# Patient Record
Sex: MALE | Race: WHITE | Employment: OTHER | URBAN - METROPOLITAN AREA
[De-identification: names, ages, dates, MRNs, and addresses within clinical notes are randomized per-mention and may not be internally consistent; named-entity substitution may affect disease eponyms.]

---

## 2017-02-03 ENCOUNTER — HOSPITAL ENCOUNTER (OUTPATIENT)
Dept: GENERAL RADIOLOGY | Age: 72
Discharge: HOME | End: 2017-02-03
Payer: MEDICARE

## 2017-02-03 DIAGNOSIS — R06.02 SHORTNESS OF BREATH: ICD-10-CM

## 2017-02-03 PROCEDURE — 71020 XR CHEST PA LAT: CPT

## 2017-03-27 ENCOUNTER — HOSPITAL ENCOUNTER (OUTPATIENT)
Dept: GENERAL RADIOLOGY | Age: 72
Discharge: HOME | End: 2017-03-27
Payer: MEDICARE

## 2017-03-27 DIAGNOSIS — J18.9 PNEUMONIA: ICD-10-CM

## 2017-03-27 PROCEDURE — 71020 XR CHEST PA LAT: CPT

## 2017-05-09 ENCOUNTER — HOSPITAL ENCOUNTER (OUTPATIENT)
Dept: CT IMAGING | Age: 72
Discharge: HOME | End: 2017-05-09
Attending: INTERNAL MEDICINE
Payer: MEDICARE

## 2017-05-09 DIAGNOSIS — J44.9 COPD (CHRONIC OBSTRUCTIVE PULMONARY DISEASE) (HCC): ICD-10-CM

## 2017-05-09 PROCEDURE — 71250 CT THORAX DX C-: CPT

## 2017-11-16 ENCOUNTER — HOSPITAL ENCOUNTER (OUTPATIENT)
Age: 72
Setting detail: OUTPATIENT SURGERY
Discharge: HOME | End: 2017-11-16
Attending: SPECIALIST | Admitting: SPECIALIST
Payer: MEDICARE

## 2017-11-16 ENCOUNTER — ANESTHESIA EVENT (OUTPATIENT)
Dept: ENDOSCOPY | Age: 72
End: 2017-11-16
Payer: MEDICARE

## 2017-11-16 ENCOUNTER — ANESTHESIA (OUTPATIENT)
Dept: ENDOSCOPY | Age: 72
End: 2017-11-16
Payer: MEDICARE

## 2017-11-16 VITALS
HEIGHT: 66 IN | SYSTOLIC BLOOD PRESSURE: 162 MMHG | HEART RATE: 66 BPM | WEIGHT: 246 LBS | TEMPERATURE: 98.3 F | RESPIRATION RATE: 15 BRPM | OXYGEN SATURATION: 93 % | BODY MASS INDEX: 39.53 KG/M2 | DIASTOLIC BLOOD PRESSURE: 105 MMHG

## 2017-11-16 PROCEDURE — 88305 TISSUE EXAM BY PATHOLOGIST: CPT | Performed by: SPECIALIST

## 2017-11-16 PROCEDURE — 74011000250 HC RX REV CODE- 250

## 2017-11-16 PROCEDURE — 74011250636 HC RX REV CODE- 250/636

## 2017-11-16 PROCEDURE — 76040000019: Performed by: SPECIALIST

## 2017-11-16 PROCEDURE — 76060000031 HC ANESTHESIA FIRST 0.5 HR: Performed by: SPECIALIST

## 2017-11-16 PROCEDURE — 77030009426 HC FCPS BIOP ENDOSC BSC -B: Performed by: SPECIALIST

## 2017-11-16 RX ORDER — FLUMAZENIL 0.1 MG/ML
0.2 INJECTION INTRAVENOUS
Status: DISCONTINUED | OUTPATIENT
Start: 2017-11-16 | End: 2017-11-16 | Stop reason: HOSPADM

## 2017-11-16 RX ORDER — ATROPINE SULFATE 0.1 MG/ML
0.5 INJECTION INTRAVENOUS
Status: DISCONTINUED | OUTPATIENT
Start: 2017-11-16 | End: 2017-11-16 | Stop reason: HOSPADM

## 2017-11-16 RX ORDER — MIDAZOLAM HYDROCHLORIDE 1 MG/ML
.25-1 INJECTION, SOLUTION INTRAMUSCULAR; INTRAVENOUS
Status: DISCONTINUED | OUTPATIENT
Start: 2017-11-16 | End: 2017-11-16 | Stop reason: HOSPADM

## 2017-11-16 RX ORDER — SODIUM CHLORIDE 9 MG/ML
INJECTION, SOLUTION INTRAVENOUS
Status: DISCONTINUED | OUTPATIENT
Start: 2017-11-16 | End: 2017-11-16 | Stop reason: HOSPADM

## 2017-11-16 RX ORDER — PROPOFOL 10 MG/ML
INJECTION, EMULSION INTRAVENOUS AS NEEDED
Status: DISCONTINUED | OUTPATIENT
Start: 2017-11-16 | End: 2017-11-16 | Stop reason: HOSPADM

## 2017-11-16 RX ORDER — EPINEPHRINE 0.1 MG/ML
1 INJECTION INTRACARDIAC; INTRAVENOUS
Status: DISCONTINUED | OUTPATIENT
Start: 2017-11-16 | End: 2017-11-16 | Stop reason: HOSPADM

## 2017-11-16 RX ORDER — FENTANYL CITRATE 50 UG/ML
200 INJECTION, SOLUTION INTRAMUSCULAR; INTRAVENOUS
Status: DISCONTINUED | OUTPATIENT
Start: 2017-11-16 | End: 2017-11-16 | Stop reason: HOSPADM

## 2017-11-16 RX ORDER — SODIUM CHLORIDE 0.9 % (FLUSH) 0.9 %
5-10 SYRINGE (ML) INJECTION EVERY 8 HOURS
Status: DISCONTINUED | OUTPATIENT
Start: 2017-11-16 | End: 2017-11-16 | Stop reason: HOSPADM

## 2017-11-16 RX ORDER — SODIUM CHLORIDE 0.9 % (FLUSH) 0.9 %
5-10 SYRINGE (ML) INJECTION AS NEEDED
Status: DISCONTINUED | OUTPATIENT
Start: 2017-11-16 | End: 2017-11-16 | Stop reason: HOSPADM

## 2017-11-16 RX ORDER — SODIUM CHLORIDE 9 MG/ML
50 INJECTION, SOLUTION INTRAVENOUS CONTINUOUS
Status: DISCONTINUED | OUTPATIENT
Start: 2017-11-16 | End: 2017-11-16 | Stop reason: HOSPADM

## 2017-11-16 RX ORDER — LIDOCAINE HYDROCHLORIDE 20 MG/ML
INJECTION, SOLUTION EPIDURAL; INFILTRATION; INTRACAUDAL; PERINEURAL AS NEEDED
Status: DISCONTINUED | OUTPATIENT
Start: 2017-11-16 | End: 2017-11-16 | Stop reason: HOSPADM

## 2017-11-16 RX ORDER — NALOXONE HYDROCHLORIDE 0.4 MG/ML
0.4 INJECTION, SOLUTION INTRAMUSCULAR; INTRAVENOUS; SUBCUTANEOUS
Status: DISCONTINUED | OUTPATIENT
Start: 2017-11-16 | End: 2017-11-16 | Stop reason: HOSPADM

## 2017-11-16 RX ORDER — DEXTROMETHORPHAN/PSEUDOEPHED 2.5-7.5/.8
1.2 DROPS ORAL
Status: DISCONTINUED | OUTPATIENT
Start: 2017-11-16 | End: 2017-11-16 | Stop reason: HOSPADM

## 2017-11-16 RX ADMIN — LIDOCAINE HYDROCHLORIDE 60 MG: 20 INJECTION, SOLUTION EPIDURAL; INFILTRATION; INTRACAUDAL; PERINEURAL at 14:30

## 2017-11-16 RX ADMIN — PROPOFOL 50 MG: 10 INJECTION, EMULSION INTRAVENOUS at 14:30

## 2017-11-16 RX ADMIN — SODIUM CHLORIDE: 9 INJECTION, SOLUTION INTRAVENOUS at 14:25

## 2017-11-16 RX ADMIN — PROPOFOL 50 MG: 10 INJECTION, EMULSION INTRAVENOUS at 14:32

## 2017-11-16 NOTE — DISCHARGE INSTRUCTIONS
Quinton Kiara  [id number]  1945    COLON DISCHARGE INSTRUCTIONS  Discomfort:  Redness at IV site- apply warm compress to area; if redness or soreness persist- contact your physician  There may be a slight amount of blood passed from the rectum  Gaseous discomfort- walking, belching will help relieve any discomfort  You may not operate a vehicle for 12 hours  You may not engage in an occupation involving machinery or appliances for rest of today  You may not drink alcoholic beverages for at least 12 hours  Avoid making any critical decisions for at least 24 hour  DIET:   High fiber diet. - however -  remember your colon is empty and a heavy meal will produce gas. Avoid these foods:  vegetables, fried / greasy foods, carbonated drinks for today. MEDICATIONS:      Regarding Aspirin or Nonsteroidal medications, please see below. ACTIVITY:  You may resume your normal daily activities it is recommended that you spend the remainder of the day resting -  avoid any strenuous activity. CALL M.D. ANY SIGN OF:  Increasing pain, nausea, vomiting  Abdominal distension (swelling)  New increased bleeding (oral or rectal)  Fever (chills)  Pain in chest area  Bloody discharge from nose or mouth  Shortness of breath    You may not  take any Advil, Aspirin, Ibuprofen, Motrin, Aleve, or Goodys for 10 days, ONLY  Tylenol as needed for pain.       Follow-up Instructions:   Call Dr. Deya Jackson  Results of procedure / biopsy in 10 days  Telephone #  [phone]      DISCHARGE SUMMARY from Nurse    The following personal items collected during your admission are returned to you:   Dental Appliance: Dental Appliances: None  Vision:    Hearing Aid:    Jewelry:    Clothing:    Other Valuables:    Valuables sent to safe:

## 2017-11-16 NOTE — H&P
Pre-endoscopy H and P     The patient was seen and examined in the endoscopy suite. The airway was assessed and docuemented. The problem list and medications were reviewed. Patient Active Problem List   Diagnosis Code    GI bleed K92.2    TIA (transient ischemic attack) G45.9     Social History     Social History    Marital status: SINGLE     Spouse name: N/A    Number of children: N/A    Years of education: N/A     Occupational History    Not on file. Social History Main Topics    Smoking status: Former Smoker     Packs/day: 2.00     Years: 45.00     Quit date: 6/19/2005    Smokeless tobacco: Never Used    Alcohol use No      Comment: 1 gallon whiskey a week stopped 4 years ago    Drug use: Not on file    Sexual activity: Not on file     Other Topics Concern    Not on file     Social History Narrative     Past Medical History:   Diagnosis Date    Chronic kidney disease     stones    [facility] [facility]     Diabetes ([facility] [address] [facility]     Hypertension     Stroke ([facility] [address] 1980s    no residual         Prior to Admission Medications   Prescriptions Last Dose Informant Patient Reported? Taking?   acetaminophen 650 mg Tab 11/2/2017  No No   Sig: Take 650 mg by mouth every four (4) hours as needed. atorvastatin (LIPITOR) 20 mg tablet 11/15/2017  No No   Sig: Take 1 Tab by mouth daily. dexamethasone (DECADRON) 0.5 mg tablet 11/15/2017 at Unknown time  No Yes   Sig: Decadron 2 mg every 6 hours for 3 days then, 2 mg every 8 hoursx2 days  then 2 mg every 12 hours x2 days  Then 1 mg every 12 hoursx2 days then 0.5 mg every 12 hours x2 days , then daily for 2 days and stop   insulin glargine (LANTUS) 100 unit/mL injection 11/15/2017 at Unknown time  No Yes   Sig: Lantus 6 units daily   pantoprazole (PROTONIX) 20 mg tablet 11/15/2017 at Unknown time  No Yes   Sig: Take 2 Tabs by mouth daily.       Facility-Administered Medications: None       Chief complaint, history of present illness, and review of systems and Past medical History are positive for: chronic cough, heartburn dysphagia, COPD and CVA. The heart, lungs and mental status were satisfactory for the administration of sedation and for the procedure. I discussed with the patient the objectives, risks, consequences and alternatives to the procedure.      Plan: Endoscopic procedure with sedation     Chandler Prakash MD   11/16/2017  2:27 PM

## 2017-11-16 NOTE — IP AVS SNAPSHOT
[address] 
[phone] Patient: Kathryn Maciel MRN: [id number] PBL:0/2/8978 About your hospitalization You were admitted on:  November 16, 2017 You last received care in the:  [address] [place] [address] ENDOSCOPY You were discharged on:  November 16, 2017 Why you were hospitalized Your primary diagnosis was:  Not on File Discharge Orders None A check beto indicates which time of day the medication should be taken. My Medications TAKE these medications as instructed Instructions Each Dose to Equal  
 Morning Noon Evening Bedtime  
 acetaminophen 650 mg Tab Your last dose was: Your next dose is: Take 650 mg by mouth every four (4) hours as needed. 650 mg  
    
   
   
   
  
 atorvastatin 20 mg tablet Commonly known as:  LIPITOR Your last dose was: Your next dose is: Take 1 Tab by mouth daily. 20 mg  
    
   
   
   
  
 dexamethasone 0.5 mg tablet Commonly known as:  DECADRON Your last dose was: Your next dose is:    
   
   
 Decadron 2 mg every 6 hours for 3 days then, 2 mg every 8 hoursx2 days  then 2 mg every 12 hours x2 days  Then 1 mg every 12 hoursx2 days then 0.5 mg every 12 hours x2 days , then daily for 2 days and stop  
     
   
   
   
  
 insulin glargine 100 unit/mL injection Commonly known as:  LANTUS Your last dose was: Your next dose is:    
   
   
 Lantus 6 units daily  
     
   
   
   
  
 pantoprazole 20 mg tablet Commonly known as:  PROTONIX Your last dose was: Your next dose is: Take 2 Tabs by mouth daily. 40 mg Discharge Instructions Kathryn Maciel [id number] 
1945 COLON DISCHARGE INSTRUCTIONS Discomfort: 
Redness at IV site- apply warm compress to area; if redness or soreness persist- contact your physician There may be a slight amount of blood passed from the rectum Gaseous discomfort- walking, belching will help relieve any discomfort You may not operate a vehicle for 12 hours You may not engage in an occupation involving machinery or appliances for rest of today You may not drink alcoholic beverages for at least 12 hours Avoid making any critical decisions for at least 24 hour DIET: 
 High fiber diet.  however -  remember your colon is empty and a heavy meal will produce gas. Avoid these foods:  vegetables, fried / greasy foods, carbonated drinks for today. MEDICATIONS: 
  
 Regarding Aspirin or Nonsteroidal medications, please see below. ACTIVITY: 
You may resume your normal daily activities it is recommended that you spend the remainder of the day resting -  avoid any strenuous activity. CALL M.D. ANY SIGN OF: Increasing pain, nausea, vomiting Abdominal distension (swelling) New increased bleeding (oral or rectal) Fever (chills) Pain in chest area Bloody discharge from nose or mouth Shortness of breath You may not  take any Advil, Aspirin, Ibuprofen, Motrin, Aleve, or Goodys for 10 days, ONLY  Tylenol as needed for pain. Follow-up Instructions: 
 Call Dr. Diana Templeton Results of procedure / biopsy in 10 days Telephone #  [phone] DISCHARGE SUMMARY from Nurse The following personal items collected during your admission are returned to you:  
Dental Appliance: Dental Appliances: None Vision:   
Hearing Aid:   
Jewelry:   
Clothing:   
Other Valuables:   
Valuables sent to safe:   
 
 
 
 
  
  
  
Introducing [facility]! [facility] introduces [employer] patient portal. Now you can access parts of your medical record, email your doctor's office, and request medication refills online. 1. In your internet browser, go to [email or web address]. [employer] 2. Click on the First Time User? Click Here link in the Sign In box.  You will see the New Member Sign Up page. 3. Enter your [employer] Access Code exactly as it appears below. You will not need to use this code after youve completed the sign-up process. If you do not sign up before the expiration date, you must request a new code. · [employer] Access Code: [email or web address] Expires: 2/14/2018  2:51 PM 
 
4. Enter the last four digits of your Social Security Number (xxxx) and Date of Birth (mm/dd/yyyy) as indicated and click Submit. You will be taken to the next sign-up page. 5. Create a [employer] ID. This will be your [employer] login ID and cannot be changed, so think of one that is secure and easy to remember. 6. Create a [employer] password. You can change your password at any time. 7. Enter your Password Reset Question and Answer. This can be used at a later time if you forget your password. 8. Enter your e-mail address. You will receive e-mail notification when new information is available in [facility] [address] [facility] [address]. 9. Click Sign Up. You can now view and download portions of your medical record. 10. Click the Download Summary menu link to download a portable copy of your medical information. If you have questions, please visit the Frequently Asked Questions section of the [employer] website. Remember, [employer] is NOT to be used for urgent needs. For medical emergencies, dial 911. Now available from your iPhone and Android! Providers Seen During Your Hospitalization Provider Specialty Primary office phone Emre Arvizu MD Gastroenterology [phone] Your Primary Care Physician (PCP) Primary Care Physician Office Phone Office Fax Esther Gibbs [phone] You are allergic to the following No active allergies Recent Documentation Height Weight BMI Smoking Status 1.676 m 111.6 kg 39.71 kg/m2 Former Smoker Emergency Contacts Name Discharge Info Relation Home Work Mobile Reba Martinez DISCHARGE CAREGIVER [3] Friend [5] [phone] Patient Belongings The following personal items are in your possession at time of discharge: 
  Dental Appliances: None Please provide this summary of care documentation to your next provider. Signatures-by signing, you are acknowledging that this After Visit Summary has been reviewed with you and you have received a copy. Patient Signature:  ____________________________________________________________ Date:  ____________________________________________________________  
  
Lian Bergman Provider Signature:  ____________________________________________________________ Date:  ____________________________________________________________

## 2017-11-16 NOTE — ANESTHESIA POSTPROCEDURE EVALUATION
Post-Anesthesia Evaluation and Assessment    Patient: Km Hussein MRN: [id number]  SSN: [id number]    YOB: 1945  Age: 81 y.o. Sex: male       Cardiovascular Function/Vital Signs  Visit Vitals    /77    Pulse 75    Temp 36.8 °C (98.3 °F)    Resp 21    Ht 5' 6\" (1.676 m)    Wt 111.6 kg (246 lb)    SpO2 98%    BMI 39.71 kg/m2       Patient is status post MAC anesthesia for Procedure(s):  ESOPHAGOGASTRODUODENOSCOPY (EGD)  ESOPHAGOGASTRODUODENAL (EGD) BIOPSY. Nausea/Vomiting: None    Postoperative hydration reviewed and adequate. Pain:      Managed    Neurological Status: At baseline    Mental Status and Level of Consciousness: Arousable    Pulmonary Status:   O2 Device: Nasal cannula (11/16/17 1450)   Adequate oxygenation and airway patent    Complications related to anesthesia: None    Post-anesthesia assessment completed.  No concerns    Signed By: Renée Pierce DO     November 16, 2017

## 2017-11-16 NOTE — PROCEDURES
[address] [facility], [address] [facility]                 NAME:  Zane Light   :   1945   MRN:   [id number]     Date/Time:  2017 2:40 PM    Esophagogastroduodenoscopy (EGD) Procedure Note    :  Joaquin Ortiz MD    Referring Provider:  Oly Wilhelm MD    Anethesia/Sedation:  MAC anesthesia Propofol    Preoperative diagnosis: CHRONIC COUGH, DYSPHAGIA, HEARTBURN    Postoperative diagnosis: gastritis, r/o barretts    Procedure Details     After infom consent was obtained for the procedure, with all risks and benefits of procedure explained the patient was taken to the endoscopy suite and placed in the left lateral decubitus position. Following sequential administration of sedation as per above, the [id number] gastroscope was inserted into the mouth and advanced under direct vision to second portion of the duodenum. A careful inspection was made as the gastroscope was withdrawn, including a retroflexed view of the proximal stomach; findings and interventions are described below. Findings:  Esophagus:Irregular Z line at 35 cm, biopsies obtained to rule out Bridges esophagus. Stomach:Antral gastritis, biopsies done. Duodenum/jejunum:normal      Therapies:  none    Specimens: antral, distal esophagus biopsies           EBL: None    Complications:   None; patient tolerated the procedure well. Impression:    See Postoperative diagnosis above    Recommendations:  -Acid suppression with a proton pump inhibitor. , -Await pathology.     Discharge disposition:  Home in the company of  when able to ambulate    Joaquin Ortiz MD

## 2017-11-16 NOTE — PROGRESS NOTES
Endoscope was pre-cleaned at bedside immediately following procedure by [facility] FOR COGNITIVE DISORDERS. Ward Solomon

## 2017-11-16 NOTE — ANESTHESIA PREPROCEDURE EVALUATION
Anesthetic History   No history of anesthetic complications            Review of Systems / Medical History  Patient summary reviewed, nursing notes reviewed and pertinent labs reviewed    Pulmonary  Within defined limits                 Neuro/Psych         TIA     Cardiovascular    Hypertension              Exercise tolerance: >4 METS     GI/Hepatic/Renal  Within defined limits              Endo/Other    Diabetes         Other Findings              Physical Exam    Airway  Mallampati: II  TM Distance: > 6 cm  Neck ROM: normal range of motion   Mouth opening: Normal     Cardiovascular  Regular rate and rhythm,  S1 and S2 normal,  no murmur, click, rub, or gallop             Dental  No notable dental hx       Pulmonary  Breath sounds clear to auscultation               Abdominal  GI exam deferred       Other Findings            Anesthetic Plan    ASA: 3  Anesthesia type: MAC          Induction: Intravenous  Anesthetic plan and risks discussed with: Patient

## 2017-11-16 NOTE — ROUTINE PROCESS
Zane Flemington  1945  [id number]    Situation:  Verbal report received from: RN Venecia  Procedure: Procedure(s):  ESOPHAGOGASTRODUODENOSCOPY (EGD)  ESOPHAGOGASTRODUODENAL (EGD) BIOPSY    Background:    Preoperative diagnosis: CHRONIC COUGH, DYSPHAGIA, HEARTBURN  Postoperative diagnosis: gastritis, r/o barretts    :  Dr. Roselia Ruth  Assistant(s): Endoscopy Technician-1: Kiera Rico  Endoscopy RN-1: Joanna Rodriguez RN    Specimens:   ID Type Source Tests Collected by Time Destination   1 : antrum Preservative Gastric  Joaquin Ortiz MD 11/16/2017 1435 Pathology   2 : pathology Preservative Esophagus, Distal  Joaquin Ortiz MD 11/16/2017 1435 Pathology     H. Pylori  no    Assessment:  Intra-procedure medications       Anesthesia gave intra-procedure sedation and medications, see anesthesia flow sheet yes    Intravenous fluids:  200 NS @ KVO     Vital signs stable yes    Abdominal assessment: round and soft yes    Recommendation:  Discharge patient per MD order yes.   Return to floor no  Family or Friend : friend  Permission to share finding with family or friend yes

## 2018-04-12 ENCOUNTER — ANESTHESIA (OUTPATIENT)
Dept: ENDOSCOPY | Age: 73
End: 2018-04-12
Payer: MEDICARE

## 2018-04-12 ENCOUNTER — HOSPITAL ENCOUNTER (OUTPATIENT)
Age: 73
Setting detail: OUTPATIENT SURGERY
Discharge: HOME | End: 2018-04-12
Attending: SPECIALIST | Admitting: SPECIALIST
Payer: MEDICARE

## 2018-04-12 ENCOUNTER — ANESTHESIA EVENT (OUTPATIENT)
Dept: ENDOSCOPY | Age: 73
End: 2018-04-12
Payer: MEDICARE

## 2018-04-12 VITALS
SYSTOLIC BLOOD PRESSURE: 128 MMHG | DIASTOLIC BLOOD PRESSURE: 63 MMHG | BODY MASS INDEX: 38.9 KG/M2 | HEART RATE: 65 BPM | WEIGHT: 241 LBS | TEMPERATURE: 97.9 F | OXYGEN SATURATION: 95 % | RESPIRATION RATE: 17 BRPM

## 2018-04-12 LAB
GLUCOSE BLD STRIP.AUTO-MCNC: 67 MG/DL (ref 65–100)
GLUCOSE BLD STRIP.AUTO-MCNC: 78 MG/DL (ref 65–100)
GLUCOSE BLD STRIP.AUTO-MCNC: 86 MG/DL (ref 65–100)
SERVICE CMNT-IMP: NORMAL

## 2018-04-12 PROCEDURE — 77030027957 HC TBNG IRR ENDOGTR BUSS -B: Performed by: SPECIALIST

## 2018-04-12 PROCEDURE — 76060000032 HC ANESTHESIA 0.5 TO 1 HR: Performed by: SPECIALIST

## 2018-04-12 PROCEDURE — 74011250636 HC RX REV CODE- 250/636

## 2018-04-12 PROCEDURE — 77030011640 HC PAD GRND REM COVD -A: Performed by: SPECIALIST

## 2018-04-12 PROCEDURE — 82962 GLUCOSE BLOOD TEST: CPT

## 2018-04-12 PROCEDURE — 74011250637 HC RX REV CODE- 250/637: Performed by: SPECIALIST

## 2018-04-12 PROCEDURE — 77030013992 HC SNR POLYP ENDOSC BSC -B: Performed by: SPECIALIST

## 2018-04-12 PROCEDURE — 76040000007: Performed by: SPECIALIST

## 2018-04-12 PROCEDURE — 88305 TISSUE EXAM BY PATHOLOGIST: CPT | Performed by: SPECIALIST

## 2018-04-12 RX ORDER — PROPOFOL 10 MG/ML
INJECTION, EMULSION INTRAVENOUS AS NEEDED
Status: DISCONTINUED | OUTPATIENT
Start: 2018-04-12 | End: 2018-04-12 | Stop reason: HOSPADM

## 2018-04-12 RX ORDER — ATROPINE SULFATE 0.1 MG/ML
0.5 INJECTION INTRAVENOUS
Status: DISCONTINUED | OUTPATIENT
Start: 2018-04-12 | End: 2018-04-12 | Stop reason: HOSPADM

## 2018-04-12 RX ORDER — DEXTROMETHORPHAN/PSEUDOEPHED 2.5-7.5/.8
1.2 DROPS ORAL
Status: DISCONTINUED | OUTPATIENT
Start: 2018-04-12 | End: 2018-04-12 | Stop reason: HOSPADM

## 2018-04-12 RX ORDER — NALOXONE HYDROCHLORIDE 0.4 MG/ML
0.4 INJECTION, SOLUTION INTRAMUSCULAR; INTRAVENOUS; SUBCUTANEOUS
Status: DISCONTINUED | OUTPATIENT
Start: 2018-04-12 | End: 2018-04-12 | Stop reason: HOSPADM

## 2018-04-12 RX ORDER — FLUMAZENIL 0.1 MG/ML
0.2 INJECTION INTRAVENOUS
Status: DISCONTINUED | OUTPATIENT
Start: 2018-04-12 | End: 2018-04-12 | Stop reason: HOSPADM

## 2018-04-12 RX ORDER — EPINEPHRINE 0.1 MG/ML
1 INJECTION INTRACARDIAC; INTRAVENOUS
Status: DISCONTINUED | OUTPATIENT
Start: 2018-04-12 | End: 2018-04-12 | Stop reason: HOSPADM

## 2018-04-12 RX ORDER — PYRIDOXINE HCL (VITAMIN B6) 100 MG
100 TABLET ORAL DAILY
COMMUNITY
End: 2019-12-03

## 2018-04-12 RX ORDER — SODIUM CHLORIDE 9 MG/ML
INJECTION, SOLUTION INTRAVENOUS
Status: DISCONTINUED | OUTPATIENT
Start: 2018-04-12 | End: 2018-04-12 | Stop reason: HOSPADM

## 2018-04-12 RX ORDER — LOSARTAN POTASSIUM 25 MG/1
TABLET ORAL DAILY
COMMUNITY
End: 2019-12-03

## 2018-04-12 RX ORDER — SODIUM CHLORIDE 9 MG/ML
50 INJECTION, SOLUTION INTRAVENOUS CONTINUOUS
Status: DISCONTINUED | OUTPATIENT
Start: 2018-04-12 | End: 2018-04-12 | Stop reason: HOSPADM

## 2018-04-12 RX ORDER — FUROSEMIDE 20 MG/1
TABLET ORAL EVERY OTHER DAY
COMMUNITY
End: 2020-11-05 | Stop reason: SDUPTHER

## 2018-04-12 RX ORDER — SIMVASTATIN 20 MG/1
20 TABLET, FILM COATED ORAL
COMMUNITY
End: 2021-05-27

## 2018-04-12 RX ORDER — GLUCOSAMINE SULFATE 1500 MG
POWDER IN PACKET (EA) ORAL DAILY
COMMUNITY
End: 2019-05-29

## 2018-04-12 RX ORDER — GLUCOSAMINE SULFATE 1500 MG
POWDER IN PACKET (EA) ORAL DAILY
COMMUNITY
End: 2020-10-01

## 2018-04-12 RX ORDER — SODIUM CHLORIDE 0.9 % (FLUSH) 0.9 %
5-10 SYRINGE (ML) INJECTION AS NEEDED
Status: DISCONTINUED | OUTPATIENT
Start: 2018-04-12 | End: 2018-04-12 | Stop reason: HOSPADM

## 2018-04-12 RX ORDER — METFORMIN HYDROCHLORIDE 500 MG/1
1000 TABLET ORAL 2 TIMES DAILY WITH MEALS
COMMUNITY
End: 2022-07-13

## 2018-04-12 RX ORDER — MIDAZOLAM HYDROCHLORIDE 1 MG/ML
.25-1 INJECTION, SOLUTION INTRAMUSCULAR; INTRAVENOUS
Status: DISCONTINUED | OUTPATIENT
Start: 2018-04-12 | End: 2018-04-12 | Stop reason: HOSPADM

## 2018-04-12 RX ORDER — FENTANYL CITRATE 50 UG/ML
200 INJECTION, SOLUTION INTRAMUSCULAR; INTRAVENOUS
Status: DISCONTINUED | OUTPATIENT
Start: 2018-04-12 | End: 2018-04-12 | Stop reason: HOSPADM

## 2018-04-12 RX ORDER — SODIUM CHLORIDE 0.9 % (FLUSH) 0.9 %
5-10 SYRINGE (ML) INJECTION EVERY 8 HOURS
Status: DISCONTINUED | OUTPATIENT
Start: 2018-04-12 | End: 2018-04-12 | Stop reason: HOSPADM

## 2018-04-12 RX ADMIN — SIMETHICONE 80 MG: 20 SUSPENSION/ DROPS ORAL at 12:04

## 2018-04-12 RX ADMIN — PROPOFOL 20 MG: 10 INJECTION, EMULSION INTRAVENOUS at 12:02

## 2018-04-12 RX ADMIN — PROPOFOL 20 MG: 10 INJECTION, EMULSION INTRAVENOUS at 12:10

## 2018-04-12 RX ADMIN — PROPOFOL 20 MG: 10 INJECTION, EMULSION INTRAVENOUS at 12:04

## 2018-04-12 RX ADMIN — PROPOFOL 20 MG: 10 INJECTION, EMULSION INTRAVENOUS at 12:07

## 2018-04-12 RX ADMIN — PROPOFOL 50 MG: 10 INJECTION, EMULSION INTRAVENOUS at 11:58

## 2018-04-12 RX ADMIN — PROPOFOL 20 MG: 10 INJECTION, EMULSION INTRAVENOUS at 12:13

## 2018-04-12 RX ADMIN — PROPOFOL 30 MG: 10 INJECTION, EMULSION INTRAVENOUS at 12:16

## 2018-04-12 RX ADMIN — SODIUM CHLORIDE: 9 INJECTION, SOLUTION INTRAVENOUS at 11:41

## 2018-04-12 RX ADMIN — PROPOFOL 20 MG: 10 INJECTION, EMULSION INTRAVENOUS at 12:00

## 2018-04-12 NOTE — ROUTINE PROCESS
Mandy Dany  1945  [id number]    Situation:  Verbal report received from: MARISSA Childers RN  Procedure: Procedure(s):  COLONOSCOPY  ENDOSCOPIC POLYPECTOMY    Background:    Preoperative diagnosis: CHRONIC COUGH, DYSPHAGIA, DIABETES MELLITUS  Postoperative diagnosis: 1. diverticulosis  2. colon polyps  3. small hemorrhoids    :  Dr. Alphonso San  Assistant(s): Endoscopy Technician-2: Marilia Valadez  Endoscopy RN-1: Jeaneen Seip, RN    Specimens:   ID Type Source Tests Collected by Time Destination   1 : sigmoid colon polyp Preservative   Richard Thomas MD 4/12/2018 1215 Pathology   2 : rectum polyp Preservative   Richard Thomas MD 4/12/2018 1219 Pathology     H. Pylori  no    Assessment:  Intra-procedure medications     Anesthesia gave intra-procedure sedation and medications, see anesthesia flow sheet yes    Intravenous fluids: NS@ KVO     Vital signs stable     Abdominal assessment: round and soft     Recommendation:  Discharge patient per MD order.     Family or Friend   Permission to share finding with family or friend yes

## 2018-04-12 NOTE — ANESTHESIA POSTPROCEDURE EVALUATION
Post-Anesthesia Evaluation and Assessment    Patient: Suki Corey MRN: [id number]  SSN: [id number]    YOB: 1945  Age: 81 y.o. Sex: male       Cardiovascular Function/Vital Signs  Visit Vitals    /76    Pulse 78    Temp 36.6 °C (97.9 °F)    Resp 19    Wt 109.3 kg (241 lb)    SpO2 94%    BMI 38.9 kg/m2       Patient is status post MAC anesthesia for Procedure(s):  COLONOSCOPY  ENDOSCOPIC POLYPECTOMY. Nausea/Vomiting: None    Postoperative hydration reviewed and adequate. Pain:  Pain Scale 1: Numeric (0 - 10) (04/12/18 1229)  Pain Intensity 1: 0 (04/12/18 1229)   Managed    Neurological Status: At baseline    Mental Status and Level of Consciousness: Arousable    Pulmonary Status:   O2 Device: Nasal cannula (04/12/18 1229)   Adequate oxygenation and airway patent    Complications related to anesthesia: None    Post-anesthesia assessment completed.  No concerns    Signed By: Vinicio Hilliard MD     April 12, 2018

## 2018-04-12 NOTE — ANESTHESIA PREPROCEDURE EVALUATION
Anesthetic History   No history of anesthetic complications            Review of Systems / Medical History  Patient summary reviewed, nursing notes reviewed and pertinent labs reviewed    Pulmonary  Within defined limits  COPD               Neuro/Psych   Within defined limits    CVA       Cardiovascular  Within defined limits  Hypertension                   GI/Hepatic/Renal  Within defined limits              Endo/Other  Within defined limits  Diabetes    Morbid obesity     Other Findings              Physical Exam    Airway  Mallampati: II  TM Distance: > 6 cm  Neck ROM: normal range of motion   Mouth opening: Normal     Cardiovascular  Regular rate and rhythm,  S1 and S2 normal,  no murmur, click, rub, or gallop             Dental  No notable dental hx       Pulmonary  Breath sounds clear to auscultation               Abdominal  GI exam deferred       Other Findings            Anesthetic Plan    ASA: 3  Anesthesia type: MAC          Induction: Intravenous  Anesthetic plan and risks discussed with: Patient

## 2018-04-12 NOTE — PROGRESS NOTES
FBS 67. Pt states feeling ok. Given pt Ginger ale and a pack peanut butter crackers. Will recheck in 15min.

## 2018-04-12 NOTE — DISCHARGE INSTRUCTIONS
Omaira Booker  [id number]  1945    COLON DISCHARGE INSTRUCTIONS  Discomfort:  Redness at IV site- apply warm compress to area; if redness or soreness persist- contact your physician  There may be a slight amount of blood passed from the rectum  Gaseous discomfort- walking, belching will help relieve any discomfort  You may not operate a vehicle for 12 hours  You may not engage in an occupation involving machinery or appliances for rest of today  You may not drink alcoholic beverages for at least 12 hours  Avoid making any critical decisions for at least 24 hour  DIET:   High fiber diet. - however -  remember your colon is empty and a heavy meal will produce gas. Avoid these foods:  vegetables, fried / greasy foods, carbonated drinks for today. MEDICATIONS:      Regarding Aspirin or Nonsteroidal medications, please see below. ACTIVITY:  You may resume your normal daily activities it is recommended that you spend the remainder of the day resting -  avoid any strenuous activity. CALL M.D. ANY SIGN OF:  Increasing pain, nausea, vomiting  Abdominal distension (swelling)  New increased bleeding (oral or rectal)  Fever (chills)  Pain in chest area  Bloody discharge from nose or mouth  Shortness of breath    You may not  take any Advil, Aspirin, Ibuprofen, Motrin, Aleve, or Goodys for 10 days, ONLY  Tylenol as needed for pain.       Follow-up Instructions:   Call Dr. Oseas Whatley  Results of procedure / biopsy in 10 days  Telephone #  [phone]      DISCHARGE SUMMARY from Nurse    The following personal items collected during your admission are returned to you:   Dental Appliance: Dental Appliances: None  Vision: Visual Aid: Glasses, With patient  Hearing Aid:    Jewelry:    Clothing:    Other Valuables:    Valuables sent to safe:

## 2018-04-12 NOTE — IP AVS SNAPSHOT
[address] [facility] [place] [address] 
[phone] Patient: Regis Hahn MRN: [id number] EHO:[birth year] About your hospitalization You were admitted on:  April 12, 2018 You last received care in the:  [facility] ENDOSCOPY You were discharged on:  April 12, 2018 Why you were hospitalized Your primary diagnosis was:  Not on File Follow-up Information None Discharge Orders None A check beto indicates which time of day the medication should be taken. My Medications CONTINUE taking these medications Instructions Each Dose to Equal  
 Morning Noon Evening Bedtime  
 acetaminophen 650 mg Tab Your last dose was: Your next dose is: Take 650 mg by mouth every four (4) hours as needed. 650 mg  
    
   
   
   
  
 atorvastatin 20 mg tablet Commonly known as:  LIPITOR Your last dose was: Your next dose is: Take 1 Tab by mouth daily. 20 mg  
    
   
   
   
  
 * cholecalciferol 1,000 unit Cap Commonly known as:  VITAMIN D3 Your last dose was: Your next dose is: Take  by mouth daily. * VITAMIN D3 1,000 unit Cap Generic drug:  cholecalciferol Your last dose was: Your next dose is: Take  by mouth daily. dexamethasone 0.5 mg tablet Commonly known as:  DECADRON Your last dose was: Your next dose is:    
   
   
 Decadron 2 mg every 6 hours for 3 days then, 2 mg every 8 hoursx2 days  then 2 mg every 12 hours x2 days  Then 1 mg every 12 hoursx2 days then 0.5 mg every 12 hours x2 days , then daily for 2 days and stop  
     
   
   
   
  
 furosemide 20 mg tablet Commonly known as:  LASIX Your last dose was: Your next dose is: Take  by mouth daily. insulin glargine 100 unit/mL injection Commonly known as:  LANTUS Your last dose was: Your next dose is:    
   
   
 Lantus 6 units daily  
     
   
   
   
  
 losartan 25 mg tablet Commonly known as:  COZAAR Your last dose was: Your next dose is: Take  by mouth daily. metFORMIN 500 mg tablet Commonly known as:  GLUCOPHAGE Your last dose was: Your next dose is: Take  by mouth two (2) times daily (with meals). NovoLIN 70/30 U-100 Insulin 100 unit/mL (70-30) injection Generic drug:  insulin NPH/insulin regular Your last dose was: Your next dose is:    
   
   
 by SubCUTAneous route. pantoprazole 20 mg tablet Commonly known as:  PROTONIX Your last dose was: Your next dose is: Take 2 Tabs by mouth daily. 40 mg  
    
   
   
   
  
 simvastatin 20 mg tablet Commonly known as:  ZOCOR Your last dose was: Your next dose is: Take 20 mg by mouth nightly. 20 mg  
    
   
   
   
  
 VITAMIN B-6 100 mg tablet Generic drug:  pyridoxine (vitamin B6) Your last dose was: Your next dose is: Take 100 mg by mouth daily. 100 mg * Notice: This list has 2 medication(s) that are the same as other medications prescribed for you. Read the directions carefully, and ask your doctor or other care provider to review them with you. Discharge Instructions Rose Nicolas [phone] 
1945 COLON DISCHARGE INSTRUCTIONS Discomfort: 
Redness at IV site- apply warm compress to area; if redness or soreness persist- contact your physician There may be a slight amount of blood passed from the rectum Gaseous discomfort- walking, belching will help relieve any discomfort You may not operate a vehicle for 12 hours You may not engage in an occupation involving machinery or appliances for rest of today You may not drink alcoholic beverages for at least 12 hours Avoid making any critical decisions for at least 24 hour DIET: 
 High fiber diet.  however -  remember your colon is empty and a heavy meal will produce gas. Avoid these foods:  vegetables, fried / greasy foods, carbonated drinks for today. MEDICATIONS: 
  
 Regarding Aspirin or Nonsteroidal medications, please see below. ACTIVITY: 
You may resume your normal daily activities it is recommended that you spend the remainder of the day resting -  avoid any strenuous activity. CALL M.D. ANY SIGN OF: Increasing pain, nausea, vomiting Abdominal distension (swelling) New increased bleeding (oral or rectal) Fever (chills) Pain in chest area Bloody discharge from nose or mouth Shortness of breath You may not  take any Advil, Aspirin, Ibuprofen, Motrin, Aleve, or Goodys for 10 days, ONLY  Tylenol as needed for pain. Follow-up Instructions: 
 Call Dr. Jonah Reyes Results of procedure / biopsy in 10 days Telephone #  [phone] DISCHARGE SUMMARY from Nurse The following personal items collected during your admission are returned to you:  
Dental Appliance: Dental Appliances: None Vision: Visual Aid: Glasses, With patient Hearing Aid:   
Jewelry:   
Clothing:   
Other Valuables:   
Valuables sent to safe:   
 
 
 
 
  
  
  
Introducing [facility]! Liz Ibrahim introduces Paymetric patient portal. Now you can access parts of your medical record, email your doctor's office, and request medication refills online. 1. In your internet browser, go to [email or web address] 2. Click on the First Time User? Click Here link in the Sign In box. You will see the New Member Sign Up page. 3. Enter your Paymetric Access Code exactly as it appears below. You will not need to use this code after youve completed the sign-up process.  If you do not sign up before the expiration date, you must request a new code. 
 
· Beep Access Code: [email or web address] Expires: 7/11/2018 12:48 PM 
 
4. Enter the last four digits of your Social Security Number (xxxx) and Date of Birth (mm/dd/yyyy) as indicated and click Submit. You will be taken to the next sign-up page. 5. Create a Beep ID. This will be your Beep login ID and cannot be changed, so think of one that is secure and easy to remember. 6. Create a Beep password. You can change your password at any time. 7. Enter your Password Reset Question and Answer. This can be used at a later time if you forget your password. 8. Enter your e-mail address. You will receive e-mail notification when new information is available in [address] 9. Click Sign Up. You can now view and download portions of your medical record. 10. Click the Download Summary menu link to download a portable copy of your medical information. If you have questions, please visit the Frequently Asked Questions section of the Beep website. Remember, Beep is NOT to be used for urgent needs. For medical emergencies, dial 911. Now available from your iPhone and Android! Introducing Eder Mcneil As a Robert [facility] patient, I wanted to make you aware of our electronic visit tool called Eder Flakitoleonardo. [facility] 24/7 allows you to connect within minutes with a medical provider 24 hours a day, seven days a week via a mobile device or tablet or logging into a secure website from your computer. You can access Eder Maryannfin from anywhere in the [place]. A virtual visit might be right for you when you have a simple condition and feel like you just dont want to get out of bed, or cant get away from work for an appointment, when your regular [facility] provider is not available (evenings, weekends or holidays), or when youre out of town and need minor care.   Electronic visits cost only $49 and if the [facility] 24/7 provider determines a prescription is needed to treat your condition, one can be electronically transmitted to a nearby pharmacy*. Please take a moment to enroll today if you have not already done so. The enrollment process is free and takes just a few minutes. To enroll, please download the Rodolfo Cho 24/7 leo to your tablet or phone, or visit [email or web address] to enroll on your computer. And, as an [address] [place] [address] patient with a DashBurst account, the results of your visits will be scanned into your electronic medical record and your primary care provider will be able to view the scanned results. We urge you to continue to see your regular [facility] provider for your ongoing medical care. And while your primary care provider may not be the one available when you seek a Lumavita virtual visit, the peace of mind you get from getting a real diagnosis real time can be priceless. For more information on Lumavita, view our Frequently Asked Questions (FAQs) at [email or web address]. Sincerely, 
 
Meghan Mathews MD 
Chief Medical Officer [address] [place] [address] *:  certain medications cannot be prescribed via Lumavita Providers Seen During Your Hospitalization Provider Specialty Primary office phone Ana Medina MD Gastroenterology [phone] Your Primary Care Physician (PCP) Primary Care Physician Office Phone Office Fax Ray Villeda [phone] You are allergic to the following No active allergies Recent Documentation Weight BMI Smoking Status 109.3 kg 38.9 kg/m2 Former Smoker Emergency Contacts Name Discharge Info Relation Home Work Mobile JuanReba DISCHARGE CAREGIVER [3] Friend [5] [phone] Patient Belongings The following personal items are in your possession at time of discharge: Dental Appliances: None  Visual Aid: Glasses, With patient Please provide this summary of care documentation to your next provider. Signatures-by signing, you are acknowledging that this After Visit Summary has been reviewed with you and you have received a copy. Patient Signature:  ____________________________________________________________ Date:  ____________________________________________________________  
  
Ej Cart Provider Signature:  ____________________________________________________________ Date:  ____________________________________________________________

## 2018-04-12 NOTE — H&P
Colonoscopy History and Physical      The patient was seen and examined. Date of last colonoscopy: 2012, Polyps  Yes      The airway was assessed and documented. The problem list, past medical history, and medications were reviewed. Patient Active Problem List   Diagnosis Code    GI bleed K92.2    TIA (transient ischemic attack) G45.9     Social History     Social History    Marital status: SINGLE     Spouse name: N/A    Number of children: N/A    Years of education: N/A     Occupational History    Not on file. Social History Main Topics    Smoking status: Former Smoker     Packs/day: 2.00     Years: 45.00     Quit date: 6/19/2005    Smokeless tobacco: Never Used    Alcohol use No      Comment: 1 gallon whiskey a week stopped 4 years ago    Drug use: Not on file    Sexual activity: Not on file     Other Topics Concern    Not on file     Social History Narrative     Past Medical History:   Diagnosis Date    CAD (coronary artery disease)     Chronic kidney disease     stones    [facility] disease [facility] [address] [address]     Diabetes ([facility] [address]     Hypertension     Stroke ([facility] [address] 1980s    right hand neuropathy         Prior to Admission Medications   Prescriptions Last Dose Informant Patient Reported? Taking?   acetaminophen 650 mg Tab Not Taking at Unknown time  No No   Sig: Take 650 mg by mouth every four (4) hours as needed. atorvastatin (LIPITOR) 20 mg tablet Not Taking at Unknown time  No No   Sig: Take 1 Tab by mouth daily. cholecalciferol (VITAMIN D3) 1,000 unit cap 4/11/2018 at Unknown time  Yes Yes   Sig: Take  by mouth daily. cholecalciferol (VITAMIN D3) 1,000 unit cap 4/11/2018 at Unknown time  Yes Yes   Sig: Take  by mouth daily.    dexamethasone (DECADRON) 0.5 mg tablet   No No   Sig: Decadron 2 mg every 6 hours for 3 days then, 2 mg every 8 hoursx2 days  then 2 mg every 12 hours x2 days  Then 1 mg every 12 hoursx2 days then 0.5 mg every 12 hours x2 days , then daily for 2 days and stop   furosemide (LASIX) 20 mg tablet 4/11/2018 at Unknown time  Yes Yes   Sig: Take  by mouth daily. insulin NPH/insulin regular (NOVOLIN 70/30 U-100 INSULIN) 100 unit/mL (70-30) injection 4/12/2018 at Unknown time  Yes Yes   Sig: by SubCUTAneous route. insulin glargine (LANTUS) 100 unit/mL injection Not Taking at Unknown time  No No   Sig: Lantus 6 units daily   losartan (COZAAR) 25 mg tablet 4/11/2018 at Unknown time  Yes Yes   Sig: Take  by mouth daily. metFORMIN (GLUCOPHAGE) 500 mg tablet 4/11/2018 at Unknown time  Yes Yes   Sig: Take  by mouth two (2) times daily (with meals). pantoprazole (PROTONIX) 20 mg tablet 4/11/2018 at Unknown time  No Yes   Sig: Take 2 Tabs by mouth daily. pyridoxine, vitamin B6, (VITAMIN B-6) 100 mg tablet 4/11/2018 at Unknown time  Yes Yes   Sig: Take 100 mg by mouth daily. simvastatin (ZOCOR) 20 mg tablet 4/11/2018 at Unknown time  Yes Yes   Sig: Take 20 mg by mouth nightly. Facility-Administered Medications: None       The patient was seen and examined in the endoscopy suite. The airway was assessed and docuemented. The problem list and medications were reviewed. Chief complaint, history of present illness, and review of systems and Past medical History are positive for: diabetes, HTN, Colon polyps , obesity and COPD. The heart, lungs and mental status were satisfactory for the administration of sedation and for the procedure. I discussed with the patient the objectives, risks, consequences and alternatives to the procedure.      Plan: Endoscopic procedure with sedation     Mark Jordan MD   4/12/2018  11:57 AM

## 2018-04-12 NOTE — PROGRESS NOTES

## 2018-04-12 NOTE — PROCEDURES
[address] [facility]  [address]                 Colonoscopy Procedure Note    Indications:   See Preoperative Diagnosis above  Referring Physician: Tee Cordova MD  Anesthesia/Sedation: MAC anesthesia Propofol  Endoscopist:  Dr. Liu Hubbard  Assistant:  Endoscopy Technician-2: Isak Ann  Endoscopy RN-1: Marta Adams RN  Preoperative diagnosis: CHRONIC COUGH, DYSPHAGIA, DIABETES MELLITUS  Postoperative diagnosis: 1. diverticulosis  2. colon polyps  3. small hemorrhoids    Procedure in Detail:  Informed consent was obtained for the procedure, including sedation. Risks of perforation, hemorrhage, adverse drug reaction, and aspiration were discussed. The patient was placed in the left lateral decubitus position. Based on the pre-procedure assessment, including review of the patient's medical history, medications, allergies, and review of systems, he had been deemed to be an appropriate candidate for moderate sedation; he was therefore sedated with the medications listed above. The patient was monitored continuously with ECG tracing, pulse oximetry, blood pressure monitoring, and direct observations. A rectal examination was performed. The [id number] was inserted into the rectum and advanced under direct vision to the cecum, which was identified by the ileocecal valve and appendiceal orifice. The quality of the colonic preparation was good. A careful inspection was made as the colonoscope was withdrawn, including a retroflexed view of the rectum; findings and interventions are described below. Appropriate photodocumentation was obtained. Findings:  Rectum: Grade 2 non bleeding hemorrhoids, 6 mm sessile polyp removed with hot snare. Sigmoid: moderate diverticulosis; Descending Colon: moderate diverticulosis;  Transverse Colon: moderate diverticulosis;   Ascending Colon: normal  Cecum: normal    Specimens:    Colon polyps    EBL: None    Complications: None; patient tolerated the procedure well. Recommendations:     - Await pathology. - If adenoma is present, repeat colonoscopy in 3 years. - High fiber diet.       Signed By: Betty Bennett MD                        April 12, 2018

## 2018-12-06 ENCOUNTER — HOSPITAL ENCOUNTER (OUTPATIENT)
Dept: GENERAL RADIOLOGY | Age: 73
Discharge: HOME | End: 2018-12-06
Payer: MEDICARE

## 2018-12-06 DIAGNOSIS — R05.9 COUGH: ICD-10-CM

## 2018-12-06 PROCEDURE — 71046 X-RAY EXAM CHEST 2 VIEWS: CPT

## 2019-02-13 ENCOUNTER — HOSPITAL ENCOUNTER (OUTPATIENT)
Dept: CT IMAGING | Age: 74
Discharge: HOME | End: 2019-02-13
Payer: MEDICARE

## 2019-02-13 DIAGNOSIS — R04.2 HEMOPTYSIS: ICD-10-CM

## 2019-02-13 PROCEDURE — 71250 CT THORAX DX C-: CPT

## 2019-05-28 ENCOUNTER — HOSPITAL ENCOUNTER (OUTPATIENT)
Age: 74
Setting detail: OBSERVATION
Discharge: HOME | End: 2019-05-29
Attending: EMERGENCY MEDICINE | Admitting: INTERNAL MEDICINE
Payer: MEDICARE

## 2019-05-28 ENCOUNTER — APPOINTMENT (OUTPATIENT)
Dept: GENERAL RADIOLOGY | Age: 74
End: 2019-05-28
Attending: EMERGENCY MEDICINE
Payer: MEDICARE

## 2019-05-28 ENCOUNTER — APPOINTMENT (OUTPATIENT)
Dept: CT IMAGING | Age: 74
End: 2019-05-28
Attending: EMERGENCY MEDICINE
Payer: MEDICARE

## 2019-05-28 DIAGNOSIS — Z87.898: Primary | ICD-10-CM

## 2019-05-28 DIAGNOSIS — G45.9 TIA (TRANSIENT ISCHEMIC ATTACK): ICD-10-CM

## 2019-05-28 DIAGNOSIS — R40.4 TRANSIENT ALTERATION OF AWARENESS: ICD-10-CM

## 2019-05-28 LAB
ALBUMIN SERPL-MCNC: 3.3 G/DL (ref 3.5–5)
ALBUMIN/GLOB SERPL: 0.9 {RATIO} (ref 1.1–2.2)
ALP SERPL-CCNC: 60 U/L (ref 45–117)
ALT SERPL-CCNC: 33 U/L (ref 12–78)
ANION GAP SERPL CALC-SCNC: 8 MMOL/L (ref 5–15)
APPEARANCE UR: CLEAR
AST SERPL-CCNC: 29 U/L (ref 15–37)
ATRIAL RATE: 69 BPM
BACTERIA URNS QL MICRO: NEGATIVE /HPF
BASOPHILS # BLD: 0.1 K/UL (ref 0–0.1)
BASOPHILS NFR BLD: 2 % (ref 0–1)
BILIRUB SERPL-MCNC: 0.5 MG/DL (ref 0.2–1)
BILIRUB UR QL: NEGATIVE
BUN SERPL-MCNC: 18 MG/DL (ref 6–20)
BUN/CREAT SERPL: 13 (ref 12–20)
CALCIUM SERPL-MCNC: 8.3 MG/DL (ref 8.5–10.1)
CALCULATED P AXIS, ECG09: 60 DEGREES
CALCULATED T AXIS, ECG11: 50 DEGREES
CHLORIDE SERPL-SCNC: 110 MMOL/L (ref 97–108)
CO2 SERPL-SCNC: 26 MMOL/L (ref 21–32)
COLOR UR: NORMAL
COMMENT, HOLDF: NORMAL
COMMENT, HOLDF: NORMAL
CREAT SERPL-MCNC: 1.44 MG/DL (ref 0.7–1.3)
DIAGNOSIS, 93000: NORMAL
DIFFERENTIAL METHOD BLD: ABNORMAL
EOSINOPHIL # BLD: 0.2 K/UL (ref 0–0.4)
EOSINOPHIL NFR BLD: 4 % (ref 0–7)
EPITH CASTS URNS QL MICRO: NORMAL /LPF
ERYTHROCYTE [DISTWIDTH] IN BLOOD BY AUTOMATED COUNT: 13.4 % (ref 11.5–14.5)
GLOBULIN SER CALC-MCNC: 3.5 G/DL (ref 2–4)
GLUCOSE BLD STRIP.AUTO-MCNC: 117 MG/DL (ref 65–100)
GLUCOSE BLD STRIP.AUTO-MCNC: 163 MG/DL (ref 65–100)
GLUCOSE SERPL-MCNC: 150 MG/DL (ref 65–100)
GLUCOSE UR STRIP.AUTO-MCNC: NEGATIVE MG/DL
HCT VFR BLD AUTO: 43.5 % (ref 36.6–50.3)
HGB BLD-MCNC: 13.9 G/DL (ref 12.1–17)
HGB UR QL STRIP: NEGATIVE
IMM GRANULOCYTES # BLD AUTO: 0 K/UL (ref 0–0.04)
IMM GRANULOCYTES NFR BLD AUTO: 0 % (ref 0–0.5)
KETONES UR QL STRIP.AUTO: NEGATIVE MG/DL
LEUKOCYTE ESTERASE UR QL STRIP.AUTO: NEGATIVE
LYMPHOCYTES # BLD: 1.5 K/UL (ref 0.8–3.5)
LYMPHOCYTES NFR BLD: 37 % (ref 12–49)
MAGNESIUM SERPL-MCNC: 1.7 MG/DL (ref 1.6–2.4)
MCH RBC QN AUTO: 29.6 PG (ref 26–34)
MCHC RBC AUTO-ENTMCNC: 32 G/DL (ref 30–36.5)
MCV RBC AUTO: 92.8 FL (ref 80–99)
MONOCYTES # BLD: 0.4 K/UL (ref 0–1)
MONOCYTES NFR BLD: 9 % (ref 5–13)
NEUTS SEG # BLD: 1.8 K/UL (ref 1.8–8)
NEUTS SEG NFR BLD: 48 % (ref 32–75)
NITRITE UR QL STRIP.AUTO: NEGATIVE
NRBC # BLD: 0 K/UL (ref 0–0.01)
NRBC BLD-RTO: 0 PER 100 WBC
P-R INTERVAL, ECG05: 142 MS
PH UR STRIP: 5.5 [PH] (ref 5–8)
PLATELET # BLD AUTO: 109 K/UL (ref 150–400)
POTASSIUM SERPL-SCNC: 4.1 MMOL/L (ref 3.5–5.1)
PROT SERPL-MCNC: 6.8 G/DL (ref 6.4–8.2)
PROT UR STRIP-MCNC: NEGATIVE MG/DL
Q-T INTERVAL, ECG07: 406 MS
QRS DURATION, ECG06: 98 MS
QTC CALCULATION (BEZET), ECG08: 435 MS
RBC # BLD AUTO: 4.69 M/UL (ref 4.1–5.7)
RBC #/AREA URNS HPF: NORMAL /HPF (ref 0–5)
RBC MORPH BLD: ABNORMAL
SAMPLES BEING HELD,HOLD: NORMAL
SAMPLES BEING HELD,HOLD: NORMAL
SERVICE CMNT-IMP: ABNORMAL
SERVICE CMNT-IMP: ABNORMAL
SODIUM SERPL-SCNC: 144 MMOL/L (ref 136–145)
SP GR UR REFRACTOMETRY: 1.02 (ref 1–1.03)
UA: UC IF INDICATED,UAUC: NORMAL
UROBILINOGEN UR QL STRIP.AUTO: 0.2 EU/DL (ref 0.2–1)
VENTRICULAR RATE, ECG03: 69 BPM
WBC # BLD AUTO: 4 K/UL (ref 4.1–11.1)
WBC URNS QL MICRO: NORMAL /HPF (ref 0–4)

## 2019-05-28 PROCEDURE — 85025 COMPLETE CBC W/AUTO DIFF WBC: CPT

## 2019-05-28 PROCEDURE — 86140 C-REACTIVE PROTEIN: CPT

## 2019-05-28 PROCEDURE — 83735 ASSAY OF MAGNESIUM: CPT

## 2019-05-28 PROCEDURE — 71046 X-RAY EXAM CHEST 2 VIEWS: CPT

## 2019-05-28 PROCEDURE — 74011000250 HC RX REV CODE- 250: Performed by: EMERGENCY MEDICINE

## 2019-05-28 PROCEDURE — 94664 DEMO&/EVAL PT USE INHALER: CPT

## 2019-05-28 PROCEDURE — 85379 FIBRIN DEGRADATION QUANT: CPT

## 2019-05-28 PROCEDURE — 96360 HYDRATION IV INFUSION INIT: CPT

## 2019-05-28 PROCEDURE — 81001 URINALYSIS AUTO W/SCOPE: CPT

## 2019-05-28 PROCEDURE — 74011250636 HC RX REV CODE- 250/636: Performed by: INTERNAL MEDICINE

## 2019-05-28 PROCEDURE — 82962 GLUCOSE BLOOD TEST: CPT

## 2019-05-28 PROCEDURE — 83880 ASSAY OF NATRIURETIC PEPTIDE: CPT

## 2019-05-28 PROCEDURE — 70450 CT HEAD/BRAIN W/O DYE: CPT

## 2019-05-28 PROCEDURE — 99218 HC RM OBSERVATION: CPT

## 2019-05-28 PROCEDURE — 84443 ASSAY THYROID STIM HORMONE: CPT

## 2019-05-28 PROCEDURE — 82550 ASSAY OF CK (CPK): CPT

## 2019-05-28 PROCEDURE — 85652 RBC SED RATE AUTOMATED: CPT

## 2019-05-28 PROCEDURE — 94640 AIRWAY INHALATION TREATMENT: CPT

## 2019-05-28 PROCEDURE — 77030029684 HC NEB SM VOL KT MONA -A

## 2019-05-28 PROCEDURE — 96361 HYDRATE IV INFUSION ADD-ON: CPT

## 2019-05-28 PROCEDURE — 80053 COMPREHEN METABOLIC PANEL: CPT

## 2019-05-28 PROCEDURE — 84100 ASSAY OF PHOSPHORUS: CPT

## 2019-05-28 PROCEDURE — 99285 EMERGENCY DEPT VISIT HI MDM: CPT

## 2019-05-28 PROCEDURE — 93005 ELECTROCARDIOGRAM TRACING: CPT

## 2019-05-28 PROCEDURE — 36415 COLL VENOUS BLD VENIPUNCTURE: CPT

## 2019-05-28 PROCEDURE — 84484 ASSAY OF TROPONIN QUANT: CPT

## 2019-05-28 PROCEDURE — 74011250636 HC RX REV CODE- 250/636: Performed by: EMERGENCY MEDICINE

## 2019-05-28 RX ORDER — MAGNESIUM SULFATE 100 %
4 CRYSTALS MISCELLANEOUS AS NEEDED
Status: DISCONTINUED | OUTPATIENT
Start: 2019-05-28 | End: 2019-05-29 | Stop reason: HOSPADM

## 2019-05-28 RX ORDER — INSULIN LISPRO 100 [IU]/ML
INJECTION, SOLUTION INTRAVENOUS; SUBCUTANEOUS
Status: DISCONTINUED | OUTPATIENT
Start: 2019-05-28 | End: 2019-05-29 | Stop reason: HOSPADM

## 2019-05-28 RX ORDER — ACETAMINOPHEN 325 MG/1
650 TABLET ORAL
Status: DISCONTINUED | OUTPATIENT
Start: 2019-05-28 | End: 2019-05-29 | Stop reason: HOSPADM

## 2019-05-28 RX ORDER — ONDANSETRON 2 MG/ML
4 INJECTION INTRAMUSCULAR; INTRAVENOUS
Status: DISCONTINUED | OUTPATIENT
Start: 2019-05-28 | End: 2019-05-29 | Stop reason: HOSPADM

## 2019-05-28 RX ORDER — SODIUM CHLORIDE, SODIUM LACTATE, POTASSIUM CHLORIDE, CALCIUM CHLORIDE 600; 310; 30; 20 MG/100ML; MG/100ML; MG/100ML; MG/100ML
125 INJECTION, SOLUTION INTRAVENOUS CONTINUOUS
Status: DISCONTINUED | OUTPATIENT
Start: 2019-05-28 | End: 2019-05-28

## 2019-05-28 RX ORDER — IPRATROPIUM BROMIDE AND ALBUTEROL SULFATE 2.5; .5 MG/3ML; MG/3ML
3 SOLUTION RESPIRATORY (INHALATION)
Status: COMPLETED | OUTPATIENT
Start: 2019-05-28 | End: 2019-05-28

## 2019-05-28 RX ORDER — DEXTROSE 50 % IN WATER (D50W) INTRAVENOUS SYRINGE
12.5-25 AS NEEDED
Status: DISCONTINUED | OUTPATIENT
Start: 2019-05-28 | End: 2019-05-29 | Stop reason: HOSPADM

## 2019-05-28 RX ORDER — BISACODYL 5 MG
5 TABLET, DELAYED RELEASE (ENTERIC COATED) ORAL DAILY PRN
Status: DISCONTINUED | OUTPATIENT
Start: 2019-05-28 | End: 2019-05-29 | Stop reason: HOSPADM

## 2019-05-28 RX ORDER — SODIUM CHLORIDE 0.9 % (FLUSH) 0.9 %
5-40 SYRINGE (ML) INJECTION EVERY 8 HOURS
Status: DISCONTINUED | OUTPATIENT
Start: 2019-05-28 | End: 2019-05-29 | Stop reason: HOSPADM

## 2019-05-28 RX ORDER — SODIUM CHLORIDE 0.9 % (FLUSH) 0.9 %
5-40 SYRINGE (ML) INJECTION AS NEEDED
Status: DISCONTINUED | OUTPATIENT
Start: 2019-05-28 | End: 2019-05-29 | Stop reason: HOSPADM

## 2019-05-28 RX ORDER — SODIUM CHLORIDE, SODIUM LACTATE, POTASSIUM CHLORIDE, CALCIUM CHLORIDE 600; 310; 30; 20 MG/100ML; MG/100ML; MG/100ML; MG/100ML
75 INJECTION, SOLUTION INTRAVENOUS CONTINUOUS
Status: DISCONTINUED | OUTPATIENT
Start: 2019-05-28 | End: 2019-05-29 | Stop reason: HOSPADM

## 2019-05-28 RX ADMIN — IPRATROPIUM BROMIDE AND ALBUTEROL SULFATE 3 ML: .5; 3 SOLUTION RESPIRATORY (INHALATION) at 17:51

## 2019-05-28 RX ADMIN — Medication 10 ML: at 22:43

## 2019-05-28 RX ADMIN — SODIUM CHLORIDE 1000 ML: 900 INJECTION, SOLUTION INTRAVENOUS at 19:22

## 2019-05-28 RX ADMIN — SODIUM CHLORIDE, SODIUM LACTATE, POTASSIUM CHLORIDE, AND CALCIUM CHLORIDE 75 ML/HR: 600; 310; 30; 20 INJECTION, SOLUTION INTRAVENOUS at 22:43

## 2019-05-28 NOTE — ED PROVIDER NOTES
72 y.o. male with past medical history significant for hypertension, CAD, stroke, and COPD who presents from home via a private vehicle with chief complaint of light-headedness. Wife reports the pt woke up this morning around 0800 complaining he \"was't feeling well\" but did not have any specific symptoms at the time. Wife reports the pt went to sleep again around 1000 at baseline. Wife reports she woke the pt up around 1500 and the pt was confused with slurred speech. In the ED, pt only complains of light-headedness and a little dyspnea. Pt reports his light-headedness is improved when he is sitting or otherwise resting and exacerbated with movement. Pt states other symptoms have now resolved. Pt denies any nausea, vomiting, chest pain, palpitations, numbness, tingling, dizziness, weakness, or other symptoms. There are no other acute medical concerns at this time. Social hx - Tobacco use: former smoker, Alcohol Use: former user    PCP: Gucci Duque MD    Note written by Luly Meza, as dictated by Mague Brooks MD 5:22 PM.        The history is provided by the patient and the spouse. No  was used.         Past Medical History:   Diagnosis Date    CAD (coronary artery disease)     Chronic kidney disease     stones    Chronic obstructive pulmonary disease ([address]     Diabetes ([address]     Hypertension     Stroke ([address] 1980s    right hand neuropathy       Past Surgical History:   Procedure Laterality Date    COLONOSCOPY N/A 4/12/2018    COLONOSCOPY performed by Ward Rutledge MD at [facility] ENDOSCOPY         Family History:   Problem Relation Age of Onset    Heart Disease Mother     Heart Disease Father     Cancer Sister         breast ca       Social History     Socioeconomic History    Marital status: SINGLE     Spouse name: Not on file    Number of children: Not on file    Years of education: Not on file    Highest education level: Not on file   Occupational History  Not on file   Social Needs    Financial resource strain: Not on file    Food insecurity:     Worry: Not on file     Inability: Not on file    Transportation needs:     Medical: Not on file     Non-medical: Not on file   Tobacco Use    Smoking status: Former Smoker     Packs/day: 2.00     Years: 45.00     Pack years: 90.00     Last attempt to quit: 2005     Years since quittin.9    Smokeless tobacco: Never Used   Substance and Sexual Activity    Alcohol use: No     Comment: 1 gallon whiskey a week stopped 4 years ago    Drug use: Not on file    Sexual activity: Not on file   Lifestyle    Physical activity:     Days per week: Not on file     Minutes per session: Not on file    Stress: Not on file   Relationships    Social connections:     Talks on phone: Not on file     Gets together: Not on file     Attends Taoist service: Not on file     Active member of club or organization: Not on file     Attends meetings of clubs or organizations: Not on file     Relationship status: Not on file    Intimate partner violence:     Fear of current or ex partner: Not on file     Emotionally abused: Not on file     Physically abused: Not on file     Forced sexual activity: Not on file   Other Topics Concern    Not on file   Social History Narrative    Not on file         ALLERGIES: Patient has no known allergies. Review of Systems   Constitutional: Negative for chills and fever. Respiratory: Positive for shortness of breath. Cardiovascular: Negative for chest pain and palpitations. Gastrointestinal: Negative for abdominal pain, constipation, diarrhea, nausea and vomiting. Neurological: Positive for speech difficulty and light-headedness. Negative for dizziness, weakness and numbness. Psychiatric/Behavioral: Positive for confusion. All other systems reviewed and are negative.       Vitals:    19 1714 19 1730 19 1736   BP: 124/77 99/46    Pulse: 75 69    Resp: 16 16 Temp: 97.9 °F (36.6 °C) 98.4 °F (36.9 °C)    SpO2: 93% (!) 88% 95%   Height: 5' 5\" (1.651 m)              Physical Exam   Constitutional: He is oriented to person, place, and time. He appears well-developed and well-nourished. No distress. Pt with a wide base gait. HENT:   Head: Normocephalic and atraumatic. Eyes: Pupils are equal, round, and reactive to light. Conjunctivae are normal.   Neck: Normal range of motion. Cardiovascular: Normal rate, regular rhythm and normal heart sounds. Exam reveals no gallop and no friction rub. No murmur heard. Pulmonary/Chest: Effort normal. He has wheezes. Some expiratory wheezes. Abdominal: Soft. He exhibits no distension. There is no tenderness. Musculoskeletal: Normal range of motion. Neurological: He is alert and oriented to person, place, and time. No cranial nerve deficit. Pt is alert and oriented. Equal strength in bilateral upper and bilateral lower extremities. No drift. No slurred speech. Skin: Skin is dry. Capillary refill takes less than 2 seconds. Nursing note and vitals reviewed. Note written by Willa Gilford, Scribe, as dictated by Jose Manuel Banuelos MD 5:22 PM.  MDM  Number of Diagnoses or Management Options  History of dysarthria:   Transient alteration of awareness:   Diagnosis management comments: DDX: arrhythmia, STEMI, seizure, meningitis, stroke, sepsis, subarachnoid hemorrhage, intracranial bleeding, TIA, encephalitis. Procedures  ED EKG interpretation:  Rhythm: normal sinus rhythm; and regular . Rate (approx.): 69 bpm; ST/T wave: no ST or T wave changes; no ectopy  Note written by Willa Gilford, Scribe, as dictated by Jose Manuel Banuelos MD 5:45 PM.           Hospitalist Romeo for Admission  6:59 PM    ED Room Number: ER10/10  Patient Name and age:  Priya Nugent 72 y.o.  male  Working Diagnosis:   1. History of dysarthria    2.  Transient alteration of awareness      Readmission: no  Isolation Requirements:  no  Recommended Level of Care:  telemetry  Code Status:  Likely full but pt in Xray  Other:  ? TIA, no current deficits, no TPA    CONSULT NOTE:  7:21 PM Candiss Merlin, MD spoke with Dr. Gamaliel Michel, Consult for Hospitalist.  Discussed available diagnostic tests and clinical findings. Dr. Gamaliel Michel will see and admit.    7:21 PM  Patient is being admitted to the hospital.  The results of their tests and reasons for their admission have been discussed with them and/or available family. They convey agreement and understanding for the need to be admitted and for their admission diagnosis.

## 2019-05-28 NOTE — ED TRIAGE NOTES
He and his wife arrive saying this am when he got up at 8am he wasn't feeling well. They say they have been working hard moving. He went back to be at 10am. His wife awoke him at 3pm and he was confused and was slurring his speech. He arrives ambulatory without any deficits. He does complain that he feels dizzy. Code stroke was called and he was taken to the CT room.

## 2019-05-29 ENCOUNTER — APPOINTMENT (OUTPATIENT)
Dept: VASCULAR SURGERY | Age: 74
End: 2019-05-29
Attending: INTERNAL MEDICINE
Payer: MEDICARE

## 2019-05-29 ENCOUNTER — APPOINTMENT (OUTPATIENT)
Dept: MRI IMAGING | Age: 74
End: 2019-05-29
Attending: INTERNAL MEDICINE
Payer: MEDICARE

## 2019-05-29 ENCOUNTER — APPOINTMENT (OUTPATIENT)
Dept: NON INVASIVE DIAGNOSTICS | Age: 74
End: 2019-05-29
Attending: INTERNAL MEDICINE
Payer: MEDICARE

## 2019-05-29 VITALS
OXYGEN SATURATION: 95 % | HEIGHT: 65 IN | WEIGHT: 227 LBS | BODY MASS INDEX: 37.82 KG/M2 | RESPIRATION RATE: 16 BRPM | TEMPERATURE: 98.1 F | HEART RATE: 71 BPM | DIASTOLIC BLOOD PRESSURE: 74 MMHG | SYSTOLIC BLOOD PRESSURE: 143 MMHG

## 2019-05-29 LAB
ALBUMIN SERPL-MCNC: 3.2 G/DL (ref 3.5–5)
ALBUMIN/GLOB SERPL: 0.8 {RATIO} (ref 1.1–2.2)
ALP SERPL-CCNC: 58 U/L (ref 45–117)
ALT SERPL-CCNC: 35 U/L (ref 12–78)
ANION GAP SERPL CALC-SCNC: 5 MMOL/L (ref 5–15)
AST SERPL-CCNC: 25 U/L (ref 15–37)
BASOPHILS # BLD: 0 K/UL (ref 0–0.1)
BASOPHILS NFR BLD: 1 % (ref 0–1)
BILIRUB SERPL-MCNC: 0.5 MG/DL (ref 0.2–1)
BNP SERPL-MCNC: 130 PG/ML
BUN SERPL-MCNC: 14 MG/DL (ref 6–20)
BUN/CREAT SERPL: 12 (ref 12–20)
CALCIUM SERPL-MCNC: 8.4 MG/DL (ref 8.5–10.1)
CHLORIDE SERPL-SCNC: 112 MMOL/L (ref 97–108)
CHOLEST SERPL-MCNC: 104 MG/DL
CK MB CFR SERPL CALC: 1.6 % (ref 0–2.5)
CK MB CFR SERPL CALC: 1.7 % (ref 0–2.5)
CK MB SERPL-MCNC: 1.8 NG/ML (ref 5–25)
CK MB SERPL-MCNC: 1.9 NG/ML (ref 5–25)
CK SERPL-CCNC: 106 U/L (ref 39–308)
CK SERPL-CCNC: 119 U/L (ref 39–308)
CO2 SERPL-SCNC: 28 MMOL/L (ref 21–32)
COMMENT, HOLDF: NORMAL
CREAT SERPL-MCNC: 1.17 MG/DL (ref 0.7–1.3)
CRP SERPL-MCNC: <0.29 MG/DL (ref 0–0.6)
D DIMER PPP FEU-MCNC: 0.86 MG/L FEU (ref 0–0.65)
DIFFERENTIAL METHOD BLD: ABNORMAL
ECHO AO ROOT DIAM: 3.24 CM
ECHO AV PEAK GRADIENT: 10.8 MMHG
ECHO AV PEAK VELOCITY: 164.38 CM/S
ECHO LA MAJOR AXIS: 3.54 CM
ECHO LA TO AORTIC ROOT RATIO: 1.09
ECHO LV E' LATERAL VELOCITY: 12.64 CM/S
ECHO LV E' SEPTAL VELOCITY: 10.77 CM/S
ECHO LV INTERNAL DIMENSION DIASTOLIC: 4.69 CM (ref 4.2–5.9)
ECHO LV INTERNAL DIMENSION SYSTOLIC: 3.04 CM
ECHO LV IVSD: 1.19 CM (ref 0.6–1)
ECHO LV MASS 2D: 242.2 G (ref 88–224)
ECHO LV MASS INDEX 2D: 116 G/M2 (ref 49–115)
ECHO LV POSTERIOR WALL DIASTOLIC: 1.16 CM (ref 0.6–1)
ECHO MV A VELOCITY: 109.37 CM/S
ECHO MV AREA PHT: 3.2 CM2
ECHO MV E DECELERATION TIME (DT): 235.9 MS
ECHO MV E VELOCITY: 101.45 CM/S
ECHO MV E/A RATIO: 0.93
ECHO MV E/E' LATERAL: 8.03
ECHO MV E/E' RATIO (AVERAGED): 8.72
ECHO MV E/E' SEPTAL: 9.42
ECHO MV PRESSURE HALF TIME (PHT): 68.4 MS
ECHO PV MAX VELOCITY: 115.77 CM/S
ECHO PV PEAK GRADIENT: 5.4 MMHG
ECHO RV TAPSE: 2.25 CM (ref 1.5–2)
EOSINOPHIL # BLD: 0.1 K/UL (ref 0–0.4)
EOSINOPHIL NFR BLD: 4 % (ref 0–7)
ERYTHROCYTE [DISTWIDTH] IN BLOOD BY AUTOMATED COUNT: 13.7 % (ref 11.5–14.5)
ERYTHROCYTE [SEDIMENTATION RATE] IN BLOOD: 9 MM/HR (ref 0–20)
EST. AVERAGE GLUCOSE BLD GHB EST-MCNC: 157 MG/DL
GLOBULIN SER CALC-MCNC: 3.8 G/DL (ref 2–4)
GLUCOSE BLD STRIP.AUTO-MCNC: 117 MG/DL (ref 65–100)
GLUCOSE BLD STRIP.AUTO-MCNC: 121 MG/DL (ref 65–100)
GLUCOSE SERPL-MCNC: 117 MG/DL (ref 65–100)
HBA1C MFR BLD: 7.1 % (ref 4.2–6.3)
HCT VFR BLD AUTO: 43.2 % (ref 36.6–50.3)
HDLC SERPL-MCNC: 33 MG/DL
HDLC SERPL: 3.2 {RATIO} (ref 0–5)
HGB BLD-MCNC: 13.5 G/DL (ref 12.1–17)
IMM GRANULOCYTES # BLD AUTO: 0 K/UL (ref 0–0.04)
IMM GRANULOCYTES NFR BLD AUTO: 0 % (ref 0–0.5)
LDLC SERPL CALC-MCNC: 45.6 MG/DL (ref 0–100)
LEFT CCA DIST DIAS: 22 CM/S
LEFT CCA DIST SYS: 108.8 CM/S
LEFT CCA PROX DIAS: 15 CM/S
LEFT CCA PROX SYS: 84.3 CM/S
LEFT ECA DIAS: 11.27 CM/S
LEFT ECA SYS: 58.7 CM/S
LEFT ICA DIST DIAS: 30 CM/S
LEFT ICA DIST SYS: 111.3 CM/S
LEFT ICA MID DIAS: 40.8 CM/S
LEFT ICA MID SYS: 113.1 CM/S
LEFT ICA PROX DIAS: 45.1 CM/S
LEFT ICA PROX SYS: 131.5 CM/S
LEFT ICA/CCA SYS: 1.21
LEFT VERTEBRAL DIAS: 29.63 CM/S
LEFT VERTEBRAL SYS: 72.5 CM/S
LIPID PROFILE,FLP: NORMAL
LYMPHOCYTES # BLD: 1.1 K/UL (ref 0.8–3.5)
LYMPHOCYTES NFR BLD: 36 % (ref 12–49)
MAGNESIUM SERPL-MCNC: 1.6 MG/DL (ref 1.6–2.4)
MCH RBC QN AUTO: 29.5 PG (ref 26–34)
MCHC RBC AUTO-ENTMCNC: 31.3 G/DL (ref 30–36.5)
MCV RBC AUTO: 94.3 FL (ref 80–99)
MONOCYTES # BLD: 0.2 K/UL (ref 0–1)
MONOCYTES NFR BLD: 8 % (ref 5–13)
NEUTS SEG # BLD: 1.7 K/UL (ref 1.8–8)
NEUTS SEG NFR BLD: 51 % (ref 32–75)
NRBC # BLD: 0 K/UL (ref 0–0.01)
NRBC BLD-RTO: 0 PER 100 WBC
PHOSPHATE SERPL-MCNC: 3 MG/DL (ref 2.6–4.7)
PLATELET # BLD AUTO: 84 K/UL (ref 150–400)
POTASSIUM SERPL-SCNC: 3.9 MMOL/L (ref 3.5–5.1)
PROT SERPL-MCNC: 7 G/DL (ref 6.4–8.2)
RBC # BLD AUTO: 4.58 M/UL (ref 4.1–5.7)
RBC MORPH BLD: ABNORMAL
RIGHT CCA DIST DIAS: 21.7 CM/S
RIGHT CCA DIST SYS: 85.6 CM/S
RIGHT CCA PROX DIAS: 17.7 CM/S
RIGHT CCA PROX SYS: 89.5 CM/S
RIGHT ECA DIAS: 8.86 CM/S
RIGHT ECA SYS: 73.3 CM/S
RIGHT ICA DIST DIAS: 32.1 CM/S
RIGHT ICA DIST SYS: 82.9 CM/S
RIGHT ICA MID DIAS: 23.9 CM/S
RIGHT ICA MID SYS: 66.5 CM/S
RIGHT ICA PROX DIAS: 16.9 CM/S
RIGHT ICA PROX SYS: 65.2 CM/S
RIGHT ICA/CCA SYS: 1
RIGHT VERTEBRAL DIAS: 14.57 CM/S
RIGHT VERTEBRAL SYS: 54.8 CM/S
SAMPLES BEING HELD,HOLD: NORMAL
SERVICE CMNT-IMP: ABNORMAL
SERVICE CMNT-IMP: ABNORMAL
SODIUM SERPL-SCNC: 145 MMOL/L (ref 136–145)
TRIGL SERPL-MCNC: 127 MG/DL (ref ?–150)
TROPONIN I SERPL-MCNC: <0.05 NG/ML
TROPONIN I SERPL-MCNC: <0.05 NG/ML
TSH SERPL DL<=0.05 MIU/L-ACNC: 0.71 UIU/ML (ref 0.36–3.74)
VLDLC SERPL CALC-MCNC: 25.4 MG/DL
WBC # BLD AUTO: 3.1 K/UL (ref 4.1–11.1)

## 2019-05-29 PROCEDURE — 70551 MRI BRAIN STEM W/O DYE: CPT

## 2019-05-29 PROCEDURE — 93306 TTE W/DOPPLER COMPLETE: CPT

## 2019-05-29 PROCEDURE — 99218 HC RM OBSERVATION: CPT

## 2019-05-29 PROCEDURE — 70544 MR ANGIOGRAPHY HEAD W/O DYE: CPT

## 2019-05-29 PROCEDURE — 82550 ASSAY OF CK (CPK): CPT

## 2019-05-29 PROCEDURE — 85025 COMPLETE CBC W/AUTO DIFF WBC: CPT

## 2019-05-29 PROCEDURE — 93970 EXTREMITY STUDY: CPT

## 2019-05-29 PROCEDURE — 80053 COMPREHEN METABOLIC PANEL: CPT

## 2019-05-29 PROCEDURE — 97116 GAIT TRAINING THERAPY: CPT

## 2019-05-29 PROCEDURE — 80061 LIPID PANEL: CPT

## 2019-05-29 PROCEDURE — 97165 OT EVAL LOW COMPLEX 30 MIN: CPT

## 2019-05-29 PROCEDURE — 97112 NEUROMUSCULAR REEDUCATION: CPT

## 2019-05-29 PROCEDURE — 97161 PT EVAL LOW COMPLEX 20 MIN: CPT

## 2019-05-29 PROCEDURE — 82962 GLUCOSE BLOOD TEST: CPT

## 2019-05-29 PROCEDURE — 74011250637 HC RX REV CODE- 250/637: Performed by: INTERNAL MEDICINE

## 2019-05-29 PROCEDURE — 84484 ASSAY OF TROPONIN QUANT: CPT

## 2019-05-29 PROCEDURE — 36415 COLL VENOUS BLD VENIPUNCTURE: CPT

## 2019-05-29 PROCEDURE — 97535 SELF CARE MNGMENT TRAINING: CPT

## 2019-05-29 PROCEDURE — 74011250637 HC RX REV CODE- 250/637: Performed by: PSYCHIATRY & NEUROLOGY

## 2019-05-29 PROCEDURE — 93880 EXTRACRANIAL BILAT STUDY: CPT

## 2019-05-29 PROCEDURE — 83036 HEMOGLOBIN GLYCOSYLATED A1C: CPT

## 2019-05-29 RX ORDER — IPRATROPIUM BROMIDE AND ALBUTEROL SULFATE 2.5; .5 MG/3ML; MG/3ML
3 SOLUTION RESPIRATORY (INHALATION)
Status: DISCONTINUED | OUTPATIENT
Start: 2019-05-29 | End: 2019-05-29 | Stop reason: HOSPADM

## 2019-05-29 RX ORDER — ASPIRIN 81 MG/1
81 TABLET ORAL DAILY
Qty: 30 TAB | Refills: 0 | Status: SHIPPED | OUTPATIENT
Start: 2019-05-29 | End: 2019-05-29

## 2019-05-29 RX ORDER — ASPIRIN 81 MG/1
81 TABLET ORAL DAILY
Status: DISCONTINUED | OUTPATIENT
Start: 2019-05-29 | End: 2019-05-29 | Stop reason: HOSPADM

## 2019-05-29 RX ORDER — PANTOPRAZOLE SODIUM 40 MG/1
40 TABLET, DELAYED RELEASE ORAL DAILY
Status: DISCONTINUED | OUTPATIENT
Start: 2019-05-29 | End: 2019-05-29 | Stop reason: HOSPADM

## 2019-05-29 RX ORDER — SODIUM CHLORIDE 0.9 % (FLUSH) 0.9 %
20 SYRINGE (ML) INJECTION
Status: DISCONTINUED | OUTPATIENT
Start: 2019-05-29 | End: 2019-05-29 | Stop reason: HOSPADM

## 2019-05-29 RX ORDER — LOSARTAN POTASSIUM 50 MG/1
25 TABLET ORAL DAILY
Status: DISCONTINUED | OUTPATIENT
Start: 2019-05-29 | End: 2019-05-29 | Stop reason: HOSPADM

## 2019-05-29 RX ORDER — ASPIRIN 81 MG/1
81 TABLET ORAL DAILY
Qty: 30 TAB | Refills: 1 | Status: SHIPPED | OUTPATIENT
Start: 2019-05-29

## 2019-05-29 RX ORDER — SIMVASTATIN 20 MG/1
20 TABLET, FILM COATED ORAL DAILY
Status: DISCONTINUED | OUTPATIENT
Start: 2019-05-29 | End: 2019-05-29 | Stop reason: HOSPADM

## 2019-05-29 RX ADMIN — PANTOPRAZOLE SODIUM 40 MG: 40 TABLET, DELAYED RELEASE ORAL at 09:36

## 2019-05-29 RX ADMIN — Medication 10 ML: at 05:17

## 2019-05-29 RX ADMIN — LOSARTAN POTASSIUM 25 MG: 50 TABLET ORAL at 09:36

## 2019-05-29 RX ADMIN — ASPIRIN 81 MG: 81 TABLET ORAL at 15:00

## 2019-05-29 RX ADMIN — SIMVASTATIN 20 MG: 20 TABLET, FILM COATED ORAL at 09:36

## 2019-05-29 NOTE — PROGRESS NOTES
Problem: Mobility Impaired (Adult and Pediatric)  Goal: *Acute Goals and Plan of Care (Insert Text)  Description  Physical Therapy Goals  Initiated 5/29/2019  1. Patient will move from supine to sit and sit to supine , scoot up and down and roll side to side in bed with independence within 7 day(s). 2.  Patient will transfer from bed to chair and chair to bed with independence using the least restrictive device within 7 day(s). 3.  Patient will perform sit to stand with independence within 7 day(s). 4.  Patient will ambulate with independence for 150 feet with the least restrictive device within 7 day(s). 5.  Patient will ascend/descend 3 stairs with B handrail(s) with modified independence within 7 day(s). 6.  Patient will improve Camp Balance score by 7 points within 7 days. Outcome: Progressing Towards Goal  PHYSICAL THERAPY EVALUATION  Patient: Zane Light (39 y.o. male)  Date: 5/29/2019  Primary Diagnosis: TIA (transient ischemic attack) [G45.9]        Precautions: Fall       ASSESSMENT :  Based on the objective data described below, the patient presents with dizziness, impaired balance, unsteady gait, and overall decrease in functional mobility compared to baseline function s/p admission for suspected TIA. Patient lives with girlfriend in 3 story home with 3 SOL. At baseline, he is independent with ADLs and mobility. He has home oxygen 2L prn. Patient has a hx of CVA, with difficulty describing new vs old symptoms, but reports residual dizziness and mild R sided weakness and paraesthesia. Patient has had 2 falls in the last  6 months. This date, patient transferred supine<>sitting with SBA, sit<>stand with CGA, and ambulated 30 ft with CGA. Patient demonstrated slow, shuffled, unsteady gait with arms in high guard position, but no overt LOB noted. Patient with equal strength bilaterally.   Reported R hand sensation \"felt different\" but unable to elaborate or identify if symptoms were new or old. Patient scored 27/56 on the Camp indicting he is at moderate risk for falls. Recommending OP neuro PT upon discharge to address higher level balance and maximize safety and independence with functional mobility. Patient will benefit from skilled intervention to address the above impairments. Patient?s rehabilitation potential is considered to be Fair  Factors which may influence rehabilitation potential include:   ? None noted  ? Mental ability/status  ? Medical condition  ? Home/family situation and support systems  ? Safety awareness  ? Pain tolerance/management  ? Other:      PLAN :  Recommendations and Planned Interventions:  ?           Bed Mobility Training             ? Neuromuscular Re-Education  ? Transfer Training                   ? Orthotic/Prosthetic Training  ? Gait Training                         ? Modalities  ? Therapeutic Exercises           ? Edema Management/Control  ? Therapeutic Activities            ? Patient and Family Training/Education  ? Other (comment):    Frequency/Duration: Patient will be followed by physical therapy  3 times a week to address goals. Discharge Recommendations: OP neuro PT   Further Equipment Recommendations for Discharge: None      SUBJECTIVE:   Patient stated ? I always get dizzy when i'm standing up.?    OBJECTIVE DATA SUMMARY:   HISTORY:    Past Medical History:   Diagnosis Date    CAD (coronary artery disease)     Chronic kidney disease     stones    Chronic obstructive pulmonary disease ([address]     Diabetes ([address]     Hypertension     Stroke ([address] 1980s    right hand neuropathy     Past Surgical History:   Procedure Laterality Date    COLONOSCOPY N/A 4/12/2018    COLONOSCOPY performed by Zane Carrizales MD at [facility] ENDOSCOPY     Prior Level of Function/Home Situation:  Patient lives with girlfriend in a 1 story home.   At baseline, he is independent with ADLs and mobility. He has home oxygen 2L prn. Patient has a hx of CVA, with difficulty describing new vs old symptoms, but reports residual dizziness and mild R sided weakness and paraesthesia. Patient has had 2 falls in the last  6months. Personal factors and/or comorbidities impacting plan of care: hx CVA, hx of falls, dizziness    Home Situation  Home Environment: Private residence  # Steps to Enter: 3  Rails to Enter: Yes  Hand Rails : Bilateral  One/Two Story Residence: One story  Living Alone: No(Girlfriend)  Support Systems: Spouse/Significant Other/Partner  Patient Expects to be Discharged to[de-identified] Private residence  Current DME Used/Available at Home: Shower chair, Oxygen, portable    EXAMINATION/PRESENTATION/DECISION MAKING:   Critical Behavior:  Neurologic State: Alert  Orientation Level: Oriented X4  Cognition: Follows commands     Hearing: Auditory  Auditory Impairment: Hard of hearing, bilateral  Skin:    Edema:   Range Of Motion:  AROM: Generally decreased, functional           PROM: Generally decreased, functional           Strength:    Strength: Generally decreased, functional                    Tone & Sensation:   Tone: Normal              Sensation: Impaired(R side diminished )               Coordination:  Coordination: Generally decreased, functional  Vision:      Functional Mobility:  Bed Mobility:     Supine to Sit: Stand-by assistance  Sit to Supine: Stand-by assistance     Transfers:  Sit to Stand: Contact guard assistance  Stand to Sit: Contact guard assistance                       Balance:   Sitting: Intact  Standing: Impaired; Without support  Standing - Static: Good  Standing - Dynamic : Good  Ambulation/Gait Training:  Distance (ft): 30 Feet (ft)  Assistive Device: Gait belt  Ambulation - Level of Assistance: Contact guard assistance        Gait Abnormalities: Decreased step clearance; Path deviations; Shuffling gait;Trunk sway increased        Base of Support: Widened     Speed/Laly: Shuffled; Slow  Step Length: Left shortened;Right shortened         Functional Measure:  Camp Balance Test:    Sitting to Standin  Standing Unsupported: 4  Sitting with Back Unsupported: 4  Standing to Sittin  Transfers: 4  Standing Unsupported with Eyes Closed: 2  Standing Unsupported with Feet Together: 2  Reach Forward with Outstretched Arm: 0   Object: 0  Turn to Look Over Shoulders: 2  Turn 360 Degrees: 1  Alternate Foot on Step/Stool: 0  Standing Unsupported One Foot in Front: 0  Stand on One Le  Total: 27         56=Maximum possible score;   0-20=High fall risk  21-40=Moderate fall risk   41-56=Low fall risk            Physical Therapy Evaluation Charge Determination   History Examination Presentation Decision-Making   HIGH Complexity :3+ comorbidities / personal factors will impact the outcome/ POC  HIGH Complexity : 4+ Standardized tests and measures addressing body structure, function, activity limitation and / or participation in recreation  LOW Complexity : Stable, uncomplicated  LOW Complexity : FOTO score of       Based on the above components, the patient evaluation is determined to be of the following complexity level: LOW     Pain:  Pain Scale 1: Numeric (0 - 10)  Pain Intensity 1: 0              Activity Tolerance:   Fair, VSS  Please refer to the flowsheet for vital signs taken during this treatment. After treatment:   ?         Patient left in no apparent distress sitting up in chair  ? Patient left in no apparent distress in bed  ? Call bell left within reach  ? Nursing notified  ? Caregiver present  ? Bed alarm activated    COMMUNICATION/EDUCATION:   The patient?s plan of care was discussed with: Occupational Therapist and Registered Nurse. ?         Fall prevention education was provided and the patient/caregiver indicated understanding. ?          Patient/family have participated as able in goal setting and plan of care. ?         Patient/family agree to work toward stated goals and plan of care. ?         Patient understands intent and goals of therapy, but is neutral about his/her participation. ? Patient is unable to participate in goal setting and plan of care.     Thank you for this referral.  Dahlia Farias, PT, DPT   Time Calculation: 24 mins

## 2019-05-29 NOTE — PROGRESS NOTES
Bedside and Verbal shift change report given to [facility] (oncoming nurse) by Jerad Avitia RN (offgoing nurse). Report included the following information SBAR, Kardex, Intake/Output, MAR, Recent Results and Cardiac Rhythm NSR.

## 2019-05-29 NOTE — ROUTINE PROCESS
TRANSFER - OUT REPORT: 
 
Verbal report given to Alma Rosa Adams RN(name) on Ortega Oconnell  being transferred to [facility](unit) for routine progression of care Report consisted of patients Situation, Background, Assessment and  
Recommendations(SBAR). Information from the following report(s) SBAR, Kardex, ED Summary, Procedure Summary, Intake/Output, MAR and Recent Results was reviewed with the receiving nurse. Lines:  
Peripheral IV 05/28/19 Left Antecubital (Active) Site Assessment Clean, dry, & intact 5/28/2019  5:42 PM  
Phlebitis Assessment 0 5/28/2019  5:42 PM  
Infiltration Assessment 0 5/28/2019  5:42 PM  
Dressing Status Clean, dry, & intact 5/28/2019  5:42 PM  
Dressing Type Transparent 5/28/2019  5:42 PM  
Hub Color/Line Status Pink;Flushed;Patent 5/28/2019  5:42 PM  
Action Taken Blood drawn 5/28/2019  5:42 PM  
  
 
Opportunity for questions and clarification was provided.    
 
Patient transported with: 
[facility]

## 2019-05-29 NOTE — PROGRESS NOTES
Problem: Patient Education: Go to Patient Education Activity  Goal: Patient/Family Education  Outcome: Progressing Towards Goal     Problem: TIA/CVA Stroke: 0-24 hours  Goal: Activity/Safety  Outcome: Progressing Towards Goal  Goal: Consults, if ordered  Outcome: Progressing Towards Goal  Goal: Diagnostic Test/Procedures  Outcome: Progressing Towards Goal  Goal: Nutrition/Diet  Outcome: Progressing Towards Goal  Goal: Discharge Planning  Outcome: Progressing Towards Goal  Goal: Medications  Outcome: Progressing Towards Goal  Goal: Respiratory  Outcome: Progressing Towards Goal  Goal: Treatments/Interventions/Procedures  Outcome: Progressing Towards Goal  Goal: Minimize risk of bleeding post-thrombolytic infusion  Outcome: Progressing Towards Goal  Goal: Monitor for complications post-thrombolytic infusion  Outcome: Progressing Towards Goal  Goal: Psychosocial  Outcome: Progressing Towards Goal  Goal: *Hemodynamically stable  Outcome: Progressing Towards Goal  Goal: *Neurologically stable  Description  Absence of additional neurological deficits    Outcome: Progressing Towards Goal  Goal: *Verbalizes anxiety and depression are reduced or absent  Outcome: Progressing Towards Goal  Goal: *Absence of Signs of Aspiration on Current Diet  Outcome: Progressing Towards Goal  Goal: *Absence of deep venous thrombosis signs and symptoms(Stroke Metric)  Outcome: Progressing Towards Goal  Goal: *Ability to perform ADLs and demonstrates progressive mobility and function  Outcome: Progressing Towards Goal  Goal: *Stroke education started(Stroke Metric)  Outcome: Progressing Towards Goal  Goal: *Dysphagia screen performed(Stroke Metric)  Outcome: Progressing Towards Goal  Goal: *Rehab consulted(Stroke Metric)  Outcome: Progressing Towards Goal     Problem: Pain  Goal: *Control of Pain  Outcome: Progressing Towards Goal     Problem: Patient Education: Go to Patient Education Activity  Goal: Patient/Family Education  Outcome: Progressing Towards Goal     Problem: Pressure Injury - Risk of  Goal: *Prevention of pressure injury  Description  Document Jay Scale and appropriate interventions in the flowsheet. Outcome: Progressing Towards Goal  Note:   Pressure Injury Interventions:                                            Problem: Patient Education: Go to Patient Education Activity  Goal: Patient/Family Education  Outcome: Progressing Towards Goal     Problem: Falls - Risk of  Goal: *Absence of Falls  Description  Document Susan Rafa Fall Risk and appropriate interventions in the flowsheet.   Outcome: Progressing Towards Goal     Problem: Patient Education: Go to Patient Education Activity  Goal: Patient/Family Education  Outcome: Progressing Towards Goal

## 2019-05-29 NOTE — ROUTINE PROCESS
I have reviewed discharge instructions with the patient. The patient verbalized understanding. PIV and Telemetry discontinued. Pt discharged home via car by his girlfriend. Care and education closed. Signed AVS placed on chart. Discharge medications reviewed with patient and appropriate educational materials and side effects teaching were provided. Pt has scripts and all personal belongings. Pt aware to f/u with PCP in 1wk+f/u with Neurologist in 1wk. No s/s of visible distress. VSS.

## 2019-05-29 NOTE — CONSULTS
NEUROLOGY CONSULT NOTE    Name Ernesto Friend Age 72 y.o. MRN [id number]  1945     Consulting Physician: Chandrika Romano MD      Chief Complaint:  Dizziness, dysarthria     Assessment:     · Principal Problem:  ·   TIA (transient ischemic attack) (2013)  ·   ·   72year old LHM with a h/o HTN, HPL, DM, COPD, remote stroke  vs brain abscess with residual R paresthesias, dizziness/imbalance (R medullary distribution) presenting with worsening of his baseline dizziness, RUE paresthesias and dysarthria 19. Symptoms are improved today. Head CT reviewed and without acute process. MRI Brain/MRA subsequently completed showing no acute ischemia, mild chronic microvascular disease, no significant occlusion/stenosis. Suspect possible posterior circulation TIA. Advise resuming antiplatelet therapy for stroke prevention moving forward. Recommendations:   ASA 81mg daily, cont. Statin therapy  SBP goal<140, LDL<70  Telemetry/TTE pending  CUS pending  Stroke education  PT/OT evaluations  Will F/U remaining studies once completed-please call if further questions/concerns    Thank you very much for this referral. I appreciate the opportunity to participate in this patient's care. History of Present Illness: This is a 72 y.o.  left handed  male, we were asked to see for dizziness, dysarthria. PMH notable for HTN, HPL, DM, COPD, remote stroke  vs brain abscess with residual R paresthesias, dizziness/imbalance (R medullary distribution). Patient reports he was in the process of moving into a new home yesterday when he felt abnormal.  He endorsed worsening of his baseline dizziness/lightheadedness/imbalance, felt generalized weakness. He took a nap late morning and awoke with some dysarthria which has since improved this admission.   He also noted that his baseline RUE numbness was somewhat more prominent than usual.  He denied headache, focal weakness, aphasia, vision deficits. He was not on AP therapy PTA. Head CT reviewed and without acute process. MRI Brain/MRA subsequently completed showing no acute ischemia, mild chronic microvascular disease, no significant occlusion/stenosis. He feels near his baseline today. No Known Allergies     Prior to Admission medications    Medication Sig Start Date End Date Taking? Authorizing Provider   insulin NPH/insulin regular (NOVOLIN 70/30 U-100 INSULIN) 100 unit/mL (70-30) injection by SubCUTAneous route. Provider, Historical   simvastatin (ZOCOR) 20 mg tablet Take 20 mg by mouth nightly. Provider, Historical   metFORMIN (GLUCOPHAGE) 500 mg tablet Take  by mouth two (2) times daily (with meals). Provider, Historical   losartan (COZAAR) 25 mg tablet Take  by mouth daily. Provider, Historical   cholecalciferol (VITAMIN D3) 1,000 unit cap Take  by mouth daily. Provider, Historical   furosemide (LASIX) 20 mg tablet Take  by mouth daily. Provider, Historical   cholecalciferol (VITAMIN D3) 1,000 unit cap Take  by mouth daily. Provider, Historical   pyridoxine, vitamin B6, (VITAMIN B-6) 100 mg tablet Take 100 mg by mouth daily. Provider, Historical   acetaminophen 650 mg Tab Take 650 mg by mouth every four (4) hours as needed. 7/12/13   Mary Carmen Cruz MD   atorvastatin (LIPITOR) 20 mg tablet Take 1 Tab by mouth daily. 7/12/13   Mary Carmen Cruz MD   insulin glargine (LANTUS) 100 unit/mL injection Lantus 6 units daily 7/12/13   Mary Carmen Cruz MD   dexamethasone (DECADRON) 0.5 mg tablet Decadron 2 mg every 6 hours for 3 days then, 2 mg every 8 hoursx2 days  then 2 mg every 12 hours x2 days  Then 1 mg every 12 hoursx2 days then 0.5 mg every 12 hours x2 days , then daily for 2 days and stop 7/12/13   Mary Carmen Cruz MD   pantoprazole (PROTONIX) 20 mg tablet Take 2 Tabs by mouth daily.  6/21/13   Lexx Perez MD       Past Medical History:   Diagnosis Date    CAD (coronary artery disease)     Chronic kidney disease stones    Chronic obstructive pulmonary disease (HCC)     [facility]     Hypertension     Stroke ([facility] 1980s    right hand neuropathy        Past Surgical History:   Procedure Laterality Date    COLONOSCOPY N/A 2018    COLONOSCOPY performed by Hebert Pino MD at [facility] ENDOSCOPY        Social History     Tobacco Use    Smoking status: Former Smoker     Packs/day: 2.00     Years: 45.00     Pack years: 90.00     Last attempt to quit: 2005     Years since quittin.9    Smokeless tobacco: Never Used   Substance Use Topics    Alcohol use: No     Comment: 1 gallon whiskey a week stopped 4 years ago        Family History   Problem Relation Age of Onset    Heart Disease Mother     Heart Disease Father     Cancer Sister         breast ca        Review of Systems:   Comprehensive review of systems performed and negative except for as listed above. Exam:     Visit Vitals  /61   Pulse 73   Temp 97.8 °F (36.6 °C)   Resp 18   Ht 5' 5\" (1.651 m)   Wt 103 kg (227 lb)   SpO2 95%   BMI 37.77 kg/m²        General: Well developed, well nourished. Patient in no apparent distress   Head: Normocephalic, atraumatic, anicteric sclera   Lungs:  Clear to auscultation bilaterally, No wheezes or rubs   Cardiac: Regular rate and rhythm with no murmurs. Abd: Bowel sounds were audible. No tenderness on palpation   Ext: No pedal edema   Skin: No overt signs of rash     Neurological Exam:  Mental Status: Alert and oriented to person place and time   Speech: Fluent no aphasia or dysarthria. Cranial Nerves:   Intact visual fields. Facial sensation is normal. Facial movement is symmetric. Palate is midline. Normal sternocleidomastoid strength. Tongue is midline. Hearing is intact bilaterally. Eyes: PERRL, EOM's full, no nystagmus, no ptosis. Motor:  Full and symmetric strength of upper and lower proximal and distal muscles. Normal bulk and tone.     Reflexes:   Deep tendon reflexes 1+/4 and symmetrical. Plantar response is downgoing b/l. Sensory:   Symmetrically intact  with no perceived deficits modalities involving small or large fibers. Gait:  Gait is balanced and fluid with normal arm swing. Tremor:   No tremor noted. Cerebellar:  Finger to nose and heel over shin to knee was demonstrated competently. Neurovascular: No carotid bruits. Imaging  CT Results (maximum last 3): Results from [place] encounter on 05/28/19   CT CODE NEURO HEAD WO CONTRAST    Narrative EXAM:  CT CODE NEURO HEAD WO CONTRAST    INDICATION:   stroke symptoms last known well 10am    COMPARISON: CT head 7/7/2013. TECHNIQUE: Unenhanced CT of the head was performed using 5 mm images. Brain and  bone windows were generated. CT dose reduction was achieved through use of a  standardized protocol tailored for this examination and automatic exposure  control for dose modulation. FINDINGS:  The ventricles are normal in size and position. Basilar cisterns are patent. No  midline shift. There is no evidence of acute infarct, hemorrhage, or extraaxial  fluid collection. Scattered mucosal thickening in the right ethmoidal air cells and bilateral  maxillary sinuses. The mastoid air cells and middle ears are clear. The orbital  contents are within normal limits. There are no significant osseous or  extracranial soft tissue lesions. Impression IMPRESSION:  1. No evidence of acute intracranial abnormality. MRI Results (maximum last 3): Results from [place] encounter on 05/28/19   MRA BRAIN WO CONT    Narrative EXAM:  MRI BRAIN WO CONT, MRA BRAIN WO CONT    INDICATION:    TIA. Altered mental status. Dysarthria. COMPARISON:  CT head 5/20/2019, MRI brain 7/11/2013. CONTRAST: None. TECHNIQUE:    MRI Brain:  Multiplanar multisequence acquisition without contrast of the brain. MRA Head:  3-D time-of-flight MRA of the head was performed.  Multiplanar and MIP  reconstructions were obtained. FINDINGS:  MRI Brain:  The ventricles are normal in size and position. Scattered periventricular and  deep white matter T2/FLAIR hyperintensities, consistent with mild chronic  microvascular ischemic disease. There is no acute infarct, hemorrhage,  extra-axial fluid collection, or mass effect. There is no cerebellar tonsillar  herniation. Expected arterial flow-voids are present. Scattered mucosal thickening in the bilateral ethmoidal air cells and maxillary  sinuses, as well as sphenoid sinuses. No air-fluid level. The mastoid air cells  and middle ears are clear. The orbital contents are within normal limits. No  significant osseous or scalp lesions are identified. MRA Head:  There is no evidence of large vessel occlusion or flow-limiting stenosis of the  intracranial internal carotid, anterior cerebral, and middle cerebral arteries. The anterior communicating artery is patent. There is no evidence of large vessel occlusion or flow-limiting stenosis of the  intracranial vertebral arteries, basilar artery, or posterior cerebral arteries. The posterior communicating arteries are patent. There is no evidence of aneurysm or vascular malformation. Impression IMPRESSION:   MRI Brain:  1. No evidence of acute intracranial abnormality. 2. Mild chronic microvascular ischemic disease. MRA Head:  1. No evidence of significant stenosis or aneurysm. MRI BRAIN WO CONT    Narrative EXAM:  MRI BRAIN WO CONT, MRA BRAIN WO CONT    INDICATION:    TIA. Altered mental status. Dysarthria. COMPARISON:  CT head 5/20/2019, MRI brain 7/11/2013. CONTRAST: None. TECHNIQUE:    MRI Brain:  Multiplanar multisequence acquisition without contrast of the brain. MRA Head:  3-D time-of-flight MRA of the head was performed. Multiplanar and MIP  reconstructions were obtained. FINDINGS:  MRI Brain:  The ventricles are normal in size and position.  Scattered periventricular and  deep white matter T2/FLAIR hyperintensities, consistent with mild chronic  microvascular ischemic disease. There is no acute infarct, hemorrhage,  extra-axial fluid collection, or mass effect. There is no cerebellar tonsillar  herniation. Expected arterial flow-voids are present. Scattered mucosal thickening in the bilateral ethmoidal air cells and maxillary  sinuses, as well as sphenoid sinuses. No air-fluid level. The mastoid air cells  and middle ears are clear. The orbital contents are within normal limits. No  significant osseous or scalp lesions are identified. MRA Head:  There is no evidence of large vessel occlusion or flow-limiting stenosis of the  intracranial internal carotid, anterior cerebral, and middle cerebral arteries. The anterior communicating artery is patent. There is no evidence of large vessel occlusion or flow-limiting stenosis of the  intracranial vertebral arteries, basilar artery, or posterior cerebral arteries. The posterior communicating arteries are patent. There is no evidence of aneurysm or vascular malformation. Impression IMPRESSION:   MRI Brain:  1. No evidence of acute intracranial abnormality. 2. Mild chronic microvascular ischemic disease. MRA Head:  1. No evidence of significant stenosis or aneurysm. Lab Review  Lab Results   Component Value Date/Time    WBC 3.1 (L) 05/29/2019 05:08 AM    HCT 43.2 05/29/2019 05:08 AM    HGB 13.5 05/29/2019 05:08 AM    PLATELET 84 (L) 10/48/5565 05:08 AM     Lab Results   Component Value Date/Time    Sodium 145 05/29/2019 05:08 AM    Potassium 3.9 05/29/2019 05:08 AM    Chloride 112 (H) 05/29/2019 05:08 AM    CO2 28 05/29/2019 05:08 AM    Glucose 117 (H) 05/29/2019 05:08 AM    BUN 14 05/29/2019 05:08 AM    Creatinine 1.17 05/29/2019 05:08 AM    Calcium 8.4 (L) 05/29/2019 05:08 AM     No components found for: TROPQUANT  No results found for: SANDY    Signed:  Amy Gaona DO  5/29/2019  1:00 PM

## 2019-05-29 NOTE — PROGRESS NOTES
Patient in process of leaving floor for testing. Will follow up for eval later.  Discussed with PT who is recommending outpatient PT.

## 2019-05-29 NOTE — DISCHARGE SUMMARY
Discharge Summary     PATIENT ID: Ramona Camacho  MRN: [id number]   YOB: 1945    DATE OF ADMISSION: 5/28/2019  5:17 PM    DATE OF DISCHARGE: 5/29/2019  PRIMARY CARE PROVIDER: Gucci Duque MD   ATTENDING PHYSICIAN: Zayra Avitia MD  DISCHARGING PROVIDER: Geo Mg NP. To contact this individual call [phone] and ask the  to page. If unavailable ask to be transferred the Adult Hospitalist Department. CONSULTATIONS: IP CONSULT TO NEUROLOGY    ADMITTING [address] [place] [address] COURSE:   Pt presented to the ED with dizziness, confusion and slurred speech. Pt had reports he had been working [employer]" moving early the am of admit, he generally did not feel well and went back to bed sevral hours later ~ 1000. ~ 1500, his wife woke him from sleep and noted that he was confused and was slurring his speech - she called EMS. Code stroke was called in ED. By the time the patient arrived at the ED, he was no longer confused and had no neuro deficits; his only complaint on arrival at the emergency room was dizziness. The dizziness is worse with movement, improved with rest.  No associated HA or blurring of vision. He indicated he also had been experiencing increased SOB at home. Pmhx:  dyslipidemia, type 2 diabetes, hypertension, COPD    DISCHARGE DIAGNOSES / PLAN:      TIA with hx of Stroke in 2013  - CT:  negative for acute pathology  - MRI: No evidence of acute intracranial abnormality. Mild chronic microvascular ischemic disease. MRA No evidence of significant stenosis or aneurysm  - Carotid Dopplers: Mild, less than 50 percent, stenosis of the R internal carotid artery. 50 to 69 percent stenosis of the L internal carotid artery (lower end of range). Addendum for Echo read  - LVEF: 66 - 70%. No regional wall motion abnormality noted. Mild (grade 1) left ventricular diastolic dysfunction. MV: Trace mitral valve regurgitation. No No atrial septal defect present.  L Atrium: Mildly dilated left atrium.    - hgbA1c 7.1  - Lipid panel:  Chol 104 Hdl 33 Ldl 45.6. Continue with current statin. - ESR and C-reactive protein: benign  - neurology has seen, suggested ASA and outpatient Neuro PT  - follow up with neurology outpatient    Hx Dyslipidemia: continue home medications    Hx Type 2 diabetes:   - resume home 70/30 insulin and metformin. Does not take Lantus as per med rec. - suggested he monitor blood glucose, review with PCP on next visit.   - HgbA1c 7.1 today (he reports this is up from ~6.7 on last check several months ago)    Hx Hypertension: resume home medication. Hx Morbid obesity:    - dietitian was consulted and made recommendations for outpatient counseling.   - BMI 37.8     Hx COPD  - no s/s exacerbation  - resume home medications  - pt reports that [employer] [facility] provides him with prn oxygen at home and he uses it when he is doing a lot of activity and gets SOB. Does not use it at night. Hx Thrombocytopenia: Mild and appears at baseline    Acute kidney injury: resolved with fluids.    - can resume lasix in am    Bilateral lower extremity swelling: no DVT per dopplers  - resuming lasix, this could be dependent edema as he is his feet frequently       PENDING TEST RESULTS:   At the time of discharge the following test results are still pending: Echo    FOLLOW UP APPOINTMENTS:    Follow-up Information     Follow up With Specialties Details Why Contact Info    Clair Cohn MD Family Practice In 1 week Post hospital check up [place]      Collette Pang, DO Neurology In 4 weeks  [address] [facility] at [address] [facility].  [phone]           ADDITIONAL CARE RECOMMENDATIONS:   - take medications as directed  - check your blood glucose at least 3 times daily, keep record to review with your PCP    You have some L Internal Carotid Stenosis (50-69% lower end), this should be monitored by ultrasound. Please discuss with your PCP. DIET: Cardiac Diet and Diabetic Diet  Nutrition Outpatient Counseling:   Available through [facility] and [facility] at [facility], [place] [address] or [facility] by calling Central Scheduling at [phone] to schedule an appointment. Thank you for taking time to speak with the Registered Dietitian. Carlo Zacarias, MSNW, RD, CDE    ACTIVITY: Outpatient PT referral written    DISCHARGE MEDICATIONS:  Current Discharge Medication List      START taking these medications    Details   aspirin delayed-release 81 mg tablet Take 1 Tab by mouth daily. Qty: 30 Tab, Refills: 1      OTHER Outapatient Neuro Physical Therapy  - address higher level balance and maximize safety and independence with functional mobility. Hx Stroke  Qty: 1 Each, Refills: 0         CONTINUE these medications which have NOT CHANGED    Details   insulin NPH/insulin regular (NOVOLIN 70/30 U-100 INSULIN) 100 unit/mL (70-30) injection by SubCUTAneous route. simvastatin (ZOCOR) 20 mg tablet Take 20 mg by mouth nightly. metFORMIN (GLUCOPHAGE) 500 mg tablet Take  by mouth two (2) times daily (with meals). losartan (COZAAR) 25 mg tablet Take  by mouth daily. furosemide (LASIX) 20 mg tablet Take  by mouth daily. cholecalciferol (VITAMIN D3) 1,000 unit cap Take  by mouth daily. pyridoxine, vitamin B6, (VITAMIN B-6) 100 mg tablet Take 100 mg by mouth daily. acetaminophen 650 mg Tab Take 650 mg by mouth every four (4) hours as needed. Qty: 20 Tab, Refills: 0      atorvastatin (LIPITOR) 20 mg tablet Take 1 Tab by mouth daily. Qty: 30 Tab, Refills: 0      pantoprazole (PROTONIX) 20 mg tablet Take 2 Tabs by mouth daily.   Qty: 30 Tab, Refills: 0         STOP taking these medications       insulin glargine (LANTUS) 100 unit/mL injection Comments:   Reason for Stopping:         dexamethasone (DECADRON) 0.5 mg tablet Comments:   Reason for Stopping:             NOTIFY YOUR PHYSICIAN FOR ANY OF THE FOLLOWING:   Fever over 101 degrees for 24 hours. Chest pain, shortness of breath, fever, chills, nausea, vomiting, diarrhea, change in mentation, falling, weakness, bleeding. Severe pain or pain not relieved by medications. Or, any other signs or symptoms that you may have questions about. DISPOSITION:    Home With:   OT  PT  HH  RN       Long term SNF/Inpatient Rehab    Independent/assisted living    Hospice   xx Other: Home     PATIENT CONDITION AT DISCHARGE:   Functional status    Poor     Deconditioned    xx Independent      Cognition   xx  Lucid     Forgetful     Dementia      Catheters/lines (plus indication)    [place]     PICC     PEG    xx None      Code status   xx  Full code     DNR      PHYSICAL EXAMINATION AT DISCHARGE:  /74 (BP 1 Location: Right arm, BP Patient Position: At rest)   Pulse 71   Temp 98.1 °F (36.7 °C)   Resp 16   Ht 5' 5\" (1.651 m)   Wt 103 kg (227 lb)   SpO2 95%   BMI 37.77 kg/m²     Pt sitting on side of bed, reports to be very mildly dizzy but this is normal for him since stroke in 2013. Always is a bit dizzy which can worsen with standing, he takes precautions for this by rising slowly and not quick to walk. Discussed results of studies and plans to discharge his home on a DAILY aspirin and outpatient neuro PT to work on balance. General: obese male in no acute distress, sitting on side of bed. ENT: Neck is thick, c/o mild dizziness  CV: SR on tele. Regular rate, no murmur noted  Pulm: Diminished in bases but clear. No wheezing and no cough. RA sats 92%  GI: obese belly. Active bowel sounds. Extremities: No significant edema. Pulses intact  Neuro: A/O x 3, no strength or sensation deficits. No foal deficits. Speech is clear, no facial droop.    Psych: Calm and cooperative    CHRONIC MEDICAL DIAGNOSES:  Problem List as of 5/29/2019 Date Reviewed: 5/29/2019          Codes Class Noted - Resolved    GI bleed ICD-10-CM: K92.2  ICD-9-CM: 578.9  6/19/2013 - Present        * (Principal) RESOLVED: TIA (transient ischemic attack) ICD-10-CM: G45.9  ICD-9-CM: 435.9  7/6/2013 - 5/29/2019            Greater than 30 minutes were spent with the patient on counseling and coordination of care    Signed:   Bonnie Tovar NP  5/29/2019  4:23 PM

## 2019-05-29 NOTE — DISCHARGE INSTRUCTIONS
Discharge Instructions     PATIENT ID: Richie Dyer  MRN: [id number]   YOB: 1945    DATE OF ADMISSION: 5/28/2019  5:17 PM    DATE OF DISCHARGE: 5/29/2019    PRIMARY CARE PROVIDER: Isaac Felty, MD     ATTENDING PHYSICIAN: Maria Fernanda Justice MD  DISCHARGING PROVIDER: Raymond Bassett NP    To contact this individual call [phone] and ask the  to page. If unavailable ask to be transferred the Adult Hospitalist Department. DISCHARGE DIAGNOSES  TIA (symptoms resolved) with hx of stroke  Hx Diabetes    CONSULTATIONS: IP CONSULT TO NEUROLOGY    PENDING TEST RESULTS:   At the time of discharge the following test results are still pending: ECHO    FOLLOW UP APPOINTMENTS:   Follow-up Information     Follow up With Specialties Details Why Contact Info    Isaac Felty, MD Family Practice In 1 week Post hospital check up [address]      Nilay Sargent,  Neurology In 4 weeks  [address] [facility] at [facility] [address] [facility].  [phone]           ADDITIONAL CARE RECOMMENDATIONS:   - take medications as directed  - check your blood glucose at least 3 times daily, keep record to review with your PCP    You have some L Internal Carotid Stenosis (50-69% lower end), this should be monitored by ultrasound. Please discuss with your PCP. Patient Education   New DAILY medication:   Aspirin (Courtney Extra Strength, Courtney Aspirin Children's, Bufferin, Bufferin Low Dose) - (By mouth)   Why this medicine is used:   Treats pain, fever, and inflammation. May also reduce the risk of heart attack.   Contact a nurse or doctor right away if you have:  · Bloody vomit or vomit that looks like coffee grounds  · Blood in urine or bloody or black, tarry stools  · Wheezing or trouble breathing     Common side effects:  · Upset stomach  © 2017 [facility] Information is for End User's use only and may not be sold, redistributed or otherwise used for commercial purposes. DIET: Cardiac Diet and Diabetic Diet  Nutrition Outpatient Counseling:   Available through [facility] and [employer] at [place] [address] or [facility] [place] by calling Central Scheduling at [phone] to schedule an appointment. Thank you for taking time to speak with the Registered Dietitian. Enid Aase, MSNW, RD, CDE    ACTIVITY: Outpatient PT referral written    · It is important that you take the medication exactly as they are prescribed. · Keep your medication in the bottles provided by the pharmacist and keep a list of the medication names, dosages, and times to be taken in your wallet. · Do not take other medications without consulting your doctor. NOTIFY YOUR PHYSICIAN FOR ANY OF THE FOLLOWING:   Fever over 101 degrees for 24 hours. Chest pain, shortness of breath, fever, chills, nausea, vomiting, diarrhea, change in mentation, falling, weakness, bleeding. Severe pain or pain not relieved by medications. Or, any other signs or symptoms that you may have questions about.     DISPOSITION:    Home With:   OT  PT  HH  RN       SNF/Inpatient Rehab/LTAC    Independent/assisted living    Hospice   xx Other: Outpatient PT     CDMP Checked:   Yes *x*     PROBLEM LIST Updated:  Yes *x*     Signed:   Laura Mederos NP  5/29/2019  3:22 PM

## 2019-05-29 NOTE — PROGRESS NOTES
Problem: Pressure Injury - Risk of  Goal: *Prevention of pressure injury  Description  Document Jay Scale and appropriate interventions in the flowsheet. 5/29/2019 0038 by Consuelo Phoenix RN  Outcome: Progressing Towards Goal  Note:   Pressure Injury Interventions: Activity Interventions: Pressure redistribution bed/mattress(bed type), PT/OT evaluation, Increase time out of bed    Mobility Interventions: Pressure redistribution bed/mattress (bed type), PT/OT evaluation         Friction and Shear Interventions: Lift sheet, HOB 30 degrees or less, Minimize layers             5/29/2019 0029 by Consuelo Phoenix RN  Outcome: Progressing Towards Goal  Note:   Pressure Injury Interventions:                                            Problem: Falls - Risk of  Goal: *Absence of Falls  Description  Document Catherene Bunting Fall Risk and appropriate interventions in the flowsheet.   5/29/2019 0038 by Consuelo Phoenix RN  Outcome: Progressing Towards Goal  Note:   Fall Risk Interventions:            Medication Interventions: Patient to call before getting OOB, Teach patient to arise slowly    Elimination Interventions: Call light in reach, Patient to call for help with toileting needs, Urinal in reach, Toileting schedule/hourly rounds, Stay With Me (per policy)    History of Falls Interventions: Consult care management for discharge planning, Door open when patient unattended, Investigate reason for fall, Room close to nurse's station      5/29/2019 0029 by Consuelo Phoenix RN  Outcome: Progressing Towards Goal

## 2019-05-29 NOTE — PROGRESS NOTES
Reason for Admission:  Admitted from home with complaints of dizziness. Medical history includes HTN,DM, COPD and old CVA. RRAT Score:     16             Do you (patient/family) have any concerns for transition/discharge? Plan for utilizing home health:   No needs identified at this time. Current Advanced Directive/Advance Care Plan:  Does not have and declines to have one completed at this time    Likelihood of readmission? Moderate risk            Transition of Care Plan:    Anticipate that he will be discharged to home when medically stable. CM will follow and assist with any needs that arise.     Care Management Interventions  PCP Verified by CM: Yes(Dr Monica Florian)  Palliative Care Criteria Met (RRAT>21 & CHF Dx)?: No  Mode of Transport at Discharge: (Private)  Physical Therapy Consult: Yes  Occupational Therapy Consult: Yes  Speech Therapy Consult: Yes  Current Support Network: (Lives with girlfriend)  Confirm Follow Up Transport: Self  Plan discussed with Pt/Family/Caregiver: Yes   Resource Information Provided?: (NA)  Discharge Location  Discharge Placement: [address] [place] [address]  Ext [phone]

## 2019-05-29 NOTE — PROGRESS NOTES
Problem: Self Care Deficits Care Plan (Adult)  Goal: *Acute Goals and Plan of Care (Insert Text)  Description  1. Patient will perform ADLS at independent level within 7 days. 2. Patient will perform toileting at independent level within 7 days. 3. Patient will perform toileting transfers at independent level within 7 days. Outcome: Progressing Towards Goal     OCCUPATIONAL THERAPY EVALUATION  Patient: Radha Camara (89 y.o. male)  Date: 5/29/2019  Primary Diagnosis: TIA (transient ischemic attack) [G45.9]        Precautions: fall       ASSESSMENT :  Based on the objective data described below, patient presents with Setup upper body ADLs, Contact guard assistance lower body ADLs, and Contact guard assistance functional mobility. The following are barriers to ADL independence while in acute care:   - Cognitive and/or behavioral: safety awareness  - Medical condition: standing balance, sensation   - Other:   Deficits from old CVA include decreased sensation and dizziness    Patient will benefit from skilled acute intervention to address the above impairments. Patients rehabilitation potential is considered to be Good    Discharge recommendations: Outpatient for balance training per PT recommendation  If above is not an option then recommend: Home health (to increase independence and safety)    Barriers to discharging home, in addition to above listed impairments: significant other is elderly and unable to assist in patient care. Equipment recommendations for successful discharge (if) home: none     PLAN :  Recommendations and Planned Interventions: self care training, functional mobility training, therapeutic exercise, balance training, therapeutic activities, endurance activities, neuromuscular re-education, patient education, home safety training and family training/education    Frequency/Duration: Patient will be followed by occupational therapy 3 times a week to address goals.      SUBJECTIVE: Patient stated I guess they know Arturo is in the building.     OBJECTIVE DATA SUMMARY:   HISTORY:   Past Medical History:   Diagnosis Date    CAD (coronary artery disease)     Chronic kidney disease     stones    [facility]     Diabetes ([facility]     Hypertension     Stroke ([facility] 1980s    right hand neuropathy     Past Surgical History:   Procedure Laterality Date    COLONOSCOPY N/A 4/12/2018    COLONOSCOPY performed by Sarah Corbin MD at [facility]       Prior Level of Function/Environment/Context: independent with ADLS/mobility  Expanded or extensive additional review of patient history:     Home Situation  Home Environment: Private residence  # Steps to Enter: 3  Rails to Enter: Yes  Hand Rails : Bilateral  One/Two Story Residence: One story  Living Alone: No(Girlfriend)  Support Systems: Spouse/Significant Other/Partner  Patient Expects to be Discharged to[de-identified] Private residence  Current DME Used/Available at Home: Shower chair, Oxygen, portable    Hand dominance: Right    EXAMINATION OF PERFORMANCE DEFICITS:  Cognitive/Behavioral Status:  Neurologic State: Alert  Orientation Level: Oriented X4  Cognition: Appropriate decision making; Follows commands;Poor safety awareness;Recognition of people/places(Unsteady gate. )             Skin: bruising    Edema: intact    Hearing: Auditory  Auditory Impairment: Hard of hearing, bilateral    Vision/Perceptual:                                     Range of Motion:    AROM: Generally decreased, functional  PROM: Generally decreased, functional                      Strength:    Strength: Generally decreased, functional                Coordination:  Coordination: Generally decreased, functional  Fine Motor Skills-Upper: Left Intact; Right Intact    Gross Motor Skills-Upper: Left Intact; Right Intact    Tone & Sensation:    Tone: Normal  Sensation: Impaired(R side diminished )                      Balance:  Sitting: Intact  Standing: Impaired; Without support  Standing - Static: Good  Standing - Dynamic : Good    Functional Mobility and Transfers for ADLs:  Bed Mobility:  Supine to Sit: Stand-by assistance  Sit to Supine: Stand-by assistance    Transfers:  Sit to Stand: Contact guard assistance  Stand to Sit: Contact guard assistance    ADL Assessment:  Feeding: Independent    Oral Facial Hygiene/Grooming: Independent    Bathing: Contact guard assistance    Upper Body Dressing: Setup    Lower Body Dressing: Contact guard assistance                     ADL Intervention and task modifications:                           Lower Body Dressing Assistance  Socks: Stand-by assistance  Leg Crossed Method Used: No  Position Performed: Seated edge of bed              Therapeutic Exercise:     Functional Measure:  Fugl-Lopez Assessment of Motor Recovery after Stroke:     Reflex Activity  Flexors/Biceps/Fingers: Can be elicited  Extensors/Triceps: Can be elicited  Reflex Subtotal: 4    Volitional Movement Within Synergies  Shoulder Retraction: Full  Shoulder Elevation: Full  Shoulder Abduction (90 degrees): Full  Shoulder External Rotation: Full  Elbow Flexion: Full  Forearm Supination: Full  Shoulder Adduction/Internal Rotation: Full  Elbow Extension: Full  Forearm Pronation: Full  Subtotal: 18    Volitional Movement Mixing Synergies  Hand to Lumbar Spine: Full  Shoulder Flexion (0-90 degrees): Full  Pronation-Supination: Full  Subtotal: 6    Volitional Movement With Little or No Synergy  Shoulder Abduction (0-90 degrees): Full  Shoulder Flexion ( degrees): Full  Pronation/Supination: Full  Subtotal : 6    Normal Reflex Activity  Biceps, Triceps, Finger Flexors:  Full  Subtotal : 2    Upper Extremity Total   Upper Extremity Total: 36    Wrist  Stability at 15 Degree Dorsiflexion: Full  Repeated Dorsiflexion/ Volar Flexion: Full  Stability at 15 Degree Dorsiflexion: Full  Repeated Dorsiflexion/ Volar Flexion: Full  Circumduction: Full  Wrist Total: 10    Hand  Mass Flexion: Full  Mass Extension: Full  Grasp A: Full  Grasp B: Full  Grasp C: Full  Grasp D: Full  Grasp E: Full  Hand Total: 14    Coordination/Speed  Tremor: None  Dysmetria: None  Time: <1s  Coordination/Speed Total : 6    Total A-D  Total A-D (Motor Function): 66/66       This is a reliable/valid measure of arm function after a neurological event. It has established value to characterize functional status and for measuring spontaneous and therapy-induced recovery; tests proximal and distal motor functions. Fugl-Lopez Assessment  UE scores recorded between five and 30 days post neurologic event can be used to predict UE recovery at six months post neurologic event. Severe = 0-21 points   Moderately Severe = 22-33 points   Moderate = 34-47 points   Mild = 48-66 points  BETH San, THELMA Hugo, & MARISSA Cabrera (1992). Measurement of motor recovery after stroke: Outcome assessment and sample size requirements.  Stroke, 23, pp. 3694-5274.   ------------------------------------------------------------------------------------------------------------------------------------------------------------------  MCID:  Stroke:   Aruna Gil et al, 2001; n = 171; mean age 79 (5) years; assessed within 16 (12) days of stroke, Acute Stroke)  FMA Motor Scores from Admission to Discharge    10 point increase in FMA Upper Extremity = 1.5 change in discharge FIM    10 point increase in FMA Lower Extremity = 1.9 change in discharge FIM  MDC:   Stroke:   Candace Montes et al, 2008, n = 14, mean age = 59.9 (14.6) years, assessed on average 14 (6.5) months post stroke, Chronic Stroke)    FMA = 5.2 points for the Upper Extremity portion of the assessment       Occupational Therapy Evaluation Charge Determination   History Examination Decision-Making   LOW Complexity : Brief history review  LOW Complexity : 1-3 performance deficits relating to physical, cognitive , or psychosocial skils that result in activity limitations and / or participation restrictions  LOW Complexity : No comorbidities that affect functional and no verbal or physical assistance needed to complete eval tasks       Based on the above components, the patient evaluation is determined to be of the following complexity level: LOW   Pain:  No pain     Activity Tolerance:   Good  Please refer to the flowsheet for vital signs taken during this treatment. After treatment patient left:   Supine in bed   COMMUNICATION/EDUCATION:   The patients plan of care was discussed with: Physical Therapist.    Patient was educated regarding his deficit(s) of balance impairment as this relates to his diagnosis of TIA. He demonstrated Good understanding as evidenced by verbal feedback. Patient and/or family was verbally educated on the BE FAST acronym for signs/symptoms of CVA and TIA. BE FAST was written on patient's communication board  for visual education and reinforcement. All questions answered with patient indicating good understanding. Home safety education was provided and the patient/caregiver indicated understanding. and Patient/family have participated as able in goal setting and plan of care. This patients plan of care is appropriate for delegation to [facility].     Thank you for this referral.  Brielle Fernandez  Time Calculation: 20 mins

## 2019-05-29 NOTE — PROGRESS NOTES
Speech pathology note  Reviewed chart and note patient admitted with dizziness, slurred speech, and confusion. Per chart review, slurred speech and confusion have resolved. Note NIHSS=1 due to residual R hand numbness. Note patient passed the STAND and a regular diet was ordered. Discussed case with RN who reported no SLP related concerns. Introduced self and role of SLP to patient. Patient reported motor speech, language, and cognitive function have returned to baseline. Patient denied dysphagia, and patient informally observed taking meds given by RN with successive sips of thin liquid via straw with no difficulty. Formal SLP evaluation not clinically indicated at this time. Will sign off. Please re-consult if further needs arise. Thank you.     Sandy Flowers, Roddy Schmid., CCC-SLP

## 2019-05-29 NOTE — PROGRESS NOTES
Physical Therapy: Defer    Chart reviewed, orders received and acknowledged. Patient currently off the floor for MRI. Will follow up for PT evaluation.       Thank you,  Nicholas Rai, PT, DPT

## 2019-05-29 NOTE — H&P
[address]  HISTORY AND PHYSICAL    Name:  Keara Gaines  MR#:  [id number]  :  1945  ACCOUNT #:  [de-identified]  ADMIT DATE:  2019      PRIMARY CARE PHYSICIAN:  Amelie Plascencia MD    SOURCE OF INFORMATION:  The patient. CHIEF COMPLAINT:  Dizziness. HISTORY OF PRESENT ILLNESS:  This is a 79-year-old man with a past medical history significant for dyslipidemia, type 2 diabetes, hypertension, COPD, was in his usual state of health until the day of his presentation at the emergency room when the patient developed dizziness. The patient and his spouse has been working hard moving this morning. The patient stated to the spouse that he was not feeling well. No specific symptoms. This was at about 08:00 a.m. in the morning. The patient went back to sleep at about 10:00 a.m. without further complaints. At about 03:00 p.m., his spouse woke him up, found him to be confused with slurred speech. EMS was called. Code stroke was called to the emergency room and the patient was brought to the emergency room for further evaluation. By the time the patient arrived at the emergency room, the patient is alert and oriented without any focal neurological deficit. His only complaint on arrival at the emergency room was dizziness. The patient did not describe the dizziness as room spinning around him. The dizziness is worse with movement, improved with rest.  No associated headache or blurring of vision. The patient stated that he has been having shortness of breath for a while, which is progressive and getting worse. The patient stated that he cannot take few steps without becoming short of breath. No associated chest pain. The patient denies fever, rigors and chills. He was last admitted to this hospital in 2013. The patient was initially admitted with suspected TIA and further evaluation revealed brain abscess. The patient was treated conservatively with antibiotics.   The patient was also diagnosed with a stroke during that hospitalization. The patient was admitted for about a week and was subsequently discharged to rehab facility. When the patient arrived at the emergency room, the code S was canceled by the emergency room and CT scan of the head was then obtained. This was negative for acute pathology. The patient was referred to the hospitalist service for evaluation for admission. PAST MEDICAL HISTORY:  Dyslipidemia, type 2 diabetes, hypertension, COPD, status post CVA. ALLERGIES:  NO KNOWN DRUG ALLERGIES. MEDICATIONS:  Lipitor 20 mg daily, Lasix 20 mg daily, losartan 25 mg daily, Glucophage 500 mg twice daily, Protonix 40 mg daily, Zocor 20 mg daily. FAMILY HISTORY:  This was reviewed. Mother and father had heart disease. PAST SURGICAL HISTORY:  Not significant. SOCIAL HISTORY:  The patient is a former smoker, quit tobacco abuse in 2013. The patient also quit alcohol abuse in 2013. REVIEW OF SYSTEMS:  HEAD, EYES, EARS, NOSE AND THROAT:  This is positive for dizziness, confusion and slurred speech. No headache, no photophobia, no blurring of vision. RESPIRATORY SYSTEM:  This is positive for shortness of breath. No cough, no hemoptysis. CARDIOVASCULAR SYSTEM:  This is positive for orthopnea. No chest pain, no palpitations. GASTROINTESTINAL SYSTEM:  No nausea or vomiting. No diarrhea. No constipation. GENITOURINARY SYSTEM:  No dysuria, no urgency and no frequency. All other systems are reviewed and they are negative. PHYSICAL EXAM:  GENERAL APPEARANCE: The patient appeared ill in moderate distress. VITAL SIGNS:  On arrival at the emergency room, temperature 97.9, pulse of 75, respiratory rate 16, blood pressure 124/77, oxygen saturation 93% on room air. HEENT:  Head:  Normocephalic, atraumatic. Eyes:  Normal eye movements. No redness, no drainage, no discharge. Ears:  Normal external ears with no evidence of drainage.   Nose:  No deformity and no drainage. Mouth and Throat:  No visible oral lesion. NECK:  Supple. No JVD, no thyromegaly. CHEST:  Few expiratory wheezing. No crackles. HEART:  Normal S1 and S2, regular. No clinically appreciable murmur. ABDOMEN:  Soft, obese, nontender, normal bowel sounds. CNS:  Alert, oriented x3. No gross or focal neurological deficit. EXTREMITIES:  Edema 2+. Pulses 2+ bilaterally. MUSCULOSKELETAL SYSTEM:  No evidence of joint deformity and swelling. SKIN:  No active skin lesions seen in the exposed part of the body. PSYCHIATRY:  Normal mood and affect. LYMPHATIC SYSTEM:  No cervical lymphadenopathy. DIAGNOSTIC DATA:  EKG shows normal sinus rhythm. No significant ST and T-wave abnormalities. CT scan of the head without contrast, no acute intracranial pathology. Chest x-ray, no acute process. LABORATORY DATA:  Hematology:  WBC 4.0, hemoglobin 13.9, hematocrit 43.5, platelets 410. Chemistry:  Sodium 144, potassium 4.1, chloride 110, CO2 26, glucose 150, BUN 18, creatinine 1.44, calcium 8.3, total bilirubin 0.5, ALT 33, AST 29, alkaline phosphatase 60, total protein at 6.8, albumin level 3.3, globulin at 3.5, magnesium level 1.7. Urinalysis: This is significant for negative nitrite, negative leuko esterase, negative bacteria. ASSESSMENT:  1. Suspected transient ischemic attack. 2.  Dyslipidemia. 3.  Type 2 diabetes. 4.  Hypertension. 5.  Chronic obstructive pulmonary disease. 6.  Morbid obesity. 7.  Thrombocytopenia. 8.  Acute kidney injury. 9.  Bilateral lower extremity swelling. PLAN:  1. Suspected TIA. We will place the patient on observation. We will obtain MRI, MRA of the brain, carotid Doppler of the neck. Inpatient Neurology consult will be requested to assist in further evaluation and treatment of a suspected TIA. We will check lipid profile. We will check hemoglobin A1c level.   We will also check ESR and C-reactive protein level to evaluate the patient for systemic inflammation. We will await further recommendation from the inpatient neurologist.  2.  Dyslipidemia. We will continue with preadmission medication. We will check a lipid profile as stated above. 3.  Type 2 diabetes. The patient will be placed on a sliding scale with insulin coverage. We will check hemoglobin A1c level if one has not been checked recently. We will hold oral agents until the time of discharge from the hospital.  4.  Hypertension. We will resume home medication. We will monitor the patient's blood pressure closely. 5.  COPD. The patient will be placed on DuoNeb as needed since the patient is complaining of shortness of breath. We will check BNP level to determine if there is a cardiac component to the shortness of breath. We will check cardiac markers to rule out acute myocardial infarction as a possible cause of his shortness of breath. We will also check a D-dimer to evaluate the patient for thromboembolism as a possible cause of shortness of breath. The patient does not appear to be in acute COPD exacerbation. We will continue to monitor. 7.  Morbid obesity. Dietary consult will be requested for dietary therapy. The morbid obesity is unspecified. 8.  Thrombocytopenia. This is mild. The patient is asymptomatic. We will continue to monitor. 9.  Acute kidney injury. We will hold Lasix. We will carry out fluid therapy and repeat renal function. 10.  Bilateral lower extremity swelling. We will check ultrasound of the lower extremity for DVT. OTHER ISSUES:  Code status, the patient is a full code. We will request SCD for DVT prophylaxis. FUNCTIONAL STATUS PRIOR TO ADMISSION:  The patient is ambulatory without assistant or device. Denys Epley, MD [id number]  D:  05/29/2019 0:26  T:  05/29/2019 0:32  JOB #:  [id number]    CC:   Eusebia Barbosa MD

## 2019-05-29 NOTE — PROGRESS NOTES
Bedside shift change report given to Ambar Gamino (oncoming nurse) by Brittney Ness (offgoing nurse). Report included the following information SBAR, Kardex, Procedure Summary, MAR, Accordion, Recent Results and Cardiac Rhythm NSR/SB.

## 2019-05-29 NOTE — PROGRESS NOTES
Problem: TIA/CVA Stroke: 0-24 hours  Goal: Activity/Safety  Outcome: Progressing Towards Goal  Goal: Consults, if ordered  Outcome: Progressing Towards Goal  Goal: Diagnostic Test/Procedures  Outcome: Progressing Towards Goal  Goal: Nutrition/Diet  Outcome: Progressing Towards Goal  Goal: Discharge Planning  Outcome: Progressing Towards Goal  Goal: Medications  Outcome: Progressing Towards Goal  Goal: Respiratory  Outcome: Progressing Towards Goal  Goal: Treatments/Interventions/Procedures  Outcome: Progressing Towards Goal  Goal: Minimize risk of bleeding post-thrombolytic infusion  Outcome: Progressing Towards Goal  Goal: Monitor for complications post-thrombolytic infusion  Outcome: Progressing Towards Goal  Goal: Psychosocial  Outcome: Progressing Towards Goal  Goal: *Hemodynamically stable  Outcome: Progressing Towards Goal  Goal: *Neurologically stable  Description  Absence of additional neurological deficits    Outcome: Progressing Towards Goal  Goal: *Verbalizes anxiety and depression are reduced or absent  Outcome: Progressing Towards Goal  Goal: *Absence of Signs of Aspiration on Current Diet  Outcome: Progressing Towards Goal  Goal: *Absence of deep venous thrombosis signs and symptoms(Stroke Metric)  Outcome: Progressing Towards Goal  Goal: *Ability to perform ADLs and demonstrates progressive mobility and function  Outcome: Progressing Towards Goal  Goal: *Stroke education started(Stroke Metric)  Outcome: Progressing Towards Goal  Goal: *Dysphagia screen performed(Stroke Metric)  Outcome: Progressing Towards Goal  Goal: *Rehab consulted(Stroke Metric)  Outcome: Progressing Towards Goal

## 2019-05-29 NOTE — ED NOTES
Bedside and Verbal shift change report given to Lindsey Lorenzo RN (oncoming nurse) by [address] Nicole RN (offgoing nurse). Report included the following information SBAR, ED Summary, MAR and Recent Results.

## 2019-05-29 NOTE — PROGRESS NOTES
TRANSFER - IN REPORT:    Verbal report received from Maricruz(name) on Radha Camara  being received from ED(unit) for routine progression of care      Report consisted of patients Situation, Background, Assessment and   Recommendations(SBAR). Information from the following report(s) SBAR, ED Summary, Procedure Summary, MAR, Accordion, Recent Results and Cardiac Rhythm NSR was reviewed with the receiving nurse. Opportunity for questions and clarification was provided. Assessment completed upon patients arrival to unit and care assumed.

## 2019-05-29 NOTE — PROGRESS NOTES
NUTRITION   RD Assessment       Pt seen for:    [] MST for n/a    [x]   MD Consult General nutrition management and supplements. [] Supplements  []   PO intake check   [] Food Allergies  []   Food Preferences/tolerances    [] Rescreen   []   Education    [] Diet order clarification []   Other   [] LOS                          Nutrition Prescription:   No changes or new recommendations at this time  Encourage adequate intake of meals and supplements    Pt referred to outpatient nutrition counseling on discharge instructions   RD will interview for acute nutrition related needs & answer questions later today or tomorrow  Assessment:   Information obtained from:   Idania Sorensen, pt off unit and with other providers when checked on this morning. Pt admitted with TIA (transient ischemic attack) [G45.9]. PMHx: dyslipidemia, T2DM, HTN, COPD, s/p CVA (2013)  Reviewed MD consult on admission, appears to be for obesity/weight management. Pt is well nourished, obese. Wt appears to be down ~20# from [id number] per medical records. Pt will benefit from outpatient nutrition counseling if/when appropriate & pt willing. Cultural, Moravian and ethnic food preferences:   [x]  None   []  Identified and addressed    Diet:  Cardiac    PO Intake: [x] Good     [] Fair  [] Poor   Intake documented from Nursing flow sheets:   No data found. Skin:   intact  BM:   5/29/19       ABD:  WDL     Bowel Sounds last documented:   n/a    Estimated Daily Nutrition Requirements:  Kcals/day: 2054 Kcals/day  Protein: 82 g( - 103g (0.8-1.2g/kg of current wt)  Fluid: (1mL/kcal of PO)     Based On: [address] [facility](AF 1.2)  Weight Used: Actual wt(103 kg)  Body mass index is 37.79 kg/m².     Weight Changes:   [x]   Loss  []   Gain  []   Stable  Wt Readings from Last 5 Encounters:   05/29/19 103 kg (227 lb 1.2 oz)   04/12/18 109.3 kg (241 lb)   11/16/17 111.6 kg (246 lb)   07/12/13 78 kg (171 lb 15.3 oz)   06/21/13 84.9 kg (187 lb 3.2 oz)     Last 3 Recorded Weights in this Encounter    05/29/19 0200   Weight: 103 kg (227 lb 1.2 oz)     Nutrition Problems Identified  [x]     None  []     Food Preferences     Nutrition Diagnosis:      No nutrition diagnosis identified at this time    Intervention:   []    Obtained/adjusted food preferences/tolerances and/or snacks options   []    Dislikes supplements will try a substitution   []    Modify diet for food allergies  []    Adjust texture due to difficulty chewing   []    Encourage Fresh Fruit, Activia yogurt, fluid   []    Educated patient  [x]    Rescreen per screening protocol  []    Add Supplements    Goal: no new  Met previous goal: n/a     Monitoring/Evaluation:   Education & Discharge Needs  [x] Nutrition related discharge needs addressed: referral to nutrition outpatient counseling    [] Supplements (on d/c instruction &/or coupons provided)    [x] Education: TBD   [] No nutrition related discharge needs at this time     Rescreen: []  At Nutrition Risk          [x]  Not at Nutrition Risk, rescreen per screening protocol    Benitez Garcia MS, [address] [place] [address], E   Pager #[phone]

## 2019-08-14 ENCOUNTER — OFFICE VISIT (OUTPATIENT)
Dept: NEUROLOGY | Age: 74
End: 2019-08-14

## 2019-08-14 ENCOUNTER — TELEPHONE (OUTPATIENT)
Dept: NEUROLOGY | Age: 74
End: 2019-08-14

## 2019-08-14 VITALS
BODY MASS INDEX: 37.65 KG/M2 | SYSTOLIC BLOOD PRESSURE: 112 MMHG | WEIGHT: 226 LBS | HEIGHT: 65 IN | HEART RATE: 68 BPM | RESPIRATION RATE: 16 BRPM | OXYGEN SATURATION: 98 % | DIASTOLIC BLOOD PRESSURE: 64 MMHG

## 2019-08-14 DIAGNOSIS — R61 EXCESSIVE SWEATING: ICD-10-CM

## 2019-08-14 DIAGNOSIS — R42 DIZZINESS: ICD-10-CM

## 2019-08-14 DIAGNOSIS — H91.92 HEARING LOSS OF LEFT EAR, UNSPECIFIED HEARING LOSS TYPE: Primary | ICD-10-CM

## 2019-08-14 DIAGNOSIS — Z87.898 HISTORY OF SNORING: ICD-10-CM

## 2019-08-14 DIAGNOSIS — H93.12 TINNITUS AURIUM, LEFT: ICD-10-CM

## 2019-08-14 RX ORDER — ENALAPRIL MALEATE 20 MG/1
20 TABLET ORAL DAILY
COMMUNITY
End: 2019-12-03

## 2019-08-14 RX ORDER — MONTELUKAST SODIUM 10 MG/1
10 TABLET ORAL DAILY
COMMUNITY
End: 2019-12-03

## 2019-08-14 RX ORDER — DOXYCYCLINE 100 MG/1
100 CAPSULE ORAL 2 TIMES DAILY
COMMUNITY
End: 2019-12-03

## 2019-08-14 RX ORDER — BUDESONIDE 0.5 MG/2ML
500 INHALANT ORAL
COMMUNITY
End: 2020-10-01

## 2019-08-14 NOTE — TELEPHONE ENCOUNTER
[facility]  Ans testing [phone]

## 2019-08-14 NOTE — PROGRESS NOTES
Date:  19     Name:  Ana Barrera  :  1945  MRN:  [id number]     PCP:  Beba Suh MD    Chief Complaint   Patient presents with    Stroke       HISTORY OF PRESENT ILLNESS:  Hospital follow-up visit. The patient was seen in the hospital in May for dizziness, and to rule out CVA. MRI, CTA, CT all came back unremarkable he was discharged on aspirin and statin. Today he presents because he continues to have these dizzy spells. He describes his dizzy spell is more as swaying he feels like his balance is off. This is intermittent, it happens often but is not constantly. He denies any falls. Tingling in his lower extremities. He denies double vision. He denies headaches. His wife reports that he also is having some head sweats that it is excessive.  reports that when he goes to sleep at night he will wake up drenched in sweat. This has been going on now for a couple of months. He has discussed this with his primary care provider and this was not concerning to them at the time. In addition to the dizzy spells, he also reports that he is noticed that he is having some hearing loss which is also intermittent. He reports he has seen the ENT specialist and they recommended hearing aids. He does not truly want to get hearing aids because he said that is intermittent however, the majority of the time he cannot hear. Except as noted above, denies  fever, chills, cough. No CP or SOB. No dysuria, loss of bowel or bladder control. No Weight loss. Appetite good. Sleeping well. No sweats. No edema. No bruising or bleeding. No nausea or vomit. No diarrhea. No frequency, urgency, No depressive sxs. No anxiety. Denies sore throat, nasal congestion, nasal discharge, epistaxis, tinnitus, hearing loss, back pain, muscle pain, or joint pain. Current Outpatient Medications   Medication Sig    doxycycline (MONODOX) 100 mg capsule Take 100 mg by mouth two (2) times a day.     enalapril (VASOTEC) 20 mg tablet Take 20 mg by mouth daily. Indications: 1/2 tablet QAM    montelukast (SINGULAIR) 10 mg tablet Take 10 mg by mouth daily.  budesonide (PULMICORT) 0.5 mg/2 mL nbsp 500 mcg by Nebulization route.  Oxygen Indications: 2L prn    aspirin delayed-release 81 mg tablet Take 1 Tab by mouth daily.  insulin NPH/insulin regular (NOVOLIN 70/30 U-100 INSULIN) 100 unit/mL (70-30) injection by SubCUTAneous route. Indications: 24u in AM and 15u PM    simvastatin (ZOCOR) 20 mg tablet Take 20 mg by mouth nightly.  metFORMIN (GLUCOPHAGE) 500 mg tablet Take 1,000 mg by mouth two (2) times daily (with meals).  furosemide (LASIX) 20 mg tablet Take  by mouth daily.  pantoprazole (PROTONIX) 20 mg tablet Take 2 Tabs by mouth daily.  OTHER [facility]  - address higher level balance and maximize safety and independence with functional mobility. Hx Stroke    losartan (COZAAR) 25 mg tablet Take  by mouth daily.  cholecalciferol (VITAMIN D3) 1,000 unit cap Take  by mouth daily.  pyridoxine, vitamin B6, (VITAMIN B-6) 100 mg tablet Take 100 mg by mouth daily.  acetaminophen 650 mg Tab Take 650 mg by mouth every four (4) hours as needed.  atorvastatin (LIPITOR) 20 mg tablet Take 1 Tab by mouth daily. No current facility-administered medications for this visit.       No Known Allergies  Past Medical History:   Diagnosis Date    CAD (coronary artery disease)     Chronic kidney disease     stones    Chronic obstructive pulmonary disease ([address]     Diabetes ([address]     Hypertension     Stroke ([address] 1980s    right hand neuropathy     Past Surgical History:   Procedure Laterality Date    COLONOSCOPY N/A 4/12/2018    COLONOSCOPY performed by Hossein Joy MD at [facility] ENDOSCOPY     Social History     Socioeconomic History    Marital status: SINGLE     Spouse name: Not on file    Number of children: Not on file    Years of education: Not on file    Highest education level: Not on file   Occupational History    Not on file   Social Needs    Financial resource strain: Not on file    Food insecurity:     Worry: Not on file     Inability: Not on file    Transportation needs:     Medical: Not on file     Non-medical: Not on file   Tobacco Use    Smoking status: Former Smoker     Packs/day: 2.00     Years: 45.00     Pack years: 90.00     Last attempt to quit: 2005     Years since quittin.1    Smokeless tobacco: Never Used   Substance and Sexual Activity    Alcohol use: No     Comment: 1 gallon whiskey a week stopped 4 years ago    Drug use: Not on file    Sexual activity: Not on file   Lifestyle    Physical activity:     Days per week: Not on file     Minutes per session: Not on file    Stress: Not on file   Relationships    Social connections:     Talks on phone: Not on file     Gets together: Not on file     Attends Voodoo service: Not on file     Active member of club or organization: Not on file     Attends meetings of clubs or organizations: Not on file     Relationship status: Not on file    Intimate partner violence:     Fear of current or ex partner: Not on file     Emotionally abused: Not on file     Physically abused: Not on file     Forced sexual activity: Not on file   Other Topics Concern    Not on file   Social History Narrative    Not on file     Family History   Problem Relation Age of Onset    Heart Disease Mother     Heart Disease Father     Cancer Sister         breast ca       PHYSICAL EXAMINATION:    Visit Vitals  /64   Pulse 68   Resp 16   Ht 5' 5\" (1.651 m)   Wt 102.5 kg (226 lb)   SpO2 98%   BMI 37.61 kg/m²     General:  Well defined, nourished, and groomed individual in no acute distress. Neck: Supple, nontender, no bruits, no pain with resistance to active range of motion. Heart: Regular rate and rhythm, no murmurs, rub, or gallop. Normal S1S2.   Lungs:  Clear to auscultation bilaterally with equal chest expansion, no cough, no wheeze  Musculoskeletal:  Extremities revealed no edema and had full range of motion of joints. Psych:  Good mood and bright affect    NEUROLOGICAL EXAMINATION:     Mental Status:   Alert and oriented to person, place, and time with recent and remote memory intact. Attention span and concentration are normal. Speech is fluent with a full fund of knowledge. Cranial Nerves:    II, III, IV, VI:  Visual acuity grossly intact. Visual fields are normal.    Pupils are equal, round, and reactive to light and accommodation. Extra-ocular movements are full and fluid. Fundoscopic exam was benign, no ptosis or nystagmus. V-XII: Hearing is grossly intact. Facial features are symmetric, with normal sensation and strength. The palate rises symmetrically and the tongue protrudes midline. Sternocleidomastoids 5/5. Motor Examination: Normal tone, bulk, and strength, 5/5 muscle strength throughout. No cogwheel rigidity or clonus present. Coordination:  Heel-to-shin was smooth and symmetrical bilaterally. Finger to nose and rapid arm movement testing was normal.   No resting or intention tremor    Gait and Station:  Steady while walking. Normal arm swing. No Rhomberg or pronator drift. No muscle wasting or fasiculations noted. Reflexes:  DTRs 2+ throughout. ASSESSMENT AND PLAN    ICD-10-CM ICD-9-CM    1. Hearing loss of left ear, unspecified hearing loss type H91.92 389.9 MRI BRAIN MRV WO CONT      TSH AND FREE T4   2. Tinnitus aurium, left H93.12 388. 31 MRI BRAIN MRV WO CONT   3. Dizziness R42 780.4 MRI BRAIN MRV WO CONT      REFERRAL TO NEUROLOGY   4. Excessive sweating R61 780.8 REFERRAL TO NEUROLOGY      VITAMIN B12   5. History of snoring Z87.898 V15.89 SLEEP MEDICINE REFERRAL     72year old get an MRI of the brain with MRV to evaluate any Venous causes giving his hearing loss and vertigo.  Will refer to a sleep study to rule out sleep apnea which can cause vertigo. As for his excessive night sweating, I will check  his Thyroid, however, recommend that he follow up with his primary provider for other causes . Will refer to ANS testing given the vertigo and excessive sweating. The patient is his wife verbalized understanding. This note will not be viewable in [address]   Rosalinda Adamson NP

## 2019-08-15 LAB
T4 FREE SERPL-MCNC: 1.58 NG/DL (ref 0.82–1.77)
TSH SERPL DL<=0.005 MIU/L-ACNC: 1.74 UIU/ML (ref 0.45–4.5)
VIT B12 SERPL-MCNC: 326 PG/ML (ref 232–1245)

## 2019-08-21 ENCOUNTER — DOCUMENTATION ONLY (OUTPATIENT)
Dept: NEUROLOGY | Age: 74
End: 2019-08-21

## 2019-08-28 ENCOUNTER — TELEPHONE (OUTPATIENT)
Dept: NEUROLOGY | Age: 74
End: 2019-08-28

## 2019-08-28 NOTE — TELEPHONE ENCOUNTER
Spoke with Baldemar Rodrigez at [facility] MRI, she needs clarification on order patient is scheduled for on Saturday. Does patient need MRI brain WO and MRV or just the MRV.

## 2019-08-31 ENCOUNTER — HOSPITAL ENCOUNTER (OUTPATIENT)
Dept: MRI IMAGING | Age: 74
End: 2019-08-31
Attending: NURSE PRACTITIONER

## 2019-09-03 NOTE — TELEPHONE ENCOUNTER
Called and spoke to patient scheduled ans testing for 9/16/19 at [address] for testing hold all oral meds except for asa 48 hrs prior to testing  Patient verbalized understanding

## 2019-09-13 ENCOUNTER — TELEPHONE (OUTPATIENT)
Dept: NEUROLOGY | Age: 74
End: 2019-09-13

## 2019-09-13 NOTE — TELEPHONE ENCOUNTER
----- Message from Vito Plascencia sent at 9/13/2019 12:30 PM EDT -----  Regarding: Dr. Leona Urias first and last name: Enrique Sorensen     Reason for call: MRI reschedule    Callback required yes/no and why: Yes     Best contact number(s): Wife, dominique like the nurse to give her back to reschedule appt for MRI.        Details to clarify the request:      Vito Plascencia

## 2019-09-15 ENCOUNTER — HOSPITAL ENCOUNTER (OUTPATIENT)
Dept: MRI IMAGING | Age: 74
Discharge: HOME | End: 2019-09-15
Attending: NURSE PRACTITIONER
Payer: MEDICARE

## 2019-09-15 DIAGNOSIS — R42 DIZZINESS: ICD-10-CM

## 2019-09-15 DIAGNOSIS — H91.92 HEARING LOSS OF LEFT EAR, UNSPECIFIED HEARING LOSS TYPE: ICD-10-CM

## 2019-09-15 DIAGNOSIS — H93.12 TINNITUS AURIUM, LEFT: ICD-10-CM

## 2019-09-15 DIAGNOSIS — H91.90 HEARING LOSS: ICD-10-CM

## 2019-09-15 DIAGNOSIS — H93.19 TINNITUS: ICD-10-CM

## 2019-09-15 PROCEDURE — 70544 MR ANGIOGRAPHY HEAD W/O DYE: CPT

## 2019-09-15 PROCEDURE — 70551 MRI BRAIN STEM W/O DYE: CPT

## 2019-09-16 ENCOUNTER — OFFICE VISIT (OUTPATIENT)
Dept: NEUROLOGY | Age: 74
End: 2019-09-16

## 2019-09-16 DIAGNOSIS — R42 LIGHTHEADEDNESS: Primary | ICD-10-CM

## 2019-09-16 NOTE — LETTER
2019 4:49 PM 
 
Patient:  Irma Hardy YOB: 1945 Date of Visit: 2019 Dear Dayton Estrada MD 
[facility] [place] [address] VIA Facsimile: [phone] 
 : Thank you for referring Mr. Jt Chan to me for ANS testing. [facility] [place] [address], [address] Phone: [phone] FAX: [phone] Clinical Autonomic Testing Report Patient ID: Irma Hardy [id number] 
28 y.o. 
1945 REFERRED BY: France Sorto NP 
PCP: Hilda Vinson MD 
 
Date of Testin2019 Indication/History:   
Episodes of dizziness and being off balanced for 7 yrs (+) diabetes Patient is coming for syncope/autonomic dysfunction evaluation. Medications taken 48 hrs before the test: Insulin Procedure: This Autonomic Nervous System (ANS) testing is performed by utilizing [address] [place] [address] Medication Review Autonomic System, with established protocol. Multiple procedures performed: Heart rate response to deep breathing (HRDB), Valsalva ratio, Heart rate and BP response to head up tilt (HUT), and Quantitative sudomotor axon reflex testing (QSWEAT) . Result: 1. Heart response to deep  breathing (HRDB): 2 series of 6 cycles were performed and the mean of 6 consecutive cycles was obtained. Average HR difference was 8.5 and E:I ratio was 1.14. Normal response 2. Heart rate response to Valsalva maneuver:  [id number]-[id number] BP to Valsalva was measured. Square-wave variant of blood pressure response during valsalva maneuver, which can be a normal variant was noted. Heart rate response was reduced. ( VR = 1.19; Normal > 1.29 ) 3. HUT (head-up tilt) : Bzpn-zp-ruoh BP and HR were measured, up to 15 minutes post tilt. No significant BP reduction or HR acceleration/deceleration.  
4. SUDOMOTOR: QSWEAT response showed relatively preserved sweat production in all 4 localities (forearm, proximal leg, distal leg, foot) of the right upper and lower limbs, comparing patient to age group. Impression: ABNORMAL 1) Reduced heart rate response to valsalva maneuver, indicating a mild dysfunction of the cardiovagal (parasympathetic) division, as can be seen in diabetic cardiac autonomic neuropathy. 2) QSWEAT response is within normal limits. Kelli Navas MD 
Diplomate, American Board of Psychiatry and Neurology Diplomate, Neuromuscular Medicine Diplomate, [address] [place] [address] Board of Electrodiagnostic Medicine Note: Raw Data will be scanned separately in Media

## 2019-09-20 ENCOUNTER — TELEPHONE (OUTPATIENT)
Dept: NEUROLOGY | Age: 74
End: 2019-09-20

## 2019-09-20 NOTE — TELEPHONE ENCOUNTER
Called and spoke to patient   Redoing valsalva test   Informed patient to hold meds 48 hrs   Patient verbalized understanding

## 2019-09-27 NOTE — PROCEDURES
[facility]  [address] [place], [address] [place], [address] [facility]  Phone: [phone]  FAX: [phone]     Clinical Autonomic Testing Report     Patient ID:  Carmelita Gore  [id number]  38 y.o.  1945     REFERRED BY: Nithin Marroquin NP  PCP: Keane Leventhal, MD    Date of Testin2019     Indication/History:    Episodes of dizziness and being off balanced for 7 yrs (+) diabetes    Patient is coming for syncope/autonomic dysfunction evaluation. Medications taken 48 hrs before the test: Insulin     Procedure: This Autonomic Nervous System (ANS) testing is performed by utilizing [address] [place] [address] [employer] Autonomic System, with established protocol. Multiple procedures performed: Heart rate response to deep breathing (HRDB), Valsalva ratio, Heart rate and BP response to head up tilt (HUT), and Quantitative sudomotor axon reflex testing (QSWEAT) . Result:  1. Heart response to deep  breathing (HRDB): 2 series of 6 cycles were performed and the mean of 6 consecutive cycles was obtained. Average HR difference was 8.5 and E:I ratio was 1.14. Normal response    2. Heart rate response to Valsalva maneuver:  [id number]-dv-[id number] BP to Valsalva was measured. Square-wave variant of blood pressure response during valsalva maneuver, which can be a normal variant was noted. Heart rate response was reduced. ( VR = 1.19; Normal > 1.29 )  3. HUT (head-up tilt) : Klhg-qu-iujr BP and HR were measured, up to 15 minutes post tilt. No significant BP reduction or HR acceleration/deceleration. 4. SUDOMOTOR: QSWEAT response showed relatively preserved sweat production in all 4 localities (forearm, proximal leg, distal leg, foot) of the right upper and lower limbs, comparing patient to age group.      Impression:   ABNORMAL    1) Reduced heart rate response to valsalva maneuver, indicating a mild dysfunction of the cardiovagal (parasympathetic) division, as can be seen in diabetic cardiac autonomic neuropathy. 2) QSWEAT response is within normal limits.          Waqas Ku MD  Diplomate, American Board of Psychiatry and Neurology  Diplomate, Neuromuscular Medicine  Diplomate, American Board of Electrodiagnostic Medicine    Note: Raw Data will be scanned separately in Media

## 2019-10-03 ENCOUNTER — TELEPHONE (OUTPATIENT)
Dept: NEUROLOGY | Age: 74
End: 2019-10-03

## 2019-10-03 NOTE — TELEPHONE ENCOUNTER
----- Message from Gary Lorenzo Bhupinder sent at 10/3/2019  2:23 PM EDT -----  Regarding: amie self/ telephone  General Message/Vendor Calls    Caller's first and last name: Ofelia Hyman, partner      Reason for call: to get the pt's MRI and ANS results       Callback required yes/no and why: yes      Best contact number(s): [phone]      Details to clarify the request:      Gary Ortiz7

## 2019-10-04 NOTE — TELEPHONE ENCOUNTER
Please inform that MRI brain was normal and innominate testing showed mild neuropathy which could be from underlying diabetes.  Nothing significant

## 2019-10-15 ENCOUNTER — TELEPHONE (OUTPATIENT)
Dept: NEUROLOGY | Age: 74
End: 2019-10-15

## 2019-10-15 NOTE — TELEPHONE ENCOUNTER
Spoke with wife, informed her per -Please inform that MRI brain was normal and innominate testing showed mild neuropathy which could be from underlying diabetes.  Nothing significant. She verbalized understanding.

## 2019-10-17 ENCOUNTER — OFFICE VISIT (OUTPATIENT)
Dept: SLEEP MEDICINE | Age: 74
End: 2019-10-17

## 2019-10-17 VITALS
BODY MASS INDEX: 37.15 KG/M2 | SYSTOLIC BLOOD PRESSURE: 128 MMHG | WEIGHT: 223 LBS | HEIGHT: 65 IN | HEART RATE: 78 BPM | OXYGEN SATURATION: 93 % | DIASTOLIC BLOOD PRESSURE: 78 MMHG

## 2019-10-17 DIAGNOSIS — J44.9 CHRONIC OBSTRUCTIVE PULMONARY DISEASE, UNSPECIFIED COPD TYPE (HCC): ICD-10-CM

## 2019-10-17 DIAGNOSIS — G47.33 OSA (OBSTRUCTIVE SLEEP APNEA): Primary | ICD-10-CM

## 2019-10-17 DIAGNOSIS — Z86.73 H/O: STROKE: ICD-10-CM

## 2019-10-17 NOTE — PATIENT INSTRUCTIONS
217 [facility] [place]. [place], [address]  Tel.  [phone]  Fax. 100 [facility] 60  [place], [address] [facility]  Tel.  [phone]  Fax. [phone] [address]  Tel.  [phone]  Fax. [phone]     Sleep Apnea: After Your Visit  Your Care Instructions  Sleep apnea occurs when you frequently stop breathing for 10 seconds or longer during sleep. It can be mild to severe, based on the number of times per hour that you stop breathing or have slowed breathing. Blocked or narrowed airways in your nose, mouth, or throat can cause sleep apnea. Your airway can become blocked when your throat muscles and tongue relax during sleep. Sleep apnea is common, occurring in 1 out of 20 individuals. Individuals having any of the following characteristics should be evaluated and treated right away due to high risk and detrimental consequences from untreated sleep apnea:  1. Obesity  2. Congestive Heart failure  3. Atrial Fibrillation  4. Uncontrolled Hypertension  5. Type II Diabetes  6. Night-time Arrhythmias  7. Stroke  8. Pulmonary Hypertension  9. High-risk Driving Populations (pilots, truck drivers, etc.)  10. Patients Considering Weight-loss Surgery    How do you know you have sleep apnea? You probably have sleep apnea if you answer 'yes' to 3 or more of the following questions:  S - Have you been told that you Snore? T - Are you often Tired during the day? O - Has anyone Observed you stop breathing while sleeping? P- Do you have (or are being treated for) high blood Pressure? B - Are you obese (Body Mass Index > 35)? A - Is your Age 48years old or older? N - Is your Neck size greater than 16 inches? G - Are you male Gender? A sleep physician can prescribe a breathing device that prevents tissues in the throat from blocking your airway.  Or your doctor may recommend using a dental device (oral breathing device) to help keep your airway open. In some cases, surgery may be needed to remove enlarged tissues in the throat. Follow-up care is a key part of your treatment and safety. Be sure to make and go to all appointments, and call your doctor if you are having problems. It's also a good idea to know your test results and keep a list of the medicines you take. How can you care for yourself at home? · Lose weight, if needed. It may reduce the number of times you stop breathing or have slowed breathing. · Go to bed at the same time every night. · Sleep on your side. It may stop mild apnea. If you tend to roll onto your back, sew a pocket in the back of your pajama top. Put a tennis ball into the pocket, and stitch the pocket shut. This will help keep you from sleeping on your back. · Avoid alcohol and medicines such as sleeping pills and sedatives before bed. · Do not smoke. Smoking can make sleep apnea worse. If you need help quitting, talk to your doctor about stop-smoking programs and medicines. These can increase your chances of quitting for good. · Prop up the head of your bed 4 to 6 inches by putting bricks under the legs of the bed. · Treat breathing problems, such as a stuffy nose, caused by a cold or allergies. · Use a continuous positive airway pressure (CPAP) breathing machine if lifestyle changes do not help your apnea and your doctor recommends it. The machine keeps your airway from closing when you sleep. · If CPAP does not help you, ask your doctor whether you should try other breathing machines. A bilevel positive airway pressure machine has two types of air pressureâone for breathing in and one for breathing out. Another device raises or lowers air pressure as needed while you breathe. · If your nose feels dry or bleeds when using one of these machines, talk with your doctor about increasing moisture in the air. A humidifier may help.   · If your nose is runny or stuffy from using a breathing machine, talk with your doctor about using decongestants or a corticosteroid nasal spray. When should you call for help? Watch closely for changes in your health, and be sure to contact your doctor if:  · You still have sleep apnea even though you have made lifestyle changes. · You are thinking of trying a device such as CPAP. · You are having problems using a CPAP or similar machine. Where can you learn more? Go to [facility]. Enter H643 in the search box to learn more about \"Sleep Apnea: After Your Visit. \"   © 6712-8164 Healthwise, Incorporated. Care instructions adapted under license by Troy Rojas (which disclaims liability or warranty for this information). This care instruction is for use with your licensed healthcare professional. If you have questions about a medical condition or this instruction, always ask your healthcare professional. [place] any warranty or liability for your use of this information. PROPER SLEEP HYGIENE    What to avoid  · Do not have drinks with caffeine, such as coffee or black tea, for 8 hours before bed. · Do not smoke or use other types of tobacco near bedtime. Nicotine is a stimulant and can keep you awake. · Avoid drinking alcohol late in the evening, because it can cause you to wake in the middle of the night. · Do not eat a big meal close to bedtime. If you are hungry, eat a light snack. · Do not drink a lot of water close to bedtime, because the need to urinate may wake you up during the night. · Do not read or watch TV in bed. Use the bed only for sleeping and sexual activity. What to try  · Go to bed at the same time every night, and wake up at the same time every morning. Do not take naps during the day. · Keep your bedroom quiet, dark, and cool. · Get regular exercise, but not within 3 to 4 hours of your bedtime. .  · Sleep on a comfortable pillow and mattress.   · If watching the clock makes you anxious, turn it facing away from you so you cannot see the time. · If you worry when you lie down, start a worry book. Well before bedtime, write down your worries, and then set the book and your concerns aside. · Try meditation or other relaxation techniques before you go to bed. · If you cannot fall asleep, get up and go to another room until you feel sleepy. Do something relaxing. Repeat your bedtime routine before you go to bed again. · Make your house quiet and calm about an hour before bedtime. Turn down the lights, turn off the TV, log off the computer, and turn down the volume on music. This can help you relax after a busy day. Drowsy Driving  The [address] [place] [address] Road Traffic Safety Administration cites drowsiness as a causing factor in more than 125,874 police reported crashes annually, resulting in 76,000 injuries and 1,500 deaths. Other surveys suggest 55% of people polled have driven while drowsy in the past year, 23% had fallen asleep but not crashed, 3% crashed, and 2% had and accident due to drowsy driving. Who is at risk? Young Drivers: One study of drowsy driving accidents states that 55% of the drivers were under 25 years. Of those, 75% were male. Shift Workers and Travelers: People who work overnight or travel across time zones frequently are at higher risk of experiencing Circadian Rhythm Disorders. They are trying to work and function when their body is programed to sleep. Sleep Deprived: Lack of sleep has a serious impact on your ability to pay attention or focus on a task. Consistently getting less than the average of 8 hours your body needs creates partial or cumulative sleep deprivation. Untreated Sleep Disorders: Sleep Apnea, Narcolepsy, R.L.S., and other sleep disorders (untreated) prevent a person from getting enough restful sleep. This leads to excessive daytime sleepiness and increases the risk for drowsy driving accidents by up to 7 times.   Medications / Alcohol: Even over the counter medications can cause drowsiness. Medications that impair a drivers attention should have a warning label. Alcohol naturally makes you sleepy and on its own can cause accidents. Combined with excessive drowsiness its effects are amplified. Signs of Drowsy Driving:   * You don't remember driving the last few miles   * You may drift out of your biju   * You are unable to focus and your thoughts wander   * You may yawn more often than normal   * You have difficulty keeping your eyes open / nodding off   * Missing traffic signs, speeding, or tailgating  Prevention-   Good sleep hygiene, lifestyle and behavioral choices have the most impact on drowsy driving. There is no substitute for sleep and the average person requires 8 hours nightly. If you find yourself driving drowsy, stop and sleep. Consider the sleep hygiene tips provided during your visit as well. Medication Refill Policy: Refills for all medications require 1 week advance notice. Please have your pharmacy fax a refill request. We are unable to fax, or call in \"controled substance\" medications and you will need to pick these prescriptions up from our office. [employer] Activation    Thank you for requesting access to [employer]. Please follow the instructions below to securely access and download your online medical record. [employer] allows you to send messages to your doctor, view your test results, renew your prescriptions, schedule appointments, and more. How Do I Sign Up? 1. In your internet browser, go to [email or web address]. 2. Click on the First Time User? Click Here link in the Sign In box. You will see the New Member Sign Up page. 3. Enter your [employer] Access Code exactly as it appears below. You will not need to use this code after youve completed the sign-up process. If you do not sign up before the expiration date, you must request a new code.     [employer] Access Code: [email or web address]  Expires: 12/1/2019  3:46 PM (This is the date your [employer] access code will )    4. Enter the last four digits of your Social Security Number (xxxx) and Date of Birth (mm/dd/yyyy) as indicated and click Submit. You will be taken to the next sign-up page. 5. Create a [employer]At ID. This will be your [employer] login ID and cannot be changed, so think of one that is secure and easy to remember. 6. Create a [employer] password. You can change your password at any time. 7. Enter your Password Reset Question and Answer. This can be used at a later time if you forget your password. 8. Enter your e-mail address. You will receive e-mail notification when new information is available in [address] 9. Click Sign Up. You can now view and download portions of your medical record. 10. Click the Download Summary menu link to download a portable copy of your medical information. Additional Information    If you have questions, please call [phone]. Remember, [employer] is NOT to be used for urgent needs. For medical emergencies, dial 911.

## 2019-10-17 NOTE — PROGRESS NOTES
217 [facility] [place]. [place], [address]  Tel.  [phone]  Fax. 100 [facility] 60  [place], [address] [facility]  Tel.  [phone]  Fax. [phone] [address]   Tel.  [phone]  Fax. [phone]         Subjective:      Savannah Marmolejo is an 69 y.o. male referred for evaluation for a sleep disorder. He complains of snoring associated with periods of not breathing, tossing and turning and kicking. Symptoms began 7 years ago following a stroke stable since that time. He usually can fall asleep in <5 minutes. Family or house members note snoring, periods of not breathing. He denies completely or partially paralyzed while falling asleep or waking up. Savannah Marmolejo does wake up frequently at night. He is not bothered by waking up too early and left unable to get back to sleep. He actually sleeps about 6 hours at night and wakes up about 6 times during the night. He does work shifts: Other Shift. Nita Jesus indicates he does get too little sleep at night. His bedtime is 2100. He awakens at 0800. He does take naps. He takes 3 naps a week lasting 2, Minute(s). He has the following observed behaviors: Light snoring, Loud snoring, Sleep talking, Biting tongue, Twitching of legs or feet;  . Other remarks:  He has a prior history of OSBALDO but was not able to tolerate CPAP Therapy. [place] Sleepiness Score: 6     No Known Allergies      Current Outpatient Medications:     enalapril (VASOTEC) 20 mg tablet, Take 20 mg by mouth daily. Indications: 1/2 tablet QAM, Disp: , Rfl:     montelukast (SINGULAIR) 10 mg tablet, Take 10 mg by mouth daily. , Disp: , Rfl:     budesonide (PULMICORT) 0.5 mg/2 mL nbsp, 500 mcg by Nebulization route., Disp: , Rfl:     Oxygen, Indications: 2L prn, Disp: , Rfl:     aspirin delayed-release 81 mg tablet, Take 1 Tab by mouth daily. , Disp: 30 Tab, Rfl: 1    insulin NPH/insulin regular (NOVOLIN 70/30 U-100 INSULIN) 100 unit/mL (70-30) injection, by SubCUTAneous route. Indications: 24u in AM and 15u PM, Disp: , Rfl:     simvastatin (ZOCOR) 20 mg tablet, Take 20 mg by mouth nightly., Disp: , Rfl:     metFORMIN (GLUCOPHAGE) 500 mg tablet, Take 1,000 mg by mouth two (2) times daily (with meals). , Disp: , Rfl:     furosemide (LASIX) 20 mg tablet, Take  by mouth daily. , Disp: , Rfl:     pantoprazole (PROTONIX) 20 mg tablet, Take 2 Tabs by mouth daily. , Disp: 30 Tab, Rfl: 0    doxycycline (MONODOX) 100 mg capsule, Take 100 mg by mouth two (2) times a day., Disp: , Rfl:     OTHER, [facility] - address higher level balance and maximize safety and independence with functional mobility. Hx Stroke, Disp: 1 Each, Rfl: 0    losartan (COZAAR) 25 mg tablet, Take  by mouth daily. , Disp: , Rfl:     cholecalciferol (VITAMIN D3) 1,000 unit cap, Take  by mouth daily. , Disp: , Rfl:     pyridoxine, vitamin B6, (VITAMIN B-6) 100 mg tablet, Take 100 mg by mouth daily. , Disp: , Rfl:     acetaminophen 650 mg Tab, Take 650 mg by mouth every four (4) hours as needed. , Disp: 20 Tab, Rfl: 0    atorvastatin (LIPITOR) 20 mg tablet, Take 1 Tab by mouth daily. , Disp: 30 Tab, Rfl: 0     He  has a past medical history of CAD (coronary artery disease), Chronic kidney disease, Chronic obstructive pulmonary disease ([facility] Diabetes ([facility] Hypertension, and Stroke ([facility] (1980s). He also has no past medical history of Seizures ([facility] or Unspecified adverse effect of anesthesia. He  has a past surgical history that includes colonoscopy (N/A, 4/12/2018). He family history includes Cancer in his sister; Heart Disease in his father and mother. He  reports that he quit smoking about 14 years ago. He has a 90.00 pack-year smoking history. He has never used smokeless tobacco. He reports that he does not drink alcohol.      Review of Systems:  Constitutional:  No significant weight loss or weight gain  Eyes:  No blurred vision  CVS: No significant chest pain  Pulm: + significant shortness of breath  GI:  No significant nausea or vomiting  : + significant nocturia  Musculoskeletal:  No significant joint pain at night  Skin:  No significant rashes  Neuro:  + significant dizziness   Psych:  + active mood issues    Sleep Review of Systems: notable for no difficulty falling asleep; frequent awakenings at night;  regular dreaming noted; no nightmares ; no early morning headaches; memory problems; no concentration issues; no history of any automobile or occupational accidents due to daytime drowsiness. Objective:     Visit Vitals  /78   Pulse 78   Ht 5' 5\" (1.651 m)   Wt 223 lb (101.2 kg)   SpO2 93%   BMI 37.11 kg/m²         General:   Not in acute distress   Eyes:  Anicteric sclerae, no obvious strabismus   Nose:  No obvious nasal septum deviation    Oropharynx:   Class 4 oropharyngeal outlet, thick tongue base, uvula could not be seen due to low-lying soft palate, narrow tonsilo-pharyngeal pilars   Tonsils:   tonsils are not seen due to low-lying soft palate   Neck:   Neck circ. in \"inches\": 19; midline trachea   Chest/Lungs:  Equal lung expansion, clear on auscultation    CVS:  Normal rate, regular rhythm; no JVD   Skin:  Warm to touch; no obvious rashes   Neuro:  No focal deficits ; no obvious tremor    Psych:  Normal affect,  normal countenance;          Assessment:       ICD-10-CM ICD-9-CM    1. OSBALDO (obstructive sleep apnea) G47.33 327.23    2. Chronic obstructive pulmonary disease, unspecified [facility] J44.9 496    3. BMI 37.0-37.9, adult Z68.37 V85.37    4. H/O: stroke Z86.73 V12.54          Plan:     * The patient currently has a High Risk for having sleep apnea. STOP-BANG score 6.  * Sleep testing was ordered for initial evaluation.       Orders Placed This Encounter    POLYSOMNOGRAPHY 1 NIGHT     Standing Status:   Future     Standing Expiration Date:   4/17/2020     Scheduling Instructions:      Perform ETCO2 monitoring during Polysomnography             Perform Bi-level Titration if Split. Order Specific Question:   Reason for Exam     Answer:   OSBALDO     * He was provided information on sleep apnea including coresponding risk factors and the importance of proper treatment. * Treatment options if indicated were reviewed today. Patient agrees to a trial of Bi-Level PAP therapy if indicated due to a prior history of CPAP Failure. * Counseling was provided regarding proper sleep hygiene (including effect of light on sleep), sleep environment safety and safe driving. * Effect of sleep disturbance on weight was reviewed. We have recommended a dedicated weight loss through appropriate diet and an exercise regiment as significant weight reduction has been shown to reduce severity of obstructive sleep apnea. * Patient agrees to telephone [phone]  follow-up by myself or lead sleep technologist shortly after sleep study to review results and plan final management.     (patient has given permission for a message to be left regarding test results and further management if patient cannot be cannot be reached directly). Thank you for allowing us to participate in your patient's medical care. We'll keep you updated on these investigations. Ivan Ring MD, FAASM  Electronically signed.  10/17/19

## 2019-12-03 ENCOUNTER — OFFICE VISIT (OUTPATIENT)
Dept: NEUROLOGY | Age: 74
End: 2019-12-03

## 2019-12-03 VITALS
WEIGHT: 223 LBS | SYSTOLIC BLOOD PRESSURE: 124 MMHG | RESPIRATION RATE: 18 BRPM | HEART RATE: 107 BPM | DIASTOLIC BLOOD PRESSURE: 84 MMHG | OXYGEN SATURATION: 98 % | BODY MASS INDEX: 37.11 KG/M2

## 2019-12-03 DIAGNOSIS — G90.1 DYSAUTONOMIA (HCC): ICD-10-CM

## 2019-12-03 DIAGNOSIS — R42 POSTURAL DIZZINESS WITH PRESYNCOPE: Primary | ICD-10-CM

## 2019-12-03 DIAGNOSIS — R55 POSTURAL DIZZINESS WITH PRESYNCOPE: Primary | ICD-10-CM

## 2019-12-03 DIAGNOSIS — R26.81 GAIT INSTABILITY: ICD-10-CM

## 2019-12-03 NOTE — PROGRESS NOTES
Neurology Clinic Follow up Note    Patient ID:  Savannah Marmolejo  [id number]  56 y.o.  1945      Mr. José Antonio Fernandez is here for follow up today of  Chief Complaint   Patient presents with    Dizziness          Last Appointment With Me:  Hospital consult 19    \"LHM with a h/o HTN, HPL, DM, COPD, remote stroke  vs brain abscess with residual R paresthesias, dizziness/imbalance (R medullary distribution) presenting with worsening of his baseline dizziness, RUE paresthesias and dysarthria 19. Head CT reviewed and without acute process. MRI Brain/MRA subsequently completed showing no acute ischemia, mild chronic microvascular disease, no significant occlusion/stenosis. Suspect possible posterior circulation TIA. Advise resuming antiplatelet therapy for stroke prevention moving forward. \"    Interval History:   Patient was doing well until 2 weeks ago. He reports an increase in his baseline dizziness. He walks 4 miles daily and states he is veering in different directions when ambulating. This was present before but not as severe. He denies vertigo. He reports an episode this past weekend after awakening from a nap. He stood up to use the restroom and felt dizzy/lightheaded, he noted a bright white light in his vision. Vision was also blurred. No N/V, palpitations. He felt as if he may pass out. Symptoms lasted approximately 2 minutes. No LOC, confusion. He denies similar events in the past.      MRI Brain/MRV 9/15/19: tinnitus, hearing loss: NAP, minimal chronic microvascular ischemia, no evidence of vessel malformation    ANS testin) Reduced heart rate response to valsalva maneuver, indicating a mild dysfunction of the cardiovagal (parasympathetic) division, as can be seen in diabetic cardiac autonomic neuropathy. 2) QSWEAT response is within normal limits. PMHx/ PSHx/ FHx/ SHx:  Reviewed and unchanged previous visit.    Past Medical History:   Diagnosis Date    CAD (coronary artery disease)     Chronic kidney disease     stones    Chronic obstructive pulmonary disease ([address]     Diabetes ([address]     Hypertension     [facility]s    right hand neuropathy         ROS:  Comprehensive review of systems negative except for as noted above. Objective:       Meds:  Current Outpatient Medications   Medication Sig Dispense Refill    budesonide (PULMICORT) 0.5 mg/2 mL nbsp 500 mcg by Nebulization route.  Oxygen Indications: 2L prn      aspirin delayed-release 81 mg tablet Take 1 Tab by mouth daily. 30 Tab 1    [facility]  - address higher level balance and maximize safety and independence with functional mobility. Hx Stroke 1 Each 0    insulin NPH/insulin regular (NOVOLIN 70/30 U-100 INSULIN) 100 unit/mL (70-30) injection by SubCUTAneous route. Indications: 24u in AM and 15u PM      simvastatin (ZOCOR) 20 mg tablet Take 20 mg by mouth nightly.  metFORMIN (GLUCOPHAGE) 500 mg tablet Take 1,000 mg by mouth two (2) times daily (with meals).  furosemide (LASIX) 20 mg tablet Take  by mouth daily.  cholecalciferol (VITAMIN D3) 1,000 unit cap Take  by mouth daily.  pantoprazole (PROTONIX) 20 mg tablet Take 2 Tabs by mouth daily. 30 Tab 0    acetaminophen 650 mg Tab Take 650 mg by mouth every four (4) hours as needed. 20 Tab 0    atorvastatin (LIPITOR) 20 mg tablet Take 1 Tab by mouth daily. 30 Tab 0       Exam:  Visit Vitals  /84   Pulse (!) 107   Resp 18   Wt 101.2 kg (223 lb)   SpO2 98%   BMI 37.11 kg/m²     NEUROLOGICAL EXAM:  General: Awake, alert, speech fluent  CN: PERRL, EOMI without nystagmus, VFF to confrontation, facial sensation and strength are normal and symmetric, hearing is intact to finger rub bilaterally, palate and tongue movements are intact and symmetric. Motor: Normal tone, bulk and strength bilaterally. Reflexes: 1/4 and symmetric, plantar stimulation is flexor.   Coordination: FNF, YUSEF, HTS intact. Sensation: LT intact throughout. Gait: Normal-based, slight unsteadiness with turns      LABS  Results for orders placed or performed in visit on 08/14/19   TSH AND FREE T4   Result Value Ref Range    TSH 1.740 0.450 - 4.500 uIU/mL    T4, Free 1.58 0.82 - 1.77 ng/dL   VITAMIN B12   Result Value Ref Range    Vitamin B12 326 232 - 1,245 pg/mL       IMAGING:  MRI Results (most recent):  Results from Hospital Encounter encounter on 09/15/19   MRI BRAIN WO CONT    Narrative EXAM: MRI BRAIN WO CONT  Clinical history: Tinnitus, hearing loss  INDICATION: tinnitus, hearing loss    COMPARISON: 5/29/2019    CONTRAST: None. TECHNIQUE:    Multiplanar multisequence acquisition without contrast of the brain. Noncontrast IAC protocol utilized  FINDINGS:  The IACs are symmetric in size. There is no cerebellopontine angle mass. The  left vertebral artery is dominant. There is no definitive vascular loop  identified. There is a small mucous retention cyst in the right maxillary sinus. Cavernous sinuses are symmetric. There is no suprasellar mass. Visualized  cranial nerves are without evidence of abnormal displacement or mass lesion. There is no acute infarct, hemorrhage, extra-axial fluid collection, or mass  effect. There is no cerebellar tonsillar herniation. Minimal periventricular and  scattered foci of increased T2 signal intensity, corona radiata. The paranasal sinuses, mastoid air cells, and middle ears are clear. The orbital  contents are within normal limits. No significant osseous or scalp lesions are  identified. Impression IMPRESSION:   Normal noncontrast MR evaluation of the skull base. No skull base abnormality is  demonstrated. Minimal chronic microvascular ischemic change. Assessment:     Encounter Diagnoses     ICD-10-CM ICD-9-CM   1. Postural dizziness with presyncope R42 780.4    R55 780.2   2. Dysautonomia ([address] G90.1 337.9   3.  Gait instability R26.81 66.2   69year old male with a h/o HTN, HPL, DM, COPD, remote stroke 2013 with residual R paresthesias, dizziness/imbalance (R medullary distribution), hospitalization for worsening of his baseline dizziness/dysarthria/RUE paresthesias 5/28/19 with negative neuroimaging. He is here for f/u. He states dizziness was improved until 2 weeks ago when he noted an increase in dizziness/imbalance, veering off to one side when ambulating. He also reported an episode of lightheadedness with near syncope when standing concerning for orthostatic intolerance. Neurological examination is non-focal today. MRI Brain/MRV completed 9/15/19 showing no acute intracranial process, minimal chronic microvascular ischemia, no evidence of vessel malformation. ANS testing also completed due to abnormal sweating, dizziness consistent with mild cardiovagal dysfunction c/w diabetic autonomic neuropathy likely contributing to orthostatic intolerance. Plan:   Vestibular PT  Discuss possible reduction of Lasix with PCP  Increase hydration, salt intake  DM management/strict glucose control to prevent progression of DM related dysautonomia    Follow-up and Dispositions    · Return in about 2 months (around 2/3/2020).          Signed:  Karis Peguero, DO  12/3/2019

## 2019-12-13 ENCOUNTER — TELEPHONE (OUTPATIENT)
Dept: NEUROLOGY | Age: 74
End: 2019-12-13

## 2019-12-13 NOTE — TELEPHONE ENCOUNTER
----- Message from Lyle Herron sent at 12/13/2019  3:08 PM EST -----  Regarding: DR. Cardona/Telephone  General Message/Vendor Calls    Caller's first and last name:HITESH QUARLES (FRIEND)      Reason for call: Requesting a call back, stated insurance company will not cover pt's [facility].        Callback required yes/no and why:Yes      Best contact number(s):[phone]      Details to clarify the request:      Lyle Herron

## 2019-12-18 ENCOUNTER — TELEPHONE (OUTPATIENT)
Dept: NEUROLOGY | Age: 74
End: 2019-12-18

## 2019-12-18 NOTE — TELEPHONE ENCOUNTER
----- Message from Humphrey Perez sent at 12/17/2019  4:25 PM EST -----  Regarding: Dr Brooklynn Osborn  General Message/Vendor Calls    Caller's first and last name: Ching Levine, partner      Reason for call:caller is inquiring on if pt needs a [facility] [phone]      Callback required yes/no and why:      Best contact number(s):[phone]      Details to clarify the request:      Humphrey Perez

## 2019-12-24 ENCOUNTER — TELEPHONE (OUTPATIENT)
Dept: NEUROLOGY | Age: 74
End: 2019-12-24

## 2019-12-24 NOTE — TELEPHONE ENCOUNTER
Fax [phone]  Per insurance an authorization needs to be faxed before pt can start physical therapy. Needs to be done by Dr Lanny Cummings since she is the one who put the order in.

## 2019-12-27 NOTE — TELEPHONE ENCOUNTER
Spoke with patient's insurance. Authorization needs to be submitted by fax. Shakira Paris will fax a form now to the office. To please include last office notes and order.

## 2019-12-30 ENCOUNTER — HOSPITAL ENCOUNTER (OUTPATIENT)
Dept: SLEEP MEDICINE | Age: 74
Discharge: HOME | End: 2019-12-30
Payer: MEDICARE

## 2019-12-30 VITALS
WEIGHT: 221.3 LBS | BODY MASS INDEX: 36.87 KG/M2 | TEMPERATURE: 97.8 F | DIASTOLIC BLOOD PRESSURE: 73 MMHG | SYSTOLIC BLOOD PRESSURE: 125 MMHG | OXYGEN SATURATION: 95 % | HEIGHT: 65 IN | HEART RATE: 76 BPM

## 2019-12-30 DIAGNOSIS — G47.33 OSA (OBSTRUCTIVE SLEEP APNEA): ICD-10-CM

## 2019-12-30 PROCEDURE — 95810 POLYSOM 6/> YRS 4/> PARAM: CPT | Performed by: INTERNAL MEDICINE

## 2019-12-30 NOTE — TELEPHONE ENCOUNTER
----- Message from Georgina Flor sent at 12/30/2019  3:27 PM EST -----  Regarding: Dr. Monika Abad Message/Vendor Calls    Caller's first and last name:Reba Martinez(partner)      Reason for call:referral to [facility]      Callback required yes/no and why:yes      Best contact number(s):[phone] or [phone]      Details to clarify the request: Pt's partner stated a referral was sent to [facility] on last wk and was advised it was not received, and pt could not be seen, and would like it resent(fax number should be on file)      Georgina Flor

## 2020-01-09 ENCOUNTER — TELEPHONE (OUTPATIENT)
Dept: SLEEP MEDICINE | Age: 75
End: 2020-01-09

## 2020-01-09 DIAGNOSIS — G47.33 OSA (OBSTRUCTIVE SLEEP APNEA): Primary | ICD-10-CM

## 2020-01-10 NOTE — TELEPHONE ENCOUNTER
Alonso Kamara is to be contacted by lead sleep technologist regarding results of Sleep Testing which was indicative of an average AHI of 34 per hour with an SpO2 elliott of 64% . * A second polysomnogram has been ordered for mask fitting, Bi-Level PAP desensitizing protocol, and Bi-Level pressure titration due to a past history of CPAP Failure. * Follow-up appointment will be scheduled 8-12 weeks following PAP initiation to gauge treatment response and compliance. Encounter Diagnosis   Name Primary?  OSBALDO (obstructive sleep apnea) Yes       Orders Placed This Encounter    SLEEP LAB (PAP TITRATION)     Standing Status:   Future     Standing Expiration Date:   7/9/2020     Scheduling Instructions:      Perform Bi-level Titration.      Order Specific Question:   Reason for Exam     Answer:   OSBALDO

## 2020-01-10 NOTE — TELEPHONE ENCOUNTER
Reviewed sleep study results with patient. He expressed understanding and is willing to proceed with a Bilevel Titration. Please schedule Bilevel  titration.     Esme Zamudio,RRT,RPSGT, CSE

## 2020-02-05 ENCOUNTER — OFFICE VISIT (OUTPATIENT)
Dept: NEUROLOGY | Age: 75
End: 2020-02-05

## 2020-02-05 VITALS
SYSTOLIC BLOOD PRESSURE: 92 MMHG | WEIGHT: 215 LBS | RESPIRATION RATE: 18 BRPM | OXYGEN SATURATION: 98 % | HEART RATE: 76 BPM | BODY MASS INDEX: 35.78 KG/M2 | DIASTOLIC BLOOD PRESSURE: 66 MMHG

## 2020-02-05 DIAGNOSIS — R55 POSTURAL DIZZINESS WITH PRESYNCOPE: Primary | ICD-10-CM

## 2020-02-05 DIAGNOSIS — G90.1 DYSAUTONOMIA (HCC): ICD-10-CM

## 2020-02-05 DIAGNOSIS — R42 POSTURAL DIZZINESS WITH PRESYNCOPE: Primary | ICD-10-CM

## 2020-02-05 DIAGNOSIS — R26.81 GAIT INSTABILITY: ICD-10-CM

## 2020-02-05 NOTE — PROGRESS NOTES
Neurology Clinic Follow up Note    Patient ID:  Priya Nugent  [id number]  89 y.o.  1945      Mr. Chan Ray is here for follow up today of  Chief Complaint   Patient presents with    Dizziness          Last Appointment With Me:  12/3/19    \"LHM with a h/o HTN, HPL, DM, COPD, remote stroke 2013 vs brain abscess with residual R paresthesias, dizziness/imbalance (R medullary distribution) presenting with worsening of his baseline dizziness, RUE paresthesias and dysarthria 5/28/19. Head CT reviewed and without acute process. MRI Brain/MRA subsequently completed showing no acute ischemia, mild chronic microvascular disease, no significant occlusion/stenosis. Suspect possible posterior circulation TIA. Advise resuming antiplatelet therapy for stroke prevention moving forward. \"    Interval History:   Patient reports he is \"feeling wonderful\" over the past month. Dizziness is much improved. He is participating in vestibular therapy with [facility]. He feels this is helpful. He has increased salt/water intake. He reports some recent low serum glucose measurements at home- plans to discuss this with his PCP. PMHx/ PSHx/ FHx/ SHx:  Reviewed and unchanged previous visit. Past Medical History:   Diagnosis Date    CAD (coronary artery disease)     Chronic kidney disease     stones    [facility]     Diabetes [facility]     Hypertension     Stroke ([facility] 1980s    right hand neuropathy         ROS:  Comprehensive review of systems negative except for as noted above. Objective:       Meds:  Current Outpatient Medications   Medication Sig Dispense Refill    budesonide (PULMICORT) 0.5 mg/2 mL nbsp 500 mcg by Nebulization route.  Oxygen Indications: 2L prn      aspirin delayed-release 81 mg tablet Take 1 Tab by mouth daily. 30 Tab 1    [facility]  - address higher level balance and maximize safety and independence with functional mobility.        Hx Stroke 1 Each 0    insulin NPH/insulin regular (NOVOLIN 70/30 U-100 INSULIN) 100 unit/mL (70-30) injection by SubCUTAneous route. Indications: 24u in AM and 15u PM      simvastatin (ZOCOR) 20 mg tablet Take 20 mg by mouth nightly.  metFORMIN (GLUCOPHAGE) 500 mg tablet Take 1,000 mg by mouth two (2) times daily (with meals).  furosemide (LASIX) 20 mg tablet Take  by mouth daily.  cholecalciferol (VITAMIN D3) 1,000 unit cap Take  by mouth daily.  acetaminophen 650 mg Tab Take 650 mg by mouth every four (4) hours as needed. 20 Tab 0    atorvastatin (LIPITOR) 20 mg tablet Take 1 Tab by mouth daily. 30 Tab 0    pantoprazole (PROTONIX) 20 mg tablet Take 2 Tabs by mouth daily. 30 Tab 0       Exam:  Visit Vitals  BP 92/66   Pulse 76   Resp 18   Wt 97.5 kg (215 lb)   SpO2 98%   BMI 35.78 kg/m²     NEUROLOGICAL EXAM:  General: Awake, alert, speech fluent  CN: PERRL, EOMI without nystagmus, VFF to confrontation, facial sensation and strength are normal and symmetric, hearing is intact to finger rub bilaterally, palate and tongue movements are intact and symmetric. Motor: Normal tone, bulk and strength bilaterally. Reflexes: 1/4 and symmetric, plantar stimulation is flexor. Coordination: FNF, YUSEF, HTS intact. Sensation: LT intact throughout. Gait: Normal-based, slight unsteadiness with turns      LABS  Results for orders placed or performed in visit on 08/14/19   TSH AND FREE T4   Result Value Ref Range    TSH 1.740 0.450 - 4.500 uIU/mL    T4, Free 1.58 0.82 - 1.77 ng/dL   VITAMIN B12   Result Value Ref Range    Vitamin B12 326 232 - 1,245 pg/mL       IMAGING:  MRI Results (most recent):  Results from Hospital Encounter encounter on 09/15/19   MRI BRAIN WO CONT    Narrative EXAM: MRI BRAIN WO CONT  Clinical history: Tinnitus, hearing loss  INDICATION: tinnitus, hearing loss    COMPARISON: 5/29/2019    CONTRAST: None.     TECHNIQUE:    Multiplanar multisequence acquisition without contrast of the brain.  Noncontrast IAC protocol utilized  FINDINGS:  The IACs are symmetric in size. There is no cerebellopontine angle mass. The  left vertebral artery is dominant. There is no definitive vascular loop  identified. There is a small mucous retention cyst in the right maxillary sinus. Cavernous sinuses are symmetric. There is no suprasellar mass. Visualized  cranial nerves are without evidence of abnormal displacement or mass lesion. There is no acute infarct, hemorrhage, extra-axial fluid collection, or mass  effect. There is no cerebellar tonsillar herniation. Minimal periventricular and  scattered foci of increased T2 signal intensity, corona radiata. The paranasal sinuses, mastoid air cells, and middle ears are clear. The orbital  contents are within normal limits. No significant osseous or scalp lesions are  identified. Impression IMPRESSION:   Normal noncontrast MR evaluation of the skull base. No skull base abnormality is  demonstrated. Minimal chronic microvascular ischemic change. Assessment:     Encounter Diagnoses     ICD-10-CM ICD-9-CM   1. Postural dizziness with presyncope R42 780.4    R55 780.2   2. Dysautonomia ([address] G90.1 337.9   3. Gait instability R26.81 66.2      69year old male with a h/o HTN, HPL, DM, COPD, remote stroke 2013 with residual R paresthesias, dizziness/imbalance (R medullary distribution), hospitalization for worsening of his baseline dizziness/dysarthria/RUE paresthesias 5/28/19 with negative neuroimaging. He is here for f/u. He states dizziness is much improved with vestibular therapy. MRI Brain/MRV completed 9/15/19 showing no acute intracranial process, minimal chronic microvascular ischemia, no evidence of vessel malformation. ANS testing also completed due to abnormal sweating, dizziness consistent with mild cardiovagal dysfunction c/w diabetic autonomic neuropathy likely contributing to orthostatic intolerance.     Plan:   Continue Vestibular PT  Continue conservative measures for orthostatic intolerance as previously discussed  DM management to prevent progression of DM related dysautonomia       Follow-up and Dispositions    · Return if symptoms worsen or fail to improve.            Signed:  Lesia Dalal DO  2/5/2020

## 2020-02-05 NOTE — PATIENT INSTRUCTIONS
[facility]   Statement to Patients  April 1, 2014      In an effort to ensure the large volume of patient prescription refills is processed in the most efficient and expeditious manner, we are asking our patients to assist us by calling your Pharmacy for all prescription refills, this will include also your  Mail Order Pharmacy. The pharmacy will contact our office electronically to continue the refill process. Please do not wait until the last minute to call your pharmacy. We need at least 48 hours (2days) to fill prescriptions. We also encourage you to call your pharmacy before going to  your prescription to make sure it is ready. With regard to controlled substance prescription refill requests (narcotic refills) that need to be picked up at our office, we ask your cooperation by providing us with at least 72 hours (3days) notice that you will need a refill. We will not refill narcotic prescription refill requests after 4:00pm on any weekday, Monday through Thursday, or after 2:00pm on Fridays, or on the weekends. We encourage everyone to explore another way of getting your prescription refill request processed using [employer], our patient web portal through our electronic medical record system. Neul is an efficient and effective way to communicate your medication request directly to the office and  downloadable as an leo on your smart phone . Neul also features a review functionality that allows you to view your medication list as well as leave messages for your physician. Are you ready to get connected? If so please review the attatched instructions or speak to any of our staff to get you set up right away! Thank you so much for your cooperation. Should you have any questions please contact our Practice Administrator.     The Physicians and Staff,  [facility]

## 2020-02-19 ENCOUNTER — HOSPITAL ENCOUNTER (OUTPATIENT)
Dept: SLEEP MEDICINE | Age: 75
Discharge: HOME | End: 2020-02-19
Payer: MEDICARE

## 2020-02-19 DIAGNOSIS — G47.33 OSA (OBSTRUCTIVE SLEEP APNEA): ICD-10-CM

## 2020-02-19 PROCEDURE — 94660 CPAP INITIATION&MGMT: CPT | Performed by: INTERNAL MEDICINE

## 2020-02-19 PROCEDURE — 95811 POLYSOM 6/>YRS CPAP 4/> PARM: CPT | Performed by: INTERNAL MEDICINE

## 2020-02-24 ENCOUNTER — TELEPHONE (OUTPATIENT)
Dept: NEUROLOGY | Age: 75
End: 2020-02-24

## 2020-02-24 NOTE — TELEPHONE ENCOUNTER
Per Dr. Angelo Faith, I do not see results. They will need to follow up with Dr. Ifeoma Lazaro regarding results of testing.  RADHA

## 2020-02-29 ENCOUNTER — TELEPHONE (OUTPATIENT)
Dept: SLEEP MEDICINE | Age: 75
End: 2020-02-29

## 2020-02-29 DIAGNOSIS — G47.31 COMPLEX SLEEP APNEA SYNDROME: Primary | ICD-10-CM

## 2020-02-29 NOTE — TELEPHONE ENCOUNTER
Myke Adorno is to be contacted by lead sleep technologist regarding results of Sleep Testing which was indicative of an average Apnea/Hypopnea index of 16.6 on PAP therapy. A total of 106 were scored on Bi-Level PAP of which 56 were central apneas. The estimated ejection fraction is 66 - 70% as assessed on 05-. * A second polysomnogram has been ordered for mask fitting, PAP desensitizing protocol, and ASV titration. * Follow-up appointment will be scheduled 8-12 weeks following PAP initiation to gauge treatment response and compliance. Encounter Diagnosis   Name Primary?     Complex sleep apnea syndrome Yes       Orders Placed This Encounter    SLEEP LAB (PAP TITRATION)     Standing Status:   Future     Standing Expiration Date:   8/29/2020     Order Specific Question:   Reason for Exam     Answer:   CSA

## 2020-03-02 NOTE — TELEPHONE ENCOUNTER
Reviewed sleep study results with patient. He expressed understanding and is willing to proceed with an ASV titration. Please schedule titration.     Esme Zamudio,RRT,RPSGT, CSE

## 2020-03-11 ENCOUNTER — HOSPITAL ENCOUNTER (OUTPATIENT)
Dept: SLEEP MEDICINE | Age: 75
Discharge: HOME | End: 2020-03-11
Payer: MEDICARE

## 2020-03-11 DIAGNOSIS — G47.31 COMPLEX SLEEP APNEA SYNDROME: ICD-10-CM

## 2020-03-11 PROCEDURE — 95811 POLYSOM 6/>YRS CPAP 4/> PARM: CPT | Performed by: INTERNAL MEDICINE

## 2020-03-12 VITALS
WEIGHT: 214.9 LBS | SYSTOLIC BLOOD PRESSURE: 140 MMHG | DIASTOLIC BLOOD PRESSURE: 77 MMHG | BODY MASS INDEX: 35.81 KG/M2 | OXYGEN SATURATION: 93 % | HEIGHT: 65 IN | HEART RATE: 70 BPM

## 2020-03-13 ENCOUNTER — TELEPHONE (OUTPATIENT)
Dept: SLEEP MEDICINE | Age: 75
End: 2020-03-13

## 2020-03-13 DIAGNOSIS — G47.31 COMPLEX SLEEP APNEA SYNDROME: Primary | ICD-10-CM

## 2020-03-13 NOTE — TELEPHONE ENCOUNTER
Orders Placed This Encounter    AMB SUPPLY ORDER     Diagnosis: Complex Sleep Apnea G47.37    Positive Airway Pressure Therapy: Duration of need: 99 months. BiPAP autoSV  with Heated Humidifier :    Min EPAP:  9 cmH2O / Max EPAP: 12 cmH2O;  Min PS:       6 cmH2O / Max PS:     15 cmH2O;  Max Pressure:  25 cmH2O, Back up Rate: Auto;     Full Face Mask 1 every 3 months.  Full Face Mask Cushion 1 per month.  Headgear 1 every 6 months.  Tubing 1 every 3 months.  Filter(s) Non-Disposable 1 every 6 months. Patricia Briceño MD, FAASM; NPI: [id number]  Electronically signed.  03/13/20

## 2020-03-16 ENCOUNTER — DOCUMENTATION ONLY (OUTPATIENT)
Dept: SLEEP MEDICINE | Age: 75
End: 2020-03-16

## 2020-04-08 ENCOUNTER — DOCUMENTATION ONLY (OUTPATIENT)
Dept: SLEEP MEDICINE | Age: 75
End: 2020-04-08

## 2020-04-15 ENCOUNTER — DOCUMENTATION ONLY (OUTPATIENT)
Dept: SLEEP MEDICINE | Age: 75
End: 2020-04-15

## 2020-06-02 ENCOUNTER — TELEPHONE (OUTPATIENT)
Dept: SLEEP MEDICINE | Age: 75
End: 2020-06-02

## 2020-06-03 ENCOUNTER — TELEPHONE (OUTPATIENT)
Dept: SLEEP MEDICINE | Age: 75
End: 2020-06-03

## 2020-06-03 ENCOUNTER — VIRTUAL VISIT (OUTPATIENT)
Dept: SLEEP MEDICINE | Age: 75
End: 2020-06-03

## 2020-06-03 VITALS — BODY MASS INDEX: 34.32 KG/M2 | HEIGHT: 65 IN | WEIGHT: 206 LBS

## 2020-06-03 DIAGNOSIS — Z79.4 CONTROLLED TYPE 2 DIABETES MELLITUS WITHOUT COMPLICATION, WITH LONG-TERM CURRENT USE OF INSULIN (HCC): ICD-10-CM

## 2020-06-03 DIAGNOSIS — G47.31 COMPLEX SLEEP APNEA SYNDROME: Primary | ICD-10-CM

## 2020-06-03 DIAGNOSIS — Z86.73 H/O: STROKE: ICD-10-CM

## 2020-06-03 DIAGNOSIS — E11.9 CONTROLLED TYPE 2 DIABETES MELLITUS WITHOUT COMPLICATION, WITH LONG-TERM CURRENT USE OF INSULIN (HCC): ICD-10-CM

## 2020-06-03 NOTE — PROGRESS NOTES
[address] [place]. [place], [address]   Tel.  [phone]   Fax. [address] [facility] [address]   [place], [address]   Tel.  [phone]   Fax. [phone] [address] [facility] [address]   Tel.  [phone]   Fax. [phone]       Subjective:   Yohannes Heard is a 69 y.o. male who was seen by synchronous (real-time) audio-video technology on 6/3/2020. Consent:  He and/or his healthcare decision maker is aware that this patient-initiated Telehealth encounter is a billable service, with coverage as determined by his insurance carrier. He is aware that he may receive a bill and has provided verbal consent to proceed: Yes    I was at home while conducting this encounter. Patient verified with Photo in chart. Yohannes Heard is seen for a positive airway pressure follow-up, last seen by Dr. Chu Gordillo on 10/17/2019, prior notes reviewed in detail. In lab sleep test 12/2019 showed AHI of 34.0/hr with a lowest SaO2 of 64%, duration of SaO2 < 88% 13.8 min. He  is seen today for first adherence on device set up 4/27/2020. He reports no problems using the device. The following concerns reviewed:    Drowsiness no Problems exhaling no   Snoring no Forget to put on no   Mask Comfortable yes Can't fall asleep no   Dry Mouth no Mask falls off no   Air Leaking no Frequent awakenings no       He admits that his sleep has improved on PAP therapy using full face mask and heated tubing. Review of device download indicated:  Auto SV pressure: Max IPAP 25, EPAP 9-12, PS 6-15 cmH2O; Avg Pressure: EPAP 10.6, PS 7.3 cmH2O; Average % Night in Large Leak:  0.0  % Used Days >= 4 hours: 87.  Avg hours used:  5.5. Therapy Apnea Index averaged over PAP use: 6.4 /hr which reflects improved sleep breathing condition. [place] Sleepiness Score: 4 and Modified F.O.S.Q. Score Total / 2: 20 which reflects improved sleep quality over therapy time.     No Known Allergies    He has a current medication list which includes the following prescription(s): budesonide, oxygen, insulin nph/insulin regular, simvastatin, metformin, furosemide, atorvastatin, pantoprazole, aspirin delayed-release, OTHER, cholecalciferol, and acetaminophen. .      He  has a past medical history of CAD (coronary artery disease), Chronic kidney disease, Chronic obstructive pulmonary disease ([facility] Diabetes ([facility] Hypertension, and Stroke ([facility] (1980s). He also has no past medical history of Seizures ([facility] or Unspecified adverse effect of anesthesia. Sleep Review of Systems: notable for Negative difficulty falling asleep; Positive awakenings at night; Negative early morning headaches; Negative memory problems; Negative concentration issues; Negative chest pain; Negative shortness of breath; Negative significant joint pain at night; Negative significant muscle pain at night; Negative rashes or itching; Negative heartburn; Negative significant mood issues; ocasional afternoon naps     Objective:   Vitals reported by patient     Visit Vitals  Ht 5' 5\" (1.651 m)   Wt 206 lb (93.4 kg)   BMI 34.28 kg/m²          Physical Exam completed by visual and auditory observation of patient with verbal input from patient. General:   Alert, oriented, not in acute distress   Eyes:  Anicteric Sclerae; no obvious strabismus   Nose:  No obvious nasal septum deviation    Neck:   Midline trachea, no visible mass   Chest/Lungs:  Respiratory effort normal, no visualized signs of difficulty breathing or respiratory distress   CVS:  No JVD   Extremities:  No obvious rashes noted on face, neck, or hands   Neuro:  No facial asymmetry, no focal deficits; no obvious tremor    Psych:  Normal affect,  normal countenance       Assessment:       ICD-10-CM ICD-9-CM    1. Complex sleep apnea syndrome G47.31 327.21    2. H/O: stroke Z86.73 V12.54    3.  Controlled type 2 diabetes mellitus without complication, with long-term current use of insulin (HCC) E11.9 250.00     Z79.4 V58.67    4. Adult BMI 34.0-34.9 kg/sq m Z68.34 V85.34        AHI = 34.9912/2019). On APAP :  Max IPAP 25, EPAP 9-12, PS 6-15 cmH2O. He is adherent with PAP therapy and PAP continues to benefit patient and remains necessary for control of his sleep apnea. Plan:     Follow-up and Dispositions    · Return in about 1 year (around 6/3/2021). * Continue on current pressures    No orders of the defined types were placed in this encounter. * Counseling was provided regarding the importance of regular PAP use with emphasis on ensuring sufficient total sleep time, proper sleep hygiene, and safe driving. * Re-enforced proper and regular cleaning for the device. * He was asked to contact our office for any problems regarding PAP therapy. 2. H/O Stroke - continue on his current regimen. I have reviewed the relationship between stroke and sleep disordered breathing. 3. Type II diabetes -  he continues on his current regimen. I have reviewed the relationship between sleep disordered breathing as it relates to diabetes. 4. Recommended a dedicated weight loss program through appropriate diet and exercise regimen as significant weight reduction has been shown to reduce severity of obstructive sleep apnea. Patient's phone number [phone] (home)  was reviewed and confirmed for accuracy. He gives permission for messages regarding results and appointments to be left at that number. Pursuant to the emergency declaration under the [facility], [facility] waiver authority and the [employer] Act, this Virtual Visit was conducted, with patient's consent, to reduce the patient's risk of exposure to COVID-19 and provide continuity of care for an established patient.      Services were provided through a video synchronous discussion virtually to substitute for in-person clinic visit. JENNIFER You, [facility]  Electronically signed.  06/03/20

## 2020-07-01 ENCOUNTER — HOSPITAL ENCOUNTER (OUTPATIENT)
Dept: CT IMAGING | Age: 75
Discharge: HOME | End: 2020-07-01
Attending: FAMILY MEDICINE
Payer: SELF-PAY

## 2020-07-01 DIAGNOSIS — Z00.00 UNSPECIFIED EXAMINATION: ICD-10-CM

## 2020-07-01 PROCEDURE — 75571 CT HRT W/O DYE W/CA TEST: CPT

## 2020-10-01 ENCOUNTER — OFFICE VISIT (OUTPATIENT)
Dept: INTERNAL MEDICINE CLINIC | Age: 75
End: 2020-10-01
Payer: MEDICARE

## 2020-10-01 VITALS
HEIGHT: 65 IN | RESPIRATION RATE: 15 BRPM | DIASTOLIC BLOOD PRESSURE: 71 MMHG | HEART RATE: 69 BPM | SYSTOLIC BLOOD PRESSURE: 129 MMHG | TEMPERATURE: 97.8 F | OXYGEN SATURATION: 98 % | BODY MASS INDEX: 33.32 KG/M2 | WEIGHT: 200 LBS

## 2020-10-01 DIAGNOSIS — Z79.4 TYPE 2 DIABETES MELLITUS WITHOUT COMPLICATION, WITH LONG-TERM CURRENT USE OF INSULIN (HCC): ICD-10-CM

## 2020-10-01 DIAGNOSIS — R42 DIZZINESS: Primary | ICD-10-CM

## 2020-10-01 DIAGNOSIS — Z86.73 HISTORY OF STROKE: ICD-10-CM

## 2020-10-01 DIAGNOSIS — E11.9 TYPE 2 DIABETES MELLITUS WITHOUT COMPLICATION, WITH LONG-TERM CURRENT USE OF INSULIN (HCC): ICD-10-CM

## 2020-10-01 DIAGNOSIS — E78.00 HYPERCHOLESTEREMIA: ICD-10-CM

## 2020-10-01 LAB
ALBUMIN SERPL-MCNC: 4.2 G/DL (ref 3.5–5)
ALBUMIN/GLOB SERPL: 1.2 {RATIO} (ref 1.1–2.2)
ALP SERPL-CCNC: 67 U/L (ref 45–117)
ALT SERPL-CCNC: 34 U/L (ref 12–78)
ANION GAP SERPL CALC-SCNC: 7 MMOL/L (ref 5–15)
AST SERPL-CCNC: 24 U/L (ref 15–37)
BILIRUB SERPL-MCNC: 0.6 MG/DL (ref 0.2–1)
BUN SERPL-MCNC: 20 MG/DL (ref 6–20)
BUN/CREAT SERPL: 14 (ref 12–20)
CALCIUM SERPL-MCNC: 9.9 MG/DL (ref 8.5–10.1)
CHLORIDE SERPL-SCNC: 109 MMOL/L (ref 97–108)
CHOLEST SERPL-MCNC: 161 MG/DL
CK SERPL-CCNC: 131 U/L (ref 39–308)
CO2 SERPL-SCNC: 26 MMOL/L (ref 21–32)
CREAT SERPL-MCNC: 1.47 MG/DL (ref 0.7–1.3)
GLOBULIN SER CALC-MCNC: 3.4 G/DL (ref 2–4)
GLUCOSE SERPL-MCNC: 69 MG/DL (ref 65–100)
HDLC SERPL-MCNC: 52 MG/DL
HDLC SERPL: 3.1 {RATIO} (ref 0–5)
LDLC SERPL CALC-MCNC: 84.6 MG/DL (ref 0–100)
LIPID PROFILE,FLP: NORMAL
POTASSIUM SERPL-SCNC: 4.3 MMOL/L (ref 3.5–5.1)
PROT SERPL-MCNC: 7.6 G/DL (ref 6.4–8.2)
SODIUM SERPL-SCNC: 142 MMOL/L (ref 136–145)
TRIGL SERPL-MCNC: 122 MG/DL (ref ?–150)
VLDLC SERPL CALC-MCNC: 24.4 MG/DL

## 2020-10-01 PROCEDURE — G8427 DOCREV CUR MEDS BY ELIG CLIN: HCPCS | Performed by: INTERNAL MEDICINE

## 2020-10-01 PROCEDURE — G8417 CALC BMI ABV UP PARAM F/U: HCPCS | Performed by: INTERNAL MEDICINE

## 2020-10-01 PROCEDURE — 3017F COLORECTAL CA SCREEN DOC REV: CPT | Performed by: INTERNAL MEDICINE

## 2020-10-01 PROCEDURE — G8536 NO DOC ELDER MAL SCRN: HCPCS | Performed by: INTERNAL MEDICINE

## 2020-10-01 PROCEDURE — G8510 SCR DEP NEG, NO PLAN REQD: HCPCS | Performed by: INTERNAL MEDICINE

## 2020-10-01 PROCEDURE — 2022F DILAT RTA XM EVC RTNOPTHY: CPT | Performed by: INTERNAL MEDICINE

## 2020-10-01 PROCEDURE — 99204 OFFICE O/P NEW MOD 45 MIN: CPT | Performed by: INTERNAL MEDICINE

## 2020-10-01 PROCEDURE — 3046F HEMOGLOBIN A1C LEVEL >9.0%: CPT | Performed by: INTERNAL MEDICINE

## 2020-10-01 PROCEDURE — 1101F PT FALLS ASSESS-DOCD LE1/YR: CPT | Performed by: INTERNAL MEDICINE

## 2020-10-01 RX ORDER — MONTELUKAST SODIUM 10 MG/1
TABLET ORAL
COMMUNITY
End: 2021-02-22 | Stop reason: SDUPTHER

## 2020-10-01 RX ORDER — ENALAPRIL MALEATE 10 MG/1
10 TABLET ORAL DAILY
COMMUNITY
Start: 2020-07-03 | End: 2021-07-10

## 2020-10-01 RX ORDER — FLASH GLUCOSE SENSOR
KIT MISCELLANEOUS
COMMUNITY
Start: 2020-09-30 | End: 2020-12-18 | Stop reason: SDUPTHER

## 2020-10-01 NOTE — PROGRESS NOTES
History of Present Illness:   Lala Loza is a 70 y.o. male here for evaluation:    Chief Complaint   Patient presents with    New Patient    Establish Care    Hearing Problem    Dizziness     Patient reports dizziness occurs often(upon waking and going to sleep), denies falling. Would like a referral to see [facility]. Notes (nursing/rooming note copied below in italics):  Patient not fasting. Patient had flu vaccine 2-3 weeks ago (Sept 2020)    He had seen Dr. Holly Ortega at practice in [facility] and seen last few months ago. He notes current with exam/AWV there. He had records sent but not able to review at this time. Release(s) completed at visit for:  PCP records. He had seen [facility] last several months ago and completed therapy. He has vestibular/dizziness PT there and helped resolve symptoms. He has [facility] and has refills on 14-day sensors. HE notes DM well-controlled with Dr. Andrew Rain. His glc was 103 average with his last labs. He has scheduled with [facility] for Jan 2021--last was 5.7. A1c              eAg  5.1 100   5.2 103   5.3 105   5.4 108   5.5 111   5.6 114   5.7 117   5.8 120   5.9 123   6.0 125           Nursing screenings reviewed by provider at visit. Past Medical History:   Diagnosis Date    CAD (coronary artery disease)     Chronic kidney disease     stones    Chronic obstructive pulmonary disease ([address]     Diabetes ([facility]     Hypertension     Stroke ([facility] 1980s    right hand neuropathy     Past Surgical History:   Procedure Laterality Date    COLONOSCOPY N/A 4/12/2018    COLONOSCOPY performed [facility] Amita Suarez MD at [facility] ENDOSCOPY    HX SKIN BIOPSY  2019    skin cancer removed from left leg. Prior to Admission medications    Medication Sig Start Date End Date Taking?  Authorizing Provider   enalapril (VASOTEC) 10 mg tablet  7/3/20  Yes Provider, Historical   FreeStyle Ashlee 14 Day Sensor kit  9/30/20  Yes Provider, Historical montelukast (SINGULAIR) 10 mg tablet Take  by mouth. Yes Provider, Historical   Oxygen Indications: 2L prn   Yes Provider, Historical   aspirin delayed-release 81 mg tablet Take 1 Tab by mouth daily. 5/29/19  Yes Bozena Quezada NP   insulin NPH/insulin regular (NOVOLIN 70/30 U-100 INSULIN) 100 unit/mL (70-30) injection by SubCUTAneous route. Indications: 24u in AM and 15u PM   Yes Provider, Historical   simvastatin (ZOCOR) 20 mg tablet Take 20 mg by mouth nightly. Yes Provider, Historical   metFORMIN (GLUCOPHAGE) 500 mg tablet Take 1,000 mg by mouth two (2) times daily (with meals). Yes Provider, Historical   furosemide (LASIX) 20 mg tablet Take  by mouth every other day. Yes Provider, Historical   atorvastatin (LIPITOR) 20 mg tablet Take 1 Tab by mouth daily. 7/12/13  Yes Josue Malone MD   pantoprazole (PROTONIX) 20 mg tablet Take 2 Tabs by mouth daily. 6/21/13  Yes Laney Perez MD   budesonide (PULMICORT) 0.5 mg/2 mL nbsp 500 mcg by Nebulization route. Provider, Historical   OTHER [facility]  - address higher level balance and maximize safety and independence with functional mobility. Hx Stroke  Patient not taking: Reported on 10/1/2020 5/29/19   Bozena Quezada NP   cholecalciferol (VITAMIN D3) 1,000 unit cap Take  by mouth daily. Provider, Historical   acetaminophen 650 mg Tab Take 650 mg by mouth every four (4) hours as needed. Patient not taking: Reported on 10/1/2020 7/12/13   Josue Malone MD        ROS  Complete ROS negative except as indicated in note. Vitals:    10/01/20 1038   BP: 129/71   Pulse: 69   Resp: 15   Temp: 97.8 °F (36.6 °C)   TempSrc: Oral   SpO2: 98%   Weight: 200 lb (90.7 kg)   Height: 5' 5\" (1.651 m)   PainSc:   0 - No pain      Body mass index is 33.28 kg/m². Physical Exam:     Physical Exam  Vitals signs and nursing note reviewed. Constitutional:       General: He is not in acute distress.      Appearance: Normal appearance. He is well-developed. He is not diaphoretic. HENT:      Head: Normocephalic and atraumatic. Mouth/Throat:      Mouth: Mucous membranes are moist.   Eyes:      General: No scleral icterus. Right eye: No discharge. Left eye: No discharge. Conjunctiva/sclera: Conjunctivae normal.   Cardiovascular:      Rate and Rhythm: Normal rate and regular rhythm. Pulses: Normal pulses. Heart sounds: Normal heart sounds. No murmur. No friction rub. No gallop. Pulmonary:      Effort: Pulmonary effort is normal. No respiratory distress. Breath sounds: Normal breath sounds. No stridor. No wheezing, rhonchi or rales. Abdominal:      General: Bowel sounds are normal. There is no distension. Palpations: Abdomen is soft. Tenderness: There is no abdominal tenderness. There is no guarding or rebound. Musculoskeletal:         General: No swelling, tenderness or deformity. Right lower leg: No edema. Left lower leg: No edema. Skin:     General: Skin is warm. Coloration: Skin is not jaundiced or pale. Findings: No bruising, erythema or rash. Neurological:      General: No focal deficit present. Mental Status: He is alert. Motor: No abnormal muscle tone. Coordination: Coordination normal.      Gait: Gait normal.   Psychiatric:         Mood and Affect: Mood normal.         Behavior: Behavior normal.         Thought Content: Thought content normal.         Judgment: Judgment normal.         Assessment and Plan:       ICD-10-CM ICD-9-CM    1. Dizziness  R42 780.4 REFERRAL TO PHYSICAL THERAPY   2. Type 2 diabetes mellitus without complication, with long-term current use of insulin ([facility])  Z62.5 269.53 METABOLIC PANEL, COMPREHENSIVE    Z79.4 V58.67 REFERRAL TO PHYSICAL THERAPY      METABOLIC PANEL, COMPREHENSIVE   3.  History of stroke  H29.72 Q31.97 METABOLIC PANEL, COMPREHENSIVE      LIPID PANEL      REFERRAL TO PHYSICAL THERAPY      LIPID PANEL      METABOLIC PANEL, COMPREHENSIVE      CANCELED: CBC WITH AUTOMATED DIFF      CANCELED: CBC WITH AUTOMATED DIFF   4. Hypercholesteremia  Y65.64 019.8 METABOLIC PANEL, COMPREHENSIVE      LIPID PANEL      CK      CK      LIPID PANEL      METABOLIC PANEL, COMPREHENSIVE       1. He has done well with PT in past to help with dizziness symptoms. Order to pt and faxed as requested after visit. 2-4:  Fasting labs reviewed with pt. Follow-up and Dispositions    · Return in about 3 months (around 1/1/2021), or if symptoms worsen or fail to improve, for Diabetes follow-up.       lab results and schedule of future lab studies reviewed with patient  reviewed medications and side effects in detail    For additional documentation of information and/or recommendations discussed this visit, please see notes in instructions. Plan and evaluation (above) reviewed with pt at visit  Patient voiced understanding of plan and provided with time to ask/review questions. After Visit Summary (AVS) provided to pt after visit with additional instructions as needed/reviewed. Future Appointments   Date Time Provider Ping Almaraz   6/9/2021 10:30 AM Katja Fung NP [facility] AMB       Addendum:  10-4-20:  Plan repeat BMP due to elevated creatinine c/t May 2019 labs. CBC to be re-drawn then. Orders placed.

## 2020-10-01 NOTE — PATIENT INSTRUCTIONS
1.  Will fax orders for physical therapy to [facility] as requested. 2.  You can have [address] labs or other records faxed to [phone], as reviewed/requested.

## 2020-10-01 NOTE — PROGRESS NOTES
RM 17     Patient not fasting. Patient had flu vaccine 2-3 weeks ago (Sept 2020)    Chief Complaint   Patient presents with    New Patient    Establish Care    Hearing Problem    Dizziness     Patient reports dizziness occurs often(upon waking and going to sleep), denies falling. Would like a referral to see [facility]. 1. Have you been to the ER, urgent care clinic since your last visit? Hospitalized since your last visit? No    2. Have you seen or consulted any other health care providers outside of the [address] [place] [address] since your last visit? Include any pap smears or colon screening. Yes Reason for visit:  Dr. Daniel Jules, a month ago. [facility], 8 months ago, dizziness. Last week, [facility], diabetes follow up     Health Maintenance Due   Topic Date Due    Hepatitis C Screening  1945    Statin Therapy  1945    DTaP/Tdap/Td series (1 - Tdap) 09/01/1968    Shingrix Vaccine Age 50> (1 of 2) 09/01/1997    FOBT Q1Y Age 50-75  09/01/1997    GLAUCOMA SCREENING Q2Y  09/01/2012    AAA Screening 73-67 YO Male Smoking Patients  09/01/2012    Pneumococcal 65+ years (1 of 1 - PPSV23) 09/01/2012    Medicare Yearly Exam  03/14/2018    Lipid Screen  05/29/2020    Flu Vaccine (1) 09/01/2020       3 most recent PHQ Screens 10/1/2020   Little interest or pleasure in doing things Not at all   Feeling down, depressed, irritable, or hopeless Not at all   Total Score PHQ 2 0       No flowsheet data found. Fall Risk Assessment, last 12 mths 10/1/2020   Able to walk? Yes   Fall in past 12 months?  No         Learning Assessment 8/14/2019   PRIMARY LEARNER Patient   PRIMARY LANGUAGE ENGLISH   LEARNER PREFERENCE PRIMARY DEMONSTRATION   ANSWERED BY self   RELATIONSHIP SELF

## 2020-10-05 ENCOUNTER — TELEPHONE (OUTPATIENT)
Dept: INTERNAL MEDICINE CLINIC | Age: 75
End: 2020-10-05

## 2020-10-05 DIAGNOSIS — D69.6 THROMBOCYTOPENIA (HCC): ICD-10-CM

## 2020-10-05 DIAGNOSIS — R79.89 ELEVATED SERUM CREATININE: Primary | ICD-10-CM

## 2020-10-05 NOTE — TELEPHONE ENCOUNTER
Writer notified patient of the following. Patient scheduled lab appt to return to office for repeat CBC.  Please place lab order 2-3 yrs

## 2020-10-05 NOTE — TELEPHONE ENCOUNTER
Pravin Berry from [facility] called to discuss the pt's most recent lab's that were drawn please call and advise Brandan's # [phone].

## 2020-10-05 NOTE — TELEPHONE ENCOUNTER
Writer spoke with [facility] lab who states the lavender tube collected clotted and they are unable to perform CBC testing ordered. Would you like to have patient return to office to have this re-drawn? Please advise.

## 2020-10-07 ENCOUNTER — APPOINTMENT (OUTPATIENT)
Dept: INTERNAL MEDICINE CLINIC | Age: 75
End: 2020-10-07

## 2020-10-07 DIAGNOSIS — E11.9 TYPE 2 DIABETES MELLITUS WITHOUT COMPLICATION, WITH LONG-TERM CURRENT USE OF INSULIN (HCC): ICD-10-CM

## 2020-10-07 DIAGNOSIS — Z79.4 TYPE 2 DIABETES MELLITUS WITHOUT COMPLICATION, WITH LONG-TERM CURRENT USE OF INSULIN (HCC): ICD-10-CM

## 2020-10-07 LAB
ANION GAP SERPL CALC-SCNC: 7 MMOL/L (ref 5–15)
BUN SERPL-MCNC: 19 MG/DL (ref 6–20)
BUN/CREAT SERPL: 12 (ref 12–20)
CALCIUM SERPL-MCNC: 9.6 MG/DL (ref 8.5–10.1)
CHLORIDE SERPL-SCNC: 112 MMOL/L (ref 97–108)
CO2 SERPL-SCNC: 24 MMOL/L (ref 21–32)
CREAT SERPL-MCNC: 1.55 MG/DL (ref 0.7–1.3)
GLUCOSE SERPL-MCNC: 79 MG/DL (ref 65–100)
POTASSIUM SERPL-SCNC: 4.2 MMOL/L (ref 3.5–5.1)
SODIUM SERPL-SCNC: 143 MMOL/L (ref 136–145)

## 2020-10-08 ENCOUNTER — DOCUMENTATION ONLY (OUTPATIENT)
Dept: INTERNAL MEDICINE CLINIC | Age: 75
End: 2020-10-08

## 2020-10-08 NOTE — PROGRESS NOTES
Writer spoke with [facility] lab who states patient CBC with diff was redrawn yesterday. They are not able to process this specimen due to clotting. Lab will call the patient to notify him of this information. Prior to ending call lab informed writer to include in order to collect a lavender and blue top tube when specimen is re-drawn just in case the specimen does clot they are still able to run additional testing. Please place lab order.

## 2020-10-09 ENCOUNTER — APPOINTMENT (OUTPATIENT)
Dept: INTERNAL MEDICINE CLINIC | Age: 75
End: 2020-10-09

## 2020-10-09 LAB
BASOPHILS # BLD: 0.1 K/UL (ref 0–0.1)
BASOPHILS NFR BLD: 1 % (ref 0–1)
DIFFERENTIAL METHOD BLD: ABNORMAL
EOSINOPHIL # BLD: 0.2 K/UL (ref 0–0.4)
EOSINOPHIL NFR BLD: 5 % (ref 0–7)
ERYTHROCYTE [DISTWIDTH] IN BLOOD BY AUTOMATED COUNT: 13.2 % (ref 11.5–14.5)
HCT VFR BLD AUTO: 40.3 % (ref 36.6–50.3)
HGB BLD-MCNC: 13.1 G/DL (ref 12.1–17)
IMM GRANULOCYTES # BLD AUTO: 0 K/UL (ref 0–0.04)
IMM GRANULOCYTES NFR BLD AUTO: 1 % (ref 0–0.5)
LYMPHOCYTES # BLD: 1.6 K/UL (ref 0.8–3.5)
LYMPHOCYTES NFR BLD: 39 % (ref 12–49)
MCH RBC QN AUTO: 29.8 PG (ref 26–34)
MCHC RBC AUTO-ENTMCNC: 32.5 G/DL (ref 30–36.5)
MCV RBC AUTO: 91.8 FL (ref 80–99)
MONOCYTES # BLD: 0.3 K/UL (ref 0–1)
MONOCYTES NFR BLD: 8 % (ref 5–13)
NEUTS SEG # BLD: 1.9 K/UL (ref 1.8–8)
NEUTS SEG NFR BLD: 46 % (ref 32–75)
NRBC # BLD: 0 K/UL (ref 0–0.01)
NRBC BLD-RTO: 0 PER 100 WBC
PLATELET # BLD AUTO: 98 K/UL (ref 150–400)
RBC # BLD AUTO: 4.39 M/UL (ref 4.1–5.7)
WBC # BLD AUTO: 4.1 K/UL (ref 4.1–11.1)

## 2020-10-22 ENCOUNTER — TELEPHONE (OUTPATIENT)
Dept: SLEEP MEDICINE | Age: 75
End: 2020-10-22

## 2020-10-22 NOTE — TELEPHONE ENCOUNTER
Received CMN from [place] re: O2 recertification for COPD. Faxed this back to them to contact either PCP or pulmonologist as review of records does not show that this was prescribed by our providers.

## 2020-10-26 NOTE — TELEPHONE ENCOUNTER
Recommended hematology eval for low plt on repeat CBC. Recommended US renal for stable, but elevated creatinine. Diagnoses and all orders for this visit:    1. Elevated serum creatinine  -     US RETROPERITONEUM COMP; Future    2.  Thrombocytopenia ([facility]  -     REFERRAL TO HEMATOLOGY ONCOLOGY

## 2020-10-28 ENCOUNTER — TRANSCRIBE ORDER (OUTPATIENT)
Dept: SCHEDULING | Age: 75
End: 2020-10-28

## 2020-10-28 DIAGNOSIS — Z13.820 OSTEOPOROSIS SCREENING: Primary | ICD-10-CM

## 2020-10-28 DIAGNOSIS — M81.0 AGE RELATED OSTEOPOROSIS, UNSPECIFIED PATHOLOGICAL FRACTURE PRESENCE: ICD-10-CM

## 2020-11-04 ENCOUNTER — TELEPHONE (OUTPATIENT)
Dept: INTERNAL MEDICINE CLINIC | Age: 75
End: 2020-11-04

## 2020-11-04 NOTE — TELEPHONE ENCOUNTER
Pt went to [employer] for Smart[facility]/S; pt did not get imaging done due to cost. Copy of ABN placed in provider's box for review. Please call ptt to advise if other imaging could be done / what pt should do.   Pt ph# [phone]

## 2020-11-05 ENCOUNTER — OFFICE VISIT (OUTPATIENT)
Dept: INTERNAL MEDICINE CLINIC | Age: 75
End: 2020-11-05
Payer: MEDICARE

## 2020-11-05 VITALS
TEMPERATURE: 97.8 F | DIASTOLIC BLOOD PRESSURE: 74 MMHG | HEIGHT: 65 IN | SYSTOLIC BLOOD PRESSURE: 129 MMHG | BODY MASS INDEX: 32.65 KG/M2 | RESPIRATION RATE: 18 BRPM | OXYGEN SATURATION: 99 % | WEIGHT: 196 LBS | HEART RATE: 68 BPM

## 2020-11-05 DIAGNOSIS — R42 DIZZINESS: ICD-10-CM

## 2020-11-05 DIAGNOSIS — M79.89 LEG SWELLING: ICD-10-CM

## 2020-11-05 DIAGNOSIS — I10 ESSENTIAL HYPERTENSION: Primary | ICD-10-CM

## 2020-11-05 DIAGNOSIS — D69.6 THROMBOCYTOPENIA (HCC): ICD-10-CM

## 2020-11-05 DIAGNOSIS — R79.89 ELEVATED SERUM CREATININE: ICD-10-CM

## 2020-11-05 PROCEDURE — 3017F COLORECTAL CA SCREEN DOC REV: CPT | Performed by: INTERNAL MEDICINE

## 2020-11-05 PROCEDURE — G8417 CALC BMI ABV UP PARAM F/U: HCPCS | Performed by: INTERNAL MEDICINE

## 2020-11-05 PROCEDURE — G8427 DOCREV CUR MEDS BY ELIG CLIN: HCPCS | Performed by: INTERNAL MEDICINE

## 2020-11-05 PROCEDURE — 99214 OFFICE O/P EST MOD 30 MIN: CPT | Performed by: INTERNAL MEDICINE

## 2020-11-05 PROCEDURE — G8510 SCR DEP NEG, NO PLAN REQD: HCPCS | Performed by: INTERNAL MEDICINE

## 2020-11-05 PROCEDURE — G8536 NO DOC ELDER MAL SCRN: HCPCS | Performed by: INTERNAL MEDICINE

## 2020-11-05 PROCEDURE — 1101F PT FALLS ASSESS-DOCD LE1/YR: CPT | Performed by: INTERNAL MEDICINE

## 2020-11-05 RX ORDER — FUROSEMIDE 20 MG/1
20 TABLET ORAL EVERY OTHER DAY
Qty: 45 TAB | Refills: 0 | Status: SHIPPED | OUTPATIENT
Start: 2020-11-05 | End: 2021-02-19

## 2020-11-05 NOTE — PROGRESS NOTES
History of Present Illness:   Kayleen Leone is a 70 y.o. male here for evaluation:    Chief Complaint   Patient presents with    Ultrasound     pt states, not completed due to insurance not covering. Notes (nursing/rooming note copied below in italics):  As above    Ultrasound was unable to be scheduled due to requiring ABN for coverage. Reviewed findings with pt on labs and reason for planning US. Reviewed with patient visit:  --Initial notification received 2 to 3 days ago that ultrasound was approved. --Subsequent notification received ABN required for ultrasound (ultrasound not approved)    Nursing contacted radiology and patient's insurance to attempt to clarify during visit, but was unable to clarify for patient and provider during visit. Reviewed in light of diuretic refill request for patient plan to stop diuretic. He notes no lower extremity edema on days when he is on this medication. He notes started in the past by provider prior to starting care here--from record review seems to been started for lower extremity edema at that time. He notes no history of heart failure. Reviewed plan to stop diuretic and possible effects on creatinine at visit. He has upcoming labs with Dr. Izaguirre/hematology and will repeat creatinine then off diuretic. Reviewed if ultrasound not approved and renal function improves/normalizes off diuretic, would not need referral for evaluation. Reviewed if ultrasound approved will proceed with study given increased creatinine value. If ultrasound not approved and renal function remains elevated, plan referral to either renal or urology at which point they can determine need and request approval (if needed) for ultrasound without evaluation. Patient voiced understanding of plan. Noted he had not received information from physical therapy regarding scheduling. Reviewed order should have been faxed from prior visit here.   Order printed and resigned for patient visit. Copy of order faxed to [facility] as requested--[place] or [facility] location. He notes prior care through care more--release visit for health maintenance records as requested/reviewed. Health Maintenance Due   Topic Date Due    Hepatitis C Screening  1945    Foot Exam Q1  09/01/1957    MICROALBUMIN Q1  09/01/1957    Eye Exam Retinal or Dilated  09/01/1957    DTaP/Tdap/Td series (1 - Tdap) 09/01/1968    GLAUCOMA SCREENING Q2Y  09/01/2012    AAA Screening 73-69 YO Male Smoking Patients  09/01/2012    Pneumococcal 65+ years (1 of 1 - PPSV23) 09/01/2012    Medicare Yearly Exam  03/14/2018    A1C test (Diabetic or Prediabetic)  05/29/2020         Nursing screenings reviewed by provider at visit. Prior to Admission medications    Medication Sig Start Date End Date Taking? Authorizing Provider   enalapril (VASOTEC) 10 mg tablet Take 10 mg by mouth daily. 7/3/20  Yes Provider, Historical   FreeStyle Ashlee 14 Day Sensor kit  9/30/20  Yes Provider, Historical   montelukast (SINGULAIR) 10 mg tablet Take  by mouth. Yes Provider, Historical   Oxygen Indications: 2L prn   Yes Provider, Historical   aspirin delayed-release 81 mg tablet Take 1 Tab by mouth daily. 5/29/19  Yes Darien Wei NP   insulin NPH/insulin regular (NOVOLIN 70/30 U-100 INSULIN) 100 unit/mL (70-30) injection by SubCUTAneous route. Indications: 24u in AM and 15u PM   Yes Provider, Historical   simvastatin (ZOCOR) 20 mg tablet Take 20 mg by mouth nightly. Yes Provider, Historical   metFORMIN (GLUCOPHAGE) 500 mg tablet Take 1,000 mg by mouth two (2) times daily (with meals). Yes Provider, Historical   furosemide (LASIX) 20 mg tablet Take  by mouth every other day. Yes Provider, Historical   pantoprazole (PROTONIX) 20 mg tablet Take 2 Tabs by mouth daily.  6/21/13  Yes Nelli Perez MD   OTHER [facility]  - address higher level balance and maximize safety and independence with functional mobility. Hx Stroke  Patient not taking: Reported on 10/1/2020 5/29/19   Velna Blocker, NP        ROS    Vitals:    11/05/20 1449   BP: 129/74   Pulse: 68   Resp: 18   Temp: 97.8 °F (36.6 °C)   TempSrc: Oral   SpO2: 99%   Weight: 196 lb (88.9 kg)   Height: 5' 5\" (1.651 m)   PainSc:   0 - No pain      Body mass index is 32.62 kg/m². Physical Exam:     Physical Exam  Vitals signs and nursing note reviewed. Constitutional:       General: He is not in acute distress. Appearance: Normal appearance. He is well-developed. He is not diaphoretic. HENT:      Head: Normocephalic and atraumatic. Eyes:      General: No scleral icterus. Right eye: No discharge. Left eye: No discharge. Conjunctiva/sclera: Conjunctivae normal.   Cardiovascular:      Rate and Rhythm: Normal rate and regular rhythm. Pulses: Normal pulses. Heart sounds: Normal heart sounds. No murmur. No friction rub. No gallop. Pulmonary:      Effort: Pulmonary effort is normal. No respiratory distress. Breath sounds: Normal breath sounds. No stridor. No wheezing, rhonchi or rales. Abdominal:      General: Bowel sounds are normal. There is no distension. Palpations: Abdomen is soft. Tenderness: There is no abdominal tenderness. There is no guarding or rebound. Musculoskeletal:         General: No swelling, tenderness or deformity. Right lower leg: No edema. Left lower leg: No edema. Skin:     General: Skin is warm. Coloration: Skin is not jaundiced or pale. Findings: No bruising, erythema or rash. Neurological:      General: No focal deficit present. Mental Status: He is alert. Motor: No abnormal muscle tone. Coordination: Coordination normal.      Gait: Gait normal.   Psychiatric:         Mood and Affect: Mood normal.         Behavior: Behavior normal.         Thought Content:  Thought content normal.         Judgment: Judgment normal.         Assessment and Plan:       ICD-10-CM ICD-9-CM    1. Essential hypertension  I10 401.9 furosemide (LASIX) 20 mg tablet   2. Leg swelling  M79.89 729.81    3. Elevated serum creatinine  R79.89 790.99 RENAL FUNCTION PANEL   4. Thrombocytopenia (HCC)  D69.6 287.5    5. Dizziness  R42 780.4        1,2,3: Plan stop Lasix as reviewed. Print prescription provided if develops leg swelling in interim off diuretic. 3.  If creatinine normalizes off diuretic, reviewed may not need renal ultrasound done. If creatinine remains elevated off diuretic, plan referral to renal to evaluate further since he has no symptoms related to his prostate. If able to clarify that ultrasound is covered, will complete as ordered previously. If ABN required, will defer imaging to renal or other referral if needed. 4.  Has hematology evaluation scheduled as below. He will update creatinine of diuretic with future lab provided to patient today, at that visit. 5.  Referral reprinted and reviewed therapy. Follow-up and Dispositions    · Return in about 6 weeks (around 12/17/2020) for referral follow-up, non-fasting labs. lab results and schedule of future lab studies reviewed with patient  reviewed medications and side effects in detail    For additional documentation of information and/or recommendations discussed this visit, please see notes in instructions. Plan and evaluation (above) reviewed with pt at visit  Patient voiced understanding of plan and provided with time to ask/review questions. After Visit Summary (AVS) provided to pt after visit with additional instructions as needed/reviewed. Release(s) completed at visit for:  [facility] health maintenance records (request sent with letter with  due list above). Reviewed with pt at visit.       Future Appointments   Date Time Provider Ping Almaraz   12/3/2020 11:00 AM Kory Yang  N Ang Hopson AMB   6/9/2021 10:30 AM Sil Caballero NP [address] [facility] BS AMB

## 2020-11-05 NOTE — LETTER
Name: Tiffanie Heredia   Sex: male   : 1945  
[address] [address] [phone] (home) Current Immunizations: 
Immunization History Administered Date(s) Administered  Influenza High Dose Vaccine PF 2020  Zoster Recombinant 2020, 2020 Allergies: Allergies as of 2020  (No Known Allergies) Please let me know if you have other questions.  
 
Denver Ramirez MD

## 2020-11-05 NOTE — PATIENT INSTRUCTIONS
Do not take the Lasix or furosemide as reviewed. You have a printed script to fill if you get more leg swelling off this medication. If you are able to stop and stay off the medication, please do the labs (lab slip provided today) with your visit with Dr. Helen Mccauley on 12-3-20 as reviewed.

## 2020-11-05 NOTE — LETTER
2020 3:40 PM 
 
Mr. Jenny Lala [address] [place] [address] :  1945 [facility] Lala at [facility] Please provide us with records for any of the below health screenings that you have done or have records for, for his chart here: 
 
Health Maintenance Due Topic Date Due  
 Hepatitis C Screening  1945  Foot Exam Q1  1957  MICROALBUMIN Q1  1957  Eye Exam Retinal or Dilated  1957  
 DTaP/Tdap/Td series (1 - Tdap) 1968  GLAUCOMA SCREENING Q2Y  2012  AAA Screening 73-69 YO Male Smoking Patients  2012  Pneumococcal 65+ years (1 of 1 - PPSV23) 2012  Medicare Yearly Exam  2018  A1C test (Diabetic or Prediabetic)  2020 Please let me know if you have other questions.  
 
Dina Dubose MD

## 2020-11-05 NOTE — PROGRESS NOTES
Room 18     Identified pt with two pt identifiers(name and ). Reviewed record in preparation for visit and have obtained necessary documentation. All patient medications has been reviewed. Chief Complaint   Patient presents with    Ultrasound     pt states, not completed due to insurance not covering. 3 most recent PHQ Screens 2020   Little interest or pleasure in doing things Not at all   Feeling down, depressed, irritable, or hopeless Not at all   Total Score PHQ 2 0     Abuse Screening Questionnaire 2020   Do you ever feel afraid of your partner? N   Are you in a relationship with someone who physically or mentally threatens you? N   Is it safe for you to go home? Y       Health Maintenance Due   Topic    Hepatitis C Screening     Foot Exam Q1     MICROALBUMIN Q1     Eye Exam Retinal or Dilated     DTaP/Tdap/Td series (1 - Tdap)    GLAUCOMA SCREENING Q2Y     AAA Screening 73-69 YO Male Smoking Patients     Pneumococcal 65+ years (1 of 1 - PPSV23)    Medicare Yearly Exam     A1C test (Diabetic or Prediabetic)    Pt states, has not been screened for Hep C recently. Abel (Podiatrist) foot exam completed 2020. Ophthalmologist exam in summer 2020. Pt is unsure of tdap and pneumo vaccines. [address] overdue 18  AAA overdue 12    Health Maintenance Review: Patient reminded of \"due or due soon\" health maintenance. I have asked the patient to contact his/her primary care provider (PCP) for follow-up on his/her health maintenance.     Vitals:    20 1449   BP: 129/74   Pulse: 68   Resp: 18   Temp: 97.8 °F (36.6 °C)   TempSrc: Oral   SpO2: 99%   Weight: 196 lb (88.9 kg)   Height: 5' 5\" (1.651 m)   PainSc:   0 - No pain       Wt Readings from Last 3 Encounters:   20 196 lb (88.9 kg)   10/01/20 200 lb (90.7 kg)   20 206 lb (93.4 kg)     Temp Readings from Last 3 Encounters:   20 97.8 °F (36.6 °C) (Oral)   10/01/20 97.8 °F (36.6 °C) (Oral)   19 97.8 °F (36.6 °C)     BP Readings from Last 3 Encounters:   11/05/20 129/74   10/01/20 129/71   03/12/20 140/77     Pulse Readings from Last 3 Encounters:   11/05/20 68   10/01/20 69   03/12/20 70       Coordination of Care Questionnaire:   1) Have you been to an emergency room, urgent care, or hospitalized since your last visit?   no       2. Have seen or consulted any other health care provider since your last visit? NO    Advance Care Planning:   End of Life Planning: DNI/DNR, has NO advanced directive  - add't info provided, reviewed DNR/DNI and patient is not interested  Oswaldo Moody [address] ACP-Facilitator appointment no    Patient is accompanied by self I have received verbal consent from Maryann Rocha to discuss any/all medical information while they are present in the room.

## 2020-12-03 ENCOUNTER — VIRTUAL VISIT (OUTPATIENT)
Dept: ONCOLOGY | Age: 75
End: 2020-12-03
Payer: MEDICARE

## 2020-12-03 DIAGNOSIS — E11.59 TYPE 2 DIABETES MELLITUS WITH OTHER CIRCULATORY COMPLICATION, WITHOUT LONG-TERM CURRENT USE OF INSULIN (HCC): ICD-10-CM

## 2020-12-03 DIAGNOSIS — D69.6 THROMBOCYTOPENIA (HCC): ICD-10-CM

## 2020-12-03 DIAGNOSIS — R16.2 HEPATOSPLENOMEGALY: Primary | ICD-10-CM

## 2020-12-03 DIAGNOSIS — E66.09 CLASS 1 OBESITY DUE TO EXCESS CALORIES WITH SERIOUS COMORBIDITY AND BODY MASS INDEX (BMI) OF 32.0 TO 32.9 IN ADULT: ICD-10-CM

## 2020-12-03 PROBLEM — E11.9 DIABETES MELLITUS (HCC): Status: ACTIVE | Noted: 2020-12-03

## 2020-12-03 PROCEDURE — 99204 OFFICE O/P NEW MOD 45 MIN: CPT | Performed by: INTERNAL MEDICINE

## 2020-12-03 NOTE — PROGRESS NOTES
[facility] [place] at [address], [address], [address] [place]: [phone]  F: [phone]    Reason for Visit:   Babita Lee is a 70 y.o. male who is seen in consultation at the request of Dr. Philbert Boas for evaluation of Thrombocytopenia    Seen by synchronous (real-time) audio-video technology on 12/3/2020    History of Present Illness:   Patient is a 70 y.o.male with PMH as below who is seen for thrombocytopenia    He has had fluctuating thrombocytopenia since 2013 ( 77-112k). He does bruise a lot, has no bleeding. Is on ASA  He denies any bleeding in BMs and urine. He has no fevers, chills. He has had some sweats, however this corresponds to hypoglycemia. He has no abdominal pain, has no change in appetite, has no change in weight. No new lumps. He used to drink alcohol since 2013  Sister had breast cancer     Past Medical History:   Diagnosis Date    CAD (coronary artery disease)     Chronic kidney disease     stones    Chronic obstructive pulmonary disease ([address]     Diabetes ([address]     Hypertension     St[facility] ([facility] 1980s    right hand neuropathy      Past Surgical History:   Procedure Laterality Date    COLONOSCOPY N/A 4/12/2018    COLONOSCOPY performed by Sun Dangelo MD at [address] HX SKIN BIOPSY  2019    skin cancer removed from left leg. Social History     Tobacco Use    Smoking status: Former Smoker     Packs/day: 2.00     Years: 45.00     Pack years: 90.00     Last attempt to quit: 6/19/2005     Years since quitting: 15.4    Smokeless tobacco: Never Used   Substance Use Topics    Alcohol use: No     Comment: 1 gallon whiskey a week stopped 4 years ago      Family History   Problem Relation Age of Onset    Heart Disease Mother     Heart Disease Father     Cancer Sister         breast ca     Current Outpatient Medications   Medication Sig    furosemide (LASIX) 20 mg tablet Take 1 Tab by mouth every other day.     enalapril (VASOTEC) 10 mg tablet Take 10 mg by mouth daily.  FreeStyle Ashlee 14 Day Sensor kit     montelukast (SINGULAIR) 10 mg tablet Take  by mouth.  Oxygen Indications: 2L prn    aspirin delayed-release 81 mg tablet Take 1 Tab by mouth daily.  OTHER [facility]  - address higher level balance and maximize safety and independence with functional mobility. Hx Stroke    insulin NPH/insulin regular (NOVOLIN 70/30 U-100 INSULIN) 100 unit/mL (70-30) injection by SubCUTAneous route. Indications: 24u in AM and 15u PM    simvastatin (ZOCOR) 20 mg tablet Take 20 mg by mouth nightly.  metFORMIN (GLUCOPHAGE) 500 mg tablet Take 1,000 mg by mouth two (2) times daily (with meals).  pantoprazole (PROTONIX) 20 mg tablet Take 2 Tabs by mouth daily. No current facility-administered medications for this visit. No Known Allergies     Review of Systems: A complete review of systems was obtained, negative except as described above. Physical Exam:     Due to this being a TeleHealth evaluation, many elements of the physical examination are unable to be assessed. Constitutional: Appears well-developed and well-nourished in no apparent distress   Mental status: Alert and awake, Oriented to person/place/time, Able to follow commands  Eyes: EOM normal, Sclera normal, No visible ocular discharge  HENT: Normocephalic, atraumatic;   Neck: No visualized mass  Pulmonary/Chest: Respiratory effort normal, No visualized signs of difficulty breathing or respiratory distress   Musculoskeletal: Normal gait with no signs of ataxia, Normal range of motion of neck  Neurological: No facial asymmetry (Cranial nerve 7 motor function), No gaze palsy  Skin: No significant exanthematous lesions or discoloration noted on facial skin  Psychiatric: Normal affect, normal judgment/insight.  No hallucinations       Results:     Lab Results   Component Value Date/Time    WBC 4.1 10/09/2020 10:28 AM    HGB 13.1 10/09/2020 10:28 AM    HCT 40.3 10/09/2020 10:28 AM    PLATELET 98 (L) 49/35/1785 10:28 AM    MCV 91.8 10/09/2020 10:28 AM    ABS. NEUTROPHILS 1.9 10/09/2020 10:28 AM     Lab Results   Component Value Date/Time    Sodium 143 10/07/2020 02:08 PM    Potassium 4.2 10/07/2020 02:08 PM    Chloride 112 (H) 10/07/2020 02:08 PM    CO2 24 10/07/2020 02:08 PM    Glucose 79 10/07/2020 02:08 PM    BUN 19 10/07/2020 02:08 PM    Creatinine 1.55 (H) 10/07/2020 02:08 PM    GFR est AA 54 (L) 10/07/2020 02:08 PM    GFR est non-AA 44 (L) 10/07/2020 02:08 PM    Calcium 9.6 10/07/2020 02:08 PM    Glucose (POC) 121 (H) 05/29/2019 01:41 PM     Lab Results   Component Value Date/Time    Bilirubin, total 0.6 10/01/2020 11:43 AM    ALT (SGPT) 34 10/01/2020 11:43 AM    Alk. phosphatase 67 10/01/2020 11:43 AM    Protein, total 7.6 10/01/2020 11:43 AM    Albumin 4.2 10/01/2020 11:43 AM    Globulin 3.4 10/01/2020 11:43 AM     Lab Results   Component Value Date/Time    Vitamin B12 326 08/14/2019 02:31 PM    Homocysteine, plasma 11.5 07/06/2013 05:35 AM    Sed rate (ESR) 21 (H) 07/07/2013 10:15 AM    Sed rate, automated 9 05/28/2019 11:00 PM    C-Reactive protein <0.29 05/28/2019 11:00 PM    TSH 1.740 08/14/2019 02:31 PM    HIV 1/2 Interpretation NONREACTIVE 07/06/2013 02:36 PM     Lab Results   Component Value Date/Time    INR 1.2 (H) 07/06/2013 02:13 AM    aPTT 27.4 07/06/2013 02:13 AM    D-dimer 0.86 (H) 05/28/2019 11:00 PM         Records reviewed and summarized above. Pathology report(s) reviewed above. Radiology report(s) reviewed above.       Assessment:   1) Mild stable thrombocytopenia    Since 2013  No other CBC abnormalities  No culprit medications  Differentials include pseudothrombocytopenia, Chronic infections, Underlying liver disease  Given the stability a BM disorder is less likely but not r/o  Will obtain work up as below    2) CAD    3) DM    4)HTN    5)Obesity      Plan:     · CBC diff, smear, citrate platelets, LD, USG abdomen, Hep panel, HIV, B12    RTC 3-4 weeks    I appreciate the opportunity to participate in Mr. Yasmeen Persaud care. Signed By: Tray Mccormick MD    I was in the office while conducting this encounter. The patient was at his home. Consent:  He and/or his healthcare decision maker is aware that this patient-initiated Telehealth encounter is a billable service, with coverage as determined by his insurance carrier. He is aware that he may receive a bill and has provided verbal consent to proceed: Yes    Pursuant to the emergency declaration under the [address] and the [facility] [place], operated by [facility], 1135 waiver authority and the [employer] and [employer] unlimitedar General Act, this Virtual  Visit was conducted, with patient's (and/or legal guardian's) consent, to reduce the patient's risk of exposure to COVID-19 and provide necessary medical care. Services were provided through a video synchronous discussion virtually to substitute for in-person visit.

## 2020-12-03 NOTE — PROGRESS NOTES
Babita Lee is a 70 y.o. male  Chief Complaint   Patient presents with    New Patient   1. Have you been to the ER, urgent care clinic since your last visit? Hospitalized since your last visit? No    2. Have you seen or consulted any other health care providers outside of the [address] [place] [address] since your last visit? Include any pap smears or colon screening. No

## 2020-12-18 RX ORDER — FLASH GLUCOSE SENSOR
KIT MISCELLANEOUS
Qty: 6 KIT | Refills: 1 | Status: SHIPPED | OUTPATIENT
Start: 2020-12-18 | End: 2021-05-27

## 2020-12-18 RX ORDER — PANTOPRAZOLE SODIUM 20 MG/1
40 TABLET, DELAYED RELEASE ORAL DAILY
Qty: 180 TAB | Refills: 1 | Status: SHIPPED | OUTPATIENT
Start: 2020-12-18 | End: 2021-07-19

## 2020-12-18 NOTE — TELEPHONE ENCOUNTER
Refill request(s) approved--pantoprazole; Freestyle Ashlee sensor. Requested Prescriptions     Signed Prescriptions Disp Refills    pantoprazole (Protonix) 20 mg tablet 180 Tab 1     Sig: Take 2 Tabs by mouth daily. Authorizing Provider: Carlton Gonzalez FreeStyle Ashlee 14 Day Sensor kit 6 Kit 1     Sig: Use as directed.      Authorizing Provider: Rafaela Carroll

## 2020-12-18 NOTE — TELEPHONE ENCOUNTER
Freestyle Ashlee - Use to check blood sugar 3 times a day and as needed every 14 days.  Pt states he previously had 16 refills, is requesting to have multiple refills again    Please send refills to [address] on file

## 2020-12-18 NOTE — TELEPHONE ENCOUNTER
Historical medication: Freestyle Ashlee 14 day sensor. Last visit 11/05/2020 MD Rachel Jameson   Next appointment 6 weeks (12/2020) - Nothing scheduled   Previous refill encounter(s)   06/21/2013 Protonix 20 mg #30 prescribed by Dr. Edgar Shahid.     Requested Prescriptions     Pending Prescriptions Disp Refills    pantoprazole (Protonix) 20 mg tablet 30 Tab 0     Sig: Take 2 Tabs by mouth daily.     FreeStyle Ashlee 14 Day [employer]

## 2020-12-21 ENCOUNTER — HOSPITAL ENCOUNTER (OUTPATIENT)
Dept: ULTRASOUND IMAGING | Age: 75
Discharge: HOME | End: 2020-12-21
Attending: INTERNAL MEDICINE
Payer: MEDICARE

## 2020-12-21 DIAGNOSIS — D69.6 THROMBOCYTOPENIA (HCC): ICD-10-CM

## 2020-12-21 DIAGNOSIS — R16.2 HEPATOSPLENOMEGALY: ICD-10-CM

## 2020-12-21 PROCEDURE — 76700 US EXAM ABDOM COMPLETE: CPT

## 2020-12-22 DIAGNOSIS — D69.6 THROMBOCYTOPENIA (HCC): Primary | ICD-10-CM

## 2020-12-24 LAB
BASOPHILS # BLD AUTO: 0.1 X10E3/UL (ref 0–0.2)
BASOPHILS NFR BLD AUTO: 1 %
EOSINOPHIL # BLD AUTO: 0.2 X10E3/UL (ref 0–0.4)
EOSINOPHIL NFR BLD AUTO: 3 %
ERYTHROCYTE [DISTWIDTH] IN BLOOD BY AUTOMATED COUNT: 13.2 % (ref 11.6–15.4)
HCT VFR BLD AUTO: 36.2 % (ref 37.5–51)
HGB BLD-MCNC: 12.6 G/DL (ref 13–17.7)
IMM GRANULOCYTES # BLD AUTO: 0 X10E3/UL (ref 0–0.1)
IMM GRANULOCYTES NFR BLD AUTO: 0 %
LYMPHOCYTES # BLD AUTO: 1.4 X10E3/UL (ref 0.7–3.1)
LYMPHOCYTES NFR BLD AUTO: 28 %
MCH RBC QN AUTO: 30.5 PG (ref 26.6–33)
MCHC RBC AUTO-ENTMCNC: 34.8 G/DL (ref 31.5–35.7)
MCV RBC AUTO: 88 FL (ref 79–97)
MONOCYTES # BLD AUTO: 0.3 X10E3/UL (ref 0.1–0.9)
MONOCYTES NFR BLD AUTO: 6 %
MORPHOLOGY BLD-IMP: ABNORMAL
NEUTROPHILS # BLD AUTO: 3.1 X10E3/UL (ref 1.4–7)
NEUTROPHILS NFR BLD AUTO: 62 %
PLATELET # BLD AUTO: 117 X10E3/UL (ref 150–450)
PLATELET # BLD AUTO: ABNORMAL X10E3/UL
RBC # BLD AUTO: 4.13 X10E6/UL (ref 4.14–5.8)
WBC # BLD AUTO: 5.1 X10E3/UL (ref 3.4–10.8)

## 2020-12-31 ENCOUNTER — VIRTUAL VISIT (OUTPATIENT)
Dept: ONCOLOGY | Age: 75
End: 2020-12-31
Payer: MEDICARE

## 2020-12-31 DIAGNOSIS — E66.09 CLASS 1 OBESITY DUE TO EXCESS CALORIES WITH SERIOUS COMORBIDITY AND BODY MASS INDEX (BMI) OF 32.0 TO 32.9 IN ADULT: ICD-10-CM

## 2020-12-31 DIAGNOSIS — D69.6 THROMBOCYTOPENIA (HCC): Primary | ICD-10-CM

## 2020-12-31 PROCEDURE — 99214 OFFICE O/P EST MOD 30 MIN: CPT | Performed by: INTERNAL MEDICINE

## 2020-12-31 NOTE — PROGRESS NOTES
[facility] [place] [address] [address], [address], [address] [place]: [phone]  F: [phone]    Reason for Visit:   Angel Frazier is a 70 y.o. male who is seen for follow up ofThrombocytopenia    Seen by synchronous (real-time) audio-video technology on 12/31/2020    History of Present Illness:   Patient is a 70 y.o.male with PMH as below who is seen for thrombocytopenia    He has had fluctuating thrombocytopenia since 2013 ( 77-112k). Seen to review results of testing  He has no bleeding at all, no dark stools, has continued to have dizziness    He used to drink alcohol since 2013  Sister had breast cancer     Past Medical History:   Diagnosis Date    CAD (coronary artery disease)     Chronic kidney disease     stones    Chronic obstructive pulmonary disease ([address]     Diabetes ([address]     Hypertension     St[facility] ([facility] 1980s    right hand neuropathy      Past Surgical History:   Procedure Laterality Date    COLONOSCOPY N/A 4/12/2018    COLONOSCOPY performed by Loretta Riddle MD at [address] HX SKIN BIOPSY  2019    skin cancer removed from left leg. Social History     Tobacco Use    Smoking status: Former Smoker     Packs/day: 2.00     Years: 45.00     Pack years: 90.00     Quit date: 6/19/2005     Years since quitting: 15.5    Smokeless tobacco: Never Used   Substance Use Topics    Alcohol use: No     Comment: 1 gallon whiskey a week stopped 4 years ago      Family History   Problem Relation Age of Onset    Heart Disease Mother     Heart Disease Father     Cancer Sister         breast ca     Current Outpatient Medications   Medication Sig    pantoprazole (Protonix) 20 mg tablet Take 2 Tabs by mouth daily.  FreeStyle Ashlee 14 Day Sensor kit Use as directed.  enalapril (VASOTEC) 10 mg tablet Take 10 mg by mouth daily.  montelukast (SINGULAIR) 10 mg tablet Take  by mouth.     Oxygen Indications: 2L prn    aspirin delayed-release 81 mg tablet Take 1 Tab by mouth daily.  OTHER [facility]  - address higher level balance and maximize safety and independence with functional mobility. Hx Stroke    insulin NPH/insulin regular (NOVOLIN 70/30 U-100 INSULIN) 100 unit/mL (70-30) injection by SubCUTAneous route. Indications: 24u in AM and 15u PM    simvastatin (ZOCOR) 20 mg tablet Take 20 mg by mouth nightly.  metFORMIN (GLUCOPHAGE) 500 mg tablet Take 1,000 mg by mouth two (2) times daily (with meals).  furosemide (LASIX) 20 mg tablet Take 1 Tab by mouth every other day. No current facility-administered medications for this visit. No Known Allergies     Review of Systems: A complete review of systems was obtained, negative except as described above. Physical Exam:     Due to this being a TeleHealth evaluation, many elements of the physical examination are unable to be assessed. Constitutional: Appears well-developed and well-nourished in no apparent distress   Mental status: Alert and awake, Oriented to person/place/time, Able to follow commands  Eyes: EOM normal, Sclera normal, No visible ocular discharge  Neurological: No facial asymmetry (Cranial nerve 7 motor function), No gaze palsy  Skin: No significant exanthematous lesions or discoloration noted on facial skin  Psychiatric: Normal affect, normal judgment/insight. No hallucinations       Results:     Lab Results   Component Value Date/Time    WBC 5.1 12/22/2020 01:15 PM    HGB 12.6 (L) 12/22/2020 01:15 PM    HCT 36.2 (L) 12/22/2020 01:15 PM    PLATELET CANCELED 31/90/5951 01:15 PM    MCV 88 12/22/2020 01:15 PM    ABS.  NEUTROPHILS 3.1 12/22/2020 01:15 PM     Lab Results   Component Value Date/Time    Sodium 143 10/07/2020 02:08 PM    Potassium 4.2 10/07/2020 02:08 PM    Chloride 112 (H) 10/07/2020 02:08 PM    CO2 24 10/07/2020 02:08 PM    Glucose 79 10/07/2020 02:08 PM    BUN 19 10/07/2020 02:08 PM    Creatinine 1.55 (H) 10/07/2020 02:08 PM    GFR est AA 54 (L) 10/07/2020 02:08 PM    GFR est non-AA 44 (L) 10/07/2020 02:08 PM    Calcium 9.6 10/07/2020 02:08 PM    Glucose (POC) 121 (H) 05/29/2019 01:41 PM     Lab Results   Component Value Date/Time    Bilirubin, total 0.6 10/01/2020 11:43 AM    ALT (SGPT) 34 10/01/2020 11:43 AM    Alk. phosphatase 67 10/01/2020 11:43 AM    Protein, total 7.6 10/01/2020 11:43 AM    Albumin 4.2 10/01/2020 11:43 AM    Globulin 3.4 10/01/2020 11:43 AM     Lab Results   Component Value Date/Time    Vitamin B12 326 08/14/2019 02:31 PM    Homocysteine, plasma 11.5 07/06/2013 05:35 AM    Sed rate (ESR) 21 (H) 07/07/2013 10:15 AM    Sed rate, automated 9 05/28/2019 11:00 PM    C-Reactive protein <0.29 05/28/2019 11:00 PM    TSH 1.740 08/14/2019 02:31 PM    HIV 1/2 Interpretation NONREACTIVE 07/06/2013 02:36 PM     Lab Results   Component Value Date/Time    INR 1.2 (H) 07/06/2013 02:13 AM    aPTT 27.4 07/06/2013 02:13 AM    D-dimer 0.86 (H) 05/28/2019 11:00 PM     Actual platelet count may be somewhat higher than reported due to   aggregation of platelets in this sample    Records reviewed and summarized above. Pathology report(s) reviewed above. Radiology report(s) reviewed above. USG abdomen    IMPRESSION  IMPRESSION:  1. Heterogeneous hepatic echotexture with an area of focal sparing in the right  lobe. 2.  Mildly enlarged spleen measuring 13.1 cm in length. 3.  Left adrenal adenoma, seen on prior cross-sectional imaging.   4.  Contracted gallbladder around gallstones.     Assessment:   1) Mild stable thrombocytopenia    Since 2013  Mild adnemia  No culprit medications  Work up suggests pseudothrombocytopenia and underlying fatty liver with mild splenomegaly as the cause of his mild thrombocytopenia    No specific interventions needed though we will monitor      I have also recommended a hepatology evaluation      2) CAD/ Dizziness    Follow up with Dr. Linwood Baxter    3) DM    4)HTN    5)Obesity      Plan:     · Refer to Hepatology  · See me in 3-4 months with cbc, iron profile ferritin    I appreciate the opportunity to participate in Mr. Leonard Colindres care. Signed By: Nikki oLmeli MD    I was in the office while conducting this encounter. The patient was at his home. Consent:  He and/or his healthcare decision maker is aware that this patient-initiated Telehealth encounter is a billable service, with coverage as determined by his insurance carrier. He is aware that he may receive a bill and has provided verbal consent to proceed: Yes    Pursuant to the emergency declaration under the [address] and the [facility] waiver authority and the [employer] General Act, this Virtual  Visit was conducted, with patient's (and/or legal guardian's) consent, to reduce the patient's risk of exposure to COVID-19 and provide necessary medical care. Services were provided through a video synchronous discussion virtually to substitute for in-person visit.

## 2020-12-31 NOTE — PROGRESS NOTES
Erin Garcia is a 70 y.o. male  Chief Complaint   Patient presents with    Follow-up     Thrombocytopenia   1. Have you been to the ER, urgent care clinic since your last visit? Hospitalized since your last visit? No    2. Have you seen or consulted any other health care providers outside of the [address] [place] [address] since your last visit? Include any pap smears or colon screening.  No

## 2021-01-08 ENCOUNTER — TELEPHONE (OUTPATIENT)
Dept: ONCOLOGY | Age: 76
End: 2021-01-08

## 2021-01-12 LAB
CREATININE, EXTERNAL: 1.48
HBA1C MFR BLD HPLC: 5.6 %

## 2021-02-03 ENCOUNTER — OFFICE VISIT (OUTPATIENT)
Dept: HEMATOLOGY | Age: 76
End: 2021-02-03
Payer: MEDICARE

## 2021-02-03 VITALS
DIASTOLIC BLOOD PRESSURE: 65 MMHG | HEIGHT: 65 IN | OXYGEN SATURATION: 98 % | SYSTOLIC BLOOD PRESSURE: 134 MMHG | WEIGHT: 195 LBS | TEMPERATURE: 98 F | RESPIRATION RATE: 20 BRPM | HEART RATE: 67 BPM | BODY MASS INDEX: 32.49 KG/M2

## 2021-02-03 DIAGNOSIS — D69.6 THROMBOCYTOPENIA (HCC): Primary | ICD-10-CM

## 2021-02-03 PROBLEM — Z86.73 HISTORY OF CVA (CEREBROVASCULAR ACCIDENT): Status: ACTIVE | Noted: 2021-02-03

## 2021-02-03 PROCEDURE — G8536 NO DOC ELDER MAL SCRN: HCPCS | Performed by: HOSPITALIST

## 2021-02-03 PROCEDURE — G8432 DEP SCR NOT DOC, RNG: HCPCS | Performed by: HOSPITALIST

## 2021-02-03 PROCEDURE — 1101F PT FALLS ASSESS-DOCD LE1/YR: CPT | Performed by: HOSPITALIST

## 2021-02-03 PROCEDURE — 3017F COLORECTAL CA SCREEN DOC REV: CPT | Performed by: HOSPITALIST

## 2021-02-03 PROCEDURE — G8427 DOCREV CUR MEDS BY ELIG CLIN: HCPCS | Performed by: HOSPITALIST

## 2021-02-03 PROCEDURE — G8417 CALC BMI ABV UP PARAM F/U: HCPCS | Performed by: HOSPITALIST

## 2021-02-03 PROCEDURE — 99204 OFFICE O/P NEW MOD 45 MIN: CPT | Performed by: HOSPITALIST

## 2021-02-03 NOTE — PROGRESS NOTES
Identified pt with two pt identifiers(name and ). Reviewed record in preparation for visit and have obtained necessary documentation. Chief Complaint   Patient presents with    New Patient      Vitals:    21 1116   BP: 134/65   Pulse: 67   Resp: 20   Temp: 98 °F (36.7 °C)   TempSrc: Temporal   SpO2: 98%   Weight: 195 lb (88.5 kg)   Height: 5' 5\" (1.651 m)   PainSc:   0 - No pain       Health Maintenance Review: Patient reminded of \"due or due soon\" health maintenance. I have asked the patient to contact his/her primary care provider (PCP) for follow-up on his/her health maintenance. Coordination of Care Questionnaire:  :   1) Have you been to an emergency room, urgent care, or hospitalized since your last visit? If yes, where when, and reason for visit? no       2. Have seen or consulted any other health care provider since your last visit? If yes, where when, and reason for visit? NO      Patient is accompanied by self I have received verbal consent from Jolly Gerber to discuss any/all medical information while they are present in the room.

## 2021-02-03 NOTE — LETTER
2/3/2021 Patient: Enrico Childs YOB: 1945 Date of Visit: 2/3/2021 Casimiro You MD 
[address] [place] [address] [address] [employer] Dear Casimiro You MD, Thank you for referring Mr. Vaishali Barcenas to [address] for evaluation. My notes for this consultation are attached. If you have questions, please do not hesitate to call me. I look forward to following your patient along with you. Sincerely, Rachael Rocha MD

## 2021-02-03 NOTE — PROGRESS NOTES
[address] [facility]      Phu Pink MD, Tracey Stevens, Shirin Stiles MD, MPH      Karen White, PA-MARILIN Martino, ACNP-BC     April TASHA Angulo, [facility]   Katja Cortez, FNP-C    Adrianabrandoncristi Seals, [facility]       Bernard Morris [address]    at [place] [address], [address] [address]    [phone]    FAX: [address] [place] [address] Drive, [address], [address]    [phone]    FAX: [phone]     Patient Care Team:  Deonna Story MD as PCP - General (Infectious Disease)  Deonna Story MD as PCP - [facility]    Problem List  Date Reviewed: 12/31/2020          Codes Class Noted    Thrombocytopenia [facility]) ICD-10-CM: D69.6  ICD-9-CM: 287.5  12/3/2020        Class 1 obesity due to excess calories with serious comorbidity and body mass index (BMI) of 32.0 to 32.9 in adult ICD-10-CM: E66.09, Z68.32  ICD-9-CM: 278.00, V85.32  12/3/2020        Diabetes mellitus [facility]) ICD-10-CM: E11.9  ICD-9-CM: 250.00  12/3/2020            The clinicians listed above have   asked me to see Chetan Atwood in consultation regarding management of thrombocytopenia  All medical records sent by the referring physicians were reviewed including imaging studies     The patient is a 70 male  male who was found to have low platelets on 43/7929    Imaging of the liver performed with ultrasound in 12/2020 demonstrated heterogeneous hepatic echotexture with an area of focal sparing in the right  lobe. Mildly enlarged spleen measuring 13.1 cm in length. An assessment of liver fibrosis with biopsy or elastography has not been performed.   .      Serologic evaluation for markers of chronic liver disease has either not been performed or the results are not available. The patient has not developed any of the major complications of cirrhosis to date. The patient reports fatigue    The patient is not currently experiencing the following symptoms of liver disease: pain in the right side over the liver, yellowing of the eyes or skin,   problems concentrating, swelling of the abdomen, swelling of the lower extremities, hematemesis, Hematochezia. Patient reports history of heavy alcohol use. He used to drink  '2\" 1/2 gallon of blended alcohol daily. He has not had any alcoholic drink since 5492    The patient completes all daily activities without any functional limitations. ASSESSMENT AND PLAN:  Thrombocytopenia   This is of uncertain etiology. The liver transaminase is normal, the liver function is normal, the platelet count is depressed     We will perform viral serologies to rule out hepatitis B, and C virus    We will have patient return to the clinic for fibro scan of the liver to assess for scarring. If fibro scan suggest advanced fibrosis we will schedule patient for liver biopsy to confirm or exclude cirrhosis. There is no evidence of overt bleeding so no treatment is required for thrombocytopenia  The platelet count is adequate for the patient to undergo procedures without the need for platelet transfusion or platelet growth factors. Screening for Esophageal varices   The patient has not had an EGD to screen for varices. We will defer EGD for now until after fibro scan or liver biopsy    Screening for Hepatocellular Carcinoma  HCC screening is not necessary if the patient has no evidence of cirrhosis. Treatment of other medical problems in patients with chronic liver disease  There are no contraindications for the patient to take most medications that are necessary for treatment of other medical issues.   The patient may have cirrhrosis and should avoid taking Benzodiazapines which could exacerbate HE.  NSAIDs which are associated with a higher rate of developing ROBBIE. The patient can take Any medications utilized for treatment of DM. Statins to treat hypercholesterolemia    The patient does not comsume alcohol on a daily basis     Counseling for alcohol in patients with chronic liver disease  The patient was counseled regarding alcohol consumption and the effect of alcohol on chronic liver disease.l    Vaccinations   The need for vaccination against viral hepatitis A and B will be assessed with serologic and instituted as appropriate. Routine vaccinations against other bacterial and viral agents can be performed as indicated. Annual flu vaccination should be administered if indicated. ALLERGIES  No Known Allergies    MEDICATIONS  Current Outpatient Medications   Medication Sig    pantoprazole (Protonix) 20 mg tablet Take 2 Tabs by mouth daily.  FreeStyle Ashlee 14 Day Sensor kit Use as directed.  enalapril (VASOTEC) 10 mg tablet Take 10 mg by mouth daily.  montelukast (SINGULAIR) 10 mg tablet Take  by mouth.  Oxygen Indications: 2L prn    aspirin delayed-release 81 mg tablet Take 1 Tab by mouth daily.  OTHER [facility]  - address higher level balance and maximize safety and independence with functional mobility. Hx Stroke    simvastatin (ZOCOR) 20 mg tablet Take 20 mg by mouth nightly.  metFORMIN (GLUCOPHAGE) 500 mg tablet Take 1,000 mg by mouth two (2) times daily (with meals).  furosemide (LASIX) 20 mg tablet Take 1 Tab by mouth every other day.  insulin NPH/insulin regular (NOVOLIN 70/30 U-100 INSULIN) 100 unit/mL (70-30) injection by SubCUTAneous route. Indications: 24u in AM and 15u PM     No current facility-administered medications for this visit. SYSTEM REVIEW NOT RELATED TO LIVER DISEASE OR REVIEWED ABOVE:  Constitution systems: Negative for fever, chills, weight gain, weight loss. Eyes: Negative for visual changes.   ENT: Negative for sore throat, painful swallowing. Respiratory: Negative for cough, hemoptysis, SOB. Cardiology: Negative for chest pain, palpitations. GI:  Negative for constipation or diarrhea. : Negative for urinary frequency, dysuria, hematuria, nocturia. Skin: Negative for rash. Hematology: Negative for easy bruising, blood clots. Musculo-skelatal: Negative for back pain, muscle pain, weakness. Neurologic: Negative for headaches, dizziness, vertigo, memory problems not related to HE. Psychology: Negative for anxiety, depression. FAMILY HISTORY:  There is no family history of liver disease. There is no family history of immune disorders. SOCIAL HISTORY:  The patient is   The patient has no children. The patient stopped using tobacco products in 2009  The patient has previously consumed alcohol in excess. The patient is a retired barnes    PHYSICAL EXAMINATION:  Visit Vitals  /65 (BP 1 Location: Right lower arm, BP Patient Position: Sitting, BP Cuff Size: Adult)   Pulse 67   Temp 98 °F (36.7 °C) (Temporal)   Resp 20   Ht 5' 5\" (1.651 m)   Wt 195 lb (88.5 kg)   SpO2 98%   BMI 32.45 kg/m²     General: No acute distress. Eyes: Sclera anicteric. ENT: No oral lesions. Thyroid normal.  Nodes: No adenopathy. Skin: No spider angiomata. No jaundice. No palmar erythema. Respiratory: Lungs clear to auscultation. Cardiovascular: Regular heart rate. No murmurs. No JVD. Abdomen: Soft non-tender. Liver size normal to percussion/palpation. Spleen not palpable. No obvious ascites. Extremities: No edema. No muscle wasting. No gross arthritic changes. Neurologic: Alert and oriented. Cranial nerves grossly intact. No asterixis. LABORATORY STUDIES:  Recent liver function panel, CBC with platelet count and BMP are not available. These studies will be performed.     01/12/2021  ALT 20  AST 24  Bilirubin Total: 0.3  Scr 1.48    Liver [place] of [address] Units 12/22/2020 10/9/2020 WBC 3.4 - 10.8 x10E3/uL 5.1 4.1   ANC 1.4 - 7.0 x10E3/uL 3.1 1.9   HGB 13.0 - 17.7 g/dL 12.6 (L) 13.1   PLT x10E3/uL CANCELED 98 (L)    - 450 x10E3/uL 117 (L)    AST 15 - 37 U/L     ALT 12 - 78 U/L     Alk Phos 45 - 117 U/L     Bili, Total 0.2 - 1.0 MG/DL     Albumin 3.5 - 5.0 g/dL     BUN 6 - 20 MG/DL     Creat 0.70 - 1.30 MG/DL     Na 136 - 145 mmol/L     K 3.5 - 5.1 mmol/L     Cl 97 - 108 mmol/L     CO2 21 - 32 mmol/L     Glucose 65 - 100 mg/dL     Magnesium 1.6 - 2.4 mg/dL       Liver [facility] Latest Ref Rng & Units 10/7/2020 10/1/2020   WBC 3.4 - 10.8 x10E3/uL     ANC 1.4 - 7.0 x10E3/uL     HGB 13.0 - 17.7 g/dL     PLT x10E3/uL      - 450 x10E3/uL     AST 15 - 37 U/L  24   ALT 12 - 78 U/L  34   Alk Phos 45 - 117 U/L  67   Bili, Total 0.2 - 1.0 MG/DL  0.6   Albumin 3.5 - 5.0 g/dL  4.2   BUN 6 - 20 MG/DL 19 20   Creat 0.70 - 1.30 MG/DL 1.55 (H) 1.47 (H)   Na 136 - 145 mmol/L 143 142   K 3.5 - 5.1 mmol/L 4.2 4.3   Cl 97 - 108 mmol/L 112 (H) 109 (H)   CO2 21 - 32 mmol/L 24 26   Glucose 65 - 100 mg/dL 79 69   Magnesium 1.6 - 2.4 mg/dL           SEROLOGIES:  Not available or performed. Testing will be performed as indicated. LIVER HISTOLOGY:  Not available or performed    ENDOSCOPIC PROCEDURES:  Not available or performed    RADIOLOGY:  12/2020: Liver US:  demonstrated heterogeneous hepatic echotexture with an area of focal sparing in the right lobe. Mildly enlarged spleen measuring 13.1 cm in length. OTHER TESTING:  Not available or performed    FOLLOW-UP:  All of the issues listed above in the Assessment and Plan were discussed with the patient. All questions were answered. The patient expressed a clear understanding of the above. [address] [place] [address] in 4 weeks for Fibroscan and to review all data and determine the treatment plan.     Hansa Prakash MD, MPH  [facility]  [address] [place], [address]  [address] [facility]

## 2021-02-04 LAB
FERRITIN SERPL-MCNC: 27 NG/ML (ref 26–388)
HAV AB SER QL IA: POSITIVE
HBV CORE AB SERPL QL IA: NEGATIVE
HBV SURFACE AB SER QL: NONREACTIVE
HBV SURFACE AB SER-ACNC: <3.1 MIU/ML
HBV SURFACE AG SER QL: <0.1 INDEX
HBV SURFACE AG SER QL: NEGATIVE
HCV AB S/CO SERPL IA: <0.1 S/CO RATIO (ref 0–0.9)
HCV AB SERPL QL IA: NORMAL
IRON SATN MFR SERPL: 26 % (ref 20–50)
IRON SERPL-MCNC: 100 UG/DL (ref 35–150)
TIBC SERPL-MCNC: 386 UG/DL (ref 250–450)

## 2021-02-16 ENCOUNTER — OFFICE VISIT (OUTPATIENT)
Dept: URGENT CARE | Age: 76
End: 2021-02-16
Payer: MEDICARE

## 2021-02-16 ENCOUNTER — OFFICE VISIT (OUTPATIENT)
Dept: INTERNAL MEDICINE CLINIC | Age: 76
End: 2021-02-16
Payer: MEDICARE

## 2021-02-16 VITALS
RESPIRATION RATE: 18 BRPM | OXYGEN SATURATION: 98 % | WEIGHT: 201 LBS | HEART RATE: 74 BPM | BODY MASS INDEX: 33.49 KG/M2 | DIASTOLIC BLOOD PRESSURE: 73 MMHG | HEIGHT: 65 IN | SYSTOLIC BLOOD PRESSURE: 128 MMHG | TEMPERATURE: 98.3 F

## 2021-02-16 VITALS — HEART RATE: 75 BPM | TEMPERATURE: 98.4 F | OXYGEN SATURATION: 97 % | RESPIRATION RATE: 18 BRPM

## 2021-02-16 DIAGNOSIS — R05.8 PRODUCTIVE COUGH: ICD-10-CM

## 2021-02-16 DIAGNOSIS — Z86.73 HISTORY OF STROKE: ICD-10-CM

## 2021-02-16 DIAGNOSIS — D69.6 THROMBOCYTOPENIA (HCC): ICD-10-CM

## 2021-02-16 DIAGNOSIS — Z87.898 HISTORY OF HEMOPTYSIS: ICD-10-CM

## 2021-02-16 DIAGNOSIS — Z20.822 ENCOUNTER FOR LABORATORY TESTING FOR COVID-19 VIRUS: Primary | ICD-10-CM

## 2021-02-16 DIAGNOSIS — R05.9 COUGH: Primary | ICD-10-CM

## 2021-02-16 DIAGNOSIS — M79.672 LEFT FOOT PAIN: ICD-10-CM

## 2021-02-16 PROCEDURE — G8427 DOCREV CUR MEDS BY ELIG CLIN: HCPCS | Performed by: NURSE PRACTITIONER

## 2021-02-16 PROCEDURE — G8427 DOCREV CUR MEDS BY ELIG CLIN: HCPCS | Performed by: INTERNAL MEDICINE

## 2021-02-16 PROCEDURE — 1101F PT FALLS ASSESS-DOCD LE1/YR: CPT | Performed by: INTERNAL MEDICINE

## 2021-02-16 PROCEDURE — G8536 NO DOC ELDER MAL SCRN: HCPCS | Performed by: NURSE PRACTITIONER

## 2021-02-16 PROCEDURE — 3017F COLORECTAL CA SCREEN DOC REV: CPT | Performed by: INTERNAL MEDICINE

## 2021-02-16 PROCEDURE — 3017F COLORECTAL CA SCREEN DOC REV: CPT | Performed by: NURSE PRACTITIONER

## 2021-02-16 PROCEDURE — G8510 SCR DEP NEG, NO PLAN REQD: HCPCS | Performed by: NURSE PRACTITIONER

## 2021-02-16 PROCEDURE — 99202 OFFICE O/P NEW SF 15 MIN: CPT | Performed by: NURSE PRACTITIONER

## 2021-02-16 PROCEDURE — G8536 NO DOC ELDER MAL SCRN: HCPCS | Performed by: INTERNAL MEDICINE

## 2021-02-16 PROCEDURE — G8417 CALC BMI ABV UP PARAM F/U: HCPCS | Performed by: INTERNAL MEDICINE

## 2021-02-16 PROCEDURE — 1101F PT FALLS ASSESS-DOCD LE1/YR: CPT | Performed by: NURSE PRACTITIONER

## 2021-02-16 PROCEDURE — 99214 OFFICE O/P EST MOD 30 MIN: CPT | Performed by: INTERNAL MEDICINE

## 2021-02-16 PROCEDURE — G8417 CALC BMI ABV UP PARAM F/U: HCPCS | Performed by: NURSE PRACTITIONER

## 2021-02-16 PROCEDURE — G8510 SCR DEP NEG, NO PLAN REQD: HCPCS | Performed by: INTERNAL MEDICINE

## 2021-02-16 NOTE — PROGRESS NOTES
Subjective: (As above and below)       This patient was seen in Flu Clinic at [address] [place] [address] Urgent Care while outdoors at their vehicle due to COVID-19 pandemic with PPE and focused examination in order to decrease community viral transmission. The patient/guardian gave verbal consent to treat. Chief Complaint   Patient presents with   Jessica Cornejo is a 70 y.o. male who presents here for COVID 19 testing only. States had in person OV with PCP today (see encounter)   Was referred here to urgent care for testing  Patient denies any active chest pain, SOB, fevers, new, worsening or changes since OV today. Has coughed up blood tinged mucus x 1. Onset < 2 days ago. Has follow up plan with PCP. Review of Systems - negative except as listed above    Reviewed PmHx, RxHx, FmHx, SocHx, AllgHx and updated in chart. Family History   Problem Relation Age of Onset    Heart Disease Mother     Heart Disease Father     Cancer Sister         breast ca       Past Medical History:   Diagnosis Date    CAD (coronary artery disease)     Chronic kidney disease     stones    [facility]     Diabetes ([facility]     Hypertension     Stroke ([facility] 1980s    right hand neuropathy      Social History     Socioeconomic History    Marital status: SINGLE     Spouse name: Not on file    Number of children: Not on file    Years of education: Not on file    Highest education level: Not on file   Tobacco Use    Smoking status: Former Smoker     Packs/day: 2.00     Years: 45.00     Pack years: 90.00     Quit date: 6/19/2005     Years since quitting: 15.6    Smokeless tobacco: Never Used   Substance and Sexual Activity    Alcohol use: No     Comment: 1 gallon whiskey a week stopped 4 years ago          Current Outpatient Medications   Medication Sig    pantoprazole (Protonix) 20 mg tablet Take 2 Tabs by mouth daily.  FreeStyle Ashlee 14 Day Sensor kit Use as directed.  enalapril (VASOTEC) 10 mg tablet Take 10 mg by mouth daily.  montelukast (SINGULAIR) 10 mg tablet Take  by mouth.  Oxygen Indications: 2L prn    aspirin delayed-release 81 mg tablet Take 1 Tab by mouth daily.  OTHER [facility]  - address higher level balance and maximize safety and independence with functional mobility. Hx Stroke    simvastatin (ZOCOR) 20 mg tablet Take 20 mg by mouth nightly.  metFORMIN (GLUCOPHAGE) 500 mg tablet Take 1,000 mg by mouth two (2) times daily (with meals).  furosemide (LASIX) 20 mg tablet Take 1 Tab by mouth every other day. No current facility-administered medications for this visit. Objective:     Vitals:    02/16/21 1500   Pulse: 75   Resp: 18   Temp: 98.4 °F (36.9 °C)   SpO2: 97%       Physical Exam  General appearance - appears well hydrated and does not appear toxic, no acute distress  Eyes - EOMs intact. Non injected. No scleral icterus   Ears - no external swelling  Nose - No purulent drainage  Mouth - OP clear without swelling, exudate or lesion. Mucus membranes moist. Uvula midline. Neck/Lymphatics - trachea midline, full AROM  Chest - Normal breathing effort no wheeze rales, rhonchi or diminishments bilaterally. Heart - RRR, no murmurs  Skin - no observable rashes or pallor  Neurologic- alert and oriented x 3  Psychiatric- normal mood, behavior and though content. Assessment/ Plan:     1. Encounter for laboratory testing for COVID-19 virus    - NOVEL CORONAVIRUS (COVID-19)    2. Productive cough    - NOVEL CORONAVIRUS (COVID-19)    Will test for covid 19 today. VS stable today. Continue plan with PCP and follow up with them virtually by the end of this week . Contact PCP immediately for any new, worsening or changes  SOB/chest pain/dizziness/ trouble breathing go immediately to ED. Follow up:   We have reviewed worsening/concerning signs and symptoms that may warrant seeking immediate care in the ED setting. For other non-severe changes, non-improvement or questions, patient aware to contact PCP office or consider a virtual online medical consultation.         Olimpia Tavarez NP

## 2021-02-16 NOTE — PROGRESS NOTES
RM # 16    Chief Complaint   Patient presents with    Foot Pain     left started a month ago. can not walk  Care more put a [place] on his insole.  Cough     started yesterday. mucus and blood mixed. no shortness of breath. a liltte congested    Other     no longer taking insulin       1. Have you been to the ER, urgent care clinic since your last visit? Hospitalized since your last visit? No    2. Have you seen or consulted any other health care providers outside of the [address] [place] [address] since your last visit? Include any pap smears or colon screening. No    Health Maintenance Due   Topic Date Due    Foot Exam Q1  09/01/1957    MICROALBUMIN Q1  09/01/1957    Eye Exam Retinal or Dilated  09/01/1957    COVID-19 Vaccine (1 of 2) 09/01/1963    DTaP/Tdap/Td series (1 - Tdap) 09/01/1968    GLAUCOMA SCREENING Q2Y  09/01/2012    AAA Screening 73-69 YO Male Smoking Patients  09/01/2012    Pneumococcal 65+ years (1 of 1 - PPSV23) 09/01/2012    Medicare Yearly Exam  03/14/2018       3 most recent PHQ Screens 2/16/2021   Little interest or pleasure in doing things Not at all   Feeling down, depressed, irritable, or hopeless Not at all   Total Score PHQ 2 0     Fall Risk Assessment, last 12 mths 2/3/2021   Able to walk? Yes   Fall in past 12 months? 0   Do you feel unsteady? 0   Are you worried about falling 0     Abuse Screening Questionnaire 2/3/2021   Do you ever feel afraid of your partner? N   Are you in a relationship with someone who physically or mentally threatens you? N   Is it safe for you to go home?  Y     ADL Assessment 11/5/2020   Feeding yourself No Help Needed   Getting from bed to chair No Help Needed   Getting dressed No Help Needed   Bathing or showering No Help Needed   Walk across the room (includes cane/walker) No Help Needed   Using the telphone No Help Needed   Taking your medications No Help Needed   Preparing meals No Help Needed   Managing money (expenses/bills) No Help Needed   Moderately strenuous housework (laundry) No Help Needed   Shopping for personal items (toiletries/medicines) No Help Needed   Shopping for groceries No Help Needed   Driving No Help Needed   Climbing a flight of stairs No Help Needed   Getting to places beyond walking distances No Help Needed       Learning Assessment 8/14/2019   PRIMARY LEARNER Patient   PRIMARY LANGUAGE ENGLISH   LEARNER PREFERENCE PRIMARY DEMONSTRATION   ANSWERED BY self   RELATIONSHIP SELF           AVS  education, follow up, and recommendations provided and addressed with patient.  services used to advise patient no .

## 2021-02-16 NOTE — PROGRESS NOTES
Chetan Atwood (: 1945) is a 70 y.o. male, established patient, here for evaluation of the following chief complaint(s):  Chief Complaint   Patient presents with    Foot Pain     left started a month ago. can not walk  Care more put a [place] on his insole.  Cough     started yesterday. mucus and blood mixed. no shortness of breath. a liltte congested    Other     no longer taking insulin       Assessment and Plan:       ICD-10-CM ICD-9-CM    1. Cough  R05 786.2    2. History of hemoptysis  Z87.09 V12.69    3. Thrombocytopenia (HCC)  D69.6 287.5    4. History of stroke  Z86.73 V12.54    5. Left foot pain  M79.672 729.5        1,2:  COVID testing reviewed--he plans to do as requested after visit. 2,3:  Mild hemoptysis may be related to low plt. Hematology and hepatology following. 4.  Reports prior swallowing testing normal.  Can review repeat if needed based on follow-up for cough as above. 5.  Monitor with podiatry. Consider imaging as needed. Follow-up and Dispositions    · Return if symptoms worsen or fail to improve.       lab results and schedule of future lab studies reviewed with patient  reviewed medications and side effects in detail    For additional documentation of information and/or recommendations discussed this visit, please see notes in instructions. Plan and evaluation (above) reviewed with pt at visit  Patient voiced understanding of plan and provided with time to ask/review questions. After Visit Summary (AVS) provided to pt after visit with additional instructions as needed/reviewed.         Future Appointments   Date Time Provider Ping Almaraz   3/3/2021  3:00 PM MD GUERA Law BS AMB   2021  9:30 AM Lindsey Cai  [address] BS AMB   2021 10:20 AM Víctor Calvo NP [facility] BS AMB     --Updated future visits after patient check-out:  Future Appointments   Date Time Provider Ping Almaraz   2021 11:45 AM Brooke Wagner Enma Modi MD [facility] BS AMB   3/3/2021  3:00 PM MD GUERA Cedeño BS AMB   4/29/2021  9:30 AM Mary Hubbard  [address] BS AMB   6/9/2021 10:20 AM Cathie Adan NP [address] [facility] BS AMB         History of Present Illness:     Notes (nursing/rooming note copied below in italics):  As above. He ntotes his GF was tested and negative. She was tested for procedure nto symptoms. He has had problems with mucus production since stroke. He notes yesterday started with cough. He coughed up a ~1cm clot of blood mixed with mucus. He has heavier cough today. Reviewed testing for COVID, since not clear cause. He has insert for his foot pain. Notes dorsal distal mid-foot pain. Reviewed follow-up with [facility] podiatrist.      He notes [facility] has stopped his insulin and monitoring A1c's there. Requested info to update records here after visit. Nursing screenings reviewed by provider at visit. Prior to Admission medications    Medication Sig Start Date End Date Taking? Authorizing Provider   pantoprazole (Protonix) 20 mg tablet Take 2 Tabs by mouth daily. 12/18/20  Yes Candie Goncalves MD   FreeStyle Ashlee 14 Day Sensor kit Use as directed. 12/18/20  Yes Candie Goncalves MD   enalapril (VASOTEC) 10 mg tablet Take 10 mg by mouth daily. 7/3/20  Yes Provider, Historical   montelukast (SINGULAIR) 10 mg tablet Take  by mouth. Yes Provider, Historical   Oxygen Indications: 2L prn   Yes Provider, Historical   aspirin delayed-release 81 mg tablet Take 1 Tab by mouth daily. 5/29/19  Yes Alba Jean NP   OTHER [facility]  - address higher level balance and maximize safety and independence with functional mobility. Hx Stroke 5/29/19  Yes Alba Jean NP   simvastatin (ZOCOR) 20 mg tablet Take 20 mg by mouth nightly. Yes Provider, Historical   metFORMIN (GLUCOPHAGE) 500 mg tablet Take 1,000 mg by mouth two (2) times daily (with meals).    Yes Provider, Historical   furosemide (LASIX) 20 mg tablet Take 1 Tab by mouth every other day. 11/5/20   Romain Allison MD        ROS    Vitals:    02/16/21 1345   BP: 128/73   Pulse: 74   Resp: 18   Temp: 98.3 °F (36.8 °C)   TempSrc: Oral   SpO2: 98%   Weight: 201 lb (91.2 kg)   Height: 5' 5\" (1.651 m)   PainSc:   5   PainLoc: Foot      Body mass index is 33.45 kg/m². Physical Exam:     Physical Exam  Vitals signs and nursing note reviewed. Constitutional:       General: He is not in acute distress. Appearance: Normal appearance. He is well-developed. He is not diaphoretic. HENT:      Head: Normocephalic and atraumatic. Nose: Nose normal.      Comments: Mild NP edema--no bleeding. Mouth/Throat:      Mouth: Mucous membranes are moist.      Pharynx: Oropharynx is clear. Comments: OP clear--no bleeding. Eyes:      General: No scleral icterus. Right eye: No discharge. Left eye: No discharge. Conjunctiva/sclera: Conjunctivae normal.   Neck:      Musculoskeletal: Neck supple. No neck rigidity or muscular tenderness. Cardiovascular:      Rate and Rhythm: Normal rate and regular rhythm. Pulses: Normal pulses. Heart sounds: Normal heart sounds. No murmur. No friction rub. No gallop. Pulmonary:      Effort: Pulmonary effort is normal. No respiratory distress. Breath sounds: Normal breath sounds. No stridor. No wheezing, rhonchi or rales. Comments: Transmitted upper airway noise with inspiration--no expiratory wheezing. No cough during visit. Abdominal:      General: Bowel sounds are normal. There is no distension. Palpations: Abdomen is soft. Tenderness: There is no abdominal tenderness. There is no guarding or rebound. Musculoskeletal:         General: No swelling, tenderness or deformity. Feet:    Lymphadenopathy:      Cervical: No cervical adenopathy. Skin:     General: Skin is warm.       Coloration: Skin is not jaundiced or pale.      Findings: No bruising, erythema or rash. Neurological:      General: No focal deficit present. Mental Status: He is alert. Motor: No abnormal muscle tone. Coordination: Coordination normal.      Gait: Gait normal.   Psychiatric:         Mood and Affect: Mood normal.         Behavior: Behavior normal.         Thought Content: Thought content normal.         Judgment: Judgment normal.        An electronic signature was used to authenticate this note.   -- Uriel Cook MD

## 2021-02-16 NOTE — PATIENT INSTRUCTIONS
[address]  has a non-emergency room Urgent Care Facility: 
 
[address] [place] [address] [address] [phone] Hours: typically 9AM to 9PM. Recommend you go there for testing today. We can complete evaluation of remainder of problems as reviewed with a virtual visit later this week. 2.  Please follow-up with your [facility] Podiatrist regarding the foot pain. 3.  We will contact you to schedule a virtual visit to review the other concerns and changes after your COVID testing.

## 2021-02-17 LAB — SARS-COV-2, NAA: NOT DETECTED

## 2021-02-22 ENCOUNTER — VIRTUAL VISIT (OUTPATIENT)
Dept: INTERNAL MEDICINE CLINIC | Age: 76
End: 2021-02-22
Payer: MEDICARE

## 2021-02-22 DIAGNOSIS — Z87.898 HISTORY OF CHRONIC COUGH: ICD-10-CM

## 2021-02-22 DIAGNOSIS — J44.9 CHRONIC OBSTRUCTIVE PULMONARY DISEASE, UNSPECIFIED COPD TYPE (HCC): Primary | ICD-10-CM

## 2021-02-22 DIAGNOSIS — Z86.73 HISTORY OF STROKE: ICD-10-CM

## 2021-02-22 DIAGNOSIS — M79.672 LEFT FOOT PAIN: ICD-10-CM

## 2021-02-22 PROCEDURE — 3017F COLORECTAL CA SCREEN DOC REV: CPT | Performed by: INTERNAL MEDICINE

## 2021-02-22 PROCEDURE — G8428 CUR MEDS NOT DOCUMENT: HCPCS | Performed by: INTERNAL MEDICINE

## 2021-02-22 PROCEDURE — 1101F PT FALLS ASSESS-DOCD LE1/YR: CPT | Performed by: INTERNAL MEDICINE

## 2021-02-22 PROCEDURE — G8510 SCR DEP NEG, NO PLAN REQD: HCPCS | Performed by: INTERNAL MEDICINE

## 2021-02-22 PROCEDURE — 99214 OFFICE O/P EST MOD 30 MIN: CPT | Performed by: INTERNAL MEDICINE

## 2021-02-22 RX ORDER — MONTELUKAST SODIUM 10 MG/1
10 TABLET ORAL DAILY
Qty: 30 TAB | Refills: 3 | Status: SHIPPED | OUTPATIENT
Start: 2021-02-22 | End: 2021-05-27

## 2021-02-22 NOTE — PROGRESS NOTES
Tato Medeiros (: 1945) is a 70 y.o. male, established patient, here for evaluation of the following chief complaint(s)--see below:    Tato Medeiros is a 70 y.o. male who was seen by synchronous (real-time) audio-video technology on 2021. Consent: Tato Medeiros, who was seen by synchronous (real-time) audio-video technology, and/or his healthcare decision maker, is aware that this patient-initiated, Telehealth encounter on 2021 is a billable service, with coverage as determined by his insurance carrier. He is aware that he may receive a bill and has provided verbal consent to proceed: Yes. I was in the office while conducting this encounter. Assessment & Plan:   Diagnoses and all orders for this visit:      ICD-10-CM ICD-9-CM    1. Chronic obstructive pulmonary disease, unspecified COPD type (HCC)  J44.9 496 montelukast (SINGULAIR) 10 mg tablet   2. History of stroke  Z86.73 V12.54    3. Left foot pain  M79.672 729.5    4. History of chronic cough  Z87.09 V12.69 montelukast (SINGULAIR) 10 mg tablet       1,2. He will follow-up with Dr. Uma Mantilla with pulmonary associates. He has some mild chronic, slightly productive cough. Prior used nebulized medication with budesonide and formoterol. 3.  To follow-up with [place] podiatrist.    4.  Acute/recent worsening has resolved with negative COVID testing at [facility] as reviewed. He will follow-up for chronic cough with pulmonary, and remain on Singulair until reviewed with pulmonary. Follow-up and Dispositions    · Return in about 3 months (around 2021), or if symptoms worsen or fail to improve, for referral follow-up.       lab results and schedule of future lab studies reviewed with patient  reviewed medications and side effects in detail    For additional documentation of information and/or recommendations discussed this visit, please see notes in instructions.       Plan and evaluation (above) reviewed with pt at visit  Patient voiced understanding of plan and provided with time to ask/review questions. After Visit Summary (AVS) provided to pt after visit with additional instructions as needed/reviewed. AVS:  [x]  [facility] signed. []  Mailed to patient after visit. []  Not sent to patient after visit. Future Appointments   Date Time Provider Ping Almaraz   3/3/2021  3:00 PM MD GUERA Gregorio BS AMB   4/29/2021  9:30 AM Santiago Ponce  N [address]  BS AMB   6/9/2021 10:20 AM Sandra Calvo, NP [address] [facility] BS AMB         Subjective:   Yohannes Heard was seen for:  Chief Complaint   Patient presents with    Foot Pain     bilateral but mostly left side, top part of foot    Medication Evaluation     pt would like to discuss singulair Rx         Notes:    Had budesonide 0.5mg/formoterol 12 mcg nebs from [facility] taking 1-2 times daily but has nto used for some time. Unable to find specific dosing in formulary, or on his medication list.  ? If compounded medication. Plan for him to review with pulmonary--he will schedule with Dr. Lino Jerry for follow-up. He continues to use oxygen PRN, but not regularly. Had COVID testing and negative. That acute cough has improved. Still has come problems, but back to baseline mucus production. Questions about Singulair use reviewed. He notes no history of asthma, just COPD. Reviewed continuing until seen by Lino Jerry. Nursing screenings reviewed by provider at visit. No Known Allergies    Prior to Admission medications    Medication Sig Start Date End Date Taking? Authorizing Provider   pantoprazole (Protonix) 20 mg tablet Take 2 Tabs by mouth daily. 12/18/20  Yes Derick Barrios MD   FreeStyle Ashlee 14 Day Sensor kit Use as directed. 12/18/20  Yes Derick Barrios MD   enalapril (VASOTEC) 10 mg tablet Take 10 mg by mouth daily.  7/3/20  Yes Provider, Historical   montelukast (SINGULAIR) 10 mg tablet Take  by mouth.   Yes Provider, Historical   Oxygen Indications: 2L prn   Yes Provider, Historical   aspirin delayed-release 81 mg tablet Take 1 Tab by mouth daily. 5/29/19  Yes Oswald Deras NP   simvastatin (ZOCOR) 20 mg tablet Take 20 mg by mouth nightly. Yes Provider, Historical   metFORMIN (GLUCOPHAGE) 500 mg tablet Take 1,000 mg by mouth two (2) times daily (with meals). Yes Provider, Historical   OTHER [facility]  - address higher level balance and maximize safety and independence with functional mobility. Hx Stroke 5/29/19   Oswald Deras NP         ROS    PHYSICAL EXAMINATION:    Vital Signs: There were no vitals taken for this visit. Patient-Reported Vitals 2/22/2021   Patient-Reported Weight 194lb   Patient-Reported Systolic  736   Patient-Reported Diastolic 63        Constitutional: [x] Appears well-developed and well-nourished [x] No apparent distress      Mental status: [x] Alert and awake  [x] Oriented [x] Able to follow commands       Eyes:   EOM    [x]  Normal      Sclera  [x]  Normal              Discharge [x]  None visible       HENT: [x] Normocephalic, atraumatic    [x] Mouth/Throat: Mucous membranes are moist    External Ears [x] Normal      Neck: [x] No visualized mass     Pulmonary/Chest: [x] Respiratory effort normal   [x] No visualized signs of difficulty breathing or respiratory distress    Musculoskeletal:  [x] Normal range of motion of neck    Neurological:        [x] No Facial Asymmetry (Cranial nerve 7 motor function) (limited exam due to video visit)          [x] No gaze palsy     Skin:        [x] No significant exanthematous lesions or discoloration noted on facial skin             Psychiatric:       [x] Normal Affect       Other pertinent observable physical exam findings:  None. We discussed the expected course, resolution and complications of the diagnosis(es) in detail.   Medication risks, benefits, costs, interactions, and alternatives were discussed as indicated.  I advised him to contact the office if his condition worsens, changes or fails to improve as anticipated. He expressed understanding with the diagnosis(es) and plan.     Geovanny Antonio is a 75 y.o. male who was evaluated by a video visit encounter for concerns as above.      Geovanny Antonio is being evaluated by a Virtual Visit (video visit) encounter to address concerns as mentioned above.  A caregiver was present when appropriate.  Due to this being a TeleHealth encounter (During COVID-19 public health emergency), evaluation of the following organ systems was limited: Vitals/Constitutional/EENT/Resp/CV/GI//MS/Neuro/Skin/Heme-Lymph-Imm.  Pursuant to the emergency declaration under the Hall Act and the National Emergencies Act, 1135 waiver authority and the Coronavirus Preparedness and Response Supplemental Appropriations Act, this Virtual Visit was conducted with patient's (and/or legal guardian's) consent, to reduce the patient's risk of exposure to COVID-19 and provide necessary medical care.  The patient (and/or legal guardian) has also been advised to contact this office for worsening conditions or problems, and seek emergency medical treatment and/or call 911 if deemed necessary.    Patient identification was verified at the start of the visit: YES.    Services were provided through a video synchronous discussion virtually to substitute for in-person clinic visit. Patient was located at their individual home (or other location as per patient preference).  Provider was located in medical office.    An electronic signature was used to authenticate this note.  -- Shar Goldsmith MD

## 2021-02-22 NOTE — PROGRESS NOTES
Virtual visit [phone]    Chief Complaint   Patient presents with    Foot Pain     bilateral but mostly left side, top part of foot    Medication Evaluation     pt would like to discuss singulair Rx   Covid test done one day last week, results were negative  0/10 pain today  No covid vaccine yet   non fasting with sensor    1. Have you been to the ER, urgent care clinic since your last visit? Hospitalized since your last visit? No    2. Have you seen or consulted any other health care providers outside of the [address] [place] [address] since your last visit? Include any pap smears or colon screening. No        Health Maintenance Due   Topic Date Due    Foot Exam Q1  09/01/1957    MICROALBUMIN Q1  09/01/1957    Eye Exam Retinal or Dilated  09/01/1957    COVID-19 Vaccine (1 of 2) 09/01/1963    DTaP/Tdap/Td series (1 - Tdap) 09/01/1968    GLAUCOMA SCREENING Q2Y  09/01/2012    AAA Screening 73-67 YO Male Smoking Patients  09/01/2012    Pneumococcal 65+ years (1 of 1 - PPSV23) 09/01/2012    Medicare Yearly Exam  03/14/2018           3 most recent PHQ Screens 2/22/2021   Little interest or pleasure in doing things Not at all   Feeling down, depressed, irritable, or hopeless Not at all   Total Score PHQ 2 0     Fall Risk Assessment, last 12 mths 2/22/2021   Able to walk? Yes   Fall in past 12 months? 0   Do you feel unsteady? 0   Are you worried about falling 0         Learning Assessment 8/14/2019   PRIMARY LEARNER Patient   PRIMARY LANGUAGE ENGLISH   LEARNER PREFERENCE PRIMARY DEMONSTRATION   ANSWERED BY self   RELATIONSHIP SELF         An After Visit Summary was printed and given to the patient.

## 2021-02-24 NOTE — PATIENT INSTRUCTIONS
1.  Please follow-up with Podiatry for your foot pain. 2.  If you need to see an orthopedist, Dr. Helder Jaramillo with [facility] is an orthopedic foot specialist. 
 
--[facility]:  [phone] [facility]:  [phone] also has orthopedic foot specialists if preferred.

## 2021-03-03 ENCOUNTER — OFFICE VISIT (OUTPATIENT)
Dept: HEMATOLOGY | Age: 76
End: 2021-03-03
Payer: MEDICARE

## 2021-03-03 VITALS
BODY MASS INDEX: 33.09 KG/M2 | OXYGEN SATURATION: 98 % | DIASTOLIC BLOOD PRESSURE: 69 MMHG | SYSTOLIC BLOOD PRESSURE: 133 MMHG | WEIGHT: 198.6 LBS | TEMPERATURE: 98.4 F | HEART RATE: 68 BPM | HEIGHT: 65 IN | RESPIRATION RATE: 20 BRPM

## 2021-03-03 DIAGNOSIS — K74.60 CIRRHOSIS OF LIVER WITHOUT ASCITES, UNSPECIFIED HEPATIC CIRRHOSIS TYPE (HCC): Primary | ICD-10-CM

## 2021-03-03 PROCEDURE — 91200 LIVER ELASTOGRAPHY: CPT | Performed by: HOSPITALIST

## 2021-03-03 PROCEDURE — 99214 OFFICE O/P EST MOD 30 MIN: CPT | Performed by: HOSPITALIST

## 2021-03-03 NOTE — PROGRESS NOTES
[address] [facility]      Ginny Childs MD, Estee Briceno MD, MPH      TERI Hernandez, [facility]     April S Adele [facility]   CHINYERE Mcmahan, [facility]       [facility] De Jones 136    at [address] [place], [address] Scarlett Kapoor  22.    [phone]    FAX: [address] [place] [address] [address], [address]    [phone]    FAX: [phone]     Patient Care Team:  Ashok Elizalde MD as PCP - General (Infectious Disease)  Ashok Elizalde MD as PCP - [address] [place] [address] Dr Calderon Provider    Problem List  Date Reviewed: 2/16/2021          Codes Class Noted    History of CVA (cerebrovascular accident) ICD-10-CM: Z86.73  ICD-9-CM: V12.54  2/3/2021        Thrombocytopenia ([facility] ICD-10-CM: D69.6  ICD-9-CM: 287.5  12/3/2020        Class 1 obesity due to excess calories with serious comorbidity and body mass index (BMI) of 32.0 to 32.9 in adult ICD-10-CM: E66.09, Z68.32  ICD-9-CM: 278.00, V85.32  12/3/2020        Diabetes mellitus ([facility] ICD-10-CM: E11.9  ICD-9-CM: 250.00  12/3/2020             Cathy Mitchell returns to liver [facility] for follow up and management of thrombocytopenia  All medical records sent by the referring physicians were reviewed including imaging studies     The patient is a 70year old male  who was found to have low platelets on 59/7019    Imaging of the liver performed with ultrasound in 12/2020 demonstrated heterogeneous hepatic echotexture with an area of focal sparing in the right lobe. Mildly enlarged spleen measuring 13.1 cm in length. FibroScan performed at [facility]. EkPa was 50.8. IQR/med 20%. .  The results suggested a fibrosis level of F 4. The CAP score suggests there is hepatic steatosis. Liver enzymes are normal. AST 24, ALT 34, ALP 67, Albumin 4.2, PLT count is depressed    Serologic evaluation for markers of chronic liver disease was negative for hepatitis B surface Ag, HCV Ab, iron saturation and ferritin      The patient has not developed any of the major complications of cirrhosis to date. The patient reports fatigue    The patient is not currently experiencing the following symptoms of liver disease: pain in the right side over the liver, yellowing of the eyes or skin,   problems concentrating, swelling of the abdomen, swelling of the lower extremities, hematemesis, Hematochezia. Patient reports history of heavy alcohol use. He used to drink  '2\" 1/2 gallon of blended alcohol daily. He has not had any alcoholic drink since 0485    The patient completes all daily activities without any functional limitations. 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 ALP is normal.  Liver function is normal. The platelet count is depressed. The need to perform a liver biopsy to help determine the cause and severity of the liver test abnormalities was discussed. The risks of performing the liver biopsy including pain, puncture of the lung, gallbladder, intestine or kidney and bleeding were discussed. The patient has decided to have a liver biopsy. This will be scheduled. Screening for Esophageal varices   The patient has not had an EGD to screen for varices. EGD of the liver will be scheduled      Hepatic encephalopathy   Overt HE has not developed to date. There is no need for treatment with lactulose and/or Xifaxan at this time. Thrombocytopenia   This is secondary to cirrhosis. There is no evidence of overt bleeding. No treatment is required.   The platelet count is adequate for the patient to undergo procedures without the need for platelet transfusion or platelet growth factors. Screening for Hepatocellular Carcinoma  [facility] screening was performed in 12/2020. We will repeat imaging of the liver in 06/2021    Treatment of other medical problems in patients with chronic liver disease  There are no contraindications for the patient to take most medications that are necessary for treatment of other medical issues. The patient has cirrhrosis and should avoid taking  Benzodiazapines which could exacerbate HE. NSAIDs which are associated with a higher rate of developing ROBBIE. The patient can take Any medications utilized for treatment of DM  Statins to treat hypercholesterolemia  The patient does not comsume alcohol on a daily basis. Counseling for alcohol in patients with chronic liver disease  The patient has cirrhosis and was advised to be abstinent from all alcohol including non-alcoholic beer which does contain some alcohol. Osteoporosis  The risk of osteoporosis is increased in patients with cirrhosis. DEXA bone density to assess for osteoporosis has not been performed. This should be ordered by the patients primary care physician. Vaccinations   The need for vaccination against viral hepatitis A and B will be assessed with serologic and instituted as appropriate. Routine vaccinations against other bacterial and viral agents can be performed as indicated. Annual flu vaccination should be administered if indicated. ALLERGIES  No Known Allergies    MEDICATIONS  Current Outpatient Medications   Medication Sig    montelukast (SINGULAIR) 10 mg tablet Take 1 Tab by mouth daily.  pantoprazole (Protonix) 20 mg tablet Take 2 Tabs by mouth daily.  FreeStyle Ashlee 14 Day Sensor kit Use as directed.  enalapril (VASOTEC) 10 mg tablet Take 10 mg by mouth daily.  Oxygen Indications: 2L prn    aspirin delayed-release 81 mg tablet Take 1 Tab by mouth daily.     OTHER [facility] Neuro Physical Therapy  - address higher level balance and maximize safety and independence with functional mobility. Hx Stroke    simvastatin (ZOCOR) 20 mg tablet Take 20 mg by mouth nightly.  metFORMIN (GLUCOPHAGE) 500 mg tablet Take 1,000 mg by mouth two (2) times daily (with meals). No current facility-administered medications for this visit. SYSTEM REVIEW NOT RELATED TO LIVER DISEASE OR REVIEWED ABOVE:  Constitution systems: Negative for fever, chills, weight gain, weight loss. Eyes: Negative for visual changes. ENT: Negative for sore throat, painful swallowing. Respiratory: Negative for cough, hemoptysis, SOB. Cardiology: Negative for chest pain, palpitations. GI:  Negative for constipation or diarrhea. : Negative for urinary frequency, dysuria, hematuria, nocturia. Skin: Negative for rash. Hematology: Negative for easy bruising, blood clots. Musculo-skelatal: Negative for back pain, muscle pain, weakness. Neurologic: Negative for headaches, dizziness, vertigo, memory problems not related to HE. Psychology: Negative for anxiety, depression. FAMILY HISTORY:  There is no family history of liver disease. There is no family history of immune disorders. SOCIAL HISTORY:  The patient is   The patient has no children. The patient stopped using tobacco products in 2009  The patient has previously consumed alcohol in excess. The patient is a retired barnes    PHYSICAL EXAMINATION:  Visit Vitals  /69 (BP 1 Location: Right lower arm, BP Patient Position: Sitting, BP Cuff Size: Adult)   Pulse 68   Temp 98.4 °F (36.9 °C) (Temporal)   Resp 20   Ht 5' 5\" (1.651 m)   Wt 198 lb 9.6 oz (90.1 kg)   SpO2 98%   BMI 33.05 kg/m²     General: No acute distress. Eyes: Sclera anicteric. ENT: No oral lesions. Thyroid normal.  Nodes: No adenopathy. Skin: No spider angiomata. No jaundice. No palmar erythema.   Respiratory: Lungs clear to auscultation. Cardiovascular: Regular heart rate. No murmurs. No JVD. Abdomen: Soft non-tender. Liver size normal to percussion/palpation. Spleen not palpable. No obvious ascites. Extremities: No edema. No muscle wasting. No gross arthritic changes. Neurologic: Alert and oriented. Cranial nerves grossly intact. No asterixis. LABORATORY STUDIES:  Recent liver function panel, CBC with platelet count and BMP are not available. These studies will be performed. 01/12/2021  ALT 20  AST 24  Bilirubin Total: 0.3  Scr 1.48    [address] Units 12/22/2020 10/9/2020   WBC 3.4 - 10.8 x10E3/uL 5.1 4.1   ANC 1.4 - 7.0 x10E3/uL 3.1 1.9   HGB 13.0 - 17.7 g/dL 12.6 (L) 13.1   PLT x10E3/uL CANCELED 98 (L)    - 450 x10E3/uL 117 (L)    AST 15 - 37 U/L     ALT 12 - 78 U/L     Alk Phos 45 - 117 U/L     Bili, Total 0.2 - 1.0 MG/DL     Albumin 3.5 - 5.0 g/dL     BUN 6 - 20 MG/DL     Creat 0.70 - 1.30 MG/DL     Na 136 - 145 mmol/L     K 3.5 - 5.1 mmol/L     Cl 97 - 108 mmol/L     CO2 21 - 32 mmol/L     Glucose 65 - 100 mg/dL     Magnesium 1.6 - 2.4 mg/dL       Liver [address] Ref Rng & Units 10/7/2020 10/1/2020   WBC 3.4 - 10.8 x10E3/uL     ANC 1.4 - 7.0 x10E3/uL     HGB 13.0 - 17.7 g/dL     PLT x10E3/uL      - 450 x10E3/uL     AST 15 - 37 U/L  24   ALT 12 - 78 U/L  34   Alk Phos 45 - 117 U/L  67   Bili, Total 0.2 - 1.0 MG/DL  0.6   Albumin 3.5 - 5.0 g/dL  4.2   BUN 6 - 20 MG/DL 19 20   Creat 0.70 - 1.30 MG/DL 1.55 (H) 1.47 (H)   Na 136 - 145 mmol/L 143 142   K 3.5 - 5.1 mmol/L 4.2 4.3   Cl 97 - 108 mmol/L 112 (H) 109 (H)   CO2 21 - 32 mmol/L 24 26   Glucose 65 - 100 mg/dL 79 69   Magnesium 1.6 - 2.4 mg/dL           SEROLOGIES:  Not available or performed. Testing will be performed as indicated.   Serologies Latest Ref Rng & Units 2/3/2021   Hep A Ab, Total Negative   Positive (A)   Hep B Surface Ag Index <0.10   Hep B Surface Ag Interp Negative   Negative Hep B Core Ab, Total Negative   Negative   Hep B Surface Ab mIU/mL <3.10   Hep B Surface Ab Interp NONREACTIVE   NONREACTIVE   Hep C Ab 0.0 - 0.9 s/co ratio <0.1   Ferritin 26 - 388 NG/ML 27   Iron % Saturation 20 - 50 % 26     LIVER HISTOLOGY:  3/12/2021: [facility]. EkPa was 50.8. IQR/med 20%. . The results suggested a fibrosis level of F 4. The CAP score suggests there is hepatic steatosis. ENDOSCOPIC PROCEDURES:  Not available or performed    RADIOLOGY:  12/2020: Liver US:  demonstrated heterogeneous hepatic echotexture with an area of focal sparing in the right lobe. Mildly enlarged spleen measuring 13.1 cm in length. OTHER TESTING:  Not available or performed    FOLLOW-UP:  All of the issues listed above in the Assessment and Plan were discussed with the patient. All questions were answered. The patient expressed a clear understanding of the above. Follow-up Case Elvin Madden 32 in 2 weeks following liver biopsy.     Haley Roy MD, MPH  Advanced Hepatology  [facility] of [address] [address] [facility] [address] [address] Vladimir Tejeda, [address] [facility] 22.  [phone]  [address] [facility]

## 2021-03-03 NOTE — PROGRESS NOTES
Identified pt with two pt identifiers(name and ). Reviewed record in preparation for visit and have obtained necessary documentation. No chief complaint on file. Vitals:    21 1451   BP: 133/69   Pulse: 68   Resp: 20   Temp: 98.4 °F (36.9 °C)   TempSrc: Temporal   SpO2: 98%   Weight: 198 lb 9.6 oz (90.1 kg)   Height: 5' 5\" (1.651 m)   PainSc:   0 - No pain       Health Maintenance Review: Patient reminded of \"due or due soon\" health maintenance. I have asked the patient to contact his/her primary care provider (PCP) for follow-up on his/her health maintenance. Coordination of Care Questionnaire:  :   1) Have you been to an emergency room, urgent care, or hospitalized since your last visit? If yes, where when, and reason for visit? no       2. Have seen or consulted any other health care provider since your last visit? If yes, where when, and reason for visit? NO      Patient is accompanied by self I have received verbal consent from Nery Taylor to discuss any/all medical information while they are present in the room.

## 2021-03-03 NOTE — LETTER
3/12/2021 Patient: Mt Stern YOB: 1945 Date of Visit: 3/3/2021 Enrico Naqvi MD 
[address] [place] [address] [address] [employer] Dear Enrico Naqvi MD, Thank you for [facility]. My notes for this consultation are attached. If you have questions, please do not hesitate to call me. I look forward to following your patient along with you. Sincerely, Sharron Arvizu MD

## 2021-03-12 PROBLEM — K74.60 CIRRHOSIS OF LIVER WITHOUT ASCITES (HCC): Status: ACTIVE | Noted: 2021-03-12

## 2021-04-12 ENCOUNTER — PATIENT MESSAGE (OUTPATIENT)
Dept: INTERNAL MEDICINE CLINIC | Age: 76
End: 2021-04-12

## 2021-04-12 DIAGNOSIS — M79.672 LEFT FOOT PAIN: Primary | ICD-10-CM

## 2021-04-22 LAB
ALBUMIN SERPL-MCNC: 4 G/DL (ref 3.7–4.7)
BUN SERPL-MCNC: 25 MG/DL (ref 8–27)
BUN/CREAT SERPL: 16 (ref 10–24)
CALCIUM SERPL-MCNC: 9.5 MG/DL (ref 8.6–10.2)
CHLORIDE SERPL-SCNC: 110 MMOL/L (ref 96–106)
CO2 SERPL-SCNC: 19 MMOL/L (ref 20–29)
CREAT SERPL-MCNC: 1.53 MG/DL (ref 0.76–1.27)
GLUCOSE SERPL-MCNC: 162 MG/DL (ref 65–99)
PHOSPHATE SERPL-MCNC: 3.6 MG/DL (ref 2.8–4.1)
POTASSIUM SERPL-SCNC: 4.1 MMOL/L (ref 3.5–5.2)
SODIUM SERPL-SCNC: 145 MMOL/L (ref 134–144)

## 2021-04-26 ENCOUNTER — TELEPHONE (OUTPATIENT)
Dept: INTERNAL MEDICINE CLINIC | Age: 76
End: 2021-04-26

## 2021-05-05 ENCOUNTER — DOCUMENTATION ONLY (OUTPATIENT)
Dept: SLEEP MEDICINE | Age: 76
End: 2021-05-05

## 2021-05-06 ENCOUNTER — VIRTUAL VISIT (OUTPATIENT)
Dept: SLEEP MEDICINE | Age: 76
End: 2021-05-06
Payer: MEDICARE

## 2021-05-06 ENCOUNTER — HOSPITAL ENCOUNTER (OUTPATIENT)
Dept: LAB | Age: 76
Discharge: HOME | End: 2021-05-06
Payer: MEDICARE

## 2021-05-06 DIAGNOSIS — D69.6 THROMBOCYTOPENIA (HCC): Primary | ICD-10-CM

## 2021-05-06 DIAGNOSIS — G47.31 COMPLEX SLEEP APNEA SYNDROME: Primary | ICD-10-CM

## 2021-05-06 DIAGNOSIS — E11.9 CONTROLLED TYPE 2 DIABETES MELLITUS WITHOUT COMPLICATION, WITHOUT LONG-TERM CURRENT USE OF INSULIN (HCC): ICD-10-CM

## 2021-05-06 DIAGNOSIS — I10 ESSENTIAL HYPERTENSION: ICD-10-CM

## 2021-05-06 PROCEDURE — 36415 COLL VENOUS BLD VENIPUNCTURE: CPT

## 2021-05-06 PROCEDURE — 82728 ASSAY OF FERRITIN: CPT

## 2021-05-06 PROCEDURE — 99442 PR PHYS/QHP TELEPHONE EVALUATION 11-20 MIN: CPT | Performed by: NURSE PRACTITIONER

## 2021-05-06 PROCEDURE — 85025 COMPLETE CBC W/AUTO DIFF WBC: CPT

## 2021-05-06 NOTE — PROGRESS NOTES
[address] [facility] [place]. [place], [address]   Tel.  [phone]   Fax. [address] [facility] 60   [place], [address] [facility]   Tel.  [phone]   Fax. [phone] [address] [place] [address] [place]    Tel.  [phone]   Fax. [address] [place] (: 1945) is a 70 y.o. male, established patient, seen for positive airway pressure follow-up, he was last seen by me on 6/3/2020, previously seen by Dr. Brenda Baum on 10/17/2019, prior notes reviewed in detail. In lab sleep test 2019 showed AHI of 34.0/hr with a lowest SaO2 of 64%, duration of SaO2 < 88% 13.8 min. ASSESSMENT/PLAN:    ICD-10-CM ICD-9-CM    1. Complex sleep apnea syndrome  G47.31 327.21    2. Essential hypertension  I10 401.9    3. Controlled type 2 diabetes mellitus without complication, without long-term current use of insulin (HCC)  E11.9 250.00    4. BMI 32.0-32.9,adult  Z68.32 V85.32        AHI = 34.9(2019). On BiLevel :  Max IPAP 25, EPAP 9-12, PS 6-15 cmH2O. Set up 2020. Initial compliance met. He is not compliant with PAP therapy although when used PAP shows benefit to the patient and remains necessary for control of his sleep apnea. There is continued clinical benefit from the hours of use demonstrated by AHI reduced from 34.9/hr to 1.5/hr if patient is willing to re-trial PAP therapy. Follow-up and Dispositions    · Return if symptoms worsen or fail to improve. 1. Sleep Apnea -    Continue on current pressure settings. He notes that he does not want to use the device any longer and is not interested in making any adjustments to it to try again. I explained risks of not treating sleep apnea and benefits of PAP therapy. We discussed sleeping with his head elevated to reduce obstructions as one strategy to help if he will no longer be using PAP.     * He was encouraged to contact our office if he would like to resume PAP therapy or discuss any other treatment options, which he declines at this time. 2. Hypertension -  continue on his current regimen, his BP is well controlled and he notes this is doing much better with weight loss. 3. Type II diabetes -  he continues on his current regimen. He is no longer using insulin and his A1C on 2/15/21 was 5.6.     4. Encouraged continued weight management program through appropriate diet and exercise regimen as significant weight reduction has been shown to reduce severity of obstructive sleep apnea. He has been diagnosed with Cirrhosis and is scheduled for EGD. SUBJECTIVE/OBJECTIVE:    He  is seen today for follow up on PAP device and reports he no longer wants to use the device. He was expecting a call regarding his Oxygen therapy. He feels it is too difficult to breathe when he tries the PAP and that no change will improve this. He is using oxygen during the day but does not have a bleed in for the PAP device at night. He is not interested in being able to use the oxygen with the PAP, he is adamant that he will no longer be using PAP device. He feels that his sleep has not changed on PAP therapy using full face mask and heated tubing. Review of device download indicates no use in prior 2 months. Prior 90 day data shows: BiLevel pressure: Max IPAP 25, EPAP 9-12, PS 6-15 cmH2O;   Avg. Device Pressure <= 90 %: EPAP 12.0, PS 8.0 cmH2O      Average % Night in Large Leak:  0.0  % Used Days >= 4 hours: 27.  Avg hours used:  4:23. Therapy Apnea Index averaged over PAP use: 1.5 /hr which reflects improved sleep breathing condition. Higden Sleepiness Score: (P) 6 and Modified F.O.S.Q. Score Total / 2: (P) 15     Sleep Review of Systems: notable for Negative difficulty falling asleep; Positive awakenings at night; Negative early morning headaches; Positive memory problems; Negative concentration issues;  Positive shortness of breath    Vitals reported by patient     Patient-Reported Vitals 5/6/2021 Patient-Reported Weight 195   Patient-Reported Height 55   Patient-Reported Pulse 79   Patient-Reported Temperature 97.4   Patient-Reported SpO2 96   Patient-Reported Systolic  379   Patient-Reported Diastolic 65      Calculated BMI 32    Physical Exam not completed due to audio only visit. Pursuant to the emergency declaration under the [address] [facility], [address] [place] [address] and the [employer] and [employer] Act, this telephone encounter was conducted with patient's (and/or legal guardian's) consent, to reduce the risk of exposure to COVID-19 and provide necessary medical care. Services were provided through a telephone call to substitute for in-person encounter. I was in the office while conducting this encounter, patient located at their home or alternate location of their choice. Patient identification was verified at the start of the visit: YES using name and date of birth. Patient's phone number [phone] (home)  was confirmed for accuracy. He gives permission for messages regarding results and appointments to be left at that number. On this date 05/06/21 I have spent 20 minutes reviewing previous notes, test results, and on an audio only visit with the patient discussing the diagnosis and importance of compliance with the treatment plan as well as documenting on the day of the visit. An electronic signature was used to authenticate this note.     -- José Saenz NP, [facility]  05/06/21

## 2021-05-06 NOTE — PATIENT INSTRUCTIONS
217 [facility] [place]. 1668 [facility], [address] Tel.  [phone] Fax. 100 [facility] [address] [place], 200 [facility] Tel.  [phone] Fax. [phone] 9250 [address] Tel.  [phone] Fax. [phone] Learning About CPAP for Sleep Apnea What is CPAP? CPAP is a small machine that you use at home every night while you sleep. It increases air pressure in your throat to keep your airway open. When you have sleep apnea, this can help you sleep better so you feel much better. CPAP stands for \"continuous positive airway pressure. \" The CPAP machine will have one of the following: · A mask that covers your nose and mouth · Prongs that fit into your nose · A mask that covers your nose only, the most common type. This type is called NCPAP. The N stands for \"nasal.\" Why is it done? CPAP is usually the best treatment for obstructive sleep apnea. It is the first treatment choice and the most widely used. Your doctor may suggest CPAP if you have: · Moderate to severe sleep apnea. · Sleep apnea and coronary artery disease (CAD) or heart failure. How does it help? · CPAP can help you have more normal sleep, so you feel less sleepy and more alert during the daytime. · CPAP may help keep heart failure or other heart problems from getting worse. · NCPAP may help lower your blood pressure. · If you use CPAP, your bed partner may also sleep better because you are not snoring or restless. What are the side effects? Some people who use CPAP have: · A dry or stuffy nose and a sore throat. · Irritated skin on the face. · Sore eyes. · Bloating. If you have any of these problems, work with your doctor to fix them. Here are some things you can try: · Be sure the mask or nasal prongs fit well. · See if your doctor can adjust the pressure of your CPAP. · If your nose is dry, try a humidifier.  
· If your nose is runny or stuffy, try decongestant medicine or a steroid nasal spray. If these things do not help, you might try a different type of machine. Some machines have air pressure that adjusts on its own. Others have air pressures that are different when you breathe in than when you breathe out. This may reduce discomfort caused by too much pressure in your nose. Where can you learn more? Go to Kanbox.be Enter C365 in the search box to learn more about \"Learning About CPAP for Sleep Apnea. \"  
© 6058-2366 Healthwise, Incorporated. Care instructions adapted under license by [employer] (which disclaims liability or warranty for this information). This care instruction is for use with your licensed healthcare professional. If you have questions about a medical condition or this instruction, always ask your healthcare professional. [address] any warranty or liability for your use of this information. Content Version: 5.9.64405; Last Revised: January 11, 2010 PROPER SLEEP HYGIENE What to avoid · Do not have drinks with caffeine, such as coffee or black tea, for 8 hours before bed. · Do not smoke or use other types of tobacco near bedtime. Nicotine is a stimulant and can keep you awake. · Avoid drinking alcohol late in the evening, because it can cause you to wake in the middle of the night. · Do not eat a big meal close to bedtime. If you are hungry, eat a light snack. · Do not drink a lot of water close to bedtime, because the need to urinate may wake you up during the night. · Do not read or watch TV in bed. Use the bed only for sleeping and sexual activity. What to try · Go to bed at the same time every night, and wake up at the same time every morning. Do not take naps during the day. · Keep your bedroom quiet, dark, and cool. · Get regular exercise, but not within 3 to 4 hours of your bedtime. Alfonzo Angry · Sleep on a comfortable pillow and mattress.  
· If watching the clock makes you anxious, turn it facing away from you so you cannot see the time. · If you worry when you lie down, start a worry book. Well before bedtime, write down your worries, and then set the book and your concerns aside. · Try meditation or other relaxation techniques before you go to bed. · If you cannot fall asleep, get up and go to another room until you feel sleepy. Do something relaxing. Repeat your bedtime routine before you go to bed again. · Make your house quiet and calm about an hour before bedtime. Turn down the lights, turn off the TV, log off the computer, and turn down the volume on music. This can help you relax after a busy day. Drowsy Driving: The [employer] cites drowsiness as a causing factor in more than 209,699 police reported crashes annually, resulting in 76,000 injuries and 1,500 deaths. Other surveys suggest 55% of people polled have driven while drowsy in the past year, 23% had fallen asleep but not crashed, 3% crashed, and 2% had and accident due to drowsy driving. Who is at risk? Young Drivers: One study of drowsy driving accidents states that 55% of the drivers were under 25 years. Of those, 75% were male. Shift Workers and Travelers: People who work overnight or travel across time zones frequently are at higher risk of experiencing Circadian Rhythm Disorders. They are trying to work and function when their body is programed to sleep. Sleep Deprived: Lack of sleep has a serious impact on your ability to pay attention or focus on a task. Consistently getting less than the average of 8 hours your body needs creates partial or cumulative sleep deprivation. Untreated Sleep Disorders: Sleep Apnea, Narcolepsy, R.L.S., and other sleep disorders (untreated) prevent a person from getting enough restful sleep. This leads to excessive daytime sleepiness and increases the risk for drowsy driving accidents by up to 7 times.  
Medications / Alcohol: Even over the counter medications can cause drowsiness. Medications that impair a drivers attention should have a warning label. Alcohol naturally makes you sleepy and on its own can cause accidents. Combined with excessive drowsiness its effects are amplified. Signs of Drowsy Driving: * You don't remember driving the last few miles * You may drift out of your biju * You are unable to focus and your thoughts wander * You may yawn more often than normal 
 * You have difficulty keeping your eyes open / nodding off * Missing traffic signs, speeding, or tailgating Prevention-  
Good sleep hygiene, lifestyle and behavioral choices have the most impact on drowsy driving. There is no substitute for sleep and the average person requires 8 hours nightly. If you find yourself driving drowsy, stop and sleep. Consider the sleep hygiene tips provided during your visit as well. Medication Refill Policy: Refills for all medications require 1 week advance notice. Please have your pharmacy fax a refill request. We are unable to fax, or call in \"controled substance\" medications and you will need to pick these prescriptions up from our office. [employer] Activation Thank you for requesting access to [employer]. Please follow the instructions below to securely access and download your online medical record. [employer] allows you to send messages to your doctor, view your test results, renew your prescriptions, schedule appointments, and more. How Do I Sign Up? 1. In your internet browser, go to [email or web address]. 2. Click on the First Time User? Click Here link in the Sign In box. You will see the New Member Sign Up page. 3. Enter your [employer] Access Code exactly as it appears below. You will not need to use this code after youve completed the sign-up process. If you do not sign up before the expiration date, you must request a new code. [employer] Access Code:  Activation code not generated Current [employer] Quotes Status: Active (This is the date your [employer]otes access code will ) 4. Enter the last four digits of your Social Security Number (xxxx) and Date of Birth (mm/dd/yyyy) as indicated and click Submit. You will be taken to the next sign-up page. 5. Create a EndoShapet ID. This will be your Endo[employer] login ID and cannot be changed, so think of one that is secure and easy to remember. 6. Create a [employer] Quotes password. You can change your password at any time. 7. Enter your Password Reset Question and Answer. This can be used at a later time if you forget your password. 8. Enter your e-mail address. You will receive e-mail notification when new information is available in [address] 9. Click Sign Up. You can now view and download portions of your medical record. 10. Click the Download Summary menu link to download a portable copy of your medical information. Additional Information If you have questions, please call [phone]. Remember, [employer] Qu[employer] is NOT to be used for urgent needs. For medical emergencies, dial 911.

## 2021-05-07 LAB
BASOPHILS # BLD AUTO: 0 X10E3/UL (ref 0–0.2)
BASOPHILS NFR BLD AUTO: 1 %
EOSINOPHIL # BLD AUTO: 0.3 X10E3/UL (ref 0–0.4)
EOSINOPHIL NFR BLD AUTO: 6 %
ERYTHROCYTE [DISTWIDTH] IN BLOOD BY AUTOMATED COUNT: 13.3 % (ref 11.6–15.4)
FERRITIN SERPL-MCNC: 46 NG/ML (ref 30–400)
HCT VFR BLD AUTO: 36 % (ref 37.5–51)
HGB BLD-MCNC: 12 G/DL (ref 13–17.7)
IMM GRANULOCYTES # BLD AUTO: 0 X10E3/UL (ref 0–0.1)
IMM GRANULOCYTES NFR BLD AUTO: 0 %
LYMPHOCYTES # BLD AUTO: 1.3 X10E3/UL (ref 0.7–3.1)
LYMPHOCYTES NFR BLD AUTO: 33 %
MCH RBC QN AUTO: 29.4 PG (ref 26.6–33)
MCHC RBC AUTO-ENTMCNC: 33.3 G/DL (ref 31.5–35.7)
MCV RBC AUTO: 88 FL (ref 79–97)
MONOCYTES # BLD AUTO: 0.3 X10E3/UL (ref 0.1–0.9)
MONOCYTES NFR BLD AUTO: 7 %
MORPHOLOGY BLD-IMP: ABNORMAL
NEUTROPHILS # BLD AUTO: 2.1 X10E3/UL (ref 1.4–7)
NEUTROPHILS NFR BLD AUTO: 53 %
PLATELET # BLD AUTO: ABNORMAL X10E3/UL
RBC # BLD AUTO: 4.08 X10E6/UL (ref 4.14–5.8)
WBC # BLD AUTO: 4 X10E3/UL (ref 3.4–10.8)

## 2021-05-07 RX ORDER — SODIUM CHLORIDE 9 MG/ML
10 INJECTION INTRAMUSCULAR; INTRAVENOUS; SUBCUTANEOUS AS NEEDED
Status: CANCELLED | OUTPATIENT
Start: 2021-05-10

## 2021-05-07 RX ORDER — SODIUM CHLORIDE 0.9 % (FLUSH) 0.9 %
5-10 SYRINGE (ML) INJECTION AS NEEDED
Status: CANCELLED | OUTPATIENT
Start: 2021-05-10

## 2021-05-07 RX ORDER — HEPARIN 100 UNIT/ML
500 SYRINGE INTRAVENOUS AS NEEDED
Status: CANCELLED | OUTPATIENT
Start: 2021-05-10

## 2021-05-10 ENCOUNTER — HOSPITAL ENCOUNTER (OUTPATIENT)
Dept: INFUSION THERAPY | Age: 76
Discharge: HOME | End: 2021-05-10
Payer: MEDICARE

## 2021-05-10 VITALS
TEMPERATURE: 97.3 F | DIASTOLIC BLOOD PRESSURE: 82 MMHG | HEART RATE: 70 BPM | SYSTOLIC BLOOD PRESSURE: 140 MMHG | OXYGEN SATURATION: 97 %

## 2021-05-10 LAB — PLATELET # BLD AUTO: 113 K/UL (ref 150–400)

## 2021-05-10 PROCEDURE — 85049 AUTOMATED PLATELET COUNT: CPT

## 2021-05-10 PROCEDURE — 36415 COLL VENOUS BLD VENIPUNCTURE: CPT

## 2021-05-10 NOTE — PROGRESS NOTES
[facility] Lab Visit:        1330:  Pt arrived ambulatory to [facility] in stable condition , for lab draw. Labs drawn peripherally from Left AC arm and sent for processing. Pt tolerated well. Visit Vitals  BP (!) 140/82 (BP 1 Location: Right upper arm, BP Patient Position: Sitting)   Pulse 70   Temp 97.3 °F (36.3 °C)   SpO2 97%       1345: No new concerns voiced. D/cd home ambulatory in no distress. Pt aware of next [facility] appointment scheduled. Labs available in CC once resulted.

## 2021-05-11 ENCOUNTER — DOCUMENTATION ONLY (OUTPATIENT)
Dept: INTERNAL MEDICINE CLINIC | Age: 76
End: 2021-05-11

## 2021-05-11 NOTE — PROGRESS NOTES
[facility] , Plan of care , signed by provider on 5/7/2021, faxed on 05/11/2021 confirmation placed in central scan

## 2021-05-14 ENCOUNTER — OFFICE VISIT (OUTPATIENT)
Dept: ONCOLOGY | Age: 76
End: 2021-05-14
Payer: MEDICARE

## 2021-05-14 VITALS
RESPIRATION RATE: 18 BRPM | TEMPERATURE: 98.3 F | HEART RATE: 65 BPM | SYSTOLIC BLOOD PRESSURE: 134 MMHG | WEIGHT: 203 LBS | BODY MASS INDEX: 33.78 KG/M2 | DIASTOLIC BLOOD PRESSURE: 72 MMHG | OXYGEN SATURATION: 98 %

## 2021-05-14 DIAGNOSIS — E66.09 CLASS 1 OBESITY DUE TO EXCESS CALORIES WITH SERIOUS COMORBIDITY AND BODY MASS INDEX (BMI) OF 32.0 TO 32.9 IN ADULT: ICD-10-CM

## 2021-05-14 DIAGNOSIS — K74.60 CIRRHOSIS OF LIVER WITHOUT ASCITES, UNSPECIFIED HEPATIC CIRRHOSIS TYPE (HCC): ICD-10-CM

## 2021-05-14 DIAGNOSIS — D69.6 THROMBOCYTOPENIA (HCC): Primary | ICD-10-CM

## 2021-05-14 DIAGNOSIS — R16.2 HEPATOSPLENOMEGALY: ICD-10-CM

## 2021-05-14 PROCEDURE — 99213 OFFICE O/P EST LOW 20 MIN: CPT | Performed by: INTERNAL MEDICINE

## 2021-05-14 PROCEDURE — G8754 DIAS BP LESS 90: HCPCS | Performed by: INTERNAL MEDICINE

## 2021-05-14 PROCEDURE — 3017F COLORECTAL CA SCREEN DOC REV: CPT | Performed by: INTERNAL MEDICINE

## 2021-05-14 PROCEDURE — 1101F PT FALLS ASSESS-DOCD LE1/YR: CPT | Performed by: INTERNAL MEDICINE

## 2021-05-14 PROCEDURE — G8432 DEP SCR NOT DOC, RNG: HCPCS | Performed by: INTERNAL MEDICINE

## 2021-05-14 PROCEDURE — G8752 SYS BP LESS 140: HCPCS | Performed by: INTERNAL MEDICINE

## 2021-05-14 PROCEDURE — G8427 DOCREV CUR MEDS BY ELIG CLIN: HCPCS | Performed by: INTERNAL MEDICINE

## 2021-05-14 RX ORDER — METHYLPREDNISOLONE 4 MG/1
TABLET ORAL
COMMUNITY
End: 2021-05-27

## 2021-05-14 RX ORDER — DICLOFENAC SODIUM 75 MG/1
TABLET, DELAYED RELEASE ORAL
COMMUNITY
End: 2021-05-27

## 2021-05-14 NOTE — PROGRESS NOTES
Manuel Jackson is a 70 y.o. male    Chief Complaint   Patient presents with    Follow-up     Thrombocytopenia       1. Have you been to the ER, urgent care clinic since your last visit? Hospitalized since your last visit? No    2. Have you seen or consulted any other health care providers outside of the [address] [place] [address] since your last visit? Include any pap smears or colon screening.  No    Patient states he has had both doses of the covid vaccine

## 2021-05-14 NOTE — PROGRESS NOTES
[facility] [place] at [address], [address] [place]: [phone]  F: [phone]    Reason for Visit:   Hu Kaufman is a 70 y.o. male who is seen for follow up ofThrombocytopenia on 5/14/2021    History of Present Illness:   Patient is a 70 y.o.male with PMH as below who is seen for thrombocytopenia    He has had fluctuating thrombocytopenia since 2013 ( 77-112k). Work up suggested pseudothrombocytopenia and liver disease. Has since been evaluated by Hepatology and has been diagnosed with Cirrhosis. Comes for follow up. He used to drink alcohol since 2013  Sister had breast cancer     Past Medical History:   Diagnosis Date    CAD (coronary artery disease)     Chronic kidney disease     stones    Chronic obstructive pulmonary disease ([address]     Diabetes [facility]     Hypertension     Stroke ([facility] 1980s    right hand neuropathy      Past Surgical History:   Procedure Laterality Date    COLONOSCOPY N/A 4/12/2018    COLONOSCOPY performed by Vivian Leigh MD at [address] HX SKIN BIOPSY  2019    skin cancer removed from left leg. Social History     Tobacco Use    Smoking status: Former Smoker     Packs/day: 2.00     Years: 45.00     Pack years: 90.00     Quit date: 6/19/2005     Years since quitting: 15.9    Smokeless tobacco: Never Used   Substance Use Topics    Alcohol use: No     Comment: 1 gallon whiskey a week stopped 4 years ago      Family History   Problem Relation Age of Onset    Heart Disease Mother     Heart Disease Father     Cancer Sister         breast ca     Current Outpatient Medications   Medication Sig    diclofenac EC (VOLTAREN) 75 mg EC tablet Take  by mouth.  methylPREDNISolone (MEDROL) 4 mg tab Take  by mouth daily (with breakfast).  montelukast (SINGULAIR) 10 mg tablet Take 1 Tab by mouth daily.  pantoprazole (Protonix) 20 mg tablet Take 2 Tabs by mouth daily.     FreeStyle Ashlee 14 Day [employer] Use as directed.  enalapril (VASOTEC) 10 mg tablet Take 10 mg by mouth daily.  Oxygen Indications: 2L prn    aspirin delayed-release 81 mg tablet Take 1 Tab by mouth daily.  OTHER [facility]  - address higher level balance and maximize safety and independence with functional mobility. Hx Stroke    simvastatin (ZOCOR) 20 mg tablet Take 20 mg by mouth nightly.  metFORMIN (GLUCOPHAGE) 500 mg tablet Take 1,000 mg by mouth two (2) times daily (with meals). No current facility-administered medications for this visit. No Known Allergies     Review of Systems: A complete review of systems was obtained, negative except as described above. Physical Exam:     Vitals reviewed   General appearance - alert, well appearing, and in no distress  Mental status - oriented to person, place, and time  Extremities - peripheral pulses normal, no pedal edema, no clubbing or cyanosis  Skin - normal coloration and turgor, no new rashes, no suspicious skin lesions noted    ECOG PS: 0        Results:     Lab Results   Component Value Date/Time    WBC 4.0 05/06/2021 03:48 PM    HGB 12.0 (L) 05/06/2021 03:48 PM    HCT 36.0 (L) 05/06/2021 03:48 PM    PLATELET CANCELED 73/45/9136 03:48 PM    MCV 88 05/06/2021 03:48 PM    ABS. NEUTROPHILS 2.1 05/06/2021 03:48 PM     Lab Results   Component Value Date/Time    Sodium 145 (H) 04/21/2021 12:00 AM    Potassium 4.1 04/21/2021 12:00 AM    Chloride 110 (H) 04/21/2021 12:00 AM    CO2 19 (L) 04/21/2021 12:00 AM    Glucose 162 (H) 04/21/2021 12:00 AM    BUN 25 04/21/2021 12:00 AM    Creatinine 1.53 (H) 04/21/2021 12:00 AM    GFR est AA 54 (L) 10/07/2020 02:08 PM    GFR est non-AA 44 (L) 10/07/2020 02:08 PM    Calcium 9.5 04/21/2021 12:00 AM    Glucose (POC) 121 (H) 05/29/2019 01:41 PM     Lab Results   Component Value Date/Time    Bilirubin, total 0.6 10/01/2020 11:43 AM    ALT (SGPT) 34 10/01/2020 11:43 AM    Alk.  phosphatase 67 10/01/2020 11:43 AM Protein, total 7.6 10/01/2020 11:43 AM    Albumin 4.0 04/21/2021 12:00 AM    Globulin 3.4 10/01/2020 11:43 AM     Lab Results   Component Value Date/Time    Iron % saturation 31 04/29/2021 02:21 PM    TIBC 354 04/29/2021 02:21 PM    Ferritin 46 05/06/2021 03:48 PM    Vitamin B12 326 08/14/2019 02:31 PM    Homocysteine, plasma 11.5 07/06/2013 05:35 AM    Sed rate (ESR) 21 (H) 07/07/2013 10:15 AM    Sed rate, automated 9 05/28/2019 11:00 PM    C-Reactive protein <0.29 05/28/2019 11:00 PM    TSH 1.740 08/14/2019 02:31 PM    HIV 1/2 Interpretation NONREACTIVE 07/06/2013 02:36 PM     Lab Results   Component Value Date/Time    INR 1.2 (H) 07/06/2013 02:13 AM    aPTT 27.4 07/06/2013 02:13 AM    D-dimer 0.86 (H) 05/28/2019 11:00 PM     Actual platelet count may be somewhat higher than reported due to   aggregation of platelets in this sample    Records reviewed and summarized above. Pathology report(s) reviewed above. Radiology report(s) reviewed above. USG abdomen    IMPRESSION  IMPRESSION:  1. Heterogeneous hepatic echotexture with an area of focal sparing in the right  lobe. 2.  Mildly enlarged spleen measuring 13.1 cm in length. 3.  Left adrenal adenoma, seen on prior cross-sectional imaging. 4.  Contracted gallbladder around gallstones.     Assessment:   1) Mild stable thrombocytopenia    Since 2013  Secondary to pseudothrombocytopenia and cirrhosis  No specific interventions today        2) CAD    3) DM    4)HTN    5)Obesity    6) Cirrhosis  Hepatology notes reviewed and discussed       Plan:     · Follow with Hepatology    No follow up with us needed      I appreciate the opportunity to participate in Mr. Luba rouse.     Signed By: Jose C Saba MD

## 2021-05-20 ENCOUNTER — PATIENT MESSAGE (OUTPATIENT)
Dept: HEMATOLOGY | Age: 76
End: 2021-05-20

## 2021-05-20 NOTE — TELEPHONE ENCOUNTER
From: Elvis Sarmiento  To: [address]: 5/20/2021 10:40 AM EDT  Subject: Test Results Question    This for Dr. Dorthey [place] office, I just received a phone call from [facility] about a covert test on May the 24th, I have already had both shots with merda If I still need a test, should I have paper work to go with it.        [email or web address] Talk w/Elayne who is on patient HIPPA forms. COVID testing will need to be done on 5/24/21 before any procedure is performed--verbalize understanding.  (KF)

## 2021-05-24 ENCOUNTER — HOSPITAL ENCOUNTER (OUTPATIENT)
Dept: PREADMISSION TESTING | Age: 76
Discharge: HOME | End: 2021-05-24
Payer: MEDICARE

## 2021-05-24 ENCOUNTER — TRANSCRIBE ORDER (OUTPATIENT)
Dept: REGISTRATION | Age: 76
End: 2021-05-24

## 2021-05-24 DIAGNOSIS — Z01.812 PRE-PROCEDURE LAB EXAM: Primary | ICD-10-CM

## 2021-05-24 DIAGNOSIS — Z01.812 PRE-PROCEDURE LAB EXAM: ICD-10-CM

## 2021-05-24 PROCEDURE — U0005 INFEC AGEN DETEC AMPLI PROBE: HCPCS

## 2021-05-25 LAB — SARS-COV-2, COV2NT: NOT DETECTED

## 2021-05-26 ENCOUNTER — TRANSCRIBE ORDER (OUTPATIENT)
Dept: SCHEDULING | Age: 76
End: 2021-05-26

## 2021-05-26 DIAGNOSIS — K74.60 CIRRHOSIS (HCC): Primary | ICD-10-CM

## 2021-05-27 ENCOUNTER — APPOINTMENT (OUTPATIENT)
Dept: CT IMAGING | Age: 76
DRG: 439 | End: 2021-05-27
Attending: EMERGENCY MEDICINE
Payer: MEDICARE

## 2021-05-27 ENCOUNTER — ANESTHESIA EVENT (OUTPATIENT)
Dept: ENDOSCOPY | Age: 76
DRG: 439 | End: 2021-05-27
Payer: MEDICARE

## 2021-05-27 ENCOUNTER — APPOINTMENT (OUTPATIENT)
Dept: ULTRASOUND IMAGING | Age: 76
DRG: 439 | End: 2021-05-27
Attending: INTERNAL MEDICINE
Payer: MEDICARE

## 2021-05-27 ENCOUNTER — HOSPITAL ENCOUNTER (OUTPATIENT)
Age: 76
Setting detail: OUTPATIENT SURGERY
Discharge: HOME | DRG: 439 | End: 2021-05-27
Attending: INTERNAL MEDICINE | Admitting: INTERNAL MEDICINE
Payer: MEDICARE

## 2021-05-27 ENCOUNTER — HOSPITAL ENCOUNTER (INPATIENT)
Age: 76
LOS: 5 days | Discharge: HOME | DRG: 439 | End: 2021-06-01
Attending: EMERGENCY MEDICINE | Admitting: FAMILY MEDICINE
Payer: MEDICARE

## 2021-05-27 ENCOUNTER — ANESTHESIA (OUTPATIENT)
Dept: ENDOSCOPY | Age: 76
DRG: 439 | End: 2021-05-27
Payer: MEDICARE

## 2021-05-27 VITALS
HEART RATE: 65 BPM | SYSTOLIC BLOOD PRESSURE: 136 MMHG | HEIGHT: 65 IN | BODY MASS INDEX: 33.32 KG/M2 | TEMPERATURE: 98.7 F | OXYGEN SATURATION: 97 % | DIASTOLIC BLOOD PRESSURE: 76 MMHG | WEIGHT: 200 LBS | RESPIRATION RATE: 15 BRPM

## 2021-05-27 DIAGNOSIS — K29.70 GASTRITIS WITHOUT BLEEDING, UNSPECIFIED CHRONICITY, UNSPECIFIED GASTRITIS TYPE: ICD-10-CM

## 2021-05-27 DIAGNOSIS — K22.70 BARRETT'S ESOPHAGUS WITHOUT DYSPLASIA: ICD-10-CM

## 2021-05-27 DIAGNOSIS — K80.20 GALLSTONES: ICD-10-CM

## 2021-05-27 DIAGNOSIS — K85.10 ACUTE BILIARY PANCREATITIS WITHOUT INFECTION OR NECROSIS: ICD-10-CM

## 2021-05-27 DIAGNOSIS — K85.10 ACUTE BILIARY PANCREATITIS, UNSPECIFIED COMPLICATION STATUS: ICD-10-CM

## 2021-05-27 DIAGNOSIS — K31.7 GASTRIC POLYP: ICD-10-CM

## 2021-05-27 DIAGNOSIS — K75.81 NASH (NONALCOHOLIC STEATOHEPATITIS): ICD-10-CM

## 2021-05-27 DIAGNOSIS — K74.60 CIRRHOSIS (HCC): ICD-10-CM

## 2021-05-27 DIAGNOSIS — K74.60 CIRRHOSIS OF LIVER WITHOUT ASCITES, UNSPECIFIED HEPATIC CIRRHOSIS TYPE (HCC): ICD-10-CM

## 2021-05-27 DIAGNOSIS — R10.84 ABDOMINAL PAIN, GENERALIZED: Primary | ICD-10-CM

## 2021-05-27 DIAGNOSIS — D69.6 THROMBOCYTOPENIA (HCC): ICD-10-CM

## 2021-05-27 PROBLEM — K85.90 PANCREATITIS: Status: ACTIVE | Noted: 2021-05-27

## 2021-05-27 LAB
ALBUMIN SERPL-MCNC: 3.7 G/DL (ref 3.5–5)
ALBUMIN/GLOB SERPL: 1.1 {RATIO} (ref 1.1–2.2)
ALP SERPL-CCNC: 86 U/L (ref 45–117)
ALT SERPL-CCNC: 103 U/L (ref 12–78)
ANION GAP SERPL CALC-SCNC: 9 MMOL/L (ref 5–15)
AST SERPL-CCNC: 138 U/L (ref 15–37)
BASOPHILS # BLD: 0.1 K/UL (ref 0–0.1)
BASOPHILS NFR BLD: 1 % (ref 0–1)
BILIRUB SERPL-MCNC: 1.2 MG/DL (ref 0.2–1)
BUN SERPL-MCNC: 16 MG/DL (ref 6–20)
BUN/CREAT SERPL: 12 (ref 12–20)
CALCIUM SERPL-MCNC: 8.6 MG/DL (ref 8.5–10.1)
CHLORIDE SERPL-SCNC: 116 MMOL/L (ref 97–108)
CO2 SERPL-SCNC: 21 MMOL/L (ref 21–32)
COMMENT, HOLDF: NORMAL
CREAT SERPL-MCNC: 1.31 MG/DL (ref 0.7–1.3)
DIFFERENTIAL METHOD BLD: ABNORMAL
EOSINOPHIL # BLD: 0.4 K/UL (ref 0–0.4)
EOSINOPHIL NFR BLD: 3 % (ref 0–7)
ERYTHROCYTE [DISTWIDTH] IN BLOOD BY AUTOMATED COUNT: 14.2 % (ref 11.5–14.5)
GLOBULIN SER CALC-MCNC: 3.3 G/DL (ref 2–4)
GLUCOSE BLD STRIP.AUTO-MCNC: 150 MG/DL (ref 65–117)
GLUCOSE BLD STRIP.AUTO-MCNC: 169 MG/DL (ref 65–117)
GLUCOSE BLD STRIP.AUTO-MCNC: 181 MG/DL (ref 65–117)
GLUCOSE SERPL-MCNC: 123 MG/DL (ref 65–100)
HCT VFR BLD AUTO: 42.4 % (ref 36.6–50.3)
HGB BLD-MCNC: 13.6 G/DL (ref 12.1–17)
IMM GRANULOCYTES # BLD AUTO: 0.1 K/UL (ref 0–0.04)
IMM GRANULOCYTES NFR BLD AUTO: 1 % (ref 0–0.5)
LIPASE SERPL-CCNC: >3000 U/L (ref 73–393)
LYMPHOCYTES # BLD: 2.1 K/UL (ref 0.8–3.5)
LYMPHOCYTES NFR BLD: 18 % (ref 12–49)
MCH RBC QN AUTO: 29.8 PG (ref 26–34)
MCHC RBC AUTO-ENTMCNC: 32.1 G/DL (ref 30–36.5)
MCV RBC AUTO: 93 FL (ref 80–99)
MONOCYTES # BLD: 0.8 K/UL (ref 0–1)
MONOCYTES NFR BLD: 7 % (ref 5–13)
NEUTS SEG # BLD: 8.5 K/UL (ref 1.8–8)
NEUTS SEG NFR BLD: 70 % (ref 32–75)
NRBC # BLD: 0 K/UL (ref 0–0.01)
NRBC BLD-RTO: 0 PER 100 WBC
PLATELET # BLD AUTO: 149 K/UL (ref 150–400)
PMV BLD AUTO: 11.2 FL (ref 8.9–12.9)
POTASSIUM SERPL-SCNC: 3.8 MMOL/L (ref 3.5–5.1)
PROT SERPL-MCNC: 7 G/DL (ref 6.4–8.2)
RBC # BLD AUTO: 4.56 M/UL (ref 4.1–5.7)
SAMPLES BEING HELD,HOLD: NORMAL
SERVICE CMNT-IMP: ABNORMAL
SODIUM SERPL-SCNC: 146 MMOL/L (ref 136–145)
WBC # BLD AUTO: 11.9 K/UL (ref 4.1–11.1)

## 2021-05-27 PROCEDURE — 76040000019: Performed by: INTERNAL MEDICINE

## 2021-05-27 PROCEDURE — 65270000029 HC RM PRIVATE

## 2021-05-27 PROCEDURE — 2709999900 HC NON-CHARGEABLE SUPPLY: Performed by: INTERNAL MEDICINE

## 2021-05-27 PROCEDURE — 96375 TX/PRO/DX INJ NEW DRUG ADDON: CPT

## 2021-05-27 PROCEDURE — 77030014115: Performed by: INTERNAL MEDICINE

## 2021-05-27 PROCEDURE — 85025 COMPLETE CBC W/AUTO DIFF WBC: CPT

## 2021-05-27 PROCEDURE — 76942 ECHO GUIDE FOR BIOPSY: CPT | Performed by: INTERNAL MEDICINE

## 2021-05-27 PROCEDURE — 88313 SPECIAL STAINS GROUP 2: CPT

## 2021-05-27 PROCEDURE — 0FB03ZX EXCISION OF LIVER, PERCUTANEOUS APPROACH, DIAGNOSTIC: ICD-10-PCS | Performed by: INTERNAL MEDICINE

## 2021-05-27 PROCEDURE — 96365 THER/PROPH/DIAG IV INF INIT: CPT

## 2021-05-27 PROCEDURE — 74011250636 HC RX REV CODE- 250/636: Performed by: NURSE ANESTHETIST, CERTIFIED REGISTERED

## 2021-05-27 PROCEDURE — 0DB68ZZ EXCISION OF STOMACH, VIA NATURAL OR ARTIFICIAL OPENING ENDOSCOPIC: ICD-10-PCS | Performed by: INTERNAL MEDICINE

## 2021-05-27 PROCEDURE — 88307 TISSUE EXAM BY PATHOLOGIST: CPT

## 2021-05-27 PROCEDURE — 76060000031 HC ANESTHESIA FIRST 0.5 HR: Performed by: INTERNAL MEDICINE

## 2021-05-27 PROCEDURE — 96376 TX/PRO/DX INJ SAME DRUG ADON: CPT

## 2021-05-27 PROCEDURE — 0DB78ZX EXCISION OF STOMACH, PYLORUS, VIA NATURAL OR ARTIFICIAL OPENING ENDOSCOPIC, DIAGNOSTIC: ICD-10-PCS | Performed by: INTERNAL MEDICINE

## 2021-05-27 PROCEDURE — 36415 COLL VENOUS BLD VENIPUNCTURE: CPT

## 2021-05-27 PROCEDURE — 88305 TISSUE EXAM BY PATHOLOGIST: CPT

## 2021-05-27 PROCEDURE — 77030039825 HC MSK NSL PAP SUPERNO2VA VYRM -B: Performed by: NURSE ANESTHETIST, CERTIFIED REGISTERED

## 2021-05-27 PROCEDURE — 74011000258 HC RX REV CODE- 258: Performed by: INTERNAL MEDICINE

## 2021-05-27 PROCEDURE — 43251 EGD REMOVE LESION SNARE: CPT | Performed by: INTERNAL MEDICINE

## 2021-05-27 PROCEDURE — 74011250636 HC RX REV CODE- 250/636: Performed by: EMERGENCY MEDICINE

## 2021-05-27 PROCEDURE — 82962 GLUCOSE BLOOD TEST: CPT

## 2021-05-27 PROCEDURE — 83690 ASSAY OF LIPASE: CPT

## 2021-05-27 PROCEDURE — 47000 NEEDLE BIOPSY OF LIVER PERQ: CPT | Performed by: INTERNAL MEDICINE

## 2021-05-27 PROCEDURE — 74176 CT ABD & PELVIS W/O CONTRAST: CPT

## 2021-05-27 PROCEDURE — 77030013992 HC SNR POLYP ENDOSC BSC -B: Performed by: INTERNAL MEDICINE

## 2021-05-27 PROCEDURE — 76942 ECHO GUIDE FOR BIOPSY: CPT

## 2021-05-27 PROCEDURE — 99285 EMERGENCY DEPT VISIT HI MDM: CPT

## 2021-05-27 PROCEDURE — 74011000250 HC RX REV CODE- 250: Performed by: NURSE ANESTHETIST, CERTIFIED REGISTERED

## 2021-05-27 PROCEDURE — 77030021593 HC FCPS BIOP ENDOSC BSC -A: Performed by: INTERNAL MEDICINE

## 2021-05-27 PROCEDURE — 80053 COMPREHEN METABOLIC PANEL: CPT

## 2021-05-27 PROCEDURE — 77030013826 HC NDL BIOP MAXCOR BARD -B: Performed by: INTERNAL MEDICINE

## 2021-05-27 PROCEDURE — 74011250636 HC RX REV CODE- 250/636: Performed by: FAMILY MEDICINE

## 2021-05-27 PROCEDURE — 74011000258 HC RX REV CODE- 258: Performed by: EMERGENCY MEDICINE

## 2021-05-27 RX ORDER — LIDOCAINE HYDROCHLORIDE 20 MG/ML
INJECTION, SOLUTION EPIDURAL; INFILTRATION; INTRACAUDAL; PERINEURAL AS NEEDED
Status: DISCONTINUED | OUTPATIENT
Start: 2021-05-27 | End: 2021-05-27 | Stop reason: HOSPADM

## 2021-05-27 RX ORDER — ONDANSETRON 2 MG/ML
4 INJECTION INTRAMUSCULAR; INTRAVENOUS
Status: DISCONTINUED | OUTPATIENT
Start: 2021-05-27 | End: 2021-06-01 | Stop reason: HOSPADM

## 2021-05-27 RX ORDER — SODIUM CHLORIDE 0.9 % (FLUSH) 0.9 %
5-40 SYRINGE (ML) INJECTION AS NEEDED
Status: DISCONTINUED | OUTPATIENT
Start: 2021-05-27 | End: 2021-05-27 | Stop reason: HOSPADM

## 2021-05-27 RX ORDER — DEXTROMETHORPHAN/PSEUDOEPHED 2.5-7.5/.8
1.2 DROPS ORAL
Status: DISCONTINUED | OUTPATIENT
Start: 2021-05-27 | End: 2021-05-27 | Stop reason: HOSPADM

## 2021-05-27 RX ORDER — PANTOPRAZOLE SODIUM 40 MG/1
40 TABLET, DELAYED RELEASE ORAL DAILY
Status: DISCONTINUED | OUTPATIENT
Start: 2021-05-28 | End: 2021-06-01 | Stop reason: HOSPADM

## 2021-05-27 RX ORDER — ONDANSETRON 2 MG/ML
8 INJECTION INTRAMUSCULAR; INTRAVENOUS
Status: COMPLETED | OUTPATIENT
Start: 2021-05-27 | End: 2021-05-27

## 2021-05-27 RX ORDER — ATROPINE SULFATE 0.1 MG/ML
0.5 INJECTION INTRAVENOUS
Status: DISCONTINUED | OUTPATIENT
Start: 2021-05-27 | End: 2021-05-27 | Stop reason: HOSPADM

## 2021-05-27 RX ORDER — HYDROMORPHONE HYDROCHLORIDE 1 MG/ML
1 INJECTION, SOLUTION INTRAMUSCULAR; INTRAVENOUS; SUBCUTANEOUS
Status: COMPLETED | OUTPATIENT
Start: 2021-05-27 | End: 2021-05-27

## 2021-05-27 RX ORDER — FENTANYL CITRATE 50 UG/ML
50-200 INJECTION, SOLUTION INTRAMUSCULAR; INTRAVENOUS
Status: DISCONTINUED | OUTPATIENT
Start: 2021-05-27 | End: 2021-05-27 | Stop reason: HOSPADM

## 2021-05-27 RX ORDER — PROPOFOL 10 MG/ML
INJECTION, EMULSION INTRAVENOUS AS NEEDED
Status: DISCONTINUED | OUTPATIENT
Start: 2021-05-27 | End: 2021-05-27 | Stop reason: HOSPADM

## 2021-05-27 RX ORDER — SODIUM CHLORIDE 9 MG/ML
50 INJECTION, SOLUTION INTRAVENOUS CONTINUOUS
Status: DISCONTINUED | OUTPATIENT
Start: 2021-05-27 | End: 2021-05-27 | Stop reason: HOSPADM

## 2021-05-27 RX ORDER — HYDROMORPHONE HYDROCHLORIDE 1 MG/ML
1 INJECTION, SOLUTION INTRAMUSCULAR; INTRAVENOUS; SUBCUTANEOUS
Status: CANCELLED | OUTPATIENT
Start: 2021-05-27

## 2021-05-27 RX ORDER — NALOXONE HYDROCHLORIDE 0.4 MG/ML
0.4 INJECTION, SOLUTION INTRAMUSCULAR; INTRAVENOUS; SUBCUTANEOUS
Status: DISCONTINUED | OUTPATIENT
Start: 2021-05-27 | End: 2021-05-27 | Stop reason: HOSPADM

## 2021-05-27 RX ORDER — ACETAMINOPHEN 650 MG/1
650 SUPPOSITORY RECTAL
Status: DISCONTINUED | OUTPATIENT
Start: 2021-05-27 | End: 2021-06-01 | Stop reason: HOSPADM

## 2021-05-27 RX ORDER — HYDROMORPHONE HYDROCHLORIDE 1 MG/ML
1 INJECTION, SOLUTION INTRAMUSCULAR; INTRAVENOUS; SUBCUTANEOUS
Status: DISCONTINUED | OUTPATIENT
Start: 2021-05-27 | End: 2021-05-31 | Stop reason: SDUPTHER

## 2021-05-27 RX ORDER — SODIUM CHLORIDE 0.9 % (FLUSH) 0.9 %
5-40 SYRINGE (ML) INJECTION EVERY 8 HOURS
Status: DISCONTINUED | OUTPATIENT
Start: 2021-05-27 | End: 2021-05-27 | Stop reason: HOSPADM

## 2021-05-27 RX ORDER — FLUMAZENIL 0.1 MG/ML
0.2 INJECTION INTRAVENOUS
Status: DISCONTINUED | OUTPATIENT
Start: 2021-05-27 | End: 2021-05-27 | Stop reason: HOSPADM

## 2021-05-27 RX ORDER — SODIUM CHLORIDE 0.9 % (FLUSH) 0.9 %
5-40 SYRINGE (ML) INJECTION AS NEEDED
Status: DISCONTINUED | OUTPATIENT
Start: 2021-05-27 | End: 2021-06-01 | Stop reason: HOSPADM

## 2021-05-27 RX ORDER — SODIUM CHLORIDE 9 MG/ML
75 INJECTION, SOLUTION INTRAVENOUS CONTINUOUS
Status: DISCONTINUED | OUTPATIENT
Start: 2021-05-27 | End: 2021-05-28

## 2021-05-27 RX ORDER — ENOXAPARIN SODIUM 100 MG/ML
40 INJECTION SUBCUTANEOUS DAILY
Status: DISCONTINUED | OUTPATIENT
Start: 2021-05-28 | End: 2021-06-01 | Stop reason: HOSPADM

## 2021-05-27 RX ORDER — MAGNESIUM SULFATE 100 %
4 CRYSTALS MISCELLANEOUS AS NEEDED
Status: DISCONTINUED | OUTPATIENT
Start: 2021-05-27 | End: 2021-06-01 | Stop reason: HOSPADM

## 2021-05-27 RX ORDER — ASPIRIN 81 MG/1
81 TABLET ORAL DAILY
Status: DISCONTINUED | OUTPATIENT
Start: 2021-05-28 | End: 2021-06-01 | Stop reason: HOSPADM

## 2021-05-27 RX ORDER — DEXTROSE 50 % IN WATER (D50W) INTRAVENOUS SYRINGE
12.5-25 AS NEEDED
Status: DISCONTINUED | OUTPATIENT
Start: 2021-05-27 | End: 2021-06-01 | Stop reason: HOSPADM

## 2021-05-27 RX ORDER — MIDAZOLAM HYDROCHLORIDE 1 MG/ML
5-10 INJECTION, SOLUTION INTRAMUSCULAR; INTRAVENOUS
Status: DISCONTINUED | OUTPATIENT
Start: 2021-05-27 | End: 2021-05-27 | Stop reason: HOSPADM

## 2021-05-27 RX ORDER — ACETAMINOPHEN 325 MG/1
650 TABLET ORAL
Status: DISCONTINUED | OUTPATIENT
Start: 2021-05-27 | End: 2021-06-01 | Stop reason: HOSPADM

## 2021-05-27 RX ORDER — INSULIN LISPRO 100 [IU]/ML
INJECTION, SOLUTION INTRAVENOUS; SUBCUTANEOUS EVERY 6 HOURS
Status: DISCONTINUED | OUTPATIENT
Start: 2021-05-27 | End: 2021-05-31

## 2021-05-27 RX ORDER — ONDANSETRON 2 MG/ML
4 INJECTION INTRAMUSCULAR; INTRAVENOUS
Status: CANCELLED | OUTPATIENT
Start: 2021-05-27

## 2021-05-27 RX ORDER — SODIUM CHLORIDE 9 MG/ML
INJECTION, SOLUTION INTRAVENOUS
Status: DISCONTINUED | OUTPATIENT
Start: 2021-05-27 | End: 2021-05-27 | Stop reason: HOSPADM

## 2021-05-27 RX ORDER — SODIUM CHLORIDE 0.9 % (FLUSH) 0.9 %
5-40 SYRINGE (ML) INJECTION EVERY 8 HOURS
Status: DISCONTINUED | OUTPATIENT
Start: 2021-05-27 | End: 2021-06-01 | Stop reason: HOSPADM

## 2021-05-27 RX ORDER — PROMETHAZINE HYDROCHLORIDE 25 MG/1
12.5 TABLET ORAL
Status: DISCONTINUED | OUTPATIENT
Start: 2021-05-27 | End: 2021-06-01 | Stop reason: HOSPADM

## 2021-05-27 RX ORDER — LISINOPRIL 10 MG/1
10 TABLET ORAL DAILY
Status: DISCONTINUED | OUTPATIENT
Start: 2021-05-28 | End: 2021-06-01 | Stop reason: HOSPADM

## 2021-05-27 RX ORDER — EPINEPHRINE 0.1 MG/ML
1 INJECTION INTRACARDIAC; INTRAVENOUS
Status: DISCONTINUED | OUTPATIENT
Start: 2021-05-27 | End: 2021-05-27 | Stop reason: HOSPADM

## 2021-05-27 RX ORDER — POLYETHYLENE GLYCOL 3350 17 G/17G
17 POWDER, FOR SOLUTION ORAL DAILY PRN
Status: DISCONTINUED | OUTPATIENT
Start: 2021-05-27 | End: 2021-06-01 | Stop reason: HOSPADM

## 2021-05-27 RX ADMIN — HYDROMORPHONE HYDROCHLORIDE 1 MG: 1 INJECTION, SOLUTION INTRAMUSCULAR; INTRAVENOUS; SUBCUTANEOUS at 16:56

## 2021-05-27 RX ADMIN — HYDROMORPHONE HYDROCHLORIDE 1 MG: 1 INJECTION, SOLUTION INTRAMUSCULAR; INTRAVENOUS; SUBCUTANEOUS at 13:43

## 2021-05-27 RX ADMIN — ONDANSETRON 8 MG: 2 INJECTION INTRAMUSCULAR; INTRAVENOUS at 13:43

## 2021-05-27 RX ADMIN — PROPOFOL 20 MG: 10 INJECTION, EMULSION INTRAVENOUS at 07:54

## 2021-05-27 RX ADMIN — PROPOFOL 30 MG: 10 INJECTION, EMULSION INTRAVENOUS at 07:51

## 2021-05-27 RX ADMIN — PROPOFOL 80 MG: 10 INJECTION, EMULSION INTRAVENOUS at 07:38

## 2021-05-27 RX ADMIN — SODIUM CHLORIDE 500 ML: 9 INJECTION, SOLUTION INTRAVENOUS at 13:48

## 2021-05-27 RX ADMIN — ONDANSETRON 4 MG: 2 INJECTION INTRAMUSCULAR; INTRAVENOUS at 17:56

## 2021-05-27 RX ADMIN — PROPOFOL 30 MG: 10 INJECTION, EMULSION INTRAVENOUS at 07:40

## 2021-05-27 RX ADMIN — PROPOFOL 30 MG: 10 INJECTION, EMULSION INTRAVENOUS at 07:44

## 2021-05-27 RX ADMIN — HYDROMORPHONE HYDROCHLORIDE 1 MG: 1 INJECTION, SOLUTION INTRAMUSCULAR; INTRAVENOUS; SUBCUTANEOUS at 21:29

## 2021-05-27 RX ADMIN — PROPOFOL 30 MG: 10 INJECTION, EMULSION INTRAVENOUS at 07:46

## 2021-05-27 RX ADMIN — PROPOFOL 30 MG: 10 INJECTION, EMULSION INTRAVENOUS at 07:42

## 2021-05-27 RX ADMIN — PROPOFOL 30 MG: 10 INJECTION, EMULSION INTRAVENOUS at 07:39

## 2021-05-27 RX ADMIN — SODIUM CHLORIDE: 900 INJECTION, SOLUTION INTRAVENOUS at 07:00

## 2021-05-27 RX ADMIN — SODIUM CHLORIDE 75 ML/HR: 9 INJECTION, SOLUTION INTRAVENOUS at 15:33

## 2021-05-27 RX ADMIN — PIPERACILLIN AND TAZOBACTAM 3.38 G: 3; .375 INJECTION, POWDER, FOR SOLUTION INTRAVENOUS at 13:48

## 2021-05-27 RX ADMIN — PROPOFOL 30 MG: 10 INJECTION, EMULSION INTRAVENOUS at 07:48

## 2021-05-27 RX ADMIN — LIDOCAINE HYDROCHLORIDE 50 MG: 20 INJECTION, SOLUTION EPIDURAL; INFILTRATION; INTRACAUDAL; PERINEURAL at 07:38

## 2021-05-27 RX ADMIN — Medication 10 ML: at 18:59

## 2021-05-27 NOTE — ANESTHESIA POSTPROCEDURE EVALUATION
Post-Anesthesia Evaluation and Assessment    Patient: Sj Maza MRN: [id number]  SSN: [id number]    YOB: 1945  Age: 70 y.o. Sex: male       Cardiovascular Function/Vital Signs  Visit Vitals  /88   Pulse 62   Temp 37.1 °C (98.7 °F)   Resp 18   Ht 5' 5\" (1.651 m)   Wt 90.7 kg (200 lb)   SpO2 92%   BMI 33.28 kg/m²       Patient is status post MAC anesthesia for Procedure(s):  ESOPHAGOGASTRODUODENOSCOPY (EGD)   :-  LIVER BIOPSY  ENDOSCOPIC POLYPECTOMY. Nausea/Vomiting: None    Postoperative hydration reviewed and adequate. Pain:  Pain Scale 1: Numeric (0 - 10) (05/27/21 0830)  Pain Intensity 1: 0 (05/27/21 0830)   Managed    Neurological Status: At baseline    Mental Status and Level of Consciousness: Alert and oriented to person, place, and time    Pulmonary Status:   O2 Device: Nasal cannula (05/27/21 0830)   Adequate oxygenation and airway patent    Complications related to anesthesia: None    Post-anesthesia assessment completed. No concerns    Signed By: Real Connell MD     May 27, 2021              Procedure(s):  ESOPHAGOGASTRODUODENOSCOPY (EGD)   :-  LIVER BIOPSY  ENDOSCOPIC POLYPECTOMY. MAC    <BSHSIANPOST>    INITIAL Post-op Vital signs:   Vitals Value Taken Time   /70 05/27/21 0835   Temp 37.1 °C (98.7 °F) 05/27/21 0801   Pulse 62 05/27/21 0839   Resp 17 05/27/21 0839   SpO2 94 % 05/27/21 0839   Vitals shown include unvalidated device data.

## 2021-05-27 NOTE — DISCHARGE INSTRUCTIONS
[address] [facility] MD Narcsio, Pratik Rodrigez MD, MPH      TERI Daly, [facility]     Clementina Angulo, [facility]   Alex Funes AZALIA-MARILIN Eisenberg, [facility]       Bernard Tay De Jones 136    at [address], [facility] Scarlett Kapoor  22.    [phone]    FAX: [address] [place] [address], [address]    [phone]    FAX: [phone]         ENDOSCOPY DISCHARGE INSTRUCTIONS      Mell Trivedi  1945  Date: 5/27/2021    DISCOMFORT:  Use lozenges or warm salt water gargle for sore thoat  Apply warm compress to IV site if red. If redness or soreness persists call the office. You may experience gas and bloating. Walking and belching will help relieve this. You may experience chest discomfort or pressure especially if banding of esophageal varices was performed. DIET:  You may resume your normal diet. ACTIVITY:  Spend the remainder of the day resting. Avoid any strenuous activity. You may not operate a vehicle for 12 hours. You may not engage in an occupation involving machinery or appliances for rest of today. Avoid making any critical or financial decisions for at least 24 hour. Call the [phone] [id number] if you have any of the following:  Increasing chest or abdominal pain, nausea, vomiting, vomiting blood, abdominal distension or swelling, fever or chills, bloody discharge from nose or mouth or shortness of breath. Follow-up Instructions:  Call Dr. Louie Delvalle for any questions or problems at the phone number listed above. If a biopsy was performed, you will be contacted by the office staff or Dr Louie Delvalle within 1 week.    If you have not heard from us by then you may call the office at the phone number listed above to inquire about the results. ENDOSCOPY FINDINGS:  Your endoscopy demonstrated   Barretts esophagus. Gastritis of the stomach. Polyps in the stomach  A biopsy of the stomach was taken. A poly was removed could not be found. Will repeat EGD to look for development of varices in 3 years. The office will call about biopsy results  Keep follow-up appointment with me on 6/11/2021. DISCHARGE SUMMARY from the Nurse: The following personal items collected during your admission are returned to you:   Dental Appliance: Dental Appliances: None  Vision: Visual Aid: None  Hearing Aid:    Jewelry:    Clothing:    Other Valuables:    Valuables sent to safe:              [address] [place], [place] [address]MD Lizbeth Nation, Alveria Innocent, MD, MPH      Mei Betancur, TERI Kapoor, ACNP-BC     Clementina Angulo, [facility]   Onofre Eng, FNP-C    Rosa Hurtado, [facility]       [address] [place] [address].    [phone]    FAX: [phone] [facility]    at [address], [address] [phone]    FAX: [phone]         LIVER BIOPSY [address]  1945  Date: 5/27/2021    DIET:    You may resume your previous diet. ACTIVITIES:  Rest quietly the rest of today. You should not lift any objects more than 20 pounds for the next 2 days. If you work sitting down without strenuous activity you may return to work tomorrow. If you exert yourself or do heavy lifting at work you should take tomorrow off. Do not drive or operate hazardous machinery for 12 hours after you are discharged from this procedure.     SPECIAL INSTRUCTIONS:  Do not use any aspirin or non-steroidal (Motin, Advil, Naproxen, etc) pain medications for the next 2 days. You may use extra-strength Tylenol (acetaminophen) if you experience pain or discomfort later today. Restarting blood thinners: If you were taking blood thinners prior to the procedure you can restart these in 2 days. Call the [facility] office if you experience any of the following:  Persistent or severe abdominal pain. Persistent or severe abdominal distention. Fever and chills   Nausea and vomiting. New or unusual symptoms. Follow-up care: You should have a follow up appointment with Dr. Yashira Angeles to review the results of the liver biopsy results in 2 weeks. If you do not have an appointment please call the office at the number listed above to schedule this. Other instructions: If you have any problems or questions call the [facility] office at the phone number listed above. DISCHARGE SUMMARY from Nurse: The following personal items collected during your admission are returned to you:   Dental Appliance: Dental Appliances: None  Vision: Visual Aid: None  Hearing Aid:    Jewelry:    Clothing:    Other Valuables:    Valuables sent to safe:            Learning About Coronavirus (COVID-19)  Coronavirus (COVID-19): Overview  What is coronavirus ([id number]-[id number])? The coronavirus disease (COVID-19) is caused by a virus. It is an illness that was first found in [place], [place], in December 2019. It has since spread worldwide. The virus can cause fever, cough, and trouble breathing. In severe cases, it can cause pneumonia and make it hard to breathe without help. It can cause death. Coronaviruses are a large group of viruses. They cause the common cold. They also cause more serious illnesses like Middle East respiratory syndrome (MERS) and severe acute respiratory syndrome (SARS). COVID-19 is caused by a novel coronavirus.  That means it's a new type that has not been seen in people before. This virus spreads person-to-person through droplets from coughing and sneezing. It can also spread when you are close to someone who is infected. And it can spread when you touch something that has the virus on it, such as a doorknob or a tabletop. What can you do to protect yourself from coronavirus (COVID-19)? The best way to protect yourself from getting sick is to:  · Avoid areas where there is an outbreak. · Avoid contact with people who may be infected. · Wash your hands often with soap or alcohol-based hand sanitizers. · Avoid crowds and try to stay at least 6 feet away from other people. · Wash your hands often, especially after you cough or sneeze. Use soap and water, and scrub for at least 20 seconds. If soap and water aren't available, use an alcohol-based hand . · Avoid touching your mouth, nose, and eyes. What can you do to avoid spreading the virus to others? To help avoid spreading the virus to others:  · Cover your mouth with a tissue when you cough or sneeze. Then throw the tissue in the trash. · Use a disinfectant to clean things that you touch often. · Stay home if you are sick or have been exposed to the virus. Don't go to school, work, or public areas. And don't use public transportation. · If you are sick:  ? Leave your home only if you need to get medical care. But call the doctor's office first so they know you're coming. And wear a face mask, if you have one.  ? If you have a face mask, wear it whenever you're around other people. It can help stop the spread of the virus when you cough or sneeze. ? Clean and disinfect your home every day. Use household  and disinfectant wipes or sprays. Take special care to clean things that you grab with your hands. These include doorknobs, remote controls, phones, and handles on your refrigerator and microwave.  And don't forget countertops, tabletops, bathrooms, and computer keyboards. When to call for help  Call 911 anytime you think you may need emergency care. For example, call if:  · You have severe trouble breathing. (You can't talk at all.)  · You have constant chest pain or pressure. · You are severely dizzy or lightheaded. · You are confused or can't think clearly. · Your face and lips have a blue color. · You pass out (lose consciousness) or are very hard to wake up. Call your doctor now if you develop symptoms such as:  · Shortness of breath. · Fever. · Cough. If you need to get care, call ahead to the doctor's office for instructions before you go. Make sure you wear a face mask, if you have one, to prevent exposing other people to the virus. Where can you get the latest information? The following health organizations are tracking and studying this virus. Their websites contain the most up-to-date information. Katie Cherry also learn what to do if you think you may have been exposed to the virus. · U.S. Centers for Disease Control and Prevention (CDC): The CDC provides updated news about the disease and travel advice. The website also tells you how to prevent the spread of infection. www.cdc.gov  · World Health Organization [place] [facility]): WHO offers information about the virus outbreaks. WHO also has travel advice. www.who.int  Current as of: April 1, 2020               Content Version: 12.4  © 7142-2789 Healthwise, Incorporated. Care instructions adapted under license by your healthcare professional. If you have questions about a medical condition or this instruction, always ask your healthcare professional. [address] any warranty or liability for your use of this information.

## 2021-05-27 NOTE — PROCEDURES
Kahlil Fagan MD, João Staples MD, MPH      Keira Kline, TERI Brunson, [facility]-BC     April S Adele, [facility]   Blanca Lake AZALIA-MARILIN Burton, [facility]       Bernard [facility] 136    at [address] [place], [address] Scarlett Kapoor  22.    [phone]    FAX: [address] [place] [address], [address]    [phone]    FAX: [phone]         UPPER ENDOSCOPY PROCEDURE NOTE    Stefani Brooks  1945    INDICATION: Cirrhosis. Screening for esophageal varices with variceal ligation if  indicated. : Bro Perrin MD    SURGICAL ASSISTANT:  None    PROSTHETIC DEVISES, TISSUE GRAFTS, ORGAN TRANSPLANTS:  Not applicable     ANESTHESIA/SEDATION: Sedation per anesthesiology    PROCEDURE DESCRIPTION:  [employer] consent was obtained from the patient for the procedure. All risks and benefits of the procedure explained. The procedure was performed in the endoscopy suite. The patient was laying on a stretcher and moved to the left lateral decubitus position prior to administration of sedation. Sedation was administered by anesthesiology. See their note for details. The endoscope was inserted into the mouth and advanced under direct vision to the second portion of the duodenum. Careful inspection of upper gastrointestinal tract was made as the endoscope was inserted and withdrawn. Retroflexion of the endoscope to view of the cardia of the stomach was performed. The findings and interventions are described below. FINDINGS:  Esophagus:    No esophageal varices. No banding performed.   Barretts changes to esophageal mucosa extending upward from GEJ to 38 cm  [place] of Barretts mucosa in distal esophagus    Stomach:   No portal hypertensive gastropathy   Gastritis of the antrum  Polyps in the antrum  No gastric varices identified. Duodenum:   Normal bulb and second portion    SPECIMENS COLLECTED:   2 biopsies were taken from the antrum. The specimens were placed in preservative and transported to Pathology after the procedure. The largest gastric polyp was removed with snare cautery but could nt be found in the stomach   The snare site was well cauterized with no bleeding. INTERVENTIONS:  None    COMPLICATIONS: None. The patient tolerated the procedure well. EBL: Negligible. RECOMMENDATIONS:  Observe until discharge parameters are achieved. May resume previous diet. Repeat endoscopy to reassess varices and need for banding in 3 years. Follow-up Liver [facility] office as scheduled.       Isai Parham MD  [address], [address] [place] [address] 22.  [phone]  [address] [place] [address]

## 2021-05-27 NOTE — H&P
Merle Romero MD, Ju Mayfield MD, MPH      Leeanna Vaz, TERI Christianson, ACNP-BC     April S Adele, [facility]   Arleen Bolton AZALIA-C    [place], [facility]       Bernard Morris Jasvir De Jones 136    at [place] [address] [address], [address] Scarlett [address].    [phone]    FAX: [address] [place] [address], [address]    [phone]    FAX: [phone]       PRE-PROCEDURE NOTE - EGD    H and P from last office visit reviewed. Allergies reviewed. Out-patient medicaton list reviewed. Patient Active Problem List   Diagnosis Code    Th[facility]ocytopenia ([facility] D69.6    Class 1 obesity due to excess calories with serious comorbidity and body mass index (BMI) of 32.0 to 32.9 in adult E66.09, Z68.32    Diabetes mellitus (HCC) E11.9    History of CVA (cerebrovascular accident) Z80.78    Cirrhosis of liver without ascites ([facility] K74.60       No Known Allergies    No current facility-administered medications on file prior to encounter. Current Outpatient Medications on File Prior to Encounter   Medication Sig Dispense Refill    montelukast (SINGULAIR) 10 mg tablet Take 1 Tab by mouth daily. 30 Tab 3    pantoprazole (Protonix) 20 mg tablet Take 2 Tabs by mouth daily. 180 Tab 1    FreeStyle Ashlee 14 Day Sensor kit Use as directed. 6 Kit 1    enalapril (VASOTEC) 10 mg tablet Take 10 mg by mouth daily.  Oxygen Indications: 2L prn      aspirin delayed-release 81 mg tablet Take 1 Tab by mouth daily. 30 Tab 1    OTHER [facility]  - address higher level balance and maximize safety and independence with functional mobility.        Hx Stroke 1 Each 0    simvastatin (ZOCOR) 20 mg tablet Take 20 mg by mouth nightly.  metFORMIN (GLUCOPHAGE) 500 mg tablet Take 1,000 mg by mouth two (2) times daily (with meals). For EGD to assess for esophageal and gastric varices. Plan to perform banding if indicated based upon variceal size and appearance. Liver biopsy to confirm etiology and severity of liver disease to follow the EGD. The risks of the procedure were discussed with the patient. These included reaction to anesthesia, pain, perforation and bleeding. All questions were answered. The patient wishes to proceed with the procedure. The patient was counseled at length about the risks of meredith Covid-19 in the aracelis-operative and post-operative states including the recovery window of their procedure. The patient was made aware that meredith Covid-19 after a surgical procedure may worsen their prognosis for recovering from the virus and lend to a higher morbidity and or mortality risk. The patient was given the options of postponing their procedure. All of the risks, benefits, and alternatives were discussed. The patient does  wish to proceed with the procedure. PHYSICAL EXAMINATION:  Visit Vitals  BP (!) 141/80   Pulse 62   Temp 97.8 °F (36.6 °C)   Resp 20   Ht 5' 5\" (1.651 m)   Wt 200 lb (90.7 kg)   SpO2 95%   BMI 33.28 kg/m²       General: No acute distress. Eyes: Sclera anicteric. ENT: No oral lesions. Thyroid normal.  Nodes: No adenopathy. Skin: No spider angiomata. No jaundice. No palmar erythema. Respiratory: Lungs clear to auscultation. Cardiovascular: Regular heart rate. No murmurs. No JVD. Abdomen: Soft non-tender, liver size normal to percussion/palpation. Spleen not palpable. No obvious ascites. Extremities: No edema. No muscle wasting. No gross arthritic changes. Neurologic: Alert and oriented. Cranial nerves grossly intact. No asterixis.       MOST RECENT LABORATORY STUDIES:  Lab Results   Component Value Date/Time    WBC 4.0 05/06/2021 03:48 PM HGB 12.0 (L) 05/06/2021 03:48 PM    HCT 36.0 (L) 05/06/2021 03:48 PM    PLATELET CANCELED 09/00/8759 03:48 PM    MCV 88 05/06/2021 03:48 PM     Lab Results   Component Value Date/Time    INR 1.2 (H) 07/06/2013 02:13 AM    INR 1.2 (H) 06/19/2013 07:43 AM    Prothrombin time 12.9 (H) 07/06/2013 02:13 AM    Prothrombin time 12.6 (H) 06/19/2013 07:43 AM       ASSESSMENT AND PLAN:  EGD to assess for esophageal and/or gastric varices.   Sedation per anesthesiology    MD Juice Martinez 13  [address] Ileana Maki, [address] [place] [address] 22.  [phone]  [address]

## 2021-05-27 NOTE — ED PROVIDER NOTES
This is a 79-year-old male with history of coronary artery disease, chronic kidney disease, COPD, hypertension and diabetes. He has had a stroke in the past as well. This morning he had a EGD, liver biopsy and a polyp removed in the stomach by Dr. Joshua Trivedi. Upon getting home, the patient began developing abdominal pain and some diarrhea. The pain is gotten progressively more severe and is excruciating at this point. He returns to the ED because of this swelling and pain. He has had no fever or chills and has not been passing any blood in his stool or vomiting any blood. The pain is noted to be 10/10. Past Medical History:   Diagnosis Date    CAD (coronary artery disease)     Chronic kidney disease     stones    Chronic obstructive pulmonary disease ([address]     Diabetes ([address]     Hypertension     Stroke ([address] 1980s    right hand neuropathy       Past Surgical History:   Procedure Laterality Date    COLONOSCOPY N/A 4/12/2018    COL[facility] Osman Emerson [facility] at [facility] ENDOSCOPY    HX SKIN BIOPSY  2019    skin cancer removed from left leg.           Family History:   Problem Relation Age of Onset    Heart Disease Mother     Heart Disease Father     Cancer Sister         breast ca       Social History     Socioeconomic History    Marital status: LIFE PARTNER     Spouse name: Not on file    Number of children: Not on file    Years of education: Not on file    Highest education level: Not on file   Occupational History    Not on file   Tobacco Use    Smoking status: Former Smoker     Packs/day: 2.00     Years: 45.00     Pack years: 90.00     Quit date: 6/19/2005     Years since quitting: 15.9    Smokeless tobacco: Never Used   Substance and Sexual Activity    Alcohol use: No     Comment: 1 gallon whiskey a week stopped 4 years ago    Drug use: Not on file    Sexual activity: Not on file   Other Topics Concern    Not on file   Social History Narrative    Not on file     Social Determinants of Health     Financial Resource Strain:     Difficulty of Paying Living Expenses:    Food Insecurity:     Worried About Running Out of Food in the Last Year:     [address] in the Last Year:    Transportation Needs:     Lack of Transportation (Medical):  Lack of Transportation (Non-Medical):    Physical Activity:     Days of Exercise per Week:     Minutes of Exercise per Session:    Stress:     Feeling of Stress :    Social Connections:     Frequency of Communication with Friends and Family:     Frequency of Social Gatherings with Friends and Family:     Attends Rastafarian Services:     Active Member of Clubs or Organizations:     Attends Club or Organization Meetings:     Marital Status:    Intimate Partner Violence:     Fear of Current or Ex-Partner:     Emotionally Abused:     Physically Abused:     Sexually Abused: ALLERGIES: Patient has no known allergies. Review of Systems   Constitutional: Negative for activity change, appetite change, chills, fatigue and fever. HENT: Negative for ear pain, facial swelling, sore throat and trouble swallowing. Eyes: Negative for pain, discharge and visual disturbance. Respiratory: Negative for chest tightness, shortness of breath and wheezing. Cardiovascular: Negative for chest pain and palpitations. Gastrointestinal: Positive for abdominal pain, diarrhea, nausea and vomiting. Negative for blood in stool. Genitourinary: Negative for difficulty urinating, flank pain and hematuria. Musculoskeletal: Negative for arthralgias, joint swelling, myalgias and neck pain. Skin: Negative for color change and rash. Neurological: Negative for dizziness, weakness, numbness and headaches. Hematological: Negative for adenopathy. Does not bruise/bleed easily. Psychiatric/Behavioral: Negative for behavioral problems, confusion and sleep disturbance. All other systems reviewed and are negative.       Vitals:    05/27/21 1247   BP: 113/69   Pulse: 83   Resp: 17   Temp: 98 °F (36.7 °C)   SpO2: 94%   Weight: 92.3 kg (203 lb 7.8 oz)            Physical Exam  Vitals and nursing note reviewed. Constitutional:       General: He is in acute distress. Appearance: He is well-developed. He is obese. He is ill-appearing. Comments: This is a 33-year-old male that appears in acute distress with abdominal pain. Vital signs are as noted. HENT:      Head: Normocephalic and atraumatic. Nose: Nose normal.   Eyes:      General: No scleral icterus. Conjunctiva/sclera: Conjunctivae normal.      Pupils: Pupils are equal, round, and reactive to light. Neck:      Thyroid: No thyromegaly. Vascular: No JVD. Trachea: No tracheal deviation. Comments: No carotid bruits noted. Cardiovascular:      Rate and Rhythm: Normal rate and regular rhythm. Heart sounds: Normal heart sounds. No murmur heard. No friction rub. No gallop. Pulmonary:      Effort: Pulmonary effort is normal. No respiratory distress. Breath sounds: Normal breath sounds. No wheezing or rales. Chest:      Chest wall: No tenderness. Abdominal:      General: Abdomen is protuberant. Bowel sounds are decreased. There is distension. Palpations: Abdomen is rigid. There is no mass. Tenderness: There is generalized abdominal tenderness ( There is generalized tenderness to palpation with guarding. ). There is no guarding or rebound. Musculoskeletal:         General: No tenderness. Normal range of motion. Cervical back: Normal range of motion and neck supple. Lymphadenopathy:      Cervical: No cervical adenopathy. Skin:     General: Skin is warm and dry. Findings: No erythema or rash. Neurological:      Mental Status: He is alert and oriented to person, place, and time. Cranial Nerves: No cranial nerve deficit. Coordination: Coordination normal.      Deep Tendon Reflexes: Reflexes are normal and symmetric. Psychiatric:         Behavior: Behavior normal.         Thought Content: Thought content normal.         Judgment: Judgment normal.          MDM  Number of Diagnoses or Management Options     Amount and/or Complexity of Data Reviewed  Clinical lab tests: ordered and reviewed  Tests in the radiology section of CPT®: ordered and reviewed  Decide to obtain previous medical records or to obtain history from someone other than the patient: yes  Obtain history from someone other than the patient: yes  Review and summarize past medical records: yes  Discuss the patient with other providers: yes    Risk of Complications, Morbidity, and/or Mortality  Presenting problems: high  Diagnostic procedures: high  Management options: high    Patient Progress  Patient progress: stable         Procedures      2:25 PM  I have spoken with Dr. Iftikhar Trejo and he is planning on seeing the patient in the ED. Still awaiting a CT of the abdomen. Patient is resting comfortably after medications. The patient's labs are not remarkable. There is no free air on the CT of the abdomen. Dr. Iftikhar Trejo is in seeing the patient and he is having a recurrence of his severe generalized pain. We will asked the hospitalist to admit for further management and observation. He will need IV fluids and pain medication. Dr. Iftikhar Trejo will follow. Perfect Serve Consult for Admission  4:26 PM    ED Room Number: ER27/27  Patient Name and age:  Aleshia Ellis 70 y.o.  male  Working Diagnosis:   1. Abdominal pain, generalized      2. Pancreatitis  COVID-19 Suspicion:  no  Sepsis present:  no  Reassessment needed: no  Code Status:  Full Code  Readmission: no  Isolation Requirements:  no  Recommended Level of Care:  med/surg  Department:[facility] Adult ED - 21   Other:  Dr. Macario Goes following    Patient's lipase is noted to be over 3000 and the CT does show some evidence of pancreatic disease.

## 2021-05-27 NOTE — PROCEDURES
China Javed MD, [place], Sandra Serrano MD, MPH      Kelin Ashley, PA-C Darylene Boyer, [facility]     Clementina Angulo, [facility]   Christina Gerber, FNP-C    Fransisca Molina, [facility]       Bernard Morris [facility] 136    at [address], [facility] Scarlett Kapoor  22.    [phone]    FAX: [address] [place] [address], [address] [place] [address] - [address]    [phone]    FAX: [phone]         LIVER BIOPSY PROCEDURE NOTE    Elvis Torsten  1945    INDICATIONS/PRE-OPERATIVE  DIAGNOSIS:  NAFLD    : Sabrina Gee MD    SURGICAL ASSISTANT:  None    PROSTHETIC DEVICES, TISSUE GRAFTS, TRANSPLANTED ORGANS:  Not applicable    SEDATION: 1% Lidocaine injection 10 ml    PROCEDURE:  Informed consent to perform the procedure was obtained from the patient. The patient was positioned on the edge of the stretcher lying flat in the supine position. Ultrasound was utilized to image the liver. The diaphragm and any major mass lesion or vascular structures within the liver were identified. An appropriate site for liver biopsy was identified. The distance from the surface of the skin to the liver capsule was 10 cm. This area was prepped with betadine and draped in sterile fashion. The skin was infiltrated with 1% lidocaine. The deeper subcutanous tissues and liver capsule overlying the biopsy site were then infiltrated with 1% lidocaine until appropriate anesthesia was obtained. A small incision was made in the skin so the biopsy devise could be easily inserted. A total of 2 passes with the 16 gauge Bard biopsy devise was then made into the liver. Core(s) of liver tissue totaling 3 cm in length were obtained and placed into tissue fixative. A band aid was placed over the biopsy site. The patient was then repositioned on the right side and transported to the recovery area on the stretcher for routine monitoring until discharge. The specimen was sent to pathology for processing via the normal transport mechanism. SPECIMEN COLLECTED: Liver    INTERVENTIONS:  None    ESTIMATED BLOOD LOSS: Negligible.      POST-OPERATIVE DIAGNOSIS: Same as Pre-operative Diagnosis      Daria Hearn MD [facility], [facility] 22.  [phone]  [address] [place] [address]

## 2021-05-27 NOTE — ANESTHESIA PREPROCEDURE EVALUATION
Anesthetic History   No history of anesthetic complications            Review of Systems / Medical History  Patient summary reviewed, nursing notes reviewed and pertinent labs reviewed    Pulmonary  Within defined limits  COPD               Neuro/Psych   Within defined limits    CVA       Cardiovascular    Hypertension          CAD         GI/Hepatic/Renal           Liver disease     Endo/Other  Within defined limits  Diabetes    Morbid obesity     Other Findings              Physical Exam    Airway  Mallampati: II  TM Distance: > 6 cm  Neck ROM: normal range of motion   Mouth opening: Normal     Cardiovascular  Regular rate and rhythm,  S1 and S2 normal,  no murmur, click, rub, or gallop             Dental  No notable dental hx       Pulmonary  Breath sounds clear to auscultation               Abdominal  GI exam deferred       Other Findings            Anesthetic Plan    ASA: 3  Anesthesia type: MAC          Induction: Intravenous  Anesthetic plan and risks discussed with: Patient

## 2021-05-27 NOTE — ROUTINE PROCESS
TRANSFER - OUT REPORT: 
 
Verbal report given to KENNY Schulz(name) on Abby Dyer  being transferred to 5S, room 560(unit) for routine progression of care Report consisted of patients Situation, Background, Assessment and  
Recommendations(SBAR). Information from the following report(s) SBAR, ED Summary and MAR was reviewed with the receiving nurse. Lines:  
Peripheral IV 05/27/21 Right Antecubital (Active) Site Assessment Clean, dry, & intact 05/27/21 1307 Phlebitis Assessment 0 05/27/21 1307 Infiltration Assessment 0 05/27/21 1307 Dressing Status Clean, dry, & intact 05/27/21 1307 Hub Color/Line Status Pink 05/27/21 1307 Action Taken Blood drawn 05/27/21 1307 Opportunity for questions and clarification was provided. Patient transported with: 
 [place]

## 2021-05-27 NOTE — H&P
[address] [facility] Adult  Hospitalist Group  History and Physical    Primary Care Provider: Frederic Felix MD  Date of Service:  5/27/2021    Subjective:     Abby Dyer is a 70 y.o. male who presents with acute onset of abdominal pain this AM.  Patient has history of liver cirrhosis. Patient underwent EGD and liver biopsy by Dr. Topher Parish earlier this AM.  EGD shows no varices. Liver biopsy was done without complication. Patient went home and started developing abdominal pain in the epigastrium and middle of the abdomen. Denies any nausea vomiting. Patient reports diarrhea at home. Patient represented to the emergency room due to his abdominal pain. CT abdomen and pelvis showing extensive peripancreatic and retroperitoneal inflammatory changes compatible with acute pancreatitis. Also shows cholecystolithiasis with none specific gallbladder wall thickening. Patient's lipase were found to be more than 3000. Hospitalist service requested to admit the patient for further evaluation management. Review of Systems:    A comprehensive review of systems was negative except for that written in the History of Present Illness. Past Medical History:   Diagnosis Date    CAD (coronary artery disease)     Chronic kidney disease     stones    Chronic obstructive pulmonary disease ([address]     Diabetes ([address]     Hypertension     [facility] ([facility] 1980s    right hand neuropathy      Past Surgical History:   Procedure Laterality Date    COLONOSCOPY N/A 4/12/2018    COL[facility] Keyes MD at [facility] ENDOSCOPY    HX SKIN BIOPSY  2019    skin cancer removed from left leg. Prior to Admission medications    Medication Sig Start Date End Date Taking? Authorizing Provider   pantoprazole (Protonix) 20 mg tablet Take 2 Tabs by mouth daily. 12/18/20   Frederic Felix MD   enalapril (VASOTEC) 10 mg tablet Take 10 mg by mouth daily.  7/3/20   Provider, Historical   aspirin delayed-release 81 mg tablet Take 1 Tab by mouth daily. 5/29/19   Aniceto Bush NP   metFORMIN (GLUCOPHAGE) 500 mg tablet Take 1,000 mg by mouth two (2) times daily (with meals). Provider, Historical     No Known Allergies   Family History   Problem Relation Age of Onset    Heart Disease Mother     Heart Disease Father     Cancer Sister         breast ca        SOCIAL HISTORY:  Patient resides at Home. Patient ambulates with no assistance. Smoking history: Former smoker  Alcohol history: Patient has quit drinking alcohol 4 years ago        Objective:       Physical Exam:   Visit Vitals  BP (!) 123/57   Pulse 61   Temp 98 °F (36.7 °C)   Resp 16   Wt 92.3 kg (203 lb 7.8 oz)   SpO2 93%   BMI 33.86 kg/m²     General:  Alert, cooperative, no distress, appears stated age. Head:  Normocephalic, without obvious abnormality, atraumatic. Eyes:  Conjunctivae/corneas clear. PERRL, EOMs intact. Fundi benign   Ears:  Normal TMs and external ear canals both ears. Nose: Nares normal. Septum midline. Mucosa normal. No drainage or sinus tenderness. Throat: Lips, mucosa, and tongue normal. Teeth and gums normal.   Neck: Supple, symmetrical, trachea midline, no adenopathy, thyroid: no enlargement/tenderness/nodules, no carotid bruit and no JVD. Back:   Symmetric, no curvature. ROM normal. No CVA tenderness. Lungs:   Clear to auscultation bilaterally. Chest wall:  No tenderness or deformity. Heart:  Regular rate and rhythm, S1, S2 normal, no murmur, click, rub or gallop. Abdomen:   Soft, non-tender. Bowel sounds normal. No masses,  No organomegaly. Genitalia:  Deferred   Rectal:  Deferred   Extremities: Extremities normal, atraumatic, no cyanosis or edema. Pulses: 2+ and symmetric all extremities. Skin: Skin color, texture, turgor normal. No rashes or lesions   Lymph nodes: Cervical, supraclavicular, and axillary nodes normal.   Neurologic: CNII-XII intact.  Normal strength, sensation and reflexes throughout. Cap refill: Brisk, less than 3 seconds  Pulses: 2+, symmetric in all extremities  Skin: Warm, dry, without rashes or lesions    Data Review: All diagnostic labs and studies have been reviewed. CT abdomen and pelvis  Extensive peripancreatic and retroperitoneal inflammatory changes, compatible  with acute pancreatitis. No evidence of pneumoperitoneum. Cholecystolithiasis  with nonspecific gallbladder wall thickening. 3 cm left adrenal adenoma. Nodular  hepatic contour. Assessment and Plan     Acute pancreatitis  -Patient presents with acute onset of abdominal pain, noted inflammatory changes around pancreas on the CT scan and also lipase elevated more than 3000, consistent with acute pancreatitis  -Likely etiology could be gallstone, gallbladder with stones and thickened wall  -Patient will be kept n.p.o., IV fluid for hydration, pain management with Dilaudid  -Recess clinical status and lipase in a.m. 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 without finding of varices    Estimated length of stay is 2 midnights.     FUNCTIONAL STATUS PRIOR TO HOSPITALIZATION Ambulates Independently (including history of recent falls):     Signed By: Milinda Denver, MD     May 27, 2021

## 2021-05-27 NOTE — PERIOP NOTES

## 2021-05-27 NOTE — ED TRIAGE NOTES
Arrived from home with girlfriend c/o abdominal pain and nausea. Patient had EGD with liver biopsy this morning. Vomiting x2 , Diarrhea x2. Patient is dry heaving in triage cobian.

## 2021-05-28 ENCOUNTER — APPOINTMENT (OUTPATIENT)
Dept: MRI IMAGING | Age: 76
DRG: 439 | End: 2021-05-28
Attending: PHYSICIAN ASSISTANT
Payer: MEDICARE

## 2021-05-28 LAB
ALBUMIN SERPL-MCNC: 3.5 G/DL (ref 3.5–5)
ALBUMIN/GLOB SERPL: 1.1 {RATIO} (ref 1.1–2.2)
ALP SERPL-CCNC: 97 U/L (ref 45–117)
ALT SERPL-CCNC: 165 U/L (ref 12–78)
ANION GAP SERPL CALC-SCNC: 12 MMOL/L (ref 5–15)
AST SERPL-CCNC: 141 U/L (ref 15–37)
BASOPHILS # BLD: 0 K/UL (ref 0–0.1)
BASOPHILS NFR BLD: 0 % (ref 0–1)
BILIRUB SERPL-MCNC: 1.3 MG/DL (ref 0.2–1)
BUN SERPL-MCNC: 22 MG/DL (ref 6–20)
BUN/CREAT SERPL: 16 (ref 12–20)
CALCIUM SERPL-MCNC: 8.1 MG/DL (ref 8.5–10.1)
CHLORIDE SERPL-SCNC: 116 MMOL/L (ref 97–108)
CO2 SERPL-SCNC: 14 MMOL/L (ref 21–32)
CREAT SERPL-MCNC: 1.39 MG/DL (ref 0.7–1.3)
DIFFERENTIAL METHOD BLD: ABNORMAL
EOSINOPHIL # BLD: 0 K/UL (ref 0–0.4)
EOSINOPHIL NFR BLD: 0 % (ref 0–7)
ERYTHROCYTE [DISTWIDTH] IN BLOOD BY AUTOMATED COUNT: 14.2 % (ref 11.5–14.5)
GLOBULIN SER CALC-MCNC: 3.3 G/DL (ref 2–4)
GLUCOSE BLD STRIP.AUTO-MCNC: 117 MG/DL (ref 65–117)
GLUCOSE BLD STRIP.AUTO-MCNC: 128 MG/DL (ref 65–117)
GLUCOSE BLD STRIP.AUTO-MCNC: 152 MG/DL (ref 65–117)
GLUCOSE BLD STRIP.AUTO-MCNC: 158 MG/DL (ref 65–117)
GLUCOSE SERPL-MCNC: 170 MG/DL (ref 65–100)
HCT VFR BLD AUTO: 46.9 % (ref 36.6–50.3)
HGB BLD-MCNC: 15.2 G/DL (ref 12.1–17)
IMM GRANULOCYTES # BLD AUTO: 0.1 K/UL (ref 0–0.04)
IMM GRANULOCYTES NFR BLD AUTO: 1 % (ref 0–0.5)
LIPASE SERPL-CCNC: >3000 U/L (ref 73–393)
LYMPHOCYTES # BLD: 0.8 K/UL (ref 0.8–3.5)
LYMPHOCYTES NFR BLD: 6 % (ref 12–49)
MCH RBC QN AUTO: 29.5 PG (ref 26–34)
MCHC RBC AUTO-ENTMCNC: 32.4 G/DL (ref 30–36.5)
MCV RBC AUTO: 91.1 FL (ref 80–99)
MONOCYTES # BLD: 0.8 K/UL (ref 0–1)
MONOCYTES NFR BLD: 6 % (ref 5–13)
NEUTS SEG # BLD: 11.6 K/UL (ref 1.8–8)
NEUTS SEG NFR BLD: 87 % (ref 32–75)
NRBC # BLD: 0 K/UL (ref 0–0.01)
NRBC BLD-RTO: 0 PER 100 WBC
PLATELET # BLD AUTO: 120 K/UL (ref 150–400)
PMV BLD AUTO: 11.4 FL (ref 8.9–12.9)
POTASSIUM SERPL-SCNC: 4.5 MMOL/L (ref 3.5–5.1)
PROT SERPL-MCNC: 6.8 G/DL (ref 6.4–8.2)
RBC # BLD AUTO: 5.15 M/UL (ref 4.1–5.7)
RBC MORPH BLD: ABNORMAL
SERVICE CMNT-IMP: ABNORMAL
SERVICE CMNT-IMP: NORMAL
SODIUM SERPL-SCNC: 142 MMOL/L (ref 136–145)
WBC # BLD AUTO: 13.3 K/UL (ref 4.1–11.1)

## 2021-05-28 PROCEDURE — 74011250636 HC RX REV CODE- 250/636: Performed by: FAMILY MEDICINE

## 2021-05-28 PROCEDURE — 99223 1ST HOSP IP/OBS HIGH 75: CPT | Performed by: INTERNAL MEDICINE

## 2021-05-28 PROCEDURE — A9585 GADOBUTROL INJECTION: HCPCS | Performed by: FAMILY MEDICINE

## 2021-05-28 PROCEDURE — 99221 1ST HOSP IP/OBS SF/LOW 40: CPT | Performed by: NURSE PRACTITIONER

## 2021-05-28 PROCEDURE — 74011250636 HC RX REV CODE- 250/636: Performed by: INTERNAL MEDICINE

## 2021-05-28 PROCEDURE — 74011000250 HC RX REV CODE- 250: Performed by: FAMILY MEDICINE

## 2021-05-28 PROCEDURE — 74011000258 HC RX REV CODE- 258: Performed by: FAMILY MEDICINE

## 2021-05-28 PROCEDURE — 65270000029 HC RM PRIVATE

## 2021-05-28 PROCEDURE — 83690 ASSAY OF LIPASE: CPT

## 2021-05-28 PROCEDURE — 74011250636 HC RX REV CODE- 250/636: Performed by: NURSE PRACTITIONER

## 2021-05-28 PROCEDURE — 82962 GLUCOSE BLOOD TEST: CPT

## 2021-05-28 PROCEDURE — 36415 COLL VENOUS BLD VENIPUNCTURE: CPT

## 2021-05-28 PROCEDURE — 77030021566 MRI ABD W MRCP W WO CONT

## 2021-05-28 PROCEDURE — 80053 COMPREHEN METABOLIC PANEL: CPT

## 2021-05-28 PROCEDURE — 99222 1ST HOSP IP/OBS MODERATE 55: CPT | Performed by: SURGERY

## 2021-05-28 PROCEDURE — 74011250637 HC RX REV CODE- 250/637: Performed by: FAMILY MEDICINE

## 2021-05-28 PROCEDURE — 85025 COMPLETE CBC W/AUTO DIFF WBC: CPT

## 2021-05-28 RX ORDER — SODIUM BICARBONATE IN D5W 150/1000ML
PLASTIC BAG, INJECTION (ML) INTRAVENOUS CONTINUOUS
Status: DISCONTINUED | OUTPATIENT
Start: 2021-05-28 | End: 2021-05-29

## 2021-05-28 RX ORDER — HYDROMORPHONE HYDROCHLORIDE 2 MG/ML
2 INJECTION, SOLUTION INTRAMUSCULAR; INTRAVENOUS; SUBCUTANEOUS
Status: DISCONTINUED | OUTPATIENT
Start: 2021-05-28 | End: 2021-05-31

## 2021-05-28 RX ORDER — SODIUM CHLORIDE 0.9 % (FLUSH) 0.9 %
10 SYRINGE (ML) INJECTION
Status: COMPLETED | OUTPATIENT
Start: 2021-05-28 | End: 2021-05-28

## 2021-05-28 RX ORDER — SODIUM CHLORIDE, SODIUM LACTATE, POTASSIUM CHLORIDE, CALCIUM CHLORIDE 600; 310; 30; 20 MG/100ML; MG/100ML; MG/100ML; MG/100ML
100 INJECTION, SOLUTION INTRAVENOUS CONTINUOUS
Status: DISCONTINUED | OUTPATIENT
Start: 2021-05-28 | End: 2021-05-28

## 2021-05-28 RX ADMIN — LISINOPRIL 10 MG: 10 TABLET ORAL at 08:47

## 2021-05-28 RX ADMIN — Medication: at 11:17

## 2021-05-28 RX ADMIN — GADOBUTROL 9.2 ML: 604.72 INJECTION INTRAVENOUS at 17:31

## 2021-05-28 RX ADMIN — SODIUM CHLORIDE, POTASSIUM CHLORIDE, SODIUM LACTATE AND CALCIUM CHLORIDE 100 ML/HR: 600; 310; 30; 20 INJECTION, SOLUTION INTRAVENOUS at 06:00

## 2021-05-28 RX ADMIN — SODIUM CHLORIDE 100 ML: 900 INJECTION, SOLUTION INTRAVENOUS at 17:32

## 2021-05-28 RX ADMIN — PANTOPRAZOLE SODIUM 40 MG: 40 TABLET, DELAYED RELEASE ORAL at 08:47

## 2021-05-28 RX ADMIN — HYDROMORPHONE HYDROCHLORIDE 2 MG: 2 INJECTION, SOLUTION INTRAMUSCULAR; INTRAVENOUS; SUBCUTANEOUS at 13:58

## 2021-05-28 RX ADMIN — Medication 10 ML: at 17:32

## 2021-05-28 RX ADMIN — ENOXAPARIN SODIUM 40 MG: 40 INJECTION SUBCUTANEOUS at 08:49

## 2021-05-28 RX ADMIN — HYDROMORPHONE HYDROCHLORIDE 2 MG: 2 INJECTION, SOLUTION INTRAMUSCULAR; INTRAVENOUS; SUBCUTANEOUS at 08:10

## 2021-05-28 RX ADMIN — PIPERACILLIN AND TAZOBACTAM 3.38 G: 3; .375 INJECTION, POWDER, LYOPHILIZED, FOR SOLUTION INTRAVENOUS at 22:33

## 2021-05-28 RX ADMIN — PIPERACILLIN AND TAZOBACTAM 3.38 G: 3; .375 INJECTION, POWDER, LYOPHILIZED, FOR SOLUTION INTRAVENOUS at 14:00

## 2021-05-28 RX ADMIN — Medication 10 ML: at 14:00

## 2021-05-28 RX ADMIN — ASPIRIN 81 MG: 81 TABLET, COATED ORAL at 08:47

## 2021-05-28 RX ADMIN — Medication 10 ML: at 22:33

## 2021-05-28 RX ADMIN — HYDROMORPHONE HYDROCHLORIDE 1 MG: 1 INJECTION, SOLUTION INTRAMUSCULAR; INTRAVENOUS; SUBCUTANEOUS at 22:29

## 2021-05-28 NOTE — PROGRESS NOTES
Bedside and Verbal shift change report [facility] and Colette Reid (oncoming nurse) by Desire Booker (offgoing nurse). Report included the following information SBAR, Kardex, ED Summary, Intake/Output and MAR. Hospitalist on call, Fiona Barfield, informed that patient's BP is still elevated even after pain is managed at 184/90. NP looked back at patient's BP trends and said for nightshift to check his BP again in an hour and contact him if SBP still > 180. Otherwise continue to monitor. Dr. Dania Gallagher also said that he would re-enter patients GI consult tomorrow (5/28/21) and that there was no need to call it this evening.

## 2021-05-28 NOTE — PROGRESS NOTES
Bedside shift change report given to Padmaja Nava RN (oncoming nurse) by Patience Padron (offgoing nurse). Report included the following information SBAR, Kardex, Procedure Summary, Intake/Output, MAR and Recent Results.

## 2021-05-28 NOTE — CONSULTS
[address]  [address]  [address] [place] Razo, [address]  [phone]                     GI CONSULTATION NOTE      NAME:  Treasure Arauz   :   1945   MRN:   [id number]     Consult Date: 2021     Chief Complaint: Pancreatitis     History of Present Illness:  Patient is a 70 y.o. who is seen in consultation at the request of Dr. Pancho Cabello for the above problem. Patient is followed by Dr. Pancho Cabello for NAFLD and had an OP US-guided percutaneous liver biopsy yesterday. He also had EGD which was negative for esophageal or gastric varices and portal hypertensive gastropathy , with findings c/w Barretts changes in distal esophagus. Patient states that he was in a normal state of health at the time of the procedure, and he went home and ate a banana. Soon after he began to experience mid-abdominal pain that migrated to the right side and progressively worsened, accompanied by nausea, vomiting and diarrhea. No blood or melena. He came to the ER and was found to have a lipase >3000 with CT findings of extensive acute pancreatitis with multiple gallstones in the gallbladder but a normal-appearing CBD. LFTs were:  , , TBili 1.2. He was admitted and kept NPO with IVF and pain meds. He reports that overnight his lower back began to bother him, but he is no longer having nausea or vomiting. The pain is still rather bad, and lipase this morning still > 3000. He has never had pancreatitis in the past and denies ETOH.      PMH:  Past Medical History:   Diagnosis Date    CAD (coronary artery disease)     Chronic kidney disease     stones    Chronic obstructive pulmonary disease ([address]     [facility] ([facility]     Hypertension     Stroke ([facility] [address] [facility] 1980s    right hand neuropathy       PSH:  Past Surgical History:   Procedure Laterality Date    COLONOSCOPY N/A 2018    COLONOSCOPY performed by Vibha Reynolds MD at [address] HX SKIN BIOPSY  2019    skin cancer removed from left leg. Allergies:  No Known Allergies    Home Medications:  Prior to Admission Medications   Prescriptions Last Dose Informant Patient Reported? Taking?   aspirin delayed-release 81 mg tablet   No No   Sig: Take 1 Tab by mouth daily. enalapril (VASOTEC) 10 mg tablet   Yes No   Sig: Take 10 mg by mouth daily. metFORMIN (GLUCOPHAGE) 500 mg tablet   Yes No   Sig: Take 1,000 mg by mouth two (2) times daily (with meals). pantoprazole (Protonix) 20 mg tablet   No No   Sig: Take 2 Tabs by mouth daily.       Facility-Administered Medications: None       Hospital Medications:  Current Facility-Administered Medications   Medication Dose Route Frequency    HYDROmorphone (PF) (DILAUDID) injection 2 mg  2 mg IntraVENous Q6H PRN    sodium bicarbonate 150 mEq/1000 mL D5W (premix)   IntraVENous CONTINUOUS    piperacillin-tazobactam (ZOSYN) 3.375 g in 0.9% sodium chloride (MBP/ADV) 100 mL MBP  3.375 g IntraVENous Q8H    aspirin delayed-release tablet 81 mg  81 mg Oral DAILY    lisinopriL (PRINIVIL, ZESTRIL) tablet 10 mg  10 mg Oral DAILY    pantoprazole (PROTONIX) tablet 40 mg  40 mg Oral DAILY    HYDROmorphone (PF) (DILAUDID) injection 1 mg  1 mg IntraVENous Q4H PRN    sodium chloride (NS) flush 5-40 mL  5-40 mL IntraVENous Q8H    sodium chloride (NS) flush 5-40 mL  5-40 mL IntraVENous PRN    acetaminophen (TYLENOL) tablet 650 mg  650 mg Oral Q6H PRN    Or    acetaminophen (TYLENOL) suppository 650 mg  650 mg Rectal Q6H PRN    polyethylene glycol (MIRALAX) packet 17 g  17 g Oral DAILY PRN    promethazine (PHENERGAN) tablet 12.5 mg  12.5 mg Oral Q6H PRN    Or    ondansetron (ZOFRAN) injection 4 mg  4 mg IntraVENous Q6H PRN    enoxaparin (LOVENOX) injection 40 mg  40 mg SubCUTAneous DAILY    insulin lispro (HUMALOG) injection   SubCUTAneous Q6H    glucose chewable tablet 16 g  4 Tablet Oral PRN    dextrose (D50W) injection syrg 12.5-25 g  12.5-25 g IntraVENous PRN    glucagon (GLUCAGEN) injection 1 mg  1 mg IntraMUSCular PRN       Social History:  Social History     Tobacco Use    Smoking status: Former Smoker     Packs/day: 2.00     Years: 45.00     Pack years: 90.00     Quit date: 6/19/2005     Years since quitting: 15.9    Smokeless tobacco: Never Used   Substance Use Topics    Alcohol use: No     Comment: 1 gallon whiskey a week stopped 4 years ago       Family History:  Family History   Problem Relation Age of Onset    Heart Disease Mother     Heart Disease Father     Cancer Sister         breast ca       Review of Systems:    Constitutional: negative fever, negative chills, negative weight loss  Eyes:   negative visual changes  ENT:   negative sore throat, tongue or lip swelling  Respiratory:  negative cough, negative dyspnea  Cards:  negative for chest pain, palpitations, lower extremity edema  GI:   See HPI  :  negative for frequency, dysuria  Integument:  negative for rash and pruritus  Heme:  negative for easy bruising and gum/nose bleeding  Musculoskel: negative for myalgias,  back pain and muscle weakness  Neuro: negative for headaches, dizziness, vertigo  Psych:  negative for feelings of anxiety, depression      Objective:     Patient Vitals for the past 8 hrs:   BP Temp Pulse Resp SpO2 Height   05/28/21 1300      5' 5\" (1.651 m)   05/28/21 0846 112/64 98.2 °F (36.8 °C) (!) 102 17 91 %      No intake/output data recorded. 05/26 1901 - 05/28 0700  In: 600 [I.V.:600]  Out: -       PHYSICAL EXAM:  General appearance: cooperative, no distress, appears stated age  Skin: Extremities and face reveal no rashes or jaundice. Florentin complexion  HEENT: Sclerae anicteric. Extra-occular muscles are intact. Cardiovascular: Regular rate and rhythm. No murmurs, gallops, or rubs. Respiratory: Comfortable breathing with no accessory muscle use. Clear breath sounds with no wheezes, rales, or rhonchi. GI: Abdomen nondistended, soft, and normal active bowel sounds. Moderate ttp upper abdomen.   No enlargement of the liver or spleen. No masses palpable. Rectal: Deferred   Musculoskeletal: No edema of the lower legs. Neurological: Gross memory appears intact. Patient is alert and oriented. Psychiatric: Mood appears appropriate with good judgement. No anxiety or agitation. Data Review     Recent Labs     05/28/21  0332 05/27/21  1300   WBC 13.3* 11.9*   HGB 15.2 13.6   HCT 46.9 42.4   * 149*     Recent Labs     05/28/21  0332 05/27/21  1300    146*   K 4.5 3.8   * 116*   CO2 14* 21   BUN 22* 16   CREA 1.39* 1.31*   * 123*   CA 8.1* 8.6     Recent Labs     05/28/21  0332 05/27/21  1300   AP 97 86   TP 6.8 7.0   ALB 3.5 3.7   GLOB 3.3 3.3   LPSE >3,000* >3,000*     No results for input(s): INR, PTP, APTT, INREXT in the last 72 hours.      Imaging studies reviewed    Assessment / Plan :     Acute pancreatitis  Cholelithiasis  - Assumed gallstone-induced, though CT without evidence of CBD dilatation  - MRCP pending  - Cannot perform ERCP today with Lovenox at 0900  - NPO, IVF, pain management per hospitalist    NAFLD/Cirrhosis  - Followed by Dr. Dania Gallagher  - US-guided liver biopsy 5/27    DVT prophylaxis  - Lovenox      Patient Active Hospital Problem List:   Active Problems:    Pancreatitis (5/27/2021)

## 2021-05-28 NOTE — PROGRESS NOTES
TRANSFER - IN REPORT:    Verbal report received from Sho Campos (name) on Kendall Umaña  being received from ER (unit) for routine progression of care      Report consisted of patients Situation, Background, Assessment and   Recommendations(SBAR). Information from the following report(s) SBAR, Kardex, ED Summary, Intake/Output and MAR was reviewed with the receiving nurse. Opportunity for questions and clarification was provided. Assessment completed upon patients arrival to unit and care assumed.          Primary Nurse Parris Guidry and Itzel Penaloza RN performed a dual skin assessment on this patient No impairment noted  Jay score is 19

## 2021-05-28 NOTE — PROGRESS NOTES
Physician Progress Note      PATIENT:               Eldon Solo  CSN #:                  [id number]  :                       1945  ADMIT DATE:       2021 12:50 PM  [address] [place] DATE:  RESPONDING  PROVIDER #:        Mirella Betancourt MD          QUERY TEXT:    Dear Attending,    Pt admitted with acute pancreatitis, noted to have low serum CO2. If possible, please document in the progress notes and discharge summary if you are evaluating and/or treating any of the following: The medical record reflects the following:  Risk Factors: acute pancreatitis with diarrhea  Clinical Indicators: CO2: 14 on   Treatment: sodium bicarbonate 125 mL/hr, IVFs, monitoring      Thank you,    Estefanía Kapoor RN  [facility]  [phone]  Options provided:  -- Metabolic Acidosis  -- Clinically insignificant low serum CO2  -- Other - I will add my own diagnosis  -- Disagree - Not applicable / Not valid  -- Disagree - Clinically unable to determine / Unknown  -- Refer to Clinical Documentation Reviewer    PROVIDER RESPONSE TEXT:    This patient has metabolic acidosis.     Query created by: Clarita Reyes on 2021 3:12 PM      Electronically signed by:  Mirella Betancourt MD 2021 5:23 PM

## 2021-05-28 NOTE — CONSULTS
[facility] MD Narciso, Bethanie Pathak MD, MPH      Tad Soto, TERI Cervantes [place]     Clementina Angulo, [facility] [place]   Valeri Lpiscomb RENEA Aaronton Prerna [facility] [place]       [facility]    at [address], [address] Scarlett Kapoor  22.    [phone]    FAX: [address] [place] [address], [address]    [phone]    FAX: [phone]         HEPATOLOGY CONSULT NOTE  The patient is well known to and regularly cared for at [facility]. He is a 70year old  male who was found to have thrombocytopenia in 12/2020. An ultrasound demonstrated changes suggestig cirrhosis and splenomegaly. A Fibroscan demonstrated a marked increase in liver stiffness of 51 kPa suggesting cirrhosis and high CAP score suggesting cirrhosis was from fatty liver. There is a history of heavy alcohol use in the past but none since 2013. The patient underwent an EGD to screen for esophageal varices and a liver biopsy to confirm cirrhosis yesterday. A gastric polyp was removed without complications and 2 biopsies of gastritis were obtained. There were no esophageal varices. 2 hours after getting home from the procedure he developed severe abdominal and nausea with no vomiting. He came to the ED and laboratory studies and CT scan demonstrated findings consistent with acute pancreatitis. There was no intrahepatic or perihepatic bleeding from the liver biopsy. There was no free air from the polypectomy. He has been treated with IV fluids and pain meds over night. Today he still has severe abdominal pain. He says the pain meds are not strong enough.   Labs today show the liver enzymes are all normal. TBILI is normal.  Lipase is still >2000. Plan for today:  Will get stat MRI to make sure there are no stones lodged in CBD or PD that would require ERCP prior to holiday weekend. I will consult GI if I need them. Continue IV fluids. He is not getting enough pain meds and I have increased Dilaudid to 2 mg Q6H, from 1 mg Q8H  He can have sips of water but no food. ASSESSMENT AND PLAN:  Acute pancreatitis  The etiology is almost certainly due to gallstones. An [address] [place] [address] in 12/2020 demonstrated gallstones. CT scan in the ED yesterday suggested thickening of the CBD and multiple small stones along with inflammatory changes of the pancreas. Will perform MRCP to confirm choledocholithias and ask GI if they wish to consider ERCP before the holiday weekend. Abdominal pain  This is due to acute pancreatitis. Will increase the dose of pain meds until pain free. Cirrhosis  The diagnosis of cirrhosis is based upon imaging, laboratory studies, Fibroscan,     Cirrhosis is secondary to ETOH with or without ALFARO. A liver biopsy was performed yesterday. He has been abstinent from alcohol since 2013. IF there is still fatty liver then he also has NAFLD in addition to previous ETOH contributing to cirrhosis. The patient has normal liver function. The patient has never developed any complications of cirrhosis to date. The CTP is 5. Child class A. The MELD score is 9. Screening for Esophageal varices   The patient does not have esophageal or gastric varices. The last EGD to assess for varices was performed in 5/2021.     Hepatic encephalopathy   Overt HE has not developed to date. There is no need for treatment with lactulose and/or Xifaxan at this time.     Thrombocytopenia   This is secondary to cirrhosis. There is no evidence of overt bleeding. No treatment is required.   The platelet count is adequate for the patient to undergo procedures without the need for platelet transfusion or platelet growth factors.     Screening for Hepatocellular Carcinoma  HCC screening has recently been performed and does not suggest [address] The next liver imaging study will be performed in 11/2021.       SYSTEM REVIEW:  Constitution systems: Negative for fever, chills, weight gain, weight loss. Eyes: Negative for visual changes. ENT: Negative for sore throat, painful swallowing. Respiratory: Negative for cough, hemoptysis, SOB. Cardiology: Negative for chest pain, palpitations. GI:  Negative for constipation or diarrhea. : Negative for urinary frequency, dysuria, hematuria, nocturia. Skin: Negative for rash. Hematology: Negative for easy bruising, blood clots. Musculo-skelatal: Negative for back pain, muscle pain, weakness. Neurologic: Negative for headaches, dizziness, vertigo, memory problems not related to HE. Psychology: Negative for anxiety, depression. FAMILY HISTORY:  There is no family history of liver disease. There is no family history of immune disorders.     SOCIAL HISTORY:  The patient is   The patient has no children. The patient stopped using tobacco products in 2009  The patient has previously consumed alcohol in excess. The patient is a retired barnes    PHYSICAL EXAMINATION:  VS: per nursing note  General:  Uncomfortable. Eyes:  Sclera anicteric. ENT:  No oral lesions. Thyroid normal.  Nodes:  No adenopathy. Skin:  No spider angiomata. No jaundice. Respiratory:  Lungs clear to auscultation. Cardiovascular:  Regular heart rate. Abdomen:  Diffusely tender. Most tender below umbilicus. No rebound. No obvious ascites. Extremities:  No lower extremity edema. Neurologic:  Alert and oriented. Cranial nerves grossly intact. No asterixis. LABORATORY:  Results for Ainsley Hussein (MRN [id number]) as of 5/28/2021 07:27   Ref.  Range 5/27/2021 13:00 5/28/2021 03:32   WBC Latest Ref Range: 4.1 - 11.1 K/uL 11.9 (H) 13.3 (H) HGB Latest Ref Range: 12.1 - 17.0 g/dL 13.6 15.2   PLATELET Latest Ref Range: 150 - 400 K/uL 149 (L) 120 (L)   Sodium Latest Ref Range: 136 - 145 mmol/L 146 (H) 142   Potassium Latest Ref Range: 3.5 - 5.1 mmol/L 3.8 4.5   Chloride Latest Ref Range: 97 - 108 mmol/L 116 (H) 116 (H)   CO2 Latest Ref Range: 21 - 32 mmol/L 21 14 (LL)   Glucose Latest Ref Range: 65 - 100 mg/dL 123 (H) 170 (H)   BUN Latest Ref Range: 6 - 20 MG/DL 16 22 (H)   Creatinine Latest Ref Range: 0.70 - 1.30 MG/DL 1.31 (H) 1.39 (H)   Bilirubin, total Latest Ref Range: 0.2 - 1.0 MG/DL 1.2 (H) 1.3 (H)   Albumin Latest Ref Range: 3.5 - 5.0 g/dL 3.7 3.5   ALT Latest Ref Range: 12 - 78 U/L 103 (H) 165 (H)   AST Latest Ref Range: 15 - 37 U/L 138 (H) 141 (H)   Alk. phosphatase Latest Ref Range: 45 - 117 U/L 86 97   Lipase Latest Ref Range: 73 - 393 U/L >3,000 (H) >3,000 (H)       RADIOLOGY:  12/2020. Ultrasound of liver. Echogenic consistent with cirrhosis. No liver mass lesions. No dilated bile ducts. No ascites. Gallstones. 5/2021. CT scan abdomen without IV contrast.  Nodular liver consistent with cirrhosis. No liver mass lesions. Thickening of CBD with several defects suggesting CBD stones. No ascites.         MD [facility] 13 of [address], [address] [place] [address] 22.  [phone]  [address] [facility]

## 2021-05-28 NOTE — CONSULTS
[facility] [address] [place] [address]  Inpatient Consultation        Admit Date: 5/27/2021  Reason for Consultation: gallstones, pancreatitis    HPI:  Veronica Mejia is a 70 y.o. male w/ hx as below notably cirrhosis of liver, CVA on ASA whom we are asked to see in consultation by Dr. Russell Nelson for the above complaint. Pt presented to the ED yesterday w/ c/o epigastric, Ruq pain, and diarrhea after having an EGD w/ liver bx earlier the same day by Dr Hollis Pepper. He was found to have pancreatitis and elevated LFTs and t bili. He denies n/v today but cont to have abd pain. His GI is Dr Siri Alcaraz    CT scan  Extensive peripancreatic and retroperitoneal inflammatory changes, compatible  with acute pancreatitis. No evidence of pneumoperitoneum. Cholecystolithiasis  with nonspecific gallbladder wall thickening. 3 cm left adrenal adenoma. Nodular  hepatic contour. Patient Active Problem List    Diagnosis Date Noted    Pancreatitis 05/27/2021    Cirrhosis of liver without ascites ([address] 03/12/2021    History of CVA (cerebrovascular accident) 02/03/2021    Thrombocytopenia ([address] 12/03/2020    Class 1 obesity due to excess calories with serious comorbidity and body mass index (BMI) of 32.0 to 32.9 in adult 12/03/2020    Diabetes mellitus ([address] 12/03/2020     Past Medical History:   Diagnosis Date    CAD (coronary artery disease)     Chronic kidney disease     stones    Chronic obstructive pulmonary disease ([address]     Diabetes ([address]     Hypertension     Stroke ([address] 1980s    right hand neuropathy      Past Surgical History:   Procedure Laterality Date    COLONOSCOPY N/A 4/12/2018    COLONOSCOPY performed by Ru Gudino MD at [address] HX SKIN BIOPSY  2019    skin cancer removed from left leg.        Social History     Tobacco Use    Smoking status: Former Smoker     Packs/day: 2.00     Years: 45.00     Pack years: 90.00     Quit date: 6/19/2005     Years since quitting: 15.9    Smokeless tobacco: Never Used   Substance Use Topics    Alcohol use: No     Comment: 1 gallon whiskey a week stopped 4 years ago      Family History   Problem Relation Age of Onset    Heart Disease Mother     Heart Disease Father     Cancer Sister         breast ca      Prior to Admission medications    Medication Sig Start Date End Date Taking? Authorizing Provider   pantoprazole (Protonix) 20 mg tablet Take 2 Tabs by mouth daily. 12/18/20   Alec Jackson MD   enalapril (VASOTEC) 10 mg tablet Take 10 mg by mouth daily. 7/3/20   Provider, Historical   aspirin delayed-release 81 mg tablet Take 1 Tab by mouth daily. 5/29/19   Teresa Martinez NP   metFORMIN (GLUCOPHAGE) 500 mg tablet Take 1,000 mg by mouth two (2) times daily (with meals).     Provider, Historical     Current Facility-Administered Medications   Medication Dose Route Frequency    HYDROmorphone (PF) (DILAUDID) injection 2 mg  2 mg IntraVENous Q6H PRN    sodium bicarbonate 150 mEq/1000 mL D5W (premix)   IntraVENous CONTINUOUS    aspirin delayed-release tablet 81 mg  81 mg Oral DAILY    lisinopriL (PRINIVIL, ZESTRIL) tablet 10 mg  10 mg Oral DAILY    pantoprazole (PROTONIX) tablet 40 mg  40 mg Oral DAILY    HYDROmorphone (PF) (DILAUDID) injection 1 mg  1 mg IntraVENous Q4H PRN    sodium chloride (NS) flush 5-40 mL  5-40 mL IntraVENous Q8H    sodium chloride (NS) flush 5-40 mL  5-40 mL IntraVENous PRN    acetaminophen (TYLENOL) tablet 650 mg  650 mg Oral Q6H PRN    Or    acetaminophen (TYLENOL) suppository 650 mg  650 mg Rectal Q6H PRN    polyethylene glycol (MIRALAX) packet 17 g  17 g Oral DAILY PRN    promethazine (PHENERGAN) tablet 12.5 mg  12.5 mg Oral Q6H PRN    Or    ondansetron (ZOFRAN) injection 4 mg  4 mg IntraVENous Q6H PRN    enoxaparin (LOVENOX) injection 40 mg  40 mg SubCUTAneous DAILY    insulin lispro (HUMALOG) injection   SubCUTAneous Q6H    glucose chewable tablet 16 g  4 Tablet Oral PRN    dextrose (D50W) injection syrg 12.5-25 g 12.5-25 g IntraVENous PRN    glucagon (GLUCAGEN) injection 1 mg  1 mg IntraMUSCular PRN     No Known Allergies       Subjective:     Review of Systems:    A comprehensive review of systems was negative except for that written in the History of Present Illness. Objective:     Blood pressure 112/64, pulse (!) 102, temperature 98.2 °F (36.8 °C), resp. rate 17, weight 203 lb 7.8 oz (92.3 kg), SpO2 91 %. Temp (24hrs), Av.3 °F (36.8 °C), Min:97.8 °F (36.6 °C), Max:99 °F (37.2 °C)      Recent Labs     21  0332 21  1300   WBC 13.3* 11.9*   HGB 15.2 13.6   HCT 46.9 42.4   * 149*     Recent Labs     21  0332 21  1300    146*   K 4.5 3.8   * 116*   CO2 14* 21   * 123*   BUN 22* 16   CREA 1.39* 1.31*   CA 8.1* 8.6   ALB 3.5 3.7   TBILI 1.3* 1.2*   * 103*     Recent Labs     21  0332 21  1300   LPSE >3,000* >3,000*         Intake/Output Summary (Last 24 hours) at 2021 1040  Last data filed at 2021 1528  Gross per 24 hour   Intake 600 ml   Output    Net 600 ml       _____________________  Physical Exam:     General:  Alert, cooperative, no distress, appears stated age. Eyes:   Sclera clear. Throat: Lips, mucosa, and tongue normal.   Neck: Supple, symmetrical, trachea midline. Lungs:   Clear to auscultation bilaterally. Heart:  Regular rate and rhythm. Abdomen:   Protuberant, +BS, TTP RUQ and epigastric area; diffuse tenderness across mid abdomen   Extremities: Extremities normal, atraumatic, no cyanosis or edema. Skin: Skin color, texture, turgor normal. No rashes or lesions. Assessment:   Active Problems:    Pancreatitis (2021)            Plan:     MRCP today  Pt had lovenox this am so if ERCP is necessary can't be done until tomorrow, then lap rené timing TBD  Thank you for allowing us to participate in the care of this patient.    PPI  Npo  Would stop lovenox if MRCP is positive for a stone so ERCP can be scheduled    Total time spent with patient: 30 minutes. Signed By: Tex Mcclain NP     May 28, 2021        I have independently examined the patient and have reviewed the chart. I agree with the above plan. The patient does appear to have pancreatitis possibly caused by the gallbladder. He is supposed to get an MRCP here soon. If he does show stones in the common bile duct ERCP would be the next step. Once the pancreatitis is resolved and his lipase is coming down and having less pain we could then consider laparoscopic cholecystectomy. We would also want to make sure that from a cirrhosis standpoint he is stable for surgery. Surgery we could consider possibly sometime next week. We will be following him during his hospital stay. Thank you for the consultation.     Ewelina Barger MD

## 2021-05-28 NOTE — PROGRESS NOTES
[address]  Hospitalist Group                                                                                          Hospitalist Progress Note  Dilan Rocha MD  Answering service: [phone] OR [phone] from in house phone              Progress Note    Patient: Ernestine Tirado MRN: [id number]  SSN: [id number]    YOB: 1945  Age: 70 y.o. Sex: male      Admit Date: 5/27/2021    LOS: 1 day     Subjective:     Patient presents with abdominal pain and found to have acute pancreatitis. Likely gallstone related. Patient has cholelithiasis but does not show stone in CBD. Abdominal pain is improving with pain medications. Still with elevated lipase. Objective:     Vitals:    05/27/21 1920 05/27/21 2028 05/28/21 0327 05/28/21 0846   BP: (!) 184/90 (!) 178/88 (!) 171/99 112/64   Pulse: 66 70 95 (!) 102   Resp:  18 18 17   Temp:  98.4 °F (36.9 °C) 99 °F (37.2 °C) 98.2 °F (36.8 °C)   SpO2:  94% 92% 91%   Weight:            Intake and Output:  Current Shift: No intake/output data recorded. Last three shifts: 05/26 1901 - 05/28 0700  In: 600 [I.V.:600]  Out: -     Physical Exam:   GENERAL: alert, cooperative, no distress, appears stated age  THROAT & NECK: normal and no erythema or exudates noted. LUNG: clear to auscultation bilaterally  HEART: regular rate and rhythm, S1, S2 normal, no murmur, click, rub or gallop  ABDOMEN: soft, non-tender. Bowel sounds normal. No masses,  no organomegaly  EXTREMITIES:  extremities normal, atraumatic, no cyanosis or edema  SKIN: no rash or abnormalities  NEUROLOGIC: AOx3. Gait normal.   PSYCHIATRIC: non focal    Lab/Data Review: All lab results for the last 24 hours reviewed.      Recent Results (from the past 24 hour(s))   GLUCOSE, POC    Collection Time: 05/27/21  7:35 PM   Result Value Ref Range    Glucose (POC) 169 (H) 65 - 117 mg/dL    Performed by Karl Little    GLUCOSE, POC    Collection Time: 05/27/21  9:42 PM   Result Value Ref Range Glucose (POC) 181 (H) 65 - 117 mg/dL    Performed by Gissell Vidales    GLUCOSE, POC    Collection Time: 05/27/21 11:41 PM   Result Value Ref Range    Glucose (POC) 150 (H) 65 - 117 mg/dL    Performed by Michael Escobar Nw, COMPREHENSIVE    Collection Time: 05/28/21  3:32 AM   Result Value Ref Range    Sodium 142 136 - 145 mmol/L    Potassium 4.5 3.5 - 5.1 mmol/L    Chloride 116 (H) 97 - 108 mmol/L    CO2 14 (LL) 21 - 32 mmol/L    Anion gap 12 5 - 15 mmol/L    Glucose 170 (H) 65 - 100 mg/dL    BUN 22 (H) 6 - 20 MG/DL    Creatinine 1.39 (H) 0.70 - 1.30 MG/DL    BUN/Creatinine ratio 16 12 - 20      GFR est AA >60 >60 ml/min/1.73m2    GFR est non-AA 50 (L) >60 ml/min/1.73m2    Calcium 8.1 (L) 8.5 - 10.1 MG/DL    Bilirubin, total 1.3 (H) 0.2 - 1.0 MG/DL    ALT (SGPT) 165 (H) 12 - 78 U/L    AST (SGOT) 141 (H) 15 - 37 U/L    Alk. phosphatase 97 45 - 117 U/L    Protein, total 6.8 6.4 - 8.2 g/dL    Albumin 3.5 3.5 - 5.0 g/dL    Globulin 3.3 2.0 - 4.0 g/dL    A-G Ratio 1.1 1.1 - 2.2     CBC WITH AUTOMATED DIFF    Collection Time: 05/28/21  3:32 AM   Result Value Ref Range    WBC 13.3 (H) 4.1 - 11.1 K/uL    RBC 5.15 4.10 - 5.70 M/uL    HGB 15.2 12.1 - 17.0 g/dL    HCT 46.9 36.6 - 50.3 %    MCV 91.1 80.0 - 99.0 FL    MCH 29.5 26.0 - 34.0 PG    MCHC 32.4 30.0 - 36.5 g/dL    RDW 14.2 11.5 - 14.5 %    PLATELET 873 (L) 947 - 400 K/uL    MPV 11.4 8.9 - 12.9 FL    NRBC 0.0 0  WBC    ABSOLUTE NRBC 0.00 0.00 - 0.01 K/uL    NEUTROPHILS 87 (H) 32 - 75 %    LYMPHOCYTES 6 (L) 12 - 49 %    MONOCYTES 6 5 - 13 %    EOSINOPHILS 0 0 - 7 %    BASOPHILS 0 0 - 1 %    IMMATURE GRANULOCYTES 1 (H) 0.0 - 0.5 %    ABS. NEUTROPHILS 11.6 (H) 1.8 - 8.0 K/UL    ABS. LYMPHOCYTES 0.8 0.8 - 3.5 K/UL    ABS. MONOCYTES 0.8 0.0 - 1.0 K/UL    ABS. EOSINOPHILS 0.0 0.0 - 0.4 K/UL    ABS. BASOPHILS 0.0 0.0 - 0.1 K/UL    ABS. IMM.  GRANS. 0.1 (H) 0.00 - 0.04 K/UL    DF SMEAR SCANNED      RBC COMMENTS NORMOCYTIC, NORMOCHROMIC     LIPASE Collection Time: 05/28/21  3:32 AM   Result Value Ref Range    Lipase >3,000 (H) 73 - 393 U/L   GLUCOSE, POC    Collection Time: 05/28/21  6:00 AM   Result Value Ref Range    Glucose (POC) 152 (H) 65 - 117 mg/dL    Performed by Rogenia Curling    GLUCOSE, POC    Collection Time: 05/28/21 12:11 PM   Result Value Ref Range    Glucose (POC) 158 (H) 65 - 117 mg/dL    Performed by Servando Weiner. (CON)         Imaging:    CT ABD PELV WO CONT    Result Date: 5/27/2021  EXAM: CT ABD PELV WO CONT INDICATION: Abdominal pain following upper endoscopy and gastric polypectomy today. COMPARISON: None CONTRAST:  None. TECHNIQUE: Thin axial images were obtained through the abdomen and pelvis. Coronal and sagittal reformats were generated. Oral contrast was not administered. CT dose reduction was achieved through use of a standardized protocol tailored for this examination and automatic exposure control for dose modulation. The absence of intravenous contrast material reduces the sensitivity for evaluation of the vasculature and solid organs. FINDINGS: LOWER THORAX: Bibasilar atelectasis. LIVER: Nodular in contour, with no visualized mass. BILIARY TREE: Gallbladder is contracted but demonstrates diffuse wall thickening and contains multiple small calculi. CBD is not dilated. SPLEEN: within normal limits. PANCREAS: Diffuse peripancreatic/retroperitoneal inflammatory changes ADRENALS: 3.0 cm left adrenal adenoma. KIDNEYS/URETERS: No calculus or hydronephrosis. STOMACH: Unremarkable. SMALL BOWEL: No dilatation or wall thickening. COLON: No dilatation or wall thickening. APPENDIX: Normal. PERITONEUM: No ascites or pneumoperitoneum. RETROPERITONEUM: No lymphadenopathy or aortic aneurysm. REPRODUCTIVE ORGANS: Unremarkable URINARY BLADDER: No mass or calculus. BONES: No destructive bone lesion. Grade 1 anterolisthesis at L5-S1 secondary to bilateral pars interarticularis defects. ABDOMINAL WALL: No mass or hernia.  ADDITIONAL COMMENTS: N/A Extensive peripancreatic and retroperitoneal inflammatory changes, compatible with acute pancreatitis. No evidence of pneumoperitoneum. Cholecystolithiasis with nonspecific gallbladder wall thickening. 3 cm left adrenal adenoma. Nodular hepatic contour. Assessment and Plan:     Acute pancreatitis  -Patient presents with acute onset of abdominal pain, noted inflammatory changes around pancreas on the CT scan and also lipase elevated more than 3000, consistent with acute pancreatitis  -Likely etiology could be gallstone, gallbladder with stones and thickened wall  -Patient will be kept n.p.o., IV fluid for hydration, pain management with Dilaudid  -Plan for MRCP to evaluate for CBD stone  -Given increasing leukocytosis, I will start Zosyn  -GI following     Cholelithiasis  -General surgery evaluated the patient, timing for cholecystectomy to be determined     Hypertension  -Blood pressure elevated due to pain  -Continue current medications  -Monitor blood pressure     Diabetes  -Hold Metformin  -Insulin sliding scale coverage and monitor blood sugars     Liver cirrhosis  -Patient with history of liver cirrhosis due to alcohol  -Has quit drinking several years ago  -Hepatology following  -S/p liver biopsy 05/27, follow pathology  -S/p EGD 05/27 without finding of varices    Thrombocytopenia  -Due to liver cirrhosis  -Monitor platelet count    Discharge disposition: Likely more than 48 hours pending further work-up from GI and general surgery.     Signed By: Abril Galvan MD     May 28, 2021

## 2021-05-29 LAB
ALBUMIN SERPL-MCNC: 2.8 G/DL (ref 3.5–5)
ALBUMIN/GLOB SERPL: 0.9 {RATIO} (ref 1.1–2.2)
ALP SERPL-CCNC: 63 U/L (ref 45–117)
ALT SERPL-CCNC: 81 U/L (ref 12–78)
ANION GAP SERPL CALC-SCNC: 8 MMOL/L (ref 5–15)
AST SERPL-CCNC: 47 U/L (ref 15–37)
BASOPHILS # BLD: 0 K/UL (ref 0–0.1)
BASOPHILS NFR BLD: 0 % (ref 0–1)
BILIRUB DIRECT SERPL-MCNC: 0.6 MG/DL (ref 0–0.2)
BILIRUB SERPL-MCNC: 1.5 MG/DL (ref 0.2–1)
BUN SERPL-MCNC: 23 MG/DL (ref 6–20)
BUN/CREAT SERPL: 14 (ref 12–20)
CALCIUM SERPL-MCNC: 7.4 MG/DL (ref 8.5–10.1)
CHLORIDE SERPL-SCNC: 107 MMOL/L (ref 97–108)
CO2 SERPL-SCNC: 24 MMOL/L (ref 21–32)
CREAT SERPL-MCNC: 1.63 MG/DL (ref 0.7–1.3)
DIFFERENTIAL METHOD BLD: ABNORMAL
EOSINOPHIL # BLD: 0 K/UL (ref 0–0.4)
EOSINOPHIL NFR BLD: 0 % (ref 0–7)
ERYTHROCYTE [DISTWIDTH] IN BLOOD BY AUTOMATED COUNT: 14.6 % (ref 11.5–14.5)
GLOBULIN SER CALC-MCNC: 3 G/DL (ref 2–4)
GLUCOSE BLD STRIP.AUTO-MCNC: 127 MG/DL (ref 65–117)
GLUCOSE BLD STRIP.AUTO-MCNC: 134 MG/DL (ref 65–117)
GLUCOSE BLD STRIP.AUTO-MCNC: 153 MG/DL (ref 65–117)
GLUCOSE SERPL-MCNC: 159 MG/DL (ref 65–100)
HCT VFR BLD AUTO: 41.7 % (ref 36.6–50.3)
HGB BLD-MCNC: 13.7 G/DL (ref 12.1–17)
IMM GRANULOCYTES # BLD AUTO: 0.1 K/UL (ref 0–0.04)
IMM GRANULOCYTES NFR BLD AUTO: 1 % (ref 0–0.5)
LIPASE SERPL-CCNC: >3000 U/L (ref 73–393)
LYMPHOCYTES # BLD: 1.2 K/UL (ref 0.8–3.5)
LYMPHOCYTES NFR BLD: 8 % (ref 12–49)
MCH RBC QN AUTO: 29.5 PG (ref 26–34)
MCHC RBC AUTO-ENTMCNC: 32.9 G/DL (ref 30–36.5)
MCV RBC AUTO: 89.9 FL (ref 80–99)
MONOCYTES # BLD: 1.2 K/UL (ref 0–1)
MONOCYTES NFR BLD: 8 % (ref 5–13)
NEUTS SEG # BLD: 12.3 K/UL (ref 1.8–8)
NEUTS SEG NFR BLD: 83 % (ref 32–75)
NRBC # BLD: 0 K/UL (ref 0–0.01)
NRBC BLD-RTO: 0 PER 100 WBC
PLATELET # BLD AUTO: 121 K/UL (ref 150–400)
POTASSIUM SERPL-SCNC: 4.2 MMOL/L (ref 3.5–5.1)
PROT SERPL-MCNC: 5.8 G/DL (ref 6.4–8.2)
RBC # BLD AUTO: 4.64 M/UL (ref 4.1–5.7)
RBC MORPH BLD: ABNORMAL
SERVICE CMNT-IMP: ABNORMAL
SODIUM SERPL-SCNC: 139 MMOL/L (ref 136–145)
WBC # BLD AUTO: 14.8 K/UL (ref 4.1–11.1)

## 2021-05-29 PROCEDURE — 74011250636 HC RX REV CODE- 250/636: Performed by: FAMILY MEDICINE

## 2021-05-29 PROCEDURE — 36415 COLL VENOUS BLD VENIPUNCTURE: CPT

## 2021-05-29 PROCEDURE — 85025 COMPLETE CBC W/AUTO DIFF WBC: CPT

## 2021-05-29 PROCEDURE — 74011636637 HC RX REV CODE- 636/637: Performed by: FAMILY MEDICINE

## 2021-05-29 PROCEDURE — 74011250637 HC RX REV CODE- 250/637: Performed by: FAMILY MEDICINE

## 2021-05-29 PROCEDURE — 83690 ASSAY OF LIPASE: CPT

## 2021-05-29 PROCEDURE — 74011000258 HC RX REV CODE- 258: Performed by: FAMILY MEDICINE

## 2021-05-29 PROCEDURE — 82962 GLUCOSE BLOOD TEST: CPT

## 2021-05-29 PROCEDURE — 65270000029 HC RM PRIVATE

## 2021-05-29 PROCEDURE — 74011000250 HC RX REV CODE- 250: Performed by: FAMILY MEDICINE

## 2021-05-29 PROCEDURE — 99232 SBSQ HOSP IP/OBS MODERATE 35: CPT | Performed by: INTERNAL MEDICINE

## 2021-05-29 PROCEDURE — 80048 BASIC METABOLIC PNL TOTAL CA: CPT

## 2021-05-29 PROCEDURE — 80076 HEPATIC FUNCTION PANEL: CPT

## 2021-05-29 RX ORDER — LORAZEPAM 1 MG/1
0.5 TABLET ORAL
Status: DISCONTINUED | OUTPATIENT
Start: 2021-05-29 | End: 2021-06-01 | Stop reason: HOSPADM

## 2021-05-29 RX ORDER — LATANOPROST 50 UG/ML
1 SOLUTION/ DROPS OPHTHALMIC
COMMUNITY
End: 2021-09-14

## 2021-05-29 RX ORDER — SODIUM CHLORIDE, SODIUM LACTATE, POTASSIUM CHLORIDE, CALCIUM CHLORIDE 600; 310; 30; 20 MG/100ML; MG/100ML; MG/100ML; MG/100ML
125 INJECTION, SOLUTION INTRAVENOUS CONTINUOUS
Status: DISCONTINUED | OUTPATIENT
Start: 2021-05-29 | End: 2021-06-01 | Stop reason: HOSPADM

## 2021-05-29 RX ADMIN — Medication: at 02:39

## 2021-05-29 RX ADMIN — HYDROMORPHONE HYDROCHLORIDE 1 MG: 1 INJECTION, SOLUTION INTRAMUSCULAR; INTRAVENOUS; SUBCUTANEOUS at 12:22

## 2021-05-29 RX ADMIN — Medication 10 ML: at 05:29

## 2021-05-29 RX ADMIN — LISINOPRIL 10 MG: 10 TABLET ORAL at 09:45

## 2021-05-29 RX ADMIN — HYDROMORPHONE HYDROCHLORIDE 1 MG: 1 INJECTION, SOLUTION INTRAMUSCULAR; INTRAVENOUS; SUBCUTANEOUS at 03:07

## 2021-05-29 RX ADMIN — SODIUM CHLORIDE, POTASSIUM CHLORIDE, SODIUM LACTATE AND CALCIUM CHLORIDE 125 ML/HR: 600; 310; 30; 20 INJECTION, SOLUTION INTRAVENOUS at 08:42

## 2021-05-29 RX ADMIN — PIPERACILLIN AND TAZOBACTAM 3.38 G: 3; .375 INJECTION, POWDER, LYOPHILIZED, FOR SOLUTION INTRAVENOUS at 14:18

## 2021-05-29 RX ADMIN — PIPERACILLIN AND TAZOBACTAM 3.38 G: 3; .375 INJECTION, POWDER, LYOPHILIZED, FOR SOLUTION INTRAVENOUS at 05:26

## 2021-05-29 RX ADMIN — INSULIN LISPRO 2 UNITS: 100 INJECTION, SOLUTION INTRAVENOUS; SUBCUTANEOUS at 12:22

## 2021-05-29 RX ADMIN — Medication 10 ML: at 21:54

## 2021-05-29 RX ADMIN — HYDROMORPHONE HYDROCHLORIDE 1 MG: 1 INJECTION, SOLUTION INTRAMUSCULAR; INTRAVENOUS; SUBCUTANEOUS at 18:07

## 2021-05-29 RX ADMIN — PIPERACILLIN AND TAZOBACTAM 3.38 G: 3; .375 INJECTION, POWDER, LYOPHILIZED, FOR SOLUTION INTRAVENOUS at 21:53

## 2021-05-29 RX ADMIN — PANTOPRAZOLE SODIUM 40 MG: 40 TABLET, DELAYED RELEASE ORAL at 09:45

## 2021-05-29 NOTE — PROGRESS NOTES
[address] [facility], MD, Meeta Amador MD, MPH      Janice Carlson, TERI Michael, [facility]     April S Adele, [facility]   CHINYERE Polanco [facility]       [facility]    at [address], [address] Scarlett Kapoor  22.    [phone]    FAX: [address] [place] [address], [address]    [phone]    FAX: [phone]       HEPATOLOGY PROGRESS NOTE  The patient is well known to and regularly cared for at [place] [address]. He is a 70year old  male who was found to have thrombocytopenia in 12/2020. An ultrasound demonstrated changes suggestig cirrhosis and splenomegaly. A Fibroscan demonstrated a marked increase in liver stiffness of 51 kPa suggesting cirrhosis and high CAP score suggesting cirrhosis was from fatty liver. There is a history of heavy alcohol use in the past but none since 2013. The patient underwent an EGD to screen for esophageal varices and a liver biopsy to confirm cirrhosis yesterday. A gastric polyp was removed without complications and 2 biopsies of gastritis were obtained. There were no esophageal varices. 2 hours after getting home from the procedure he developed severe abdominal and nausea with no vomiting. He came to the ED and laboratory studies and CT scan demonstrated findings consistent with acute pancreatitis. There was no intrahepatic or perihepatic bleeding from the liver biopsy. There was no free air from the polypectomy. He has been treated with IV fluids and pain meds over night. Today he still has severe abdominal pain. He says the pain meds are not strong enough.   Labs today show the liver enzymes are all normal. TBILI is normal.  Lipase is still >2000. MRCP yesterday showed no stones in CBD. Lipase this AM is still >3000  Pain is under good control at current dose. Plan for today:  Continue IV fluids. Continue clear liquids  Continue pain meds. Monitor Lipase every day. ASSESSMENT AND PLAN:  Acute pancreatitis  The etiology is almost certainly due to gallstones. An [address] in 12/2020 demonstrated gallstones. CT scan in the ED yesterday suggested thickening of the CBD and multiple small stones along with inflammatory changes of the pancreas. MRCP shows no CBD stones. Swelling of HOP. Abdominal pain  This is due to acute pancreatitis. Much better controlled than yesterday. Cirrhosis  The diagnosis of cirrhosis is based upon imaging, laboratory studies, Fibroscan,     Cirrhosis is secondary to ETOH with or without ALFARO. A liver biopsy was performed yesterday. He has been abstinent from alcohol since 2013. IF there is still fatty liver then he also has NAFLD in addition to previous ETOH contributing to cirrhosis. The patient has normal liver function. The patient has never developed any complications of cirrhosis to date. The CTP is 5. Child class A. The MELD score is 9. Screening for Esophageal varices   The patient does not have esophageal or gastric varices. The last EGD to assess for varices was performed in 5/2021.     Thrombocytopenia   This is secondary to cirrhosis. There is no evidence of overt bleeding. No treatment is required. The platelet count is adequate for the patient to undergo procedures without the need for platelet transfusion or platelet growth factors.       PHYSICAL EXAMINATION:  VS: per nursing note  General:  Uncomfortable. Eyes:  Sclera anicteric. ENT:  No oral lesions. Thyroid normal.  Nodes:  No adenopathy. Skin:  No spider angiomata. No jaundice. Respiratory:  Lungs clear to auscultation.    Cardiovascular:  Regular heart rate.  Abdomen:  Diffusely tender. Most tender below umbilicus. No rebound. No obvious ascites. Extremities:  No lower extremity edema. Neurologic:  Alert and oriented. Cranial nerves grossly intact. No asterixis. LABORATORY:  Results for Ainsley Hussein (MRN [id number]) as of 5/29/2021 12:56   Ref. Range 5/27/2021 13:00 5/28/2021 03:32 5/29/2021 03:02 5/29/2021 05:34   WBC Latest Ref Range: 4.1 - 11.1 K/uL 11.9 (H) 13.3 (H) 14.8 (H)    HGB Latest Ref Range: 12.1 - 17.0 g/dL 13.6 15.2 13.7    PLATELET Latest Ref Range: 150 - 400 K/uL 149 (L) 120 (L) 121 (L)    Sodium Latest Ref Range: 136 - 145 mmol/L 146 (H) 142  139   Potassium Latest Ref Range: 3.5 - 5.1 mmol/L 3.8 4.5  4.2   Chloride Latest Ref Range: 97 - 108 mmol/L 116 (H) 116 (H)  107   CO2 Latest Ref Range: 21 - 32 mmol/L 21 14 (LL)  24   Glucose Latest Ref Range: 65 - 100 mg/dL 123 (H) 170 (H)  159 (H)   BUN Latest Ref Range: 6 - 20 MG/DL 16 22 (H)  23 (H)   Creatinine Latest Ref Range: 0.70 - 1.30 MG/DL 1.31 (H) 1.39 (H)  1.63 (H)   Bilirubin, total Latest Ref Range: 0.2 - 1.0 MG/DL 1.2 (H) 1.3 (H)  1.5 (H)   Albumin Latest Ref Range: 3.5 - 5.0 g/dL 3.7 3.5  2.8 (L)   ALT Latest Ref Range: 12 - 78 U/L 103 (H) 165 (H)  81 (H)   AST Latest Ref Range: 15 - 37 U/L 138 (H) 141 (H)  47 (H)   Alk. phosphatase Latest Ref Range: 45 - 117 U/L 86 97  63       RADIOLOGY:  12/2020. Ultrasound of liver. Echogenic consistent with cirrhosis. No liver mass lesions. No dilated bile ducts. No ascites. Gallstones. 5/2021. CT scan abdomen without IV contrast.  Nodular liver consistent with cirrhosis. No liver mass lesions. Thickening of CBD with several defects suggesting CBD stones. No ascites.         Diana Lopez MD  [place] [address].  [phone]  [address] [place] [address]

## 2021-05-29 NOTE — PROGRESS NOTES
Progress Note    Patient: Ke Rossi MRN: [id number]  SSN: [id number]    YOB: 1945  Age: 70 y.o. Sex: male      Admit Date: 2021    Biliary pancreatitis    Subjective:     No acute surgical issues. Pt reported mild abdominal pain. No nausea or vomiting. Lipase still above 3000 and MRCP showed severe pancreatitis. Objective:     Visit Vitals  /71 (BP 1 Location: Right upper arm, BP Patient Position: At rest)   Pulse 91   Temp 99.6 °F (37.6 °C)   Resp 16   Ht 5' 5\" (1.651 m)   Wt 203 lb 7.8 oz (92.3 kg)   SpO2 92%   BMI 33.86 kg/m²       Temp (24hrs), Av.9 °F (37.2 °C), Min:98.3 °F (36.8 °C), Max:99.6 °F (37.6 °C)        Physical Exam:    Gen:  NAD  Pulm:  Unlabored  Abd:  S/mildly distended/Non-TTP    Recent Results (from the past 24 hour(s))   GLUCOSE, POC    Collection Time: 21  6:05 PM   Result Value Ref Range    Glucose (POC) 117 65 - 117 mg/dL    Performed by [id number], POC    Collection Time: 21 11:04 PM   Result Value Ref Range    Glucose (POC) 128 (H) 65 - 117 mg/dL    Performed by Sanna Tsai    CBC WITH AUTOMATED DIFF    Collection Time: 21  3:02 AM   Result Value Ref Range    WBC 14.8 (H) 4.1 - 11.1 K/uL    RBC 4.64 4.10 - 5.70 M/uL    HGB 13.7 12.1 - 17.0 g/dL    HCT 41.7 36.6 - 50.3 %    MCV 89.9 80.0 - 99.0 FL    MCH 29.5 26.0 - 34.0 PG    MCHC 32.9 30.0 - 36.5 g/dL    RDW 14.6 (H) 11.5 - 14.5 %    PLATELET 284 (L) 317 - 400 K/uL    NRBC 0.0 0  WBC    ABSOLUTE NRBC 0.00 0.00 - 0.01 K/uL    NEUTROPHILS 83 (H) 32 - 75 %    LYMPHOCYTES 8 (L) 12 - 49 %    MONOCYTES 8 5 - 13 %    EOSINOPHILS 0 0 - 7 %    BASOPHILS 0 0 - 1 %    IMMATURE GRANULOCYTES 1 (H) 0.0 - 0.5 %    ABS. NEUTROPHILS 12.3 (H) 1.8 - 8.0 K/UL    ABS. LYMPHOCYTES 1.2 0.8 - 3.5 K/UL    ABS. MONOCYTES 1.2 (H) 0.0 - 1.0 K/UL    ABS. EOSINOPHILS 0.0 0.0 - 0.4 K/UL    ABS. BASOPHILS 0.0 0.0 - 0.1 K/UL    ABS. IMM.  GRANS. 0.1 (H) 0.00 - 0.04 K/UL    DF SMEAR SCANNED RBC COMMENTS ANISOCYTOSIS  1+       LIPASE    Collection Time: 05/29/21  3:02 AM   Result Value Ref Range    Lipase >3,000 (H) 73 - 172 U/L   METABOLIC PANEL, BASIC    Collection Time: 05/29/21  5:34 AM   Result Value Ref Range    Sodium 139 136 - 145 mmol/L    Potassium 4.2 3.5 - 5.1 mmol/L    Chloride 107 97 - 108 mmol/L    CO2 24 21 - 32 mmol/L    Anion gap 8 5 - 15 mmol/L    Glucose 159 (H) 65 - 100 mg/dL    BUN 23 (H) 6 - 20 MG/DL    Creatinine 1.63 (H) 0.70 - 1.30 MG/DL    BUN/Creatinine ratio 14 12 - 20      GFR est AA 51 (L) >60 ml/min/1.73m2    GFR est non-AA 42 (L) >60 ml/min/1.73m2    Calcium 7.4 (L) 8.5 - 10.1 MG/DL   HEPATIC FUNCTION PANEL    Collection Time: 05/29/21  5:34 AM   Result Value Ref Range    Protein, total 5.8 (L) 6.4 - 8.2 g/dL    Albumin 2.8 (L) 3.5 - 5.0 g/dL    Globulin 3.0 2.0 - 4.0 g/dL    A-G Ratio 0.9 (L) 1.1 - 2.2      Bilirubin, total 1.5 (H) 0.2 - 1.0 MG/DL    Bilirubin, direct 0.6 (H) 0.0 - 0.2 MG/DL    Alk. phosphatase 63 45 - 117 U/L    AST (SGOT) 47 (H) 15 - 37 U/L    ALT (SGPT) 81 (H) 12 - 78 U/L   GLUCOSE, POC    Collection Time: 05/29/21  6:27 AM   Result Value Ref Range    Glucose (POC) 134 (H) 65 - 117 mg/dL    Performed by Glen Chua (CON)    GLUCOSE, POC    Collection Time: 05/29/21 12:14 PM   Result Value Ref Range    Glucose (POC) 153 (H) 65 - 117 mg/dL    Performed by Kanchan Small  PCT          Assessment:     Hospital Problems  Date Reviewed: 5/27/2021        Codes Class Noted POA    Pancreatitis ICD-10-CM: K85.90  ICD-9-CM: 628.9  5/27/2021 Unknown              Plan/Recommendations/Medical Decision Making:     - Biliary pancreatitis:  No acute indication for cholecystectomy.   Continue to monitor labs and treat with IV fluid hydration  - Would recommend going very slow with diet given pancreatitis  - Continue antibiotic therapy

## 2021-05-29 NOTE — PROGRESS NOTES
[address] [facility]  Hospitalist Group                                                                                          Hospitalist Progress Note  Rolando Flores MD  Answering service: [phone] OR [phone] from in house phone              Progress Note    Patient: Jodi Paulino MRN: [id number]  SSN: [id number]    YOB: 1945  Age: 70 y.o. Sex: male      Admit Date: 5/27/2021    LOS: 2 days     Subjective:     Patient presents with abdominal pain and found to have acute pancreatitis. Likely gallstone related. Patient has cholelithiasis but does not show stone in CBD. Abdominal pain is improving with pain medications. Still with elevated lipase. Objective:     Vitals:    05/28/21 1410 05/28/21 2237 05/29/21 0241 05/29/21 0915   BP: 114/72 123/74 133/78 128/71   Pulse: 100 (!) 106 (!) 102 91   Resp: 15 18 16 16   Temp: 98.6 °F (37 °C) 98.3 °F (36.8 °C) 98.9 °F (37.2 °C) 99.6 °F (37.6 °C)   SpO2: 92% 92% 91% 92%   Weight:       Height:            Intake and Output:  Current Shift: No intake/output data recorded. Last three shifts: No intake/output data recorded. Physical Exam:   GENERAL: alert, cooperative, no distress, appears stated age  THROAT & NECK: normal and no erythema or exudates noted. LUNG: clear to auscultation bilaterally  HEART: regular rate and rhythm, S1, S2 normal, no murmur, click, rub or gallop  ABDOMEN: soft, non-tender. Bowel sounds normal. No masses,  no organomegaly  EXTREMITIES:  extremities normal, atraumatic, no cyanosis or edema  SKIN: no rash or abnormalities  NEUROLOGIC: AOx3. Gait normal.   PSYCHIATRIC: non focal    Lab/Data Review: All lab results for the last 24 hours reviewed.      Recent Results (from the past 24 hour(s))   GLUCOSE, POC    Collection Time: 05/28/21 12:11 PM   Result Value Ref Range    Glucose (POC) 158 (H) 65 - 117 mg/dL    Performed by Mary Jain. (CON)    GLUCOSE, POC    Collection Time: 05/28/21  6:05 PM Result Value Ref Range    Glucose (POC) 117 65 - 117 mg/dL    Performed by [id number], POC    Collection Time: 05/28/21 11:04 PM   Result Value Ref Range    Glucose (POC) 128 (H) 65 - 117 mg/dL    Performed by [facility]    CBC WITH AUTOMATED DIFF    Collection Time: 05/29/21  3:02 AM   Result Value Ref Range    WBC 14.8 (H) 4.1 - 11.1 K/uL    RBC 4.64 4.10 - 5.70 M/uL    HGB 13.7 12.1 - 17.0 g/dL    HCT 41.7 36.6 - 50.3 %    MCV 89.9 80.0 - 99.0 FL    MCH 29.5 26.0 - 34.0 PG    MCHC 32.9 30.0 - 36.5 g/dL    RDW 14.6 (H) 11.5 - 14.5 %    PLATELET 874 (L) 126 - 400 K/uL    NRBC 0.0 0  WBC    ABSOLUTE NRBC 0.00 0.00 - 0.01 K/uL    NEUTROPHILS 83 (H) 32 - 75 %    LYMPHOCYTES 8 (L) 12 - 49 %    MONOCYTES 8 5 - 13 %    EOSINOPHILS 0 0 - 7 %    BASOPHILS 0 0 - 1 %    IMMATURE GRANULOCYTES 1 (H) 0.0 - 0.5 %    ABS. NEUTROPHILS 12.3 (H) 1.8 - 8.0 K/UL    ABS. LYMPHOCYTES 1.2 0.8 - 3.5 K/UL    ABS. MONOCYTES 1.2 (H) 0.0 - 1.0 K/UL    ABS. EOSINOPHILS 0.0 0.0 - 0.4 K/UL    ABS. BASOPHILS 0.0 0.0 - 0.1 K/UL    ABS. IMM.  GRANS. 0.1 (H) 0.00 - 0.04 K/UL    DF SMEAR SCANNED      RBC COMMENTS ANISOCYTOSIS  1+       LIPASE    Collection Time: 05/29/21  3:02 AM   Result Value Ref Range    Lipase >3,000 (H) 73 - 145 U/L   METABOLIC PANEL, BASIC    Collection Time: 05/29/21  5:34 AM   Result Value Ref Range    Sodium 139 136 - 145 mmol/L    Potassium 4.2 3.5 - 5.1 mmol/L    Chloride 107 97 - 108 mmol/L    CO2 24 21 - 32 mmol/L    Anion gap 8 5 - 15 mmol/L    Glucose 159 (H) 65 - 100 mg/dL    BUN 23 (H) 6 - 20 MG/DL    Creatinine 1.63 (H) 0.70 - 1.30 MG/DL    BUN/Creatinine ratio 14 12 - 20      GFR est AA 51 (L) >60 ml/min/1.73m2    GFR est non-AA 42 (L) >60 ml/min/1.73m2    Calcium 7.4 (L) 8.5 - 10.1 MG/DL   HEPATIC FUNCTION PANEL    Collection Time: 05/29/21  5:34 AM   Result Value Ref Range    Protein, total 5.8 (L) 6.4 - 8.2 g/dL    Albumin 2.8 (L) 3.5 - 5.0 g/dL    Globulin 3.0 2.0 - 4.0 g/dL    A-G Ratio 0.9 (L) 1.1 - 2.2      Bilirubin, total 1.5 (H) 0.2 - 1.0 MG/DL    Bilirubin, direct 0.6 (H) 0.0 - 0.2 MG/DL    Alk. phosphatase 63 45 - 117 U/L    AST (SGOT) 47 (H) 15 - 37 U/L    ALT (SGPT) 81 (H) 12 - 78 U/L   GLUCOSE, POC    Collection Time: 05/29/21  6:27 AM   Result Value Ref Range    Glucose (POC) 134 (H) 65 - 117 mg/dL    Performed by Flo Tavera (CON)         Imaging:          Assessment and Plan:     Acute pancreatitis  -Patient presents with acute onset of abdominal pain, noted inflammatory changes around pancreas on the CT scan and also lipase elevated more than 3000, consistent with acute pancreatitis  -Likely etiology could be gallstone, gallbladder with stones and thickened wall  -MRCP does not show common bile duct stone, shows narrowing of the intrapancreatic common bile duct likely reflecting compression by pancreatic edema  -Patient with leukocytosis, continue Zosyn  -GI following     Cholelithiasis  -General surgery evaluated the patient, timing for cholecystectomy to be determined     Hypertension  -Blood pressure elevated due to pain  -Continue current medications  -Monitor blood pressure     Diabetes  -Hold Metformin  -Insulin sliding scale coverage and monitor blood sugars     Liver cirrhosis  -Patient with history of liver cirrhosis due to alcohol  -Has quit drinking several years ago  -Hepatology following  -S/p liver biopsy 05/27, follow pathology  -S/p EGD 05/27 without finding of varices    Thrombocytopenia  -Due to liver cirrhosis  -Monitor platelet count    Discharge disposition: Likely more than 48 hours pending further work-up from GI and general surgery.     Signed By: Abril Galvan MD     May 29, 2021

## 2021-05-29 NOTE — PROGRESS NOTES
Bedside and Verbal shift change report given to Garret Shafer (oncoming nurse) by Jeanice Epley (offgoing nurse). Report included the following information SBAR, Kardex, Intake/Output and MAR.

## 2021-05-30 LAB
ALBUMIN SERPL-MCNC: 2.8 G/DL (ref 3.5–5)
ALBUMIN/GLOB SERPL: 1 {RATIO} (ref 1.1–2.2)
ALP SERPL-CCNC: 74 U/L (ref 45–117)
ALT SERPL-CCNC: 64 U/L (ref 12–78)
ANION GAP SERPL CALC-SCNC: 7 MMOL/L (ref 5–15)
AST SERPL-CCNC: 35 U/L (ref 15–37)
BASOPHILS # BLD: 0 K/UL (ref 0–0.1)
BASOPHILS NFR BLD: 0 % (ref 0–1)
BILIRUB SERPL-MCNC: 1.9 MG/DL (ref 0.2–1)
BUN SERPL-MCNC: 18 MG/DL (ref 6–20)
BUN/CREAT SERPL: 14 (ref 12–20)
CALCIUM SERPL-MCNC: 7.2 MG/DL (ref 8.5–10.1)
CHLORIDE SERPL-SCNC: 106 MMOL/L (ref 97–108)
CO2 SERPL-SCNC: 24 MMOL/L (ref 21–32)
CREAT SERPL-MCNC: 1.27 MG/DL (ref 0.7–1.3)
DIFFERENTIAL METHOD BLD: ABNORMAL
EOSINOPHIL # BLD: 0 K/UL (ref 0–0.4)
EOSINOPHIL NFR BLD: 0 % (ref 0–7)
ERYTHROCYTE [DISTWIDTH] IN BLOOD BY AUTOMATED COUNT: 13.7 % (ref 11.5–14.5)
GLOBULIN SER CALC-MCNC: 2.8 G/DL (ref 2–4)
GLUCOSE BLD STRIP.AUTO-MCNC: 115 MG/DL (ref 65–117)
GLUCOSE BLD STRIP.AUTO-MCNC: 116 MG/DL (ref 65–117)
GLUCOSE BLD STRIP.AUTO-MCNC: 119 MG/DL (ref 65–117)
GLUCOSE BLD STRIP.AUTO-MCNC: 121 MG/DL (ref 65–117)
GLUCOSE BLD STRIP.AUTO-MCNC: 154 MG/DL (ref 65–117)
GLUCOSE SERPL-MCNC: 123 MG/DL (ref 65–100)
HCT VFR BLD AUTO: 37.8 % (ref 36.6–50.3)
HGB BLD-MCNC: 12.5 G/DL (ref 12.1–17)
IMM GRANULOCYTES # BLD AUTO: 0.2 K/UL (ref 0–0.04)
IMM GRANULOCYTES NFR BLD AUTO: 1 % (ref 0–0.5)
LIPASE SERPL-CCNC: 1140 U/L (ref 73–393)
LYMPHOCYTES # BLD: 0.9 K/UL (ref 0.8–3.5)
LYMPHOCYTES NFR BLD: 8 % (ref 12–49)
MCH RBC QN AUTO: 30 PG (ref 26–34)
MCHC RBC AUTO-ENTMCNC: 33.1 G/DL (ref 30–36.5)
MCV RBC AUTO: 90.6 FL (ref 80–99)
MONOCYTES # BLD: 0.8 K/UL (ref 0–1)
MONOCYTES NFR BLD: 7 % (ref 5–13)
NEUTS SEG # BLD: 10.1 K/UL (ref 1.8–8)
NEUTS SEG NFR BLD: 84 % (ref 32–75)
NRBC # BLD: 0 K/UL (ref 0–0.01)
NRBC BLD-RTO: 0 PER 100 WBC
PLATELET # BLD AUTO: 105 K/UL (ref 150–400)
PMV BLD AUTO: 11.2 FL (ref 8.9–12.9)
POTASSIUM SERPL-SCNC: 3.7 MMOL/L (ref 3.5–5.1)
PROT SERPL-MCNC: 5.6 G/DL (ref 6.4–8.2)
RBC # BLD AUTO: 4.17 M/UL (ref 4.1–5.7)
SERVICE CMNT-IMP: ABNORMAL
SERVICE CMNT-IMP: NORMAL
SERVICE CMNT-IMP: NORMAL
SODIUM SERPL-SCNC: 137 MMOL/L (ref 136–145)
WBC # BLD AUTO: 12 K/UL (ref 4.1–11.1)

## 2021-05-30 PROCEDURE — 74011250636 HC RX REV CODE- 250/636: Performed by: FAMILY MEDICINE

## 2021-05-30 PROCEDURE — 99233 SBSQ HOSP IP/OBS HIGH 50: CPT | Performed by: INTERNAL MEDICINE

## 2021-05-30 PROCEDURE — 80053 COMPREHEN METABOLIC PANEL: CPT

## 2021-05-30 PROCEDURE — 65270000029 HC RM PRIVATE

## 2021-05-30 PROCEDURE — 83690 ASSAY OF LIPASE: CPT

## 2021-05-30 PROCEDURE — 74011250637 HC RX REV CODE- 250/637: Performed by: FAMILY MEDICINE

## 2021-05-30 PROCEDURE — 74011000258 HC RX REV CODE- 258: Performed by: FAMILY MEDICINE

## 2021-05-30 PROCEDURE — 36415 COLL VENOUS BLD VENIPUNCTURE: CPT

## 2021-05-30 PROCEDURE — 82962 GLUCOSE BLOOD TEST: CPT

## 2021-05-30 PROCEDURE — 74011636637 HC RX REV CODE- 636/637: Performed by: FAMILY MEDICINE

## 2021-05-30 PROCEDURE — 85025 COMPLETE CBC W/AUTO DIFF WBC: CPT

## 2021-05-30 RX ADMIN — SODIUM CHLORIDE, POTASSIUM CHLORIDE, SODIUM LACTATE AND CALCIUM CHLORIDE 125 ML/HR: 600; 310; 30; 20 INJECTION, SOLUTION INTRAVENOUS at 12:17

## 2021-05-30 RX ADMIN — HYDROMORPHONE HYDROCHLORIDE 1 MG: 1 INJECTION, SOLUTION INTRAMUSCULAR; INTRAVENOUS; SUBCUTANEOUS at 06:39

## 2021-05-30 RX ADMIN — PIPERACILLIN AND TAZOBACTAM 3.38 G: 3; .375 INJECTION, POWDER, LYOPHILIZED, FOR SOLUTION INTRAVENOUS at 05:39

## 2021-05-30 RX ADMIN — PANTOPRAZOLE SODIUM 40 MG: 40 TABLET, DELAYED RELEASE ORAL at 08:32

## 2021-05-30 RX ADMIN — HYDROMORPHONE HYDROCHLORIDE 1 MG: 1 INJECTION, SOLUTION INTRAMUSCULAR; INTRAVENOUS; SUBCUTANEOUS at 12:17

## 2021-05-30 RX ADMIN — LISINOPRIL 10 MG: 10 TABLET ORAL at 08:32

## 2021-05-30 RX ADMIN — Medication 10 ML: at 05:40

## 2021-05-30 RX ADMIN — INSULIN LISPRO 2 UNITS: 100 INJECTION, SOLUTION INTRAVENOUS; SUBCUTANEOUS at 12:17

## 2021-05-30 RX ADMIN — HYDROMORPHONE HYDROCHLORIDE 1 MG: 1 INJECTION, SOLUTION INTRAMUSCULAR; INTRAVENOUS; SUBCUTANEOUS at 00:33

## 2021-05-30 RX ADMIN — HYDROMORPHONE HYDROCHLORIDE 1 MG: 1 INJECTION, SOLUTION INTRAMUSCULAR; INTRAVENOUS; SUBCUTANEOUS at 20:40

## 2021-05-30 RX ADMIN — ENOXAPARIN SODIUM 40 MG: 40 INJECTION SUBCUTANEOUS at 08:33

## 2021-05-30 RX ADMIN — ASPIRIN 81 MG: 81 TABLET, COATED ORAL at 08:32

## 2021-05-30 RX ADMIN — Medication 10 ML: at 21:52

## 2021-05-30 RX ADMIN — HYDROMORPHONE HYDROCHLORIDE 1 MG: 1 INJECTION, SOLUTION INTRAMUSCULAR; INTRAVENOUS; SUBCUTANEOUS at 16:05

## 2021-05-30 RX ADMIN — SODIUM CHLORIDE, POTASSIUM CHLORIDE, SODIUM LACTATE AND CALCIUM CHLORIDE 125 ML/HR: 600; 310; 30; 20 INJECTION, SOLUTION INTRAVENOUS at 05:39

## 2021-05-30 RX ADMIN — PIPERACILLIN AND TAZOBACTAM 3.38 G: 3; .375 INJECTION, POWDER, LYOPHILIZED, FOR SOLUTION INTRAVENOUS at 21:52

## 2021-05-30 RX ADMIN — PIPERACILLIN AND TAZOBACTAM 3.38 G: 3; .375 INJECTION, POWDER, LYOPHILIZED, FOR SOLUTION INTRAVENOUS at 13:48

## 2021-05-30 NOTE — PROGRESS NOTES
[address] [facility]  [address]  [address]  [phone]               GASTROENTEROLOGY  PROGRESS NOTE        NAME: Mayela Adams   :  1945   MRN:  [id number]     Date of Admission: 2021  Consult Date: 2021     Assessment:   1. Acute pancreatitis: So far no clear etiology. Could possibly biliary, although no CBD dilation noted on CT and no choledocholithiasis on MRCP. Could have passed a stone. Liver enzyme elevation could be related to recent liver biopsy rather than biliary etiology (normal alk phos). Also question, if this has anything to do with the liver biopsy although this is secondary to hemobilia when it does happen. Again no biliary dilation was noted and hgb has remained stable. For now agree with working diagnosis of biliary pancreatitis and continuing supportive therapy with eventual cholecystectomy. 2. Cholelithiasis: With gall bladder wall thickening on CT (although non-contrast). 3. Liver cirrhosis: Likely ALFARO. Followed by Dr. Alexandrea Childers. S/p liver biopsy          Recommendations:   1. Continue supportive care with clear liquid diet, IV hydration, electrolyte correction, pain control  2. Watch for narcotic related constipation and start bowel regimen if evidence  3. Consider advancing diet to low fat diet over next day or 2 depending on progress  4. If unable to advance diet and pain persists may need to consider nasojejunal feeding  5. No need for ERCP at this time. Thank you for allowing us to participate in the care of this patient. Please do not hesitate to contact us with any further questions/concerns. Terence Ahumada, MD  [facility]    Subjective:   Persistent upper abdominal pain. Pain meds working but effect wears off soon. No vomiting. Has not had a bowel movement.      Objective:   BP (!) 169/89   Pulse 90   Temp 99.6 °F (37.6 °C)   Resp 16   Ht 5' 5\" (1.651 m)   Wt 92.3 kg (203 lb 7.8 oz)   SpO2 92% BMI 33.86 kg/m²     Physical Exam    General: Alert, in mild distress from abdominal pain  HEENT: Anicteric sclerae. Hard of hearing. Abdomen: Soft, obese, Non distended, no ascites, upper abdominal tenderness. Extremities: No peripheral edema  Neurologic:  CN 2-12 gi, Alert and oriented X 3. No acute neurological distress   Psych:   Good insight. Not anxious nor agitated. Labs: Reviewed     10/1/2020 11:43 5/27/2021 13:00 5/28/2021 03:32 5/29/2021 03:02 5/29/2021 05:34   Bilirubin, total 0.6 1.2 (H) 1.3 (H)  1.5 (H)   Bilirubin, direct     0.6 (H)   Protein, total 7.6 7.0 6.8  5.8 (L)   Albumin 4.2 3.7 3.5  2.8 (L)   Globulin 3.4 3.3 3.3  3.0   A-G Ratio 1.2 1.1 1.1  0.9 (L)   ALT 34 103 (H) 165 (H)  81 (H)   AST 24 138 (H) 141 (H)  47 (H)   Alk. phosphatase 67 86 97  63   Lipase  >3,000 (H) >3,000 (H) >3,000 (H)       5/29/2021 05:34   Sodium 139   Potassium 4.2   Chloride 107   CO2 24   Anion gap 8   Glucose 159 (H)   BUN 23 (H)   Creatinine 1.63 (H)   BUN/Creatinine ratio 14   Calcium 7.4 (L)   GFR est non-AA 42 (L)   GFR est AA 51 (L)      5/29/2021 03:02   WBC 14.8 (H)   RBC 4.64   HGB 13.7   HCT 41.7   MCV 89.9   PLATELET 396 (L)       Interval Imaging: Reviewed    05/28/2021: MRI/MRCP: Preliminary report:    Extensive peripancreatic inflammatory stranding redemonstrated. 1.4 cm  pancreatic head cystic lesion. Narrowing of the intrapancreatic common bile duct  likely reflecting compression by pancreatic edema with relative prominence at  the ampulla but no evident obstructing mass lesion or filling defect. No  identified complications of pancreatitis otherwise. Hepatic steatosis and  cirrhosis. Cholecystolithiasis. 05/27/2021: CT abd/pelvis without contrast:    Extensive peripancreatic and retroperitoneal inflammatory changes, compatible  with acute pancreatitis. No evidence of pneumoperitoneum. Cholecystolithiasis  with nonspecific gallbladder wall thickening. 3 cm left adrenal adenoma. Nodular  hepatic contour.     Interval Endoscopy: Reviewed, none

## 2021-05-30 NOTE — PROGRESS NOTES
Fiona Quiros MD, Alie Carrera MD, MPH      TERI Finnegan, [facility]     Clementina CORDOVA Adele, [facility]   Rosie Tsai AZALIA-MARILIN Sanderson, [facility]       Bernard Morris [facility] [address]    at [address], [address] Scarlett Kapoor  22.    [phone]    FAX: [address] [place] [address], [address]    [phone]    FAX: [phone]       HEPATOLOGY PROGRESS NOTE  The patient is well known to and regularly cared for at [facility]. He is a 70year old  male who was found to have thrombocytopenia in 12/2020. An ultrasound demonstrated changes suggestig cirrhosis and splenomegaly. A Fibroscan demonstrated a marked increase in liver stiffness of 51 kPa suggesting cirrhosis and high CAP score suggesting cirrhosis was from fatty liver. There is a history of heavy alcohol use in the past but none since 2013. The patient underwent an EGD to screen for esophageal varices and a liver biopsy to confirm cirrhosis yesterday. A gastric polyp was removed without complications and 2 biopsies of gastritis were obtained. There were no esophageal varices. 2 hours after getting home from the procedure he developed severe abdominal and nausea with no vomiting. He came to the ED and laboratory studies and CT scan demonstrated findings consistent with acute pancreatitis. There was no intrahepatic or perihepatic bleeding from the liver biopsy. There was no free air from the polypectomy. Hospital course:  Previous US shows stones in the CB. MRCP showed no stones in CBD. He has been treated with IV fluids and pain meds   He feels much better.   Lipase is down to 1100  Can probably start to advance diet in AM  Will discuss with  whether cholecystectomy should be performed during this hosptialization or electively after a few weeks. Plan for today:  Continue IV fluids. Continue clear liquids for today. Can advance to soft in AM  Continue pain meds. Continue to monitor Lipase every day. ASSESSMENT AND PLAN:  Acute pancreatitis  The etiology is almost certainly due to gallstones. An [address] in 12/2020 demonstrated gallstones. CT scan in the ED yesterday suggested thickening of the CBD and multiple small stones along with inflammatory changes of the pancreas. MRCP shows no CBD stones. Swelling of HOP. Abdominal pain  This is due to acute pancreatitis. Pain is resolving. Cirrhosis  The diagnosis of cirrhosis is based upon imaging, laboratory studies, Fibroscan,   Cirrhosis is secondary to ETOH with or without ALFARO. A liver biopsy was performed on th day he developed pancreatitis  He has been abstinent from alcohol since 2013. IF there is still fatty liver then he also has NAFLD in addition to previous ETOH contributing to cirrhosis. The patient has normal liver function. The patient has never developed any complications of cirrhosis to date. The CTP is 5. Child class A. The MELD score is 9. Screening for Esophageal varices   The patient does not have esophageal or gastric varices. The last EGD to assess for varices was performed in 5/2021.     Thrombocytopenia   This is secondary to cirrhosis. There is no evidence of overt bleeding. No treatment is required. The platelet count is adequate for the patient to undergo procedures without the need for platelet transfusion or platelet growth factors. Risk of elective surgery in a patient with cirrhosis  The patient has cirrhosis Child class A and MELD of under 10. The risk of complications including hepatic decompensation is about 20-30%. Operative mortality risk is under 5%.   I see no reason why he could not get cholecystectomy        PHYSICAL EXAMINATION:  VS: per nursing note  General:  Uncomfortable. Eyes:  Sclera anicteric. ENT:  No oral lesions. Thyroid normal.  Nodes:  No adenopathy. Skin:  No spider angiomata. No jaundice. Respiratory:  Lungs clear to auscultation. Cardiovascular:  Regular heart rate. Abdomen:  Diffusely tender. Most tender below umbilicus. No rebound. No obvious ascites. Extremities:  No lower extremity edema. Neurologic:  Alert and oriented. Cranial nerves grossly intact. No asterixis. LABORATORY:  Results for Melodie Bence (MRN [id number]) as of 5/30/2021 13:57   Ref. Range 5/28/2021 03:32 5/29/2021 05:34 5/30/2021 00:46   WBC Latest Ref Range: 4.1 - 11.1 K/uL 13.3 (H) 14.8 (H) 12.0 (H)   HGB Latest Ref Range: 12.1 - 17.0 g/dL 15.2 13.7 12.5   PLATELET Latest Ref Range: 150 - 400 K/uL 120 (L) 121 (L) 105 (L)   Sodium Latest Ref Range: 136 - 145 mmol/L 142 139 137   Potassium Latest Ref Range: 3.5 - 5.1 mmol/L 4.5 4.2 3.7   Chloride Latest Ref Range: 97 - 108 mmol/L 116 (H) 107 106   CO2 Latest Ref Range: 21 - 32 mmol/L 14 (LL) 24 24   Glucose Latest Ref Range: 65 - 100 mg/dL 170 (H) 159 (H) 123 (H)   BUN Latest Ref Range: 6 - 20 MG/DL 22 (H) 23 (H) 18   Creatinine Latest Ref Range: 0.70 - 1.30 MG/DL 1.39 (H) 1.63 (H) 1.27   Bilirubin, total Latest Ref Range: 0.2 - 1.0 MG/DL 1.3 (H) 1.5 (H) 1.9 (H)   Albumin Latest Ref Range: 3.5 - 5.0 g/dL 3.5 2.8 (L) 2.8 (L)   ALT Latest Ref Range: 12 - 78 U/L 165 (H) 81 (H) 64   AST Latest Ref Range: 15 - 37 U/L 141 (H) 47 (H) 35   Alk. phosphatase Latest Ref Range: 45 - 117 U/L 97 63 74   Lipase Latest Ref Range: 73 - 393 U/L >3,000 (H) >3,000 (H) 1,140 (H)       RADIOLOGY:  12/2020. Ultrasound of liver. Echogenic consistent with cirrhosis. No liver mass lesions. No dilated bile ducts. No ascites. Gallstones. 5/2021. CT scan abdomen without IV contrast.  Nodular liver consistent with cirrhosis.  No liver mass lesions. Thickening of CBD with several defects suggesting CBD stones. No ascites.         María Dickens MD  [address], [address] [place] [address] 22.  [phone]  [address] [place] [address]

## 2021-05-30 NOTE — PROGRESS NOTES
Progress Note    Patient: Mayela Adams MRN: [id number]  SSN: [id number]    YOB: 1945  Age: 70 y.o. Sex: male      Admit Date: 2021    Biliary pancreatitis    Subjective:     No acute surgical issues. Pt is doing better. Lipase is trending down. Pain is under control. Objective:     Visit Vitals  BP (!) 141/79 (BP 1 Location: Left upper arm, BP Patient Position: At rest)   Pulse 87   Temp 98.4 °F (36.9 °C)   Resp 18   Ht 5' 5\" (1.651 m)   Wt 203 lb 7.8 oz (92.3 kg)   SpO2 93%   BMI 33.86 kg/m²       Temp (24hrs), Av.8 °F (37.1 °C), Min:98 °F (36.7 °C), Max:99.6 °F (37.6 °C)        Physical Exam:    Gen:  NAD  Pulm:  Unlabored  Abd:  S/mildly distended/Non-TTP    Recent Results (from the past 24 hour(s))   GLUCOSE, POC    Collection Time: 21  4:38 PM   Result Value Ref Range    Glucose (POC) 127 (H) 65 - 117 mg/dL    Performed by Gavi Garcia    GLUCOSE, POC    Collection Time: 21 12:45 AM   Result Value Ref Range    Glucose (POC) 119 (H) 65 - 117 mg/dL    Performed by Shannon Duvall    METABOLIC PANEL, COMPREHENSIVE    Collection Time: 21 12:46 AM   Result Value Ref Range    Sodium 137 136 - 145 mmol/L    Potassium 3.7 3.5 - 5.1 mmol/L    Chloride 106 97 - 108 mmol/L    CO2 24 21 - 32 mmol/L    Anion gap 7 5 - 15 mmol/L    Glucose 123 (H) 65 - 100 mg/dL    BUN 18 6 - 20 MG/DL    Creatinine 1.27 0.70 - 1.30 MG/DL    BUN/Creatinine ratio 14 12 - 20      GFR est AA >60 >60 ml/min/1.73m2    GFR est non-AA 56 (L) >60 ml/min/1.73m2    Calcium 7.2 (L) 8.5 - 10.1 MG/DL    Bilirubin, total 1.9 (H) 0.2 - 1.0 MG/DL    ALT (SGPT) 64 12 - 78 U/L    AST (SGOT) 35 15 - 37 U/L    Alk.  phosphatase 74 45 - 117 U/L    Protein, total 5.6 (L) 6.4 - 8.2 g/dL    Albumin 2.8 (L) 3.5 - 5.0 g/dL    Globulin 2.8 2.0 - 4.0 g/dL    A-G Ratio 1.0 (L) 1.1 - 2.2     CBC WITH AUTOMATED DIFF    Collection Time: 21 12:46 AM   Result Value Ref Range    WBC 12.0 (H) 4.1 - 11.1 K/uL    RBC 4.17 4.10 - 5.70 M/uL    HGB 12.5 12.1 - 17.0 g/dL    HCT 37.8 36.6 - 50.3 %    MCV 90.6 80.0 - 99.0 FL    MCH 30.0 26.0 - 34.0 PG    MCHC 33.1 30.0 - 36.5 g/dL    RDW 13.7 11.5 - 14.5 %    PLATELET 283 (L) 498 - 400 K/uL    MPV 11.2 8.9 - 12.9 FL    NRBC 0.0 0  WBC    ABSOLUTE NRBC 0.00 0.00 - 0.01 K/uL    NEUTROPHILS 84 (H) 32 - 75 %    LYMPHOCYTES 8 (L) 12 - 49 %    MONOCYTES 7 5 - 13 %    EOSINOPHILS 0 0 - 7 %    BASOPHILS 0 0 - 1 %    IMMATURE GRANULOCYTES 1 (H) 0.0 - 0.5 %    ABS. NEUTROPHILS 10.1 (H) 1.8 - 8.0 K/UL    ABS. LYMPHOCYTES 0.9 0.8 - 3.5 K/UL    ABS. MONOCYTES 0.8 0.0 - 1.0 K/UL    ABS. EOSINOPHILS 0.0 0.0 - 0.4 K/UL    ABS. BASOPHILS 0.0 0.0 - 0.1 K/UL    ABS. IMM. GRANS. 0.2 (H) 0.00 - 0.04 K/UL    DF AUTOMATED     LIPASE    Collection Time: 05/30/21 12:46 AM   Result Value Ref Range    Lipase 1,140 (H) 73 - 393 U/L   GLUCOSE, POC    Collection Time: 05/30/21  5:12 AM   Result Value Ref Range    Glucose (POC) 115 65 - 117 mg/dL    Performed by [address] [place] [address], POC    Collection Time: 05/30/21 12:10 PM   Result Value Ref Range    Glucose (POC) 154 (H) 65 - 117 mg/dL    Performed by Garry Laguna  PCT          Assessment:     Hospital Problems  Date Reviewed: 5/27/2021        Codes Class Noted POA    Pancreatitis ICD-10-CM: K85.90  ICD-9-CM: 456.8  5/27/2021 Unknown              Plan/Recommendations/Medical Decision Making:     - Biliary pancreatitis:  No acute indication for cholecystectomy.   Continue to monitor labs and treat with IV fluid hydration  - Advance to full liquids  - Continue antibiotic therapy  - Possible OR for lap cholecystectomy with Dr. Godinez Prim this week

## 2021-05-30 NOTE — PROGRESS NOTES
Bedside and Verbal shift change report given to Mariangel Mosqueda (oncoming nurse) by Hugo Beal (offgoing nurse). Report included the following information SBAR.

## 2021-05-30 NOTE — PROGRESS NOTES
Overall improved. Advance diet to low fat diet. Agree with consideration for same admission cholecystectomy. Recommend intra-operative cholangiogram and call back if positive. Please call if any further assistance needed.      Raymond Haq MD  [facility]

## 2021-05-30 NOTE — PROGRESS NOTES
[address] [facility] Adult  Hospitalist Group                                                                                          Hospitalist Progress Note  Fabiano Novoa MD  Answering service: [phone] OR [phone] from in house phone              Progress Note    Patient: Jimbo Arauz MRN: [id number]  SSN: [id number]    YOB: 1945  Age: 70 y.o. Sex: male      Admit Date: 5/27/2021    LOS: 3 days     Subjective:     Patient presents with abdominal pain and found to have acute pancreatitis. Likely gallstone related. Patient has cholelithiasis but does not show stone in CBD. Abdominal pain is improving with pain medications. Still with elevated lipase. Objective:     Vitals:    05/29/21 1718 05/29/21 2013 05/30/21 0049 05/30/21 0831   BP: (!) 169/89 128/78 118/75 (!) 141/79   Pulse: 90 85 82 87   Resp: 16 16 16 18   Temp: 99.6 °F (37.6 °C) 99.1 °F (37.3 °C) 98 °F (36.7 °C) 98.4 °F (36.9 °C)   SpO2: 92% 91% 92% 93%   Weight:       Height:            Intake and Output:  Current Shift: No intake/output data recorded. Last three shifts: No intake/output data recorded. Physical Exam:   GENERAL: alert, cooperative, no distress, appears stated age  THROAT & NECK: normal and no erythema or exudates noted. LUNG: clear to auscultation bilaterally  HEART: regular rate and rhythm, S1, S2 normal, no murmur, click, rub or gallop  ABDOMEN: soft, non-tender. Bowel sounds normal. No masses,  no organomegaly  EXTREMITIES:  extremities normal, atraumatic, no cyanosis or edema  SKIN: no rash or abnormalities  NEUROLOGIC: AOx3. Gait normal.   PSYCHIATRIC: non focal    Lab/Data Review: All lab results for the last 24 hours reviewed.      Recent Results (from the past 24 hour(s))   GLUCOSE, POC    Collection Time: 05/29/21  4:38 PM   Result Value Ref Range    Glucose (POC) 127 (H) 65 - 117 mg/dL    Performed by Pat Meadows    GLUCOSE, POC    Collection Time: 05/30/21 12:45 AM   Result Value Ref Range    Glucose (POC) 119 (H) 65 - 117 mg/dL    Performed by [id number] [facility] [address], [facility]    Collection Time: 05/30/21 12:46 AM   Result Value Ref Range    Sodium 137 136 - 145 mmol/L    Potassium 3.7 3.5 - 5.1 mmol/L    Chloride 106 97 - 108 mmol/L    CO2 24 21 - 32 mmol/L    Anion gap 7 5 - 15 mmol/L    Glucose 123 (H) 65 - 100 mg/dL    BUN 18 6 - 20 MG/DL    Creatinine 1.27 0.70 - 1.30 MG/DL    BUN/Creatinine ratio 14 12 - 20      GFR est AA >60 >60 ml/min/1.73m2    GFR est non-AA 56 (L) >60 ml/min/1.73m2    Calcium 7.2 (L) 8.5 - 10.1 MG/DL    Bilirubin, total 1.9 (H) 0.2 - 1.0 MG/DL    ALT (SGPT) 64 12 - 78 U/L    AST (SGOT) 35 15 - 37 U/L    Alk. phosphatase 74 45 - 117 U/L    Protein, total 5.6 (L) 6.4 - 8.2 g/dL    Albumin 2.8 (L) 3.5 - 5.0 g/dL    Globulin 2.8 2.0 - 4.0 g/dL    A-G Ratio 1.0 (L) 1.1 - 2.2     CBC WITH AUTOMATED DIFF    Collection Time: 05/30/21 12:46 AM   Result Value Ref Range    WBC 12.0 (H) 4.1 - 11.1 K/uL    RBC 4.17 4.10 - 5.70 M/uL    HGB 12.5 12.1 - 17.0 g/dL    HCT 37.8 36.6 - 50.3 %    MCV 90.6 80.0 - 99.0 FL    MCH 30.0 26.0 - 34.0 PG    MCHC 33.1 30.0 - 36.5 g/dL    RDW 13.7 11.5 - 14.5 %    PLATELET 111 (L) 481 - 400 K/uL    MPV 11.2 8.9 - 12.9 FL    NRBC 0.0 0  WBC    ABSOLUTE NRBC 0.00 0.00 - 0.01 K/uL    NEUTROPHILS 84 (H) 32 - 75 %    LYMPHOCYTES 8 (L) 12 - 49 %    MONOCYTES 7 5 - 13 %    EOSINOPHILS 0 0 - 7 %    BASOPHILS 0 0 - 1 %    IMMATURE GRANULOCYTES 1 (H) 0.0 - 0.5 %    ABS. NEUTROPHILS 10.1 (H) 1.8 - 8.0 K/UL    ABS. LYMPHOCYTES 0.9 0.8 - 3.5 K/UL    ABS. MONOCYTES 0.8 0.0 - 1.0 K/UL    ABS. EOSINOPHILS 0.0 0.0 - 0.4 K/UL    ABS. BASOPHILS 0.0 0.0 - 0.1 K/UL    ABS. IMM.  GRANS. 0.2 (H) 0.00 - 0.04 K/UL    DF AUTOMATED     LIPASE    Collection Time: 05/30/21 12:46 AM   Result Value Ref Range    Lipase 1,140 (H) 73 - 393 U/L   GLUCOSE, POC    Collection Time: 05/30/21  5:12 AM   Result Value Ref Range    Glucose (POC) 115 65 - 117 mg/dL Performed by Sharon Brand    GLUCOSE, POC    Collection Time: 05/30/21 12:10 PM   Result Value Ref Range    Glucose (POC) 154 (H) 65 - 117 mg/dL    Performed by Princess Angel  PCT         Imaging:          Assessment and Plan:     Acute pancreatitis  -Patient presents with acute onset of abdominal pain, noted inflammatory changes around pancreas on the CT scan and also lipase elevated more than 3000, consistent with acute pancreatitis  -Likely etiology could be gallstone, gallbladder with stones and thickened wall  -MRCP does not show common bile duct stone, shows narrowing of the intrapancreatic common bile duct likely reflecting compression by pancreatic edema  -Patient with leukocytosis, continue Zosyn  -Patient is clinically improving, lipase starting to trend down  -Continue clear liquid diet for now, may consider advancing diet in the next 1 to 2 days based on clinical status and lipase level  -GI following     Cholelithiasis  -General surgery evaluated the patient, timing for cholecystectomy to be determined     Hypertension  -Blood pressure elevated due to pain  -Continue current medications  -Monitor blood pressure     Diabetes  -Hold Metformin  -Insulin sliding scale coverage and monitor blood sugars     Liver cirrhosis  -Patient with history of liver cirrhosis due to alcohol  -Has quit drinking several years ago  -Hepatology following  -S/p liver biopsy 05/27, follow pathology  -S/p EGD 05/27 without finding of varices    Thrombocytopenia  -Due to liver cirrhosis  -Monitor platelet count    Discharge disposition: Likely in 24-48 hours pending further recommendations from GI and general surgery.     Signed By: Claudia Olvera MD     May 30, 2021

## 2021-05-31 LAB
ALBUMIN SERPL-MCNC: 2.4 G/DL (ref 3.5–5)
ALBUMIN/GLOB SERPL: 0.9 {RATIO} (ref 1.1–2.2)
ALP SERPL-CCNC: 64 U/L (ref 45–117)
ALT SERPL-CCNC: 43 U/L (ref 12–78)
ANION GAP SERPL CALC-SCNC: 8 MMOL/L (ref 5–15)
AST SERPL-CCNC: 27 U/L (ref 15–37)
BASOPHILS # BLD: 0 K/UL (ref 0–0.1)
BASOPHILS NFR BLD: 0 % (ref 0–1)
BILIRUB SERPL-MCNC: 1.5 MG/DL (ref 0.2–1)
BUN SERPL-MCNC: 15 MG/DL (ref 6–20)
BUN/CREAT SERPL: 14 (ref 12–20)
CALCIUM SERPL-MCNC: 7.3 MG/DL (ref 8.5–10.1)
CHLORIDE SERPL-SCNC: 107 MMOL/L (ref 97–108)
CO2 SERPL-SCNC: 23 MMOL/L (ref 21–32)
CREAT SERPL-MCNC: 1.07 MG/DL (ref 0.7–1.3)
DIFFERENTIAL METHOD BLD: ABNORMAL
EOSINOPHIL # BLD: 0.1 K/UL (ref 0–0.4)
EOSINOPHIL NFR BLD: 1 % (ref 0–7)
ERYTHROCYTE [DISTWIDTH] IN BLOOD BY AUTOMATED COUNT: 13.5 % (ref 11.5–14.5)
GLOBULIN SER CALC-MCNC: 2.7 G/DL (ref 2–4)
GLUCOSE BLD STRIP.AUTO-MCNC: 110 MG/DL (ref 65–117)
GLUCOSE BLD STRIP.AUTO-MCNC: 115 MG/DL (ref 65–117)
GLUCOSE BLD STRIP.AUTO-MCNC: 123 MG/DL (ref 65–117)
GLUCOSE BLD STRIP.AUTO-MCNC: 128 MG/DL (ref 65–117)
GLUCOSE BLD STRIP.AUTO-MCNC: 131 MG/DL (ref 65–117)
GLUCOSE SERPL-MCNC: 117 MG/DL (ref 65–100)
HCT VFR BLD AUTO: 33 % (ref 36.6–50.3)
HGB BLD-MCNC: 10.8 G/DL (ref 12.1–17)
IMM GRANULOCYTES # BLD AUTO: 0.1 K/UL (ref 0–0.04)
IMM GRANULOCYTES NFR BLD AUTO: 1 % (ref 0–0.5)
LIPASE SERPL-CCNC: 221 U/L (ref 73–393)
LYMPHOCYTES # BLD: 0.6 K/UL (ref 0.8–3.5)
LYMPHOCYTES NFR BLD: 7 % (ref 12–49)
MCH RBC QN AUTO: 29.2 PG (ref 26–34)
MCHC RBC AUTO-ENTMCNC: 32.7 G/DL (ref 30–36.5)
MCV RBC AUTO: 89.2 FL (ref 80–99)
MONOCYTES # BLD: 0.8 K/UL (ref 0–1)
MONOCYTES NFR BLD: 10 % (ref 5–13)
NEUTS SEG # BLD: 6.3 K/UL (ref 1.8–8)
NEUTS SEG NFR BLD: 81 % (ref 32–75)
NRBC # BLD: 0 K/UL (ref 0–0.01)
NRBC BLD-RTO: 0 PER 100 WBC
PLATELET # BLD AUTO: 85 K/UL (ref 150–400)
POTASSIUM SERPL-SCNC: 3.4 MMOL/L (ref 3.5–5.1)
PROT SERPL-MCNC: 5.1 G/DL (ref 6.4–8.2)
RBC # BLD AUTO: 3.7 M/UL (ref 4.1–5.7)
RBC MORPH BLD: ABNORMAL
SERVICE CMNT-IMP: ABNORMAL
SERVICE CMNT-IMP: NORMAL
SERVICE CMNT-IMP: NORMAL
SODIUM SERPL-SCNC: 138 MMOL/L (ref 136–145)
WBC # BLD AUTO: 7.9 K/UL (ref 4.1–11.1)

## 2021-05-31 PROCEDURE — 99233 SBSQ HOSP IP/OBS HIGH 50: CPT | Performed by: INTERNAL MEDICINE

## 2021-05-31 PROCEDURE — 94760 N-INVAS EAR/PLS OXIMETRY 1: CPT

## 2021-05-31 PROCEDURE — 74011250636 HC RX REV CODE- 250/636: Performed by: FAMILY MEDICINE

## 2021-05-31 PROCEDURE — 74011250637 HC RX REV CODE- 250/637: Performed by: FAMILY MEDICINE

## 2021-05-31 PROCEDURE — 65270000029 HC RM PRIVATE

## 2021-05-31 PROCEDURE — 99232 SBSQ HOSP IP/OBS MODERATE 35: CPT | Performed by: SURGERY

## 2021-05-31 PROCEDURE — 80053 COMPREHEN METABOLIC PANEL: CPT

## 2021-05-31 PROCEDURE — 83690 ASSAY OF LIPASE: CPT

## 2021-05-31 PROCEDURE — 74011000258 HC RX REV CODE- 258: Performed by: FAMILY MEDICINE

## 2021-05-31 PROCEDURE — 77010033678 HC OXYGEN DAILY

## 2021-05-31 PROCEDURE — 82962 GLUCOSE BLOOD TEST: CPT

## 2021-05-31 PROCEDURE — 36415 COLL VENOUS BLD VENIPUNCTURE: CPT

## 2021-05-31 PROCEDURE — 85025 COMPLETE CBC W/AUTO DIFF WBC: CPT

## 2021-05-31 RX ORDER — POTASSIUM CHLORIDE 750 MG/1
20 TABLET, FILM COATED, EXTENDED RELEASE ORAL DAILY
Status: DISCONTINUED | OUTPATIENT
Start: 2021-06-01 | End: 2021-06-01 | Stop reason: HOSPADM

## 2021-05-31 RX ORDER — POLYETHYLENE GLYCOL 3350 17 G/17G
17 POWDER, FOR SOLUTION ORAL DAILY
Status: DISCONTINUED | OUTPATIENT
Start: 2021-05-31 | End: 2021-06-01 | Stop reason: HOSPADM

## 2021-05-31 RX ORDER — HYDROMORPHONE HYDROCHLORIDE 1 MG/ML
1 INJECTION, SOLUTION INTRAMUSCULAR; INTRAVENOUS; SUBCUTANEOUS
Status: DISCONTINUED | OUTPATIENT
Start: 2021-05-31 | End: 2021-06-01 | Stop reason: HOSPADM

## 2021-05-31 RX ORDER — SIMVASTATIN 20 MG/1
20 TABLET, FILM COATED ORAL
COMMUNITY

## 2021-05-31 RX ORDER — INSULIN LISPRO 100 [IU]/ML
INJECTION, SOLUTION INTRAVENOUS; SUBCUTANEOUS
Status: DISCONTINUED | OUTPATIENT
Start: 2021-05-31 | End: 2021-06-01 | Stop reason: HOSPADM

## 2021-05-31 RX ADMIN — POLYETHYLENE GLYCOL 3350 17 G: 17 POWDER, FOR SOLUTION ORAL at 11:05

## 2021-05-31 RX ADMIN — ASPIRIN 81 MG: 81 TABLET, COATED ORAL at 08:33

## 2021-05-31 RX ADMIN — Medication 10 ML: at 05:36

## 2021-05-31 RX ADMIN — PANTOPRAZOLE SODIUM 40 MG: 40 TABLET, DELAYED RELEASE ORAL at 08:29

## 2021-05-31 RX ADMIN — LISINOPRIL 10 MG: 10 TABLET ORAL at 11:10

## 2021-05-31 RX ADMIN — HYDROMORPHONE HYDROCHLORIDE 1 MG: 1 INJECTION, SOLUTION INTRAMUSCULAR; INTRAVENOUS; SUBCUTANEOUS at 05:34

## 2021-05-31 RX ADMIN — ACETAMINOPHEN 650 MG: 325 TABLET ORAL at 17:54

## 2021-05-31 RX ADMIN — PIPERACILLIN AND TAZOBACTAM 3.38 G: 3; .375 INJECTION, POWDER, LYOPHILIZED, FOR SOLUTION INTRAVENOUS at 05:36

## 2021-05-31 RX ADMIN — Medication 10 ML: at 14:00

## 2021-05-31 RX ADMIN — HYDROMORPHONE HYDROCHLORIDE 1 MG: 1 INJECTION, SOLUTION INTRAMUSCULAR; INTRAVENOUS; SUBCUTANEOUS at 10:56

## 2021-05-31 RX ADMIN — LORAZEPAM 0.5 MG: 1 TABLET ORAL at 12:34

## 2021-05-31 RX ADMIN — ENOXAPARIN SODIUM 40 MG: 40 INJECTION SUBCUTANEOUS at 08:29

## 2021-05-31 RX ADMIN — HYDROMORPHONE HYDROCHLORIDE 1 MG: 1 INJECTION, SOLUTION INTRAMUSCULAR; INTRAVENOUS; SUBCUTANEOUS at 01:05

## 2021-05-31 RX ADMIN — SODIUM CHLORIDE, POTASSIUM CHLORIDE, SODIUM LACTATE AND CALCIUM CHLORIDE 125 ML/HR: 600; 310; 30; 20 INJECTION, SOLUTION INTRAVENOUS at 19:23

## 2021-05-31 NOTE — PROGRESS NOTES
[facility] [facility] Adult  Hospitalist Group                                                                                          Hospitalist Progress Note  Melissa Light MD  Answering service: [phone] OR [phone] from in house phone              Progress Note    Patient: Aleshia Ellis MRN: [id number]  SSN: [id number]    YOB: 1945  Age: 70 y.o. Sex: male      Admit Date: 5/27/2021    LOS: 4 days     Subjective:     Patient presents with abdominal pain and found to have acute pancreatitis. Likely gallstone related. Patient has cholelithiasis but does not show stone in CBD. Abdominal pain is overall resolved. Objective:     Vitals:    05/30/21 2024 05/31/21 0108 05/31/21 0443 05/31/21 1100   BP: (!) 160/83 131/68  116/60   Pulse: 88 76  70   Resp: 16 16  16   Temp: 98.1 °F (36.7 °C) 99.2 °F (37.3 °C)  99.9 °F (37.7 °C)   SpO2: 92% 95% 93% 93%   Weight:       Height:            Intake and Output:  Current Shift: No intake/output data recorded. Last three shifts: No intake/output data recorded. Physical Exam:   GENERAL: alert, cooperative, no distress, appears stated age  THROAT & NECK: normal and no erythema or exudates noted. LUNG: clear to auscultation bilaterally  HEART: regular rate and rhythm, S1, S2 normal, no murmur, click, rub or gallop  ABDOMEN: soft, non-tender. Bowel sounds normal. No masses,  no organomegaly  EXTREMITIES:  extremities normal, atraumatic, no cyanosis or edema  SKIN: no rash or abnormalities  NEUROLOGIC: AOx3. Gait normal.   PSYCHIATRIC: non focal    Lab/Data Review: All lab results for the last 24 hours reviewed.      Recent Results (from the past 24 hour(s))   GLUCOSE, POC    Collection Time: 05/30/21  5:38 PM   Result Value Ref Range    Glucose (POC) 116 65 - 117 mg/dL    Performed by Jett AGRAWAL    GLUCOSE, POC    Collection Time: 05/30/21  9:29 PM   Result Value Ref Range    Glucose (POC) 121 (H) 65 - 117 mg/dL    Performed by Mao Penn Lashay    GLUCOSE, POC    Collection Time: 05/31/21 12:08 AM   Result Value Ref Range    Glucose (POC) 123 (H) 65 - 117 mg/dL    Performed by Cecy Junior    METABOLIC PANEL, COMPREHENSIVE    Collection Time: 05/31/21  1:16 AM   Result Value Ref Range    Sodium 138 136 - 145 mmol/L    Potassium 3.4 (L) 3.5 - 5.1 mmol/L    Chloride 107 97 - 108 mmol/L    CO2 23 21 - 32 mmol/L    Anion gap 8 5 - 15 mmol/L    Glucose 117 (H) 65 - 100 mg/dL    BUN 15 6 - 20 MG/DL    Creatinine 1.07 0.70 - 1.30 MG/DL    BUN/Creatinine ratio 14 12 - 20      GFR est AA >60 >60 ml/min/1.73m2    GFR est non-AA >60 >60 ml/min/1.73m2    Calcium 7.3 (L) 8.5 - 10.1 MG/DL    Bilirubin, total 1.5 (H) 0.2 - 1.0 MG/DL    ALT (SGPT) 43 12 - 78 U/L    AST (SGOT) 27 15 - 37 U/L    Alk. phosphatase 64 45 - 117 U/L    Protein, total 5.1 (L) 6.4 - 8.2 g/dL    Albumin 2.4 (L) 3.5 - 5.0 g/dL    Globulin 2.7 2.0 - 4.0 g/dL    A-G Ratio 0.9 (L) 1.1 - 2.2     CBC WITH AUTOMATED DIFF    Collection Time: 05/31/21  1:16 AM   Result Value Ref Range    WBC 7.9 4.1 - 11.1 K/uL    RBC 3.70 (L) 4.10 - 5.70 M/uL    HGB 10.8 (L) 12.1 - 17.0 g/dL    HCT 33.0 (L) 36.6 - 50.3 %    MCV 89.2 80.0 - 99.0 FL    MCH 29.2 26.0 - 34.0 PG    MCHC 32.7 30.0 - 36.5 g/dL    RDW 13.5 11.5 - 14.5 %    PLATELET 85 (L) 958 - 400 K/uL    NRBC 0.0 0  WBC    ABSOLUTE NRBC 0.00 0.00 - 0.01 K/uL    NEUTROPHILS 81 (H) 32 - 75 %    LYMPHOCYTES 7 (L) 12 - 49 %    MONOCYTES 10 5 - 13 %    EOSINOPHILS 1 0 - 7 %    BASOPHILS 0 0 - 1 %    IMMATURE GRANULOCYTES 1 (H) 0.0 - 0.5 %    ABS. NEUTROPHILS 6.3 1.8 - 8.0 K/UL    ABS. LYMPHOCYTES 0.6 (L) 0.8 - 3.5 K/UL    ABS. MONOCYTES 0.8 0.0 - 1.0 K/UL    ABS. EOSINOPHILS 0.1 0.0 - 0.4 K/UL    ABS. BASOPHILS 0.0 0.0 - 0.1 K/UL    ABS. IMM.  GRANS. 0.1 (H) 0.00 - 0.04 K/UL    DF SMEAR SCANNED      RBC COMMENTS NORMOCYTIC, NORMOCHROMIC     LIPASE    Collection Time: 05/31/21  1:16 AM   Result Value Ref Range    Lipase 221 73 - 393 U/L   GLUCOSE, POC Collection Time: 05/31/21  6:41 AM   Result Value Ref Range    Glucose (POC) 110 65 - 117 mg/dL    Performed by Alexis Officer    GLUCOSE, POC    Collection Time: 05/31/21 11:57 AM   Result Value Ref Range    Glucose (POC) 115 65 - 117 mg/dL    Performed by Kaylin Burden         Imaging:          Assessment and Plan:     Acute pancreatitis  -Patient presents with acute onset of abdominal pain, noted inflammatory changes around pancreas on the CT scan and also lipase elevated more than 3000, consistent with acute pancreatitis  -Likely etiology could be gallstone, gallbladder with stones and thickened wall  -MRCP does not show common bile duct stone, shows narrowing of the intrapancreatic common bile duct likely reflecting compression by pancreatic edema  -Leukocytosis has resolved and will discontinue Zosyn  -Lipase normalized and abdominal pain is resolved  -GI following     Cholelithiasis  -General surgery evaluated the patient, timing for cholecystectomy to be determined     Hypertension  -Blood pressure elevated due to pain  -Continue current medications  -Monitor blood pressure     Diabetes  -Hold Metformin  -Insulin sliding scale coverage and monitor blood sugars     Liver cirrhosis  -Patient with history of liver cirrhosis due to alcohol  -Has quit drinking several years ago  -Hepatology following  -S/p liver biopsy 05/27, follow pathology  -S/p EGD 05/27 without finding of varices    Thrombocytopenia  -Due to liver cirrhosis  -Monitor platelet count    Discharge disposition: Likely in 24-48 hours pending further recommendations from GI and general surgery.     Signed By: Rolando Flores MD     May 31, 2021

## 2021-05-31 NOTE — PROGRESS NOTES
Bedside and Verbal shift change report given to Natalie Brooks RN (oncoming nurse) by Aida Clifton RN (offgoing nurse). Report included the following information SBAR, Kardex and MAR.

## 2021-05-31 NOTE — PROGRESS NOTES
Gilberto Ardon MD, Anant Galicia MD, MPH      TERI Clarke, ACNP-BC     Clementina Angulo, AGPCNP-SARAH Brownlily MiltonCHINYEREan Rodrigo, [facility] CenterNP-BC       [facility] 136    at [address]    [address] [place] [address] Ave, [address] Scarlett Kapoor  22. [phone]    FAX: [address] [place] [address] Avenue    [address], [address]    [phone]    FAX: [phone]       HEPATOLOGY PROGRESS NOTE  The patient is well known to and regularly cared for at [address] [address]. He is a 70year old  male who was found to have thrombocytopenia in 12/2020. An ultrasound demonstrated changes suggestig cirrhosis and splenomegaly. A Fibroscan demonstrated a marked increase in liver stiffness of 51 kPa suggesting cirrhosis and high CAP score suggesting cirrhosis was from fatty liver. There is a history of heavy alcohol use in the past but none since 2013. The patient underwent an EGD to screen for esophageal varices and a liver biopsy to confirm cirrhosis yesterday. A gastric polyp was removed without complications and 2 biopsies of gastritis were obtained. There were no esophageal varices. 2 hours after getting home from the procedure he developed severe abdominal and nausea with no vomiting. He came to the ED and laboratory studies and CT scan demonstrated findings consistent with acute pancreatitis. There was no intrahepatic or perihepatic bleeding from the liver biopsy. There was no free air from the polypectomy. Hospital course:  Previous US shows stones in the CB. MRCP showed no stones in CBD. He has been treated with IV fluids and pain meds   He feels much better.   Lipase is down to 200s  Will advance diet to soft for dinner. Dr Claritza Rossi to decide in AM if cholecystectomy will be performed during this hosptialization or electively after a few weeks. Plan for today:  Continue IV fluids. Advance to soft diet for dinner. Continue pain meds. Continue to monitor Lipase every day. If no surgery can probably go home tommorrow      ASSESSMENT AND PLAN:  Acute pancreatitis  The etiology is almost certainly due to gallstones. An [address] [place] [address] in 12/2020 demonstrated gallstones. CT scan in the ED yesterday suggested thickening of the CBD and multiple small stones along with inflammatory changes of the pancreas. MRCP shows no CBD stones. Swelling of HOP. Abdominal pain  This is due to acute pancreatitis. Pain is resolving. Will reduce pain med dose and duration to Q8H prn. Cirrhosis  The diagnosis of cirrhosis is based upon imaging, laboratory studies, Fibroscan,   Cirrhosis is secondary to ETOH with or without ALFARO. A liver biopsy was performed on th day he developed pancreatitis  He has been abstinent from alcohol since 2013. IF there is still fatty liver then he also has NAFLD in addition to previous ETOH contributing to cirrhosis. The patient has normal liver function. The patient has never developed any complications of cirrhosis to date. The CTP is 5. Child class A. The MELD score is 9. Screening for Esophageal varices   The patient does not have esophageal or gastric varices. The last EGD to assess for varices was performed in 5/2021.     Thrombocytopenia   This is secondary to cirrhosis. There is no evidence of overt bleeding. No treatment is required. The platelet count is adequate for the patient to undergo procedures without the need for platelet transfusion or platelet growth factors. Risk of elective surgery in a patient with cirrhosis  The patient has cirrhosis Child class A and MELD of under 10.   The risk of complications including hepatic decompensation is about 20-30%. Operative mortality risk is under 5%. I see no reason why he could not get cholecystectomy        PHYSICAL EXAMINATION:  VS: per nursing note  General:  Uncomfortable. Eyes:  Sclera anicteric. ENT:  No oral lesions. Thyroid normal.  Nodes:  No adenopathy. Skin:  No spider angiomata. No jaundice. Respiratory:  Lungs clear to auscultation. Cardiovascular:  Regular heart rate. Abdomen:  Diffusely tender. Most tender below umbilicus. No rebound. No obvious ascites. Extremities:  No lower extremity edema. Neurologic:  Alert and oriented. Cranial nerves grossly intact. No asterixis. LABORATORY:  Results for Varinder Mcdaniel (MRN [id number]) as of 5/31/2021 12:33   Ref. Range 5/29/2021 05:34 5/30/2021 00:46 5/31/2021 01:16   WBC Latest Ref Range: 4.1 - 11.1 K/uL  12.0 (H) 7.9   HGB Latest Ref Range: 12.1 - 17.0 g/dL  12.5 10.8 (L)   PLATELET Latest Ref Range: 150 - 400 K/uL  105 (L) 85 (L)   Sodium Latest Ref Range: 136 - 145 mmol/L 139 137 138   Potassium Latest Ref Range: 3.5 - 5.1 mmol/L 4.2 3.7 3.4 (L)   Chloride Latest Ref Range: 97 - 108 mmol/L 107 106 107   CO2 Latest Ref Range: 21 - 32 mmol/L 24 24 23   Glucose Latest Ref Range: 65 - 100 mg/dL 159 (H) 123 (H) 117 (H)   BUN Latest Ref Range: 6 - 20 MG/DL 23 (H) 18 15   Creatinine Latest Ref Range: 0.70 - 1.30 MG/DL 1.63 (H) 1.27 1.07   Bilirubin, total Latest Ref Range: 0.2 - 1.0 MG/DL 1.5 (H) 1.9 (H) 1.5 (H)   Albumin Latest Ref Range: 3.5 - 5.0 g/dL 2.8 (L) 2.8 (L) 2.4 (L)   ALT Latest Ref Range: 12 - 78 U/L 81 (H) 64 43   AST Latest Ref Range: 15 - 37 U/L 47 (H) 35 27   Alk. phosphatase Latest Ref Range: 45 - 117 U/L 63 74 64   Lipase Latest Ref Range: 73 - 393 U/L >3,000 (H) 1,140 (H) 221       RADIOLOGY:  12/2020. Ultrasound of liver. Echogenic consistent with cirrhosis. No liver mass lesions. No dilated bile ducts. No ascites. Gallstones. 5/2021.   CT scan abdomen without IV contrast.  Nodular liver consistent with cirrhosis. No liver mass lesions. Thickening of CBD with several defects suggesting CBD stones. No ascites.         Si MD Aleksander  [facility] 13  [address], [address] [place] [address].  [phone]  [address] [place] [address]

## 2021-05-31 NOTE — PROGRESS NOTES
Surgery Progress Note    5/31/2021    Admit Date: 5/27/2021    CC: Abd pain    Subjective:     Pain improving. Some hiccups. Anxious to have surgery. Constitutional: No fever or chills  Neurologic: No headache  Eyes: No scleral icterus or irritated eyes  Nose: No nasal pain or drainage  Mouth: No oral lesions or sore throat  Cardiac: No palpations or chest pain  Pulmonary: No cough or shortness or breath  Gastrointestinal: Distended, no nausea, emesis, diarrhea, or constipation  Genitourinary: No dysuria  Musculoskeletal: No muscle or joint tenderness  Skin: No rashes or lesions  Psychiatric: No anxiety or depressed mood    Objective:     Visit Vitals  /60 (BP 1 Location: Right upper arm)   Pulse 70   Temp 99.9 °F (37.7 °C)   Resp 16   Ht 5' 5\" (1.651 m)   Wt 203 lb 7.8 oz (92.3 kg)   SpO2 93%   BMI 33.86 kg/m²       General: No acute distress, conversant  Eyes: PERRLA, no scleral icterus  HENT: Normocephalic without oral lesions  Neck: Trachea midline without LAD  Cardiac: Normal pulse rate and rhythm  Pulmonary: Symmetric chest rise with normal effort  GI: Distended, mild tenderness in epigastrium  Skin: Warm without rash  Extremities: No edema or joint stiffness  Psych: Appropriate mood and affect    Labs, vital signs, and I/O reviewed. Assessment:     71 y/o M with resolving gallstone pancreatitis with clear CBD on MRCP    Plan:     PRN pain control  IVF  Continue fulls today. NPO after midnight. Pain improving  No abx currently  Dr. Vandana cMnamara to decide tomorrow regarding OR timing. Spoke with Dr. Nuvia Branch in the [address] and the OR staff and we hopefully we can honor his May 27 Covid sample given he went from EGD to basically admission. We will have to see depending on OR staff for the day.     Melissa Jeffries MD  Bariatric and General Surgeon  [facility]

## 2021-06-01 VITALS
TEMPERATURE: 98.1 F | DIASTOLIC BLOOD PRESSURE: 80 MMHG | OXYGEN SATURATION: 92 % | SYSTOLIC BLOOD PRESSURE: 168 MMHG | BODY MASS INDEX: 33.9 KG/M2 | HEART RATE: 75 BPM | HEIGHT: 65 IN | WEIGHT: 203.48 LBS | RESPIRATION RATE: 16 BRPM

## 2021-06-01 LAB
ALBUMIN SERPL-MCNC: 2.5 G/DL (ref 3.5–5)
ALBUMIN/GLOB SERPL: 0.7 {RATIO} (ref 1.1–2.2)
ALP SERPL-CCNC: 109 U/L (ref 45–117)
ALT SERPL-CCNC: 44 U/L (ref 12–78)
ANION GAP SERPL CALC-SCNC: 8 MMOL/L (ref 5–15)
AST SERPL-CCNC: 36 U/L (ref 15–37)
BILIRUB DIRECT SERPL-MCNC: 0.8 MG/DL (ref 0–0.2)
BILIRUB SERPL-MCNC: 1.5 MG/DL (ref 0.2–1)
BUN SERPL-MCNC: 15 MG/DL (ref 6–20)
BUN/CREAT SERPL: 14 (ref 12–20)
CALCIUM SERPL-MCNC: 8.1 MG/DL (ref 8.5–10.1)
CHLORIDE SERPL-SCNC: 108 MMOL/L (ref 97–108)
CO2 SERPL-SCNC: 24 MMOL/L (ref 21–32)
CREAT SERPL-MCNC: 1.06 MG/DL (ref 0.7–1.3)
GLOBULIN SER CALC-MCNC: 3.6 G/DL (ref 2–4)
GLUCOSE BLD STRIP.AUTO-MCNC: 107 MG/DL (ref 65–117)
GLUCOSE BLD STRIP.AUTO-MCNC: 108 MG/DL (ref 65–117)
GLUCOSE SERPL-MCNC: 120 MG/DL (ref 65–100)
LIPASE SERPL-CCNC: 145 U/L (ref 73–393)
POTASSIUM SERPL-SCNC: 3.3 MMOL/L (ref 3.5–5.1)
PROT SERPL-MCNC: 6.1 G/DL (ref 6.4–8.2)
SERVICE CMNT-IMP: NORMAL
SERVICE CMNT-IMP: NORMAL
SODIUM SERPL-SCNC: 140 MMOL/L (ref 136–145)

## 2021-06-01 PROCEDURE — 36415 COLL VENOUS BLD VENIPUNCTURE: CPT

## 2021-06-01 PROCEDURE — 80076 HEPATIC FUNCTION PANEL: CPT

## 2021-06-01 PROCEDURE — 74011250636 HC RX REV CODE- 250/636: Performed by: FAMILY MEDICINE

## 2021-06-01 PROCEDURE — 82962 GLUCOSE BLOOD TEST: CPT

## 2021-06-01 PROCEDURE — 80048 BASIC METABOLIC PNL TOTAL CA: CPT

## 2021-06-01 PROCEDURE — 83690 ASSAY OF LIPASE: CPT

## 2021-06-01 PROCEDURE — 74011250636 HC RX REV CODE- 250/636: Performed by: INTERNAL MEDICINE

## 2021-06-01 PROCEDURE — 74011250637 HC RX REV CODE- 250/637: Performed by: FAMILY MEDICINE

## 2021-06-01 RX ORDER — POTASSIUM CHLORIDE 1500 MG/1
20 TABLET, FILM COATED, EXTENDED RELEASE ORAL DAILY
Qty: 3 TABLET | Refills: 0 | Status: SHIPPED | OUTPATIENT
Start: 2021-06-02 | End: 2021-07-10

## 2021-06-01 RX ADMIN — LORAZEPAM 0.5 MG: 1 TABLET ORAL at 09:15

## 2021-06-01 RX ADMIN — PANTOPRAZOLE SODIUM 40 MG: 40 TABLET, DELAYED RELEASE ORAL at 09:15

## 2021-06-01 RX ADMIN — HYDROMORPHONE HYDROCHLORIDE 1 MG: 1 INJECTION, SOLUTION INTRAMUSCULAR; INTRAVENOUS; SUBCUTANEOUS at 02:12

## 2021-06-01 RX ADMIN — ASPIRIN 81 MG: 81 TABLET, COATED ORAL at 11:48

## 2021-06-01 RX ADMIN — POTASSIUM CHLORIDE 20 MEQ: 750 TABLET, EXTENDED RELEASE ORAL at 11:48

## 2021-06-01 RX ADMIN — ENOXAPARIN SODIUM 40 MG: 40 INJECTION SUBCUTANEOUS at 11:48

## 2021-06-01 RX ADMIN — POLYETHYLENE GLYCOL 3350 17 G: 17 POWDER, FOR SOLUTION ORAL at 11:48

## 2021-06-01 RX ADMIN — LISINOPRIL 10 MG: 10 TABLET ORAL at 09:15

## 2021-06-01 RX ADMIN — HYDROMORPHONE HYDROCHLORIDE 1 MG: 1 INJECTION, SOLUTION INTRAMUSCULAR; INTRAVENOUS; SUBCUTANEOUS at 11:48

## 2021-06-01 NOTE — PROGRESS NOTES
118 [facility].  [address] [facility] 140 [facility], [address]  [phone]                     GI PROGRESS NOTE    Patient Name: Aleshia Ellis      : 1945      MRN: [id number]  Admit Date: 2021  Today's Date: 2021    Subjective:     Pt is awake denies pain, nauesa. Appetite decreased but no vomiting. C/o generalized discomfort form lying bed etc.      Objective:     Blood pressure (!) 168/80, pulse 75, temperature 98.1 °F (36.7 °C), resp. rate 16, height 5' 5\" (1.651 m), weight 92.3 kg (203 lb 7.8 oz), SpO2 92 %. Physical Exam:  General appearance: cooperative, no distress, appears stated age  Skin: No jaunice   HEENT: Sclerae anicteric. Cardiovascular: Regular rate and rhythm. Respiratory: Comfortable breathing   GI: Abdomen nondistended, soft, and nontender. Normal active bowel sounds. Neurological:  Patient is alert and oriented. Psychiatric: Mood appears appropriateNo anxiety or agitation.       Data Review:    Recent Results (from the past 24 hour(s))   GLUCOSE, POC    Collection Time: 21 11:57 AM   Result Value Ref Range    Glucose (POC) 115 65 - 117 mg/dL    Performed by [address] POC    Collection Time: 21  4:13 PM   Result Value Ref Range    Glucose (POC) 128 (H) 65 - 117 mg/dL    Performed by [id number], POC    Collection Time: 21  9:07 PM   Result Value Ref Range    Glucose (POC) 131 (H) 65 - 117 mg/dL    Performed by Mechelle Orlando    METABOLIC PANEL, BASIC    Collection Time: 21  2:05 AM   Result Value Ref Range    Sodium 140 136 - 145 mmol/L    Potassium 3.3 (L) 3.5 - 5.1 mmol/L    Chloride 108 97 - 108 mmol/L    CO2 24 21 - 32 mmol/L    Anion gap 8 5 - 15 mmol/L    Glucose 120 (H) 65 - 100 mg/dL    BUN 15 6 - 20 MG/DL    Creatinine 1.06 0.70 - 1.30 MG/DL    BUN/Creatinine ratio 14 12 - 20      GFR est AA >60 >60 ml/min/1.73m2    GFR est non-AA >60 >60 ml/min/1.73m2    Calcium 8.1 (L) 8.5 - 10.1 MG/DL LIPASE    Collection Time: 06/01/21  2:05 AM   Result Value Ref Range    Lipase 145 73 - 393 U/L   HEPATIC FUNCTION PANEL    Collection Time: 06/01/21  2:05 AM   Result Value Ref Range    Protein, total 6.1 (L) 6.4 - 8.2 g/dL    Albumin 2.5 (L) 3.5 - 5.0 g/dL    Globulin 3.6 2.0 - 4.0 g/dL    A-G Ratio 0.7 (L) 1.1 - 2.2      Bilirubin, total 1.5 (H) 0.2 - 1.0 MG/DL    Bilirubin, direct 0.8 (H) 0.0 - 0.2 MG/DL    Alk.  phosphatase 109 45 - 117 U/L    AST (SGOT) 36 15 - 37 U/L    ALT (SGPT) 44 12 - 78 U/L   GLUCOSE, POC    Collection Time: 06/01/21  6:08 AM   Result Value Ref Range    Glucose (POC) 107 65 - 117 mg/dL    Performed by GENARO PAUL          Assessment / Plan :     Acute pancreatitis  Cholelithiasis  - Assumed gallstone-induced, though CT without evidence of CBD dilatation  - MRCP 5/28 showed no e/o stone/obstruction  - Spoke with surgery - unable to perform choley today so plan to send home with o/p choley with IOC  - Lipase normalized x 24hr  - ASL/ALT normal, slight increase   - OK to DC home from GI standpoint.      NAFLD/Cirrhosis  - Followed by Dr. Estee Ford  - US-guided liver biopsy 5/27       Patient C/Bre Lo 1106 Problem List:   Active Problems:    Pancreatitis (5/27/2021)

## 2021-06-01 NOTE — DISCHARGE SUMMARY
Discharge Summary       PATIENT ID: Gisela Shen  MRN: [id number]   YOB: 1945    DATE OF ADMISSION: 5/27/2021 12:50 PM    DATE OF DISCHARGE: 06/01/2021   PRIMARY CARE PROVIDER: Gonzalez Flores MD     ATTENDING PHYSICIAN: Mookie Ballesteros  DISCHARGING PROVIDER: Raz Vicente MD    To contact this individual call [phone] and ask the  to page. If unavailable ask to be transferred the Adult Hospitalist Department. CONSULTATIONS: IP CONSULT TO GASTROENTEROLOGY  IP CONSULT TO GASTROENTEROLOGY  IP CONSULT TO GENERAL SURGERY    PROCEDURES/SURGERIES: * No surgery found *    ADMITTING DIAGNOSES & HOSPITAL COURSE:     HPI  Gisela Shen is a 70 y.o. male who presents with acute onset of abdominal pain this AM.  Patient has history of liver cirrhosis. Patient underwent EGD and liver biopsy by Dr. Day Sanders earlier this AM.  EGD shows no varices. Liver biopsy was done without complication. Patient went home and started developing abdominal pain in the epigastrium and middle of the abdomen. Denies any nausea vomiting. Patient reports diarrhea at home. Patient represented to the emergency room due to his abdominal pain. CT abdomen and pelvis showing extensive peripancreatic and retroperitoneal inflammatory changes compatible with acute pancreatitis. Also shows cholecystolithiasis with none specific gallbladder wall thickening. Patient's lipase were found to be more than 3000. Hospitalist service requested to admit the patient for further evaluation management. Hospital Course  Patient presents with acute pancreatitis, initial CT with inflammatory changes around pancreas as well as lipase of over 3000. Initial CT shows gallstones and thickened gallbladder wall. Follow-up MRCP does not show any common bile duct stone. Does show narrowing of the intrapancreatic common bile duct likely reflecting compression by pancreatic edema.   Patient's etiology of pancreatitis is likely gallstone related. GI and general surgery follows the patient. Patient is clinically improved and tolerated advance diet. Lipase has been normal for 2 days prior to discharge. General surgery evaluated the patient and recommended follow-up as outpatient to consider for cholecystectomy electively. Patient also with liver cirrhosis and to follow-up with hepatology as scheduled. DISCHARGE DIAGNOSES / PLAN:      1. Acute pancreatitis  2. Cholelithiasis  3. Hypertension  4. Diabetes  5. Liver cirrhosis  6. Thrombocytopenia     ADDITIONAL CARE RECOMMENDATIONS:     GI light diet, advance as tolerated. Follow-up with general surgery as scheduled. PENDING TEST RESULTS:   At the time of discharge the following test results are still pending: None    FOLLOW UP APPOINTMENTS:    Follow-up Information     Follow up With Specialties Details Why Contact Info    Tawnya Burgos MD Infectious Disease   [address] [id number] [phone]               DIET: Cardiac Diet  Oral Nutritional Supplements: No Oral Supplement prescribed    ACTIVITY: Activity as tolerated    WOUND CARE: None    EQUIPMENT needed: None      DISCHARGE MEDICATIONS:  Current Discharge Medication List      START taking these medications    Details   potassium chloride SR (K-TAB) 20 mEq tablet Take 1 Tablet by mouth daily. Qty: 3 Tablet, Refills: 0  Start date: 6/2/2021         CONTINUE these medications which have NOT CHANGED    Details   simvastatin (ZOCOR) 20 mg tablet Take 20 mg by mouth nightly. latanoprost (XALATAN) 0.005 % ophthalmic solution Administer 1 Drop to left eye nightly. pantoprazole (Protonix) 20 mg tablet Take 2 Tabs by mouth daily. Qty: 180 Tab, Refills: 1      enalapril (VASOTEC) 10 mg tablet Take 10 mg by mouth daily. aspirin delayed-release 81 mg tablet Take 1 Tab by mouth daily.   Qty: 30 Tab, Refills: 1      metFORMIN (GLUCOPHAGE) 500 mg tablet Take 1,000 mg by mouth two (2) times daily (with meals). NOTIFY YOUR PHYSICIAN FOR ANY OF THE FOLLOWING:   Fever over 101 degrees for 24 hours. Chest pain, shortness of breath, fever, chills, nausea, vomiting, diarrhea, change in mentation, falling, weakness, bleeding. Severe pain or pain not relieved by medications. Or, any other signs or symptoms that you may have questions about. DISPOSITION:    Home With:   OT  PT  HH  RN       Long term SNF/Inpatient Rehab    Independent/assisted living    Hospice    Other:       PATIENT CONDITION AT DISCHARGE:     Functional status    Poor     Deconditioned     Independent      Cognition     Lucid     Forgetful     Dementia      Catheters/lines (plus indication)    Kuhn     PICC     PEG     None      Code status     Full code     DNR      PHYSICAL EXAMINATION AT DISCHARGE:  General:          Alert, cooperative, no distress, appears stated age. HEENT:           Atraumatic, anicteric sclerae, pink conjunctivae                          No oral ulcers, mucosa moist, throat clear, dentition fair  Neck:               Supple, symmetrical  Lungs:             Clear to auscultation bilaterally. No Wheezing or Rhonchi. No rales. Chest wall:      No tenderness  No Accessory muscle use. Heart:              Regular  rhythm,  No  murmur   No edema  Abdomen:        Soft, non-tender. Not distended. Bowel sounds normal  Extremities:     No cyanosis. No clubbing,                            Skin turgor normal, Capillary refill normal  Skin:                Not pale. Not Jaundiced  No rashes   Psych:             Not anxious or agitated.   Neurologic:      Alert, moves all extremities, answers questions appropriately and responds to commands       CHRONIC MEDICAL DIAGNOSES:  Problem List as of 6/1/2021 Date Reviewed: 5/27/2021        Codes Class Noted - Resolved    Pancreatitis ICD-10-CM: K85.90  ICD-9-CM: 383.0  5/27/2021 - Present        Cirrhosis of liver without ascites ([facility] ICD-10-CM: K74.60  ICD-9-CM: 571.5  3/12/2021 - Present        History of CVA (cerebrovascular accident) ICD-10-CM: Z86.73  ICD-9-CM: V12.54  2/3/2021 - Present        Thrombocytopenia (HCC) ICD-10-CM: D69.6  ICD-9-CM: 287.5  12/3/2020 - Present        Class 1 obesity due to excess calories with serious comorbidity and body mass index (BMI) of 32.0 to 32.9 in adult ICD-10-CM: E66.09, Z68.32  ICD-9-CM: 278.00, V85.32  12/3/2020 - Present        Diabetes mellitus ([facility] ICD-10-CM: E11.9  ICD-9-CM: 250.00  12/3/2020 - Present        RESOLVED: TIA (transient ischemic attack) ICD-10-CM: G45.9  ICD-9-CM: 435.9  7/6/2013 - 5/29/2019        RESOLVED: GI bleed ICD-10-CM: K92.2  ICD-9-CM: 578.9  6/19/2013 - 2/3/2021              Greater than 60 minutes were spent with the patient on counseling and coordination of care    Signed:   Milinda Denver, MD  6/1/2021  12:18 PM

## 2021-06-01 NOTE — PROGRESS NOTES
[employer] [facility] at [facility] Surgery      Subjective     Pt doing well, no N/V, no pain    Objective     Patient Vitals for the past 24 hrs:   Temp Pulse Resp BP SpO2   06/01/21 0915 98.1 °F (36.7 °C) 75 16 (!) 168/80 92 %   06/01/21 0215 98.4 °F (36.9 °C) 73 16 (!) 140/78 94 %   05/31/21 2030 98.1 °F (36.7 °C) 76 16 (!) 145/60 95 %   05/31/21 1429 98.4 °F (36.9 °C) 79 16 (!) 144/78 95 %   05/31/21 1100 99.9 °F (37.7 °C) 70 16 116/60 93 %       Date 05/31/21 0700 - 06/01/21 0659 06/01/21 0700 - 06/02/21 0659   Shift 8584-6186 5084-6107 24 Hour Total 0155-6929 7292-6722 24 Hour Total   INTAKE   Shift Total(mL/kg)         OUTPUT   Urine(mL/kg/hr)           Urine Occurrence(s)  1 x 1 x      Shift Total(mL/kg)         NET         Weight (kg) 92.3 92.3 92.3 92.3 92.3 92.3       PE  Pulm - CTAB  CV - RRR  Abd - soft, ND, BS present, NTTP    Labs  Recent Results (from the past 12 hour(s))   METABOLIC PANEL, BASIC    Collection Time: 06/01/21  2:05 AM   Result Value Ref Range    Sodium 140 136 - 145 mmol/L    Potassium 3.3 (L) 3.5 - 5.1 mmol/L    Chloride 108 97 - 108 mmol/L    CO2 24 21 - 32 mmol/L    Anion gap 8 5 - 15 mmol/L    Glucose 120 (H) 65 - 100 mg/dL    BUN 15 6 - 20 MG/DL    Creatinine 1.06 0.70 - 1.30 MG/DL    BUN/Creatinine ratio 14 12 - 20      GFR est AA >60 >60 ml/min/1.73m2    GFR est non-AA >60 >60 ml/min/1.73m2    Calcium 8.1 (L) 8.5 - 10.1 MG/DL   LIPASE    Collection Time: 06/01/21  2:05 AM   Result Value Ref Range    Lipase 145 73 - 393 U/L   HEPATIC FUNCTION PANEL    Collection Time: 06/01/21  2:05 AM   Result Value Ref Range    Protein, total 6.1 (L) 6.4 - 8.2 g/dL    Albumin 2.5 (L) 3.5 - 5.0 g/dL    Globulin 3.6 2.0 - 4.0 g/dL    A-G Ratio 0.7 (L) 1.1 - 2.2      Bilirubin, total 1.5 (H) 0.2 - 1.0 MG/DL    Bilirubin, direct 0.8 (H) 0.0 - 0.2 MG/DL    Alk.  phosphatase 109 45 - 117 U/L    AST (SGOT) 36 15 - 37 U/L    ALT (SGPT) 44 12 - 78 U/L   GLUCOSE, POC    Collection Time: 06/01/21  6:08 AM   Result Value Ref Range    Glucose (POC) 107 65 - 117 mg/dL    Performed by GENARO Mcnally     Jacinta Chandler is a 70 y. o.yr old male with gallstone pancreatitis, cirrhosis    Plan     No time for surgery today, he can eat  He does not want to stay to tomorrow and do surgery so he wants to be DC home   [id number] [address] for DC home  I will have my office schedule surgery in the next few days to week    Shruthi King MD

## 2021-06-01 NOTE — PROGRESS NOTES
Bedside and Verbal shift change report given to Partha Lou RN (oncoming nurse) by Joe Krishnan RN (offgoing nurse). Report included the following information SBAR.

## 2021-06-01 NOTE — PROGRESS NOTES
KELIN: Plan for discharge home with wife today. General Surgery will follow-up with patient to schedule surgery. Family to transport home via car. Medicare pt has received, reviewed, and signed 2nd IM letter informing them of their right to appeal the discharge. Signed copy has been placed on pt bedside chart.       Thierry Huston, TOMAS/CRM

## 2021-06-01 NOTE — DISCHARGE INSTRUCTIONS
Patient Education        Biliary Colic: Care Instructions  Your Care Instructions     Biliary (say \"BILL-ee-air-ee\") colic is belly pain caused by gallbladder problems. It is usually caused by a gallstone moving through or blocking the common bile duct or cystic duct. Gallstones are stones that form in the gallbladder. They also can form in the common bile duct or cystic duct. These ducts carry bile from the gallbladder and the liver to the small intestine. Gallstones may be as small as a grain of sand. Or they may be as large as a golf ball. Gallstones that cause severe symptoms usually are treated with surgery to remove the gallbladder. If the first attack of biliary colic is mild, it is often safe to wait until you have had another attack before you think about having surgery. The doctor has checked you carefully. But problems can develop later. If you notice any problems or new symptoms, get medical treatment right away. Follow-up care is a key part of your treatment and safety. Be sure to make and go to all appointments, and call your doctor if you are having problems. It's also a good idea to know your test results and keep a list of the medicines you take. How can you care for yourself at home? · Take pain medicines exactly as directed. ? If the doctor gave you a prescription medicine for pain, take it as prescribed. ? If you are not taking a prescription pain medicine, ask your doctor if you can take an over-the-counter medicine. Read and follow all instructions on the label. · Avoid foods that cause symptoms, especially fatty foods. These can cause biliary colic. · You may need more tests to look at your gallbladder. When should you call for help?    Call your doctor now or seek immediate medical care if:    · You have a fever.     · You have new belly pain, or your pain gets worse.     · There is a new or increasing yellow tint to your skin or the whites of your eyes.     · Your urine is dark yellow-brown, or your stools are light-colored or white.     · You cannot keep down fluids. Watch closely for changes in your health, and be sure to contact your doctor if:    · You do not get better as expected.     · You are not getting better after 1 day (24 hours). Where can you learn more? Go to http://www.gray.com/  Enter [id number] in the search box to learn more about \"Biliary Colic: Care Instructions. \"  Current as of: April 15, 2020               Content Version: 12.8  © 3955-1511 [employer]. Care instructions adapted under license by [employer] (which disclaims liability or warranty for this information). If you have questions about a medical condition or this instruction, always ask your healthcare professional. [address] any warranty or liability for your use of this information.

## 2021-06-02 ENCOUNTER — PATIENT OUTREACH (OUTPATIENT)
Dept: CASE MANAGEMENT | Age: 76
End: 2021-06-02

## 2021-06-02 ENCOUNTER — TELEPHONE (OUTPATIENT)
Dept: SURGERY | Age: 76
End: 2021-06-02

## 2021-06-02 NOTE — TELEPHONE ENCOUNTER
Please call wife, Yury Myles, calling on behalf of pt who would like to speak with nurse regarding the medication he can or cannot take. Pt is having surgery tomorrow.

## 2021-06-02 NOTE — TELEPHONE ENCOUNTER
Spoke with patient's wife regarding surgery tomorrow. Npo after midnight. Nothing to eat or drink. No medications.

## 2021-06-03 ENCOUNTER — ANESTHESIA EVENT (OUTPATIENT)
Dept: SURGERY | Age: 76
End: 2021-06-03
Payer: MEDICARE

## 2021-06-03 ENCOUNTER — ANESTHESIA (OUTPATIENT)
Dept: SURGERY | Age: 76
End: 2021-06-03
Payer: MEDICARE

## 2021-06-03 ENCOUNTER — HOSPITAL ENCOUNTER (OUTPATIENT)
Age: 76
Setting detail: OBSERVATION
Discharge: HOME | End: 2021-06-04
Attending: SURGERY | Admitting: SURGERY
Payer: MEDICARE

## 2021-06-03 ENCOUNTER — TELEPHONE (OUTPATIENT)
Dept: SURGERY | Age: 76
End: 2021-06-03

## 2021-06-03 ENCOUNTER — APPOINTMENT (OUTPATIENT)
Dept: GENERAL RADIOLOGY | Age: 76
End: 2021-06-03
Attending: SURGERY
Payer: MEDICARE

## 2021-06-03 DIAGNOSIS — K85.10 GALLSTONE PANCREATITIS: ICD-10-CM

## 2021-06-03 LAB
GLUCOSE BLD STRIP.AUTO-MCNC: 112 MG/DL (ref 65–117)
GLUCOSE BLD STRIP.AUTO-MCNC: 129 MG/DL (ref 65–117)
GLUCOSE BLD STRIP.AUTO-MCNC: 162 MG/DL (ref 65–117)
SERVICE CMNT-IMP: ABNORMAL
SERVICE CMNT-IMP: ABNORMAL
SERVICE CMNT-IMP: NORMAL

## 2021-06-03 PROCEDURE — 74011000250 HC RX REV CODE- 250: Performed by: NURSE ANESTHETIST, CERTIFIED REGISTERED

## 2021-06-03 PROCEDURE — 76060000036 HC ANESTHESIA 2.5 TO 3 HR: Performed by: SURGERY

## 2021-06-03 PROCEDURE — 99218 HC RM OBSERVATION: CPT

## 2021-06-03 PROCEDURE — 77030008608 HC TRCR ENDOSC SMTH AMR -B: Performed by: SURGERY

## 2021-06-03 PROCEDURE — 77030022473 HC HNDL ENDO GIA UNIV USDA -C: Performed by: SURGERY

## 2021-06-03 PROCEDURE — 77030002967 HC SUT PDS J&J -B: Performed by: SURGERY

## 2021-06-03 PROCEDURE — 74011250636 HC RX REV CODE- 250/636

## 2021-06-03 PROCEDURE — 77030031139 HC SUT VCRL2 J&J -A: Performed by: SURGERY

## 2021-06-03 PROCEDURE — 82962 GLUCOSE BLOOD TEST: CPT

## 2021-06-03 PROCEDURE — 77030008684 HC TU ET CUF COVD -B: Performed by: ANESTHESIOLOGY

## 2021-06-03 PROCEDURE — 77030020829: Performed by: SURGERY

## 2021-06-03 PROCEDURE — 74011250636 HC RX REV CODE- 250/636: Performed by: NURSE ANESTHETIST, CERTIFIED REGISTERED

## 2021-06-03 PROCEDURE — 77030010507 HC ADH SKN DERMBND J&J -B: Performed by: SURGERY

## 2021-06-03 PROCEDURE — 74011000250 HC RX REV CODE- 250: Performed by: SURGERY

## 2021-06-03 PROCEDURE — 77030040361 HC SLV COMPR DVT MDII -B: Performed by: SURGERY

## 2021-06-03 PROCEDURE — 2709999900 HC NON-CHARGEABLE SUPPLY: Performed by: SURGERY

## 2021-06-03 PROCEDURE — 77030010513 HC APPL CLP LIG J&J -C: Performed by: SURGERY

## 2021-06-03 PROCEDURE — 74011250637 HC RX REV CODE- 250/637: Performed by: SURGERY

## 2021-06-03 PROCEDURE — 47562 LAPAROSCOPIC CHOLECYSTECTOMY: CPT | Performed by: SURGERY

## 2021-06-03 PROCEDURE — 77030026438 HC STYL ET INTUB CARD -A: Performed by: ANESTHESIOLOGY

## 2021-06-03 PROCEDURE — 74011250636 HC RX REV CODE- 250/636: Performed by: ANESTHESIOLOGY

## 2021-06-03 PROCEDURE — 76210000016 HC OR PH I REC 1 TO 1.5 HR: Performed by: SURGERY

## 2021-06-03 PROCEDURE — 77030002895 HC DEV VASC CLOSR COVD -B: Performed by: SURGERY

## 2021-06-03 PROCEDURE — 77030040955 HC DSCTR SONICISION MEDT -H1: Performed by: SURGERY

## 2021-06-03 PROCEDURE — 77030012770 HC TRCR OPT FX AMR -B: Performed by: SURGERY

## 2021-06-03 PROCEDURE — 74011250636 HC RX REV CODE- 250/636: Performed by: SURGERY

## 2021-06-03 PROCEDURE — P9045 ALBUMIN (HUMAN), 5%, 250 ML: HCPCS | Performed by: NURSE ANESTHETIST, CERTIFIED REGISTERED

## 2021-06-03 PROCEDURE — 77030037032 HC INSRT SCIS CLICKLLINE DISP STOR -B: Performed by: SURGERY

## 2021-06-03 PROCEDURE — 76010000131 HC OR TIME 2 TO 2.5 HR: Performed by: SURGERY

## 2021-06-03 PROCEDURE — 77030020053 HC ELECTRD LAPSCP COVD -B: Performed by: SURGERY

## 2021-06-03 PROCEDURE — 77030002933 HC SUT MCRYL J&J -A: Performed by: SURGERY

## 2021-06-03 PROCEDURE — 88304 TISSUE EXAM BY PATHOLOGIST: CPT

## 2021-06-03 PROCEDURE — 77030007955 HC PCH ENDOSC SPEC J&J -B: Performed by: SURGERY

## 2021-06-03 RX ORDER — INSULIN LISPRO 100 [IU]/ML
INJECTION, SOLUTION INTRAVENOUS; SUBCUTANEOUS
Status: DISCONTINUED | OUTPATIENT
Start: 2021-06-03 | End: 2021-06-04 | Stop reason: HOSPADM

## 2021-06-03 RX ORDER — PHENYLEPHRINE HCL IN 0.9% NACL 0.4MG/10ML
SYRINGE (ML) INTRAVENOUS AS NEEDED
Status: DISCONTINUED | OUTPATIENT
Start: 2021-06-03 | End: 2021-06-03 | Stop reason: HOSPADM

## 2021-06-03 RX ORDER — FENTANYL CITRATE 50 UG/ML
INJECTION, SOLUTION INTRAMUSCULAR; INTRAVENOUS AS NEEDED
Status: DISCONTINUED | OUTPATIENT
Start: 2021-06-03 | End: 2021-06-03 | Stop reason: HOSPADM

## 2021-06-03 RX ORDER — LIDOCAINE HYDROCHLORIDE 20 MG/ML
INJECTION, SOLUTION EPIDURAL; INFILTRATION; INTRACAUDAL; PERINEURAL AS NEEDED
Status: DISCONTINUED | OUTPATIENT
Start: 2021-06-03 | End: 2021-06-03 | Stop reason: HOSPADM

## 2021-06-03 RX ORDER — SODIUM CHLORIDE, SODIUM LACTATE, POTASSIUM CHLORIDE, CALCIUM CHLORIDE 600; 310; 30; 20 MG/100ML; MG/100ML; MG/100ML; MG/100ML
75 INJECTION, SOLUTION INTRAVENOUS CONTINUOUS
Status: DISCONTINUED | OUTPATIENT
Start: 2021-06-03 | End: 2021-06-04

## 2021-06-03 RX ORDER — OXYCODONE AND ACETAMINOPHEN 5; 325 MG/1; MG/1
2 TABLET ORAL
Status: DISCONTINUED | OUTPATIENT
Start: 2021-06-03 | End: 2021-06-04 | Stop reason: HOSPADM

## 2021-06-03 RX ORDER — ONDANSETRON 2 MG/ML
4 INJECTION INTRAMUSCULAR; INTRAVENOUS
Status: DISCONTINUED | OUTPATIENT
Start: 2021-06-03 | End: 2021-06-04 | Stop reason: HOSPADM

## 2021-06-03 RX ORDER — ENOXAPARIN SODIUM 100 MG/ML
40 INJECTION SUBCUTANEOUS DAILY
Status: DISCONTINUED | OUTPATIENT
Start: 2021-06-04 | End: 2021-06-04 | Stop reason: HOSPADM

## 2021-06-03 RX ORDER — DEXAMETHASONE SODIUM PHOSPHATE 4 MG/ML
INJECTION, SOLUTION INTRA-ARTICULAR; INTRALESIONAL; INTRAMUSCULAR; INTRAVENOUS; SOFT TISSUE AS NEEDED
Status: DISCONTINUED | OUTPATIENT
Start: 2021-06-03 | End: 2021-06-03 | Stop reason: HOSPADM

## 2021-06-03 RX ORDER — NALOXONE HYDROCHLORIDE 0.4 MG/ML
0.4 INJECTION, SOLUTION INTRAMUSCULAR; INTRAVENOUS; SUBCUTANEOUS AS NEEDED
Status: DISCONTINUED | OUTPATIENT
Start: 2021-06-03 | End: 2021-06-04 | Stop reason: HOSPADM

## 2021-06-03 RX ORDER — MAGNESIUM SULFATE 100 %
4 CRYSTALS MISCELLANEOUS AS NEEDED
Status: DISCONTINUED | OUTPATIENT
Start: 2021-06-03 | End: 2021-06-04 | Stop reason: HOSPADM

## 2021-06-03 RX ORDER — ALBUMIN HUMAN 50 G/1000ML
SOLUTION INTRAVENOUS
Status: DISCONTINUED | OUTPATIENT
Start: 2021-06-03 | End: 2021-06-03 | Stop reason: HOSPADM

## 2021-06-03 RX ORDER — FENTANYL CITRATE 50 UG/ML
INJECTION, SOLUTION INTRAMUSCULAR; INTRAVENOUS
Status: COMPLETED
Start: 2021-06-03 | End: 2021-06-03

## 2021-06-03 RX ORDER — LATANOPROST 50 UG/ML
1 SOLUTION/ DROPS OPHTHALMIC
Status: DISCONTINUED | OUTPATIENT
Start: 2021-06-03 | End: 2021-06-04 | Stop reason: HOSPADM

## 2021-06-03 RX ORDER — SUCCINYLCHOLINE CHLORIDE 20 MG/ML
INJECTION INTRAMUSCULAR; INTRAVENOUS AS NEEDED
Status: DISCONTINUED | OUTPATIENT
Start: 2021-06-03 | End: 2021-06-03 | Stop reason: HOSPADM

## 2021-06-03 RX ORDER — DIPHENHYDRAMINE HYDROCHLORIDE 50 MG/ML
12.5 INJECTION, SOLUTION INTRAMUSCULAR; INTRAVENOUS
Status: DISCONTINUED | OUTPATIENT
Start: 2021-06-03 | End: 2021-06-04 | Stop reason: HOSPADM

## 2021-06-03 RX ORDER — SODIUM CHLORIDE 0.9 % (FLUSH) 0.9 %
5-40 SYRINGE (ML) INJECTION EVERY 8 HOURS
Status: DISCONTINUED | OUTPATIENT
Start: 2021-06-03 | End: 2021-06-04 | Stop reason: HOSPADM

## 2021-06-03 RX ORDER — POTASSIUM CHLORIDE 750 MG/1
20 TABLET, FILM COATED, EXTENDED RELEASE ORAL DAILY
Status: DISCONTINUED | OUTPATIENT
Start: 2021-06-04 | End: 2021-06-04 | Stop reason: HOSPADM

## 2021-06-03 RX ORDER — NYSTATIN 100000 [USP'U]/ML
500000 SUSPENSION ORAL 4 TIMES DAILY
Status: DISCONTINUED | OUTPATIENT
Start: 2021-06-03 | End: 2021-06-04 | Stop reason: HOSPADM

## 2021-06-03 RX ORDER — ONDANSETRON 2 MG/ML
INJECTION INTRAMUSCULAR; INTRAVENOUS AS NEEDED
Status: DISCONTINUED | OUTPATIENT
Start: 2021-06-03 | End: 2021-06-03 | Stop reason: HOSPADM

## 2021-06-03 RX ORDER — ROCURONIUM BROMIDE 10 MG/ML
INJECTION, SOLUTION INTRAVENOUS AS NEEDED
Status: DISCONTINUED | OUTPATIENT
Start: 2021-06-03 | End: 2021-06-03 | Stop reason: HOSPADM

## 2021-06-03 RX ORDER — PROPOFOL 10 MG/ML
INJECTION, EMULSION INTRAVENOUS AS NEEDED
Status: DISCONTINUED | OUTPATIENT
Start: 2021-06-03 | End: 2021-06-03 | Stop reason: HOSPADM

## 2021-06-03 RX ORDER — FENTANYL CITRATE 50 UG/ML
25 INJECTION, SOLUTION INTRAMUSCULAR; INTRAVENOUS
Status: DISCONTINUED | OUTPATIENT
Start: 2021-06-03 | End: 2021-06-03 | Stop reason: HOSPADM

## 2021-06-03 RX ORDER — SODIUM CHLORIDE, SODIUM LACTATE, POTASSIUM CHLORIDE, CALCIUM CHLORIDE 600; 310; 30; 20 MG/100ML; MG/100ML; MG/100ML; MG/100ML
25 INJECTION, SOLUTION INTRAVENOUS CONTINUOUS
Status: DISCONTINUED | OUTPATIENT
Start: 2021-06-03 | End: 2021-06-03 | Stop reason: HOSPADM

## 2021-06-03 RX ORDER — BUPIVACAINE HYDROCHLORIDE AND EPINEPHRINE 5; 5 MG/ML; UG/ML
INJECTION, SOLUTION EPIDURAL; INTRACAUDAL; PERINEURAL AS NEEDED
Status: DISCONTINUED | OUTPATIENT
Start: 2021-06-03 | End: 2021-06-03 | Stop reason: HOSPADM

## 2021-06-03 RX ORDER — MORPHINE SULFATE 4 MG/ML
INJECTION INTRAVENOUS AS NEEDED
Status: DISCONTINUED | OUTPATIENT
Start: 2021-06-03 | End: 2021-06-03 | Stop reason: HOSPADM

## 2021-06-03 RX ORDER — HYDROMORPHONE HYDROCHLORIDE 1 MG/ML
1 INJECTION, SOLUTION INTRAMUSCULAR; INTRAVENOUS; SUBCUTANEOUS
Status: DISCONTINUED | OUTPATIENT
Start: 2021-06-03 | End: 2021-06-04 | Stop reason: HOSPADM

## 2021-06-03 RX ORDER — SODIUM CHLORIDE 0.9 % (FLUSH) 0.9 %
5-40 SYRINGE (ML) INJECTION AS NEEDED
Status: DISCONTINUED | OUTPATIENT
Start: 2021-06-03 | End: 2021-06-04 | Stop reason: HOSPADM

## 2021-06-03 RX ORDER — PANTOPRAZOLE SODIUM 40 MG/1
40 TABLET, DELAYED RELEASE ORAL DAILY
Status: DISCONTINUED | OUTPATIENT
Start: 2021-06-04 | End: 2021-06-04 | Stop reason: HOSPADM

## 2021-06-03 RX ORDER — KETAMINE HYDROCHLORIDE 10 MG/ML
INJECTION, SOLUTION INTRAMUSCULAR; INTRAVENOUS AS NEEDED
Status: DISCONTINUED | OUTPATIENT
Start: 2021-06-03 | End: 2021-06-03 | Stop reason: HOSPADM

## 2021-06-03 RX ORDER — ENALAPRIL MALEATE 10 MG/1
10 TABLET ORAL DAILY
Status: DISCONTINUED | OUTPATIENT
Start: 2021-06-04 | End: 2021-06-04 | Stop reason: HOSPADM

## 2021-06-03 RX ORDER — DEXTROSE 50 % IN WATER (D50W) INTRAVENOUS SYRINGE
12.5-25 AS NEEDED
Status: DISCONTINUED | OUTPATIENT
Start: 2021-06-03 | End: 2021-06-04 | Stop reason: HOSPADM

## 2021-06-03 RX ADMIN — FENTANYL CITRATE 25 MCG: 50 INJECTION, SOLUTION INTRAMUSCULAR; INTRAVENOUS at 20:00

## 2021-06-03 RX ADMIN — Medication 100 MCG: at 15:21

## 2021-06-03 RX ADMIN — KETAMINE HYDROCHLORIDE 20 MG: 10 INJECTION, SOLUTION INTRAMUSCULAR; INTRAVENOUS at 15:06

## 2021-06-03 RX ADMIN — SODIUM CHLORIDE, POTASSIUM CHLORIDE, SODIUM LACTATE AND CALCIUM CHLORIDE 25 ML/HR: 600; 310; 30; 20 INJECTION, SOLUTION INTRAVENOUS at 14:00

## 2021-06-03 RX ADMIN — SUGAMMADEX 200 MG: 100 INJECTION, SOLUTION INTRAVENOUS at 17:09

## 2021-06-03 RX ADMIN — OXYCODONE HYDROCHLORIDE AND ACETAMINOPHEN 2 TABLET: 5; 325 TABLET ORAL at 21:03

## 2021-06-03 RX ADMIN — ALBUMIN (HUMAN) 250 ML/HR: 12.5 INJECTION, SOLUTION INTRAVENOUS at 15:54

## 2021-06-03 RX ADMIN — Medication 60 MCG: at 16:40

## 2021-06-03 RX ADMIN — MORPHINE SULFATE 2 MG: 4 INJECTION INTRAVENOUS at 17:06

## 2021-06-03 RX ADMIN — Medication 40 MCG: at 15:13

## 2021-06-03 RX ADMIN — PROPOFOL 140 MG: 10 INJECTION, EMULSION INTRAVENOUS at 15:06

## 2021-06-03 RX ADMIN — DEXAMETHASONE SODIUM PHOSPHATE 4 MG: 4 INJECTION, SOLUTION INTRAMUSCULAR; INTRAVENOUS at 15:37

## 2021-06-03 RX ADMIN — Medication 80 MCG: at 15:17

## 2021-06-03 RX ADMIN — ROCURONIUM BROMIDE 5 MG: 10 SOLUTION INTRAVENOUS at 15:06

## 2021-06-03 RX ADMIN — LIDOCAINE HYDROCHLORIDE 80 MG: 20 INJECTION, SOLUTION EPIDURAL; INFILTRATION; INTRACAUDAL; PERINEURAL at 15:06

## 2021-06-03 RX ADMIN — ONDANSETRON HYDROCHLORIDE 4 MG: 2 INJECTION, SOLUTION INTRAMUSCULAR; INTRAVENOUS at 15:37

## 2021-06-03 RX ADMIN — WATER 2 G: 1 INJECTION INTRAMUSCULAR; INTRAVENOUS; SUBCUTANEOUS at 15:25

## 2021-06-03 RX ADMIN — FENTANYL CITRATE 50 MCG: 50 INJECTION, SOLUTION INTRAMUSCULAR; INTRAVENOUS at 15:06

## 2021-06-03 RX ADMIN — SUCCINYLCHOLINE CHLORIDE 160 MG: 20 INJECTION, SOLUTION INTRAMUSCULAR; INTRAVENOUS at 15:06

## 2021-06-03 RX ADMIN — NYSTATIN 500000 UNITS: 100000 SUSPENSION ORAL at 21:04

## 2021-06-03 RX ADMIN — FENTANYL CITRATE 50 MCG: 50 INJECTION, SOLUTION INTRAMUSCULAR; INTRAVENOUS at 15:40

## 2021-06-03 RX ADMIN — PHENYLEPHRINE HYDROCHLORIDE 40 MCG/MIN: 10 INJECTION INTRAVENOUS at 15:18

## 2021-06-03 RX ADMIN — ROCURONIUM BROMIDE 35 MG: 10 SOLUTION INTRAVENOUS at 15:09

## 2021-06-03 NOTE — PERIOP NOTES
Patient: Emelyn Faith MRN: [id number]  SSN: [id number]   YOB: 1945  Age: 70 y.o. Sex: male     Patient is status post Procedure(s):  LAPAROSCOPIC CHOLECYSTECTOMY. Surgeon(s) and Role:     Chely Mills MD - Primary    Local/Dose/Irrigation:  30 ml No flowsheet data found.    MARCAINE with epi to trocar sites                  Peripheral IV 06/03/21 Right Hand (Active)          Yesi Rock #1 Right;Upper Abdomen (Active)   Site Assessment Clean, dry, & intact 06/03/21 1656      Airway - Endotracheal Tube 06/03/21 Oral (Active)                   Dressing/Packing:  Incision 06/03/21 Abdomen-Dressing/Treatment: Skin glue (06/03/21 1500)    Splint/Cast:  ]    Other:  19 Guatemalan saw drain to right upper abdomen

## 2021-06-03 NOTE — BRIEF OP NOTE
Brief Postoperative Note    Patient: Lindsey Self  YOB: 1945  MRN: [id number]    Date of Procedure: 6/3/2021     Pre-Op Diagnosis: GALLSTONE PANCREATITIS    Post-Op Diagnosis: Same as preoperative diagnosis.       Procedure(s):  LAPAROSCOPIC CHOLECYSTECTOMY    Surgeon(s):  Kit Mcrae MD    Surgical Assistant: Surg Asst-1: Zuhair HUGHES    Anesthesia: General     Estimated Blood Loss (mL): 117     Complications: None    Specimens:   ID Type Source Tests Collected by Time Destination   1 : gallbladder Fresh Gallbladder  Kit Mcrae MD 6/3/2021 1547 Pathology        Implants: * No implants in log *    Drains:   Cordell Maxwell #1 Right;Upper Abdomen (Active)   Site Assessment Clean, dry, & intact 06/03/21 5886       Findings: very cirrhotic appearing liver, omental adhesions to the liver, varices to the omental vessels, mild inflammation of the GB, numerous stones on the GB, very short and very large cystic duct, placed 2 endo loops on the cystic duct, Liban powder in the GB fossa, 19Fr saw drain in GB fossa    Electronically Signed by Giancarlo Hutchinson MD on 6/3/2021 at 5:14 PM

## 2021-06-03 NOTE — H&P
[facility] at [facility] Surgery History and Physical    History of Present Illness:      Morgan Mendez is a 70 y.o. male who was recently treated for gallstone pancreatitis. He is now ready for laparoscopic cholecystectomy in the OR today. His pancreatitis has been resolved    Past Medical History:   Diagnosis Date    CAD (coronary artery disease)     Chronic kidney disease     stones    Chronic obstructive pulmonary disease ([address]     Diabetes ([address]     Hypertension     Stroke ([address] 1980s    right hand neuropathy       Past Surgical History:   Procedure Laterality Date    COLONOSCOPY N/A 4/12/2018    COLONOSCOPY performed by Milan [facility] MD at [facility] ENDOSCOPY    HX SKIN BIOPSY  2019    skin cancer removed from left leg. No current facility-administered medications for this encounter. No Known Allergies    Social History     Socioeconomic History    Marital status: LIFE PARTNER     Spouse name: Not on file    Number of children: Not on file    Years of education: Not on file    Highest education level: Not on file   Occupational History    Not on file   Tobacco Use    Smoking status: Former Smoker     Packs/day: 2.00     Years: 45.00     Pack years: 90.00     Quit date: 6/19/2005     Years since quitting: 15.9    Smokeless tobacco: Never Used   Substance and Sexual Activity    Alcohol use: No     Comment: 1 gallon whiskey a week stopped 4 years ago    Drug use: Not on file    Sexual activity: Not on file   Other Topics Concern    Not on file   Social History Narrative    Not on file     Social Determinants of Health     Financial Resource Strain:     Difficulty of Paying Living Expenses:    Food Insecurity:     Worried About Running Out of Food in the Last Year:     [address] in the Last Year:    Transportation Needs:     Lack of Transportation (Medical):      Lack of Transportation (Non-Medical):    Physical Activity:     Days of Exercise per Week:  Minutes of Exercise per Session:    Stress:     Feeling of Stress :    Social Connections:     Frequency of Communication with Friends and Family:     Frequency of Social Gatherings with Friends and Family:     Attends [employer] Services:     Active Member of Clubs or Organizations:     Attends Club or Organization Meetings:     Marital Status:    Intimate Partner Violence:     Fear of Current or Ex-Partner:     Emotionally Abused:     Physically Abused:     Sexually Abused:        Family History   Problem Relation Age of Onset    Heart Disease Mother     Heart Disease Father     Cancer Sister         breast ca       ROS   Constitutional: negative  Ears, Nose, Mouth, Throat, and Face: negative  Respiratory: negative  Cardiovascular: negative  Gastrointestinal: negative  Genitourinary:negative  Integument/Breast: negative  Hematologic/Lymphatic: negative  Behavioral/Psychiatric: negative  Allergic/Immunologic: negative      Physical Exam:     There were no vitals taken for this visit. General - alert and oriented, no apparent distress  HEENT - no jaundice, no hearing imparement  Pulm - CTAB, no C/W/R  CV - RRR, no M/R/G  Abd - soft, ND, BS Present minimal TTP  Ext - pulses intact in UE and LE bilaterally, no edema  Skin - supple, no rashes  Psychiatric - normal affect, good mood    Labs  Lab Results   Component Value Date/Time    Sodium 140 06/01/2021 02:05 AM    Potassium 3.3 (L) 06/01/2021 02:05 AM    Chloride 108 06/01/2021 02:05 AM    CO2 24 06/01/2021 02:05 AM    Anion gap 8 06/01/2021 02:05 AM    Glucose 120 (H) 06/01/2021 02:05 AM    BUN 15 06/01/2021 02:05 AM    Creatinine 1.06 06/01/2021 02:05 AM    BUN/Creatinine ratio 14 06/01/2021 02:05 AM    GFR est AA >60 06/01/2021 02:05 AM    GFR est non-AA >60 06/01/2021 02:05 AM    Calcium 8.1 (L) 06/01/2021 02:05 AM    Bilirubin, total 1.5 (H) 06/01/2021 02:05 AM    Alk.  phosphatase 109 06/01/2021 02:05 AM    Protein, total 6.1 (L) 06/01/2021 02:05 AM    Albumin 2.5 (L) 06/01/2021 02:05 AM    Globulin 3.6 06/01/2021 02:05 AM    A-G Ratio 0.7 (L) 06/01/2021 02:05 AM    ALT (SGPT) 44 06/01/2021 02:05 AM    AST (SGOT) 36 06/01/2021 02:05 AM     Lab Results   Component Value Date/Time    WBC 7.9 05/31/2021 01:16 AM    HGB 10.8 (L) 05/31/2021 01:16 AM    HCT 33.0 (L) 05/31/2021 01:16 AM    PLATELET 85 (L) 44/52/2088 01:16 AM    MCV 89.2 05/31/2021 01:16 AM         Imaging  RUQ US - LIVER:   The liver is heterogeneous in echotexture with overall increased echogenicity. In the right lobe there is a 1.6 x 0.8 x 1.5 cm hypoechoic area which may  represent a focus of fatty sparing. There is also a subcentimeter cyst in the  left lobe.     LIVER VASCULATURE:   The portal vein flow is hepatopedal.     GALLBLADDER:  The gallbladder is contracted around stones. No wall thickening or  pericholecystic fluid.     COMMON BILE DUCT:  There is no biliary duct dilatation and the common duct measures 5 mm in  diameter.      PANCREAS:  The pancreas is not well visualized due to overlying bowel gas.      SPLEEN:  Mildly enlarged spleen measuring 13.1 cm in length.      RIGHT KIDNEY:  The right kidney demonstrates normal echogenicity with no mass, stone or  hydronephrosis. The right kidney measures 10.5 cm in length.     LEFT KIDNEY:  The left kidney demonstrates normal echogenicity with no mass, stone or  hydronephrosis. The left kidney measures 10.8 cm in length. There is a hypoechoic left adrenal mass measuring 2.4 x 2.3 x 2.9 cm.     RETROPERITONEUM:  The aorta tapers normally. The IVC is normal.           IMPRESSION  IMPRESSION:  1. Heterogeneous hepatic echotexture with an area of focal sparing in the right  lobe. 2.  Mildly enlarged spleen measuring 13.1 cm in length. 3.  Left adrenal adenoma, seen on prior cross-sectional imaging. 4.  Contracted gallbladder around gallstones.     MRCP - FINDINGS:     VISUALIZED LOWER CHEST: Small right pleural effusion. Bibasilar atelectasis. Otherwise unremarkable. LIVER: Cirrhotic morphology with diffuse loss of signal on opposed phase  relative to in phase imaging apart from sparing at the gallbladder fossa. No  focal lesion otherwise. GALLBLADDER: Intraluminal stones. Otherwise unremarkable. BILIARY DUCTS: The bile ducts are nondilated with no evident filling defect,  stricture, or mucosal abnormality. SPLEEN: Unremarkable. PANCREAS: Extensive peripancreatic stranding and fluid without organized  collection. There is a simple fluid signal intensity well-circumscribed cystic  lesion of the pancreatic head measuring 1.4 x 1.2 cm with ductal communication. No other focal pancreatic lesions. There is pancreatic ductal prominence in the  head and relative narrowing at the junction of the body and head without filling  defect or extrinsic mass evident. Peripancreatic vascular structures opacify  normally with no thrombosis or pseudoaneurysm. ADRENALS: Unremarkable. KIDNEYS: No enhancing mass, hydronephrosis, or other abnormality. STOMACH: Unremarkable. VISUALIZED BOWEL: No dilatation or wall thickening. PERITONEUM: Small free fluid around the liver margin inferiorly. No evident  pneumatosis. RETROPERITONEUM: No lymphadenopathy or aortic aneurysm. VISUALIZED PELVIS: Unremarkable. BONES AND SOFT TISSUES: Degenerative spine change. No acute fracture or  aggressive lesion. ADDITIONAL COMMENTS: N/A     IMPRESSION  1. Acute pancreatitis with extensive peripancreatic inflammation and edema  fluid. There is likely relative narrowing of the pancreatic duct at the junction  of the body and head to the pancreatic parenchymal swelling with no identified  filling defect or compressing mass. 2. Pancreatic head side branch IPMT. 3. Cholecystolithiasis. 4. Hepatic steatosis and cirrhosis with small ascites. 5. Small right pleural effusion with bibasilar atelectasis.   I have reviewed and agree with all of the pertinent images    Assessment:     Jimbo Arauz is a 70 y.o. male with previous gallstone pancreatitis    Recommendations:     1.   OR today for laparoscopic cholecystectomy with cholangiogram.  I have discussed the above procedure with the patient in detail. We reviewed the benefits and possible complications of the surgery which include bleeding, infection, damage to adjacent organs, venous thromboembolism, need for repeat surgery, death and other unforseen complications. The patient agreed to proceed with the surgery. Eve Molina MD    Greater than half of the time: 20 minutes was used in counciling the patient about diagnosis and treatment plan    Mr. Meryle Lapine has a reminder for a \"due or due soon\" health maintenance. I have asked that he contact his primary care provider for follow-up on this health maintenance.

## 2021-06-03 NOTE — PROGRESS NOTES
Care Transitions Outreach Attempt    Call within 2 business days of discharge: Yes   Attempted to reach patient for transitions of care follow up. Unable to reach patient. Per EMR patient scheduled for surgery on 6/3/2021. Will monitor for discharge plan. Patient: Mell Trivedi Patient : 1945 MRN: [id number]    Last Discharge [address]       Complaint Diagnosis Description Type Department Provider    21 Abdominal Pain Abdominal pain, generalized . .. ED to Hosp-Admission (Discharged) (ADMIT) Patricia Bonilla MD; Jaleesa Milder... 21  Cirrhosis ([facility] . .. Admission (Discharged) [facility] Aline Valera MD        Was this an external facility discharge?  No Discharge Facility: n/a    Noted following upcoming appointments from discharge chart review:   [address] follow up appointment(s):   Future Appointments   Date Time Provider Ping Almaraz   2021 11:00 AM Lily Maki MD [facility] AMB

## 2021-06-03 NOTE — TELEPHONE ENCOUNTER
Please call patient about his surgery today - patient is diabetic and would like to discuss what he can have until then to keep sugar stable. Also has question about what he can take to limit gas after the lap rené. Thanks!

## 2021-06-03 NOTE — TELEPHONE ENCOUNTER
Returned call to Mr Martina Cabrales. His Daughter stated he is asleep. I reiterated patient is to be NPO. His blood sugar will be monitored throughout surgery. The MD will discuss all Post Op recommendations.

## 2021-06-03 NOTE — ANESTHESIA PREPROCEDURE EVALUATION
Anesthetic History   No history of anesthetic complications            Review of Systems / Medical History  Patient summary reviewed, nursing notes reviewed and pertinent labs reviewed    Pulmonary  Within defined limits  COPD               Neuro/Psych   Within defined limits    CVA       Cardiovascular    Hypertension          CAD         GI/Hepatic/Renal           Liver disease     Endo/Other    Diabetes    Obesity  Pertinent negatives: No morbid obesity   Other Findings              Physical Exam    Airway  Mallampati: II  TM Distance: > 6 cm  Neck ROM: normal range of motion   Mouth opening: Normal     Cardiovascular  Regular rate and rhythm,  S1 and S2 normal,  no murmur, click, rub, or gallop             Dental  No notable dental hx       Pulmonary  Breath sounds clear to auscultation               Abdominal    Distended     Other Findings            Anesthetic Plan    ASA: 3  Anesthesia type: general          Induction: Intravenous  Anesthetic plan and risks discussed with: Patient

## 2021-06-03 NOTE — PERIOP NOTES
Spoke with Mane Perea via cell phone and updated her on start of procedure. Surgeon aware. Patient placed supine on OR bed, arms padded with egg crate and secured to armboards. Foot board placed on end of OR bed. Safety straps placed on bilateral arms and thighs. Surgeon assisted and approved final patient position. Proper body alignment maintained during procedure. 1000 ml NACL used prn for irrigation and patient clean up.

## 2021-06-04 VITALS
OXYGEN SATURATION: 93 % | BODY MASS INDEX: 35.32 KG/M2 | SYSTOLIC BLOOD PRESSURE: 127 MMHG | TEMPERATURE: 98.1 F | HEIGHT: 65 IN | HEART RATE: 74 BPM | WEIGHT: 212 LBS | DIASTOLIC BLOOD PRESSURE: 73 MMHG | RESPIRATION RATE: 18 BRPM

## 2021-06-04 LAB
ALBUMIN SERPL-MCNC: 2.3 G/DL (ref 3.5–5)
ALBUMIN/GLOB SERPL: 0.7 {RATIO} (ref 1.1–2.2)
ALP SERPL-CCNC: 114 U/L (ref 45–117)
ALT SERPL-CCNC: 62 U/L (ref 12–78)
ANION GAP SERPL CALC-SCNC: 7 MMOL/L (ref 5–15)
AST SERPL-CCNC: 63 U/L (ref 15–37)
BILIRUB SERPL-MCNC: 0.6 MG/DL (ref 0.2–1)
BUN SERPL-MCNC: 16 MG/DL (ref 6–20)
BUN/CREAT SERPL: 15 (ref 12–20)
CALCIUM SERPL-MCNC: 7.9 MG/DL (ref 8.5–10.1)
CHLORIDE SERPL-SCNC: 110 MMOL/L (ref 97–108)
CO2 SERPL-SCNC: 23 MMOL/L (ref 21–32)
CREAT SERPL-MCNC: 1.08 MG/DL (ref 0.7–1.3)
ERYTHROCYTE [DISTWIDTH] IN BLOOD BY AUTOMATED COUNT: 13.8 % (ref 11.5–14.5)
GLOBULIN SER CALC-MCNC: 3.5 G/DL (ref 2–4)
GLUCOSE BLD STRIP.AUTO-MCNC: 139 MG/DL (ref 65–117)
GLUCOSE BLD STRIP.AUTO-MCNC: 143 MG/DL (ref 65–117)
GLUCOSE SERPL-MCNC: 194 MG/DL (ref 65–100)
HCT VFR BLD AUTO: 32.1 % (ref 36.6–50.3)
HGB BLD-MCNC: 10.4 G/DL (ref 12.1–17)
MCH RBC QN AUTO: 29.1 PG (ref 26–34)
MCHC RBC AUTO-ENTMCNC: 32.4 G/DL (ref 30–36.5)
MCV RBC AUTO: 89.7 FL (ref 80–99)
NRBC # BLD: 0 K/UL (ref 0–0.01)
NRBC BLD-RTO: 0 PER 100 WBC
PLATELET # BLD AUTO: 125 K/UL (ref 150–400)
PMV BLD AUTO: 10.7 FL (ref 8.9–12.9)
POTASSIUM SERPL-SCNC: 4 MMOL/L (ref 3.5–5.1)
PROT SERPL-MCNC: 5.8 G/DL (ref 6.4–8.2)
RBC # BLD AUTO: 3.58 M/UL (ref 4.1–5.7)
SERVICE CMNT-IMP: ABNORMAL
SERVICE CMNT-IMP: ABNORMAL
SODIUM SERPL-SCNC: 140 MMOL/L (ref 136–145)
WBC # BLD AUTO: 5.8 K/UL (ref 4.1–11.1)

## 2021-06-04 PROCEDURE — 85027 COMPLETE CBC AUTOMATED: CPT

## 2021-06-04 PROCEDURE — 77010033678 HC OXYGEN DAILY

## 2021-06-04 PROCEDURE — 82962 GLUCOSE BLOOD TEST: CPT

## 2021-06-04 PROCEDURE — 80053 COMPREHEN METABOLIC PANEL: CPT

## 2021-06-04 PROCEDURE — 36415 COLL VENOUS BLD VENIPUNCTURE: CPT

## 2021-06-04 PROCEDURE — 99218 HC RM OBSERVATION: CPT

## 2021-06-04 PROCEDURE — 94760 N-INVAS EAR/PLS OXIMETRY 1: CPT

## 2021-06-04 PROCEDURE — 74011250637 HC RX REV CODE- 250/637: Performed by: SURGERY

## 2021-06-04 PROCEDURE — 74011636637 HC RX REV CODE- 636/637: Performed by: SURGERY

## 2021-06-04 RX ORDER — IBUPROFEN 800 MG/1
800 TABLET ORAL
Qty: 30 TABLET | Refills: 0 | Status: SHIPPED | OUTPATIENT
Start: 2021-06-04 | End: 2021-08-09

## 2021-06-04 RX ORDER — OXYCODONE HYDROCHLORIDE 5 MG/1
5 TABLET ORAL
Qty: 30 TABLET | Refills: 0 | Status: SHIPPED | OUTPATIENT
Start: 2021-06-04 | End: 2021-06-11

## 2021-06-04 RX ORDER — NYSTATIN 100000 [USP'U]/ML
500000 SUSPENSION ORAL 4 TIMES DAILY
Qty: 60 ML | Refills: 0 | Status: SHIPPED | OUTPATIENT
Start: 2021-06-04 | End: 2021-06-16 | Stop reason: SDUPTHER

## 2021-06-04 RX ADMIN — PANTOPRAZOLE SODIUM 40 MG: 40 TABLET, DELAYED RELEASE ORAL at 07:10

## 2021-06-04 RX ADMIN — POTASSIUM CHLORIDE 20 MEQ: 750 TABLET, FILM COATED, EXTENDED RELEASE ORAL at 09:18

## 2021-06-04 RX ADMIN — INSULIN LISPRO 2 UNITS: 100 INJECTION, SOLUTION INTRAVENOUS; SUBCUTANEOUS at 07:10

## 2021-06-04 RX ADMIN — NYSTATIN 500000 UNITS: 100000 SUSPENSION ORAL at 09:17

## 2021-06-04 NOTE — ANESTHESIA POSTPROCEDURE EVALUATION
Post-Anesthesia Evaluation and Assessment    Patient: Morgan Mendez MRN: [id number]  SSN: [id number]    YOB: 1945  Age: 70 y.o. Sex: male       Cardiovascular Function/Vital Signs  Visit Vitals  /73 (BP 1 Location: Right upper arm)   Pulse 74   Temp 36.7 °C (98.1 °F)   Resp 18   Ht 5' 5\" (1.651 m)   Wt 96.2 kg (212 lb)   SpO2 93%   BMI 35.28 kg/m²       Patient is status post General anesthesia for Procedure(s):  LAPAROSCOPIC CHOLECYSTECTOMY. Nausea/Vomiting: None    Postoperative hydration reviewed and adequate. Pain:  Pain Scale 1: Numeric (0 - 10) (06/04/21 0914)  Pain Intensity 1: 0 (06/04/21 0914)   Managed    Neurological Status:   Neuro (WDL): Within Defined Limits (06/03/21 1800)  Neuro  Neurologic State: Alert (06/04/21 0022)  Orientation Level: Oriented X4 (06/04/21 0022)  Cognition: Follows commands (06/04/21 0022)  Speech: Clear (06/04/21 0022)  LUE Motor Response: Purposeful (06/04/21 0022)  LLE Motor Response: Purposeful (06/04/21 0022)  RUE Motor Response: Purposeful (06/04/21 0022)  RLE Motor Response: Purposeful (06/04/21 0022)   At baseline    Mental Status and Level of Consciousness: Alert and oriented to person, place, and time    Pulmonary Status:   O2 Device: Nasal cannula (06/04/21 0914)   Adequate oxygenation and airway patent    Complications related to anesthesia: None    Post-anesthesia assessment completed. No concerns    Signed By: Karley Landis MD     June 4, 2021              Procedure(s):  LAPAROSCOPIC CHOLECYSTECTOMY. general    <BSHSIANPOST>    INITIAL Post-op Vital signs:   Vitals Value Taken Time   /76 06/03/21 2000   Temp 37.6 °C (99.7 °F) 06/03/21 1800   Pulse 84 06/03/21 2012   Resp 16 06/03/21 2012   SpO2 95 % 06/03/21 2012   Vitals shown include unvalidated device data.

## 2021-06-04 NOTE — PROGRESS NOTES
Bedside shift change report given to Maylin (oncoming nurse) by Whitney (offgoing nurse). Report included the following information SBAR, Kardex, Intake/Output, MAR and Recent Results.

## 2021-06-04 NOTE — DISCHARGE INSTRUCTIONS
Laparoscopic cholecystectomy      Patient Discharge Instructions    Jeanine Garcia / [id number] : 1945    Admitted 6/3/2021 Discharged: 2021       PATIENT INSTRUCTIONS  GALLBLADDER SURGERY  (CHOLECYSTECTOMY)    FOLLOW-UP:  Please make an appointment with your physician in 10 - 14 day(s). Call your physician immediately if you have any fevers greater than 101.5, drainage from your wound that is not clear or looks infected, persistent bleeding, increasing abdominal pain, problems urinating, or persistent nausea/vomiting. You should be aware that you may have right shoulder pain after surgery and that this will progressively go away. This is called 'referred pain' and is from the area of the gallbladder. It can also be caused by gas that may be trapped under the diaphragm from the surgery, especially if it was performed laparoscopically through mini-incisions. This gas will progressively get reabsorbed by your body. WOUND CARE INSTRUCTIONS:   You may shower at home. If clothing rubs against the wound or causes irritation and the wound is not draining you may cover it with a dry dressing during the daytime. Try to keep the wound dry and avoid ointments on the wound unless directed to do so. If the wound becomes bright red and painful or starts to drain infected material that is not clear, please contact your physician immediately. You should also call if you begin to drain fluid that is thin and greenish-brown from the wound and appears to look like bile. If the wound though is mildly pink and has a thick firm ridge underneath it, this is normal, and is referred to as a healing ridge. This will resolve over the next 4-6 weeks. Place an ice pack on the navel incision for the next 48 hours. After that, you may use a heating pad if you feel muscle tightening or pulling. DIET:  You may eat any foods that you can tolerate.   It is a good idea to eat a high fiber diet and take in plenty of fluids to prevent constipation. If you do become constipated you may want to take a mild laxative or take ducolax tablets on a daily basis until your bowel habits are regular. Constipation can be very uncomfortable, along with straining, after recent abdominal surgery. ACTIVITY:  You are encouraged to cough and deep breath or use your incentive spirometer if you were given one, every 15-30 minutes when awake. This will help prevent respiratory complications and low grade fevers post-operatively. You may want to hug a pillow when coughing and sneezing to add additional support to the surgical area(s) which will decrease pain during these times. You are encouraged to walk and engage in light activity for the next two weeks. You should not lift more than 20 pounds during this time frame as it could put you at increased risk for a post-operative hernia. Twenty pounds is roughly equivalent to a plastic bag of groceries. · Most people are able to return to work within 1 to 2 weeks after surgery. · You may shower 24 hours after surgery. Pat the cut (incision) dry. Do not take a bath for the first week. · Your doctor will tell you when you can have sex again. MEDICATIONS:  Try to take narcotic medications and anti-inflammatory medications, such as tylenol, ibuprofen, naprosyn, etc., with food. This will minimize stomach upset from the medication. Should you develop nausea and vomiting from the pain medication, or develop a rash, please discontinue the medication and contact your physician. You should not drive, make important decisions, or operate machinery when taking narcotic pain medication. · Take ibuprofen (Motrin) as scheduled then combine with oxycodone (Oxycontin, Oxyir) as needed for severe pain. QUESTIONS:  Please feel free to call Dr. Orly Wilhelm office ([phone]) if you have any questions, and they will be glad to assist you. Follow-up with Dr. Raymon Solares in 2 week(s).  Call the office to schedule your appointment. Information obtained by :    I understand that if any problems occur once I am at home I am to contact my physician. I understand and acknowledge receipt of the instructions indicated above.                                                                                                                                            Physician's or R.N.'s Signature                                                                  Date/Time                                                                                                                                              Patient or Representative Signature                                                          Date/Time

## 2021-06-04 NOTE — PERIOP NOTES
TRANSFER - OUT REPORT:    Verbal report given to 5S (name) on Akanksha Medrano  being transferred to RN  (unit) for routine post - op       Report consisted of patients Situation, Background, Assessment and   Recommendations(SBAR). Time Pre op antibiotic given:1525  Anesthesia Stop time: 3133  Kuhn Present on Transfer to floor:no  Order for Kuhn on Chart:no  Discharge Prescriptions with Chart:no    Information from the following report(s) SBAR, OR Summary, Procedure Summary, Intake/Output, MAR, Recent Results and Cardiac Rhythm NSR was reviewed with the receiving nurse. Opportunity for questions and clarification was provided. Is the patient on 02? YES       L/Min 2       Other nc    Is the patient on a monitor? NO    Is the nurse transporting with the patient? NO    Surgical Waiting Area notified of patient's transfer from PACU? YES      The following personal items collected during your admission accompanied patient upon transfer:   Dental Appliance: Dental Appliances: None  Vision: Visual Aid: Glasses  Hearing Aid:    Jewelry: Jewelry: None  Clothing: Clothing: With patient   Other Valuables:  Other Valuables: None  Valuables sent to safe:

## 2021-06-04 NOTE — PROGRESS NOTES
Observation notice provided in writing to patient and/or caregiver as well as verbal explanation of the policy. Patients who are in outpatient status also receive the Observation notice. Patient has received notice and or patient representative has received via secure email, fax, or certified mail based on patient representative's preference.      TOMAS Morley/CHANEL

## 2021-06-04 NOTE — OP NOTES
[address]   OPERATIVE REPORT    Name:  Galileo Schulz  MR#:  [id number]  :  1945  ACCOUNT #:  [de-identified]  DATE OF SERVICE:  2021      PREOPERATIVE DIAGNOSIS:  Gallstone pancreatitis. POSTOPERATIVE DIAGNOSIS:  Gallstone pancreatitis. PROCEDURE PERFORMED:  Laparoscopic cholecystectomy. SURGEON:  Lynda Murray MD    ASSISTANT:  lAice Rasheed SA    ANESTHESIA:  General.    COMPLICATIONS:  None. SPECIMENS REMOVED:  Gallbladder. IMPLANTS:  None. ESTIMATED BLOOD LOSS:  200 mL. DRAINS:  A 19-Japanese Sebastian drain. FINDINGS:  Very cirrhotic-appearing liver, omental adhesions to the liver, variceal type vessels of the omentum present, mild inflammation of the gallbladder present, numerous stones in the gallbladder, very short and very large cystic duct, placed 2 Endoloops on the cystic duct, Liban powder in the gallbladder fossa was placed, and a 19-Japanese Sebastian drain was placed in the gallbladder fossa. 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 The pancreatitis portion has resolved and he is coming back for laparoscopic cholecystectomy with possible intraoperative cholangiogram.    DESCRIPTION OF THE OPERATION:  The patient was met in the preop holding area. The H and P was updated. Consent was signed. All risks and benefits were explained to the patient prior to the start of the operation. He was taken back to the operating room. He was lying in the supine position. The abdomen was prepped and draped in standard sterile fashion. Time-out was called. Antibiotics were given. SCDs were on lower extremities. Started the operation by making a 5-mm incision into the right upper quadrant, inserting a VisiPort trocar into the intra-abdominal cavity, insufflating to 15 mmHg.   We then placed a 5-mm trocar superior to the umbilicus, a 38-EC subxiphoid port, and a 5-mm right lateral port. Once we had our trocars placed, we could see that there were omental adhesions which were strangely adherent to the upper portion of the falciform ligament well above the liver and that was completely covering the gallbladder. There were very large vessels in the omentum and in trying to take that down, there was some bleeding. We placed a few hemoclips on the omentum where it was bleeding. We took down the omentum with the Sonicision device and once we had taken that down, we could see the liver and the gallbladder much easier. The liver was noted to be very cirrhotic in appearance, very hard, but not that enlarged. We then found the gallbladder to be slightly inflamed. We grabbed it and pushed it up and over the liver. We then dissected down to the infundibulum of the gallbladder. We then dissected down farther progressively towards the infundibulum of the gallbladder. It was difficult to find the infundibulum due to his very large and patulous infundibulum of the gallbladder and also we eventually found his very large and wide cystic duct which was essentially contiguous with the common bile duct with very little room between the gallbladder and the actual common bile duct. We dissected out the cystic artery which we identified and put 2 clips on that on the patient's side, 1 on the distal side and cut in between. There were a few other small branches that we also doubly clipped and divided as well. Once we had divided the vessels there, we dissected up the underside of the gallbladder fossa and dissected the infundibulum of the gallbladder out completely now. We used a lot of suction dissection and blunt dissection due to the very fragility and the ease of the bleeding the patient had due to his cirrhosis and thrombocytopenia. We dissected out the infundibulum completely. We could see the junction of the common bile duct and the cystic duct very easily.   The patient had about maybe 1 cm of cystic duct present to divide and we did not have enough cystic duct to perform our cholangiogram, so we used a tan load 60-mm stapler to divide the cystic duct at the junction of the infundibulum. We fired the stapler. We looked back at our staple line and we could see that there were stones unfortunately, which could not be palpated in the infundibulum of the gallbladder and the cystic duct junction there. We did not feel comfortable with the firing of the stapler and with the stones being present, we could see that some of the staples did not appear to be completely closed and we found a little bit of bile leakage from the staple line there. Knowing we had a relatively short cystic duct to work with, we dissected out the cystic duct a little bit further onto the edge of the common bile duct and then opened up some of the staple line and removed the stones that were in the cystic duct junction. Once we had taken those stones out completely, we felt that we would have enough room to place Endoloops around the cystic duct-common bile duct junction, so we placed two 0 PDS Endoloops on the cystic duct just proximal to where it meets the common bile duct. We placed 2 Endoloops and they were closed and tight. We removed the sutures attached to the Endoloops and passed those off the field. Everything  looked good now with the cystic duct junction. We then suctioned out the right upper quadrant and then removed any stones that were spilled from the gallbladder with the stone grasper. We did of note decompress the gallbladder with the suction , due to the very thin wall of the gallbladder which we entered and still some stones but we removed the stones that we could see very easily with the stone grabber. We then removed the gallbladder from the gallbladder fossa with hook cautery cauterizing any bleeding from the liver bed along the way.   We held some pressure on the gallbladder fossa. Due to his cirrhosis, there was a little bit of bleeding there, but the bleeding was controlled and it did stop on its own. We removed the gallbladder from an Endo Catch bag from the 12-mm port site, it was passed off the field. We then looked back into the gallbladder fossa. We washed out the right upper quadrant with 1000 mL of saline irrigation. Our Endoloops were in place. The gallbladder fossa appeared to be hemostatic. Our omentum appeared to be hemostatic as well. We then placed Liban powder into the gallbladder fossa to maintain hemostasis and then also placed a 19-Kosovan round Sebastian drain into the gallbladder fossa to monitor for any signs of bleeding or leak after his surgery. He would be admitted after surgery. We then were satisfied with the dissection and everything was hemostatic. A drain was placed. We then closed our 12-mm trocar site fascial defect with an Endo Close suture passing device in interrupted fashion with 0 Vicryl suture. We then desufflated the abdominal cavity, removed the trocars and closed the skin with 4-0 Monocryl and Dermabond to complete the operation. Dr. Cyn Taylor was present and scrubbed during the entire operation. The counts were correct.         Rayo Flores MD      [id number]  D:  06/03/2021 17:34  T:  06/03/2021 22:28  JOB #:  [id number]

## 2021-06-04 NOTE — PROGRESS NOTES
[facility] at [facility]    POD #1    Subjective     Pt doing well, tolerating diet, minimal pain    Objective     Patient Vitals for the past 24 hrs:   Temp Pulse Resp BP SpO2   06/04/21 0214 97.7 °F (36.5 °C) 68 18 124/70 94 %   06/03/21 2259 98.2 °F (36.8 °C) 68 20 129/67 95 %   06/03/21 2028 98.2 °F (36.8 °C) 84 20 (!) 143/71 92 %   06/03/21 2000  82 23 (!) 145/76 96 %   06/03/21 1945  84 23 (!) 148/79 96 %   06/03/21 1930  82 24 (!) 146/78 97 %   06/03/21 1915  84 22 (!) 145/78 94 %   06/03/21 1900  83 25 (!) 144/80 95 %   06/03/21 1845  82 24 (!) 151/77 94 %   06/03/21 1830  82 22 (!) 151/77 93 %   06/03/21 1815  85 25 (!) 160/78 92 %   06/03/21 1800 99.7 °F (37.6 °C) 86 18 (!) 153/79 92 %   06/03/21 1750  86 24  92 %   06/03/21 1745  86 23 (!) 152/75 93 %   06/03/21 1740  87 23 (!) 145/76 93 %   06/03/21 1735  89 25 (!) 146/72 90 %   06/03/21 1731 98.9 °F (37.2 °C) 88 23 (!) 144/73 93 %   06/03/21 1730  89 25 (!) 144/73 93 %   06/03/21 1331 100 °F (37.8 °C) 75 16 136/84 94 %       Date 06/03/21 0700 - 06/04/21 0659 06/04/21 0700 - 06/05/21 0659   Shift 0593-9432 7965-2620 24 Hour Total 7130-3204 3977-5729 24 Hour Total   INTAKE   I.V.(mL/kg/hr) 600(0.5)  600(0.3)        Volume (lactated Ringers infusion) 600  600      Shift Total(mL/kg) 600(6.2)  600(6.2)      OUTPUT   Urine(mL/kg/hr)  1150(1) 1150(0.5)        Urine Voided  1150 1150      Drains 110 450 560        Output (ml) (Sebastian Drain #1 Right;Upper Abdomen) 110 450 560      Blood 150  150        Estimated Blood Loss 150  150      Shift Total(mL/kg) 260(2.7) 1600(16.6) 1860(19.3)       -1600 -1260      Weight (kg) 96.2 96.2 96.2 96.2 96.2 96.2       PE  Pulm - CTAB  CV - RRR  Abd - soft, ND, BS present, drain ss, incisions c/d/i    Labs  Recent Results (from the past 12 hour(s))   GLUCOSE, POC    Collection Time: 06/03/21  9:01 PM   Result Value Ref Range Glucose (POC) 162 (H) 65 - 117 mg/dL    Performed by Cherylene Maine    METABOLIC PANEL, [facility]    Collection Time: 06/04/21  3:06 AM   Result Value Ref Range    Sodium 140 136 - 145 mmol/L    Potassium 4.0 3.5 - 5.1 mmol/L    Chloride 110 (H) 97 - 108 mmol/L    CO2 23 21 - 32 mmol/L    Anion gap 7 5 - 15 mmol/L    Glucose 194 (H) 65 - 100 mg/dL    BUN 16 6 - 20 MG/DL    Creatinine 1.08 0.70 - 1.30 MG/DL    BUN/Creatinine ratio 15 12 - 20      GFR est AA >60 >60 ml/min/1.73m2    GFR est non-AA >60 >60 ml/min/1.73m2    Calcium 7.9 (L) 8.5 - 10.1 MG/DL    Bilirubin, total 0.6 0.2 - 1.0 MG/DL    ALT (SGPT) 62 12 - 78 U/L    AST (SGOT) 63 (H) 15 - 37 U/L    Alk. phosphatase 114 45 - 117 U/L    Protein, total 5.8 (L) 6.4 - 8.2 g/dL    Albumin 2.3 (L) 3.5 - 5.0 g/dL    Globulin 3.5 2.0 - 4.0 g/dL    A-G Ratio 0.7 (L) 1.1 - 2.2     CBC W/O DIFF    Collection Time: 06/04/21  3:06 AM   Result Value Ref Range    WBC 5.8 4.1 - 11.1 K/uL    RBC 3.58 (L) 4.10 - 5.70 M/uL    HGB 10.4 (L) 12.1 - 17.0 g/dL    HCT 32.1 (L) 36.6 - 50.3 %    MCV 89.7 80.0 - 99.0 FL    MCH 29.1 26.0 - 34.0 PG    MCHC 32.4 30.0 - 36.5 g/dL    RDW 13.8 11.5 - 14.5 %    PLATELET 232 (L) 198 - 400 K/uL    MPV 10.7 8.9 - 12.9 FL    NRBC 0.0 0  WBC    ABSOLUTE NRBC 0.00 0.00 - 0.01 K/uL   GLUCOSE, POC    Collection Time: 06/04/21  6:33 AM   Result Value Ref Range    Glucose (POC) 143 (H) 65 - 117 mg/dL    Performed by Benny najera RN          Assessment     Manuel Jackson is a 70 y. o.yr old male s/p laparoscopic cholecystectomy    Plan     Doing well post op  DC home today  DC FLORY drain today      Carlos Curran MD

## 2021-06-07 ENCOUNTER — OFFICE VISIT (OUTPATIENT)
Dept: INTERNAL MEDICINE CLINIC | Age: 76
End: 2021-06-07
Payer: MEDICARE

## 2021-06-07 ENCOUNTER — PATIENT OUTREACH (OUTPATIENT)
Dept: CASE MANAGEMENT | Age: 76
End: 2021-06-07

## 2021-06-07 VITALS
HEIGHT: 65 IN | HEART RATE: 65 BPM | DIASTOLIC BLOOD PRESSURE: 73 MMHG | RESPIRATION RATE: 14 BRPM | BODY MASS INDEX: 34.36 KG/M2 | OXYGEN SATURATION: 98 % | SYSTOLIC BLOOD PRESSURE: 126 MMHG | TEMPERATURE: 98 F | WEIGHT: 206.25 LBS

## 2021-06-07 DIAGNOSIS — D69.6 THROMBOCYTOPENIA (HCC): ICD-10-CM

## 2021-06-07 DIAGNOSIS — R60.0 BILATERAL LEG EDEMA: ICD-10-CM

## 2021-06-07 DIAGNOSIS — Z09 HOSPITAL DISCHARGE FOLLOW-UP: ICD-10-CM

## 2021-06-07 DIAGNOSIS — K74.69 OTHER CIRRHOSIS OF LIVER (HCC): ICD-10-CM

## 2021-06-07 DIAGNOSIS — Z90.49 S/P LAPAROSCOPIC CHOLECYSTECTOMY: ICD-10-CM

## 2021-06-07 DIAGNOSIS — E11.59 TYPE 2 DIABETES MELLITUS WITH OTHER CIRCULATORY COMPLICATION, WITHOUT LONG-TERM CURRENT USE OF INSULIN (HCC): Primary | ICD-10-CM

## 2021-06-07 DIAGNOSIS — R60.0 FLUID RETENTION IN LEGS: ICD-10-CM

## 2021-06-07 PROCEDURE — G8417 CALC BMI ABV UP PARAM F/U: HCPCS | Performed by: INTERNAL MEDICINE

## 2021-06-07 PROCEDURE — G8536 NO DOC ELDER MAL SCRN: HCPCS | Performed by: INTERNAL MEDICINE

## 2021-06-07 PROCEDURE — G8754 DIAS BP LESS 90: HCPCS | Performed by: INTERNAL MEDICINE

## 2021-06-07 PROCEDURE — 3044F HG A1C LEVEL LT 7.0%: CPT | Performed by: INTERNAL MEDICINE

## 2021-06-07 PROCEDURE — G8752 SYS BP LESS 140: HCPCS | Performed by: INTERNAL MEDICINE

## 2021-06-07 PROCEDURE — G8427 DOCREV CUR MEDS BY ELIG CLIN: HCPCS | Performed by: INTERNAL MEDICINE

## 2021-06-07 PROCEDURE — 2022F DILAT RTA XM EVC RTNOPTHY: CPT | Performed by: INTERNAL MEDICINE

## 2021-06-07 PROCEDURE — 1111F DSCHRG MED/CURRENT MED MERGE: CPT | Performed by: INTERNAL MEDICINE

## 2021-06-07 PROCEDURE — 99214 OFFICE O/P EST MOD 30 MIN: CPT | Performed by: INTERNAL MEDICINE

## 2021-06-07 PROCEDURE — G8510 SCR DEP NEG, NO PLAN REQD: HCPCS | Performed by: INTERNAL MEDICINE

## 2021-06-07 PROCEDURE — 3017F COLORECTAL CA SCREEN DOC REV: CPT | Performed by: INTERNAL MEDICINE

## 2021-06-07 PROCEDURE — 1101F PT FALLS ASSESS-DOCD LE1/YR: CPT | Performed by: INTERNAL MEDICINE

## 2021-06-07 NOTE — PROGRESS NOTES
Called and spoke briefly to wife. She was not at home but said he was home napping. Suggested CTN call back at about 4:30pm when she gets home so can make sure he gets up. Advised will call back at 4:30pm today. Called back, spoke to wife, she gave me 's number to call- [phone]. Said if he didn't answer, she would call me in am.  Did call  again, LM. Will try again . Care Transitions Initial Call    Call within 2 business days of discharge: Yes     Patient: Abby Dyer Patient : 1945 MRN: [id number]    Last Discharge  [address]       Complaint Diagnosis Description Type Department Provider    6/3/21  Gallstone pancreatitis Admission (Discharged) [id number] Tony Lora MD          Was this an external facility discharge? No     Challenges to be reviewed by the provider   Additional needs identified to be addressed with provider:yes  Reports both feet and legs extremely swollen. Went down over the night, but since getting up this am, more swollen than yesterday. Elevating them per doctor rec. Denies any sob, no pain from surgery. Drainage from where drainage tube was from surgery. Discussing with surgeon. Does not want [facility] to come see him. Irritable, anxious- wife requesting something for him for anxiety. Method of communication with provider : chart routing    Discussed [id number] related testing which was not done at this time. Advance Care Planning:   Does patient have an Advance Directive: patient declined education. Inpatient Readmission Risk score: Unplanned Readmit Risk Score: 11    Was this a readmission? yes   Patient stated reason for the admission: planned surgery for cholecystectomy    Patients top risk factors for readmission: ineffective coping, lack of knowledge about disease and medical condition-History of CAD,CKD/COPD/DM/HTN/Stroke.  Feet and legs very edematous, anxious and irritable and concerned that still having drainage from where drainage tube was removed post surgery. Interventions to address risk factors: Scheduled appointment with PCP-saw pcp yesterday., Scheduled appointment with Erich Leventhal- advised wife to schedule appt for this week. and Obtained and reviewed discharge summary and/or continuity of care documents    Care Transition Nurse (CTN) contacted the patient by telephone to perform post hospital discharge assessment. Verified name and  with patient as identifiers. Provided introduction to self, and explanation of the CTN role. Patient reports he feels terrible- both legs and feet swollen. Still having drainage from site of drainage tube from surgery. Denies any pain, no sob. Saw pcp yesterday, legs and feet were swollen then but pcp did not have any recs other than elevating legs. Wife called surgeons office re drainage, was advised it should subside. She reports  is very anxious and irritable. Refusing to have [facility] see him. Advised her to request visit with surgeon for as soon as possible, instead of waiting for  visit with 's NP Aj. Advised CTN will send message to pcp for recommendations. CTN reviewed discharge instructions, medical action plan and red flags with patient who verbalized understanding. Were discharge instructions available to patient? yes. Reviewed appropriate site of care based on symptoms and resources available to patient including: PCP, Specialist, Urgent Care Clinics, When to call 911 and [employer]. Patient given an opportunity to ask questions and does not have any further questions or concerns at this time. The patient agrees to contact the PCP office for questions related to their healthcare. Medication reconciliation was performed with patient, who verbalizes understanding of administration of home medications. Advised obtaining a 90-day supply of all daily and as-needed medications.    Referral to Pharm D needed: no Home Health/Outpatient orders at discharge: [address]: na  Date of initial visit: na    Durable Medical Equipment ordered at discharge: none  [address] [facility] [address] received: na    Covid Risk Education    Educated patient about risk for severe COVID-19 due to risk factors according to CDC guidelines. CTN reviewed discharge instructions, medical action plan and red flag symptoms with the family who verbalized understanding. Discussed COVID vaccination status: yes. Education provided on COVID-19 vaccination as appropriate. Discussed exposure protocols and quarantine with CDC Guidelines. Family was given an opportunity to verbalize any questions and concerns and agrees to contact CTN or health care provider for questions related to their healthcare. Was patient discharged with a pulse oximeter? no.     Discussed follow-up appointments. If no appointment was previously scheduled, appointment scheduling offered: yes. Is follow up appointment scheduled within 7 days of discharge? yes. (Note- saw pcp 6/7/21)  [address] follow up appointment(s):   Future Appointments   Date Time Provider Ping Almaraz   6/11/2021 11:00 AM MD GUERA Orellana BS AMB   6/16/2021  9:40 AM Bethanie Oppenheim, NP [facility] BS AMB   8/9/2021  9:00 AM Alec Jackson MD [facility] BS [facility]     Non-[facility] follow up appointment(s): na    Plan for follow-up call in 7-10 days based on severity of symptoms and risk factors. Plan for next call: symptom management-assess current symptoms of edema in lower extremities and if having any further drainage, self management-following discharge instructions, follow up appointment-saw pcp for KELIN and requesting sooner appt to see surgeon and medication management-taking meds as ordered. CTN provided contact information for future needs.     Goals Addressed    None

## 2021-06-07 NOTE — PROGRESS NOTES
Reymundo Mosqueda (: 1945) is a 70 y.o. male, established patient, here for evaluation of the following chief complaint(s):  Chief Complaint   Patient presents with   [facility]     Patient was advised to follow up with PCP following surgery       Assessment and Plan:       ICD-10-CM ICD-9-CM    1. Type 2 diabetes mellitus with other circulatory complication, without long-term current use of insulin (HCC)  E11.59 250.70    2. Other cirrhosis of liver (HCC)  K74.69 571.5    3. Thrombocytopenia (HCC)  D69.6 287.5    4. Hospital discharge follow-up  Z09 V67.59    5. S/P laparoscopic cholecystectomy  Z90.49 V45.89    6. Bilateral leg edema  R60.0 782.3    7. Fluid retention in legs  R60.0 782.3        1. Follow-up labs reviewed as below. 2,3:  Follow-up with hepatology as below. 4,5:  Stable and has surgery follow-up as below. 6,7:  Monitor post-discharge and with specialists reviewed. Follow-up and Dispositions    · Return in about 2 months (around 2021), or if symptoms worsen or fail to improve, for medication follow-up, fasting labs, Medicare Wellness exam/visit.       lab results and schedule of future lab studies reviewed with patient  reviewed medications and side effects in detail    Plan and evaluation (above) reviewed with pt at visit  Patient voiced understanding of plan and provided with time to ask/review questions. After Visit Summary (AVS) provided to pt after visit with additional instructions as needed/reviewed. Future Appointments   Date Time Provider Ping Almaraz   2021  9:40 AM Chris Oakley NP [facility] BS AMB   2021  2:30 PM MD GUERA Neely BS AMB   2021  9:00 AM Rodney Dunlap MD [facility] BS AMB   --Updated future visits after patient check-out. History of Present Illness:     Notes (nursing/rooming note copied below in italics):  Patient reports dizziness occurs everyday. BLE swelling, occurring since surgery. Here for follow-up    Since last visit here (VV 2-22-21) has:  --seen hematology for low plt eval--secondary to cirrhosis--follow-up only with hepatology  --hepatology eval and dx cirrhosis  --EGD with Dr. Ashley Irby with no varices  --Lab rené with Dr. Joanna Madden on 6/3    He notes not doing well post-op. Very weak. He notes still having drainage from removal of FLORY drain. Has follow-up with Dr. Joanna Madden on 6/11. He will contact them if drainage doesn't improve. Reviewed mgt of LE edema--this occurred after surgery. Reviewed likely related to fluid aracelis-op mgt. Here with his wife. They are changing regularly based on drainage. Move drainage with movement. Re-dressed by nursing today. Nursing screenings reviewed by provider at visit. Prior to Admission medications    Medication Sig Start Date End Date Taking? Authorizing Provider   oxyCODONE IR (ROXICODONE) 5 mg immediate release tablet Take 1 Tablet by mouth every four (4) hours as needed for Pain for up to 7 days. Max Daily Amount: 30 mg. 6/4/21 6/11/21 Yes Ivania Tang MD   ibuprofen (MOTRIN) 800 mg tablet Take 1 Tablet by mouth every six (6) hours as needed for Pain. 6/4/21  Yes Ivania Tang MD   nystatin (MYCOSTATIN) 100,000 unit/mL suspension Take 5 mL by mouth four (4) times daily. swish and spit 6/4/21  Yes Mary Phillips NP   potassium chloride SR (K-TAB) 20 mEq tablet Take 1 Tablet by mouth daily. 6/2/21  Yes Kenia Sethi MD   simvastatin (ZOCOR) 20 mg tablet Take 20 mg by mouth nightly. Yes Provider, Historical   latanoprost (XALATAN) 0.005 % ophthalmic solution Administer 1 Drop to left eye nightly. Yes Provider, Historical   pantoprazole (Protonix) 20 mg tablet Take 2 Tabs by mouth daily. 12/18/20  Yes Deepak Solano MD   enalapril (VASOTEC) 10 mg tablet Take 10 mg by mouth daily. 7/3/20  Yes Provider, Historical   aspirin delayed-release 81 mg tablet Take 1 Tab by mouth daily.  5/29/19  Yes Paulette Fleming NP metFORMIN (GLUCOPHAGE) 500 mg tablet Take 1,000 mg by mouth two (2) times daily (with meals). Yes Provider, Historical        ROS    Vitals:    06/07/21 1158   BP: 126/73   Pulse: 65   Resp: 14   Temp: 98 °F (36.7 °C)   TempSrc: Oral   SpO2: 98%   Weight: 206 lb 4 oz (93.6 kg)   Height: 5' 5\" (1.651 m)   PainSc:   0 - No pain      Body mass index is 34.32 kg/m². Physical Exam:     Physical Exam  Vitals and nursing note reviewed. Constitutional:       General: He is not in acute distress. Appearance: Normal appearance. He is well-developed. He is not diaphoretic. HENT:      Head: Normocephalic and atraumatic. Eyes:      General: No scleral icterus. Right eye: No discharge. Left eye: No discharge. Conjunctiva/sclera: Conjunctivae normal.   Cardiovascular:      Rate and Rhythm: Normal rate and regular rhythm. Pulses: Normal pulses. Heart sounds: Normal heart sounds. No murmur heard. No friction rub. No gallop. Pulmonary:      Effort: Pulmonary effort is normal. No respiratory distress. Breath sounds: Normal breath sounds. No stridor. No wheezing, rhonchi or rales. Abdominal:      General: Bowel sounds are normal. There is no distension. Palpations: Abdomen is soft. Tenderness: There is no abdominal tenderness. There is no guarding or rebound. Musculoskeletal:         General: No swelling, tenderness or deformity. Right lower leg: Edema present. Left lower leg: Edema present. Comments: 1-2+ distal LE edema bilat. Skin:     General: Skin is warm. Coloration: Skin is not jaundiced or pale. Findings: No bruising, erythema or rash. Neurological:      General: No focal deficit present. Mental Status: He is alert. Motor: No abnormal muscle tone.       Coordination: Coordination normal.      Gait: Gait normal.   Psychiatric:         Mood and Affect: Mood normal.         Behavior: Behavior normal.         Thought Content: Thought content normal.         Judgment: Judgment normal.         An electronic signature was used to authenticate this note.   -- Otis Wills MD

## 2021-06-07 NOTE — PROGRESS NOTES
RM 17    Patient reports dizziness occurs everyday. Chief Complaint   Patient presents with   [facility] Follow Up     Patient was advised to follow up with PCP following gallbladder surgery. BLE swelling, occurring postop. Patient would also like to know when he can drive. 1. Have you been to the ER, urgent care clinic since your last visit? Hospitalized since your last visit? Yes Reason for visit: 6/3/21, [facility],  Gallbladder removed. 2. Have you seen or consulted any other health care providers outside of the [address] [place] [address] [place] since your last visit? Include any pap smears or colon screening. No    Health Maintenance Due   Topic Date Due    Foot Exam Q1  Never done    MICROALBUMIN Q1  Never done    Eye Exam Retinal or Dilated  Never done    COVID-19 Vaccine (1) Never done    DTaP/Tdap/Td series (1 - Tdap) Never done    AAA Screening 73-69 YO Male Smoking Patients  Never done    Pneumococcal 65+ years (1 of 1 - PPSV23) Never done    Medicare Yearly Exam  Never done       Abuse Screening Questionnaire 6/7/2021   Do you ever feel afraid of your partner? N   Are you in a relationship with someone who physically or mentally threatens you? N   Is it safe for you to go home? Y       3 most recent PHQ Screens 6/7/2021   Little interest or pleasure in doing things Not at all   Feeling down, depressed, irritable, or hopeless Not at all   Total Score PHQ 2 0     Fall Risk Assessment, last 12 mths 6/7/2021   Able to walk? Yes   Fall in past 12 months? 0   Do you feel unsteady?  0   Are you worried about falling 0       Learning Assessment 8/14/2019   PRIMARY LEARNER Patient   PRIMARY LANGUAGE ENGLISH   LEARNER PREFERENCE PRIMARY DEMONSTRATION   ANSWERED BY self   RELATIONSHIP SELF

## 2021-06-09 ENCOUNTER — PATIENT OUTREACH (OUTPATIENT)
Dept: CASE MANAGEMENT | Age: 76
End: 2021-06-09

## 2021-06-09 NOTE — PROGRESS NOTES
Called and spoke to patient and wife. Goals      Understands red flags post discharge. 6/7/21 [facility] 6/3-6/4  Lap cholecystectomy   Reviewed discharge instructions with wife   Reviewed meds- not taking the Oxycodone. Doesn't need it. Discomfort not bad.  Reviewed red flags: fever,nausea,vomiting,diarrhea,abd pain,sob, chest pain.  KELIN with pcp 6/7-saw .  Sees surgeon, Dr.Nathan Bhatia(NP Samantha Gutierrez) on 6/16= wife sent message concerned about drainage from where drainage tube was removed. Said surgeon said should slow down. Continue to monitor. Wife also very concerned about 's anxiety level- requesting med -advised will check with pcp.  Declined info on [facility].  Given CTN contact info if any questions, concerns.  CTN to send message to pcp re swelling in both legs/ankles/feet. Patient has been elevating and eats low salt diet.  CTN to check back in a few days for update. mbt  6/9/21  Called and spoke to patient and wife.  apologized for being so upset. Says legs are a bit better. Continues to elevate. Advised to avoid salt. Did not hear back from pcp.   CTN to send another message to pcp for rec.mbt

## 2021-06-11 ENCOUNTER — OFFICE VISIT (OUTPATIENT)
Dept: HEMATOLOGY | Age: 76
End: 2021-06-11
Payer: MEDICARE

## 2021-06-11 VITALS
HEIGHT: 65 IN | RESPIRATION RATE: 17 BRPM | DIASTOLIC BLOOD PRESSURE: 67 MMHG | HEART RATE: 64 BPM | SYSTOLIC BLOOD PRESSURE: 123 MMHG | BODY MASS INDEX: 33.85 KG/M2 | WEIGHT: 203.2 LBS | TEMPERATURE: 97.4 F | OXYGEN SATURATION: 98 %

## 2021-06-11 DIAGNOSIS — R18.8 CIRRHOSIS OF LIVER WITH ASCITES, UNSPECIFIED HEPATIC CIRRHOSIS TYPE (HCC): Primary | ICD-10-CM

## 2021-06-11 DIAGNOSIS — K74.60 CIRRHOSIS OF LIVER WITH ASCITES, UNSPECIFIED HEPATIC CIRRHOSIS TYPE (HCC): Primary | ICD-10-CM

## 2021-06-11 PROCEDURE — G8417 CALC BMI ABV UP PARAM F/U: HCPCS | Performed by: INTERNAL MEDICINE

## 2021-06-11 PROCEDURE — 1111F DSCHRG MED/CURRENT MED MERGE: CPT | Performed by: INTERNAL MEDICINE

## 2021-06-11 PROCEDURE — G8510 SCR DEP NEG, NO PLAN REQD: HCPCS | Performed by: INTERNAL MEDICINE

## 2021-06-11 PROCEDURE — 1101F PT FALLS ASSESS-DOCD LE1/YR: CPT | Performed by: INTERNAL MEDICINE

## 2021-06-11 PROCEDURE — G8427 DOCREV CUR MEDS BY ELIG CLIN: HCPCS | Performed by: INTERNAL MEDICINE

## 2021-06-11 PROCEDURE — 3017F COLORECTAL CA SCREEN DOC REV: CPT | Performed by: INTERNAL MEDICINE

## 2021-06-11 PROCEDURE — G8536 NO DOC ELDER MAL SCRN: HCPCS | Performed by: INTERNAL MEDICINE

## 2021-06-11 PROCEDURE — G8754 DIAS BP LESS 90: HCPCS | Performed by: INTERNAL MEDICINE

## 2021-06-11 PROCEDURE — G8752 SYS BP LESS 140: HCPCS | Performed by: INTERNAL MEDICINE

## 2021-06-11 PROCEDURE — 99214 OFFICE O/P EST MOD 30 MIN: CPT | Performed by: INTERNAL MEDICINE

## 2021-06-11 RX ORDER — SPIRONOLACTONE 100 MG/1
100 TABLET, FILM COATED ORAL DAILY
Qty: 90 TABLET | Refills: 3 | Status: SHIPPED | OUTPATIENT
Start: 2021-06-11 | End: 2021-06-29

## 2021-06-11 RX ORDER — FUROSEMIDE 40 MG/1
40 TABLET ORAL DAILY
Qty: 90 TABLET | Refills: 3 | Status: SHIPPED | OUTPATIENT
Start: 2021-06-11 | End: 2021-06-29

## 2021-06-11 NOTE — PROGRESS NOTES
Identified pt with two pt identifiers(name and ). Reviewed record in preparation for visit and have obtained necessary documentation. Chief Complaint   Patient presents with    Cirrhosis Of Liver     LBX f/u      Vitals:    21 1043   BP: 123/67   Pulse: 64   Resp: 17   Temp: 97.4 °F (36.3 °C)   TempSrc: Temporal   SpO2: 98%   Weight: 203 lb 3.2 oz (92.2 kg)   Height: 5' 5\" (1.651 m)   PainSc:   0 - No pain       Health Maintenance Review: Patient reminded of \"due or due soon\" health maintenance. I have asked the patient to contact his/her primary care provider (PCP) for follow-up on his/her health maintenance. Coordination of Care Questionnaire:  :   1) Have you been to an emergency room, urgent care, or hospitalized since your last visit? If yes, where when, and reason for visit? Yes 6/3/21, [facility] for pancreatitis gallstones      2. Have seen or consulted any other health care provider since your last visit? If yes, where when, and reason for visit? YES, PCP for f/u      Patient is accompanied by self I have received verbal consent from Jodi Paulino to discuss any/all medical information while they are present in the room.

## 2021-06-11 NOTE — PROGRESS NOTES
[address] [facility]      Meredith Lopez MD, Abdullahi Servin, Stanley Pichardo MD, MPH      Gay Maza, PA-MARILIN Vincent, [facility]-BC     Clementina Angulo, [facility]   Leticia Lindsey, FNP-C    Jhoana Junior, [facility]       Bernard Deputado Jasvir De Jones 136    at [address], [address] [place] [address], [facility] 22.    [phone]    FAX: [address] [place] [address]    at [address], [address]    [phone]    FAX: [phone]       Patient Care Team:  Tiffany Baker MD as PCP - General (Infectious Disease)  Tiffany Baker MD as PCP - [facility] Provider  Winnie Weston RN as Care Transitions Nurse  Mateo Bush RN as Care Transitions Nurse (Family Medicine)      Problem List  Date Reviewed: 6/16/2021        Codes Class Noted    Gallstone pancreatitis ICD-10-CM: K85.10  ICD-9-CM: 641.5, 574.20  6/3/2021        Pancreatitis ICD-10-CM: K85.90  ICD-9-CM: 577.0  5/27/2021        Cirrhosis of liver without ascites ([facility].) ICD-10-CM: K74.60  ICD-9-CM: 571.5  3/12/2021        History of CVA (cerebrovascular accident) ICD-10-CM: Z86.73  ICD-9-CM: V12.54  2/3/2021        Thrombocytopenia ([facility].) ICD-10-CM: D69.6  ICD-9-CM: 287.5  12/3/2020        Class 1 obesity due to excess calories with serious comorbidity and body mass index (BMI) of 32.0 to 32.9 in adult ICD-10-CM: E66.09, Z68.32  ICD-9-CM: 278.00, V85.32  12/3/2020        Diabetes mellitus ([facility].) ICD-10-CM: E11.9  ICD-9-CM: 250.00  12/3/2020              Emelyn Faith is being seen at [address] for 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   The active problem list, all pertinent past medical history, medications, liver histology, endoscopic studies, and laboratory findings related to the liver disorder were reviewed and discussed with the patient. The patient is a 70year old male  who was found to have thrombocytopenia in 12/2020    FibroScan performed at [place] [address] demonstrated EkPa 50.8,  suggesting fibrosis level of F 4. The CAP score suggests hepatic steatosis. The patient underwent a liver biopsy in 5/2021. The procedure was well tolerated. I have personally reviewed and interpreted the liver biopsy slides. This demonstrates cirrhosis with mild steatosis, inflammation and ballooning consistent with ALFARO. An EGD in 5/2021demonstrated no esophageal varcices. A few hours after the EGD and liver biopsy the patient developed severe abdominal pain and came to the ED. He was found to have acute pancreatitis presumed due to gallstones. Pancreatitis resolved after several days and he returned for cholecystectomy several days later. He tolerated that procedure well and was discharged after 1 days of observation. The patient has not developed any of the major complications of cirrhosis to date. The patient reports fatigue    The patient is not currently experiencing the following symptoms of liver disease:   pain in the right side over the liver,   swelling of the abdomen,   swelling of the lower extremities,   hematemesis, Hematochezia. The patient completes is slowly returning to his normal activities. ASSESSMENT AND PLAN:  Cirrhosis  Cirrhosis is probably secondary to alcohol with or without NAFLD  The diagnosis of cirrhosis is based upon liver histology, imaging, laboratory studies    The patient has normal liver function. The patient has never developed any complications of cirrhosis to date. The CTP is 5. Child class A. The MELD score is 9.     Fatty liver  The diagnosis is based upon liver biopsy, imaging, Fiboscan CAP score, features of metabolic syndrome, serologic studies that are negative for other causes of chronic liver disease,     A liver biopsy performed in 5/2021 demonstrates mild steatosis, mild inflammation, mild ballooning and Cirrhosis. Fibroscan in 3/2021 demonstrated  50.8 kPa and  suggesting fatty liver and cirrhosis. The etiology for fatty liver is alcohol with or without NAFLD    Liver transaminases are normal.  ALP is normal.  Liver function is The platelet count is normal.      If the patient looses 20% of current body weight, which is 40 pounds, down to a weight of of 160 pounds, all steatosis will have resolved. Once all steatosis has resolved all inflammation will resolve. Then all fibrosis will gradually resolve and the liver could eventually be normal.    Counseling for diet and weight loss in patients with confirmed or suspected NAFLD  The patient was counseled regarding diet and exercise to achieve weight loss. The best diet for patients with fatty liver is one very low in carbohydrates and enriched with protein such as an Jaqueline's program.      The patient was told not to consume any food products and drinks containing fructose as this enhances hepatic fat synthesis. There is no medication or vitamin supplements that we advocate for ALFARO. Using glitazones in patients without diabetes mellitus has been shown to reduce fat content in the liver but has no effect on fibrosis and is associated with weight gain. Vitamin E has also been used but the data is not very good and most experts no longer advocate this. Screening for Esophageal varices   The patient does not have esophageal or gastric varices. The last EGD to assess for varices was performed in 5/2021. Hepatic encephalopathy   Overt HE has not developed to date. There is no need for treatment with lactulose and/or Xifaxan at this time. Anemia   This is due to multifactorial causes including recent prolonged hospitalization and surgery.   Will obtain FE panel to assess for iron stores. Thrombocytopenia   This is secondary to cirrhosis. There is no evidence of overt bleeding. No treatment is required. The platelet count is adequate for the patient to undergo procedures without the need for platelet transfusion or platelet growth factors. Screening for Hepatocellular Carcinoma  HCC screening has recently been performed and does not suggest [facility] The next liver imaging study will be performed in 11/2021. Treatment of other medical problems in patients with chronic liver disease  There are no contraindications for the patient to take most medications that are necessary for treatment of other medical issues. The patient has cirrhrosis and should avoid taking NSAIDs which are associated with a higher rate of developing ROBBIE. The patient can take Any medications utilized for treatment of DM, Statins to treat hypercholesterolemia    Counseling for alcohol in patients with chronic liver disease  The patient has cirrhosis and was advised to be abstinent from all alcohol including non-alcoholic beer which does contain some alcohol. The patient has previously consumed alcohol in excess. The patient has not consumed alcohol since 2013. Osteoporosis  The risk of osteoporosis is increased in patients with cirrhosis. DEXA bone density to assess for osteoporosis has not been performed. This should be ordered by the patients primary care physician. Vaccinations   Vaccination for viral hepatitis A is not needed. The patient has serologic evidence of prior exposure or vaccination with immunity. Routine vaccinations against other bacterial and viral agents can be performed as indicated. Annual flu vaccination should be administered if indicated. ALLERGIES  No Known Allergies    MEDICATIONS  Current Outpatient Medications   Medication Sig    furosemide (Lasix) 40 mg tablet Take 1 Tablet by mouth daily.     spironolactone (Aldactone) 100 mg tablet Take 1 Tablet by mouth daily.  nystatin (MYCOSTATIN) 100,000 unit/mL suspension Take 5 mL by mouth four (4) times daily for 10 days. swish and spit    cholestyramine (QUESTRAN) 4 gram packet Take 1 Packet by mouth three (3) times daily (with meals) for 14 days.  ibuprofen (MOTRIN) 800 mg tablet Take 1 Tablet by mouth every six (6) hours as needed for Pain.  potassium chloride SR (K-TAB) 20 mEq tablet Take 1 Tablet by mouth daily.  simvastatin (ZOCOR) 20 mg tablet Take 20 mg by mouth nightly.  latanoprost (XALATAN) 0.005 % ophthalmic solution Administer 1 Drop to left eye nightly.  pantoprazole (Protonix) 20 mg tablet Take 2 Tabs by mouth daily.  enalapril (VASOTEC) 10 mg tablet Take 10 mg by mouth daily.  aspirin delayed-release 81 mg tablet Take 1 Tab by mouth daily.  metFORMIN (GLUCOPHAGE) 500 mg tablet Take 1,000 mg by mouth two (2) times daily (with meals). No current facility-administered medications for this visit. SYSTEM REVIEW NOT RELATED TO LIVER DISEASE OR REVIEWED ABOVE:  Constitution systems: Negative for fever, chills, weight gain, weight loss. Eyes: Negative for visual changes. ENT: Negative for sore throat, painful swallowing. Respiratory: Negative for cough, hemoptysis, SOB. Cardiology: Negative for chest pain, palpitations. GI:  Negative for constipation or diarrhea. : Negative for urinary frequency, dysuria, hematuria, nocturia. Skin: Negative for rash. Hematology: Negative for easy bruising, blood clots. Musculo-skelatal: Negative for back pain, muscle pain, weakness. Neurologic: Negative for headaches, dizziness, vertigo, memory problems not related to HE. Psychology: Negative for anxiety, depression. FAMILY HISTORY:  There is no family history of liver disease. There is no family history of immune disorders. SOCIAL HISTORY:  The patient is   The patient has no children.      The patient stopped using tobacco products in 2009  The patient has previously consumed alcohol in excess. The patient is a retired barnes    PHYSICAL EXAMINATION:  Visit Vitals  /67 (BP 1 Location: Right upper arm, BP Patient Position: Sitting, BP Cuff Size: Large adult)   Pulse 64   Temp 97.4 °F (36.3 °C) (Temporal)   Resp 17   Ht 5' 5\" (1.651 m)   Wt 203 lb 3.2 oz (92.2 kg)   SpO2 98%   BMI 33.81 kg/m²     General: No acute distress. Eyes: Sclera anicteric. ENT: No oral lesions. Thyroid normal.  Nodes: No adenopathy. Skin: No spider angiomata. No jaundice. No palmar erythema. Respiratory: Lungs clear to auscultation. Cardiovascular: Regular heart rate. No murmurs. No JVD. Abdomen: Soft non-tender. Liver size normal to percussion/palpation. Spleen not palpable. No obvious ascites. Extremities: No edema. No muscle wasting. No gross arthritic changes. Neurologic: Alert and oriented. Cranial nerves grossly intact. No asterixis.     LABORATORY STUDIES:  Liver [place] of [address] [place] [address] 6/11/2021 6/4/2021   WBC 3.4 - 10.8 x10E3/uL 4.4 5.8   ANC 1.4 - 7.0 x10E3/uL 2.5    HGB 13.0 - 17.7 g/dL 10.4 (L) 10.4 (L)   PLT x10E3/uL CANCELED 125 (L)    - 450 x10E3/uL     INR 0.9 - 1.2 1.0    AST 0 - 40 IU/L 23 63 (H)   ALT 0 - 44 IU/L 28 62   Alk Phos 48 - 121 IU/L 117 114   Bili, Total 0.0 - 1.2 mg/dL 0.4 0.6   Bili, Direct 0.00 - 0.40 mg/dL 0.18    Albumin 3.7 - 4.7 g/dL 3.4 (L) 2.3 (L)   BUN 8 - 27 mg/dL 10 16   Creat 0.76 - 1.27 mg/dL 1.13 1.08   Na 134 - 144 mmol/L 145 (H) 140   K 3.5 - 5.2 mmol/L 5.1 4.0   Cl 96 - 106 mmol/L 113 (H) 110 (H)   CO2 20 - 29 mmol/L 21 23   Glucose 65 - 99 mg/dL 79 194 (H)       SEROLOGIES:  Serologies Latest Ref Rng & Units 2/3/2021   Hep A Ab, Total Negative   Positive (A)   Hep B Surface Ag Index <0.10   Hep B Surface Ag Interp Negative   Negative   Hep B Core Ab, Total Negative   Negative   Hep B Surface Ab mIU/mL <3.10   Hep B Surface Ab Interp NONREACTIVE   NONREACTIVE   Hep C Ab 0.0 - 0.9 s/co ratio <0.1   Ferritin 26 - 388 NG/ML 27   Iron % Saturation 20 - 50 % 26     LIVER HISTOLOGY:  3/12/2021: FibroScan performed at [address]. EkPa was 50.8. IQR/med 20%. . The results suggested a fibrosis level of F 4. The CAP score suggests there is hepatic steatosis. 5/2021. Slides reviewed by MLS. Fatty liver. AFL vs NAFLD. 25-33% macrovesicualr and micovesicular steatosis, mild inflammation, mild ballooning, Stage 4 fibrosis. DARIEL (111). ENDOSCOPIC PROCEDURES:  5/2021. EGD performed by MLS. No esophageal varices. No gastric varices. RADIOLOGY:  12/2020: Liver US:  demonstrated heterogeneous hepatic echotexture with an area of focal sparing in the right lobe. Mildly enlarged spleen measuring 13.1 cm in length.    5/2021. Dynamic MRI/MRCP liver. Changes consistent with cirrhosis. No liver mass lesions. No dilated bile ducts. No bile duct strictures. Pancreatitis. Mild ascites. OTHER TESTING:  Not available or performed    FOLLOW-UP:  All of the issues listed above in the Assessment and Plan were discussed with the patient. All questions were answered. The patient expressed a clear understanding of the above. [facility] in 4 weeks for monitoring.       Yumi Paulino MD  [facility] 13  [address], [address] [place], [place] [address]  22.  [phone]  [address] [place] [address]

## 2021-06-11 NOTE — Clinical Note
6/25/2021    Patient: Gisela Shen   YOB: 1945   Date of Visit: 6/11/2021     Jhony Lowe MD  [address] [place] [address]  Via In Basket    Dear Jhony Lowe MD,      Thank you for referring Mr. Dawood Carvalho to [address] for evaluation. My notes for this consultation are attached. If you have questions, please do not hesitate to call me. I look forward to following your patient along with you.       Sincerely,    Baljeet Mon MD

## 2021-06-12 LAB
ALBUMIN SERPL-MCNC: 3.4 G/DL (ref 3.7–4.7)
ALP SERPL-CCNC: 117 IU/L (ref 48–121)
ALT SERPL-CCNC: 28 IU/L (ref 0–44)
AST SERPL-CCNC: 23 IU/L (ref 0–40)
BASOPHILS # BLD AUTO: 0.1 X10E3/UL (ref 0–0.2)
BASOPHILS NFR BLD AUTO: 1 %
BILIRUB DIRECT SERPL-MCNC: 0.18 MG/DL (ref 0–0.4)
BILIRUB SERPL-MCNC: 0.4 MG/DL (ref 0–1.2)
BUN SERPL-MCNC: 10 MG/DL (ref 8–27)
BUN/CREAT SERPL: 9 (ref 10–24)
CALCIUM SERPL-MCNC: 8.9 MG/DL (ref 8.6–10.2)
CHLORIDE SERPL-SCNC: 113 MMOL/L (ref 96–106)
CO2 SERPL-SCNC: 21 MMOL/L (ref 20–29)
CREAT SERPL-MCNC: 1.13 MG/DL (ref 0.76–1.27)
EOSINOPHIL # BLD AUTO: 0.1 X10E3/UL (ref 0–0.4)
EOSINOPHIL NFR BLD AUTO: 3 %
ERYTHROCYTE [DISTWIDTH] IN BLOOD BY AUTOMATED COUNT: 13.2 % (ref 11.6–15.4)
GLUCOSE SERPL-MCNC: 79 MG/DL (ref 65–99)
HCT VFR BLD AUTO: 31.1 % (ref 37.5–51)
HGB BLD-MCNC: 10.4 G/DL (ref 13–17.7)
IMM GRANULOCYTES # BLD AUTO: 0 X10E3/UL (ref 0–0.1)
IMM GRANULOCYTES NFR BLD AUTO: 1 %
INR PPP: 1 (ref 0.9–1.2)
LYMPHOCYTES # BLD AUTO: 1.4 X10E3/UL (ref 0.7–3.1)
LYMPHOCYTES NFR BLD AUTO: 31 %
MCH RBC QN AUTO: 29.8 PG (ref 26.6–33)
MCHC RBC AUTO-ENTMCNC: 33.4 G/DL (ref 31.5–35.7)
MCV RBC AUTO: 89 FL (ref 79–97)
MONOCYTES # BLD AUTO: 0.4 X10E3/UL (ref 0.1–0.9)
MONOCYTES NFR BLD AUTO: 8 %
MORPHOLOGY BLD-IMP: ABNORMAL
NEUTROPHILS # BLD AUTO: 2.5 X10E3/UL (ref 1.4–7)
NEUTROPHILS NFR BLD AUTO: 56 %
PLATELET # BLD AUTO: ABNORMAL X10E3/UL
POTASSIUM SERPL-SCNC: 5.1 MMOL/L (ref 3.5–5.2)
PROT SERPL-MCNC: 5.9 G/DL (ref 6–8.5)
PROTHROMBIN TIME: 10.9 SEC (ref 9.1–12)
RBC # BLD AUTO: 3.49 X10E6/UL (ref 4.14–5.8)
SODIUM SERPL-SCNC: 145 MMOL/L (ref 134–144)
WBC # BLD AUTO: 4.4 X10E3/UL (ref 3.4–10.8)

## 2021-06-16 ENCOUNTER — TELEPHONE (OUTPATIENT)
Dept: INTERNAL MEDICINE CLINIC | Age: 76
End: 2021-06-16

## 2021-06-16 ENCOUNTER — VIRTUAL VISIT (OUTPATIENT)
Dept: SURGERY | Age: 76
End: 2021-06-16
Payer: MEDICARE

## 2021-06-16 ENCOUNTER — TRANSCRIBE ORDER (OUTPATIENT)
Dept: NEUROLOGY | Age: 76
End: 2021-06-16

## 2021-06-16 ENCOUNTER — PATIENT OUTREACH (OUTPATIENT)
Dept: CASE MANAGEMENT | Age: 76
End: 2021-06-16

## 2021-06-16 DIAGNOSIS — K85.10 GALLSTONE PANCREATITIS: ICD-10-CM

## 2021-06-16 DIAGNOSIS — Z90.49 S/P LAPAROSCOPIC CHOLECYSTECTOMY: ICD-10-CM

## 2021-06-16 DIAGNOSIS — Z09 POSTOPERATIVE EXAMINATION: Primary | ICD-10-CM

## 2021-06-16 PROCEDURE — 99024 POSTOP FOLLOW-UP VISIT: CPT | Performed by: NURSE PRACTITIONER

## 2021-06-16 RX ORDER — NYSTATIN 100000 [USP'U]/ML
500000 SUSPENSION ORAL 4 TIMES DAILY
Qty: 200 ML | Refills: 0 | Status: SHIPPED | OUTPATIENT
Start: 2021-06-16 | End: 2021-06-26

## 2021-06-16 RX ORDER — CHOLESTYRAMINE 4 G/9G
1 POWDER, FOR SUSPENSION ORAL
Qty: 42 PACKET | Refills: 0 | Status: SHIPPED | OUTPATIENT
Start: 2021-06-16 | End: 2021-06-30

## 2021-06-16 NOTE — PROGRESS NOTES
I was in the office while conducting this encounter. Consent:  He and/or his healthcare decision maker is aware that this patient-initiated Telehealth encounter is a billable service, with coverage as determined by his insurance carrier. He is aware that he may receive a bill and has provided verbal consent to proceed: No - Not billable    This virtual visit was conducted via [employer]. Pursuant to the emergency declaration under the [facility], [facility] waiver authority and the [employer] Act, this Virtual  Visit was conducted to reduce the patient's risk of exposure to COVID-19 and provide continuity of care for an established patient. Services were provided through a video synchronous discussion virtually to substitute for in-person clinic visit. Due to this being a TeleHealth evaluation, many elements of the physical examination are unable to be assessed. Total Time: minutes: 11-20 minutes. Subjective:      Ernestine Tirado is a 70 y.o. male presents for postop care 13 days following laparoscopic cholecystectomy by Dr. Frida Molina. Appetite is good. Eating a regular diet. without difficulty. Bowel movements are diarrhea. The patient is not having any pain. .Denies fever, nausea, redness at incision site, vomiting and diarrhea. Overall feels well from the surgery. Has no energy, does not feel like getting up from his couch. And dietary review, he ate cantalope and a donut this morning for breakfast and a small mini pizza last night for dinner. Has questions about taking potassium pills. He got several pills prior to surgery but only took one because he lost the others.     Pathology:  Chronic cholecystitis with cholelithiasis    Objective:       General:  alert, no distress   Abdomen: soft, non-tender   Incision:   healing well, no drainage, no erythema, no seroma, no swelling, no dehiscence, incisions well approximated   Heart: deferred   Lungs: unlabored     Assessment:     1. Chronic cholecystitis with cholelithiasis, status post lap rené. Doing well postoperatively. Plan:   D/W Dr. Raymon Solares  1. Pt is to increase activities as tolerated. .  2. Follow-up: in 2 weeks. 3. Candidiasis - refill Nystatin. Take for 10 days and f/u with PCP Marquita Duncan MD  4. Post prandial diarrhea - avoid fatty foods and will give short 2 week course of questran TID with meals  5. Diet - low fat, high protein  6. Potassium  - f/u with PCP Marquita Duncan MD  7. PCP Marquita Duncan MD - needs to call him about potassium, etc  8. Hepatologist -Dr. Tiffanie Fine - continue with f/u    Mr. Seema Oreilly has a reminder for a \"due or due soon\" health maintenance. I have asked that he contact his primary care provider for follow-up on this health maintenance. Patient verbalized understanding and agreement.

## 2021-06-16 NOTE — PROGRESS NOTES
Chief Complaint   Patient presents with    Surgical Follow-up       1. Have you been to the ER, urgent care clinic since your last visit? Hospitalized since your last visit?no    2. Have you seen or consulted any other health care providers outside of the [address] [place] [address] since your last visit? Include any pap smears or colon screening.  no

## 2021-06-16 NOTE — TELEPHONE ENCOUNTER
----- Message from Beny Chun sent at 6/16/2021 10:58 AM EDT -----  Regarding: FEDE/MD/TELEPHONE  Contact: [phone]  General Message/Vendor Calls    Caller's first and last name: Jeffry Mata (spouse)      Reason for call: Disregard My Chart request for Nystatin      Callback required yes/no and why: No      Best contact number(s): [phone]      Details to clarify the request: Please disregard MyChart request for Nystatin. Prescription will be fill by Dr. Gaby Reddy.       Beny Chun

## 2021-06-17 ENCOUNTER — TELEPHONE (OUTPATIENT)
Dept: HEMATOLOGY | Age: 76
End: 2021-06-17

## 2021-06-17 ENCOUNTER — VIRTUAL VISIT (OUTPATIENT)
Dept: INTERNAL MEDICINE CLINIC | Age: 76
End: 2021-06-17
Payer: MEDICARE

## 2021-06-17 DIAGNOSIS — M79.604 PAIN IN BOTH LOWER EXTREMITIES: ICD-10-CM

## 2021-06-17 DIAGNOSIS — M79.605 PAIN IN BOTH LOWER EXTREMITIES: ICD-10-CM

## 2021-06-17 DIAGNOSIS — K74.69 OTHER CIRRHOSIS OF LIVER (HCC): ICD-10-CM

## 2021-06-17 DIAGNOSIS — R68.89 OTHER GENERAL SYMPTOMS AND SIGNS: ICD-10-CM

## 2021-06-17 DIAGNOSIS — R53.83 FATIGUE, UNSPECIFIED TYPE: Primary | ICD-10-CM

## 2021-06-17 DIAGNOSIS — D69.6 THROMBOCYTOPENIA, UNSPECIFIED (HCC): ICD-10-CM

## 2021-06-17 DIAGNOSIS — R42 DIZZINESS: ICD-10-CM

## 2021-06-17 DIAGNOSIS — M89.9 DISORDER OF BONE, UNSPECIFIED: ICD-10-CM

## 2021-06-17 DIAGNOSIS — R79.9 ABNORMAL FINDING OF BLOOD CHEMISTRY, UNSPECIFIED: ICD-10-CM

## 2021-06-17 PROCEDURE — 99422 OL DIG E/M SVC 11-20 MIN: CPT | Performed by: INTERNAL MEDICINE

## 2021-06-17 NOTE — PROGRESS NOTES
Claudeen Oddi (: 1945) is a 70 y.o. male, established patient, here for evaluation of the following chief complaint(s)--see below:    Consent: he and/or his health care decision maker is aware that hemay receive a bill for this audio only encounter, depending on insurance coverage, and has provided verbal consent to proceed: Yes    I communicated with the patient and/or health care decision maker about the nature and details of the following:     Claudeen Oddi is a 70 y.o. male evaluated via audio only technology on 2021. Consent: He and/or his health care decision maker is aware that he may receive a bill for this audio only encounter, depending on his insurance coverage, and has provided verbal consent to proceed: Yes    Assessment & Plan:   Diagnoses and all orders for this visit:      ICD-10-CM ICD-9-CM    1. Fatigue, unspecified type  R53.83 780.79    2. Dizziness  R42 780.4    3. Pain in both lower extremities  M79.604 729.5 HEMOGLOBIN A1C WITH EAG    M79.605  CK      VITAMIN B12      FOLATE      TSH 3RD GENERATION      T4, FREE      VITAMIN D, 25 HYDROXY   4. Other cirrhosis of liver (HCC)  K74.69 571.5 HEMOGLOBIN A1C WITH EAG      VITAMIN B12      FOLATE      TSH 3RD GENERATION      T4, FREE   5. Abnormal finding of blood chemistry, unspecified   R79.9 790.6 HEMOGLOBIN A1C WITH EAG      VITAMIN B12      FOLATE      TSH 3RD GENERATION      T4, FREE      VITAMIN D, 25 HYDROXY   6. Thrombocytopenia, unspecified (HCC)   D69.6 287.5 VITAMIN B12      FOLATE      TSH 3RD GENERATION      T4, FREE      VITAMIN D, 25 HYDROXY   7. Other general symptoms and signs   R68.89 780.99 TSH 3RD GENERATION      T4, FREE      VITAMIN D, 25 HYDROXY   8. Disorder of bone, unspecified   M89.9 733.90 VITAMIN D, 25 HYDROXY       1-8:  Updating labs for fatigue evaluation and dizziness evaluation reviewed. He will schedule here as reviewed/requested. Follow-up and Dispositions    · Return for as scheduled. reviewed medications and side effects in detail    Plan and evaluation (above) reviewed with pt at visit  Patient voiced understanding of plan and provided with time to ask/review questions. After Visit Summary (AVS) provided to pt after visit with additional instructions as needed/reviewed. AVS:  [x]  [facility] signed. []  Mailed to patient after visit. []  Not sent to patient after visit. Future Appointments   Date Time Provider Ping Almaraz   6/29/2021  2:30 PM [facility]   6/30/2021 11:00 AM Vera Carlos NP [facility] BS AMB   8/9/2021  9:00 AM Genevieve Armenta MD [facility] BS AMB   --Updated future visits after patient check-out. History of Present Illness:     I communicated with the patient and/or health care decision maker about the nature and details of the following:  Chief Complaint   Patient presents with    Fatigue     occuring since 6/3/21 after cholesectomy and liver biopsy.  Dizziness     Patient reports stroke in 2013, dizziness occuring since then but has recently worsened. There were no vitals taken for this visit. Notes:  Reviewed prior labs and relation to symptoms. Component      Latest Ref Rng & Units 6/11/2021 6/11/2021 6/4/2021 6/4/2021          12:00 AM 12:00 AM  3:06 AM  3:06 AM   WBC      3.4 - 10.8 x10E3/uL 4.4   5.8   RBC      4.14 - 5.80 x10E6/uL 3.49 (L)   3.58 (L)   HGB      13.0 - 17.7 g/dL 10.4 (L)   10.4 (L)   HCT      37.5 - 51.0 % 31.1 (L)   32.1 (L)   MCV      79 - 97 fL 89   89.7   MCH      26.6 - 33.0 pg 29.8   29.1   MCHC      31.5 - 35.7 g/dL 33.4   32.4   RDW      11.6 - 15.4 % 13.2   13.8   PLATELET      M50J3/QI CANCELED   125 (L)   NEUTROPHILS      Not Estab. % 56      Lymphocytes      Not Estab. % 31      MONOCYTES      Not Estab. % 8      EOSINOPHILS      Not Estab. % 3      BASOPHILS      Not Estab. % 1      ABS.  NEUTROPHILS      1.4 - 7.0 x10E3/uL 2.5 Abs Lymphocytes      0.7 - 3.1 x10E3/uL 1.4      ABS. MONOCYTES      0.1 - 0.9 x10E3/uL 0.4      ABS. EOSINOPHILS      0.0 - 0.4 x10E3/uL 0.1      ABS. BASOPHILS      0.0 - 0.2 x10E3/uL 0.1      IMMATURE GRANULOCYTES      Not Estab. % 1      ABS. IMM. GRANS.      0.0 - 0.1 x10E3/uL 0.0      Hematology comments:       Note:      Sodium      134 - 144 mmol/L  145 (H) 140    Potassium      3.5 - 5.2 mmol/L  5.1 4.0    Chloride      96 - 106 mmol/L  113 (H) 110 (H)    CO2      20 - 29 mmol/L  21 23    Anion gap      5 - 15 mmol/L   7    Glucose      65 - 99 mg/dL  79 194 (H)    BUN      8 - 27 mg/dL  10 16    Creatinine      0.76 - 1.27 mg/dL  1.13 1.08    BUN/Creatinine ratio      10 - 24  9 (L) 15    GFR est AA      >59 mL/min/1.73  74 >60    GFR est non-AA      >59 mL/min/1.73  64 >60    Calcium      8.6 - 10.2 mg/dL  8.9 7.9 (L)    Bilirubin, total      0.2 - 1.0 MG/DL   0.6    ALT      12 - 78 U/L   62    AST      15 - 37 U/L   63 (H)    Alk. phosphatase      45 - 117 U/L   114    Protein, total      6.4 - 8.2 g/dL   5.8 (L)    Albumin      3.5 - 5.0 g/dL   2.3 (L)    Globulin      2.0 - 4.0 g/dL   3.5    A-G Ratio      1.1 - 2.2     0.7 (L)    MPV      8.9 - 12.9 FL    10.7   NRBC      0  WBC    0.0   ABSOLUTE NRBC      0.00 - 0.01 K/uL    0.00       Lab Results   Component Value Date/Time    Hemoglobin A1c 7.1 (H) 05/29/2019 05:09 AM    Hemoglobin A1c, External 5.6 01/12/2021 12:00 AM       Nursing screenings reviewed by provider at visit. Prior to Admission medications    Medication Sig Start Date End Date Taking? Authorizing Provider   nystatin (MYCOSTATIN) 100,000 unit/mL suspension Take 5 mL by mouth four (4) times daily for 10 days. swish and spit 6/16/21 6/26/21 Yes John Rocha, DORA   cholestyramine Sariah Denis) 4 gram packet Take 1 Packet by mouth three (3) times daily (with meals) for 14 days.  6/16/21 6/30/21 Yes Megan Jane NP   furosemide (Lasix) 40 mg tablet Take 1 Tablet by mouth daily. 6/11/21  Yes Alka Womack MD   spironolactone (Aldactone) 100 mg tablet Take 1 Tablet by mouth daily. 6/11/21  Yes Alka Womack MD   ibuprofen (MOTRIN) 800 mg tablet Take 1 Tablet by mouth every six (6) hours as needed for Pain. 6/4/21  Yes Fatoumata Owen MD   potassium chloride SR (K-TAB) 20 mEq tablet Take 1 Tablet by mouth daily. 6/2/21  Yes Kurt Smalls MD   simvastatin (ZOCOR) 20 mg tablet Take 20 mg by mouth nightly. Yes Provider, Historical   latanoprost (XALATAN) 0.005 % ophthalmic solution Administer 1 Drop to left eye nightly. Yes Provider, Historical   pantoprazole (Protonix) 20 mg tablet Take 2 Tabs by mouth daily. 12/18/20  Yes Tiffany Baker MD   enalapril (VASOTEC) 10 mg tablet Take 10 mg by mouth daily. 7/3/20  Yes Provider, Historical   aspirin delayed-release 81 mg tablet Take 1 Tab by mouth daily. 5/29/19  Yes Lisa Romeo NP   metFORMIN (GLUCOPHAGE) 500 mg tablet Take 1,000 mg by mouth two (2) times daily (with meals). Yes Provider, Historical       Patient-Reported Vitals 6/16/2021   Patient-Reported Weight 186 lb   Patient-Reported Height -   Patient-Reported Pulse 67   Patient-Reported Temperature -   Patient-Reported SpO2 97   Patient-Reported Systolic  2.51   Patient-Reported Diastolic -          I affirm this is a Patient Initiated Episode with an Established Patient who has not had a related appointment within my department in the past 7 days or scheduled within the next 24 hours. 11-20 minutes were spent on the digital evaluation and management of this patient. Total Time: minutes: 11-20 minutes  Note: not billable if this call serves to triage the patient into an appointment for the relevant concern      Emelyn Faith is being evaluated by a Virtual Visit (audio-only/phone visit) encounter to address concerns as mentioned above. A caregiver was present when appropriate.   Due to this being a TeleHealth encounter (During [facility] health emergency), evaluation of the following organ systems was limited: Vitals/Constitutional/EENT/Resp/CV/GI//MS/Neuro/Skin/Heme-Lymph-Imm. Pursuant to the emergency declaration under the [address] [place] [address], [address] [place] [address] authority and the [employer] Act, this Virtual Visit was conducted with patient's (and/or legal guardian's) consent, to reduce the patient's risk of exposure to COVID-19 and provide necessary medical care. The patient (and/or legal guardian) has also been advised to contact this office for worsening conditions or problems, and seek emergency medical treatment and/or call 911 if deemed necessary. Patient identification was verified at the start of the visit: YES. Services were provided through an audio-only discussion virtually to substitute for in-person clinic visit. Patient was located at their individual home. Provider was located in the medical office. An electronic signature was used to authenticate this note.   -- Peter Maldonado MD

## 2021-06-17 NOTE — PROGRESS NOTES
Virtual Visit-telephone     Chief Complaint   Patient presents with    Fatigue     occuring since 6/3/21 after cholesectomy and liver biopsy.  Dizziness     Patient reports stroke in 2013, dizziness occuring since then but has recently worsened. 1. Have you been to the ER, urgent care clinic since your last visit? Hospitalized since your last visit? No    2. Have you seen or consulted any other health care providers outside of the [address] [place] [address] since your last visit? Include any pap smears or colon screening. No    Health Maintenance Due   Topic Date Due    Foot Exam Q1  Never done    MICROALBUMIN Q1  Never done    Eye Exam Retinal or Dilated  Never done    COVID-19 Vaccine (1) Never done    DTaP/Tdap/Td series (1 - Tdap) Never done    AAA Screening 73-69 YO Male Smoking Patients  Never done    Pneumococcal 65+ years (1 of 1 - PPSV23) Never done    Medicare Yearly Exam  Never done       Abuse Screening Questionnaire 6/17/2021   Do you ever feel afraid of your partner? N   Are you in a relationship with someone who physically or mentally threatens you? N   Is it safe for you to go home? Y       3 most recent PHQ Screens 6/17/2021   Little interest or pleasure in doing things Not at all   Feeling down, depressed, irritable, or hopeless Not at all   Total Score PHQ 2 0     Fall Risk Assessment, last 12 mths 6/17/2021   Able to walk? Yes   Fall in past 12 months? 0   Do you feel unsteady?  0   Are you worried about falling 0       Learning Assessment 8/14/2019   PRIMARY LEARNER Patient   PRIMARY LANGUAGE ENGLISH   LEARNER PREFERENCE PRIMARY DEMONSTRATION   ANSWERED BY self   RELATIONSHIP SELF

## 2021-06-17 NOTE — TELEPHONE ENCOUNTER
Patient was at 200 pounds previously and has gone down to 185 this morning since he has been on lactulose. Wants to know if this is normal and ok.

## 2021-06-17 NOTE — TELEPHONE ENCOUNTER
[email or web address] Return patient call. Patient complain about weight loss after having Lap Cholecystectomy 13 days ago. Patient states \"surgeon explain before surgery he will loss about 10 lbs but patient complains it 15 lbs. \" explain to patient this happens after surgery. Patient had a VV with the surgeon office on yesterday and PCP appt today. Advice patient to express his concerns with the PCP this afternoon about his feet are painful to walk and dizziness since 2015 that nobody can seem to find the source per patient. Patient will be seen back in our office 6/29/21. (KF)

## 2021-06-17 NOTE — PROGRESS NOTES
Called and spoke to patient. Goals      Understands red flags post discharge. 6/7/21 [facility] 6/3-6/4  Lap cholecystectomy   Reviewed discharge instructions with wife   Reviewed meds- not taking the Oxycodone. Doesn't need it. Discomfort not bad.  Reviewed red flags: fever,nausea,vomiting,diarrhea,abd pain,sob, chest pain.  KELIN with pcp 6/7-saw .  Sees surgeon, Dr.Nathan Bhatia(NP Iona Cuellar) on 6/16= wife sent message concerned about drainage from where drainage tube was removed. Said surgeon said should slow down. Continue to monitor. Wife also very concerned about 's anxiety level- requesting med -advised will check with pcp.  Declined info on [facility].  Given CTN contact info if any questions, concerns.  CTN to send message to pcp re swelling in both legs/ankles/feet. Patient has been elevating and eats low salt diet.  CTN to check back in a few days for update. mbt  6/9/21  Called and spoke to patient and wife.  apologized for being so upset. Says legs are a bit better. Continues to elevate. Advised to avoid salt. Did not hear back from pcp. CTN to send another message to pcp for rec.mbt  6/17/21  Wife reports he is doing well. No pain from surgery. Feet are not swollen.  gave him a fluid pill. Lost 15 lb. Current weight is 185 lb this am.  Has VV with pcp this afternoon to review lab results. Blood sugar good and pulse ox good. Still has some dizziness, has had since his stroke in 2013. /60 this am.  Reviewed fall precautions. Call if any concerns. CTN to check back in about a week. mbt

## 2021-06-18 ENCOUNTER — APPOINTMENT (OUTPATIENT)
Dept: INTERNAL MEDICINE CLINIC | Age: 76
End: 2021-06-18

## 2021-06-18 DIAGNOSIS — M79.604 PAIN IN BOTH LOWER EXTREMITIES: ICD-10-CM

## 2021-06-18 DIAGNOSIS — R79.9 ABNORMAL FINDING OF BLOOD CHEMISTRY, UNSPECIFIED: ICD-10-CM

## 2021-06-18 DIAGNOSIS — K74.69 OTHER CIRRHOSIS OF LIVER (HCC): ICD-10-CM

## 2021-06-18 DIAGNOSIS — R68.89 OTHER GENERAL SYMPTOMS AND SIGNS: ICD-10-CM

## 2021-06-18 DIAGNOSIS — M79.605 PAIN IN BOTH LOWER EXTREMITIES: ICD-10-CM

## 2021-06-18 DIAGNOSIS — D69.6 THROMBOCYTOPENIA, UNSPECIFIED (HCC): ICD-10-CM

## 2021-06-18 DIAGNOSIS — M89.9 DISORDER OF BONE, UNSPECIFIED: ICD-10-CM

## 2021-06-19 LAB
25(OH)D3+25(OH)D2 SERPL-MCNC: 39.2 NG/ML (ref 30–100)
CK SERPL-CCNC: 97 U/L (ref 41–331)
EST. AVERAGE GLUCOSE BLD GHB EST-MCNC: 123 MG/DL
FOLATE SERPL-MCNC: 11.3 NG/ML
HBA1C MFR BLD: 5.9 % (ref 4.8–5.6)
T4 FREE SERPL-MCNC: 1.18 NG/DL (ref 0.82–1.77)
TSH SERPL DL<=0.005 MIU/L-ACNC: 1.34 UIU/ML (ref 0.45–4.5)
VIT B12 SERPL-MCNC: 304 PG/ML (ref 232–1245)

## 2021-06-25 ENCOUNTER — PATIENT OUTREACH (OUTPATIENT)
Dept: CASE MANAGEMENT | Age: 76
End: 2021-06-25

## 2021-06-25 PROBLEM — Z90.49 HISTORY OF CHOLECYSTECTOMY: Status: ACTIVE | Noted: 2021-06-25

## 2021-06-25 PROBLEM — K85.90 PANCREATITIS: Status: RESOLVED | Noted: 2021-05-27 | Resolved: 2021-06-25

## 2021-06-25 NOTE — PROGRESS NOTES
Called and spoke to patient and wife. Goals      Understands red flags post discharge. 6/7/21 [facility] 6/3-6/4  Lap cholecystectomy   Reviewed discharge instructions with wife   Reviewed meds- not taking the Oxycodone. Doesn't need it. Discomfort not bad.  Reviewed red flags: fever,nausea,vomiting,diarrhea,abd pain,sob, chest pain.  KELIN with pcp 6/7-saw .  Sees surgeon, Dr.Nathan Bhatia(NP Samantha Gutierrez) on 6/16= wife sent message concerned about drainage from where drainage tube was removed. Said surgeon said should slow down. Continue to monitor. Wife also very concerned about 's anxiety level- requesting med -advised will check with pcp.  Declined info on [facility].  Given CTN contact info if any questions, concerns.  CTN to send message to pcp re swelling in both legs/ankles/feet. Patient has been elevating and eats low salt diet.  CTN to check back in a few days for update. mbt  6/9/21  Called and spoke to patient and wife.  apologized for being so upset. Says legs are a bit better. Continues to elevate. Advised to avoid salt. Did not hear back from pcp. CTN to send another message to pcp for rec.mbt  6/17/21  Wife reports he is doing well. No pain from surgery. Feet are not swollen.  gave him a fluid pill. Lost 15 lb. Current weight is 185 lb this am.  Has VV with pcp this afternoon to review lab results. Blood sugar good and pulse ox good. Still has some dizziness, has had since his stroke in 2013. /60 this am.  Reviewed fall precautions. Call if any concerns. CTN to check back in about a week. mbt  6/25/21  Reports he is doing well except very dizzy, especially if bends over. Worse than when he had his stroke. Says bp is great, blood sugar good and O2 level good. Had VV with pcp, told him to see ENT and neuro. Has not yet made appt. Spoke with wife, Tri Stockton. She requests name of ENT from [address] [place] [address].   Advised to send MyChart message to pcp for suggestion and CTN will also request recommendation. Should go ahead and schedule appt with neuro. Advised CTN will check back in about a week. mbt

## 2021-06-29 ENCOUNTER — OFFICE VISIT (OUTPATIENT)
Dept: HEMATOLOGY | Age: 76
End: 2021-06-29
Payer: MEDICARE

## 2021-06-29 VITALS
OXYGEN SATURATION: 98 % | DIASTOLIC BLOOD PRESSURE: 44 MMHG | RESPIRATION RATE: 24 BRPM | TEMPERATURE: 96.2 F | HEART RATE: 78 BPM | BODY MASS INDEX: 30.39 KG/M2 | WEIGHT: 182.4 LBS | SYSTOLIC BLOOD PRESSURE: 91 MMHG | HEIGHT: 65 IN

## 2021-06-29 DIAGNOSIS — K74.60 CIRRHOSIS OF LIVER WITHOUT ASCITES, UNSPECIFIED HEPATIC CIRRHOSIS TYPE (HCC): Primary | ICD-10-CM

## 2021-06-29 PROCEDURE — G8427 DOCREV CUR MEDS BY ELIG CLIN: HCPCS | Performed by: INTERNAL MEDICINE

## 2021-06-29 PROCEDURE — 99214 OFFICE O/P EST MOD 30 MIN: CPT | Performed by: INTERNAL MEDICINE

## 2021-06-29 PROCEDURE — 1111F DSCHRG MED/CURRENT MED MERGE: CPT | Performed by: INTERNAL MEDICINE

## 2021-06-29 PROCEDURE — 1101F PT FALLS ASSESS-DOCD LE1/YR: CPT | Performed by: INTERNAL MEDICINE

## 2021-06-29 PROCEDURE — 3017F COLORECTAL CA SCREEN DOC REV: CPT | Performed by: INTERNAL MEDICINE

## 2021-06-29 PROCEDURE — G8536 NO DOC ELDER MAL SCRN: HCPCS | Performed by: INTERNAL MEDICINE

## 2021-06-29 PROCEDURE — G8417 CALC BMI ABV UP PARAM F/U: HCPCS | Performed by: INTERNAL MEDICINE

## 2021-06-29 PROCEDURE — G8432 DEP SCR NOT DOC, RNG: HCPCS | Performed by: INTERNAL MEDICINE

## 2021-06-29 PROCEDURE — G8752 SYS BP LESS 140: HCPCS | Performed by: INTERNAL MEDICINE

## 2021-06-29 PROCEDURE — G8754 DIAS BP LESS 90: HCPCS | Performed by: INTERNAL MEDICINE

## 2021-06-29 NOTE — PROGRESS NOTES
Identified pt with two pt identifiers(name and ). Reviewed record in preparation for visit and have obtained necessary documentation. Chief Complaint   Patient presents with    Cirrhosis Of Liver      Vitals:    21 1434   BP: (!) 91/44   Pulse: 78   Resp: 24   Temp: (!) 96.2 °F (35.7 °C)   TempSrc: Temporal   SpO2: 98%   Weight: 182 lb 6.4 oz (82.7 kg)   Height: 5' 5\" (1.651 m)   PainSc:   0 - No pain       Health Maintenance Review: Patient reminded of \"due or due soon\" health maintenance. I have asked the patient to contact his/her primary care provider (PCP) for follow-up on his/her health maintenance. Coordination of Care Questionnaire:  :   1) Have you been to an emergency room, urgent care, or hospitalized since your last visit? If yes, where when, and reason for visit? no       2. Have seen or consulted any other health care provider since your last visit? If yes, where when, and reason for visit? NO      Patient is accompanied by self I have received verbal consent from Mitch Gamez to discuss any/all medical information while they are present in the room.

## 2021-06-29 NOTE — PROGRESS NOTES
Jon Rear [address]      Alexey Boles MD, Taylor Mendoza, Juan A Crooks MD, MPH      Barry Sorto, PA-MARILIN Rivera, [facility]     Clementina Angulo, [facility]   Pema Higginbotham, FNP-C    Adeola Castaneda, [facility]       Bernard Morris [facility] [address]    at [address], [address] [place] [address], [facility] 22.    [phone]    FAX: [address] [place] [address]    at [address], [address]    [phone]    FAX: [phone]       Patient Care Team:  Cordell Rocha MD as PCP - General (Infectious Disease)  Cordell Rocha MD as PCP - [facility] Provider  Konstantin Lentz RN as Care Transitions Nurse  Kassandra Molina RN as Care Transitions Nurse (Family Medicine)      Problem List  Date Reviewed: 6/25/2021        Codes Class Noted    History of cholecystectomy ICD-10-CM: Z90.49  ICD-9-CM: V45.79  6/25/2021        Gallstone pancreatitis ICD-10-CM: K85.10  ICD-9-CM: 577.0, 574.20  6/3/2021        Cirrhosis ([facility] [[email or web address] 75.) ICD-10-CM: K74.60  ICD-9-CM: 571.5  3/12/2021        History of CVA (cerebrovascular accident) ICD-10-CM: Z86.73  ICD-9-CM: V12.54  2/3/2021        Thrombocytopenia ([facility] ICD-10-CM: D69.6  ICD-9-CM: 287.5  12/3/2020        Class 1 obesity due to excess calories with serious comorbidity and body mass index (BMI) of 32.0 to 32.9 in adult ICD-10-CM: E66.09, Z68.32  ICD-9-CM: 278.00, V85.32  12/3/2020        Diabetes mellitus ([facility] ICD-10-CM: E11.9  ICD-9-CM: 250.00  12/3/2020              Ana Maria Higgins is being seen at [address] for 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   The active problem list, all pertinent past medical history, medications, liver histology, endoscopic studies, and laboratory findings related to the liver disorder were reviewed and discussed with the patient. The patient is a 70year old male  who was found to have thrombocytopenia in 12/2020    A liver biopsy in 5/2021 demonstrated ALFARO with cirrhosis     Following the biopsy he developed severe abdominal pain and was found to have acute gallstone pancreatitis. She resoled with conservative treatment and he underwent cholecystectomy in 5/2021. During the hospitalization for acute pancreatitis he developed edema and was started on diuretics. The patient has the following symptoms which could be attributed to the liver disorder:    His BP is low. He hsa weakness and dizziness when he stands from a seated position. There is no longer any edema. He is probably dehydrated and will stop diuretics. The patient is not experiencing the following symptoms which are commonly seen in this liver disorder:   nausea,   vomiting,   Abdominal pain. The patient has mild limitations in functional activities which can be attributed to recovery from the pancreatitis and gallbladder surgery. ASSESSMENT AND PLAN:  Cirrhosis  Cirrhosis is probably secondary to alcohol and NAFLD  The diagnosis of cirrhosis is based upon liver histology, imaging, laboratory studies    The patient has normal liver function. The patient has never developed any complications of cirrhosis to date. The CTP is 5. Child class A. The MELD score is 9. ALFARO  The diagnosis is based upon liver biopsy, Fiboscan CAP score, features of metabolic syndrome, serologic studies that are negative for other causes of chronic liver disease,     A liver biopsy performed in 5/2021 demonstrates ALFARO. 25-33% macrovesicualr and micovesicular steatosis, mild inflammation, mild ballooning, and cirrhosis. Fibroscan in 3/2021 demonstrated  51 kPa and  suggesting fatty liver and cirrhosis.     Liver transaminases are normal.  ALP is normal.  Liver function is normal.  The platelet count is normal.      Will perform laboratory testing to monitor liver function and degree of liver injury. This included BMP, hepatic panel, CBC with platelet count, INR. If the patient looses 20% of current body weight, which is 36 pounds, down to a weight of of 150 pounds, all steatosis will have resolved. Once all steatosis has resolved all inflammation will resolve. Then all fibrosis will gradually resolve and the liver could eventually be normal.    There is currently no FDA approved medical treatment for fatty liver, NALFD or ALFARO. The only medical treatments for ALFARO are though clinical trials. The patient would be eligible or enrollment into the [facility] clinical trial for treatment of ALFARO and cirrhosis in patients with thrombocytopenia but no varices. .      Will discuss this with him at next appointment after he fully recovers from the epiosde of pancreatitis and cholecystectomy. Counseling for diet and weight loss in patients with confirmed or suspected NAFLD  The patient was counseled regarding diet and exercise to achieve weight loss. The best diet for patients with fatty liver is one very low in carbohydrates and enriched with protein such as an Jaqueline's program.      The patient was told not to consume any food products and drinks containing fructose as this enhances hepatic fat synthesis. There is no medication or vitamin supplements that we advocate for ALFARO. Using glitazones in patients without diabetes mellitus has been shown to reduce fat content in the liver but has no effect on fibrosis and is associated with weight gain. Vitamin E has also been used but the data is not very good and most experts no longer advocate this. Screening for Esophageal varices   The patient does not have esophageal or gastric varices. The last EGD to assess for varices was performed in 5/2021. Hepatic encephalopathy   Overt HE has not developed to date.     There is no need for treatment with lactulose and/or Xifaxan at this time. Anemia   This is due to multifactorial causes including recent prolonged hospitalization and surgery. Will obtain FE panel to assess for iron stores. Thrombocytopenia   This is secondary to cirrhosis. There is no evidence of overt bleeding. No treatment is required. The platelet count is adequate for the patient to undergo procedures without the need for platelet transfusion or platelet growth factors. Screening for Hepatocellular Carcinoma  HCC screening has recently been performed and does not suggest [facility].. The next liver imaging study will be performed in 11/2021. Treatment of other medical problems in patients with chronic liver disease  There are no contraindications for the patient to take most medications that are necessary for treatment of other medical issues. The patient has cirrhrosis and should avoid taking NSAIDs which are associated with a higher rate of developing ROBBIE. The patient can take Any medications utilized for treatment of DM, Statins to treat hypercholesterolemia    Counseling for alcohol in patients with chronic liver disease  The patient has cirrhosis and was advised to be abstinent from all alcohol including non-alcoholic beer which does contain some alcohol. The patient has previously consumed alcohol in excess. The patient has not consumed alcohol since 2013. Osteoporosis  The risk of osteoporosis is increased in patients with cirrhosis. DEXA bone density to assess for osteoporosis has not been performed. This should be ordered by the patients primary care physician. Vaccinations   Vaccination for viral hepatitis A is not needed. The patient has serologic evidence of prior exposure or vaccination with immunity. Routine vaccinations against other bacterial and viral agents can be performed as indicated.   Annual flu vaccination should be administered if indicated. ALLERGIES  No Known Allergies    MEDICATIONS  Current Outpatient Medications   Medication Sig    cholestyramine (QUESTRAN) 4 gram packet Take 1 Packet by mouth three (3) times daily (with meals) for 14 days.  furosemide (Lasix) 40 mg tablet Take 1 Tablet by mouth daily.  spironolactone (Aldactone) 100 mg tablet Take 1 Tablet by mouth daily.  ibuprofen (MOTRIN) 800 mg tablet Take 1 Tablet by mouth every six (6) hours as needed for Pain.  potassium chloride SR (K-TAB) 20 mEq tablet Take 1 Tablet by mouth daily.  simvastatin (ZOCOR) 20 mg tablet Take 20 mg by mouth nightly.  latanoprost (XALATAN) 0.005 % ophthalmic solution Administer 1 Drop to left eye nightly.  pantoprazole (Protonix) 20 mg tablet Take 2 Tabs by mouth daily.  enalapril (VASOTEC) 10 mg tablet Take 10 mg by mouth daily.  aspirin delayed-release 81 mg tablet Take 1 Tab by mouth daily.  metFORMIN (GLUCOPHAGE) 500 mg tablet Take 1,000 mg by mouth two (2) times daily (with meals). No current facility-administered medications for this visit. SYSTEM REVIEW NOT RELATED TO LIVER DISEASE OR REVIEWED ABOVE:  Constitution systems: Negative for fever, chills, weight gain, weight loss. Eyes: Negative for visual changes. ENT: Negative for sore throat, painful swallowing. Respiratory: Negative for cough, hemoptysis, SOB. Cardiology: Negative for chest pain, palpitations. GI:  Negative for constipation or diarrhea. : Negative for urinary frequency, dysuria, hematuria, nocturia. Skin: Negative for rash. Hematology: Negative for easy bruising, blood clots. Musculo-skelatal: Negative for back pain, muscle pain, weakness. Neurologic: Negative for headaches, dizziness, vertigo, memory problems not related to HE. Psychology: Negative for anxiety, depression. FAMILY HISTORY:  There is no family history of liver disease. There is no family history of immune disorders.     SOCIAL HISTORY:  The patient is   The patient has no children. The patient stopped using tobacco products in 2009  The patient has previously consumed alcohol in excess. The patient is a retired barnes    PHYSICAL EXAMINATION:  Visit Vitals  BP (!) 91/44 (BP 1 Location: Right upper arm, BP Patient Position: Sitting, BP Cuff Size: Adult)   Pulse 78   Temp (!) 96.2 °F (35.7 °C) (Temporal)   Resp 24   Ht 5' 5\" (1.651 m)   Wt 182 lb 6.4 oz (82.7 kg)   SpO2 98%   BMI 30.35 kg/m²     General: No acute distress. Eyes: Sclera anicteric. ENT: No oral lesions. Thyroid normal.  Nodes: No adenopathy. Skin: No spider angiomata. No jaundice. No palmar erythema. Respiratory: Lungs clear to auscultation. Cardiovascular: Regular heart rate. No murmurs. No JVD. Abdomen: Soft non-tender. Liver size normal to percussion/palpation. Spleen not palpable. No obvious ascites. Extremities: No edema. No muscle wasting. No gross arthritic changes. Neurologic: Alert and oriented. Cranial nerves grossly intact. No asterixis.     LABORATORY STUDIES:  Liver [place] of [address] Units 6/11/2021 6/4/2021   WBC 3.4 - 10.8 x10E3/uL 4.4 5.8   ANC 1.4 - 7.0 x10E3/uL 2.5    HGB 13.0 - 17.7 g/dL 10.4 (L) 10.4 (L)   PLT x10E3/uL CANCELED 125 (L)    - 450 x10E3/uL     INR 0.9 - 1.2 1.0    AST 0 - 40 IU/L 23 63 (H)   ALT 0 - 44 IU/L 28 62   Alk Phos 48 - 121 IU/L 117 114   Bili, Total 0.0 - 1.2 mg/dL 0.4 0.6   Bili, Direct 0.00 - 0.40 mg/dL 0.18    Albumin 3.7 - 4.7 g/dL 3.4 (L) 2.3 (L)   BUN 8 - 27 mg/dL 10 16   Creat 0.76 - 1.27 mg/dL 1.13 1.08   Na 134 - 144 mmol/L 145 (H) 140   K 3.5 - 5.2 mmol/L 5.1 4.0   Cl 96 - 106 mmol/L 113 (H) 110 (H)   CO2 20 - 29 mmol/L 21 23   Glucose 65 - 99 mg/dL 79 194 (H)       SEROLOGIES:  Serologies Latest Ref Rng & Units 2/3/2021   Hep A Ab, Total Negative   Positive (A)   Hep B Surface Ag Index <0.10   Hep B Surface Ag Interp Negative   Negative   Hep B Core Ab, Total Negative Negative   Hep B Surface Ab mIU/mL <3.10   Hep B Surface Ab Interp NONREACTIVE   NONREACTIVE   Hep C Ab 0.0 - 0.9 s/co ratio <0.1   Ferritin 26 - 388 NG/ML 27   Iron % Saturation 20 - 50 % 26     LIVER HISTOLOGY:  3/2021: [facility]. EkPa was 50.8. IQR/med 20%. . The results suggested a fibrosis level of F 4. The CAP score suggests there is hepatic steatosis. 5/2021. Slides reviewed by MLS. Fatty liver. AFL vs NAFLD. 25-33% macrovesicualr and micovesicular steatosis, mild inflammation, mild ballooning, Stage 4 fibrosis. DARIEL (111). ENDOSCOPIC PROCEDURES:  5/2021. EGD performed by MLS. No esophageal varices. No gastric varices. RADIOLOGY:  12/2020: Liver US:  demonstrated heterogeneous hepatic echotexture with an area of focal sparing in the right lobe. Mildly enlarged spleen measuring 13.1 cm in length.    5/2021. Dynamic MRI/MRCP liver. Changes consistent with cirrhosis. No liver mass lesions. No dilated bile ducts. No bile duct strictures. Pancreatitis. Mild ascites. OTHER TESTING:  Not available or performed    FOLLOW-UP:  All of the issues listed above in the Assessment and Plan were discussed with the patient. All questions were answered. The patient expressed a clear understanding of the above. [facility] [place] [address] in 4 weeks for monitoring.       Claire Singh MD  [facility] of [address] [facility] 22.  [phone]  [address] [place] [address]

## 2021-06-29 NOTE — Clinical Note
7/10/2021    Patient: Candido Rowell   YOB: 1945   Date of Visit: 6/29/2021     Rosetta Bright MD  [address] [place] [id number]  Via In Basket    Dear Rosetta Bright MD,      Thank you for referring Mr. Lanny Christianson to [address] for evaluation. My notes for this consultation are attached. If you have questions, please do not hesitate to call me. I look forward to following your patient along with you.       Sincerely,    Margarita Del Valle MD

## 2021-06-30 ENCOUNTER — OFFICE VISIT (OUTPATIENT)
Dept: SURGERY | Age: 76
End: 2021-06-30
Payer: MEDICARE

## 2021-06-30 VITALS
TEMPERATURE: 98.5 F | SYSTOLIC BLOOD PRESSURE: 97 MMHG | OXYGEN SATURATION: 96 % | RESPIRATION RATE: 17 BRPM | HEIGHT: 65 IN | BODY MASS INDEX: 30.32 KG/M2 | DIASTOLIC BLOOD PRESSURE: 56 MMHG | WEIGHT: 182 LBS | HEART RATE: 81 BPM

## 2021-06-30 DIAGNOSIS — Z90.49 S/P LAPAROSCOPIC CHOLECYSTECTOMY: ICD-10-CM

## 2021-06-30 DIAGNOSIS — Z09 POSTOPERATIVE EXAMINATION: Primary | ICD-10-CM

## 2021-06-30 DIAGNOSIS — Z90.49 HISTORY OF CHOLECYSTECTOMY: ICD-10-CM

## 2021-06-30 PROCEDURE — 99024 POSTOP FOLLOW-UP VISIT: CPT | Performed by: NURSE PRACTITIONER

## 2021-06-30 NOTE — PATIENT INSTRUCTIONS
Gallbladder Removal Surgery: What to Expect at Home  Your Recovery  After your surgery, you will likely feel weak and tired for several days after you return home. Your belly may be swollen. If you had laparoscopic surgery, you may also have pain in your shoulder for about 24 hours. You may have gas or need to burp a lot at first. A few people get diarrhea. The diarrhea usually goes away in 2 to 4 weeks, but it may last longer. How quickly you recover depends on whether you had a laparoscopic or open surgery. · For a laparoscopic surgery, most people can go back to work or their normal routine in 1 to 2 weeks. But it may take longer, depending on the type of work you do. · For an open surgery, it will probably take 4 to 6 weeks before you get back to your normal routine. This care sheet gives you a general idea about how long it will take for you to recover. However, each person recovers at a different pace. Follow the steps below to get better as quickly as possible. How can you care for yourself at home? Activity    · Rest when you feel tired. Getting enough sleep will help you recover.     · Try to walk each day. Start out by walking a little more than you did the day before. Gradually increase the amount you walk. Walking boosts blood flow and helps prevent pneumonia and constipation.     · For about 2 to 4 weeks, avoid lifting anything that would make you strain. This may include a child, heavy grocery bags and milk containers, a heavy briefcase or backpack, cat litter or dog food bags, or a vacuum .     · Avoid strenuous activities, such as biking, jogging, weightlifting, and aerobic exercise, until your doctor says it is okay.     · You may shower 24 to 48 hours after surgery, if your doctor okays it. Pat the cut (incision) dry.  Do not take a bath for the first 2 weeks, or until your doctor tells you it is okay.     · You may drive when you are no longer taking pain medicine and can quickly move your foot from the gas pedal to the brake. You must also be able to sit comfortably for a long period of time, even if you do not plan to go far. You might get caught in traffic.     · For a laparoscopic surgery, most people can go back to work or their normal routine in 1 to 2 weeks, but it may take longer. For an open surgery, it will probably take 4 to 6 weeks before you get back to your normal routine.     · Your doctor will tell you when you can have sex again. Diet    · Eat smaller meals more often instead of fewer larger meals. You can eat a normal diet, but avoid eating fatty foods for about 1 month. Fatty foods include hamburger, whole milk, cheese, and many snack foods. If your stomach is upset, try bland, low-fat foods like plain rice, broiled chicken, toast, and yogurt.     · Drink plenty of fluids (unless your doctor tells you not to).   · If you have diarrhea, try avoiding spicy foods, dairy products, fatty foods, and alcohol. You can also watch to see if specific foods cause it, and stop eating them. If the diarrhea continues for more than 2 weeks, talk to your doctor.     · You may notice that your bowel movements are not regular right after your surgery. This is common. Try to avoid constipation and straining with bowel movements. You may want to take a fiber supplement every day. If you have not had a bowel movement after a couple of days, ask your doctor about taking a mild laxative. Medicines    · Your doctor will tell you if and when you can restart your medicines. He or she will also give you instructions about taking any new medicines.     · If you take aspirin or some other blood thinner, ask your doctor if and when to start taking it again. Make sure that you understand exactly what your doctor wants you to do.     · Take pain medicines exactly as directed. ? If the doctor gave you a prescription medicine for pain, take it as prescribed.   ? If you are not taking a prescription pain medicine, take an over-the-counter medicine such as acetaminophen (Tylenol), ibuprofen (Advil, Motrin), or naproxen (Aleve). Read and follow all instructions on the label. ? Do not take two or more pain medicines at the same time unless the doctor told you to. Many pain medicines contain acetaminophen, which is Tylenol. Too much Tylenol can be harmful.     · If you think your pain medicine is making you sick to your stomach:  ? Take your medicine after meals (unless your doctor tells you not to). ? Ask your doctor for a different pain medicine.     · If your doctor prescribed antibiotics, take them as directed. Do not stop taking them just because you feel better. You need to take the full course of antibiotics. Incision care    · If you have strips of tape on the incision, or cut, leave the tape on for a week or until it falls off.     · After 24 to 48 hours, wash the area daily with warm, soapy water, and pat it dry.     · You may have staples to hold the cut together. Keep them dry until your doctor takes them out. This is usually in 7 to 10 days.     · Keep the area clean and dry. You may cover it with a gauze bandage if it weeps or rubs against clothing. Change the bandage every day. Ice    · To reduce swelling and pain, put ice or a cold pack on your belly for 10 to 20 minutes at a time. Do this every 1 to 2 hours. Put a thin cloth between the ice and your skin. Follow-up care is a key part of your treatment and safety. Be sure to make and go to all appointments, and call your doctor if you are having problems. It's also a good idea to know your test results and keep a list of the medicines you take. When should you call for help? Call 911 anytime you think you may need emergency care. For example, call if:    · You passed out (lost consciousness).     · You are short of breath. .   Call your doctor now or seek immediate medical care if:    · You are sick to your stomach and cannot drink fluids.   · You have pain that does not get better when you take your pain medicine.     · You cannot pass stools or gas.     · You have signs of infection, such as:  ? Increased pain, swelling, warmth, or redness. ? Red streaks leading from the incision. ? Pus draining from the incision. ? A fever.     · Bright red blood has soaked through the bandage over your incision.     · You have loose stitches, or your incision comes open.     · You have signs of a blood clot in your leg (called a deep vein thrombosis), such as:  ? Pain in your calf, back of knee, thigh, or groin. ? Redness and swelling in your leg or groin. Watch closely for any changes in your health, and be sure to contact your doctor if you have any problems. Where can you learn more? Go to http://www.gray.com/  Enter F357 in the search box to learn more about \"Gallbladder Removal Surgery: What to Expect at Home. \"  Current as of: April 15, 2020               Content Version: 12.8  © 2006-2021 Healthwise, [facility]. Care instructions adapted under license by [employer] (which disclaims liability or warranty for this information). If you have questions about a medical condition or this instruction, always ask your healthcare professional. [place] [address] any warranty or liability for your use of this information.

## 2021-06-30 NOTE — LETTER
6/30/2021    Patient: Veronica Mejia   YOB: 1945   Date of Visit: 6/30/2021     Leann Lemons MD  [address] [place], [place] [id number]  Via In Basket    Dear Leann Lemons MD,      Thank you for referring Mr. Kaleigh Rubio to [facility] for evaluation. My notes for this consultation are attached. If you have questions, please do not hesitate to call me. I look forward to following your patient along with you.       Sincerely,    Feliciano Davis NP

## 2021-07-07 ENCOUNTER — PATIENT OUTREACH (OUTPATIENT)
Dept: CASE MANAGEMENT | Age: 76
End: 2021-07-07

## 2021-07-07 NOTE — PROGRESS NOTES
Patient resolved from Transition of Care episode on 7/7/21. ACM/CTN was unsuccessful at contacting this patient today. Patient/family was provided the following resources and education related to COVID-19 during the initial call:                         Signs, symptoms and red flags related to COVID-19            CDC exposure and quarantine guidelines            [facility] exposure contact - [phone]            Contact for their local Department of Health                 Patient has not had any additional ED or hospital visits. No further outreach scheduled with this CTN/ACM. Episode of Care resolved. Patient has this CTN/ACM contact information if future needs arise.

## 2021-07-19 NOTE — TELEPHONE ENCOUNTER
Last visit 06/17/2021 Virtual visit MD Saldivar Call   Next appointment 08/09/2021 MD [facility] Call   Previous refill encounter(s)   12/18/20202 Protonix #180 with 1 refill      Requested Prescriptions     Pending Prescriptions Disp Refills    pantoprazole (PROTONIX) 20 mg tablet [Pharmacy Med Name: PANTOPRAZOLE SOD DR 20 MG TAB] 180 Tablet 0     Sig: Take 2 Tablets by mouth daily.

## 2021-07-20 RX ORDER — PANTOPRAZOLE SODIUM 20 MG/1
40 TABLET, DELAYED RELEASE ORAL DAILY
Qty: 180 TABLET | Refills: 1 | Status: SHIPPED | OUTPATIENT
Start: 2021-07-20 | End: 2021-12-14

## 2021-07-20 NOTE — TELEPHONE ENCOUNTER
Pt came to clinic--clarified and he prefers refill as 2 x 20mg pantoprazole daily--not 40mg once daily. Requested Prescriptions     Signed Prescriptions Disp Refills    pantoprazole (PROTONIX) 20 mg tablet 180 Tablet 1     Sig: Take 2 Tablets by mouth daily.      Authorizing Provider: Jhoana Herzog

## 2021-07-20 NOTE — TELEPHONE ENCOUNTER
Please clarify with pt--if he takes 2 x 20mg pantoprazole tabs every day, can refill as single 40mg tab if preferred.

## 2021-08-04 ENCOUNTER — OFFICE VISIT (OUTPATIENT)
Dept: HEMATOLOGY | Age: 76
End: 2021-08-04
Payer: MEDICARE

## 2021-08-04 VITALS
HEART RATE: 62 BPM | HEIGHT: 65 IN | SYSTOLIC BLOOD PRESSURE: 113 MMHG | RESPIRATION RATE: 22 BRPM | BODY MASS INDEX: 31.36 KG/M2 | TEMPERATURE: 97.9 F | OXYGEN SATURATION: 98 % | DIASTOLIC BLOOD PRESSURE: 60 MMHG | WEIGHT: 188.2 LBS

## 2021-08-04 DIAGNOSIS — K74.60 CIRRHOSIS OF LIVER WITHOUT ASCITES, UNSPECIFIED HEPATIC CIRRHOSIS TYPE (HCC): ICD-10-CM

## 2021-08-04 DIAGNOSIS — R42 VERTIGO: ICD-10-CM

## 2021-08-04 DIAGNOSIS — Z86.73 HISTORY OF CVA (CEREBROVASCULAR ACCIDENT): Primary | ICD-10-CM

## 2021-08-04 PROCEDURE — 1101F PT FALLS ASSESS-DOCD LE1/YR: CPT | Performed by: NURSE PRACTITIONER

## 2021-08-04 PROCEDURE — 3017F COLORECTAL CA SCREEN DOC REV: CPT | Performed by: NURSE PRACTITIONER

## 2021-08-04 PROCEDURE — 99214 OFFICE O/P EST MOD 30 MIN: CPT | Performed by: NURSE PRACTITIONER

## 2021-08-04 PROCEDURE — G8536 NO DOC ELDER MAL SCRN: HCPCS | Performed by: NURSE PRACTITIONER

## 2021-08-04 PROCEDURE — G8752 SYS BP LESS 140: HCPCS | Performed by: NURSE PRACTITIONER

## 2021-08-04 PROCEDURE — G8754 DIAS BP LESS 90: HCPCS | Performed by: NURSE PRACTITIONER

## 2021-08-04 PROCEDURE — G8432 DEP SCR NOT DOC, RNG: HCPCS | Performed by: NURSE PRACTITIONER

## 2021-08-04 PROCEDURE — G8417 CALC BMI ABV UP PARAM F/U: HCPCS | Performed by: NURSE PRACTITIONER

## 2021-08-04 PROCEDURE — G8427 DOCREV CUR MEDS BY ELIG CLIN: HCPCS | Performed by: NURSE PRACTITIONER

## 2021-08-04 RX ORDER — LANOLIN ALCOHOL/MO/W.PET/CERES
1000 CREAM (GRAM) TOPICAL DAILY
COMMUNITY
End: 2022-06-08

## 2021-08-04 NOTE — PROGRESS NOTES
Florentin Dwyer [address]      Sasha York MD, Andrew Majano, Linsey Guerrero MD, MPH      Caryle Bos, PA-MARILIN Sánchez, [facility]     April S Adele, [facility]   Gely Briceño, FN-C    Lida Mark, [facility]       Bernard Morris Jasvir De Jones 136    at [address], [address] Scarlett Kapoor  22.    [phone]    FAX: [address] [place] [address], [address], [address]    [phone]    FAX: [phone]       Patient Care Team:  Riri Yancey MD as PCP - General (Infectious Disease)  Riri Yancey MD as PCP - [facility]  Vesna Taylor MD (General Surgery)      Problem List  Date Reviewed: 6/30/2021        Codes Class Noted    History of cholecystectomy ICD-10-CM: Z90.49  ICD-9-CM: V45.79  6/25/2021        Gallstone pancreatitis ICD-10-CM: K85.10  ICD-9-CM: 577.0, 574.20  6/3/2021        Cirrhosis ([facility] ICD-10-CM: K74.60  ICD-9-CM: 571.5  3/12/2021        History of CVA (cerebrovascular accident) ICD-10-CM: Z86.73  ICD-9-CM: V12.54  2/3/2021        Thrombocytopenia ([facility] ICD-10-CM: D69.6  ICD-9-CM: 287.5  12/3/2020        Class 1 obesity due to excess calories with serious comorbidity and body mass index (BMI) of 32.0 to 32.9 in adult ICD-10-CM: E66.09, Z68.32  ICD-9-CM: 278.00, V85.32  12/3/2020        Diabetes mellitus ([facility] ICD-10-CM: E11.9  ICD-9-CM: 250.00  12/3/2020            Milton Copeland is being seen at [address] for 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 The active problem list, all pertinent past medical history, medications, liver histology, endoscopic studies, and laboratory findings related to the liver disorder were reviewed and discussed with the patient. The patient is a 70year old  male who was found to have thrombocytopenia in 12/2020. A liver biopsy in 5/2021 demonstrated ALFARO with cirrhosis. Following the biopsy he developed severe abdominal pain and was found to have acute gallstone pancreatitis. This resolved with conservative treatment. He underwent a cholecystectomy 5/2021. While hospitalized he developed lower extremity edema and subsequently started on diuretics. This has now resolved and he is no longer on diuretics. The patient has the following symptoms which could be attributed to the liver disorder:  Hypotension, generalized weakness. The patient is not experiencing the following symptoms which are commonly seen in this liver disorder: nausea, vomiting, pain on the right side over the liver or abdominal pain. The patient has mild limitations in functional activities which can be attributed to a past CVA. ASSESSMENT AND PLAN:  Cirrhosis  Cirrhosis is probably secondary to past alcohol use and NAFLD  The diagnosis of cirrhosis is based upon liver histology, imaging, laboratory studies    Have performed laboratory testing to monitor liver function and degree of liver injury. This included BMP, hepatic panel, CBC with platelet count and INR. Laboratory testing from 6/11/2021 reviewed in detail. Follow-up testing ordered today. The liver transaminases, ALP, and liver function are normal. The platelet count is depressed. The patient has never developed any complications of cirrhosis to date. The CTP is 5. Child class A. The MELD score is 9. ALFARO  The diagnosis is based upon liver biopsy, Fiboscan CAP score, features of metabolic syndrome, serologic studies that are negative for other causes of chronic liver disease,     A liver biopsy performed in 5/2021 demonstrates ALFARO. 25-33% macrovesicualr and micovesicular steatosis, mild inflammation, mild ballooning, and cirrhosis.     Fibroscan in 3/2021 demonstrated 51 kPa and  suggesting fatty liver and cirrhosis. If the patient looses 20% of current body weight, which is 36 pounds, down to a weight of of 150 pounds, steatosis should resolve. Once all steatosis has resolved inflammation will resolve. There is currently no FDA approved medical treatment for fatty liver, NALFD or ALFARO. The only medical treatments for ALFARO are though clinical trials. The patient would be eligible or enrollment into the [facility] clinical trial for treatment of ALFARO and cirrhosis in patients with thrombocytopenia but no varices. .      Will discuss this with him at next appointment after he fully recovers from the epiosde of pancreatitis and cholecystectomy. Vertigo  The patient states this is a chronic issues since the CVA. It has impacted his gait and he is fearful of having a fall. The BP was slightly low but he reports higher readings at home. Will refer to Neurology for further evaluation. Consider midodrine if the Neurology workup is normal.     Counseling for diet and weight loss in patients with confirmed or suspected NAFLD  The patient was counseled regarding diet and exercise to achieve weight loss. The best diet for patients with fatty liver is one very low in carbohydrates and enriched with protein such as an Jaqueline's program.      The patient was told not to consume any food products and drinks containing fructose as this enhances hepatic fat synthesis. There is no medication or vitamin supplements that we advocate for ALFARO. Using glitazones in patients without diabetes mellitus has been shown to reduce fat content in the liver but has no effect on fibrosis and is associated with weight gain. Vitamin E has also been used but the data is not very good and most experts no longer advocate this. Screening for Esophageal varices   The patient does not have esophageal or gastric varices. The last EGD to assess for varices was performed in 5/2021. Hepatic encephalopathy   Overt HE has not developed to date. There is no need for treatment with lactulose and/or Xifaxan at this time. Anemia   This is due to multifactorial causes including recent prolonged hospitalization and surgery. The HGB has improved. Previous iron studies were normal. Will check again at the next office visit. Thrombocytopenia   This is secondary to cirrhosis. There is no evidence of overt bleeding. No treatment is required. The platelet count is adequate for the patient to undergo procedures without the need for platelet transfusion or platelet growth factors. Screening for Hepatocellular Carcinoma  HCC screening has recently been performed and does not suggest [facility] The next liver imaging study will be performed in 11/2021. AFP ordered today. Treatment of other medical problems in patients with chronic liver disease  There are no contraindications for the patient to take most medications that are necessary for treatment of other medical issues. The patient has cirrhrosis and should avoid taking NSAIDs which are associated with a higher rate of developing ROBBIE. The patient can take Any medications utilized for treatment of DM, Statins to treat hypercholesterolemia    Counseling for alcohol in patients with chronic liver disease  The patient has cirrhosis and was advised to be abstinent from all alcohol including non-alcoholic beer which does contain some alcohol. The patient has previously consumed alcohol in excess. The patient has not consumed alcohol since 2013. Osteoporosis  The risk of osteoporosis is increased in patients with cirrhosis. DEXA bone density to assess for osteoporosis has not been performed. This should be ordered by the patients primary care physician. Vaccinations   Vaccination for viral hepatitis A is not needed. The patient has serologic evidence of prior exposure or vaccination with immunity.   Routine vaccinations against other bacterial and viral agents can be performed as indicated. Annual flu vaccination should be administered if indicated. ALLERGIES  No Known Allergies    MEDICATIONS  Current Outpatient Medications   Medication Sig    cyanocobalamin 1,000 mcg tablet Take 1,000 mcg by mouth daily.  guaiFENesin-dextromethorphan SR (HUMIBID DM) 600-30 mg per tablet Take 1 Tablet by mouth two (2) times a day.  multivitamin, tx-iron-ca-min (THERA-M w/ IRON) 9 mg iron-400 mcg tab tablet Take 1 Tablet by mouth daily.  pantoprazole (PROTONIX) 20 mg tablet Take 2 Tablets by mouth daily.  ibuprofen (MOTRIN) 800 mg tablet Take 1 Tablet by mouth every six (6) hours as needed for Pain.  simvastatin (ZOCOR) 20 mg tablet Take 20 mg by mouth nightly.  latanoprost (XALATAN) 0.005 % ophthalmic solution Administer 1 Drop to left eye nightly.  aspirin delayed-release 81 mg tablet Take 1 Tab by mouth daily.  metFORMIN (GLUCOPHAGE) 500 mg tablet Take 1,000 mg by mouth two (2) times daily (with meals). No current facility-administered medications for this visit. SYSTEM REVIEW NOT RELATED TO LIVER DISEASE OR REVIEWED ABOVE:  Constitution systems: Negative for fever, chills, weight gain, weight loss. Eyes: Negative for visual changes. ENT: Negative for sore throat, painful swallowing. Respiratory: Negative for cough, hemoptysis, SOB. Cardiology: Negative for chest pain, palpitations. GI:  Negative for constipation or diarrhea. : Negative for urinary frequency, dysuria, hematuria, nocturia. Skin: Negative for rash. Hematology: Negative for easy bruising, blood clots. Musculo-skelatal: Negative for back pain, muscle pain, weakness. Neurologic: Negative for headaches, dizziness, vertigo, memory problems not related to HE. Psychology: Negative for anxiety, depression. FAMILY HISTORY:  There is no family history of liver disease.     There is no family history of immune disorders. SOCIAL HISTORY:  The patient is   The patient has no children. The patient stopped using tobacco products in 2009  The patient has previously consumed alcohol in excess. The patient is a retired barnes    PHYSICAL EXAMINATION:  Visit Vitals  /60   Pulse 62   Temp 97.9 °F (36.6 °C)   Resp 22   Ht 5' 5\" (1.651 m)   Wt 188 lb 3.2 oz (85.4 kg)   SpO2 98%   BMI 31.32 kg/m²       General: No acute distress. Eyes: Sclera anicteric. ENT: No oral lesions. Thyroid normal.  Nodes: No adenopathy. Skin: No spider angiomata. No jaundice. No palmar erythema. Respiratory: Lungs clear to auscultation. Cardiovascular: Regular heart rate. No murmurs. No JVD. Abdomen: Soft non-tender, liver size normal to percussion/palpation. Spleen not palpable. No obvious ascites. Extremities: No edema. No muscle wasting. No gross arthritic changes. Neurologic: Alert and oriented. Cranial nerves grossly intact. No asterixis.     LABORATORY STUDIES:  [facility] of [address] [place] [address] & Units 8/4/2021 6/11/2021   WBC 3.4 - 10.8 x10E3/uL 4.2 4.4   ANC 1.4 - 7.0 x10E3/uL 2.1 2.5   HGB 13.0 - 17.7 g/dL 11.7 (L) 10.4 (L)   PLT x10E3/uL CANCELED CANCELED    - 450 x10E3/uL     INR 0.9 - 1.2 1.0 1.0   AST 0 - 40 IU/L 32 23   ALT 0 - 44 IU/L 29 28   Alk Phos 48 - 121 IU/L 80 117   Bili, Total 0.0 - 1.2 mg/dL 0.4 0.4   Bili, Direct 0.00 - 0.40 mg/dL 0.16 0.18   Albumin 3.7 - 4.7 g/dL 4.3 3.4 (L)   BUN 8 - 27 mg/dL 24 10   Creat 0.76 - 1.27 mg/dL 1.34 (H) 1.13   Na 134 - 144 mmol/L 144 145 (H)   K 3.5 - 5.2 mmol/L 4.6 5.1   Cl 96 - 106 mmol/L 111 (H) 113 (H)   CO2 20 - 29 mmol/L 19 (L) 21   Glucose 65 - 99 mg/dL 98 79     [facility] of [place] Latest Ref Rng & Units 6/4/2021   WBC 3.4 - 10.8 x10E3/uL 5.8   ANC 1.4 - 7.0 x10E3/uL    HGB 13.0 - 17.7 g/dL 10.4 (L)   PLT x10E3/uL 125 (L)    - 450 x10E3/uL    INR 0.9 - 1.2    AST 0 - 40 IU/L 63 (H)   ALT 0 - 44 IU/L 62   Alk Phos 48 - 121 IU/L 114   Bili, Total 0.0 - 1.2 mg/dL 0.6   Bili, Direct 0.00 - 0.40 mg/dL    Albumin 3.7 - 4.7 g/dL 2.3 (L)   BUN 8 - 27 mg/dL 16   Creat 0.76 - 1.27 mg/dL 1.08   Na 134 - 144 mmol/L 140   K 3.5 - 5.2 mmol/L 4.0   Cl 96 - 106 mmol/L 110 (H)   CO2 20 - 29 mmol/L 23   Glucose 65 - 99 mg/dL 194 (H)     Cancer Screening Latest Ref Rng & Units 8/4/2021   AFP, Serum 0.0 - 8.0 ng/mL 2.3   AFP-L3% 0.0 - 9.9 % Comment     Laboratory testing from 6/11/2021 reviewed in detail. Additional testing included to evaluate progression or regression of disease. Laboratory testing results from today will be communicated by My Chart. SEROLOGIES:  Serologies Latest Ref Rng & Units 2/3/2021   Hep A Ab, Total Negative   Positive (A)   Hep B Surface Ag Index <0.10   Hep B Surface Ag Interp Negative   Negative   Hep B Core Ab, Total Negative   Negative   Hep B Surface Ab mIU/mL <3.10   Hep B Surface Ab Interp NONREACTIVE   NONREACTIVE   Hep C Ab 0.0 - 0.9 s/co ratio <0.1   Ferritin 26 - 388 NG/ML 27   Iron % Saturation 20 - 50 % 26     LIVER HISTOLOGY:  3/2021: FibroScan performed at [address]. EkPa was 50.8. IQR/med 20%. . The results suggested a fibrosis level of F 4. The CAP score suggests there is hepatic steatosis. 5/2021. Slides reviewed by MLS. Fatty liver. AFL vs NAFLD. 25-33% macrovesicualr and micovesicular steatosis, mild inflammation, mild ballooning, Stage 4 fibrosis. DARIEL (111). ENDOSCOPIC PROCEDURES:  5/2021. EGD performed by MLS. No esophageal varices. No gastric varices. RADIOLOGY:  12/2020: Liver US:  demonstrated heterogeneous hepatic echotexture with an area of focal sparing in the right lobe. Mildly enlarged spleen measuring 13.1 cm in length.    5/2021. Dynamic MRI/MRCP liver. Changes consistent with cirrhosis. No liver mass lesions. No dilated bile ducts. No bile duct strictures. Pancreatitis. Mild ascites.     OTHER TESTING:  Not available or performed    FOLLOW-UP:  All of the issues listed above in the Assessment and Plan were discussed with the patient. All questions were answered. The patient expressed a clear understanding of the above. [address] [facility] 87 in 4 weeks. [facility] of [address] [address] Vladimir Tejeda, [address] [facility] 22.  [address] [facility]

## 2021-08-04 NOTE — PROGRESS NOTES
Shyam Vargas is a 70 y.o. male      Chief Complaint   Patient presents with    Follow-up     1. Have you been to the ER, urgent care clinic since your last visit? Hospitalized since your last visit? No       2. Have you seen or consulted any other health care providers outside of the [address] [place] [address] since your last visit? Include any pap smears or colon screening.   No     Visit Vitals  /60   Pulse 62   Temp 97.9 °F (36.6 °C)   Resp 22   Ht 5' 5\" (1.651 m)   Wt 188 lb 3.2 oz (85.4 kg)   SpO2 98%   BMI 31.32 kg/m²

## 2021-08-05 LAB
AFP L3 MFR SERPL: NORMAL % (ref 0–9.9)
AFP SERPL-MCNC: 2.3 NG/ML (ref 0–8)
ALBUMIN SERPL-MCNC: 4.3 G/DL (ref 3.7–4.7)
ALP SERPL-CCNC: 80 IU/L (ref 48–121)
ALT SERPL-CCNC: 29 IU/L (ref 0–44)
AST SERPL-CCNC: 32 IU/L (ref 0–40)
BASOPHILS # BLD AUTO: 0 X10E3/UL (ref 0–0.2)
BASOPHILS NFR BLD AUTO: 1 %
BILIRUB DIRECT SERPL-MCNC: 0.16 MG/DL (ref 0–0.4)
BILIRUB SERPL-MCNC: 0.4 MG/DL (ref 0–1.2)
BUN SERPL-MCNC: 24 MG/DL (ref 8–27)
BUN/CREAT SERPL: 18 (ref 10–24)
CALCIUM SERPL-MCNC: 9.1 MG/DL (ref 8.6–10.2)
CHLORIDE SERPL-SCNC: 111 MMOL/L (ref 96–106)
CO2 SERPL-SCNC: 19 MMOL/L (ref 20–29)
CREAT SERPL-MCNC: 1.34 MG/DL (ref 0.76–1.27)
EOSINOPHIL # BLD AUTO: 0.1 X10E3/UL (ref 0–0.4)
EOSINOPHIL NFR BLD AUTO: 2 %
ERYTHROCYTE [DISTWIDTH] IN BLOOD BY AUTOMATED COUNT: 15 % (ref 11.6–15.4)
GLUCOSE SERPL-MCNC: 98 MG/DL (ref 65–99)
HCT VFR BLD AUTO: 35.3 % (ref 37.5–51)
HGB BLD-MCNC: 11.7 G/DL (ref 13–17.7)
IMM GRANULOCYTES # BLD AUTO: 0 X10E3/UL (ref 0–0.1)
IMM GRANULOCYTES NFR BLD AUTO: 0 %
INR PPP: 1 (ref 0.9–1.2)
LYMPHOCYTES # BLD AUTO: 1.7 X10E3/UL (ref 0.7–3.1)
LYMPHOCYTES NFR BLD AUTO: 40 %
MCH RBC QN AUTO: 30 PG (ref 26.6–33)
MCHC RBC AUTO-ENTMCNC: 33.1 G/DL (ref 31.5–35.7)
MCV RBC AUTO: 91 FL (ref 79–97)
MONOCYTES # BLD AUTO: 0.3 X10E3/UL (ref 0.1–0.9)
MONOCYTES NFR BLD AUTO: 7 %
MORPHOLOGY BLD-IMP: ABNORMAL
NEUTROPHILS # BLD AUTO: 2.1 X10E3/UL (ref 1.4–7)
NEUTROPHILS NFR BLD AUTO: 50 %
PLATELET # BLD AUTO: ABNORMAL X10E3/UL
POTASSIUM SERPL-SCNC: 4.6 MMOL/L (ref 3.5–5.2)
PROT SERPL-MCNC: 6.7 G/DL (ref 6–8.5)
PROTHROMBIN TIME: 10.6 SEC (ref 9.1–12)
RBC # BLD AUTO: 3.9 X10E6/UL (ref 4.14–5.8)
SODIUM SERPL-SCNC: 144 MMOL/L (ref 134–144)
WBC # BLD AUTO: 4.2 X10E3/UL (ref 3.4–10.8)

## 2021-08-06 NOTE — PROGRESS NOTES
My chart message sent regarding the blood work results. The liver numbers and function are normal. The kidney numbers were elevated slightly.  Advised he increase water intake and if he limit use of non-steroidal's

## 2021-08-09 ENCOUNTER — OFFICE VISIT (OUTPATIENT)
Dept: INTERNAL MEDICINE CLINIC | Age: 76
End: 2021-08-09
Payer: MEDICARE

## 2021-08-09 VITALS
WEIGHT: 189.5 LBS | TEMPERATURE: 98.1 F | BODY MASS INDEX: 31.57 KG/M2 | DIASTOLIC BLOOD PRESSURE: 63 MMHG | HEIGHT: 65 IN | SYSTOLIC BLOOD PRESSURE: 118 MMHG | HEART RATE: 69 BPM | OXYGEN SATURATION: 98 % | RESPIRATION RATE: 14 BRPM

## 2021-08-09 DIAGNOSIS — E78.00 HYPERCHOLESTEREMIA: ICD-10-CM

## 2021-08-09 DIAGNOSIS — R79.89 ABNORMAL BLOOD CREATININE LEVEL: ICD-10-CM

## 2021-08-09 DIAGNOSIS — E11.59 TYPE 2 DIABETES MELLITUS WITH OTHER CIRCULATORY COMPLICATION, WITHOUT LONG-TERM CURRENT USE OF INSULIN (HCC): ICD-10-CM

## 2021-08-09 DIAGNOSIS — R42 DIZZINESS: ICD-10-CM

## 2021-08-09 DIAGNOSIS — D69.6 THROMBOCYTOPENIA, UNSPECIFIED (HCC): ICD-10-CM

## 2021-08-09 DIAGNOSIS — Z00.00 MEDICARE ANNUAL WELLNESS VISIT, SUBSEQUENT: Primary | ICD-10-CM

## 2021-08-09 DIAGNOSIS — Z13.31 SCREENING FOR DEPRESSION: ICD-10-CM

## 2021-08-09 PROCEDURE — G8427 DOCREV CUR MEDS BY ELIG CLIN: HCPCS | Performed by: INTERNAL MEDICINE

## 2021-08-09 PROCEDURE — G0439 PPPS, SUBSEQ VISIT: HCPCS | Performed by: INTERNAL MEDICINE

## 2021-08-09 PROCEDURE — 2022F DILAT RTA XM EVC RTNOPTHY: CPT | Performed by: INTERNAL MEDICINE

## 2021-08-09 PROCEDURE — G8536 NO DOC ELDER MAL SCRN: HCPCS | Performed by: INTERNAL MEDICINE

## 2021-08-09 PROCEDURE — 3017F COLORECTAL CA SCREEN DOC REV: CPT | Performed by: INTERNAL MEDICINE

## 2021-08-09 PROCEDURE — 99214 OFFICE O/P EST MOD 30 MIN: CPT | Performed by: INTERNAL MEDICINE

## 2021-08-09 PROCEDURE — G8754 DIAS BP LESS 90: HCPCS | Performed by: INTERNAL MEDICINE

## 2021-08-09 PROCEDURE — 3044F HG A1C LEVEL LT 7.0%: CPT | Performed by: INTERNAL MEDICINE

## 2021-08-09 PROCEDURE — G8752 SYS BP LESS 140: HCPCS | Performed by: INTERNAL MEDICINE

## 2021-08-09 PROCEDURE — G8417 CALC BMI ABV UP PARAM F/U: HCPCS | Performed by: INTERNAL MEDICINE

## 2021-08-09 PROCEDURE — G8510 SCR DEP NEG, NO PLAN REQD: HCPCS | Performed by: INTERNAL MEDICINE

## 2021-08-09 PROCEDURE — 1101F PT FALLS ASSESS-DOCD LE1/YR: CPT | Performed by: INTERNAL MEDICINE

## 2021-08-09 RX ORDER — GUAIFENESIN 1200 MG/1
1200 TABLET, EXTENDED RELEASE ORAL 2 TIMES DAILY
COMMUNITY
End: 2021-09-14

## 2021-08-09 NOTE — PROGRESS NOTES
Yolande Darnell (: 1945) is a 70 y.o. male, established patient, here for evaluation of the following chief complaint(s):  see below. This is a Subsequent Medicare Annual Wellness Exam (AWV) (Performed 12 months after IPPE or effective date of Medicare Part B enrollment)    I have reviewed the patient's medical history in detail and updated the computerized patient record. Assessment/Plan       ICD-10-CM ICD-9-CM    1. Medicare annual wellness visit, subsequent  Z00.00 V70.0    2. Screening for depression  Z13.31 V79.0    3. Abnormal blood creatinine level  R79.89 790.99 REFERRAL TO NEPHROLOGY   4. Dizziness  R42 780.4 REFERRAL TO ENT-OTOLARYNGOLOGY   5. Type 2 diabetes mellitus with other circulatory complication, without long-term current use of insulin (HCC)  E11.59 250.70  DIABETES FOOT EXAM   6. Thrombocytopenia, unspecified (HCC)  D69.6 287.5    7. Hypercholesteremia  E78.00 272.0 LIPID PANEL      CK       Diagnoses #1-2 for Medicare Wellness/Preventive visit. Due to significant separate problems unrelated to [facility] Wellness Visit, also addressed following diagnoses/problems at visit as below (diagnoses #3-7 above). 1-2:  Screenings reviewed at visit. 3.  Pt interested in eval with renal due to elevated creatinine. Referral reviewed. 4.  Thinks may have seen Dr. Yudelka Walker, but not for 3-4yrs. Reviewed eval there for hearing and for dizziness. Plan schedule after neuro eval below, if only seeing for dizziness. 5.  Updated exam at visit. Sees [facility] also for foot care. 6.  Following with hematology. 7.  Fasting labs reviewed. He will schedule here to update--no upcoming labs for pt and prefers to update soon. He is off statin at this time. Follow-up and Dispositions    · Return in about 6 weeks (around 2021) for medication follow-up, non-fasting labs (POC A1c); fasting labs here this week.        lab results and schedule of future lab studies reviewed with patient  reviewed medications and side effects in detail    For additional documentation of information and/or recommendations discussed this visit, please see notes in instructions. Plan and evaluation (above) reviewed with pt at visit  Patient voiced understanding of plan and provided with time to ask/review questions. After Visit Summary (AVS) provided to pt after visit with additional instructions as needed/reviewed. Future Appointments   Date Time Provider Ping Almaraz   2021  4:20 PM Clementina Angulo NP [facility] BS AMB   2021  9:40 AM MD ALICIA Sampson BS AMB   --Updated future visits after patient check-out. History     Chief Complaint   Patient presents with   [facility] Annual Wellness Visit     Notes (nursing/rooming note):  Last eye exam done 3 months ago.    Home BP cuff readin/57 HR: 69    Patient notes:  Has had numerous interval follow-up/evals with specialists. Has had dizziness since  and improved with OTC med below. Has been since CVA--he is looking for mgt, and aware likely caused by stroke. His has not resolved. He has another evaluation with neurology for dizziness. He has seen ENT but cannot recall--thinks on [facility]. Notes off oxygen now with provider.       Creatinine   Date Value Ref Range Status   2021 1.34 (H) 0.76 - 1.27 mg/dL Final   2021 1.13 0.76 - 1.27 mg/dL Final   2021 1.08 0.70 - 1.30 MG/DL Final     BUN   Date Value Ref Range Status   2021 24 8 - 27 mg/dL Final   2021 10 8 - 27 mg/dL Final   2021 16 6 - 20 MG/DL Final     BUN/Creatinine ratio   Date Value Ref Range Status   2021 18 10 - 24 Final   2021 9 (L) 10 - 24 Final   2021 15 12 - 20   Final       ALT (SGPT)   Date Value Ref Range Status   2021 29 0 - 44 IU/L Final   2021 28 0 - 44 IU/L Final   2021 62 12 - 78 U/L Final   2021 44 12 - 78 U/L Final       Notes he had interim antibiotic, and rinse for possible thrush after antibiotic. He has residual abnormal taste--decreased. He will consider ENT eval at follow-up if not improving. Not interested in seeing at this time. Reviewed Hearing and [address] may not see for taste problems, but can review another ENT then if needed. Nursing screenings reviewed by provider at visit. Past Medical History:   Diagnosis Date    CAD (coronary artery disease)     Chronic kidney disease     stones    [facility] disease [facility]     Diabetes ([facility]     Hypertension     Stroke ([facility] 1980s    right hand neuropathy      Past Surgical History:   Procedure Laterality Date    COLONOSCOPY N/A 4/12/2018    COLONOSCOPY performed by Gia Pop MD at [facility] ENDOSCOPY    HX CHOLECYSTECTOMY      HX SKIN BIOPSY  2019    skin cancer removed from left leg. Prior to Admission medications    Medication Sig Start Date End Date Taking? Authorizing Provider   OTHER [facility]   Yes Provider, Historical   guaiFENesin (Mucinex) 1,200 mg Ta12 ER tablet Take 1,200 mg by mouth two (2) times a day. Yes Provider, Historical   cyanocobalamin 1,000 mcg tablet Take 1,000 mcg by mouth daily. Yes Provider, Historical   multivitamin, tx-iron-ca-min (THERA-M w/ IRON) 9 mg iron-400 mcg tab tablet Take 1 Tablet by mouth daily. Yes Provider, Historical   pantoprazole (PROTONIX) 20 mg tablet Take 2 Tablets by mouth daily. 7/20/21  Yes Ana Martinez MD   latanoprost (XALATAN) 0.005 % ophthalmic solution Administer 1 Drop to left eye nightly. Yes Provider, Historical   aspirin delayed-release 81 mg tablet Take 1 Tab by mouth daily. 5/29/19  Yes Jim Mcdowell NP   metFORMIN (GLUCOPHAGE) 500 mg tablet Take 1,000 mg by mouth two (2) times daily (with meals). Patient takes 1/2 tab in the AM and 1/2 tab @ HS   Yes Provider, Historical   guaiFENesin-dextromethorphan SR (HUMIBID DM) 600-30 mg per tablet Take 1 Tablet by mouth two (2) times a day. Provider, Historical   ibuprofen (MOTRIN) 800 mg tablet Take 1 Tablet by mouth every six (6) hours as needed for Pain. 21   Giancarlo Curran MD   simvastatin (ZOCOR) 20 mg tablet Take 20 mg by mouth nightly. Patient not taking: Reported on 2021    Provider, Historical     He notes another provider stopped simvastatin. He is not sure which provider stopped. He has also started an OTC supplement for dizziness--Nervive. Has Calcium, Thiamine, B6 and B12. Taking 1/2 tab daily and helping with dizziness. No Known Allergies  Family History   Problem Relation Age of Onset    Heart Disease Mother     Heart Disease Father     Cancer Sister         breast ca     Social History     Tobacco Use    Smoking status: Former Smoker     Packs/day: 2.00     Years: 45.00     Pack years: 90.00     Quit date: 2005     Years since quittin.1    Smokeless tobacco: Never Used   Substance Use Topics    Alcohol use: No     Comment: 1 gallon whiskey a week stopped 4 years ago     Patient Active Problem List   Diagnosis Code    Thrombocytopenia (HCC) D69.6    Class 1 obesity due to excess calories with serious comorbidity and body mass index (BMI) of 32.0 to 32.9 in adult E66.09, Z68.32    Diabetes mellitus ([facility] E11.9    History of CVA (cerebrovascular accident) Z80.78    Cirrhosis ([facility] K74.60    Gallstone pancreatitis K85.10    History of cholecystectomy Z90.49       Depression Risk Factor Screening:     3 most recent PHQ Screens 2021   Little interest or pleasure in doing things Not at all   Feeling down, depressed, irritable, or hopeless Not at all   Total Score PHQ 2 0       Alcohol Risk Factor Screening: You do not drink alcohol or very rarely. Functional Ability and Level of Safety:     Hearing Loss  Has had hearing aids recommended, but couldn't afford. Activities of Daily Living  The home contains: no safety equipment. Handrails on porch also.   He is careful with walking and has no history falls with his dizziness. See below:    ADL Assessment 2021   Feeding yourself No Help Needed   Getting from bed to chair No Help Needed   Getting dressed No Help Needed   Bathing or showering No Help Needed   Walk across the room (includes cane/walker) No Help Needed   Using the telphone No Help Needed   Taking your medications No Help Needed   Preparing meals No Help Needed   Managing money (expenses/bills) No Help Needed   Moderately strenuous housework (laundry) No Help Needed   Shopping for personal items (toiletries/medicines) No Help Needed   Shopping for groceries No Help Needed   Driving No Help Needed   Climbing a flight of stairs No Help Needed   Getting to places beyond walking distances No Help Needed       Fall Risk  Fall Risk Assessment, last 12 mths 2021   Able to walk? Yes   Fall in past 12 months? 0   Do you feel unsteady? 0   Are you worried about falling 0       Abuse Screen  Patient is not abused    Abuse Screening Questionnaire 2021   Do you ever feel afraid of your partner? N   Are you in a relationship with someone who physically or mentally threatens you? N   Is it safe for you to go home? Y       Cognitive Screening   Evaluation of Cognitive Function:  Has your family/caregiver stated any concerns about your memory: yes - since stroke. Seeing neurology as above. Physical Exam[de-identified]     Blood pressure 118/63, pulse 69, temperature 98.1 °F (36.7 °C), temperature source Oral, resp. rate 14, height 5' 5\" (1.651 m), weight 189 lb 8 oz (86 kg), SpO2 98 %. Home BP cuff readin/57 HR: 69.      General appearance: alert, cooperative, no distress, appears stated age  Head: Normocephalic, without obvious abnormality, atraumatic  Eyes: conjunctivae/corneas clear. Ears: normal TM's and external ear canals AU. Nose: Nares normal. No drainage. OP:  MMM. Tongue with patchy white coating. No clear findings for thrush noted.   Neck: supple, symmetrical, trachea midline, no adenopathy, thyroid: not enlarged, symmetric, no tenderness/mass/nodules  Back: symmetric, no curvature. ROM normal  Lungs: clear to auscultation bilaterally  Chest wall: no tenderness  Heart: regular rate and rhythm, S1, S2 normal, no murmur, click, rub or gallop  Abdomen: soft, non-tender. Bowel sounds normal. No masses,  no organomegaly  Extremities: extremities normal, atraumatic, no cyanosis. Edema 1+ bilat LE's. Pulses: 2+ and symmetric radial.  Skin: Skin color, texture, turgor normal. No rashes or lesions  Lymph nodes: Cervical nodes normal.  Neurologic: Grossly normal    Diabetic foot exam:   Left Foot:   Visual Exam: normal    Pulse DP: 2+ (normal)   Filament test: normal sensation   Right Foot:   Visual Exam: normal    Pulse DP: 2+ (normal)   Filament test: normal sensation       Patient Care Team   Patient Care Team:  Cordell Rocha MD as PCP - General (Infectious Disease)  Cordell Rocha [facility]  Arthur Ma MD (General Surgery)    Advice/Referrals/Counseling   Education and counseling provided:  Are appropriate based on today's review and evaluation    Health Maintenance Due   Topic Date Due    Foot Exam Q1  Never done    Eye Exam Retinal or Dilated  Never done    COVID-19 Vaccine (1) Never done    DTaP/Tdap/Td series (1 - Tdap) Never done    AAA Screening 73-67 YO Male Smoking Patients  Never done    Pneumococcal 65+ years (1 of 1 - PPSV23) Never done    Medicare Yearly Exam  Never done       --Foot exam updated at visit. --He has completed release for eye exam.  Has elevated pressure and on drops in left eye. --He will [facility] after visit. --He has had vaccines with [facility]--release completed at visit. --Abd US complete 12-21-20 without abdominal aneurysm. --Colonoscopy April 2018 with hyperplastic polyps x 2. An electronic signature was used to authenticate this note.   -- Candace Joseph, MD

## 2021-08-09 NOTE — PROGRESS NOTES
RM 17      Last eye exam done 3 months ago. Home BP cuff readin/57 HR: 69    Chief Complaint   Patient presents with   [place] Annual Wellness Visit       1. Have you been to the ER, urgent care clinic since your last visit? Hospitalized since your last visit? No    2. Have you seen or consulted any other health care providers outside of the [address] [place] [address] since your last visit? Include any pap smears or colon screening. No    Health Maintenance Due   Topic Date Due    Foot Exam Q1  Never done    Eye Exam Retinal or Dilated  Never done    COVID-19 Vaccine (1) Never done    DTaP/Tdap/Td series (1 - Tdap) Never done    AAA Screening 73-67 YO Male Smoking Patients  Never done    Pneumococcal 65+ years (1 of 1 - PPSV23) Never done    Medicare Yearly Exam  Never done       3 most recent PHQ Screens 2021   Little interest or pleasure in doing things Not at all   Feeling down, depressed, irritable, or hopeless Not at all   Total Score PHQ 2 0       Abuse Screening Questionnaire 2021   Do you ever feel afraid of your partner? N   Are you in a relationship with someone who physically or mentally threatens you? N   Is it safe for you to go home? Y       Fall Risk Assessment, last 12 mths 2021   Able to walk? Yes   Fall in past 12 months? 0   Do you feel unsteady?  0   Are you worried about falling 0       ADL Assessment 2021   Feeding yourself No Help Needed   Getting from bed to chair No Help Needed   Getting dressed No Help Needed   Bathing or showering No Help Needed   Walk across the room (includes cane/walker) No Help Needed   Using the telphone No Help Needed   Taking your medications No Help Needed   Preparing meals No Help Needed   Managing money (expenses/bills) No Help Needed   Moderately strenuous housework (laundry) No Help Needed   Shopping for personal items (toiletries/medicines) No Help Needed   Shopping for groceries No Help Needed   Driving No Help Needed   Climbing a flight of stairs No Help Needed   Getting to places beyond walking distances No Help Needed       Learning Assessment 8/14/2019   PRIMARY LEARNER Patient   PRIMARY LANGUAGE ENGLISH   LEARNER PREFERENCE PRIMARY DEMONSTRATION   ANSWERED BY self   RELATIONSHIP SELF

## 2021-08-09 NOTE — PATIENT INSTRUCTIONS
Please return for fasting labs here as reviewed--later this week or next week. If taste not improving at your follow-up, will refer to a different ENT if needed. Medicare Wellness Visit, Male    The best way to live healthy is to have a lifestyle where you eat a well-balanced diet, exercise regularly, limit alcohol use, and quit all forms of tobacco/nicotine, if applicable. Regular preventive services are another way to keep healthy. Preventive services (vaccines, screening tests, monitoring & exams) can help personalize your care plan, which helps you manage your own care. Screening tests can find health problems at the earliest stages, when they are easiest to treat. [facility] follows the current, evidence-based guidelines published by the [facility] Mihai ([facility]) when recommending preventive services for our patients. Because we follow these guidelines, sometimes recommendations change over time as research supports it. (For example, a prostate screening blood test is no longer routinely recommended for men with no symptoms). Of course, you and your doctor may decide to screen more often for some diseases, based on your risk and co-morbidities (chronic disease you are already diagnosed with). Preventive services for you include:  - Medicare offers their members a free annual wellness visit, which is time for you and your primary care provider to discuss and plan for your preventive service needs. Take advantage of this benefit every year!  -All adults over age 72 should receive the recommended pneumonia vaccines. Current USPSTF guidelines recommend a series of two vaccines for the best pneumonia protection.   -All adults should have a flu vaccine yearly and tetanus vaccine every 10 years.  -All adults age 48 and older should receive the shingles vaccines (series of two vaccines).        -All adults age 38-68 who are overweight should have a diabetes screening test once every three years.   -Other screening tests & preventive services for persons with diabetes include: an eye exam to screen for diabetic retinopathy, a kidney function test, a foot exam, and stricter control over your cholesterol.   -Cardiovascular screening for adults with routine risk involves an electrocardiogram (ECG) at intervals determined by the provider.   -Colorectal cancer screening should be done for adults age 54-65 with no increased risk factors for colorectal cancer. There are a number of acceptable methods of screening for this type of cancer. Each test has its own benefits and drawbacks. Discuss with your provider what is most appropriate for you during your annual wellness visit. The different tests include: colonoscopy (considered the best screening method), a fecal occult blood test, a fecal DNA test, and sigmoidoscopy.  -All adults born between [facility] should be screened once for Hepatitis C.  -An Abdominal Aortic Aneurysm (AAA) Screening is recommended for men age 73-68 who has ever smoked in their lifetime.      Here is a list of your current Health Maintenance items (your personalized list of preventive services) with a due date:  Health Maintenance Due   Topic Date Due    Eye Exam  Never done    COVID-19 Vaccine (1) Never done    DTaP/Tdap/Td  (1 - Tdap) Never done    Pneumococcal Vaccine (1 of 1 - PPSV23) Never done

## 2021-08-13 NOTE — TELEPHONE ENCOUNTER
Returned call to patient's wife,  States he is not wanting to eat, does a daily protein shake,   States she has tried everything, has lost some weight as well,  Patient has upcoming appt, spouse will further discuss this with pcp next week.  No further concerns or questions.     Spoke with Mrs. Palak Baer, informed her that [facility] does accept patient's insurance and I would send referral there. She verbalized understanding.

## 2021-08-16 ENCOUNTER — APPOINTMENT (OUTPATIENT)
Dept: INTERNAL MEDICINE CLINIC | Age: 76
End: 2021-08-16

## 2021-08-16 DIAGNOSIS — D69.6 THROMBOCYTOPENIA (HCC): ICD-10-CM

## 2021-08-16 DIAGNOSIS — E78.00 HYPERCHOLESTEREMIA: ICD-10-CM

## 2021-08-17 LAB
BASOPHILS # BLD AUTO: NORMAL 10*3/UL
BASOPHILS NFR BLD AUTO: NORMAL %
CHOLEST SERPL-MCNC: 149 MG/DL (ref 100–199)
CK SERPL-CCNC: 77 U/L (ref 41–331)
EOSINOPHIL # BLD AUTO: NORMAL 10*3/UL
EOSINOPHIL NFR BLD AUTO: NORMAL %
ERYTHROCYTE [DISTWIDTH] IN BLOOD BY AUTOMATED COUNT: NORMAL %
HCT VFR BLD AUTO: NORMAL %
HDLC SERPL-MCNC: 54 MG/DL
HGB BLD-MCNC: NORMAL G/DL
IMM GRANULOCYTES # BLD AUTO: NORMAL 10*3/UL
IMM GRANULOCYTES NFR BLD AUTO: NORMAL %
IMMATURE CELLS, 115398: NORMAL
LDLC SERPL CALC-MCNC: 78 MG/DL (ref 0–99)
LYMPHOCYTES # BLD AUTO: NORMAL 10*3/UL
LYMPHOCYTES NFR BLD AUTO: NORMAL %
MCH RBC QN AUTO: NORMAL PG
MCHC RBC AUTO-ENTMCNC: NORMAL G/DL
MCV RBC AUTO: NORMAL FL
MONOCYTES # BLD AUTO: NORMAL 10*3/UL
MONOCYTES NFR BLD AUTO: NORMAL %
MORPHOLOGY BLD-IMP: NORMAL
NEUTROPHILS # BLD AUTO: NORMAL 10*3/UL
NEUTROPHILS NFR BLD AUTO: NORMAL %
NRBC BLD AUTO-RTO: NORMAL %
PLATELET # BLD AUTO: NORMAL 10*3/UL
RBC # BLD AUTO: NORMAL 10*6/UL
SPECIMEN STATUS REPORT, ROLRST: NORMAL
SPECIMEN STATUS REPORT, ROLRST: NORMAL
TRIGL SERPL-MCNC: 89 MG/DL (ref 0–149)
VLDLC SERPL CALC-MCNC: 17 MG/DL (ref 5–40)
WBC # BLD AUTO: NORMAL 10*3/UL

## 2021-09-14 ENCOUNTER — OFFICE VISIT (OUTPATIENT)
Dept: HEMATOLOGY | Age: 76
End: 2021-09-14
Payer: MEDICARE

## 2021-09-14 VITALS
RESPIRATION RATE: 17 BRPM | OXYGEN SATURATION: 98 % | DIASTOLIC BLOOD PRESSURE: 69 MMHG | HEIGHT: 65 IN | BODY MASS INDEX: 31.72 KG/M2 | HEART RATE: 68 BPM | WEIGHT: 190.4 LBS | SYSTOLIC BLOOD PRESSURE: 121 MMHG | TEMPERATURE: 96.9 F

## 2021-09-14 DIAGNOSIS — K74.60 CIRRHOSIS OF LIVER WITHOUT ASCITES, UNSPECIFIED HEPATIC CIRRHOSIS TYPE (HCC): Primary | ICD-10-CM

## 2021-09-14 DIAGNOSIS — I10 ESSENTIAL HYPERTENSION: ICD-10-CM

## 2021-09-14 PROCEDURE — G8427 DOCREV CUR MEDS BY ELIG CLIN: HCPCS | Performed by: NURSE PRACTITIONER

## 2021-09-14 PROCEDURE — G8752 SYS BP LESS 140: HCPCS | Performed by: NURSE PRACTITIONER

## 2021-09-14 PROCEDURE — 99214 OFFICE O/P EST MOD 30 MIN: CPT | Performed by: NURSE PRACTITIONER

## 2021-09-14 PROCEDURE — G8536 NO DOC ELDER MAL SCRN: HCPCS | Performed by: NURSE PRACTITIONER

## 2021-09-14 PROCEDURE — 3017F COLORECTAL CA SCREEN DOC REV: CPT | Performed by: NURSE PRACTITIONER

## 2021-09-14 PROCEDURE — G8417 CALC BMI ABV UP PARAM F/U: HCPCS | Performed by: NURSE PRACTITIONER

## 2021-09-14 PROCEDURE — 1101F PT FALLS ASSESS-DOCD LE1/YR: CPT | Performed by: NURSE PRACTITIONER

## 2021-09-14 PROCEDURE — G8432 DEP SCR NOT DOC, RNG: HCPCS | Performed by: NURSE PRACTITIONER

## 2021-09-14 PROCEDURE — G8754 DIAS BP LESS 90: HCPCS | Performed by: NURSE PRACTITIONER

## 2021-09-14 RX ORDER — FLASH GLUCOSE SENSOR
KIT MISCELLANEOUS
COMMUNITY
Start: 2021-08-26

## 2021-09-14 RX ORDER — LISINOPRIL 10 MG/1
10 TABLET ORAL DAILY
Qty: 30 TABLET | Refills: 5 | Status: SHIPPED | OUTPATIENT
Start: 2021-09-14 | End: 2021-12-28

## 2021-09-14 RX ORDER — ENALAPRIL MALEATE 20 MG/1
TABLET ORAL
COMMUNITY
Start: 2021-08-22 | End: 2021-09-14

## 2021-09-14 NOTE — PROGRESS NOTES
Buck Vaughn [address]      Ana aGrcia MD, Grace Henriquez, Wolf Salguero MD, MPH      Bettie Avery, TERI Lr, [facility]     Clementina Angulo, [facility]   Rip Yeh, FNP-C    Lizet Ennis, [facility]       Bernard Morris Jasvir De Jones 136    at [address], [facility] Scarlett Kapoor  22.    [phone]    FAX: [address] [place] [address]    at [address], [address]    [phone]    FAX: [phone]       Patient Care Team:  Anne Bruce MD as PCP - General (Infectious Disease)  Anne Bruce MD as PCP - [facility]  Akanksha Velasquez MD (General Surgery)      Problem List  Date Reviewed: 8/22/2021        Codes Class Noted    History of cholecystectomy ICD-10-CM: Z90.49  ICD-9-CM: V45.79  6/25/2021        Gallstone pancreatitis ICD-10-CM: K85.10  ICD-9-CM: 577.0, 574.20  6/3/2021        Cirrhosis ([facility] ICD-10-CM: K74.60  ICD-9-CM: 571.5  3/12/2021        History of CVA (cerebrovascular accident) ICD-10-CM: Z86.73  ICD-9-CM: V12.54  2/3/2021        Thrombocytopenia ([facility] ICD-10-CM: D69.6  ICD-9-CM: 287.5  12/3/2020        Class 1 obesity due to excess calories with serious comorbidity and body mass index (BMI) of 32.0 to 32.9 in adult ICD-10-CM: E66.09, Z68.32  ICD-9-CM: 278.00, V85.32  12/3/2020        Diabetes mellitus ([facility] ICD-10-CM: E11.9  ICD-9-CM: 250.00  12/3/2020              Yolande Darnell [facility] for 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 The active problem list, all pertinent past medical history, medications, liver histology, endoscopic studies, and laboratory findings related to the liver disorder were reviewed and discussed with the patient. The patient is a 70year old  male who was found to have thrombocytopenia in 12/2020. A liver biopsy in 5/2021 demonstrated ALFARO with cirrhosis. Following the biopsy he developed severe abdominal pain and was found to have acute gallstone pancreatitis. This resolved with conservative treatment. He underwent a cholecystectomy 5/2021. While hospitalized he developed lower extremity edema and subsequently started on diuretics. This has now resolved and he is no longer on diuretics. Since the last office visit the patient has been walking 4 miles daily. He reports having vertigo and fatigue. There is an upcoming appointment with Neurology to further assess the vertigo. The patient has the following symptoms which could be attributed to the liver disorder:  Hypotension, generalized weakness. The patient is not experiencing the following symptoms which are commonly seen in this liver disorder: nausea, vomiting, pain on the right side over the liver or abdominal pain. The patient has mild limitations in functional activities which can be attributed to a past CVA. ASSESSMENT AND PLAN:  Cirrhosis  Cirrhosis is probably secondary to past alcohol use and NAFLD  The diagnosis of cirrhosis is based upon liver histology, imaging, laboratory studies    Have performed laboratory testing to monitor liver function and degree of liver injury. This included BMP, hepatic panel, CBC with platelet count and INR. Laboratory testing from 8/04/2021 reviewed in detail. Follow-up testing ordered today. The liver transaminases, ALP, and liver function are normal. The platelet count is depressed. The patient has never developed any complications of cirrhosis to date. The CTP is 5. Child class A. The MELD score is 9.      ALFARO  The diagnosis is based upon liver biopsy, Fiboscan CAP score, features of metabolic syndrome, serologic studies that are negative for other causes of chronic liver disease,     A liver biopsy performed in 5/2021 demonstrates ALFARO. 25-33% macrovesicualr and micovesicular steatosis, mild inflammation, mild ballooning, and cirrhosis. Fibroscan in 3/2021 demonstrated 51 kPa and  suggesting fatty liver and cirrhosis. If the patient looses 20% of current body weight, which is 36 pounds, down to a weight of of 150 pounds, steatosis should resolve. Once all steatosis has resolved inflammation will resolve. There are currently no FDA approved medical treatment for fatty liver, NALFD or ALFARO. The only medical treatments for ALFARO are though clinical trials. The patient would be eligible or enrollment into the [facility] clinical trial for treatment of ALFARO and cirrhosis in patients with thrombocytopenia but no varices. .      Vertigo  The patient states this is a chronic issues since the CVA. It has impacted his gait and he is fearful of having a fall. The BP was slightly low but he reports higher readings at home. Will refer to Neurology for further evaluation. Hypotension  Intermittent. Will change enalapril to lisinopril 10 mg daily. Counseling for diet and weight loss in patients with confirmed or suspected NAFLD  The patient was counseled regarding diet and exercise to achieve weight loss. The best diet for patients with fatty liver is one very low in carbohydrates and enriched with protein such as an Jaqueline's program.      The patient was told not to consume any food products and drinks containing fructose as this enhances hepatic fat synthesis. There is no medication or vitamin supplements that we advocate for ALFARO. Using glitazones in patients without diabetes mellitus has been shown to reduce fat content in the liver but has no effect on fibrosis and is associated with weight gain. Vitamin E has also been used but the data is not very good and most experts no longer advocate this.       Screening for Esophageal varices   The patient does not have esophageal or gastric varices. The last EGD to assess for varices was performed in 5/2021. No varices found. Hepatic encephalopathy   Overt HE has not developed to date. There is no need for treatment with lactulose and/or Xifaxan at this time. Anemia   This is due to multifactorial causes including recent prolonged hospitalization and surgery. The HGB has improved. Previous iron studies were normal. Will check again at the next office visit. Thrombocytopenia   This is secondary to cirrhosis. There is no evidence of overt bleeding. No treatment is required. The platelet count is adequate for the patient to undergo procedures without the need for platelet transfusion or platelet growth factors. Screening for Hepatocellular Carcinoma  HCC screening has recently been performed and does not suggest [facility] The next liver imaging study will be performed in 11/2021. AFP ordered today. Treatment of other medical problems in patients with chronic liver disease  There are no contraindications for the patient to take most medications that are necessary for treatment of other medical issues. The patient has cirrhrosis and should avoid taking NSAIDs which are associated with a higher rate of developing ROBBIE. The patient can take Any medications utilized for treatment of DM, Statins to treat hypercholesterolemia    Counseling for alcohol in patients with chronic liver disease  The patient has cirrhosis and was advised to be abstinent from all alcohol including non-alcoholic beer which does contain some alcohol. The patient has previously consumed alcohol in excess. The patient has not consumed alcohol since 2013. Osteoporosis  The risk of osteoporosis is increased in patients with cirrhosis. DEXA bone density to assess for osteoporosis has not been performed. This should be ordered by the patients primary care physician.        Vaccinations   Vaccination for viral hepatitis A is not needed. The patient has serologic evidence of prior exposure or vaccination with immunity. Routine vaccinations against other bacterial and viral agents can be performed as indicated. Annual flu vaccination should be administered if indicated. ALLERGIES  No Known Allergies    MEDICATIONS  Current Outpatient Medications   Medication Sig    enalapril (VASOTEC) 20 mg tablet     FreeStyle Ashlee 14 Day Sensor kit     OTHER [employer]    cyanocobalamin 1,000 mcg tablet Take 1,000 mcg by mouth daily.  pantoprazole (PROTONIX) 20 mg tablet Take 2 Tablets by mouth daily.  simvastatin (ZOCOR) 20 mg tablet Take 20 mg by mouth nightly.  aspirin delayed-release 81 mg tablet Take 1 Tab by mouth daily.  metFORMIN (GLUCOPHAGE) 500 mg tablet Take 1,000 mg by mouth two (2) times daily (with meals). Patient takes 1/2 tab in the AM and 1/2 tab @ HS    guaiFENesin (Mucinex) 1,200 mg Ta12 ER tablet Take 1,200 mg by mouth two (2) times a day. (Patient not taking: Reported on 9/14/2021)    multivitamin, tx-iron-ca-min (THERA-M w/ IRON) 9 mg iron-400 mcg tab tablet Take 1 Tablet by mouth daily.  latanoprost (XALATAN) 0.005 % ophthalmic solution Administer 1 Drop to left eye nightly. (Patient not taking: Reported on 9/14/2021)     No current facility-administered medications for this visit. SYSTEM REVIEW NOT RELATED TO LIVER DISEASE OR REVIEWED ABOVE:  Constitution systems: Negative for fever, chills, weight gain, weight loss. Eyes: Negative for visual changes. ENT: Negative for sore throat, painful swallowing. Respiratory: Negative for cough, hemoptysis, SOB. Cardiology: Negative for chest pain, palpitations. GI:  Negative for constipation or diarrhea. : Negative for urinary frequency, dysuria, hematuria, nocturia. Skin: Negative for rash. Hematology: Negative for easy bruising, blood clots. Musculo-skeletal: Negative for back pain, muscle pain, weakness.   Neurologic: Negative for headaches, dizziness, vertigo, memory problems not related to HE. Psychology: Negative for anxiety, depression. FAMILY HISTORY:  There is no family history of liver disease. There is no family history of immune disorders. SOCIAL HISTORY:  The patient is   The patient has no children. The patient stopped using tobacco products in 2009  The patient has previously consumed alcohol in excess. The patient is a retired barnes    PHYSICAL EXAMINATION:  Visit Vitals  /69 (BP 1 Location: Left upper arm, BP Patient Position: Sitting, BP Cuff Size: Large adult)   Pulse 68   Temp 96.9 °F (36.1 °C) (Temporal)   Resp 17   Ht 5' 5\" (1.651 m)   Wt 190 lb 6.4 oz (86.4 kg)   SpO2 98%   BMI 31.68 kg/m²       General: No acute distress. Eyes: Sclera anicteric. ENT: No oral lesions. Thyroid normal.  Nodes: No adenopathy. Skin: No spider angiomata. No jaundice. No palmar erythema. Respiratory: Lungs clear to auscultation. Cardiovascular: Regular heart rate. No murmurs. No JVD. Abdomen: Soft non-tender, liver size normal to percussion/palpation. Spleen not palpable. No obvious ascites. Extremities: No edema. No muscle wasting. No gross arthritic changes. Neurologic: Alert and oriented. Cranial nerves grossly intact. No asterixis.     LABORATORY STUDIES:  Liver [place] of [address] [place] [address] & Units 8/4/2021 6/11/2021   WBC 3.4 - 10.8 x10E3/uL 4.2 4.4   ANC 1.4 - 7.0 x10E3/uL 2.1 2.5   HGB 13.0 - 17.7 g/dL 11.7 (L) 10.4 (L)   PLT x10E3/uL CANCELED CANCELED    - 450 x10E3/uL     INR 0.9 - 1.2 1.0 1.0   AST 0 - 40 IU/L 32 23   ALT 0 - 44 IU/L 29 28   Alk Phos 48 - 121 IU/L 80 117   Bili, Total 0.0 - 1.2 mg/dL 0.4 0.4   Bili, Direct 0.00 - 0.40 mg/dL 0.16 0.18   Albumin 3.7 - 4.7 g/dL 4.3 3.4 (L)   BUN 8 - 27 mg/dL 24 10   Creat 0.76 - 1.27 mg/dL 1.34 (H) 1.13   Na 134 - 144 mmol/L 144 145 (H)   K 3.5 - 5.2 mmol/L 4.6 5.1   Cl 96 - 106 mmol/L 111 (H) 113 (H)   CO2 20 - 29 mmol/L 19 (L) 21   Glucose 65 - 99 mg/dL 98 79     Liver [place]man of [address] [place] [address] Ref Rng & Units 6/4/2021   WBC 3.4 - 10.8 x10E3/uL 5.8   ANC 1.4 - 7.0 x10E3/uL    HGB 13.0 - 17.7 g/dL 10.4 (L)   PLT x10E3/uL 125 (L)    - 450 x10E3/uL    INR 0.9 - 1.2    AST 0 - 40 IU/L 63 (H)   ALT 0 - 44 IU/L 62   Alk Phos 48 - 121 IU/L 114   Bili, Total 0.0 - 1.2 mg/dL 0.6   Bili, Direct 0.00 - 0.40 mg/dL    Albumin 3.7 - 4.7 g/dL 2.3 (L)   BUN 8 - 27 mg/dL 16   Creat 0.76 - 1.27 mg/dL 1.08   Na 134 - 144 mmol/L 140   K 3.5 - 5.2 mmol/L 4.0   Cl 96 - 106 mmol/L 110 (H)   CO2 20 - 29 mmol/L 23   Glucose 65 - 99 mg/dL 194 (H)     Cancer Screening Latest Ref Rng & Units 8/4/2021   AFP, Serum 0.0 - 8.0 ng/mL 2.3   AFP-L3% 0.0 - 9.9 % Comment     Laboratory testing from 8/04/2021 reviewed in detail. Additional testing included to evaluate progression or regression of disease. Laboratory testing results from today will be communicated by My Chart. SEROLOGIES:  Serologies Latest Ref Rng & Units 2/3/2021   Hep A Ab, Total Negative   Positive (A)   Hep B Surface Ag Index <0.10   Hep B Surface Ag Interp Negative   Negative   Hep B Core Ab, Total Negative   Negative   Hep B Surface Ab mIU/mL <3.10   Hep B Surface Ab Interp NONREACTIVE   NONREACTIVE   Hep C Ab 0.0 - 0.9 s/co ratio <0.1   Ferritin 26 - 388 NG/ML 27   Iron % Saturation 20 - 50 % 26     LIVER HISTOLOGY:  3/2021: FibroScan performed at [address]. EkPa was 50.8. IQR/med 20%. . The results suggested a fibrosis level of F 4. The CAP score suggests there is hepatic steatosis. 5/2021. Slides reviewed by MLS. Fatty liver. AFL vs NAFLD. 25-33% macrovesicualr and micovesicular steatosis, mild inflammation, mild ballooning, Stage 4 fibrosis. DARIEL (111). ENDOSCOPIC PROCEDURES:  5/2021. EGD performed by MLS. No esophageal varices. No gastric varices.       RADIOLOGY:  12/2020: Liver US:  demonstrated heterogeneous hepatic echotexture with an area of focal sparing in the right lobe. Mildly enlarged spleen measuring 13.1 cm in length.    5/2021. Dynamic MRI/MRCP liver. Changes consistent with cirrhosis. No liver mass lesions. No dilated bile ducts. No bile duct strictures. Pancreatitis. Mild ascites. OTHER TESTING:  Not available or performed    FOLLOW-UP:  All of the issues listed above in the Assessment and Plan were discussed with the patient. All questions were answered. The patient expressed a clear understanding of the above. [address] [address] in 3 months. April S.  Adele, [facility] of [address] [facility] [facility] [address] [address] Vladimir Tejeda, [address] [facility], [facility] 22.  [address] [facility]

## 2021-09-14 NOTE — PROGRESS NOTES
Identified pt with two pt identifiers(name and ). Reviewed record in preparation for visit and have obtained necessary documentation. Chief Complaint   Patient presents with    Cirrhosis Of Liver     4 week f/u      Vitals:    21 1608   BP: 121/69   Pulse: 68   Resp: 17   Temp: 96.9 °F (36.1 °C)   TempSrc: Temporal   SpO2: 98%   Weight: 190 lb 6.4 oz (86.4 kg)   Height: 5' 5\" (1.651 m)   PainSc:   0 - No pain     Patient is still experiencing dizziness  Health Maintenance Review: Patient reminded of \"due or due soon\" health maintenance. I have asked the patient to contact his/her primary care provider (PCP) for follow-up on his/her health maintenance. Coordination of Care Questionnaire:  :   1) Have you been to an emergency room, urgent care, or hospitalized since your last visit? If yes, where when, and reason for visit? no       2. Have seen or consulted any other health care provider since your last visit? If yes, where when, and reason for visit? YES, PCP f/u      Patient is accompanied by self I have received verbal consent from Emmanuel Elizabeth to discuss any/all medical information while they are present in the room.

## 2021-09-16 LAB
ALBUMIN SERPL-MCNC: 4.4 G/DL (ref 3.7–4.7)
ALP SERPL-CCNC: 82 IU/L (ref 44–121)
ALT SERPL-CCNC: 21 IU/L (ref 0–44)
AMMONIA PLAS-MCNC: 79 UG/DL (ref 31–169)
AST SERPL-CCNC: 21 IU/L (ref 0–40)
BASOPHILS # BLD AUTO: 0.1 X10E3/UL (ref 0–0.2)
BASOPHILS NFR BLD AUTO: 2 %
BILIRUB DIRECT SERPL-MCNC: 0.18 MG/DL (ref 0–0.4)
BILIRUB SERPL-MCNC: 0.6 MG/DL (ref 0–1.2)
BUN SERPL-MCNC: 18 MG/DL (ref 8–27)
BUN/CREAT SERPL: 12 (ref 10–24)
CALCIUM SERPL-MCNC: 9.5 MG/DL (ref 8.6–10.2)
CHLORIDE SERPL-SCNC: 109 MMOL/L (ref 96–106)
CO2 SERPL-SCNC: 20 MMOL/L (ref 20–29)
CREAT SERPL-MCNC: 1.48 MG/DL (ref 0.76–1.27)
EOSINOPHIL # BLD AUTO: 0.2 X10E3/UL (ref 0–0.4)
EOSINOPHIL NFR BLD AUTO: 4 %
ERYTHROCYTE [DISTWIDTH] IN BLOOD BY AUTOMATED COUNT: 13.7 % (ref 11.6–15.4)
FERRITIN SERPL-MCNC: 23 NG/ML (ref 30–400)
GLUCOSE SERPL-MCNC: 104 MG/DL (ref 65–99)
HCT VFR BLD AUTO: 36.6 % (ref 37.5–51)
HGB BLD-MCNC: 12.6 G/DL (ref 13–17.7)
IMM GRANULOCYTES # BLD AUTO: 0 X10E3/UL (ref 0–0.1)
IMM GRANULOCYTES NFR BLD AUTO: 0 %
INR PPP: 1.1 (ref 0.9–1.2)
IRON SATN MFR SERPL: 16 % (ref 15–55)
IRON SERPL-MCNC: 68 UG/DL (ref 38–169)
LYMPHOCYTES # BLD AUTO: 1.6 X10E3/UL (ref 0.7–3.1)
LYMPHOCYTES NFR BLD AUTO: 36 %
MCH RBC QN AUTO: 29.1 PG (ref 26.6–33)
MCHC RBC AUTO-ENTMCNC: 34.4 G/DL (ref 31.5–35.7)
MCV RBC AUTO: 85 FL (ref 79–97)
MONOCYTES # BLD AUTO: 0.4 X10E3/UL (ref 0.1–0.9)
MONOCYTES NFR BLD AUTO: 9 %
MORPHOLOGY BLD-IMP: ABNORMAL
NEUTROPHILS # BLD AUTO: 2.3 X10E3/UL (ref 1.4–7)
NEUTROPHILS NFR BLD AUTO: 49 %
PLATELET # BLD AUTO: ABNORMAL X10E3/UL
POTASSIUM SERPL-SCNC: 4.5 MMOL/L (ref 3.5–5.2)
PROT SERPL-MCNC: 6.9 G/DL (ref 6–8.5)
PROTHROMBIN TIME: 11 SEC (ref 9.1–12)
RBC # BLD AUTO: 4.33 X10E6/UL (ref 4.14–5.8)
SODIUM SERPL-SCNC: 143 MMOL/L (ref 134–144)
TIBC SERPL-MCNC: 424 UG/DL (ref 250–450)
UIBC SERPL-MCNC: 356 UG/DL (ref 111–343)
WBC # BLD AUTO: 4.6 X10E3/UL (ref 3.4–10.8)

## 2021-09-20 ENCOUNTER — OFFICE VISIT (OUTPATIENT)
Dept: INTERNAL MEDICINE CLINIC | Age: 76
End: 2021-09-20
Payer: MEDICARE

## 2021-09-20 VITALS
BODY MASS INDEX: 32.02 KG/M2 | HEIGHT: 65 IN | DIASTOLIC BLOOD PRESSURE: 79 MMHG | SYSTOLIC BLOOD PRESSURE: 137 MMHG | RESPIRATION RATE: 16 BRPM | OXYGEN SATURATION: 98 % | TEMPERATURE: 97.8 F | HEART RATE: 69 BPM | WEIGHT: 192.2 LBS

## 2021-09-20 DIAGNOSIS — Z23 ENCOUNTER FOR IMMUNIZATION: ICD-10-CM

## 2021-09-20 DIAGNOSIS — Z86.73 HISTORY OF CVA (CEREBROVASCULAR ACCIDENT): ICD-10-CM

## 2021-09-20 DIAGNOSIS — E78.00 HYPERCHOLESTEREMIA: ICD-10-CM

## 2021-09-20 DIAGNOSIS — E11.59 TYPE 2 DIABETES MELLITUS WITH OTHER CIRCULATORY COMPLICATION, WITHOUT LONG-TERM CURRENT USE OF INSULIN (HCC): Primary | ICD-10-CM

## 2021-09-20 LAB — HBA1C MFR BLD HPLC: 5.4 % (ref 4.8–5.6)

## 2021-09-20 PROCEDURE — 83036 HEMOGLOBIN GLYCOSYLATED A1C: CPT | Performed by: INTERNAL MEDICINE

## 2021-09-20 PROCEDURE — G8417 CALC BMI ABV UP PARAM F/U: HCPCS | Performed by: INTERNAL MEDICINE

## 2021-09-20 PROCEDURE — G8752 SYS BP LESS 140: HCPCS | Performed by: INTERNAL MEDICINE

## 2021-09-20 PROCEDURE — G8432 DEP SCR NOT DOC, RNG: HCPCS | Performed by: INTERNAL MEDICINE

## 2021-09-20 PROCEDURE — G8754 DIAS BP LESS 90: HCPCS | Performed by: INTERNAL MEDICINE

## 2021-09-20 PROCEDURE — G8427 DOCREV CUR MEDS BY ELIG CLIN: HCPCS | Performed by: INTERNAL MEDICINE

## 2021-09-20 PROCEDURE — G0008 ADMIN INFLUENZA VIRUS VAC: HCPCS | Performed by: INTERNAL MEDICINE

## 2021-09-20 PROCEDURE — 3017F COLORECTAL CA SCREEN DOC REV: CPT | Performed by: INTERNAL MEDICINE

## 2021-09-20 PROCEDURE — 1101F PT FALLS ASSESS-DOCD LE1/YR: CPT | Performed by: INTERNAL MEDICINE

## 2021-09-20 PROCEDURE — 2022F DILAT RTA XM EVC RTNOPTHY: CPT | Performed by: INTERNAL MEDICINE

## 2021-09-20 PROCEDURE — G8536 NO DOC ELDER MAL SCRN: HCPCS | Performed by: INTERNAL MEDICINE

## 2021-09-20 PROCEDURE — 90694 VACC AIIV4 NO PRSRV 0.5ML IM: CPT | Performed by: INTERNAL MEDICINE

## 2021-09-20 PROCEDURE — 99214 OFFICE O/P EST MOD 30 MIN: CPT | Performed by: INTERNAL MEDICINE

## 2021-09-20 PROCEDURE — 3044F HG A1C LEVEL LT 7.0%: CPT | Performed by: INTERNAL MEDICINE

## 2021-09-20 NOTE — PROGRESS NOTES
Romayne Dexter (: 1945) is a 78 y.o. male, established patient, here for evaluation of the following chief complaint(s):  Chief Complaint   Patient presents with    Medication Evaluation    Labs     Poc A1C       Assessment and Plan:       ICD-10-CM ICD-9-CM    1. Type 2 diabetes mellitus with other circulatory complication, without long-term current use of insulin (HCC)  E11.59 250.70 AMB POC HEMOGLOBIN A1C   2. Hypercholesteremia  E78.00 272.0    3. History of CVA (cerebrovascular accident)  Z86.73 V12.54    4. Encounter for immunization  Z23 V03.89 FLU (FLUAD QUAD INFLUENZA VACCINE,QUAD,ADJUVANTED)      ADMIN INFLUENZA VIRUS VAC       1. Monitoring and follow-up reviewed. 2,3. Continue current medication with fasting labs at follow-up. 4.  Immunization(s) reviewed and updated at visit. Follow-up and Dispositions    · Return in about 3 months (around 2021), or if symptoms worsen or fail to improve, for medication follow-up, referral follow-up, fasting labs. lab results and schedule of future lab studies reviewed with patient  reviewed medications and side effects in detail    For additional documentation of information and/or recommendations discussed this visit, please see notes in instructions. Plan and evaluation (above) reviewed with pt at visit  Patient voiced understanding of plan and provided with time to ask/review questions. After Visit Summary (AVS) provided to pt after visit with additional instructions as needed/reviewed. Future Appointments   Date Time Provider Ping Almaraz   2021  9:00 AM SPT US 1 SPTRUS SPT   2021  9:40 AM MD ALICIA Lim AMB   2021  1:00 PM Cleemntina Angulo NP LIVR BS AMB   --Updated future visits after patient check-out. History of Present Illness:     Notes (nursing/rooming note copied below in italics):  Pt states he thought this visit was just for labs today.  He did not bring in any medications for review, and is not sure of what he is taking, but did answer yes to everything asked. Seen by hepatology 9/14 and changed BP medication. Updated labs there also--they have reviewed with him in [facility]. He notes [facility] is adjusting his metformin. He sees them again next month. He has no problems to address other than above. He feels great today. Has had dizziness, but seeing neurology this week. Nursing screenings reviewed by provider at visit. Prior to Admission medications    Medication Sig Start Date End Date Taking? Authorizing Provider   FreeStyle Ashlee 14 Day Sensor kit  8/26/21  Yes Provider, Historical   lisinopriL (PRINIVIL, ZESTRIL) 10 mg tablet Take 1 Tablet by mouth daily. 9/14/21  Yes Clementina Angulo NP   OTHER [facility]   Yes Provider, Historical   cyanocobalamin 1,000 mcg tablet Take 1,000 mcg by mouth daily. Yes Provider, Historical   pantoprazole (PROTONIX) 20 mg tablet Take 2 Tablets by mouth daily. 7/20/21  Yes Shine Couch MD   simvastatin (ZOCOR) 20 mg tablet Take 20 mg by mouth nightly. Yes Provider, Historical   aspirin delayed-release 81 mg tablet Take 1 Tab by mouth daily. 5/29/19  Yes Ira Sullivan NP   metFORMIN (GLUCOPHAGE) 500 mg tablet Take 1,000 mg by mouth two (2) times daily (with meals). Patient takes 1/2 tab in the AM and 1/2 tab @ HS   Yes Provider, Historical   multivitamin, tx-iron-ca-min (THERA-M w/ IRON) 9 mg iron-400 mcg tab tablet Take 1 Tablet by mouth daily. Provider, Historical        ROS    Vitals:    09/20/21 1121 09/20/21 1202   BP: (!) 142/79 137/79   Pulse: 69    Resp: 16    Temp: 97.8 °F (36.6 °C)    TempSrc: Oral    SpO2: 98%    Weight: 192 lb 3.2 oz (87.2 kg)    Height: 5' 5\" (1.651 m)    PainSc:   0 - No pain       Body mass index is 31.98 kg/m². Physical Exam:     Physical Exam  Vitals and nursing note reviewed. Constitutional:       General: He is not in acute distress. Appearance: Normal appearance. He is well-developed. He is not diaphoretic. HENT:      Head: Normocephalic and atraumatic. Mouth/Throat:      Mouth: Mucous membranes are moist.   Eyes:      General: No scleral icterus. Right eye: No discharge. Left eye: No discharge. Conjunctiva/sclera: Conjunctivae normal.   Cardiovascular:      Rate and Rhythm: Normal rate and regular rhythm. Pulses: Normal pulses. Heart sounds: Normal heart sounds. No murmur heard. No friction rub. No gallop. Pulmonary:      Effort: Pulmonary effort is normal. No respiratory distress. Breath sounds: Normal breath sounds. No stridor. No wheezing, rhonchi or rales. Abdominal:      General: Bowel sounds are normal. There is no distension. Palpations: Abdomen is soft. Tenderness: There is no abdominal tenderness. There is no guarding or rebound. Musculoskeletal:         General: No swelling, tenderness or deformity. Comments: Trace-1+ LE edema bilat. Skin:     General: Skin is warm. Coloration: Skin is not jaundiced or pale. Findings: No bruising, erythema or rash. Neurological:      General: No focal deficit present. Mental Status: He is alert. Motor: No abnormal muscle tone. Coordination: Coordination normal.      Gait: Gait normal.   Psychiatric:         Mood and Affect: Mood normal.         Behavior: Behavior normal.         Thought Content: Thought content normal.         Judgment: Judgment normal.       Results for orders placed or performed in visit on 09/20/21   AMB POC HEMOGLOBIN A1C   Result Value Ref Range    Hemoglobin A1c (POC) 5.4 4.8 - 5.6 %             A1c              eAg  5.1 100   5.2 103   5.3 105   5.4 108   5.5 111   5.6 114   5.7 117   5.8 120   5.9 123   6.0 125       An electronic signature was used to authenticate this note.   -- Nano Riley MD

## 2021-09-20 NOTE — PROGRESS NOTES
Rm 16  Recent Travel Screening and Travel History documentation     Travel Screening     Question   Response    In the last month, have you been in contact with someone who was confirmed or suspected to have Coronavirus / COVID-19? No / Unsure    Have you had a COVID-19 viral test in the last 14 days? No    Do you have any of the following new or worsening symptoms? None of these    Have you traveled internationally or domestically in the last month? No      Travel History   Travel since 08/20/21     No documented travel since 08/20/21          Chief Complaint   Patient presents with    Medication Evaluation    Labs     Poc A1C   Pt states he thought this visit was just for labs today. He did not bring in any medications for review, and is not sure of what he is taking, but did answer yes to everything asked. 1. Have you been to the ER, urgent care clinic since your last visit? Hospitalized since your last visit? No    2. Have you seen or consulted any other health care providers outside of the [address] [place] [address] since your last visit? Include any pap smears or colon screening. No                                Health Maintenance Due   Topic Date Due    Eye Exam Retinal or Dilated  Never done    DTaP/Tdap/Td series (1 - Tdap) Never done    Low dose CT lung screening  Never done    Pneumococcal 65+ years (1 of 1 - PPSV23) Never done    Flu Vaccine (1) 09/01/2021           3 most recent PHQ Screens 9/20/2021   Little interest or pleasure in doing things Not at all   Feeling down, depressed, irritable, or hopeless -   Total Score PHQ 2 -     Fall Risk Assessment, last 12 mths 9/20/2021   Able to walk? Yes   Fall in past 12 months? 0   Do you feel unsteady? 0   Are you worried about falling 0           Fall Risk Assessment, last 12 mths 9/20/2021   Able to walk? Yes   Fall in past 12 months? 0   Do you feel unsteady?  0   Are you worried about falling 0             Learning Assessment 8/14/2019 PRIMARY LEARNER Patient   PRIMARY LANGUAGE ENGLISH   LEARNER PREFERENCE PRIMARY DEMONSTRATION   ANSWERED BY self   RELATIONSHIP SELF         An After Visit Summary was printed and given to the patient. Charles Tan

## 2021-09-20 NOTE — PATIENT INSTRUCTIONS
Results for orders placed or performed in visit on 09/20/21   AMB POC HEMOGLOBIN A1C   Result Value Ref Range    Hemoglobin A1c (POC) 5.4 4.8 - 5.6 %       1. The hemoglobin A1c value above correlates with an average blood sugar/glucose of 108. 2.  Please review whether neurology wants you to be back on simvastatin or another statin when you see Dr. Yunier Griffiths this week.

## 2021-09-20 NOTE — PROGRESS NOTES
My chart message sent to the patient regarding the blood work results. The liver numbers and function are normal. The kidney numbers were elevated. The iron counts were slightly low but the blood counts have improved. The ammonia level is in the normal range. Will continue to monitor closely.

## 2021-09-21 ENCOUNTER — TELEPHONE (OUTPATIENT)
Dept: NEUROLOGY | Age: 76
End: 2021-09-21

## 2021-09-21 ENCOUNTER — HOSPITAL ENCOUNTER (OUTPATIENT)
Dept: ULTRASOUND IMAGING | Age: 76
Discharge: HOME | End: 2021-09-21
Attending: NURSE PRACTITIONER
Payer: MEDICARE

## 2021-09-21 DIAGNOSIS — K74.60 CIRRHOSIS OF LIVER WITHOUT ASCITES, UNSPECIFIED HEPATIC CIRRHOSIS TYPE (HCC): ICD-10-CM

## 2021-09-21 PROCEDURE — 76700 US EXAM ABDOM COMPLETE: CPT

## 2021-09-21 NOTE — TELEPHONE ENCOUNTER
----- Message from GenieBelt sent at 9/21/2021  4:04 PM EDT -----  Regarding: Dr. Colbert Breath / Telephone  Patient return call    Caller's first and last name and relationship (if not the patient): self      Best contact number(s): [phone]      Whose call is being returned: n/a      Details to clarify the request: pt was returning a call. Wanted a call back to speak with someone and possibly r/s the cancelled appt.  (I offered to r/s but they want a call back)      GenieBelt

## 2021-09-21 NOTE — TELEPHONE ENCOUNTER
Called pt. Verified. Inform pt that he has an appt w/ Dr. Will Points on 9/23 at 9:40am. Inform him that I have to cancel that appt because pt already has establish care w/ Dr. Anjana Molina. Pt states that he had already call to cancel appt. I inform pt that I will cancel appt. Pt verbalizes understanding.

## 2021-09-24 NOTE — PROGRESS NOTES
My chart message sent to the patient regarding the ultrasound which shows a stable lesion in the right lobe previously noted to be fat sparring. The left lobe lesion previously thought to be a cyst is also stable. There are no concerning masses or lesions.

## 2021-12-14 NOTE — TELEPHONE ENCOUNTER
Last visit 09/20/2021 MD Siri Little   Next appointment 12/28/2021 MD Siri Little   Previous refill encounter(s)   07/20/2021 Protonix #180 with 1 refill     Requested Prescriptions     Pending Prescriptions Disp Refills    pantoprazole (PROTONIX) 20 mg tablet [Pharmacy Med Name: PANTOPRAZOLE SOD DR 20 MG TAB] 60 Tablet 0     Sig: Take 2 Tablets by mouth daily.

## 2021-12-18 RX ORDER — PANTOPRAZOLE SODIUM 20 MG/1
40 TABLET, DELAYED RELEASE ORAL DAILY
Qty: 60 TABLET | Refills: 5 | Status: SHIPPED | OUTPATIENT
Start: 2021-12-18 | End: 2022-09-26

## 2021-12-19 NOTE — TELEPHONE ENCOUNTER
Refill request(s) approved--pantoprazole. Requested Prescriptions     Signed Prescriptions Disp Refills    pantoprazole (PROTONIX) 20 mg tablet 60 Tablet 5     Sig: Take 2 Tablets by mouth daily.      Authorizing Provider: Tayler Oliveira

## 2021-12-28 ENCOUNTER — OFFICE VISIT (OUTPATIENT)
Dept: INTERNAL MEDICINE CLINIC | Age: 76
End: 2021-12-28
Payer: MEDICARE

## 2021-12-28 VITALS
OXYGEN SATURATION: 95 % | BODY MASS INDEX: 35.69 KG/M2 | TEMPERATURE: 97.9 F | RESPIRATION RATE: 16 BRPM | WEIGHT: 214.2 LBS | HEART RATE: 69 BPM | DIASTOLIC BLOOD PRESSURE: 71 MMHG | HEIGHT: 65 IN | SYSTOLIC BLOOD PRESSURE: 125 MMHG

## 2021-12-28 DIAGNOSIS — R39.198 DIFFICULTY IN URINATION: ICD-10-CM

## 2021-12-28 DIAGNOSIS — R05.9 COUGH: ICD-10-CM

## 2021-12-28 DIAGNOSIS — R79.89 ELEVATED SERUM CREATININE: ICD-10-CM

## 2021-12-28 DIAGNOSIS — E11.59 TYPE 2 DIABETES MELLITUS WITH OTHER CIRCULATORY COMPLICATION, WITHOUT LONG-TERM CURRENT USE OF INSULIN (HCC): Primary | ICD-10-CM

## 2021-12-28 DIAGNOSIS — R93.89 ABNORMAL CXR: ICD-10-CM

## 2021-12-28 DIAGNOSIS — R42 DIZZINESS: ICD-10-CM

## 2021-12-28 DIAGNOSIS — D64.9 ANEMIA, UNSPECIFIED TYPE: ICD-10-CM

## 2021-12-28 DIAGNOSIS — R33.9 URINARY RETENTION: ICD-10-CM

## 2021-12-28 LAB
BILIRUB UR QL STRIP: NEGATIVE
GLUCOSE UR-MCNC: NEGATIVE MG/DL
KETONES P FAST UR STRIP-MCNC: NEGATIVE MG/DL
PH UR STRIP: 5.5 [PH] (ref 4.6–8)
PROT UR QL STRIP: NEGATIVE
SP GR UR STRIP: 1.02 (ref 1–1.03)
UA UROBILINOGEN AMB POC: NORMAL (ref 0.2–1)
URINALYSIS CLARITY POC: CLEAR
URINALYSIS COLOR POC: YELLOW
URINE BLOOD POC: NORMAL
URINE LEUKOCYTES POC: NEGATIVE
URINE NITRITES POC: NEGATIVE

## 2021-12-28 PROCEDURE — G8536 NO DOC ELDER MAL SCRN: HCPCS | Performed by: INTERNAL MEDICINE

## 2021-12-28 PROCEDURE — G8427 DOCREV CUR MEDS BY ELIG CLIN: HCPCS | Performed by: INTERNAL MEDICINE

## 2021-12-28 PROCEDURE — 1101F PT FALLS ASSESS-DOCD LE1/YR: CPT | Performed by: INTERNAL MEDICINE

## 2021-12-28 PROCEDURE — G8752 SYS BP LESS 140: HCPCS | Performed by: INTERNAL MEDICINE

## 2021-12-28 PROCEDURE — 2022F DILAT RTA XM EVC RTNOPTHY: CPT | Performed by: INTERNAL MEDICINE

## 2021-12-28 PROCEDURE — 3044F HG A1C LEVEL LT 7.0%: CPT | Performed by: INTERNAL MEDICINE

## 2021-12-28 PROCEDURE — G8754 DIAS BP LESS 90: HCPCS | Performed by: INTERNAL MEDICINE

## 2021-12-28 PROCEDURE — 99215 OFFICE O/P EST HI 40 MIN: CPT | Performed by: INTERNAL MEDICINE

## 2021-12-28 PROCEDURE — G8510 SCR DEP NEG, NO PLAN REQD: HCPCS | Performed by: INTERNAL MEDICINE

## 2021-12-28 PROCEDURE — G8417 CALC BMI ABV UP PARAM F/U: HCPCS | Performed by: INTERNAL MEDICINE

## 2021-12-28 PROCEDURE — 81003 URINALYSIS AUTO W/O SCOPE: CPT | Performed by: INTERNAL MEDICINE

## 2021-12-28 PROCEDURE — 3017F COLORECTAL CA SCREEN DOC REV: CPT | Performed by: INTERNAL MEDICINE

## 2021-12-28 RX ORDER — ENALAPRIL MALEATE 20 MG/1
TABLET ORAL
COMMUNITY
Start: 2021-11-20 | End: 2022-03-07

## 2021-12-28 NOTE — PROGRESS NOTES
Hayder Vazquez (: 1945) is a 78 y.o. male, established patient, here for evaluation of the following chief complaint(s):  Chief Complaint   Patient presents with    Medication Evaluation    Referral Follow Up       Assessment and Plan:       ICD-10-CM ICD-9-CM    1. Type 2 diabetes mellitus with other circulatory complication, without long-term current use of insulin (HCC)  E11.59 250.70 RENAL FUNCTION PANEL      HEMOGLOBIN A1C WITH EAG      MICROALBUMIN, UR, RAND W/ MICROALB/CREAT RATIO      HEMOGLOBIN A1C WITH EAG      RENAL FUNCTION PANEL      MICROALBUMIN, UR, RAND W/ MICROALB/CREAT RATIO   2. Dizziness  R42 780.4 REFERRAL TO ENT-OTOLARYNGOLOGY   3. Elevated serum creatinine  R79.89 790.99 RENAL FUNCTION PANEL      RENAL FUNCTION PANEL   4. Anemia, unspecified type  D64.9 285.9 CBC WITH AUTOMATED DIFF      CBC WITH AUTOMATED DIFF   5. Urinary retention  R33.9 788.20 REFERRAL TO UROLOGY      AMB POC URINALYSIS DIP STICK AUTO W/O MICRO   6. Difficulty in urination  R39.198 788.99 REFERRAL TO UROLOGY      AMB POC URINALYSIS DIP STICK AUTO W/O MICRO   7. Cough  R05.9 786.2 XR CHEST PA LAT   8. Abnormal CXR  R93.89 793.2 XR CHEST PA LAT       1,3,4. Lab testing reviewed. Lab monitoring reviewed. Continue current medications pending lab results/review. 2.  Reviewed referral to alternative ENT provider. He is frustrated about lack of diagnosis. Reviewed ENT and Neuro evaluation given history, but frustrated due to duration of symptoms and eval to date not helping symptoms. He will consider what he wants to do for further workup to discuss again at follow-up below, or sooner as needed. 5,6:  Referral(s) and referral coordination reviewed with patient at visit.    7,8:  Imaging reviewed.       Follow-up and Dispositions    · Return in about 3 months (around 3/28/2022), or if symptoms worsen or fail to improve, for medication follow-up, referral follow-up.       reviewed medications and side effects in detail    For additional documentation of information and/or recommendations discussed this visit, please see notes in instructions. Plan and evaluation (above) reviewed with pt at visit  Patient voiced understanding of plan and provided with time to ask/review questions. After Visit Summary (AVS) provided to pt after visit with additional instructions as needed/reviewed. Future Appointments   Date Time Provider Ping Almaraz   2/8/2022 10:40 AM DO ALICIA Alas AMB   3/4/2022  2:20 PM Clementina Angulo, NP [facility]   --Updated future visits after patient check-out. On this date 12/28/2021 I have spent 44 minutes reviewing previous notes, test results and face to face with the patient discussing the diagnosis and importance of compliance with the treatment plan as well as documenting on the day of the visit. Addendum 1-5-22:  CXR done yesterday. [employer] message to pt with results:  No acute process. History of Present Illness:     Notes (nursing/rooming note copied below in italics):  Last week, [facility] visit. covid sx    External result from 12/20 with COVID negative. He had questions with hepatology about stopping lisinopril. He noted in [employer] messages to hepatology due to concerns about recalls due to carcinogens in manufacturing process. He is not sure who prescribed the enalapril for him. He is not taking lisinopril at this time. We are prescribing his protonix. His [facility] providers prescribe his other meds. DORA Angulo has prescribed lisinopril 9-14-21. He has historical enalapril from Nov 2021 also. Reviewed with pt. Last labs with hepatology reviewed from Sept 2021.   Has last A1c here Sept.    Lab Results   Component Value Date/Time    Hemoglobin A1c 5.9 (H) 06/18/2021 02:55 PM    Hemoglobin A1c (POC) 5.4 09/20/2021 11:36 AM    Hemoglobin A1c, External 5.6 01/12/2021 12:00 AM     In interim from Sept 2021 hepatology labs and prior ones in Aug 2021, Hgb improved, and slight worsening creatinine. [facility] He was given [facility], and improved, but has cough. He took the antibiotic as prescribed for 10 days--BID. He feels better, but still coughing. He was also given steroid. Reviewed since he has memory problems from stroke, he should bring us records to help with mgt. He still has dizziness and HA. He has seen ENT. He has intermittent periods when he will not have dizziness. He notes he is not seeing ENT at this time--he had noted seeing Daysi Aranda in past when [facility]. He just stopped seeing ENT because they cancelled his appt and then when re-scheduled he decided he didn't want to see anymore. He would prefer to see another ENT provider. He has had dizziness for 13yrs. He is frustrated by not being able to find a cause, but not clear on what he wants to do to evaluate/manage further. He has poor history of prior eval, just notes everything has been done. Nursing screenings reviewed by provider at visit. Prior to Admission medications    Medication Sig Start Date End Date Taking? Authorizing Provider   enalapril (VASOTEC) 20 mg tablet  11/20/21  Yes Provider, Historical   guaiFENesin (MUCINEX) 1,200 mg Ta12 ER tablet Take 1,200 mg by mouth daily. Yes Provider, Historical   pantoprazole (PROTONIX) 20 mg tablet Take 2 Tablets by mouth daily. 12/18/21  Yes Manuel Elizabeth MD   FreeStyle Ashlee 14 Day Sensor kit  8/26/21  Yes Provider, Historical   cyanocobalamin 1,000 mcg tablet Take 1,000 mcg by mouth daily. Yes Provider, Historical   simvastatin (ZOCOR) 20 mg tablet Take 20 mg by mouth nightly. Yes Provider, Historical   aspirin delayed-release 81 mg tablet Take 1 Tab by mouth daily. 5/29/19  Yes Evelyn Ornelas NP   metFORMIN (GLUCOPHAGE) 500 mg tablet Take 1,000 mg by mouth two (2) times daily (with meals).  Patient takes 1/2 tab in the AM and 1/2 tab @ HS   Yes Provider, Historical   lisinopriL (PRINIVIL, ZESTRIL) 10 mg tablet Take 1 Tablet by mouth daily. Patient not taking: Reported on 12/28/2021 9/14/21   Clementina Angulo, NP   OTHER [facility]    Provider, Historical      Pt to clarify whether taking lisinopril or enalpril--reviewed at visit--unclear at time of visit which medication he is taking. ROS    Vitals:    12/28/21 1015 12/28/21 1035   BP: (!) 149/77 125/71   Pulse: 69    Resp: 16    Temp: 97.9 °F (36.6 °C)    TempSrc: Oral    SpO2: 95%    Weight: 214 lb 3.2 oz (97.2 kg)    Height: 5' 5\" (1.651 m)    PainSc:   0 - No pain       Body mass index is 35.64 kg/m². Physical Exam:     Physical Exam  Vitals and nursing note reviewed. Constitutional:       General: He is not in acute distress. Appearance: Normal appearance. He is well-developed. He is not diaphoretic. HENT:      Head: Normocephalic and atraumatic. Mouth/Throat:      Mouth: Mucous membranes are moist.   Eyes:      General: No scleral icterus. Right eye: No discharge. Left eye: No discharge. Conjunctiva/sclera: Conjunctivae normal.   Cardiovascular:      Rate and Rhythm: Normal rate and regular rhythm. Pulses: Normal pulses. Heart sounds: Normal heart sounds. No murmur heard. No friction rub. No gallop. Pulmonary:      Effort: Pulmonary effort is normal. No respiratory distress. Breath sounds: Normal breath sounds. No stridor. No wheezing, rhonchi or rales. Abdominal:      General: Bowel sounds are normal. There is no distension. Palpations: Abdomen is soft. Tenderness: There is no abdominal tenderness. There is no guarding or rebound. Musculoskeletal:         General: No swelling, tenderness or deformity. Cervical back: Neck supple. No rigidity. No muscular tenderness. Right lower leg: No edema. Left lower leg: No edema.    Lymphadenopathy:      Cervical: No cervical adenopathy. Skin:     General: Skin is warm. Coloration: Skin is not jaundiced or pale. Findings: No bruising, erythema or rash. Neurological:      General: No focal deficit present. Mental Status: He is alert. Motor: No abnormal muscle tone. Coordination: Coordination normal.      Gait: Gait normal.   Psychiatric:         Mood and Affect: Mood normal.         Behavior: Behavior normal.         Thought Content: Thought content normal.         Judgment: Judgment normal.       Results for orders placed or performed in visit on 12/28/21   AMB POC URINALYSIS DIP STICK AUTO W/O MICRO   Result Value Ref Range    Color (UA POC) Yellow     Clarity (UA POC) Clear     Glucose (UA POC) Negative Negative    Bilirubin (UA POC) Negative Negative    Ketones (UA POC) Negative Negative    Specific gravity (UA POC) 1.020 1.001 - 1.035    Blood (UA POC) 1+ Negative    pH (UA POC) 5.5 4.6 - 8.0    Protein (UA POC) Negative Negative    Urobilinogen (UA POC) 0.2 mg/dL 0.2 - 1    Nitrites (UA POC) Negative Negative    Leukocyte esterase (UA POC) Negative Negative       Pt had left clinic prior to UA resulting--sent him [employer] message as planned. An electronic signature was used to authenticate this note.   -- Monty Chavez MD

## 2021-12-28 NOTE — PROGRESS NOTES
Rm 17    Chief Complaint   Patient presents with    Medication Evaluation    Referral Follow Up         1. Have you been to the ER, urgent care clinic since your last visit? Hospitalized since your last visit? Last week, [facility] visit. covid sx    2. Have you seen or consulted any other health care providers outside of the [address] [place] [address] since your last visit? Include any pap smears or colon screening. No        Health Maintenance Due   Topic Date Due    Eye Exam Retinal or Dilated  Never done    DTaP/Tdap/Td series (1 - Tdap) Never done    Low dose CT lung screening  Never done    Pneumococcal 65+ years (1 of 1 - PPSV23) Never done    COVID-19 Vaccine (3 - Booster) 10/10/2021    MICROALBUMIN Q1  01/12/2022           3 most recent PHQ Screens 12/28/2021   Little interest or pleasure in doing things Not at all   Feeling down, depressed, irritable, or hopeless Not at all   Total Score PHQ 2 0     Fall Risk Assessment, last 12 mths 12/28/2021   Able to walk? Yes   Fall in past 12 months? 0   Do you feel unsteady? 0   Are you worried about falling 0           Learning Assessment 8/14/2019   PRIMARY LEARNER Patient   PRIMARY LANGUAGE ENGLISH   LEARNER PREFERENCE PRIMARY DEMONSTRATION   ANSWERED BY self   RELATIONSHIP SELF         An After Visit Summary was printed and given to the patient.

## 2021-12-28 NOTE — PATIENT INSTRUCTIONS
1.  Once you consider what you want to do for further evaluation of your dizziness, we can also refer you to neurology to clarify if related to prior stroke. 2.  Please follow the following instructions to process/authorize your referral, if needed:    Referrals processing  Please verify with your insurance IF you need referral authorization submitted. For insurance plans which require this, please follow the following steps. FAILURE TO DO SO MAY RESULT IN INABILITY TO SEE THE SPECIALIST YOU HAVE BEEN REFERRED TO (once you are scheduled to see them). 1. Call and schedule appointment with specialist  2. Call our clinic and leave message with provider name, and date of appointment  3. We will then submit the referral to your insurance. This process takes 2-5 business days. If you have questions about scheduling or authorizing referral, you can review with our referral coordinator. You can review with her today if available/if you have time, or you can call to review once you have made your referral/appointment. If you are not sure if you need referral authorizations, please review with the referral coordinator(s), either prior to or after you have made the appointment, as reviewed. 3.  No results found for this visit on 12/28/21.

## 2021-12-29 LAB
ALBUMIN SERPL-MCNC: 4.3 G/DL (ref 3.7–4.7)
ALBUMIN/CREAT UR: 12 MG/G CREAT (ref 0–29)
BASOPHILS # BLD AUTO: 0.1 X10E3/UL (ref 0–0.2)
BASOPHILS NFR BLD AUTO: 1 %
BUN SERPL-MCNC: 14 MG/DL (ref 8–27)
BUN/CREAT SERPL: 9 (ref 10–24)
CALCIUM SERPL-MCNC: 9.4 MG/DL (ref 8.6–10.2)
CHLORIDE SERPL-SCNC: 111 MMOL/L (ref 96–106)
CO2 SERPL-SCNC: 22 MMOL/L (ref 20–29)
CREAT SERPL-MCNC: 1.55 MG/DL (ref 0.76–1.27)
CREAT UR-MCNC: 122.4 MG/DL
EOSINOPHIL # BLD AUTO: 0.2 X10E3/UL (ref 0–0.4)
EOSINOPHIL NFR BLD AUTO: 4 %
ERYTHROCYTE [DISTWIDTH] IN BLOOD BY AUTOMATED COUNT: 14.4 % (ref 11.6–15.4)
EST. AVERAGE GLUCOSE BLD GHB EST-MCNC: 148 MG/DL
GLUCOSE SERPL-MCNC: 118 MG/DL (ref 65–99)
HBA1C MFR BLD: 6.8 % (ref 4.8–5.6)
HCT VFR BLD AUTO: 37.6 % (ref 37.5–51)
HGB BLD-MCNC: 12.3 G/DL (ref 13–17.7)
IMM GRANULOCYTES # BLD AUTO: 0 X10E3/UL (ref 0–0.1)
IMM GRANULOCYTES NFR BLD AUTO: 1 %
LYMPHOCYTES # BLD AUTO: 1.5 X10E3/UL (ref 0.7–3.1)
LYMPHOCYTES NFR BLD AUTO: 35 %
MCH RBC QN AUTO: 27.8 PG (ref 26.6–33)
MCHC RBC AUTO-ENTMCNC: 32.7 G/DL (ref 31.5–35.7)
MCV RBC AUTO: 85 FL (ref 79–97)
MICROALBUMIN UR-MCNC: 14.2 UG/ML
MONOCYTES # BLD AUTO: 0.4 X10E3/UL (ref 0.1–0.9)
MONOCYTES NFR BLD AUTO: 10 %
MORPHOLOGY BLD-IMP: ABNORMAL
NEUTROPHILS # BLD AUTO: 2.1 X10E3/UL (ref 1.4–7)
NEUTROPHILS NFR BLD AUTO: 49 %
PHOSPHATE SERPL-MCNC: 2.8 MG/DL (ref 2.8–4.1)
PLATELET # BLD AUTO: ABNORMAL X10E3/UL
POTASSIUM SERPL-SCNC: 4.5 MMOL/L (ref 3.5–5.2)
RBC # BLD AUTO: 4.42 X10E6/UL (ref 4.14–5.8)
SODIUM SERPL-SCNC: 148 MMOL/L (ref 134–144)
SPECIMEN STATUS REPORT, ROLRST: NORMAL
WBC # BLD AUTO: 4.3 X10E3/UL (ref 3.4–10.8)

## 2022-01-04 ENCOUNTER — HOSPITAL ENCOUNTER (OUTPATIENT)
Dept: GENERAL RADIOLOGY | Age: 77
Discharge: HOME | End: 2022-01-04

## 2022-01-04 DIAGNOSIS — R05.9 COUGH: ICD-10-CM

## 2022-01-04 DIAGNOSIS — R93.89 ABNORMAL CXR: ICD-10-CM

## 2022-02-01 ENCOUNTER — TELEPHONE (OUTPATIENT)
Dept: INTERNAL MEDICINE CLINIC | Age: 77
End: 2022-02-01

## 2022-02-01 NOTE — TELEPHONE ENCOUNTER
----- Message from Elias Burnett sent at 1/11/2022  9:32 AM EST -----  Subject: Message to Provider    QUESTIONS  Information for Provider? returning your call  ---------------------------------------------------------------------------  --------------  CALL BACK INFO  What is the best way for the office to contact you? OK to leave message on   voicemail  Preferred Call Back Phone Number? [phone]  ---------------------------------------------------------------------------  --------------  SCRIPT ANSWERS  Relationship to Patient? Other  Representative Name? Daniel Rojas  Is the Representative on the appropriate HIPAA document in Epic?  Yes

## 2022-02-02 NOTE — TELEPHONE ENCOUNTER
Future Appointments:  Future Appointments   Date Time Provider Ping Jordani   2/8/2022 10:40 AM DO ALICIA Edmond AMB   3/4/2022  2:20 PM Clementina Angulo NP [facility] AMB        Last Appointment With Me:  12/28/2021     Requested Prescriptions      No prescriptions requested or ordered in this encounter     Original call was to review xray results those have been passed along to the patient no further action required at this time

## 2022-03-04 ENCOUNTER — OFFICE VISIT (OUTPATIENT)
Dept: HEMATOLOGY | Age: 77
End: 2022-03-04
Payer: MEDICARE

## 2022-03-04 VITALS
TEMPERATURE: 98.1 F | WEIGHT: 220 LBS | DIASTOLIC BLOOD PRESSURE: 84 MMHG | BODY MASS INDEX: 36.65 KG/M2 | HEART RATE: 72 BPM | HEIGHT: 65 IN | SYSTOLIC BLOOD PRESSURE: 142 MMHG

## 2022-03-04 DIAGNOSIS — R79.89 ELEVATED SERUM CREATININE: ICD-10-CM

## 2022-03-04 DIAGNOSIS — K74.60 CIRRHOSIS OF LIVER WITHOUT ASCITES, UNSPECIFIED HEPATIC CIRRHOSIS TYPE (HCC): Primary | ICD-10-CM

## 2022-03-04 DIAGNOSIS — E66.01 SEVERE OBESITY (BMI 35.0-39.9) WITH COMORBIDITY (HCC): ICD-10-CM

## 2022-03-04 PROCEDURE — G8417 CALC BMI ABV UP PARAM F/U: HCPCS | Performed by: NURSE PRACTITIONER

## 2022-03-04 PROCEDURE — G8427 DOCREV CUR MEDS BY ELIG CLIN: HCPCS | Performed by: NURSE PRACTITIONER

## 2022-03-04 PROCEDURE — G8432 DEP SCR NOT DOC, RNG: HCPCS | Performed by: NURSE PRACTITIONER

## 2022-03-04 PROCEDURE — 3017F COLORECTAL CA SCREEN DOC REV: CPT | Performed by: NURSE PRACTITIONER

## 2022-03-04 PROCEDURE — 1101F PT FALLS ASSESS-DOCD LE1/YR: CPT | Performed by: NURSE PRACTITIONER

## 2022-03-04 PROCEDURE — G8536 NO DOC ELDER MAL SCRN: HCPCS | Performed by: NURSE PRACTITIONER

## 2022-03-04 PROCEDURE — 99214 OFFICE O/P EST MOD 30 MIN: CPT | Performed by: NURSE PRACTITIONER

## 2022-03-04 RX ORDER — LISINOPRIL 10 MG/1
10 TABLET ORAL DAILY
COMMUNITY
End: 2022-07-05

## 2022-03-04 RX ORDER — LANOLIN ALCOHOL/MO/W.PET/CERES
CREAM (GRAM) TOPICAL
COMMUNITY
End: 2022-03-07

## 2022-03-04 NOTE — PROGRESS NOTES
Identified pt with two pt identifiers(name and ). Reviewed record in preparation for visit and have obtained necessary documentation. Chief Complaint   Patient presents with    Cirrhosis Of Liver     3month f/u with April      Vitals:    22 1401   BP: (!) 142/84   Pulse: 72   Temp: 98.1 °F (36.7 °C)   TempSrc: Temporal   Weight: 220 lb (99.8 kg)   Height: 5' 5\" (1.651 m)   PainSc:   0 - No pain       Health Maintenance Review: Patient reminded of \"due or due soon\" health maintenance. I have asked the patient to contact his/her primary care provider (PCP) for follow-up on his/her health maintenance. Coordination of Care Questionnaire:  :   1) Have you been to an emergency room, urgent care, or hospitalized since your last visit? If yes, where when, and reason for visit? no       2. Have seen or consulted any other health care provider since your last visit? If yes, where when, and reason for visit? NO      Patient is accompanied by self I have received verbal consent from Bret Aguilar to discuss any/all medical information while they are present in the room.

## 2022-03-04 NOTE — PROGRESS NOTES
[place] [address] [address] [facility] [address]      Hussein Benito MD, Rojas Soto, Nathan Alvarez MD, MPH      GRAY Schmidt-MARILIN Lawrence, [facility]     Clementina Angulo, [facility]   Sameera Crowe, [facility]    Constance Elizabeth, [facility]       Bernard Morris Jasvir De Jones [address]    at [address], [facility] Scarlett Kapoor  22.    [phone]    FAX: [address] [place] [address]    at [address], [address], [address]    [phone]    FAX: [phone]       Patient Care Team:  Dean Geronimo MD as PCP - General (Infectious Disease)  Dean Geronimo MD as PCP - [facility]  Maximino Mohan MD (General Surgery)      Problem List  Date Reviewed: 12/28/2021          Codes Class Noted    History of cholecystectomy ICD-10-CM: Z90.49  ICD-9-CM: V45.79  6/25/2021        Gallstone pancreatitis ICD-10-CM: K85.10  ICD-9-CM: 577.0, 574.20  6/3/2021        Cirrhosis ([facility] [address] ICD-10-CM: K74.60  ICD-9-CM: 571.5  3/12/2021        History of CVA (cerebrovascular accident) ICD-10-CM: Z86.73  ICD-9-CM: V12.54  2/3/2021        Thrombocytopenia ([facility] [address] ICD-10-CM: D69.6  ICD-9-CM: 287.5  12/3/2020        Class 1 obesity due to excess calories with serious comorbidity and body mass index (BMI) of 32.0 to 32.9 in adult ICD-10-CM: E66.09, Z68.32  ICD-9-CM: 278.00, V85.32  12/3/2020        Diabetes mellitus ([facility] [address] ICD-10-CM: E11.9  ICD-9-CM: 250.00  12/3/2020              Hayder George [facility] for 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 The active problem list, all pertinent past medical history, medications, liver histology, endoscopic studies, and laboratory findings related to the liver disorder were reviewed and discussed with the patient. The patient is a 70year old  male who was found to have thrombocytopenia in 12/2020. A liver biopsy in 5/2021 demonstrated ALFARO with cirrhosis. Following the biopsy he developed severe abdominal pain and was found to have acute gallstone pancreatitis. This resolved with conservative treatment. He underwent a cholecystectomy 5/2021. While hospitalized he developed lower extremity edema and subsequently started on diuretics. This has now resolved and he is no longer on diuretics. Since the last office visit the patient has been in therapy for Vertigo. He had had a 30 lb weight gain due to the inability to walk from Vertigo and he had an acute upper respiratory illness in December. The patient has the following symptoms which could be attributed to the liver disorder:  Hypotension, generalized weakness. The patient is not experiencing the following symptoms which are commonly seen in this liver disorder: nausea, vomiting, pain on the right side over the liver or abdominal pain. The patient has mild limitations in functional activities which can be attributed to a past CVA. ASSESSMENT AND PLAN:  Cirrhosis  Cirrhosis is probably secondary to past alcohol use and NAFLD  The diagnosis of cirrhosis is based upon liver histology, imaging, laboratory studies    Have performed laboratory testing to monitor liver function and degree of liver injury. This included BMP, hepatic panel, CBC with platelet count and INR. Laboratory testing from 9/15/2021 reviewed in detail. Follow-up testing ordered today. The liver transaminases and ALP are normal. The liver function is normal. Platelet count is unable to be calculated due to clumping. The patient has never developed any complications of cirrhosis to date. The CTP is 5. Child class A. The MELD score is 12. The MELD is elevated to to an increase in sr. Creatinine.       ALFARO  The diagnosis is based upon liver biopsy, Fiboscan CAP score, features of metabolic syndrome, serologic studies that are negative for other causes of chronic liver disease,     A liver biopsy performed in 5/2021 demonstrates ALFARO. 25-33% macrovesicualr and micovesicular steatosis, mild inflammation, mild ballooning, and cirrhosis. Fibroscan in 3/2021 demonstrated 51 kPa and  suggesting fatty liver and cirrhosis. If the patient looses 20% of current body weight, which is 36 pounds, down to a weight of of 150 pounds, steatosis should resolve. Once all steatosis has resolved inflammation will resolve. Reinforced dietary changes and exercise. Elevated Sr. Creatinine  It is unclear why the Sr. Creatinine is elevated. This could be related to DM and Hypertension. Will have him follow-up with the PCP. Vertigo  The patient states this is a chronic issues since the CVA. It has impacted his gait and he is fearful of having a fall. The BP was slightly low but he reports higher readings at home. Will refer to Neurology for further evaluation. Counseling for diet and weight loss in patients with confirmed or suspected NAFLD  The patient was counseled regarding diet and exercise to achieve weight loss. The best diet for patients with fatty liver is one very low in carbohydrates and enriched with protein such as an Jaqueline's program.      The patient was told not to consume any food products and drinks containing fructose as this enhances hepatic fat synthesis. Screening for Esophageal varices   The patient does not have esophageal or gastric varices. The last EGD to assess for varices was performed in 5/2021. No varices found. Hepatic encephalopathy   Overt HE has not developed to date. There is no need for treatment with lactulose and/or Xifaxan at this time. Anemia   This is due to multifactorial causes including recent prolonged hospitalization and surgery. The HGB has improved.    Previous iron studies were normal. Will check again at the next office visit. Thrombocytopenia   This is secondary to cirrhosis. There is no evidence of overt bleeding. No treatment is required. The platelet count is adequate for the patient to undergo procedures without the need for platelet transfusion or platelet growth factors. Screening for Hepatocellular Carcinoma  [address] screening was last performed with ultrasound 9/2021 and does not suggest [address] AFP was normal. Ultrasound ordered to be scheduled and AFP ordered today. Treatment of other medical problems in patients with chronic liver disease  There are no contraindications for the patient to take most medications that are necessary for treatment of other medical issues. The patient has cirrhrosis and should avoid taking NSAIDs which are associated with a higher rate of developing ROBBIE. The patient can take Any medications utilized for treatment of DM, Statins to treat hypercholesterolemia    Counseling for alcohol in patients with chronic liver disease  The patient has cirrhosis and was advised to be abstinent from all alcohol including non-alcoholic beer which does contain some alcohol. The patient has previously consumed alcohol in excess. The patient has not consumed alcohol since 2013. Osteoporosis  The risk of osteoporosis is increased in patients with cirrhosis. DEXA bone density to assess for osteoporosis has not been performed. This should be ordered by the patients primary care physician. Vaccinations   Vaccination for viral hepatitis A is not needed. The patient has serologic evidence of prior exposure or vaccination with immunity. Routine vaccinations against other bacterial and viral agents can be performed as indicated. Annual flu vaccination should be administered if indicated.       ALLERGIES  No Known Allergies    MEDICATIONS  Current Outpatient Medications   Medication Sig    cyanocobalamin (Vitamin B-12) 1,000 mcg tablet     lisinopriL (PRINIVIL, ZESTRIL) 10 mg tablet Take 10 mg by mouth daily.  guaiFENesin (MUCINEX) 1,200 mg Ta12 ER tablet Take 1,200 mg by mouth daily.  pantoprazole (PROTONIX) 20 mg tablet Take 2 Tablets by mouth daily.  FreeStyle Ashlee 14 Day Sensor kit     cyanocobalamin 1,000 mcg tablet Take 1,000 mcg by mouth daily.  simvastatin (ZOCOR) 20 mg tablet Take 20 mg by mouth nightly.  aspirin delayed-release 81 mg tablet Take 1 Tab by mouth daily.  metFORMIN (GLUCOPHAGE) 500 mg tablet Take 1,000 mg by mouth two (2) times daily (with meals). Patient takes 1/2 tab in the AM and 1/2 tab @ HS     No current facility-administered medications for this visit. SYSTEM REVIEW NOT RELATED TO LIVER DISEASE OR REVIEWED ABOVE:  Constitution systems: Negative for fever, chills, weight gain, weight loss. Eyes: Negative for visual changes. ENT: Negative for sore throat, painful swallowing. Respiratory: Negative for cough, hemoptysis, SOB. Cardiology: Negative for chest pain, palpitations. GI:  Negative for constipation or diarrhea. : Negative for urinary frequency, dysuria, hematuria, nocturia. Skin: Negative for rash. Hematology: Negative for easy bruising, blood clots. Musculo-skeletal: Negative for back pain, muscle pain, weakness. Neurologic: Negative for headaches, dizziness, vertigo, memory problems not related to HE. Psychology: Negative for anxiety, depression. FAMILY HISTORY:  There is no family history of liver disease. There is no family history of immune disorders. SOCIAL HISTORY:  The patient is   The patient has no children. The patient stopped using tobacco products in 2009  The patient has previously consumed alcohol in excess.     The patient is a retired barnes    PHYSICAL EXAMINATION:  Visit Vitals  BP (!) 142/84 (BP 1 Location: Left upper arm, BP Patient Position: Sitting, BP Cuff Size: Adult)   Pulse 72   Temp 98.1 °F (36.7 °C) (Temporal)   Ht 5' 5\" (1.651 m) Wt 220 lb (99.8 kg)   BMI 36.61 kg/m²       General: No acute distress. Eyes: Sclera anicteric. ENT: No oral lesions. Thyroid normal.  Nodes: No adenopathy. Skin: No spider angiomata. No jaundice. No palmar erythema. Respiratory: Lungs clear to auscultation. Cardiovascular: Regular heart rate. No murmurs. No JVD. Abdomen: Soft non-tender, liver size normal to percussion/palpation. Spleen not palpable. No obvious ascites. Extremities: No edema. No muscle wasting. No gross arthritic changes. Neurologic: Alert and oriented. Cranial nerves grossly intact. No asterixis.     LABORATORY STUDIES:  L[place] of [id number]  [address] & Units 3/4/2022   WBC 3.4 - 10.8 x10E3/uL 4.2   ANC 1.4 - 7.0 x10E3/uL 2.4   HGB 13.0 - 17.7 g/dL 12.1 (L)   PLT x10E3/uL CANCELED    - 450 x10E3/uL    INR 0.9 - 1.2 1.0   AST 0 - 40 IU/L 25   ALT 0 - 44 IU/L 23   Alk Phos 44 - 121 IU/L 62   Bili, Total 0.0 - 1.2 mg/dL 0.3   Bili, Direct 0.00 - 0.40 mg/dL 0.14   Albumin 3.7 - 4.7 g/dL 4.3   BUN 8 - 27 mg/dL 20   Creat 0.76 - 1.27 mg/dL 1.78 (H)   Na 134 - 144 mmol/L 142   K 3.5 - 5.2 mmol/L 4.4   Cl 96 - 106 mmol/L 108 (H)   CO2 20 - 29 mmol/L 19 (L)   Glucose 65 - 99 mg/dL 116 (H)     Liver [place] of [address] [place] [address] Ref Rng & Units 9/15/2021   WBC 3.4 - 10.8 x10E3/uL 4.6   ANC 1.4 - 7.0 x10E3/uL 2.3   HGB 13.0 - 17.7 g/dL 12.6 (L)   PLT x10E3/uL CANCELED    - 450 x10E3/uL    INR 0.9 - 1.2 1.1   AST 0 - 40 IU/L 21   ALT 0 - 44 IU/L 21   Alk Phos 44 - 121 IU/L 82   Bili, Total 0.0 - 1.2 mg/dL 0.6   Bili, Direct 0.00 - 0.40 mg/dL 0.18   Albumin 3.7 - 4.7 g/dL 4.4   BUN 8 - 27 mg/dL 18   Creat 0.76 - 1.27 mg/dL 1.48 (H)   Na 134 - 144 mmol/L 143   K 3.5 - 5.2 mmol/L 4.5   Cl 96 - 106 mmol/L 109 (H)   CO2 20 - 29 mmol/L 20   Glucose 65 - 99 mg/dL 104 (H)   Magnesium 1.6 - 2.4 mg/dL    Ammonia 31 - 169 ug/dL 79     Cancer Screening Latest Ref Rng & Units 8/4/2021   AFP, Serum 0.0 - 8.0 ng/mL 2.3 AFP-L3% 0.0 - 9.9 % Comment     Laboratory testing from 9/15/2021reviewed in detail. Additional testing included to evaluate progression or regression of disease. Laboratory testing results from today will be communicated by My Chart. SEROLOGIES:  Serologies Latest Ref Rng & Units 2/3/2021   Hep A Ab, Total Negative   Positive (A)   Hep B Surface Ag Index <0.10   Hep B Surface Ag Interp Negative   Negative   Hep B Core Ab, Total Negative   Negative   Hep B Surface Ab mIU/mL <3.10   Hep B Surface Ab Interp NONREACTIVE   NONREACTIVE   Hep C Ab 0.0 - 0.9 s/co ratio <0.1   Ferritin 26 - 388 NG/ML 27   Iron % Saturation 20 - 50 % 26     LIVER HISTOLOGY:  3/2021: FibroScan performed at [address]. EkPa was 50.8. IQR/med 20%. . The results suggested a fibrosis level of F 4. The CAP score suggests there is hepatic steatosis. 5/2021. Slides reviewed by MLS. Fatty liver. AFL vs NAFLD. 25-33% macrovesicualr and micovesicular steatosis, mild inflammation, mild ballooning, Stage 4 fibrosis. DARIEL (111). ENDOSCOPIC PROCEDURES:  5/2021. EGD performed by MLS. No esophageal varices. No gastric varices. Repeat in 3 years. RADIOLOGY:  12/2020: Liver US:  demonstrated heterogeneous hepatic echotexture with an area of focal sparing in the right lobe. Mildly enlarged spleen measuring 13.1 cm in length.    5/2021. Dynamic MRI/MRCP liver. Changes consistent with cirrhosis. No liver mass lesions. No dilated bile ducts. No bile duct strictures. Pancreatitis. Mild ascites. 9/2021. Ultrasound of liver. Echogenic consistent with cirrhosis. No liver mass lesions. No dilated bile ducts. No ascites. OTHER TESTING:  Not available or performed    FOLLOW-UP:  All of the issues listed above in the Assessment and Plan were discussed with the patient. All questions were answered. The patient expressed a clear understanding of the above.     [address] in 3 month. Imaging will be done in the next 1-2 weeks. April S.  Adele, [facility] of [address] [address] [facility] [address] [id number] Vladimir Tejeda, [address] [place] [address] 22.  [address] [facility]

## 2022-03-05 LAB
ALBUMIN SERPL-MCNC: 4.3 G/DL (ref 3.7–4.7)
ALP SERPL-CCNC: 62 IU/L (ref 44–121)
ALT SERPL-CCNC: 23 IU/L (ref 0–44)
AST SERPL-CCNC: 25 IU/L (ref 0–40)
BASOPHILS # BLD AUTO: 0 X10E3/UL (ref 0–0.2)
BASOPHILS NFR BLD AUTO: 1 %
BILIRUB DIRECT SERPL-MCNC: 0.14 MG/DL (ref 0–0.4)
BILIRUB SERPL-MCNC: 0.3 MG/DL (ref 0–1.2)
BUN SERPL-MCNC: 20 MG/DL (ref 8–27)
BUN/CREAT SERPL: 11 (ref 10–24)
CALCIUM SERPL-MCNC: 9.3 MG/DL (ref 8.6–10.2)
CHLORIDE SERPL-SCNC: 108 MMOL/L (ref 96–106)
CO2 SERPL-SCNC: 19 MMOL/L (ref 20–29)
CREAT SERPL-MCNC: 1.78 MG/DL (ref 0.76–1.27)
EGFR: 40 ML/MIN/1.73
EOSINOPHIL # BLD AUTO: 0.2 X10E3/UL (ref 0–0.4)
EOSINOPHIL NFR BLD AUTO: 4 %
ERYTHROCYTE [DISTWIDTH] IN BLOOD BY AUTOMATED COUNT: 14.4 % (ref 11.6–15.4)
GLUCOSE SERPL-MCNC: 116 MG/DL (ref 65–99)
HCT VFR BLD AUTO: 36.7 % (ref 37.5–51)
HGB BLD-MCNC: 12.1 G/DL (ref 13–17.7)
IMM GRANULOCYTES # BLD AUTO: 0 X10E3/UL (ref 0–0.1)
IMM GRANULOCYTES NFR BLD AUTO: 0 %
INR PPP: 1 (ref 0.9–1.2)
LYMPHOCYTES # BLD AUTO: 1.2 X10E3/UL (ref 0.7–3.1)
LYMPHOCYTES NFR BLD AUTO: 29 %
MCH RBC QN AUTO: 28.2 PG (ref 26.6–33)
MCHC RBC AUTO-ENTMCNC: 33 G/DL (ref 31.5–35.7)
MCV RBC AUTO: 86 FL (ref 79–97)
MONOCYTES # BLD AUTO: 0.4 X10E3/UL (ref 0.1–0.9)
MONOCYTES NFR BLD AUTO: 9 %
MORPHOLOGY BLD-IMP: ABNORMAL
NEUTROPHILS # BLD AUTO: 2.4 X10E3/UL (ref 1.4–7)
NEUTROPHILS NFR BLD AUTO: 57 %
PLATELET # BLD AUTO: ABNORMAL X10E3/UL
POTASSIUM SERPL-SCNC: 4.4 MMOL/L (ref 3.5–5.2)
PROT SERPL-MCNC: 6.6 G/DL (ref 6–8.5)
PROTHROMBIN TIME: 10.7 SEC (ref 9.1–12)
RBC # BLD AUTO: 4.29 X10E6/UL (ref 4.14–5.8)
SODIUM SERPL-SCNC: 142 MMOL/L (ref 134–144)
WBC # BLD AUTO: 4.2 X10E3/UL (ref 3.4–10.8)

## 2022-03-08 NOTE — PROGRESS NOTES
Letter sent to the patient via my chart regarding the blood work results. The liver enzymes and function are normal. The Sr. Creat is elevated. Advised he see a kidney specialist, increase water intake and avoid NSAIDS.

## 2022-03-09 LAB
AFP L3 MFR SERPL: NORMAL % (ref 0–9.9)
AFP SERPL-MCNC: 2 NG/ML (ref 0–8)

## 2022-03-16 ENCOUNTER — HOSPITAL ENCOUNTER (OUTPATIENT)
Dept: ULTRASOUND IMAGING | Age: 77
Discharge: HOME | End: 2022-03-16
Payer: MEDICARE

## 2022-03-16 DIAGNOSIS — K74.60 CIRRHOSIS OF LIVER WITHOUT ASCITES, UNSPECIFIED HEPATIC CIRRHOSIS TYPE (HCC): ICD-10-CM

## 2022-03-16 PROCEDURE — 76700 US EXAM ABDOM COMPLETE: CPT | Performed by: NURSE PRACTITIONER

## 2022-03-18 PROBLEM — K74.60 CIRRHOSIS (HCC): Status: ACTIVE | Noted: 2021-03-12

## 2022-03-19 PROBLEM — E11.9 DIABETES MELLITUS (HCC): Status: ACTIVE | Noted: 2020-12-03

## 2022-03-19 PROBLEM — E66.09 CLASS 1 OBESITY DUE TO EXCESS CALORIES WITH SERIOUS COMORBIDITY AND BODY MASS INDEX (BMI) OF 32.0 TO 32.9 IN ADULT: Status: ACTIVE | Noted: 2020-12-03

## 2022-03-19 PROBLEM — Z86.73 HISTORY OF CVA (CEREBROVASCULAR ACCIDENT): Status: ACTIVE | Noted: 2021-02-03

## 2022-03-19 PROBLEM — E66.811 CLASS 1 OBESITY DUE TO EXCESS CALORIES WITH SERIOUS COMORBIDITY AND BODY MASS INDEX (BMI) OF 32.0 TO 32.9 IN ADULT: Status: ACTIVE | Noted: 2020-12-03

## 2022-03-19 PROBLEM — K85.10 GALLSTONE PANCREATITIS: Status: ACTIVE | Noted: 2021-06-03

## 2022-03-19 PROBLEM — D69.6 THROMBOCYTOPENIA (HCC): Status: ACTIVE | Noted: 2020-12-03

## 2022-03-20 PROBLEM — Z90.49 HISTORY OF CHOLECYSTECTOMY: Status: ACTIVE | Noted: 2021-06-25

## 2022-06-01 LAB
CREATININE, EXTERNAL: 1.84
HBA1C MFR BLD HPLC: 7 %
LDL-C, EXTERNAL: 81

## 2022-06-08 ENCOUNTER — OFFICE VISIT (OUTPATIENT)
Dept: HEMATOLOGY | Age: 77
End: 2022-06-08
Payer: MEDICARE

## 2022-06-08 ENCOUNTER — TELEPHONE (OUTPATIENT)
Dept: NEUROLOGY | Age: 77
End: 2022-06-08

## 2022-06-08 VITALS
HEIGHT: 65 IN | TEMPERATURE: 97.8 F | BODY MASS INDEX: 36.22 KG/M2 | WEIGHT: 217.4 LBS | OXYGEN SATURATION: 95 % | DIASTOLIC BLOOD PRESSURE: 83 MMHG | SYSTOLIC BLOOD PRESSURE: 136 MMHG | HEART RATE: 77 BPM

## 2022-06-08 DIAGNOSIS — N18.31 STAGE 3A CHRONIC KIDNEY DISEASE (HCC): ICD-10-CM

## 2022-06-08 DIAGNOSIS — Z86.73 HISTORY OF CVA (CEREBROVASCULAR ACCIDENT): ICD-10-CM

## 2022-06-08 DIAGNOSIS — K74.60 CIRRHOSIS OF LIVER WITHOUT ASCITES, UNSPECIFIED HEPATIC CIRRHOSIS TYPE (HCC): Primary | ICD-10-CM

## 2022-06-08 PROBLEM — N18.30 CHRONIC RENAL DISEASE, STAGE III (HCC): Status: ACTIVE | Noted: 2022-06-08

## 2022-06-08 PROCEDURE — G8417 CALC BMI ABV UP PARAM F/U: HCPCS | Performed by: NURSE PRACTITIONER

## 2022-06-08 PROCEDURE — G8427 DOCREV CUR MEDS BY ELIG CLIN: HCPCS | Performed by: NURSE PRACTITIONER

## 2022-06-08 PROCEDURE — 99214 OFFICE O/P EST MOD 30 MIN: CPT | Performed by: NURSE PRACTITIONER

## 2022-06-08 PROCEDURE — 3017F COLORECTAL CA SCREEN DOC REV: CPT | Performed by: NURSE PRACTITIONER

## 2022-06-08 PROCEDURE — G8536 NO DOC ELDER MAL SCRN: HCPCS | Performed by: NURSE PRACTITIONER

## 2022-06-08 PROCEDURE — G8432 DEP SCR NOT DOC, RNG: HCPCS | Performed by: NURSE PRACTITIONER

## 2022-06-08 PROCEDURE — 1101F PT FALLS ASSESS-DOCD LE1/YR: CPT | Performed by: NURSE PRACTITIONER

## 2022-06-08 PROCEDURE — 1123F ACP DISCUSS/DSCN MKR DOCD: CPT | Performed by: NURSE PRACTITIONER

## 2022-06-08 NOTE — PROGRESS NOTES
[facility], [place], [address], [place], [place]      TERI Verdugo [facility]     Clementina Angulo, [facility]   CHINYERE Segovia, [facility]       Bernard Deputado Jasvir De Jones 136    at [address], [address] Scarlett Kapoor  22.    [phone]    FAX: [address] [place] [address]    at [address], [address]    [phone]    FAX: [phone]       Patient Care Team:  Cecilia Mendiola MD as PCP - General (Infectious Disease Physician)  Cecilia Mendiola MD as PCP - [facility]  Cedric Hdez MD (General Surgery)      Problem List  Date Reviewed: 3/7/2022          Codes Class Noted    History of cholecystectomy ICD-10-CM: Z90.49  ICD-9-CM: V45.79  6/25/2021        Gallstone pancreatitis ICD-10-CM: K85.10  ICD-9-CM: 577.0, 574.20  6/3/2021        Cirrhosis ([facility] ICD-10-CM: K74.60  ICD-9-CM: 571.5  3/12/2021        History of CVA (cerebrovascular accident) ICD-10-CM: Z86.73  ICD-9-CM: V12.54  2/3/2021        Thrombocytopenia ([facility] ICD-10-CM: D69.6  ICD-9-CM: 287.5  12/3/2020        Class 1 obesity due to excess calories with serious comorbidity and body mass index (BMI) of 32.0 to 32.9 in adult ICD-10-CM: E66.09, Z68.32  ICD-9-CM: 278.00, V85.32  12/3/2020        Diabetes mellitus ([facility] ICD-10-CM: E11.9  ICD-9-CM: 250.00  12/3/2020            Paddy Bangsarah is being seen at [facility] for 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 The active problem list, all pertinent past medical history, medications, liver histology, endoscopic studies, and laboratory findings related to the liver disorder were reviewed and discussed with the patient.       The patient is a 70 year old  male who was found to have thrombocytopenia in 12/2020. A liver biopsy in 5/2021 demonstrated ALFARO with cirrhosis. Following the biopsy he developed severe abdominal pain and was found to have acute gallstone pancreatitis. This resolved with conservative treatment. He underwent a cholecystectomy 5/2021. While hospitalized he developed lower extremity edema and subsequently started on diuretics. This has now resolved and he is no longer on diuretics. Since the last office visit the patient continues to have worsening Vertigo. He was referred to neurology but this appointment was not set up. ENT had him in physical therapy which improved symptoms slightly but this was discontinued some time ago. The patient has the following symptoms which could be attributed to the liver disorder:  Generalized weakness. The patient is not experiencing the following symptoms which are commonly seen in this liver disorder: nausea, vomiting, pain on the right side over the liver or abdominal pain. The patient has mild limitations in functional activities which can be attributed to a past CVA. ASSESSMENT AND PLAN:  Cirrhosis  Cirrhosis is probably secondary to past alcohol use and NAFLD  The diagnosis of cirrhosis is based upon liver histology, imaging, laboratory studies    Have performed laboratory testing to monitor liver function and degree of liver injury. This included BMP, hepatic panel, CBC with platelet count and INR. Laboratory testing ordered by [facility] from 5/31/2022 reviewed in detail. The liver transaminases, ALP, and liver function are normal. The platelet count is depressed. The patient has never developed any complications of cirrhosis to date. Will order MRI to evaluate for mass or lesion. Serum creatinine continues to rise, will evaluate for causes of CKD. The CTP is 5. Child class A. The MELD score is unable to be calculated without INR. Assuming it is normal the MELD would be 12. This is driven by elevated serum creatinine. ALFARO  The diagnosis is based upon liver biopsy, Fiboscan CAP score, features of metabolic syndrome, serologic studies that are negative for other causes of chronic liver disease,     A liver biopsy performed in 5/2021 demonstrates ALFARO. 25-33% macrovesicualr and micovesicular steatosis, mild inflammation, mild ballooning, and cirrhosis. Fibroscan in 3/2021 demonstrated 51 kPa and  suggesting fatty liver and cirrhosis. If the patient looses 20% of current body weight, which is 36 pounds, down to a weight of of 150 pounds, steatosis should resolve. Once all steatosis has resolved inflammation will resolve. Reinforced dietary changes and exercise. Elevated Sr. Creatinine  It is unclear why the Sr. Creatinine is elevated. This could be related to DM and Hypertension. He was scheduled to see Nephrology but was late to the appointment. Visit is now for 7/06/2022. Vertigo  The patient states this is a chronic issues since the CVA. It has impacted his gait and he is fearful of having a fall. Nephrology appointment is scheduled. Counseling for diet and weight loss in patients with confirmed or suspected NAFLD  The patient was counseled regarding diet and exercise to achieve weight loss. The best diet for patients with fatty liver is one very low in carbohydrates and enriched with protein such as an Jaqueline's program.      The patient was told not to consume any food products and drinks containing fructose as this enhances hepatic fat synthesis. Screening for Esophageal varices   The patient does not have esophageal or gastric varices. The last EGD to assess for varices was performed in 5/2021. No varices found. Hepatic encephalopathy   Overt HE has not developed to date. There is no need for treatment with lactulose and/or Xifaxan at this time.     Anemia   This is due to multifactorial causes including recent prolonged hospitalization and surgery. The HGB has improved. Previous iron studies were normal. Will check again at the next office visit. Thrombocytopenia   This is secondary to cirrhosis. There is no evidence of overt bleeding. No treatment is required. The platelet count is adequate for the patient to undergo procedures without the need for platelet transfusion or platelet growth factors. Screening for Hepatocellular Carcinoma  [facility] screening was last performed with ultrasound 9/2021 and does not suggest [address] AFP was normal. Ultrasound ordered to be scheduled and AFP ordered today. Treatment of other medical problems in patients with chronic liver disease  There are no contraindications for the patient to take most medications that are necessary for treatment of other medical issues. The patient has cirrhrosis and should avoid taking NSAIDs which are associated with a higher rate of developing ROBBIE. The patient can take Any medications utilized for treatment of DM, Statins to treat hypercholesterolemia    Counseling for alcohol in patients with chronic liver disease  The patient has cirrhosis and was advised to be abstinent from all alcohol including non-alcoholic beer which does contain some alcohol. The patient has previously consumed alcohol in excess. The patient has not consumed alcohol since 2013. Osteoporosis  The risk of osteoporosis is increased in patients with cirrhosis. DEXA bone density to assess for osteoporosis has not been performed. This should be ordered by the patients primary care physician. Vaccinations   Vaccination for viral hepatitis A is not needed. The patient has serologic evidence of prior exposure or vaccination with immunity. Routine vaccinations against other bacterial and viral agents can be performed as indicated. Annual flu vaccination should be administered if indicated.       ALLERGIES  No Known Allergies    MEDICATIONS  Current Outpatient Medications   Medication Sig    lisinopriL (PRINIVIL, ZESTRIL) 10 mg tablet Take 10 mg by mouth daily.  guaiFENesin (MUCINEX) 1,200 mg Ta12 ER tablet Take 1,200 mg by mouth daily.  pantoprazole (PROTONIX) 20 mg tablet Take 2 Tablets by mouth daily.  FreeStyle Ashlee 14 Day Sensor kit     simvastatin (ZOCOR) 20 mg tablet Take 20 mg by mouth nightly.  aspirin delayed-release 81 mg tablet Take 1 Tab by mouth daily.  metFORMIN (GLUCOPHAGE) 500 mg tablet Take 1,000 mg by mouth two (2) times daily (with meals). Patient takes 1/2 tab in the AM and 1/2 tab @ HS    cyanocobalamin 1,000 mcg tablet Take 1,000 mcg by mouth daily. No current facility-administered medications for this visit. SYSTEM REVIEW NOT RELATED TO LIVER DISEASE OR REVIEWED ABOVE:  Constitution systems: Negative for fever, chills, weight gain, weight loss. Eyes: Negative for visual changes. ENT: Negative for sore throat, painful swallowing. Respiratory: Negative for cough, hemoptysis, SOB. Cardiology: Negative for chest pain, palpitations. GI:  Negative for constipation or diarrhea. : Negative for urinary frequency, dysuria, hematuria, nocturia. Skin: Negative for rash. Hematology: Negative for easy bruising, blood clots. Musculo-skeletal: Negative for back pain, muscle pain, weakness. Neurologic: Negative for headaches, dizziness, vertigo, memory problems not related to HE. Psychology: Negative for anxiety, depression. FAMILY HISTORY:  There is no family history of liver disease. There is no family history of immune disorders. SOCIAL HISTORY:  The patient is   The patient has no children. The patient stopped using tobacco products in 2009  The patient has previously consumed alcohol in excess.     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 Eyes: Sclera anicteric. ENT: No oral lesions. Thyroid normal.  Nodes: No adenopathy. Skin: No spider angiomata. No jaundice. No palmar erythema. Respiratory: Lungs clear to auscultation. Cardiovascular: Regular heart rate. No murmurs. No JVD. Abdomen: Soft non-tender, liver size normal to percussion/palpation. Spleen not palpable. No obvious ascites. Extremities: No edema. No muscle wasting. No gross arthritic changes. Neurologic: Alert and oriented. Cranial nerves grossly intact. No asterixis.     LABORATORY STUDIES:  From: 5/31/2022  AST/ALT/ALP/T Bili/ALB:21/24/62/0.3/4.4  WBC/HB/PLT/INR:4.9/12.1/58  NA/BUN/CREAT:150/19/1.84    [address] Ref Rng & Units 3/4/2022   WBC 3.4 - 10.8 x10E3/uL 4.2   ANC 1.4 - 7.0 x10E3/uL 2.4   HGB 13.0 - 17.7 g/dL 12.1 (L)   PLT x10E3/uL CANCELED    - 450 x10E3/uL    INR 0.9 - 1.2 1.0   AST 0 - 40 IU/L 25   ALT 0 - 44 IU/L 23   Alk Phos 44 - 121 IU/L 62   Bili, Total 0.0 - 1.2 mg/dL 0.3   Bili, Direct 0.00 - 0.40 mg/dL 0.14   Albumin 3.7 - 4.7 g/dL 4.3   BUN 8 - 27 mg/dL 20   Creat 0.76 - 1.27 mg/dL 1.78 (H)   Na 134 - 144 mmol/L 142   K 3.5 - 5.2 mmol/L 4.4   Cl 96 - 106 mmol/L 108 (H)   CO2 20 - 29 mmol/L 19 (L)   Glucose 65 - 99 mg/dL 116 (H)     [place] [address] Ref Rng & Units 9/15/2021   WBC 3.4 - 10.8 x10E3/uL 4.6   ANC 1.4 - 7.0 x10E3/uL 2.3   HGB 13.0 - 17.7 g/dL 12.6 (L)   PLT x10E3/uL CANCELED    - 450 x10E3/uL    INR 0.9 - 1.2 1.1   AST 0 - 40 IU/L 21   ALT 0 - 44 IU/L 21   Alk Phos 44 - 121 IU/L 82   Bili, Total 0.0 - 1.2 mg/dL 0.6   Bili, Direct 0.00 - 0.40 mg/dL 0.18   Albumin 3.7 - 4.7 g/dL 4.4   BUN 8 - 27 mg/dL 18   Creat 0.76 - 1.27 mg/dL 1.48 (H)   Na 134 - 144 mmol/L 143   K 3.5 - 5.2 mmol/L 4.5   Cl 96 - 106 mmol/L 109 (H)   CO2 20 - 29 mmol/L 20   Glucose 65 - 99 mg/dL 104 (H)   Magnesium 1.6 - 2.4 mg/dL    Ammonia 31 - 169 ug/dL 79     Cancer Screening Latest Ref Rng & Units 3/4/2022 8/4/2021   AFP, Serum 0.0 - 8.0 ng/mL 2.0 2.3   AFP-L3% 0.0 - 9.9 % Comment Comment     Laboratory testing from 5/31/2022 reviewed in detail. Additional testing included to evaluate progression or regression of disease. Laboratory testing results from today will be communicated by My Chart. SEROLOGIES:  Serologies Latest Ref Rng & Units 2/3/2021   Hep A Ab, Total Negative   Positive (A)   Hep B Surface Ag Index <0.10   Hep B Surface Ag Interp Negative   Negative   Hep B Core Ab, Total Negative   Negative   Hep B Surface Ab mIU/mL <3.10   Hep B Surface Ab Interp NONREACTIVE   NONREACTIVE   Hep C Ab 0.0 - 0.9 s/co ratio <0.1   Ferritin 26 - 388 NG/ML 27   Iron % Saturation 20 - 50 % 26     LIVER HISTOLOGY:  3/2021: [facility]. EkPa was 50.8. IQR/med 20%. . The results suggested a fibrosis level of F 4. The CAP score suggests there is hepatic steatosis. 5/2021. Slides reviewed by [facility]. Fatty liver. AFL vs NAFLD. 25-33% macrovesicualr and micovesicular steatosis, mild inflammation, mild ballooning, Stage 4 fibrosis. DARIEL (111). ENDOSCOPIC PROCEDURES:  5/2021. EGD performed by [facility]. No esophageal varices. No gastric varices. Repeat in 3 years. RADIOLOGY:  12/2020: Liver US:  demonstrated heterogeneous hepatic echotexture with an area of focal sparing in the right lobe. Mildly enlarged spleen measuring 13.1 cm in length.    5/2021. Dynamic MRI/MRCP liver. Changes consistent with cirrhosis. No liver mass lesions. No dilated bile ducts. No bile duct strictures. Pancreatitis. Mild ascites. 9/2021. Ultrasound of liver. Echogenic consistent with cirrhosis. No liver mass lesions. No dilated bile ducts. No ascites. 3/2022. Ultrasound of liver.    Echogenic consistent with cirrhosis. No liver mass lesions. No dilated bile ducts. No ascites. OTHER TESTING:  Not available or performed    FOLLOW-UP:  All of the issues listed above in the Assessment and Plan were discussed with the patient. All questions were answered. The patient expressed a clear understanding of the above. [address] [address] in 3 months for ongoing monitoring. April SMónica Angulo AGPCNP-BC  [address]  [address] [address] Vladimir Tejeda, [address] [place] [address] 22.  [facility]

## 2022-06-08 NOTE — TELEPHONE ENCOUNTER
Returned call to patient. Contact unsuccessful. Unable to LVM.   If patient was refered for a neuro appointment the referring MD should initiate PA for patient to be see

## 2022-06-08 NOTE — PROGRESS NOTES
Identified pt with two pt identifiers(name and ). Reviewed record in preparation for visit and have obtained necessary documentation. Chief Complaint   Patient presents with    Cirrhosis Of Liver     3month f/u with April      Vitals:    22 1417   BP: 136/83   Pulse: 77   Temp: 97.8 °F (36.6 °C)   TempSrc: Temporal   SpO2: 95%   Weight: 217 lb 6.4 oz (98.6 kg)   Height: 5' 5\" (1.651 m)   PainSc:   0 - No pain       Health Maintenance Review: Patient reminded of \"due or due soon\" health maintenance. I have asked the patient to contact his/her primary care provider (PCP) for follow-up on his/her health maintenance. Coordination of Care Questionnaire:  :   1) Have you been to an emergency room, urgent care, or hospitalized since your last visit? If yes, where when, and reason for visit? no       2. Have seen or consulted any other health care provider since your last visit? If yes, where when, and reason for visit? NO      Patient is accompanied by self I have received verbal consent from Xiao Love to discuss any/all medical information while they are present in the room.

## 2022-06-13 ENCOUNTER — OFFICE VISIT (OUTPATIENT)
Dept: INTERNAL MEDICINE CLINIC | Age: 77
End: 2022-06-13
Payer: MEDICARE

## 2022-06-13 VITALS
TEMPERATURE: 98.1 F | HEIGHT: 65 IN | WEIGHT: 218 LBS | HEART RATE: 72 BPM | RESPIRATION RATE: 16 BRPM | DIASTOLIC BLOOD PRESSURE: 78 MMHG | BODY MASS INDEX: 36.32 KG/M2 | SYSTOLIC BLOOD PRESSURE: 137 MMHG | OXYGEN SATURATION: 96 %

## 2022-06-13 DIAGNOSIS — E11.59 TYPE 2 DIABETES MELLITUS WITH OTHER CIRCULATORY COMPLICATION, WITHOUT LONG-TERM CURRENT USE OF INSULIN (HCC): ICD-10-CM

## 2022-06-13 DIAGNOSIS — J44.9 CHRONIC OBSTRUCTIVE PULMONARY DISEASE, UNSPECIFIED COPD TYPE (HCC): ICD-10-CM

## 2022-06-13 DIAGNOSIS — Z87.448 HISTORY OF HEMATURIA: ICD-10-CM

## 2022-06-13 DIAGNOSIS — N18.30 STAGE 3 CHRONIC KIDNEY DISEASE, UNSPECIFIED WHETHER STAGE 3A OR 3B CKD (HCC): ICD-10-CM

## 2022-06-13 DIAGNOSIS — D69.6 THROMBOCYTOPENIA, UNSPECIFIED (HCC): ICD-10-CM

## 2022-06-13 DIAGNOSIS — G90.1 DYSAUTONOMIA (HCC): ICD-10-CM

## 2022-06-13 DIAGNOSIS — R09.81 NASAL CONGESTION: ICD-10-CM

## 2022-06-13 DIAGNOSIS — R42 DIZZINESS: Primary | ICD-10-CM

## 2022-06-13 LAB
BILIRUB UR QL STRIP: NEGATIVE
GLUCOSE UR-MCNC: NEGATIVE MG/DL
KETONES P FAST UR STRIP-MCNC: NEGATIVE MG/DL
PH UR STRIP: 7 [PH] (ref 4.6–8)
PROT UR QL STRIP: NEGATIVE
SP GR UR STRIP: 1.01 (ref 1–1.03)
UA UROBILINOGEN AMB POC: NORMAL (ref 0.2–1)
URINALYSIS CLARITY POC: CLEAR
URINALYSIS COLOR POC: YELLOW
URINE BLOOD POC: NORMAL
URINE LEUKOCYTES POC: NEGATIVE
URINE NITRITES POC: NEGATIVE

## 2022-06-13 PROCEDURE — 3017F COLORECTAL CA SCREEN DOC REV: CPT | Performed by: INTERNAL MEDICINE

## 2022-06-13 PROCEDURE — G8417 CALC BMI ABV UP PARAM F/U: HCPCS | Performed by: INTERNAL MEDICINE

## 2022-06-13 PROCEDURE — 1101F PT FALLS ASSESS-DOCD LE1/YR: CPT | Performed by: INTERNAL MEDICINE

## 2022-06-13 PROCEDURE — G8432 DEP SCR NOT DOC, RNG: HCPCS | Performed by: INTERNAL MEDICINE

## 2022-06-13 PROCEDURE — 2022F DILAT RTA XM EVC RTNOPTHY: CPT | Performed by: INTERNAL MEDICINE

## 2022-06-13 PROCEDURE — 1123F ACP DISCUSS/DSCN MKR DOCD: CPT | Performed by: INTERNAL MEDICINE

## 2022-06-13 PROCEDURE — G8536 NO DOC ELDER MAL SCRN: HCPCS | Performed by: INTERNAL MEDICINE

## 2022-06-13 PROCEDURE — G8427 DOCREV CUR MEDS BY ELIG CLIN: HCPCS | Performed by: INTERNAL MEDICINE

## 2022-06-13 PROCEDURE — 3046F HEMOGLOBIN A1C LEVEL >9.0%: CPT | Performed by: INTERNAL MEDICINE

## 2022-06-13 PROCEDURE — 81003 URINALYSIS AUTO W/O SCOPE: CPT | Performed by: INTERNAL MEDICINE

## 2022-06-13 PROCEDURE — 99215 OFFICE O/P EST HI 40 MIN: CPT | Performed by: INTERNAL MEDICINE

## 2022-06-13 RX ORDER — LOPERAMIDE HCL 2 MG
TABLET ORAL
COMMUNITY
End: 2022-07-13

## 2022-06-13 RX ORDER — AZELASTINE 1 MG/ML
2 SPRAY, METERED NASAL
Qty: 30 EACH | Refills: 5 | Status: SHIPPED | OUTPATIENT
Start: 2022-06-13

## 2022-06-13 RX ORDER — NICOTINE 11MG/24HR
PATCH, TRANSDERMAL 24 HOURS TRANSDERMAL
COMMUNITY

## 2022-06-13 RX ORDER — FLUTICASONE PROPIONATE 50 MCG
2 SPRAY, SUSPENSION (ML) NASAL DAILY
Qty: 16 G | Refills: 5 | Status: SHIPPED | OUTPATIENT
Start: 2022-06-13

## 2022-06-13 NOTE — PATIENT INSTRUCTIONS
Results for orders placed or performed in visit on 06/13/22   AMB POC URINALYSIS DIP STICK AUTO W/O MICRO   Result Value Ref Range    Color (UA POC) Yellow     Clarity (UA POC) Clear     Glucose (UA POC) Negative Negative    Bilirubin (UA POC) Negative Negative    Ketones (UA POC) Negative Negative    Specific gravity (UA POC) 1.015 1.001 - 1.035    Blood (UA POC) Trace Negative    pH (UA POC) 7.0 4.6 - 8.0    Protein (UA POC) Negative Negative    Urobilinogen (UA POC) 0.2 mg/dL 0.2 - 1    Nitrites (UA POC) Negative Negative    Leukocyte esterase (UA POC) Negative Negative       1. Please review above with your kidney doctor at upcoming appt. 2.  If the Flonase is not covered, it is available OTC. Nasacort or Rhinocort are available OTC also if preferred. As directed, when you start the Flonase, take two sprays/nostril two times daily for the first 3 days, then use 2 sprays/nostril once daily after that. This will help the medication start working sooner. 3.  Vitals today do not suggest dizziness is related to blood pressure or pulse changes, related to your hypertension or your medication. Vitals:    06/13/22 1032 06/13/22 1035 06/13/22 1045 06/13/22 1050   BP: (!) 163/77 (!) 154/91 133/78 137/78   BP 1 Location: Left arm Left arm Right arm Right arm   BP Patient Position: Sitting Sitting Supine  Comment: HOB slightly elevated for comfort.  Standing   BP Cuff Size: Adult Adult Adult Adult   Pulse: 69   72   Temp: 98.1 °F (36.7 °C)      TempSrc: Oral      Resp: 16      Height: 5' 5\" (1.651 m)      Weight: 218 lb (98.9 kg)      SpO2: 96%

## 2022-06-13 NOTE — PROGRESS NOTES
rm 16    Chief Complaint   Patient presents with    Dizziness     1. Have you been to the ER, urgent care clinic since your last visit? Hospitalized since your last visit? No    2. Have you seen or consulted any other health care providers outside of the [address] [place] [address] since your last visit? Include any pap smears or colon screening.  No     Visit Vitals  BP (!) 154/91 (BP 1 Location: Left arm, BP Patient Position: Sitting, BP Cuff Size: Adult)   Pulse 69   Temp 98.1 °F (36.7 °C) (Oral)   Resp 16   Ht 5' 5\" (1.651 m)   Wt 218 lb (98.9 kg)   SpO2 96%   BMI 36.28 kg/m²

## 2022-06-13 NOTE — PROGRESS NOTES
Tucker Scott (: 1945) is a 78 y.o. male, established patient, here for evaluation of the following chief complaint(s):  Chief Complaint   Patient presents with    Dizziness       Assessment and Plan:       ICD-10-CM ICD-9-CM    1. Dizziness  R42 780.4    2. Nasal congestion  R09.81 478.19 fluticasone propionate (FLONASE) 50 mcg/actuation nasal spray      azelastine (ASTELIN) 137 mcg (0.1 %) nasal spray   3. History of hematuria  Z87.448 V13.09 AMB POC URINALYSIS DIP STICK AUTO W/O MICRO   4. Dysautonomia ([facility]  G90.1 337.9    5. Chronic obstructive pulmonary disease, unspecified COPD type ([facility]  J44.9 496    6. Stage 3 chronic kidney disease, unspecified whether stage 3a or 3b CKD ([facility])  N18.30 585.3    7. Thrombocytopenia, unspecified ([facility])  D69.6 287.5    8. Type 2 diabetes mellitus with other circulatory complication, without long-term current use of insulin ([facility])  E11.59 250.70        1. Reviewed orthostatics, and not related to BP or BP medication by orthostatics in clinic today. Discussed could do the vestibular PT exercises which she learned from prior therapy on a more regular schedule to see if would prevent symptoms of dizziness. Reviewed this may be helpful since he had improvement with this morning's episode of more severe dizziness after doing his home PT exercises. Discussed he could also contact the PT group since he just finished vestibular PT with them (from his ENT provider), and they could review a home PT plan with the exercises he is already learned. This could possibly help him manage interim dizziness until he sees neurology in 1 month. 2.  Medication(s), management and follow-up based on response reviewed at visit. Reviewed typical course of illness, duration of symptoms, and exam findings. Reviewed management with medications above due to sinus drainage and congestion symptoms.   Although no significant eustachian tube pathology suspected by your exam, possible above management of worsening allergy symptoms may improve his dizziness, since dizziness is likely multifactorial for patient. 3.  Repeat UA as below. He will review with nephrology, but has improved. 4.  Possibly etiology related to prior CVA, of ongoing/chronic dizziness. 5-7:  Followed by specialists. Renal eval as below. Last Hepatology visit reviewed as below. 8.  Last A1c and other [facility] labs reviewed at visit; copy to scan to health maintenance. Follow-up and Dispositions    · Return in about 2 months (around 8/13/2022), or if symptoms worsen or fail to improve, for referral follow-up.       lab results and schedule of future lab studies reviewed with patient  reviewed medications and side effects in detail    For additional documentation of information and/or recommendations discussed this visit, please see notes in instructions. Plan and evaluation (above) reviewed with pt at visit  Patient voiced understanding of plan and provided with time to ask/review questions. After Visit Summary (AVS) provided to pt after visit with additional instructions as needed/reviewed. Future Appointments   Date Time Provider Ping Almaraz   7/13/2022  8:00 AM DO ALICIA Askew AMB   9/14/2022 10:00 AM Clementina Angulo NP [facility] BS AMB   --Updated future visits after patient check-out. On this date 06/13/2022 I have spent 49 minutes reviewing previous notes, test results and face to face with the patient discussing the diagnosis and importance of compliance with the treatment plan as well as documenting on the day of the visit. History of Present Illness:     Notes (nursing/rooming note copied below in italics):  As above    Here to evaluate dizziness. Recently seen by hepatology on 6-8-22.     BP Readings from Last 3 Encounters:   06/13/22 (!) 154/91   06/08/22 136/83   03/04/22 (!) 142/84     Wt Readings from Last 3 Encounters:   06/13/22 218 lb (98.9 kg)   06/08/22 217 lb 6.4 oz (98.6 kg)   03/04/22 220 lb (99.8 kg)       He is seeing neurology July 13th--has been about a year. Dizziness worse past 2mo. He has not had meds in past that helped. He has done ENT eval and PT with them. Helped only briefly. He had stopped PT due to insurance limiting services. He was frustrated by this and his [facility] coverage. He had done some of his PT exercises today and helped symptoms some. Discussed could review with PT even though he is not seeing them now. He had been doing exercises 2-3 times/week. He did this AM with his dizziness nad helped. Usually not that dizzy in AM.  He was fine at 5:30 when went to [place]. He then had dizziness when got up at 8:30AM.    He is coughing also. Having sinus drainage. He has no plan from ENT. He has sample from [place]. He is not taking meds for allergies. Copy made of [facility] labs from 5-31-22. --Hgb 12.1  --Creatinine 1.84; Sodium 150. LFT's normal.  --; ; HDL 37; LDL 81. --Iron low-normal.  --B12 and folate normal.  --A1c 7.0.  --TFT's normal.  --Vit D 25-OH 53.1. Lab copy scanned as Health Maint after visit. He is able to leave urine sample today for re-eval prior hematuria. He asked if could re-check here today. Last tested here Dec 2021. He was still positiive for blood per pt few months ago. Reviewed would need to clarify with renal at follow-up plan for evaluation (renal vs urology) if still positive today. Nursing screenings reviewed by provider at visit. Prior to Admission medications    Medication Sig Start Date End Date Taking? Authorizing Provider   lisinopriL (PRINIVIL, ZESTRIL) 10 mg tablet Take 10 mg by mouth daily. Yes Provider, Historical   guaiFENesin (MUCINEX) 1,200 mg Ta12 ER tablet Take 1,200 mg by mouth daily. Yes Provider, Historical   pantoprazole (PROTONIX) 20 mg tablet Take 2 Tablets by mouth daily.  12/18/21  Yes Vannesa Schmidt MD FreeStyle Ashlee 14 Day Sensor kit  8/26/21  Yes Provider, Historical   simvastatin (ZOCOR) 20 mg tablet Take 20 mg by mouth nightly. Yes Provider, Historical   aspirin delayed-release 81 mg tablet Take 1 Tab by mouth daily. 5/29/19  Yes Shun Luna NP   metFORMIN (GLUCOPHAGE) 500 mg tablet Take 1,000 mg by mouth two (2) times daily (with meals). Patient takes 1/2 tab in the AM and 1/2 tab @ HS   Yes Provider, Historical   cholecalciferol (Vitamin D3) (2,000 UNITS /50 MCG) cap capsule     Provider, Historical   cyanocobalamin ER (Vitamin B-12) 1,000 mcg tablet     Provider, Historical        ROS    Vitals:    06/13/22 1032 06/13/22 1035 06/13/22 1045 06/13/22 1050   BP: (!) 163/77 (!) 154/91 133/78 137/78   Pulse: 69   72   Resp: 16      Temp: 98.1 °F (36.7 °C)      TempSrc: Oral      SpO2: 96%      Weight: 218 lb (98.9 kg)      Height: 5' 5\" (1.651 m)         Body mass index is 36.28 kg/m². Physical Exam:     Physical Exam  Vitals and nursing note reviewed. Constitutional:       General: He is not in acute distress. Appearance: Normal appearance. He is well-developed. He is not diaphoretic. HENT:      Head: Normocephalic and atraumatic. Right Ear: Tympanic membrane, ear canal and external ear normal.      Left Ear: Tympanic membrane, ear canal and external ear normal.      Ears:      Comments: Slightly irregular light reflex bilat TM's. No TM injection or erythema bilat. Mild layered fluid inferior right TM--~2mm. Nose:      Comments: Moderate boggy right > left nasopharyngeal edema present bilaterally with slight amount clear discharge bilat. Mouth/Throat:      Mouth: Mucous membranes are moist.      Pharynx: Oropharynx is clear. Eyes:      General: No scleral icterus. Right eye: No discharge. Left eye: No discharge. Conjunctiva/sclera: Conjunctivae normal.   Cardiovascular:      Rate and Rhythm: Normal rate and regular rhythm. Pulses: Normal pulses. Heart sounds: Normal heart sounds. No murmur heard. No friction rub. No gallop. Pulmonary:      Effort: Pulmonary effort is normal. No respiratory distress. Breath sounds: Normal breath sounds. No stridor. No wheezing, rhonchi or rales. Abdominal:      General: Bowel sounds are normal. There is no distension. Palpations: Abdomen is soft. Tenderness: There is no abdominal tenderness. There is no guarding or rebound. Musculoskeletal:         General: No swelling, tenderness or deformity. Right lower leg: No edema. Left lower leg: No edema. Skin:     General: Skin is warm. Coloration: Skin is not jaundiced or pale. Findings: No bruising, erythema or rash. Neurological:      General: No focal deficit present. Mental Status: He is alert. Motor: No abnormal muscle tone. Coordination: Coordination normal.      Gait: Gait normal.   Psychiatric:         Mood and Affect: Mood normal.         Behavior: Behavior normal.         Thought Content: Thought content normal.         Judgment: Judgment normal.       Results for orders placed or performed in visit on 06/13/22   AMB POC URINALYSIS DIP STICK AUTO W/O MICRO   Result Value Ref Range    Color (UA POC) Yellow     Clarity (UA POC) Clear     Glucose (UA POC) Negative Negative    Bilirubin (UA POC) Negative Negative    Ketones (UA POC) Negative Negative    Specific gravity (UA POC) 1.015 1.001 - 1.035    Blood (UA POC) Trace Negative    pH (UA POC) 7.0 4.6 - 8.0    Protein (UA POC) Negative Negative    Urobilinogen (UA POC) 0.2 mg/dL 0.2 - 1    Nitrites (UA POC) Negative Negative    Leukocyte esterase (UA POC) Negative Negative       An electronic signature was used to authenticate this note.   -- Julius Garcia MD

## 2022-06-30 ENCOUNTER — TELEPHONE (OUTPATIENT)
Dept: HEMATOLOGY | Age: 77
End: 2022-06-30

## 2022-06-30 NOTE — TELEPHONE ENCOUNTER
Attempted to call patient to follow up regarding authorizations for procedures that were scheduled for today. Both procedures were cancelled and rescheduled.

## 2022-07-05 RX ORDER — LISINOPRIL 10 MG/1
TABLET ORAL
Qty: 30 TABLET | Refills: 5 | Status: SHIPPED
Start: 2022-07-05 | End: 2022-09-14 | Stop reason: SINTOL

## 2022-07-07 ENCOUNTER — HOSPITAL ENCOUNTER (OUTPATIENT)
Dept: MRI IMAGING | Age: 77
Discharge: HOME | End: 2022-07-07
Attending: NURSE PRACTITIONER
Payer: MEDICARE

## 2022-07-07 ENCOUNTER — APPOINTMENT (OUTPATIENT)
Dept: ULTRASOUND IMAGING | Age: 77
End: 2022-07-07
Attending: NURSE PRACTITIONER
Payer: MEDICARE

## 2022-07-07 DIAGNOSIS — K74.60 CIRRHOSIS OF LIVER WITHOUT ASCITES, UNSPECIFIED HEPATIC CIRRHOSIS TYPE (HCC): ICD-10-CM

## 2022-07-07 PROCEDURE — 74011250636 HC RX REV CODE- 250/636: Performed by: NURSE PRACTITIONER

## 2022-07-07 PROCEDURE — 74183 MRI ABD W/O CNTR FLWD CNTR: CPT

## 2022-07-07 PROCEDURE — A9575 INJ GADOTERATE MEGLUMI 0.1ML: HCPCS | Performed by: NURSE PRACTITIONER

## 2022-07-07 RX ORDER — GADOTERATE MEGLUMINE 376.9 MG/ML
20 INJECTION INTRAVENOUS
Status: COMPLETED | OUTPATIENT
Start: 2022-07-07 | End: 2022-07-07

## 2022-07-07 RX ADMIN — GADOTERATE MEGLUMINE 20 ML: 376.9 INJECTION INTRAVENOUS at 11:05

## 2022-07-11 ENCOUNTER — HOSPITAL ENCOUNTER (OUTPATIENT)
Dept: ULTRASOUND IMAGING | Age: 77
Discharge: HOME | End: 2022-07-11
Attending: NURSE PRACTITIONER
Payer: MEDICARE

## 2022-07-11 DIAGNOSIS — Z86.73 HISTORY OF CVA (CEREBROVASCULAR ACCIDENT): ICD-10-CM

## 2022-07-11 PROCEDURE — 93880 EXTRACRANIAL BILAT STUDY: CPT

## 2022-07-11 RX ORDER — METFORMIN HYDROCHLORIDE 1000 MG/1
1000 TABLET ORAL 2 TIMES DAILY
COMMUNITY
Start: 2022-05-30

## 2022-07-11 RX ORDER — CHOLECALCIFEROL (VITAMIN D3) 125 MCG
CAPSULE ORAL
COMMUNITY
Start: 2022-05-01 | End: 2022-07-13

## 2022-07-12 LAB
LEFT ICA/CCA SYS: 1.1
RIGHT ICA/CCA SYS: 0.9

## 2022-07-13 ENCOUNTER — OFFICE VISIT (OUTPATIENT)
Dept: NEUROLOGY | Age: 77
End: 2022-07-13
Payer: MEDICARE

## 2022-07-13 VITALS
BODY MASS INDEX: 36.15 KG/M2 | SYSTOLIC BLOOD PRESSURE: 124 MMHG | WEIGHT: 217 LBS | OXYGEN SATURATION: 94 % | HEIGHT: 65 IN | DIASTOLIC BLOOD PRESSURE: 78 MMHG | RESPIRATION RATE: 18 BRPM | HEART RATE: 75 BPM

## 2022-07-13 DIAGNOSIS — Z86.73 REMOTE HISTORY OF STROKE: ICD-10-CM

## 2022-07-13 DIAGNOSIS — I95.1 DYSAUTONOMIA ORTHOSTATIC HYPOTENSION SYNDROME: Primary | ICD-10-CM

## 2022-07-13 DIAGNOSIS — R42 POSTURAL DIZZINESS: ICD-10-CM

## 2022-07-13 PROCEDURE — G8536 NO DOC ELDER MAL SCRN: HCPCS | Performed by: PSYCHIATRY & NEUROLOGY

## 2022-07-13 PROCEDURE — 1101F PT FALLS ASSESS-DOCD LE1/YR: CPT | Performed by: PSYCHIATRY & NEUROLOGY

## 2022-07-13 PROCEDURE — G8427 DOCREV CUR MEDS BY ELIG CLIN: HCPCS | Performed by: PSYCHIATRY & NEUROLOGY

## 2022-07-13 PROCEDURE — G8417 CALC BMI ABV UP PARAM F/U: HCPCS | Performed by: PSYCHIATRY & NEUROLOGY

## 2022-07-13 PROCEDURE — 99215 OFFICE O/P EST HI 40 MIN: CPT | Performed by: PSYCHIATRY & NEUROLOGY

## 2022-07-13 PROCEDURE — 1123F ACP DISCUSS/DSCN MKR DOCD: CPT | Performed by: PSYCHIATRY & NEUROLOGY

## 2022-07-13 PROCEDURE — G8510 SCR DEP NEG, NO PLAN REQD: HCPCS | Performed by: PSYCHIATRY & NEUROLOGY

## 2022-07-13 PROCEDURE — 3017F COLORECTAL CA SCREEN DOC REV: CPT | Performed by: PSYCHIATRY & NEUROLOGY

## 2022-07-13 RX ORDER — LATANOPROST 50 UG/ML
SOLUTION/ DROPS OPHTHALMIC
COMMUNITY

## 2022-07-13 RX ORDER — PREDNISONE 20 MG/1
TABLET ORAL
COMMUNITY
End: 2022-07-13

## 2022-07-13 RX ORDER — CLOTRIMAZOLE AND BETAMETHASONE DIPROPIONATE 10; .64 MG/G; MG/G
CREAM TOPICAL
COMMUNITY
Start: 2022-07-10 | End: 2022-07-13

## 2022-07-13 NOTE — PROGRESS NOTES
Neurology Clinic Follow up Note    Patient ID:  Leann Reynolds  [id number]  43 y.o.  1945      Mr. Davy Loya is here for follow up today of  Chief Complaint   Patient presents with    Follow-up     Stroke and vertigo: Patient still feels dizzy. Last Appointment With Me:  2/5/2020    \"LHM with a h/o HTN, HPL, DM, COPD, remote stroke 2013 vs brain abscess with residual R paresthesias, dizziness/imbalance (R medullary distribution) presenting with worsening of his baseline dizziness, RUE paresthesias and dysarthria 5/28/19. Head CT reviewed and without acute process. MRI Brain/MRA subsequently completed showing no acute ischemia, mild chronic microvascular disease, no significant occlusion/stenosis. Suspect possible posterior circulation TIA. Advise resuming antiplatelet therapy for stroke prevention moving forward. \"    Interval History:   Patient returns for f/u, not seen in over 2 years due to h/o chronic dizziness attributable to prior strokes as well as diabetic autonomic dysfunction. He reports dizziness has worsened over the past 2 months. He describes this as a lightheadedness/imbalance when standing. He is afraid he may fall. Denies vertigo, diplopia, N/V, focal weakness/numbness, speech/vision deficits. He has baseline dysphagia s/p remote infarction but feels this is slightly worse since his last visit. He completed CUS 7/11/22 with patent vessels, no significant stenosis. He remains on ASA for stroke prevention. He reports drinking 4-5 16oz bottles of water daily. He uses plenty of salt in his diet. Serum glucose is labile. PMHx/ PSHx/ FHx/ SHx:  Reviewed and unchanged previous visit.    Past Medical History:   Diagnosis Date    CAD (coronary artery disease)     Chronic kidney disease     stones    Chronic obstructive pulmonary disease ([address]     [facility]     Diabetes ([facility]     Hypertension     Stroke ([facility] 1980s    right hand neuropathy         ROS:  Comprehensive review of systems negative except for as noted above. Objective:       Meds:  Current Outpatient Medications   Medication Sig Dispense Refill    latanoprost (XALATAN) 0.005 % ophthalmic solution latanoprost 0.005 % eye drops   INSTILL 1 DROP INTO BOTH EYES EVERY DAY      metFORMIN (GLUCOPHAGE) 1,000 mg tablet Take 1,000 mg by mouth two (2) times a day.  lisinopriL (PRINIVIL, ZESTRIL) 10 mg tablet TAKE ONE TABLET BY MOUTH DAILY 30 Tablet 5    cholecalciferol (Vitamin D3) (2,000 UNITS /50 MCG) cap capsule       fluticasone propionate (FLONASE) 50 mcg/actuation nasal spray 2 Sprays by Both Nostrils route daily. 16 g 5    azelastine (ASTELIN) 137 mcg (0.1 %) nasal spray 2 Sprays by Both Nostrils route two (2) times daily as needed for Rhinitis. 30 Each 5    pantoprazole (PROTONIX) 20 mg tablet Take 2 Tablets by mouth daily. 60 Tablet 5    FreeStyle Ashlee 14 Day Sensor kit       simvastatin (ZOCOR) 20 mg tablet Take 20 mg by mouth nightly.  aspirin delayed-release 81 mg tablet Take 1 Tab by mouth daily. 30 Tab 1       Exam:  Visit Vitals  /78 (BP 1 Location: Left upper arm, BP Patient Position: Sitting)   Pulse 75   Resp 18   Ht 5' 5\" (1.651 m)   Wt 217 lb (98.4 kg)   SpO2 94%   BMI 36.11 kg/m²     NEUROLOGICAL EXAM:  General: Awake, alert, speech fluent  CN: PERRL, EOMI without nystagmus, VFF to confrontation, facial sensation and strength are normal and symmetric, hearing is intact to finger rub bilaterally, palate and tongue movements are intact and symmetric. Motor: Normal tone, bulk and strength bilaterally. Reflexes: 1/4 and symmetric, plantar stimulation is flexor. Coordination: FNF, YUSEF, HTS intact. Sensation: LT intact throughout.   Gait: Normal-based with rather immediate dizziness upon standing resulting in instability    LABS  Results for orders placed or performed during the hospital encounter of 07/11/22   DUPLEX CAROTID BILATERAL   Result Value Ref Range    Right ICA/CCA sys 0.90     Left ICA/CCA sys 1.10        IMAGING:  MRI Results (most recent):  Results from Hospital Encounter encounter on 07/07/22    MRI ABD W WO CONT    Narrative  EXAM:  MRI ABD W WO CONT    INDICATION: Cirrhosis. Evaluate for liver mass. COMPARISON: March 16, 2022    TECHNIQUE: Coronal T2 and postcontrast T1; Axial T2, in/out of phase, MRCP, pre-  and dynamic postcontrast T1 MRI of the abdomen. 20 mL Dotarem. Subtractions were  performed. FINDINGS: Liver: Nodular contour of the liver compatible with cirrhosis. Diffuse  loss of signal on opposed phase imaging consistent with steatosis. No concerning  liver lesion. Biliary tree: Status post cholecystectomy. No biliary dilation. Common bile duct  measures 3 mm. Pancreas: Normal pancreatic parenchymal signal intensity. Stable cystic lesion  in the pancreatic head with communication with the main duct. No enhancement. No  solid pancreatic mass. Spleen: Normal    Adrenal glands: Stable 3 cm left adrenal adenoma. Kidneys: Normal    Vasculature: Normal caliber abdominal aorta. Main portal vein, splenic vein, and  SMV are patent. Bowel: Normal caliber bowel. Lymph nodes: No lymphadenopathy. Miscellaneous: No ascites. Impression  Diffuse hepatic steatosis and morphologic features of cirrhosis. No focal liver lesion. Stable pancreatic head IPMN. No ascites. Assessment:     Encounter Diagnoses     ICD-10-CM ICD-9-CM   1. Dysautonomia orthostatic hypotension syndrome  I95.1 458.0   2. Postural dizziness  R42 780.4   3. Remote history of stroke  Z80.78 V16.53      78year old male with a h/o HTN, HPL, DM, COPD, remote stroke 2013 with residual R paresthesias, dizziness/imbalance (R medullary distribution), hospitalization for worsening of his baseline dizziness/dysarthria/RUE paresthesias 5/28/19 with negative neuroimaging.     MRI Brain/MRV were previously completed 9/15/19 showing no acute intracranial process, minimal chronic microvascular ischemia, no evidence of vessel malformation. ANS testing also completed due to abnormal sweating, dizziness consistent with mild cardiovagal dysfunction c/w diabetic autonomic neuropathy likely contributing to orthostatic intolerance. Today, he returns after two years reporting worsening postural dizziness/lightheadedness when standing. Orthostatic vitals are positive from lying to sitting. He appears to be hydrating appropriately. Encouraged discussion with his PCP regarding potential reduction in antihypertensive medications to alleviate orthostatic hypotension. Examination otherwise appears non-focal and does not suggest recurrent posterior circulation ischemia. He will continue with current antiplatelet therapy for stroke prevention. Plan:   Continue conservative measures for orthostatic hypotension related to diabetic dysautonomia  Consider adjustment/reduction of antihypertensive medications-will defer to PCP  DM management to prevent progression of DM related dysautonomia  Continue ASA 81mg daily for stroke prevention        Follow-up and Dispositions    · Return in about 6 months (around 1/13/2023). The duration of this appointment visit was 40 minutes spent on interview, examination, review of records, counseling, explanation of diagnosis, planning of further management, documentation and coordination of care.     Signed:  Palak Leal DO  7/13/2022

## 2022-07-15 NOTE — PROGRESS NOTES
My chart message sent to patient regarding the MRI. There is an adrenal adenoma that was not previously mentioned which is stable. Diffuse steatosis and cirrhosis. Stable cystic lesion on the pancreas. Vasculature patent. Will send MRI result to PCP.

## 2022-08-09 LAB — HBA1C MFR BLD HPLC: 6.7 %

## 2022-08-23 ENCOUNTER — OFFICE VISIT (OUTPATIENT)
Dept: INTERNAL MEDICINE CLINIC | Age: 77
End: 2022-08-23
Payer: MEDICARE

## 2022-08-23 VITALS
HEART RATE: 78 BPM | WEIGHT: 208.6 LBS | DIASTOLIC BLOOD PRESSURE: 70 MMHG | TEMPERATURE: 98.1 F | SYSTOLIC BLOOD PRESSURE: 119 MMHG | OXYGEN SATURATION: 96 % | RESPIRATION RATE: 16 BRPM | HEIGHT: 65 IN | BODY MASS INDEX: 34.75 KG/M2

## 2022-08-23 DIAGNOSIS — G89.29 CHRONIC MUSCULOSKELETAL PAIN: ICD-10-CM

## 2022-08-23 DIAGNOSIS — E11.59 TYPE 2 DIABETES MELLITUS WITH OTHER CIRCULATORY COMPLICATION, WITHOUT LONG-TERM CURRENT USE OF INSULIN (HCC): Primary | ICD-10-CM

## 2022-08-23 DIAGNOSIS — M10.00 IDIOPATHIC GOUT, UNSPECIFIED CHRONICITY, UNSPECIFIED SITE: ICD-10-CM

## 2022-08-23 DIAGNOSIS — G89.29 CHRONIC PAIN OF BOTH SHOULDERS: ICD-10-CM

## 2022-08-23 DIAGNOSIS — Z87.39 HISTORY OF ARTHRITIS: ICD-10-CM

## 2022-08-23 DIAGNOSIS — N18.30 STAGE 3 CHRONIC KIDNEY DISEASE, UNSPECIFIED WHETHER STAGE 3A OR 3B CKD (HCC): ICD-10-CM

## 2022-08-23 DIAGNOSIS — M25.511 CHRONIC PAIN OF BOTH SHOULDERS: ICD-10-CM

## 2022-08-23 DIAGNOSIS — M79.18 CHRONIC MUSCULOSKELETAL PAIN: ICD-10-CM

## 2022-08-23 DIAGNOSIS — M25.512 CHRONIC PAIN OF BOTH SHOULDERS: ICD-10-CM

## 2022-08-23 PROCEDURE — 99214 OFFICE O/P EST MOD 30 MIN: CPT | Performed by: INTERNAL MEDICINE

## 2022-08-23 PROCEDURE — G8536 NO DOC ELDER MAL SCRN: HCPCS | Performed by: INTERNAL MEDICINE

## 2022-08-23 PROCEDURE — G8432 DEP SCR NOT DOC, RNG: HCPCS | Performed by: INTERNAL MEDICINE

## 2022-08-23 PROCEDURE — 2022F DILAT RTA XM EVC RTNOPTHY: CPT | Performed by: INTERNAL MEDICINE

## 2022-08-23 PROCEDURE — 3017F COLORECTAL CA SCREEN DOC REV: CPT | Performed by: INTERNAL MEDICINE

## 2022-08-23 PROCEDURE — 1123F ACP DISCUSS/DSCN MKR DOCD: CPT | Performed by: INTERNAL MEDICINE

## 2022-08-23 PROCEDURE — 3051F HG A1C>EQUAL 7.0%<8.0%: CPT | Performed by: INTERNAL MEDICINE

## 2022-08-23 PROCEDURE — G8427 DOCREV CUR MEDS BY ELIG CLIN: HCPCS | Performed by: INTERNAL MEDICINE

## 2022-08-23 PROCEDURE — G8417 CALC BMI ABV UP PARAM F/U: HCPCS | Performed by: INTERNAL MEDICINE

## 2022-08-23 PROCEDURE — 1101F PT FALLS ASSESS-DOCD LE1/YR: CPT | Performed by: INTERNAL MEDICINE

## 2022-08-23 NOTE — PROGRESS NOTES
Umesh Villeda (: 1945) is a 78 y.o. male, established patient, here for evaluation of the following chief complaint(s):  Chief Complaint   Patient presents with    Leg Pain     Right leg pain x 1 month  Elevated uric acid       Assessment and Plan:       ICD-10-CM ICD-9-CM    1. Type 2 diabetes mellitus with other circulatory complication, without long-term current use of insulin (HCC)  E11.59 250.70 REFERRAL TO ENDOCRINOLOGY      2. Stage 3 chronic kidney disease, unspecified whether stage 3a or 3b CKD (HCC)  N18.30 585.3 RENAL FUNCTION PANEL      HEPATIC FUNCTION PANEL      HEPATIC FUNCTION PANEL      RENAL FUNCTION PANEL      3. Chronic pain of both shoulders  M25.511 719.41 REFERRAL TO ORTHOPEDICS    G89.29 338.29     M25.512        4. History of arthritis  Z87.39 V13.4 REFERRAL TO ORTHOPEDICS      REFERRAL TO RHEUMATOLOGY      C REACTIVE PROTEIN, QT      SED RATE (ESR)      RA + CCP ABS      ANTINUCLEAR ANTIBODIES, IFA      ANTINUCLEAR ANTIBODIES, IFA      RA + CCP ABS      SED RATE (ESR)      C REACTIVE PROTEIN, QT      5. Chronic musculoskeletal pain  M79.18 729.1 REFERRAL TO ORTHOPEDICS    G89.29 338.29 REFERRAL TO PAIN MANAGEMENT      REFERRAL TO RHEUMATOLOGY      6. Idiopathic gout, unspecified chronicity, unspecified site  M10.00 274.9 CBC WITH AUTOMATED DIFF      RENAL FUNCTION PANEL      HEPATIC FUNCTION PANEL      URIC ACID      URIC ACID      HEPATIC FUNCTION PANEL      RENAL FUNCTION PANEL      CBC WITH AUTOMATED DIFF          1,3-5:  Referral(s) and referral coordination reviewed with patient at visit.    1,2,4,6:  Lab monitoring reviewed. Continue current medications pending lab results/review. Lab follow-up to review plan prior to further specialty eval reviewed and scheduled after visit as planned. Follow-up and Dispositions    Return in about 2 weeks (around 2022) for medication follow-up, lab review (1-2 weeks--may use sick clinic slot if needed).        lab results and schedule of future lab studies reviewed with patient  reviewed medications and side effects in detail    For additional documentation of information and/or recommendations discussed this visit, please see notes in instructions. Plan and evaluation (above) reviewed with pt at visit  Patient voiced understanding of plan and provided with time to ask/review questions. After Visit Summary (AVS) provided to pt after visit with additional instructions as needed/reviewed. Future Appointments   Date Time Provider Ping Jordani   9/14/2022 10:00 AM Clementina Angulo NP [facility] BS AMB   1/13/2023  8:00 AM DO ALICIA Karimi BS AMB   --Updated future visits after patient check-out. History of Present Illness:     Notes (nursing/rooming note copied below in italics):  As above    Here to evaluate leg pain. He notes had labs with [facility] but they did not treat. He has not had regular mgt prior. He had right foot and leg pain from gout. Podiatry treated with antibiotics, but not for gout. He has had interim shoulder and functional problems, but not using wheelchair or walker now. He does not have plan for shoulder pain with [facility]. He has not had labs with [facility] for arthritis. He notes has had problems with arthritis in past, but has not had chronic dx for this. He notes no prior labs for arthritis in past.    He notes [facility] is working to help him with DM shoes. They are following him for DM and BP. He sees them about every 3mo. He has a lot of frustration about his care today. He has copies of his A1c  A1c is 6.7 from 8-9-22. He is now on metformin 500mg BID. He is taking as 1/2 of a 1,000mg tab. He has a 5-30-22 refill for metformin 1,000mg BID. He notes he has been taking 500mg BID dose for 8-10mo. He will see her Nov 2022. Reviewed would not be able to address his DM today.         He has a uric acid level also from 8-9-22 which was 9.2 (3.8-8.4). No results found for: URICA, UAU1    Reviewed gout mgt. He had not been on meds in past.  No allopurinol or Uloric here in past.  No prior gout levels. He had podiatry provide a dose pack 2-3 weeks ago. Helped with pain, but returned once completed. With flash reader, his glc is 159 today. He would prefer to avoid steroid at this time. Planned 10mg BID dosing if needed for 3-5 days for gout flare. Notes pain from proximal to right ankle down to foot. Nursing screenings reviewed by provider at visit. Prior to Admission medications    Medication Sig Start Date End Date Taking? Authorizing Provider   latanoprost (XALATAN) 0.005 % ophthalmic solution latanoprost 0.005 % eye drops   INSTILL 1 DROP INTO BOTH EYES EVERY DAY   Yes Provider, Historical   lisinopriL (PRINIVIL, ZESTRIL) 10 mg tablet TAKE ONE TABLET BY MOUTH DAILY 7/5/22  Yes Clementina Angulo NP   cholecalciferol (Vitamin D3) (2,000 UNITS /50 MCG) cap capsule    Yes Provider, Historical   fluticasone propionate (FLONASE) 50 mcg/actuation nasal spray 2 Sprays by Both Nostrils route daily. 6/13/22  Yes Linh Chan MD   azelastine (ASTELIN) 137 mcg (0.1 %) nasal spray 2 Sprays by Both Nostrils route two (2) times daily as needed for Rhinitis. 6/13/22  Yes Linh Chan MD   pantoprazole (PROTONIX) 20 mg tablet Take 2 Tablets by mouth daily. 12/18/21  Yes Linh Chan MD   FreeStyle Ashlee 14 Day Sensor kit  8/26/21  Yes Provider, Historical   simvastatin (ZOCOR) 20 mg tablet Take 20 mg by mouth nightly. Yes Provider, Historical   aspirin delayed-release 81 mg tablet Take 1 Tab by mouth daily. 5/29/19  Yes Ina Conti NP   metFORMIN (GLUCOPHAGE) 1,000 mg tablet Take 1,000 mg by mouth two (2) times a day.  5/30/22   Provider, Historical        ROS    Vitals:    08/23/22 1037   BP: 119/70   Pulse: 78   Resp: 16   Temp: 98.1 °F (36.7 °C)   TempSrc: Oral   SpO2: 96%   Weight: 208 lb 9.6 oz (94.6 kg)   Height: 5' 5\" (1.651 m)   PainSc:   5   PainLoc: Leg      Body mass index is 34.71 kg/m². Physical Exam:     Physical Exam  Vitals and nursing note reviewed. Constitutional:       General: He is not in acute distress. Appearance: Normal appearance. He is well-developed. He is not diaphoretic. HENT:      Head: Normocephalic and atraumatic. Mouth/Throat:      Mouth: Mucous membranes are moist.   Eyes:      General: No scleral icterus. Right eye: No discharge. Left eye: No discharge. Conjunctiva/sclera: Conjunctivae normal.   Cardiovascular:      Rate and Rhythm: Normal rate and regular rhythm. Pulses: Normal pulses. Heart sounds: Normal heart sounds. No murmur heard. No friction rub. No gallop. Pulmonary:      Effort: Pulmonary effort is normal. No respiratory distress. Breath sounds: Normal breath sounds. No stridor. No wheezing, rhonchi or rales. Abdominal:      General: Bowel sounds are normal. There is no distension. Palpations: Abdomen is soft. Tenderness: no abdominal tenderness There is no guarding or rebound. Musculoskeletal:         General: No swelling, tenderness or deformity. Comments: 1+ edema--symmetric to mid-tibia bilat. Skin:     General: Skin is warm. Coloration: Skin is not jaundiced or pale. Findings: No bruising, erythema or rash. Neurological:      General: No focal deficit present. Mental Status: He is alert. Motor: No abnormal muscle tone. Coordination: Coordination normal.      Gait: Gait normal.   Psychiatric:         Mood and Affect: Mood normal.         Behavior: Behavior normal.         Thought Content: Thought content normal.         Judgment: Judgment normal.       An electronic signature was used to authenticate this note.   -- Frederick Saenz MD

## 2022-08-23 NOTE — PATIENT INSTRUCTIONS
Please follow the following instructions to process/authorize your referral, if needed:    Referrals processing  Please verify with your insurance IF you need referral authorization submitted. For insurance plans which require this, please follow the following steps. FAILURE TO DO SO MAY RESULT IN INABILITY TO SEE THE SPECIALIST YOU HAVE BEEN REFERRED TO (once you are scheduled to see them). Call and schedule appointment with specialist  Call our clinic and leave message with provider name, and date of appointment  We will then submit the referral to your insurance. This process takes 2-5 business days. If you have questions about scheduling or authorizing referral, you can review with our referral coordinator. You can review with her today if available/if you have time, or you can call to review once you have made your referral/appointment. If you are not sure if you need referral authorizations, please review with the referral coordinator(s), either prior to or after you have made the appointment, as reviewed.

## 2022-08-23 NOTE — PROGRESS NOTES
RM 17      Chief Complaint   Patient presents with    Leg Pain     Right leg pain x 1 month  Elevated uric acid               1. Have you been to the ER, urgent care clinic since your last visit? Hospitalized since your last visit? No    2. Have you seen or consulted any other health care providers outside of the [address] [place] [address] since your last visit? Include any pap smears or colon screening.  No        Visit Vitals  /70 (BP 1 Location: Left upper arm, BP Patient Position: Sitting, BP Cuff Size: Adult)   Pulse 78   Temp 98.1 °F (36.7 °C) (Oral)   Resp 16   Ht 5' 5\" (1.651 m)   Wt 208 lb 9.6 oz (94.6 kg)   SpO2 96%   BMI 34.71 kg/m²

## 2022-08-31 LAB
ALBUMIN SERPL-MCNC: 4.1 G/DL (ref 3.7–4.7)
ALP SERPL-CCNC: 75 IU/L (ref 44–121)
ALT SERPL-CCNC: 25 IU/L (ref 0–44)
ANA SER QL IF: NEGATIVE
AST SERPL-CCNC: 19 IU/L (ref 0–40)
BASOPHILS # BLD AUTO: 0 X10E3/UL (ref 0–0.2)
BASOPHILS NFR BLD AUTO: 1 %
BILIRUB DIRECT SERPL-MCNC: 0.16 MG/DL (ref 0–0.4)
BILIRUB SERPL-MCNC: 0.4 MG/DL (ref 0–1.2)
BUN SERPL-MCNC: 18 MG/DL (ref 8–27)
BUN/CREAT SERPL: 11 (ref 10–24)
CALCIUM SERPL-MCNC: 9.3 MG/DL (ref 8.6–10.2)
CCP IGA+IGG SERPL IA-ACNC: 6 UNITS (ref 0–19)
CHLORIDE SERPL-SCNC: 108 MMOL/L (ref 96–106)
CO2 SERPL-SCNC: 17 MMOL/L (ref 20–29)
CREAT SERPL-MCNC: 1.69 MG/DL (ref 0.76–1.27)
CRP SERPL-MCNC: 9 MG/L (ref 0–10)
EOSINOPHIL # BLD AUTO: 0.2 X10E3/UL (ref 0–0.4)
EOSINOPHIL NFR BLD AUTO: 4 %
ERYTHROCYTE [DISTWIDTH] IN BLOOD BY AUTOMATED COUNT: 14.6 % (ref 11.6–15.4)
ERYTHROCYTE [SEDIMENTATION RATE] IN BLOOD BY WESTERGREN METHOD: 15 MM/HR (ref 0–30)
GLUCOSE SERPL-MCNC: 137 MG/DL (ref 65–99)
HCT VFR BLD AUTO: 39.2 % (ref 37.5–51)
HGB BLD-MCNC: 12.7 G/DL (ref 13–17.7)
IMM GRANULOCYTES # BLD AUTO: 0 X10E3/UL (ref 0–0.1)
IMM GRANULOCYTES NFR BLD AUTO: 0 %
LYMPHOCYTES # BLD AUTO: 1.5 X10E3/UL (ref 0.7–3.1)
LYMPHOCYTES NFR BLD AUTO: 27 %
MCH RBC QN AUTO: 27.1 PG (ref 26.6–33)
MCHC RBC AUTO-ENTMCNC: 32.4 G/DL (ref 31.5–35.7)
MCV RBC AUTO: 84 FL (ref 79–97)
MONOCYTES # BLD AUTO: 0.4 X10E3/UL (ref 0.1–0.9)
MONOCYTES NFR BLD AUTO: 8 %
MORPHOLOGY BLD-IMP: ABNORMAL
NEUTROPHILS # BLD AUTO: 3.2 X10E3/UL (ref 1.4–7)
NEUTROPHILS NFR BLD AUTO: 60 %
PHOSPHATE SERPL-MCNC: 2.9 MG/DL (ref 2.8–4.1)
PLATELET # BLD AUTO: ABNORMAL X10E3/UL (ref 150–450)
POTASSIUM SERPL-SCNC: 4.7 MMOL/L (ref 3.5–5.2)
PROT SERPL-MCNC: 6.3 G/DL (ref 6–8.5)
RBC # BLD AUTO: 4.69 X10E6/UL (ref 4.14–5.8)
RHEUMATOID FACT SERPL-ACNC: <10 IU/ML (ref 0–15)
SODIUM SERPL-SCNC: 140 MMOL/L (ref 134–144)
URATE SERPL-MCNC: 9.2 MG/DL (ref 3.8–8.4)
WBC # BLD AUTO: 5.5 X10E3/UL (ref 3.4–10.8)

## 2022-09-07 ENCOUNTER — OFFICE VISIT (OUTPATIENT)
Dept: INTERNAL MEDICINE CLINIC | Age: 77
End: 2022-09-07
Payer: MEDICARE

## 2022-09-07 VITALS
SYSTOLIC BLOOD PRESSURE: 135 MMHG | TEMPERATURE: 98.6 F | WEIGHT: 210 LBS | HEART RATE: 78 BPM | BODY MASS INDEX: 34.99 KG/M2 | DIASTOLIC BLOOD PRESSURE: 81 MMHG | OXYGEN SATURATION: 97 % | HEIGHT: 65 IN

## 2022-09-07 DIAGNOSIS — N18.30 STAGE 3 CHRONIC KIDNEY DISEASE, UNSPECIFIED WHETHER STAGE 3A OR 3B CKD (HCC): ICD-10-CM

## 2022-09-07 DIAGNOSIS — M10.00 IDIOPATHIC GOUT, UNSPECIFIED CHRONICITY, UNSPECIFIED SITE: Primary | ICD-10-CM

## 2022-09-07 DIAGNOSIS — M25.512 CHRONIC PAIN OF BOTH SHOULDERS: ICD-10-CM

## 2022-09-07 DIAGNOSIS — G89.29 CHRONIC PAIN OF BOTH SHOULDERS: ICD-10-CM

## 2022-09-07 DIAGNOSIS — M25.511 CHRONIC PAIN OF BOTH SHOULDERS: ICD-10-CM

## 2022-09-07 DIAGNOSIS — J30.89 NON-SEASONAL ALLERGIC RHINITIS DUE TO OTHER ALLERGIC TRIGGER: ICD-10-CM

## 2022-09-07 DIAGNOSIS — R05.8 RECURRENT COUGH: ICD-10-CM

## 2022-09-07 DIAGNOSIS — E11.59 TYPE 2 DIABETES MELLITUS WITH OTHER CIRCULATORY COMPLICATION, WITHOUT LONG-TERM CURRENT USE OF INSULIN (HCC): ICD-10-CM

## 2022-09-07 DIAGNOSIS — M79.18 CHRONIC MUSCULOSKELETAL PAIN: ICD-10-CM

## 2022-09-07 DIAGNOSIS — G89.29 CHRONIC MUSCULOSKELETAL PAIN: ICD-10-CM

## 2022-09-07 PROCEDURE — 2022F DILAT RTA XM EVC RTNOPTHY: CPT | Performed by: INTERNAL MEDICINE

## 2022-09-07 PROCEDURE — G8417 CALC BMI ABV UP PARAM F/U: HCPCS | Performed by: INTERNAL MEDICINE

## 2022-09-07 PROCEDURE — G8536 NO DOC ELDER MAL SCRN: HCPCS | Performed by: INTERNAL MEDICINE

## 2022-09-07 PROCEDURE — 3051F HG A1C>EQUAL 7.0%<8.0%: CPT | Performed by: INTERNAL MEDICINE

## 2022-09-07 PROCEDURE — 1101F PT FALLS ASSESS-DOCD LE1/YR: CPT | Performed by: INTERNAL MEDICINE

## 2022-09-07 PROCEDURE — 99215 OFFICE O/P EST HI 40 MIN: CPT | Performed by: INTERNAL MEDICINE

## 2022-09-07 PROCEDURE — G8427 DOCREV CUR MEDS BY ELIG CLIN: HCPCS | Performed by: INTERNAL MEDICINE

## 2022-09-07 PROCEDURE — 3017F COLORECTAL CA SCREEN DOC REV: CPT | Performed by: INTERNAL MEDICINE

## 2022-09-07 PROCEDURE — G8510 SCR DEP NEG, NO PLAN REQD: HCPCS | Performed by: INTERNAL MEDICINE

## 2022-09-07 PROCEDURE — 1123F ACP DISCUSS/DSCN MKR DOCD: CPT | Performed by: INTERNAL MEDICINE

## 2022-09-07 RX ORDER — PREDNISONE 10 MG/1
10 TABLET ORAL
Qty: 20 TABLET | Refills: 1 | Status: SHIPPED | OUTPATIENT
Start: 2022-09-07 | End: 2022-09-12

## 2022-09-07 RX ORDER — FEBUXOSTAT 40 MG/1
40 TABLET, FILM COATED ORAL DAILY
Qty: 30 TABLET | Refills: 2 | Status: SHIPPED | OUTPATIENT
Start: 2022-09-07

## 2022-09-07 NOTE — PROGRESS NOTES
Zoe Orellana (: 1945) is a 78 y.o. male, established patient, here for evaluation of the following chief complaint(s):  Chief Complaint   Patient presents with    Medication Evaluation    Gout     Ankle pain has been bothersome for about a month. Dizziness is coming back as gout is getting slightly better. Back Pain     Between the shoulders       Assessment and Plan:       ICD-10-CM ICD-9-CM    1. Idiopathic gout, unspecified chronicity, unspecified site  M10.00 274.9 febuxostat (Uloric) 40 mg tab tablet      predniSONE (DELTASONE) 10 mg tablet      2. Stage 3 chronic kidney disease, unspecified whether stage 3a or 3b CKD (formerly [facility])  N18.30 585.3       3. Type 2 diabetes mellitus with other circulatory complication, without long-term current use of insulin (formerly [facility])  E11.59 250.70       4. Chronic pain of both shoulders  M25.511 719.41     G89.29 338.29     M25.512        5. Chronic musculoskeletal pain  M79.18 729.1     G89.29 338.29       6. Recurrent cough  R05.8 786.2       7. Non-seasonal allergic rhinitis due to other allergic trigger  J30.89 477.8           1-2:  Plan start Uloric for uric acid-lowering therapy. Plan prednisone for flares as needed. Requested Prescriptions     Signed Prescriptions Disp Refills    febuxostat (Uloric) 40 mg tab tablet 30 Tablet 2     Sig: Take 1 Tablet by mouth daily. predniSONE (DELTASONE) 10 mg tablet 20 Tablet 1     Sig: Take 10 mg by mouth two (2) times daily as needed (Gout flares) for up to 5 days. 3.  Mgt and follow-up here and with [facility].    4,5:  Seeing ortho next week to evaluate. 6,7:  Re-start 2nd nasal inhaler regularly. If not improving, review allergy referral and/or adding daily anti-histamine at follow-up. Follow-up and Dispositions    Return in about 4 weeks (around 10/5/2022) for medication follow-up, referral follow-up, non-fasting labs--4-6 weeks.        lab results and schedule of future lab studies reviewed with patient  reviewed medications and side effects in detail    For additional documentation of information and/or recommendations discussed this visit, please see notes in instructions. Plan and evaluation (above) reviewed with pt at visit  Patient voiced understanding of plan and provided with time to ask/review questions. After Visit Summary (AVS) provided to pt after visit with additional instructions as needed/reviewed. Future Appointments   Date Time Provider Ping Rufina   9/12/2022  9:00 AM Janell Heimlich, MD [facility] BS AMB   9/14/2022 10:00 AM Clementina Angulo NP [facility] BS AMB   10/7/2022 11:00 AM Deon Bolton MD [facility] BS AMB   12/14/2022 10:10 AM MD NINO Gonzalez [facility] BS AMB   1/13/2023  8:00 AM DO ALICIA Calderon BS AMB   --Updated future visits after patient check-out. On this date 09/07/2022 I have spent 43 minutes reviewing previous notes, test results and face to face with the patient discussing the diagnosis and importance of compliance with the treatment plan as well as documenting on the day of the visit. History of Present Illness:     Notes (nursing/rooming note copied below in italics):  As above    Here for gout mgt/follow-up. 8/23 labs reviewed. He has scheduled with ortho, notes Rheum not taking his insurance.   Reviewed he may not need to see rheum, since inflammatory testing normal, as below:   (ESR, CRP, and SANDY/Rfactor)    Component      Latest Ref Rng & Units 8/23/2022 8/23/2022 8/23/2022 8/23/2022          12:00 AM 12:00 AM 12:00 AM 12:00 AM   Rheumatoid factor      <14.0 IU/mL    <10.0   CCP Antibodies IgG/IgA      0 - 19 units    6   Antinuclear Abs, IFA             Sed rate (ESR)      0 - 30 mm/hr   15    C-Reactive Protein, Qt      0 - 10 mg/L  9     Uric acid      3.8 - 8.4 mg/dL 9.2 (H)        Component      Latest Ref Rng & Units 8/23/2022          12:00 AM   Rheumatoid factor      <14.0 IU/mL    CCP Antibodies IgG/IgA      0 - 19 units    Antinuclear Abs, IFA       Negative   Sed rate (ESR)      0 - 30 mm/hr    C-Reactive Protein, Qt      0 - 10 mg/L    Uric acid      3.8 - 8.4 mg/dL        He asked about A1c, but reviewed we didn't check with last labs as not due from last [facility] labs. Reviewed Uric acid same as prior [facility] lab. Mgt reviewed with Uloric. Wrote out and reviewed categories of meds available, including allopurinol or Uloric for controlling/lowering Uric Acid level, colchicine (not recommending with CrCl), and NSAIDs vs steroids for acute flare mgt. Reviewed NSAIDs not recommended with his CrCl either. Plan for steroids for now as needed for flares. He has still right ankle/foot pain, and worsening left     He has seen renal in last 1mo and seeing in 1-2mo per pt--not sure. Reviewed mgt, implications with renal insufficiency. C-Gault with UpToDate Calculator 50 CrCl (using current weight below, age and last creatinine below):  Lab Results   Component Value Date/Time    Creatinine 1.69 (H) 08/23/2022 12:00 AM     GFR didn't calculate even though ordered as renal panel. At end of visit, noted cough intermittently for few weeks. Not sure how he is using nasal sprays. He identfiies as large and small sprays and cannot remember names. Using one regularly and not other one. Has scripts for both Flonase and Asteline nasal.  Reviewed if he cannot remember, he should bring a list or his meds. 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    Notes helpful, but only using one currently. Reviewed adding antihistamine and/or allergy referral--prefers to resume regular nasal spray shena and re-eval at follow-up. Nursing screenings reviewed by provider at visit. Prior to Admission medications    Medication Sig Start Date End Date Taking?  Authorizing Provider   latanoprost (XALATAN) 0.005 % ophthalmic solution latanoprost 0.005 % eye drops   INSTILL 1 DROP INTO BOTH EYES EVERY DAY   Yes Provider, Historical   metFORMIN (GLUCOPHAGE) 1,000 mg tablet Take 1,000 mg by mouth two (2) times a day. 5/30/22  Yes Provider, Historical   lisinopriL (PRINIVIL, ZESTRIL) 10 mg tablet TAKE ONE TABLET BY MOUTH DAILY 7/5/22  Yes Clementina Angulo NP   cholecalciferol (Vitamin D3) (2,000 UNITS /50 MCG) cap capsule    Yes Provider, Historical   fluticasone propionate (FLONASE) 50 mcg/actuation nasal spray 2 Sprays by Both Nostrils route daily. 6/13/22  Yes Rl Page MD   azelastine (ASTELIN) 137 mcg (0.1 %) nasal spray 2 Sprays by Both Nostrils route two (2) times daily as needed for Rhinitis. 6/13/22  Yes Rl Page MD   pantoprazole (PROTONIX) 20 mg tablet Take 2 Tablets by mouth daily. 12/18/21  Yes Rl Page MD   FreeStyle Ashlee 14 Day Sensor kit  8/26/21  Yes Provider, Historical   simvastatin (ZOCOR) 20 mg tablet Take 20 mg by mouth nightly. Yes Provider, Historical   aspirin delayed-release 81 mg tablet Take 1 Tab by mouth daily. 5/29/19  Yes Caresse Cooks, NP        ROS    Vitals:    09/07/22 1110   BP: 135/81   Pulse: 78   Temp: 98.6 °F (37 °C)   SpO2: 97%   Weight: 210 lb (95.3 kg)   Height: 5' 5\" (1.651 m)      Body mass index is 34.95 kg/m². Physical Exam:     Physical Exam  Vitals and nursing note reviewed. Constitutional:       General: He is not in acute distress. Appearance: Normal appearance. He is well-developed. He is not diaphoretic. HENT:      Head: Normocephalic and atraumatic. Nose:      Comments: Moderately severe boggy NP edema bilat. Eyes:      General: No scleral icterus. Right eye: No discharge. Left eye: No discharge. Conjunctiva/sclera: Conjunctivae normal.   Cardiovascular:      Rate and Rhythm: Normal rate and regular rhythm. Pulses: Normal pulses. Heart sounds: Normal heart sounds. No murmur heard. No friction rub. No gallop.    Pulmonary:      Effort: Pulmonary effort is normal. No respiratory distress. Breath sounds: Normal breath sounds. No stridor. No wheezing, rhonchi or rales. Abdominal:      General: Bowel sounds are normal. There is no distension. Palpations: Abdomen is soft. Tenderness: There is no abdominal tenderness. There is no guarding or rebound. Musculoskeletal:         General: No swelling, tenderness or deformity. Right lower leg: No edema. Left lower leg: No edema. Skin:     General: Skin is warm. Coloration: Skin is not jaundiced or pale. Findings: No bruising, erythema or rash. Neurological:      General: No focal deficit present. Mental Status: He is alert. Motor: No abnormal muscle tone. Coordination: Coordination normal.      Gait: Gait normal.   Psychiatric:         Mood and Affect: Mood normal.         Behavior: Behavior normal.         Thought Content: Thought content normal.         Judgment: Judgment normal.       An electronic signature was used to authenticate this note.   -- Ayde Finch MD

## 2022-09-07 NOTE — PATIENT INSTRUCTIONS
Re-start the Flonase and Azelastine nasal sprays as reviewed. Continue as directed until next follow-up and we can review further then as needed.

## 2022-09-07 NOTE — PROGRESS NOTES
RM 16    Chief Complaint   Patient presents with    Medication Evaluation    Gout     Ankle pain has been bothersome for about a month. Dizziness is coming back as gout is getting slightly better. Back Pain     Between the shoulders       Visit Vitals  /81   Pulse 78   Temp 98.6 °F (37 °C)   Ht 5' 5\" (1.651 m)   Wt 210 lb (95.3 kg)   SpO2 97%   BMI 34.95 kg/m²       3 most recent PHQ Screens 9/7/2022   Little interest or pleasure in doing things Not at all   Feeling down, depressed, irritable, or hopeless Not at all   Total Score PHQ 2 0         1. Have you been to the ER, urgent care clinic since your last visit? Hospitalized since your last visit? No    2. Have you seen or consulted any other health care providers outside of the [address] [place] [address] since your last visit? Include any pap smears or colon screening. No    Health Maintenance Due   Topic Date Due    Pneumococcal 65+ years (1 - PCV) Never done    Eye Exam Retinal or Dilated  Never done    DTaP/Tdap/Td series (1 - Tdap) Never done    Low dose CT lung screening  Never done    COVID-19 Vaccine (4 - Booster) 08/10/2021    Foot Exam Q1  08/09/2022    Flu Vaccine (1) 09/01/2022    Medicare Yearly Exam  08/10/2022       Learning Assessment 8/14/2019   PRIMARY LEARNER Patient   PRIMARY LANGUAGE ENGLISH   LEARNER PREFERENCE PRIMARY DEMONSTRATION   ANSWERED BY self   RELATIONSHIP SELF         AVS  education, follow up, and recommendations provided and addressed with patient.   services used to advise patient -no

## 2022-09-08 NOTE — TELEPHONE ENCOUNTER
This is not a Dr. Anthony Brito patient. Patient's wife is calling to r/s MRI's because she does not know if insurance has approved. I gave patient the # to call [facility]. Yes

## 2022-09-12 ENCOUNTER — PATIENT MESSAGE (OUTPATIENT)
Dept: INTERNAL MEDICINE CLINIC | Age: 77
End: 2022-09-12

## 2022-09-12 ENCOUNTER — OFFICE VISIT (OUTPATIENT)
Dept: ORTHOPEDIC SURGERY | Age: 77
End: 2022-09-12
Payer: MEDICARE

## 2022-09-12 VITALS — HEIGHT: 65 IN | WEIGHT: 213 LBS | BODY MASS INDEX: 35.49 KG/M2

## 2022-09-12 DIAGNOSIS — R20.2 NUMBNESS AND TINGLING OF BOTH UPPER EXTREMITIES: ICD-10-CM

## 2022-09-12 DIAGNOSIS — M77.12 LATERAL EPICONDYLITIS, LEFT ELBOW: ICD-10-CM

## 2022-09-12 DIAGNOSIS — M25.511 ACUTE PAIN OF RIGHT SHOULDER: ICD-10-CM

## 2022-09-12 DIAGNOSIS — R20.0 NUMBNESS AND TINGLING OF BOTH UPPER EXTREMITIES: ICD-10-CM

## 2022-09-12 DIAGNOSIS — M25.512 ACUTE PAIN OF LEFT SHOULDER: Primary | ICD-10-CM

## 2022-09-12 PROCEDURE — 1123F ACP DISCUSS/DSCN MKR DOCD: CPT | Performed by: ORTHOPAEDIC SURGERY

## 2022-09-12 PROCEDURE — 99203 OFFICE O/P NEW LOW 30 MIN: CPT | Performed by: ORTHOPAEDIC SURGERY

## 2022-09-12 RX ORDER — COLCHICINE 0.6 MG/1
TABLET ORAL
COMMUNITY
Start: 2022-08-07 | End: 2022-10-07

## 2022-09-12 NOTE — PROGRESS NOTES
Mt Stern (: 1945) is a 78 y.o. male patient here for evaluation of the following chief complaint(s):  Shoulder Pain (Bilateral shoulder pain)       ASSESSMENT/PLAN:  Below is the assessment and plan developed based on review of pertinent history, physical exam, labs, studies, and medications. 1. Acute pain of left shoulder  -     XR SHOULDER LT AP/LAT MIN 2 V; Future  -     REFERRAL TO PHYSICAL THERAPY  2. Acute pain of right shoulder  -     XR SHOULDER RT AP/LAT MIN 2 V; Future  -     REFERRAL TO PHYSICAL THERAPY  3. Numbness and tingling of both upper extremities  -     EMG TWO EXTREMITIES UPPER; Future  -     REFERRAL TO PHYSICAL THERAPY  4. Lateral epicondylitis, left elbow      81-year-old male with bilateral upper extremity pain which he states starts in the periscapular region and radiates all the way down. Occasionally has some tingling. I like to obtain an EMG to rule out any radiculopathy or nerve compression. In regards to other treatments he is already on prednisone and we will also put in a referral for physical therapy which she can begin anytime. Follow-up to discuss the EMG results. Patient verbalized understanding and elected to proceed. All questions were answered to the patient's apparent satisfaction. SUBJECTIVE/OBJECTIVE:  HPI    81-year-old male with bilateral shoulder and arm pain. Also left elbow pain. He states has been going on for years. Is been slowly getting worse. He does have new diagnosis of gout and is on prednisone which is helping a lot. Still has the above-mentioned pain. He does feel some tingling at times into the fingertips feels the pain radiating from his neck all the way down into the hands. Patient reports a gradual onset of symptoms. Duration of problem 1+ years.   Symptom Severity 10/10  Symptom Frequency intermittent        No Known Allergies    Current Outpatient Medications   Medication Sig    colchicine 0.6 mg tablet febuxostat (Uloric) 40 mg tab tablet Take 1 Tablet by mouth daily. predniSONE (DELTASONE) 10 mg tablet Take 10 mg by mouth two (2) times daily as needed (Gout flares) for up to 5 days. latanoprost (XALATAN) 0.005 % ophthalmic solution latanoprost 0.005 % eye drops   INSTILL 1 DROP INTO BOTH EYES EVERY DAY    metFORMIN (GLUCOPHAGE) 1,000 mg tablet Take 1,000 mg by mouth two (2) times a day. lisinopriL (PRINIVIL, ZESTRIL) 10 mg tablet TAKE ONE TABLET BY MOUTH DAILY    cholecalciferol (Vitamin D3) (2,000 UNITS /50 MCG) cap capsule     fluticasone propionate (FLONASE) 50 mcg/actuation nasal spray 2 Sprays by Both Nostrils route daily. azelastine (ASTELIN) 137 mcg (0.1 %) nasal spray 2 Sprays by Both Nostrils route two (2) times daily as needed for Rhinitis. pantoprazole (PROTONIX) 20 mg tablet Take 2 Tablets by mouth daily. FreeStyle Ashlee 14 Day Sensor kit     simvastatin (ZOCOR) 20 mg tablet Take 20 mg by mouth nightly. aspirin delayed-release 81 mg tablet Take 1 Tab by mouth daily. No current facility-administered medications for this visit.        Social History     Socioeconomic History    Marital status: LIFE PARTNER     Spouse name: Not on file    Number of children: Not on file    Years of education: Not on file    Highest education level: Not on file   Occupational History    Not on file   Tobacco Use    Smoking status: Former     Packs/day: 2.00     Years: 45.00     Pack years: 90.00     Types: Cigarettes     Quit date: 2013     Years since quittin.2    Smokeless tobacco: Never   Vaping Use    Vaping Use: Never used   Substance and Sexual Activity    Alcohol use: No     Comment: 1 gallon whiskey a week stopped 4 years ago    Drug use: Never    Sexual activity: Not on file   Other Topics Concern    Not on file   Social History Narrative    Not on file     Social Determinants of Health     Financial Resource Strain: Not on file   Food Insecurity: Not on file   Transportation Needs: Not on file   Physical Activity: Not on file   Stress: Not on file   Social Connections: Not on file   Intimate Partner Violence: Not on file   Housing Stability: Not on file       Past Surgical History:   Procedure Laterality Date    COLONOSCOPY N/A 4/12/2018    COLONOSCOPY performed by Ravi Christianson MD at [facility] ENDOSCOPY    HX CHOLECYSTECTOMY  2020    HX SKIN BIOPSY  2019    skin cancer removed from left leg. Family History   Problem Relation Age of Onset    Heart Disease Mother     Heart Disease Father     Cancer Sister         breast ca            Review of Systems    No flowsheet data found. Vitals:  Ht 5' 5\" (1.651 m)   Wt 213 lb (96.6 kg)   BMI 35.45 kg/m²    Estimated body surface area is 2.1 meters squared as calculated from the following:    Height as of this encounter: 5' 5\" (1.651 m). Weight as of this encounter: 213 lb (96.6 kg). Body mass index is 35.45 kg/m². Physical Exam    Musculoskeletal Exam:    Bilateral SHOULDER EXAM    ROM:    -  degrees   -  degrees   - ER 80   - IR T12    Strength:    - Supraspinatus 5/5   - Infraspinatus 5/5   - Subscapularis 5/5    Drop arm: Negative    Radha's Test: Negative    Stockton Test: Negative    Neer Test: Negative    AC Joint TTP: Negative    Negative Tinel's bilaterally. Positive median nerve compression test on the left. Negative on the right. Negative Tinel's over the ulnar nerve at the elbow. Positive tenderness to palpation at the lateral epicondyle on left. Patient fires AIN, PIN and ulnar nerves. Sensation is grossly intact in the median, radial and ulnar distribution. Hand is pink and appears well-perfused. Hand is warm. Skin is intact. Compartments are soft and compressible.       Consitutional: Healthy  Skin:   - Edema - none  - Cellulitis - No    Neuro: Numbness or tingling in R/L arm: Yes    Psych: Affect normal    Cardiovascular: Capillary Refill < 2 seconds in upper extremities    Respiratory: Non-Labored Breathing    ROS:    Constitutional: Denies fever/chills    Respiratory: Denies SOB        Imaging:    XR Results (maximum last 2): Results from Appointment encounter on 09/12/22    XR SHOULDER LT AP/LAT MIN 2 V    Narrative  Left Shoulder Xray:  Indication: pain  Views: 3 views - AP, Axillary, Scap-Y  Interpretation:  -3 views of the left shoulder are reviewed and show normal bone architecture. The humerus is well located within the glenoid. There is no arthritis of the glenohumeral joint or acromioclavicular joint. There is no evidence of high riding of the humeral head. No evidence of fracture or subluxation. There is no evidence of soft tissue swelling. XR SHOULDER RT AP/LAT MIN 2 V    Narrative  Right Shoulder Xray:  Indication: pain  Views: 3 views - AP, Axillary, Scap-Y  Interpretation:  -3 views of the right shoulder are reviewed and show normal bone architecture. The humerus is well located within the glenoid. There is no arthritis of the glenohumeral joint or acromioclavicular joint. There is no evidence of high riding of the humeral head. No evidence of fracture or subluxation. There is no evidence of soft tissue swelling.         Orders Placed This Encounter    XR SHOULDER RT AP/LAT MIN 2 V     Standing Status:   Future     Number of Occurrences:   1     Standing Expiration Date:   9/13/2023    XR SHOULDER LT AP/LAT MIN 2 V     Standing Status:   Future     Number of Occurrences:   1     Standing Expiration Date:   9/13/2023    REFERRAL TO PHYSICAL THERAPY     Referral Priority:   Routine     Referral Type:   PT/OT/ST     Referral Reason:   Specialty Services Required     Requested Specialty:   Physical Therapy     Number of Visits Requested:   12    EMG TWO EXTREMITIES UPPER     Standing Status:   Future     Standing Expiration Date:   3/12/2023     Order Specific Question:   Reason for Exam:     Answer:   numbness and tingling            An electronic signature was used to authenticate this note.   -- Jackson Banuelos MD

## 2022-09-14 ENCOUNTER — OFFICE VISIT (OUTPATIENT)
Dept: HEMATOLOGY | Age: 77
End: 2022-09-14
Payer: MEDICARE

## 2022-09-14 VITALS
HEART RATE: 69 BPM | TEMPERATURE: 98.1 F | HEIGHT: 65 IN | SYSTOLIC BLOOD PRESSURE: 137 MMHG | OXYGEN SATURATION: 98 % | BODY MASS INDEX: 35.65 KG/M2 | WEIGHT: 214 LBS | DIASTOLIC BLOOD PRESSURE: 87 MMHG

## 2022-09-14 DIAGNOSIS — K75.81 NASH (NONALCOHOLIC STEATOHEPATITIS): ICD-10-CM

## 2022-09-14 DIAGNOSIS — I10 PRIMARY HYPERTENSION: ICD-10-CM

## 2022-09-14 DIAGNOSIS — K74.60 CIRRHOSIS OF LIVER WITHOUT ASCITES, UNSPECIFIED HEPATIC CIRRHOSIS TYPE (HCC): Primary | ICD-10-CM

## 2022-09-14 PROCEDURE — G8432 DEP SCR NOT DOC, RNG: HCPCS | Performed by: NURSE PRACTITIONER

## 2022-09-14 PROCEDURE — 99214 OFFICE O/P EST MOD 30 MIN: CPT | Performed by: NURSE PRACTITIONER

## 2022-09-14 PROCEDURE — G8417 CALC BMI ABV UP PARAM F/U: HCPCS | Performed by: NURSE PRACTITIONER

## 2022-09-14 PROCEDURE — 1123F ACP DISCUSS/DSCN MKR DOCD: CPT | Performed by: NURSE PRACTITIONER

## 2022-09-14 PROCEDURE — G8536 NO DOC ELDER MAL SCRN: HCPCS | Performed by: NURSE PRACTITIONER

## 2022-09-14 PROCEDURE — G8427 DOCREV CUR MEDS BY ELIG CLIN: HCPCS | Performed by: NURSE PRACTITIONER

## 2022-09-14 PROCEDURE — 3017F COLORECTAL CA SCREEN DOC REV: CPT | Performed by: NURSE PRACTITIONER

## 2022-09-14 PROCEDURE — 1101F PT FALLS ASSESS-DOCD LE1/YR: CPT | Performed by: NURSE PRACTITIONER

## 2022-09-14 RX ORDER — AMLODIPINE BESYLATE 5 MG/1
5 TABLET ORAL DAILY
Qty: 30 TABLET | Refills: 5 | Status: SHIPPED | OUTPATIENT
Start: 2022-09-14

## 2022-09-14 NOTE — PROGRESS NOTES
Identified pt with two pt identifiers(name and ). Reviewed record in preparation for visit and have obtained necessary documentation. Chief Complaint   Patient presents with    Follow-up      Vitals:    22 1005   BP: 137/87   Pulse: 69   Temp: 98.1 °F (36.7 °C)   TempSrc: Temporal   SpO2: 98%   Weight: 214 lb (97.1 kg)   Height: 5' 5\" (1.651 m)   PainSc:   8   PainLoc: Foot       Health Maintenance Review: Patient reminded of \"due or due soon\" health maintenance. I have asked the patient to contact his/her primary care provider (PCP) for follow-up on his/her health maintenance. Coordination of Care Questionnaire:  :   1) Have you been to an emergency room, urgent care, or hospitalized since your last visit? If yes, where when, and reason for visit? no       2. Have seen or consulted any other health care provider since your last visit? If yes, where when, and reason for visit? YES/ per pt gout flare up      Patient is accompanied by self I have received verbal consent from Umesh Villeda to discuss any/all medical information while they are present in the room.

## 2022-09-14 NOTE — PROGRESS NOTES
[facility], [place], [place], [facility] [place]      Marylou Alex, TERI Rodrigez, [facility]-BC     April S Adele, [facility]   Loi Kye FNP-MARILIN Suh, [facility]       Bernard [address]    at [facility]    [address] [facility], [address] [facility] Scarlett Tejeda  22.    [phone]    FAX: [address] [place] [address]    at [address], [address]    [phone]    FAX: [phone]       Patient Care Team:  Staci Hewitt MD as PCP - General (Infectious Disease Physician)  Staci Hewitt MD as PCP - [facility] Provider  Raina Sim MD (General Surgery)      Problem List  Date Reviewed: 9/12/2022            Codes Class Noted    Dysautonomia [facility]) ICD-10-CM: G90.1  ICD-9-CM: 337.9  6/13/2022        Chronic obstructive pulmonary disease, unspecified COPD type ([facility] [address] ICD-10-CM: J44.9  ICD-9-CM: 496  6/13/2022        Chronic renal disease, stage III ICD-10-CM: N18.30  ICD-9-CM: 585.3  6/8/2022        History of cholecystectomy ICD-10-CM: Z90.49  ICD-9-CM: V45.79  6/25/2021        Gallstone pancreatitis ICD-10-CM: K85.10  ICD-9-CM: 577.0, 574.20  6/3/2021        Cirrhosis ([facility] [address] ICD-10-CM: K74.60  ICD-9-CM: 571.5  3/12/2021        History of CVA (cerebrovascular accident) ICD-10-CM: Z86.73  ICD-9-CM: V12.54  2/3/2021        Thrombocytopenia ([facility] [address] ICD-10-CM: D69.6  ICD-9-CM: 287.5  12/3/2020        Class 1 obesity due to excess calories with serious comorbidity and body mass index (BMI) of 32.0 to 32.9 in adult ICD-10-CM: E66.09, A81.15  ICD-9-CM: 278.00, V85.32  12/3/2020        Type 2 diabetes mellitus with other circulatory complication, without long-term current use of insulin ([address] ICD-10-CM: E11.59  ICD-9-CM: 250.70  12/3/2020         Elsi Masterson is being seen at [address] Ezekiel Garces for 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 The active problem list, all pertinent past medical history, medications, liver histology, endoscopic studies, and laboratory findings related to the liver disorder were reviewed and discussed with the patient. The patient is a 70year old  male who was found to have thrombocytopenia in 12/2020. A liver biopsy in 5/2021 demonstrated ALFARO with cirrhosis. Following the biopsy he developed severe abdominal pain and was found to have acute gallstone pancreatitis. This resolved with conservative treatment. He underwent a cholecystectomy 5/2021. While hospitalized he developed lower extremity edema and subsequently started on diuretics. This has now resolved and he is no longer on diuretics. Since the last office visit the patient has been experiencing debilitating gout symptoms. He is now on Uloric. Vertigo improved briefly but has now returned. The patient has the following symptoms which could be attributed to the liver disorder:  Generalized weakness. The patient is not experiencing the following symptoms which are commonly seen in this liver disorder: nausea, vomiting, pain on the right side over the liver or abdominal pain. The patient has mild limitations in functional activities which can be attributed to a past CVA. ASSESSMENT AND PLAN:  Cirrhosis  Cirrhosis is probably secondary to past alcohol use and NAFLD  The diagnosis of cirrhosis is based upon liver histology, imaging, laboratory studies    Have performed laboratory testing to monitor liver function and degree of liver injury. This included BMP, hepatic panel, CBC with platelet count and INR. Laboratory testing ordered by the from 8/23/2022 reviewed in detail. The liver transaminases and ALP are normal. The liver function is normal. The platelet count is unable to be calculated. The patient has never developed any complications of cirrhosis to date. The CTP is 5. Child class A. The MELD score is 12 based on INR from 3/04/2022. ALFARO  The diagnosis is based upon liver biopsy, Fiboscan CAP score, features of metabolic syndrome, serologic studies that are negative for other causes of chronic liver disease,     A liver biopsy performed in 5/2021 demonstrates ALFARO. 25-33% macrovesicualr and micovesicular steatosis, mild inflammation, mild ballooning, and cirrhosis. Fibroscan in 3/2021 demonstrated 51 kPa and  suggesting fatty liver and cirrhosis. If the patient looses 20% of current body weight, which is 36 pounds, down to a weight of of 150 pounds, steatosis should resolve. Once all steatosis has resolved inflammation will resolve. Reinforced dietary changes and exercise. Elevated Sr. Creatinine  It is unclear why the Sr. Creatinine is elevated. This could be related to DM and Hypertension. Recommend follow-up with Nephrology    Hypertension  Will change Lisinopril to amlodipine 5 mg daily. Discussed possible swelling of lower extremities. Advised he monitor SBP daily. Vertigo  The patient states this is a chronic issues since the CVA. It has impacted his gait and he is fearful of having a fall. Neurology is following. Counseling for diet and weight loss in patients with confirmed or suspected NAFLD  The patient was counseled regarding diet and exercise to achieve weight loss. The best diet for patients with fatty liver is one very low in carbohydrates and enriched with protein. The patient was told not to consume any food products and drinks containing fructose as this enhances hepatic fat synthesis. Screening for Esophageal varices   The patient does not have esophageal or gastric varices. The last EGD to assess for varices was performed in 5/2021. No varices found. Hepatic encephalopathy   Overt HE has not developed to date. There is no need for treatment with lactulose and/or Xifaxan at this time. Anemia   This is due to multifactorial causes including recent prolonged hospitalization and surgery. The HGB has improved. Previous iron studies were normal. Will check again at the next office visit. Thrombocytopenia   This is secondary to cirrhosis. There is no evidence of overt bleeding. No treatment is required. The platelet count is adequate for the patient to undergo procedures without the need for platelet transfusion or platelet growth factors. Screening for Hepatocellular Carcinoma  [facility] [address] screening was last performed with MRI 7/2022 and does not suggest [address] AFP was normal. The next imaging will be due 1/2023. Treatment of other medical problems in patients with chronic liver disease  There are no contraindications for the patient to take most medications that are necessary for treatment of other medical issues. The patient has cirrhrosis and should avoid taking NSAIDs which are associated with a higher rate of developing ROBBIE. The patient can take Any medications utilized for treatment of DM, Statins to treat hypercholesterolemia    Counseling for alcohol in patients with chronic liver disease  The patient has cirrhosis and was advised to be abstinent from all alcohol including non-alcoholic beer which does contain some alcohol. The patient has previously consumed alcohol in excess. The patient has not consumed alcohol since 2013. Osteoporosis  The risk of osteoporosis is increased in patients with cirrhosis. DEXA bone density to assess for osteoporosis has not been performed. This should be ordered by the patients primary care physician. Vaccinations   Vaccination for viral hepatitis A is not needed. The patient has serologic evidence of prior exposure or vaccination with immunity. Routine vaccinations against other bacterial and viral agents can be performed as indicated.   Annual flu vaccination should be administered if indicated. ALLERGIES  No Known Allergies    MEDICATIONS  Current Outpatient Medications   Medication Sig    colchicine 0.6 mg tablet     febuxostat (Uloric) 40 mg tab tablet Take 1 Tablet by mouth daily. latanoprost (XALATAN) 0.005 % ophthalmic solution latanoprost 0.005 % eye drops   INSTILL 1 DROP INTO BOTH EYES EVERY DAY    metFORMIN (GLUCOPHAGE) 1,000 mg tablet Take 1,000 mg by mouth two (2) times a day. lisinopriL (PRINIVIL, ZESTRIL) 10 mg tablet TAKE ONE TABLET BY MOUTH DAILY    cholecalciferol (Vitamin D3) (2,000 UNITS /50 MCG) cap capsule     fluticasone propionate (FLONASE) 50 mcg/actuation nasal spray 2 Sprays by Both Nostrils route daily. azelastine (ASTELIN) 137 mcg (0.1 %) nasal spray 2 Sprays by Both Nostrils route two (2) times daily as needed for Rhinitis. pantoprazole (PROTONIX) 20 mg tablet Take 2 Tablets by mouth daily. FreeStyle Ashlee 14 Day Sensor kit     simvastatin (ZOCOR) 20 mg tablet Take 20 mg by mouth nightly. aspirin delayed-release 81 mg tablet Take 1 Tab by mouth daily. No current facility-administered medications for this visit. SYSTEM REVIEW NOT RELATED TO LIVER DISEASE OR REVIEWED ABOVE:  Constitution systems: Negative for fever, chills, weight gain, weight loss. Eyes: Negative for visual changes. ENT: Negative for sore throat, painful swallowing. Respiratory: Negative for cough, hemoptysis, SOB. Cardiology: Negative for chest pain, palpitations. GI:  Negative for constipation or diarrhea. : Negative for urinary frequency, dysuria, hematuria, nocturia. Skin: Negative for rash. Hematology: Negative for easy bruising, blood clots. Musculo-skeletal: Negative for back pain, muscle pain, weakness. Neurologic: Negative for headaches, dizziness, vertigo, memory problems not related to HE. Psychology: Negative for anxiety, depression. FAMILY HISTORY:  There is no family history of liver disease. There is no family history of immune disorders. SOCIAL HISTORY:  The patient is   The patient has no children. The patient stopped using tobacco products in 2009  The patient has previously consumed alcohol in excess. The patient is a retired barnes    PHYSICAL EXAMINATION:  Visit Vitals  /87 (BP 1 Location: Left upper arm, BP Patient Position: Sitting, BP Cuff Size: Adult)   Pulse 69   Temp 98.1 °F (36.7 °C) (Temporal)   Ht 5' 5\" (1.651 m)   Wt 214 lb (97.1 kg)   SpO2 98%   BMI 35.61 kg/m²       General: No acute distress. Eyes: Sclera anicteric. ENT: No oral lesions. Thyroid normal.  Nodes: No adenopathy. Skin: No spider angiomata. No jaundice. No palmar erythema. Respiratory: Lungs clear to auscultation. Cardiovascular: Regular heart rate. No murmurs. No JVD. Abdomen: Soft non-tender, liver size normal to percussion/palpation. Spleen not palpable. No obvious ascites. Extremities: No edema. No muscle wasting. No gross arthritic changes. Neurologic: Alert and oriented. Cranial nerves grossly intact. No asterixis.     LABORATORY STUDIES:  Liver [place] of [address] [place] [address] 8/23/2022   WBC 3.4 - 10.8 x10E3/uL 5.5   ANC 1.4 - 7.0 x10E3/uL 3.2   HGB 13.0 - 17.7 g/dL 12.7 (L)    - 450 x10E3/uL Comment    - 450 x10E3/uL    INR 0.9 - 1.2    AST 0 - 40 IU/L 19   ALT 0 - 44 IU/L 25   Alk Phos 44 - 121 IU/L 75   Bili, Total 0.0 - 1.2 mg/dL 0.4   Bili, Direct 0.00 - 0.40 mg/dL 0.16   Albumin 3.7 - 4.7 g/dL 4.1   BUN 8 - 27 mg/dL 18   Creat 0.76 - 1.27 mg/dL 1.69 (H)   Na 134 - 144 mmol/L 140   K 3.5 - 5.2 mmol/L 4.7   Cl 96 - 106 mmol/L 108 (H)   CO2 20 - 29 mmol/L 17 (L)   Glucose 65 - 99 mg/dL 137 (H)     From: 5/31/2022  AST/ALT/ALP/T Bili/ALB:21/24/62/0.3/4.4  WBC/HB/PLT/INR:4.9/12.1/58  NA/BUN/CREAT:150/19/1.84    L[place] [facility] Latest Ref Rng & Units 3/4/2022   WBC 3.4 - 10.8 x10E3/uL 4.2   ANC 1.4 - 7.0 x10E3/uL 2.4   HGB 13.0 - 17.7 g/dL 12.1 (L)   PLT x10E3/uL CANCELED    - 450 x10E3/uL    INR 0.9 - 1.2 1.0   AST 0 - 40 IU/L 25   ALT 0 - 44 IU/L 23   Alk Phos 44 - 121 IU/L 62   Bili, Total 0.0 - 1.2 mg/dL 0.3   Bili, Direct 0.00 - 0.40 mg/dL 0.14   Albumin 3.7 - 4.7 g/dL 4.3   BUN 8 - 27 mg/dL 20   Creat 0.76 - 1.27 mg/dL 1.78 (H)   Na 134 - 144 mmol/L 142   K 3.5 - 5.2 mmol/L 4.4   Cl 96 - 106 mmol/L 108 (H)   CO2 20 - 29 mmol/L 19 (L)   Glucose 65 - 99 mg/dL 116 (H)     Cancer Screening Latest Ref Rng & Units 3/4/2022 8/4/2021   AFP, Serum 0.0 - 8.0 ng/mL 2.0 2.3   AFP-L3% 0.0 - 9.9 % Comment Comment     Laboratory testing from 3/04/2022 reviewed in detail. Additional testing included to evaluate progression or regression of disease. Laboratory testing results from today will be communicated by My Chart. SEROLOGIES:  Serologies Latest Ref Rng & Units 2/3/2021   Hep A Ab, Total Negative   Positive (A)   Hep B Surface Ag Index <0.10   Hep B Surface Ag Interp Negative   Negative   Hep B Core Ab, Total Negative   Negative   Hep B Surface Ab mIU/mL <3.10   Hep B Surface Ab Interp NONREACTIVE   NONREACTIVE   Hep C Ab 0.0 - 0.9 s/co ratio <0.1   Ferritin 26 - 388 NG/ML 27   Iron % Saturation 20 - 50 % 26     LIVER HISTOLOGY:  3/2021: FibroScan performed at [address]. EkPa was 50.8. IQR/med 20%. . The results suggested a fibrosis level of F 4. The CAP score suggests there is hepatic steatosis. 5/2021. Slides reviewed by MLS. Fatty liver. AFL vs NAFLD. 25-33% macrovesicualr and micovesicular steatosis, mild inflammation, mild ballooning, Stage 4 fibrosis. DARIEL (111). ENDOSCOPIC PROCEDURES:  5/2021. EGD performed by [facility]. No esophageal varices. No gastric varices. Repeat in 3 years. RADIOLOGY:  12/2020: Liver US:  demonstrated heterogeneous hepatic echotexture with an area of focal sparing in the right lobe. Mildly enlarged spleen measuring 13.1 cm in length.    5/2021.   Dynamic MRI/MRCP liver.  Changes consistent with cirrhosis. No liver mass lesions. No dilated bile ducts. No bile duct strictures. Pancreatitis. Mild ascites. 9/2021. Ultrasound of liver. Echogenic consistent with cirrhosis. No liver mass lesions. No dilated bile ducts. No ascites. 3/2022. Ultrasound of liver. Echogenic consistent with cirrhosis. No liver mass lesions. No dilated bile ducts. No ascites. 7/2022. MRI of liver. Diffuse hepatic steatosis. No focal lesion. Stable IPMN. No ascites. No ductal dilatation or stones. OTHER TESTING:  Not available or performed    FOLLOW-UP:  All of the issues listed above in the Assessment and Plan were discussed with the patient. All questions were answered. The patient expressed a clear understanding of the above. [facility] 87 in 3 months for ongoing monitoring and treatment. April S.  Adele, AGPCNP-BC  [address] [id number] Vladimir Tejeda, [address] [facility] [address].  [address] [facility]

## 2022-09-16 NOTE — TELEPHONE ENCOUNTER
Medt message to pt to clarify refill request--if prefers to change to 40mg tab once daily.     Requested Prescriptions     Pending Prescriptions Disp Refills    pantoprazole (PROTONIX) 20 mg tablet [Pharmacy Med Name: PANTOPRAZOLE SOD DR 20 MG TAB] 60 Tablet 5     Sig: TAKE TWO TABLETS BY MOUTH DAILY

## 2022-09-17 PROBLEM — K75.81 NASH (NONALCOHOLIC STEATOHEPATITIS): Status: ACTIVE | Noted: 2022-09-17

## 2022-09-26 RX ORDER — PANTOPRAZOLE SODIUM 40 MG/1
40 TABLET, DELAYED RELEASE ORAL DAILY
Qty: 90 TABLET | Refills: 1 | Status: SHIPPED | OUTPATIENT
Start: 2022-09-26

## 2022-09-26 NOTE — TELEPHONE ENCOUNTER
Refill request(s) approved--pantoprazole--refilled as 40mg daily. Clarified in MyChart message to pt that he preferred 40mg daily instead of current 20mg BID. Requested Prescriptions     Signed Prescriptions Disp Refills    pantoprazole (PROTONIX) 40 mg tablet 90 Tablet 1     Sig: Take 1 Tablet by mouth daily. Take instead of 20mg tab (prior 20mg 2 times daily).      Authorizing Provider: Johanne Ybarra

## 2022-10-07 ENCOUNTER — OFFICE VISIT (OUTPATIENT)
Dept: INTERNAL MEDICINE CLINIC | Age: 77
End: 2022-10-07
Payer: MEDICARE

## 2022-10-07 VITALS
RESPIRATION RATE: 16 BRPM | HEIGHT: 65 IN | WEIGHT: 212.2 LBS | BODY MASS INDEX: 35.35 KG/M2 | DIASTOLIC BLOOD PRESSURE: 81 MMHG | TEMPERATURE: 98.3 F | SYSTOLIC BLOOD PRESSURE: 122 MMHG | HEART RATE: 79 BPM | OXYGEN SATURATION: 96 %

## 2022-10-07 DIAGNOSIS — M10.00 IDIOPATHIC GOUT, UNSPECIFIED CHRONICITY, UNSPECIFIED SITE: Primary | ICD-10-CM

## 2022-10-07 DIAGNOSIS — Z23 NEEDS FLU SHOT: ICD-10-CM

## 2022-10-07 DIAGNOSIS — N18.30 STAGE 3 CHRONIC KIDNEY DISEASE, UNSPECIFIED WHETHER STAGE 3A OR 3B CKD (HCC): ICD-10-CM

## 2022-10-07 DIAGNOSIS — E11.59 TYPE 2 DIABETES MELLITUS WITH OTHER CIRCULATORY COMPLICATION, WITHOUT LONG-TERM CURRENT USE OF INSULIN (HCC): ICD-10-CM

## 2022-10-07 PROCEDURE — G8510 SCR DEP NEG, NO PLAN REQD: HCPCS | Performed by: INTERNAL MEDICINE

## 2022-10-07 PROCEDURE — G8427 DOCREV CUR MEDS BY ELIG CLIN: HCPCS | Performed by: INTERNAL MEDICINE

## 2022-10-07 PROCEDURE — 90694 VACC AIIV4 NO PRSRV 0.5ML IM: CPT | Performed by: INTERNAL MEDICINE

## 2022-10-07 PROCEDURE — 3017F COLORECTAL CA SCREEN DOC REV: CPT | Performed by: INTERNAL MEDICINE

## 2022-10-07 PROCEDURE — 2022F DILAT RTA XM EVC RTNOPTHY: CPT | Performed by: INTERNAL MEDICINE

## 2022-10-07 PROCEDURE — 99214 OFFICE O/P EST MOD 30 MIN: CPT | Performed by: INTERNAL MEDICINE

## 2022-10-07 PROCEDURE — G0008 ADMIN INFLUENZA VIRUS VAC: HCPCS | Performed by: INTERNAL MEDICINE

## 2022-10-07 PROCEDURE — G8536 NO DOC ELDER MAL SCRN: HCPCS | Performed by: INTERNAL MEDICINE

## 2022-10-07 PROCEDURE — 1101F PT FALLS ASSESS-DOCD LE1/YR: CPT | Performed by: INTERNAL MEDICINE

## 2022-10-07 PROCEDURE — 3051F HG A1C>EQUAL 7.0%<8.0%: CPT | Performed by: INTERNAL MEDICINE

## 2022-10-07 PROCEDURE — 1123F ACP DISCUSS/DSCN MKR DOCD: CPT | Performed by: INTERNAL MEDICINE

## 2022-10-07 PROCEDURE — G8417 CALC BMI ABV UP PARAM F/U: HCPCS | Performed by: INTERNAL MEDICINE

## 2022-10-07 RX ORDER — GABAPENTIN 100 MG/1
CAPSULE ORAL
COMMUNITY
Start: 2022-09-15

## 2022-10-07 NOTE — PATIENT INSTRUCTIONS
1:  Please review gabapentin with Dr. Rajan Eduardo.      2.  Removed the colchicine from your medication list.  Would not recommend you take this with your current kidney function. It was prescribed by another provider--not within [employer].

## 2022-10-07 NOTE — PROGRESS NOTES
After obtaining consent, and per orders of Dr. Rachel Marie, injection of Fluad given by Rut Almanza. Patient instructed to remain in clinic for 20 minutes afterwards, and to report any adverse reaction to me immediately.

## 2022-10-07 NOTE — PROGRESS NOTES
Eligio De La O (: 1945) is a 78 y.o. male, established patient, here for evaluation of the following chief complaint(s):  Chief Complaint   Patient presents with    Follow-up     4 week follow up on medications, labs and referral        Assessment and Plan:       ICD-10-CM ICD-9-CM    1. Idiopathic gout, unspecified chronicity, unspecified site  M10.00 274.9 CBC WITH AUTOMATED DIFF      METABOLIC PANEL, COMPREHENSIVE      URIC ACID      URIC ACID      METABOLIC PANEL, COMPREHENSIVE      CBC WITH AUTOMATED DIFF      2. Stage 3 chronic kidney disease, unspecified whether stage 3a or 3b CKD (HCC)  N18.30 585.3       3. Type 2 diabetes mellitus with other circulatory complication, without long-term current use of insulin (HCC)  N32.42 381.08 METABOLIC PANEL, COMPREHENSIVE      METABOLIC PANEL, COMPREHENSIVE      4. Needs flu shot  Z23 V04.81 INFLUENZA, FLUAD, (AGE 65 Y+), IM, PF, 0.5 ML          1-3:  Lab evaluation and mgt reviewed at visit. 4.  Immunization(s) reviewed and updated at visit. Follow-up and Dispositions    Return in about 4 weeks (around 2022) for medication follow-up--4-6 weeks. lab results and schedule of future lab studies reviewed with patient  reviewed medications and side effects in detail    For additional documentation of information and/or recommendations discussed this visit, please see notes in instructions. Plan and evaluation (above) reviewed with pt at visit  Patient voiced understanding of plan and provided with time to ask/review questions. After Visit Summary (AVS) provided to pt after visit with additional instructions as needed/reviewed.       Future Appointments   Date Time Provider Ping Almaraz   2022 10:10 AM Jocelynn Santillan MD RDE [facility] BS AMB   2022  2:15 PM Linette Perkins MD [facility] BS AMB   2022  8:00 AM Clementina Angulo NP [facility] BS AMB   2023  8:00 AM DO ALICIA Astudillo BS AMB   --Updated future visits after patient check-out. History of Present Illness:     Notes (nursing/rooming note copied below in italics):  Pt states that he wants the flu vaccine     Here for follow-up. Notes has used prednisone, but causes glucose fluctuations. He has new med from another provider he saw, but slept after taking, then had single episode in which he coughed up \"a spot of blood\". He stopped medication and not willing to re-start. The medication was gabapentin 100mg TID. He rec'd from pain mgt, Dr. Lisa Yepez.      He has appt with neurology as above. Reviewed to call Dr. Lisa Yepez to address. Reviewed ENT to evaluate episode bleeding, if helps him take the medication again. He has done oen 5-day dcourse of prednisone in interim. Allergies improved. He would not want ortho foot referral today for possible injection. Nursing screenings reviewed by provider at visit. Prior to Admission medications    Medication Sig Start Date End Date Taking? Authorizing Provider   gabapentin (NEURONTIN) 100 mg capsule  9/15/22  Yes Provider, Historical   pantoprazole (PROTONIX) 40 mg tablet Take 1 Tablet by mouth daily. Take instead of 20mg tab (prior 20mg 2 times daily). 9/26/22  Yes Joe Nuno MD   amLODIPine (NORVASC) 5 mg tablet Take 1 Tablet by mouth daily. 9/14/22  Yes Clementina Angulo, NP   febuxostat (Uloric) 40 mg tab tablet Take 1 Tablet by mouth daily. 9/7/22  Yes Joe Nuno MD   metFORMIN (GLUCOPHAGE) 1,000 mg tablet Take 1,000 mg by mouth two (2) times a day. 5/30/22  Yes Provider, Historical   cholecalciferol (Vitamin D3) (2,000 UNITS /50 MCG) cap capsule    Yes Provider, Historical   fluticasone propionate (FLONASE) 50 mcg/actuation nasal spray 2 Sprays by Both Nostrils route daily. 6/13/22  Yes Joe Nuno MD   azelastine (ASTELIN) 137 mcg (0.1 %) nasal spray 2 Sprays by Both Nostrils route two (2) times daily as needed for Rhinitis.  6/13/22  Yes Orlando Soto, Arron Osler, MD   FreeStyle Ashlee 14 Day Sensor kit  8/26/21  Yes Provider, Historical   simvastatin (ZOCOR) 20 mg tablet Take 20 mg by mouth nightly. Yes Provider, Historical   aspirin delayed-release 81 mg tablet Take 1 Tab by mouth daily. 5/29/19  Yes Leesa Lombard, NP   colchicine 0.6 mg tablet  8/7/22   Provider, Historical   latanoprost (XALATAN) 0.005 % ophthalmic solution latanoprost 0.005 % eye drops   INSTILL 1 DROP INTO BOTH EYES EVERY DAY  Patient not taking: Reported on 10/7/2022    Provider, Historical        ROS    Vitals:    10/07/22 1056   BP: 122/81   Pulse: 79   Resp: 16   Temp: 98.3 °F (36.8 °C)   TempSrc: Oral   SpO2: 96%   Weight: 212 lb 3.2 oz (96.3 kg)   Height: 5' 5\" (1.651 m)   PainSc:   5   PainLoc: Foot      Body mass index is 35.31 kg/m². Physical Exam:     Physical Exam  Vitals and nursing note reviewed. Constitutional:       General: He is not in acute distress. Appearance: Normal appearance. He is well-developed. He is not diaphoretic. HENT:      Head: Normocephalic and atraumatic. Eyes:      General: No scleral icterus. Right eye: No discharge. Left eye: No discharge. Conjunctiva/sclera: Conjunctivae normal.   Cardiovascular:      Rate and Rhythm: Normal rate and regular rhythm. Pulses: Normal pulses. Heart sounds: Normal heart sounds. No murmur heard. No friction rub. No gallop. Pulmonary:      Effort: Pulmonary effort is normal. No respiratory distress. Breath sounds: Normal breath sounds. No stridor. No wheezing, rhonchi or rales. Abdominal:      General: Bowel sounds are normal. There is no distension. Palpations: Abdomen is soft. Tenderness: There is no abdominal tenderness. There is no guarding or rebound. Musculoskeletal:         General: No swelling, tenderness or deformity. Skin:     General: Skin is warm. Coloration: Skin is not jaundiced or pale. Findings: No bruising, erythema or rash. Neurological:      General: No focal deficit present. Mental Status: He is alert. Motor: No abnormal muscle tone. Psychiatric:         Mood and Affect: Mood normal.         Behavior: Behavior normal.         Thought Content: Thought content normal.         Judgment: Judgment normal.     LE exam (continued):    Right ankle foot swelling--mild diffuse. No erythema or warmth noted, just pain. Notes in left foot/ankle also. Ortho evalulation reviewed--pt would prefer to wait at this time. Seeing for shoulder pain, not for ankle/foot pain--[place] since last visit here. An electronic signature was used to authenticate this note.   -- Tiara Cardenas MD

## 2022-10-07 NOTE — PROGRESS NOTES
Rm: 17    Pt states that he wants the flu vaccine       Chief Complaint   Patient presents with    Follow-up     4 week follow up on medications, labs and referral        Visit Vitals  /81 (BP 1 Location: Right upper arm, BP Patient Position: Sitting, BP Cuff Size: Adult)   Pulse 79   Temp 98.3 °F (36.8 °C) (Oral)   Resp 16   Ht 5' 5\" (1.651 m)   Wt 212 lb 3.2 oz (96.3 kg)   SpO2 96%   BMI 35.31 kg/m²       1. Have you been to the ER, urgent care clinic since your last visit? Hospitalized since your last visit? No    2. Have you seen or consulted any other health care providers outside of the [address] [place] [address] since your last visit? Include any pap smears or colon screening.  No

## 2022-10-08 LAB
ALBUMIN SERPL-MCNC: 4.6 G/DL (ref 3.7–4.7)
ALBUMIN/GLOB SERPL: 1.9 {RATIO} (ref 1.2–2.2)
ALP SERPL-CCNC: 72 IU/L (ref 44–121)
ALT SERPL-CCNC: 20 IU/L (ref 0–44)
AST SERPL-CCNC: 18 IU/L (ref 0–40)
BASOPHILS # BLD AUTO: 0.1 X10E3/UL (ref 0–0.2)
BASOPHILS NFR BLD AUTO: 1 %
BILIRUB SERPL-MCNC: 0.4 MG/DL (ref 0–1.2)
BUN SERPL-MCNC: 17 MG/DL (ref 8–27)
BUN/CREAT SERPL: 11 (ref 10–24)
CALCIUM SERPL-MCNC: 9.5 MG/DL (ref 8.6–10.2)
CHLORIDE SERPL-SCNC: 110 MMOL/L (ref 96–106)
CO2 SERPL-SCNC: 18 MMOL/L (ref 20–29)
CREAT SERPL-MCNC: 1.48 MG/DL (ref 0.76–1.27)
EOSINOPHIL # BLD AUTO: 0.2 X10E3/UL (ref 0–0.4)
EOSINOPHIL NFR BLD AUTO: 3 %
ERYTHROCYTE [DISTWIDTH] IN BLOOD BY AUTOMATED COUNT: 14.9 % (ref 11.6–15.4)
GLOBULIN SER CALC-MCNC: 2.4 G/DL (ref 1.5–4.5)
GLUCOSE SERPL-MCNC: 132 MG/DL (ref 70–99)
HCT VFR BLD AUTO: 38.3 % (ref 37.5–51)
HGB BLD-MCNC: 12.7 G/DL (ref 13–17.7)
IMM GRANULOCYTES # BLD AUTO: 0 X10E3/UL (ref 0–0.1)
IMM GRANULOCYTES NFR BLD AUTO: 0 %
LYMPHOCYTES # BLD AUTO: 1 X10E3/UL (ref 0.7–3.1)
LYMPHOCYTES NFR BLD AUTO: 17 %
MCH RBC QN AUTO: 27.9 PG (ref 26.6–33)
MCHC RBC AUTO-ENTMCNC: 33.2 G/DL (ref 31.5–35.7)
MCV RBC AUTO: 84 FL (ref 79–97)
MONOCYTES # BLD AUTO: 0.3 X10E3/UL (ref 0.1–0.9)
MONOCYTES NFR BLD AUTO: 6 %
MORPHOLOGY BLD-IMP: ABNORMAL
NEUTROPHILS # BLD AUTO: 4.1 X10E3/UL (ref 1.4–7)
NEUTROPHILS NFR BLD AUTO: 73 %
PLATELET # BLD AUTO: ABNORMAL X10E3/UL
POTASSIUM SERPL-SCNC: 4.5 MMOL/L (ref 3.5–5.2)
PROT SERPL-MCNC: 7 G/DL (ref 6–8.5)
RBC # BLD AUTO: 4.56 X10E6/UL (ref 4.14–5.8)
SODIUM SERPL-SCNC: 145 MMOL/L (ref 134–144)
URATE SERPL-MCNC: 4.2 MG/DL (ref 3.8–8.4)
WBC # BLD AUTO: 5.7 X10E3/UL (ref 3.4–10.8)

## 2022-10-18 ENCOUNTER — TELEPHONE (OUTPATIENT)
Dept: RHEUMATOLOGY | Age: 77
End: 2022-10-18

## 2022-10-18 NOTE — TELEPHONE ENCOUNTER
Pt called office to see if any sooner NPT appt. Pt already has a NPT set but was seeking sooner possibilities. Told pt there are no sooner NPT appt. Pt is fully aware and understood.

## 2022-10-21 ENCOUNTER — HOSPITAL ENCOUNTER (EMERGENCY)
Age: 77
Discharge: HOME | End: 2022-10-21
Attending: EMERGENCY MEDICINE
Payer: MEDICARE

## 2022-10-21 ENCOUNTER — APPOINTMENT (OUTPATIENT)
Dept: VASCULAR SURGERY | Age: 77
End: 2022-10-21
Attending: FAMILY MEDICINE
Payer: MEDICARE

## 2022-10-21 VITALS
OXYGEN SATURATION: 96 % | SYSTOLIC BLOOD PRESSURE: 124 MMHG | TEMPERATURE: 97.9 F | DIASTOLIC BLOOD PRESSURE: 79 MMHG | RESPIRATION RATE: 18 BRPM | HEART RATE: 79 BPM

## 2022-10-21 DIAGNOSIS — M10.9 ACUTE GOUT OF MULTIPLE SITES, UNSPECIFIED CAUSE: Primary | ICD-10-CM

## 2022-10-21 LAB
ALBUMIN SERPL-MCNC: 3.7 G/DL (ref 3.5–5)
ALBUMIN/GLOB SERPL: 0.9 {RATIO} (ref 1.1–2.2)
ALP SERPL-CCNC: 79 U/L (ref 45–117)
ALT SERPL-CCNC: 34 U/L (ref 12–78)
ANION GAP SERPL CALC-SCNC: 7 MMOL/L (ref 5–15)
AST SERPL-CCNC: 22 U/L (ref 15–37)
BASOPHILS # BLD: 0.1 K/UL (ref 0–0.1)
BASOPHILS NFR BLD: 1 % (ref 0–1)
BILIRUB SERPL-MCNC: 0.6 MG/DL (ref 0.2–1)
BUN SERPL-MCNC: 17 MG/DL (ref 6–20)
BUN/CREAT SERPL: 9 (ref 12–20)
CALCIUM SERPL-MCNC: 9.7 MG/DL (ref 8.5–10.1)
CHLORIDE SERPL-SCNC: 110 MMOL/L (ref 97–108)
CO2 SERPL-SCNC: 24 MMOL/L (ref 21–32)
COMMENT, HOLDF: NORMAL
CREAT SERPL-MCNC: 1.87 MG/DL (ref 0.7–1.3)
DIFFERENTIAL METHOD BLD: ABNORMAL
EOSINOPHIL # BLD: 0.3 K/UL (ref 0–0.4)
EOSINOPHIL NFR BLD: 4 % (ref 0–7)
ERYTHROCYTE [DISTWIDTH] IN BLOOD BY AUTOMATED COUNT: 14.6 % (ref 11.5–14.5)
GLOBULIN SER CALC-MCNC: 3.9 G/DL (ref 2–4)
GLUCOSE SERPL-MCNC: 111 MG/DL (ref 65–100)
HCT VFR BLD AUTO: 37.9 % (ref 36.6–50.3)
HGB BLD-MCNC: 12.4 G/DL (ref 12.1–17)
IMM GRANULOCYTES # BLD AUTO: 0 K/UL (ref 0–0.04)
IMM GRANULOCYTES NFR BLD AUTO: 0 % (ref 0–0.5)
LYMPHOCYTES # BLD: 1.4 K/UL (ref 0.8–3.5)
LYMPHOCYTES NFR BLD: 23 % (ref 12–49)
MCH RBC QN AUTO: 27.3 PG (ref 26–34)
MCHC RBC AUTO-ENTMCNC: 32.7 G/DL (ref 30–36.5)
MCV RBC AUTO: 83.3 FL (ref 80–99)
MONOCYTES # BLD: 0.6 K/UL (ref 0–1)
MONOCYTES NFR BLD: 9 % (ref 5–13)
NEUTS SEG # BLD: 3.9 K/UL (ref 1.8–8)
NEUTS SEG NFR BLD: 63 % (ref 32–75)
NRBC # BLD: 0 K/UL (ref 0–0.01)
NRBC BLD-RTO: 0 PER 100 WBC
PLATELET # BLD AUTO: 178 K/UL (ref 150–400)
PMV BLD AUTO: 10.8 FL (ref 8.9–12.9)
POTASSIUM SERPL-SCNC: 4.5 MMOL/L (ref 3.5–5.1)
PROT SERPL-MCNC: 7.6 G/DL (ref 6.4–8.2)
RBC # BLD AUTO: 4.55 M/UL (ref 4.1–5.7)
SAMPLES BEING HELD,HOLD: NORMAL
SODIUM SERPL-SCNC: 141 MMOL/L (ref 136–145)
URATE SERPL-MCNC: 4.3 MG/DL (ref 3.5–7.2)
WBC # BLD AUTO: 6.2 K/UL (ref 4.1–11.1)

## 2022-10-21 PROCEDURE — 80053 COMPREHEN METABOLIC PANEL: CPT

## 2022-10-21 PROCEDURE — 93971 EXTREMITY STUDY: CPT

## 2022-10-21 PROCEDURE — 99284 EMERGENCY DEPT VISIT MOD MDM: CPT

## 2022-10-21 PROCEDURE — 84550 ASSAY OF BLOOD/URIC ACID: CPT

## 2022-10-21 PROCEDURE — 36415 COLL VENOUS BLD VENIPUNCTURE: CPT

## 2022-10-21 PROCEDURE — 85025 COMPLETE CBC W/AUTO DIFF WBC: CPT

## 2022-10-21 RX ORDER — OXYCODONE AND ACETAMINOPHEN 5; 325 MG/1; MG/1
1 TABLET ORAL
Qty: 12 TABLET | Refills: 0 | Status: SHIPPED | OUTPATIENT
Start: 2022-10-21 | End: 2022-10-24

## 2022-10-21 RX ORDER — PREDNISONE 10 MG/1
TABLET ORAL
Qty: 21 TABLET | Refills: 0 | Status: SHIPPED | OUTPATIENT
Start: 2022-10-21

## 2022-10-21 NOTE — ED TRIAGE NOTES
Pt arrives for joint swelling x 3 months, pmh of gout. Swelling noted to both ankles and right knee. Pain 10/10.  Took unknown pain pill this AM.

## 2022-10-21 NOTE — ED PROVIDER NOTES
Patient is a 28-year-old male with past medical history of coronary artery disease, chronic kidney disease, COPD, cirrhosis, type 2 diabetes, hypertension, CVA presenting for evaluation of joint pain and swelling. Reports that this has been an ongoing issue for the past 3 months. Has been diagnosed with gout by his primary care provider. Was placed on febuxostat daily without symptom relief. Notes that the pain seems to be on his left side. Reports left knee pain, left foot pain, left great toe pain. Denies any fevers at home. Notes that his entire leg appears to be swollen. Denies any chest pain or shortness of breath. No fevers at home. Unable to tell me how well controlled his blood sugars are, but states that it is \"up-and-down. \"           Past Medical History:   Diagnosis Date    CAD (coronary artery disease)     Chronic kidney disease     stones    Chronic obstructive pulmonary disease ([address]     Cirrhosis ([address]     Diabetes ([address]     Hypertension     Stroke ([address] 1980s    right hand neuropathy       Past Surgical History:   Procedure Laterality Date    COLONOSCOPY N/A 2018    COLONOSCOPY performed by Nathaniel Chmabers MD at [facility]    HX CHOLECYSTECTOMY  2020    HX SKIN BIOPSY  2019    skin cancer removed from left leg.           Family History:   Problem Relation Age of Onset    Heart Disease Mother     Heart Disease Father     Cancer Sister         breast ca       Social History     Socioeconomic History    Marital status: LIFE PARTNER     Spouse name: Not on file    Number of children: Not on file    Years of education: Not on file    Highest education level: Not on file   Occupational History    Not on file   Tobacco Use    Smoking status: Former     Packs/day: 2.00     Years: 45.00     Pack years: 90.00     Types: Cigarettes     Quit date: 2013     Years since quittin.3    Smokeless tobacco: Never   Vaping Use    Vaping Use: Never used   Substance and Sexual Activity    Alcohol use: No     Comment: 1 gallon whiskey a week stopped 4 years ago    Drug use: Never    Sexual activity: Not on file   Other Topics Concern    Not on file   Social History Narrative    Not on file     Social Determinants of Health     Financial Resource Strain: Not on file   Food Insecurity: Not on file   Transportation Needs: Not on file   Physical Activity: Not on file   Stress: Not on file   Social Connections: Not on file   Intimate Partner Violence: Not on file   Housing Stability: Not on file         ALLERGIES: Patient has no known allergies. Review of Systems   Constitutional:  Negative for unexpected weight change. HENT:  Negative for congestion. Eyes:  Negative for visual disturbance. Respiratory:  Negative for cough, chest tightness and shortness of breath. Cardiovascular:  Positive for leg swelling. Negative for chest pain. Gastrointestinal:  Negative for abdominal pain, nausea and vomiting. Endocrine: Negative for polyuria. Genitourinary:  Negative for dysuria and flank pain. Musculoskeletal:  Positive for arthralgias and joint swelling. Negative for back pain. Skin:  Negative for pallor. Allergic/Immunologic: Negative for immunocompromised state. Neurological:  Negative for dizziness and headaches. Hematological:  Negative for adenopathy. Psychiatric/Behavioral:  Negative for agitation and behavioral problems. Vitals:    10/21/22 1419   BP: 124/79   Pulse: 79   Resp: 18   Temp: 97.9 °F (36.6 °C)   SpO2: 96%            Physical Exam  Vitals and nursing note reviewed. Constitutional:       Appearance: Normal appearance. He is obese. HENT:      Head: Atraumatic. Eyes:      Conjunctiva/sclera: Conjunctivae normal.      Pupils: Pupils are equal, round, and reactive to light. Cardiovascular:      Rate and Rhythm: Normal rate. Pulmonary:      Effort: Pulmonary effort is normal. No respiratory distress. Abdominal:      General: Abdomen is flat.  Bowel sounds are normal.   Musculoskeletal:      Cervical back: Neck supple. Right lower leg: Edema present. Comments: Mild edema to right lower extremity. Reported anterior right knee pain without obvious swelling. No erythema. Tender to right great toe with swelling and erythema. Tender to lateral portion of ankle with swelling and erythema. Neurovascularly intact. Skin:     General: Skin is warm and dry. Capillary Refill: Capillary refill takes less than 2 seconds. Neurological:      General: No focal deficit present. Mental Status: He is alert and oriented to person, place, and time. Mental status is at baseline. Psychiatric:         Mood and Affect: Mood normal.         Behavior: Behavior normal.        MDM  Number of Diagnoses or Management Options  Acute gout of multiple sites, unspecified cause  Diagnosis management comments: Patient presenting with concern for persistent gout. Physical exam does appear to be consistent with gout, however, due to his persistent swelling of leg will obtain lower extremity duplex to rule out DVT. His physical exam does not appear consistent with a cellulitic infection and this has been going on for 3 months with a normal white count. His uric acid is normal today, however, this does not mean I can definitely rule out gout flare. Choice of agent for this patient is difficult due to diabetes, kidney disease, history of cirrhosis. Per chart review, it does appear that his primary care has trialed him some short courses of oral prednisone for gout flare, however, patient does not remember this. 1738 -lower extremity duplex is negative for acute DVT. Choice of agent limited due to kidney disease. Unable to tolerate colchicine or NSAIDs. Plan to treat patient symptomatically with pain medication. Place patient in postop shoe. Short course of steroids with close monitoring of blood sugar. Continue to follow with PCP.   Has rheumatology appointment in December. Discussed my clinical impression(s), any labs and/or radiology results with the patient. I answered any questions and addressed any concerns. Discussed the importance of following up with their primary care physician and/or specialist(s). Discussed signs or symptoms that would warrant return back to the ER for further evaluation. The patient is agreeable with discharge.        Amount and/or Complexity of Data Reviewed  Clinical lab tests: ordered and reviewed  Tests in the radiology section of CPT®: ordered and reviewed    Patient Progress  Patient progress: stable         Procedures

## 2022-10-21 NOTE — ED NOTES
Patient given post op shoe for right foot. I have reviewed discharge instructions with the patient. The patient verbalized understanding.
108.9

## 2022-10-21 NOTE — DISCHARGE INSTRUCTIONS
Thank you for allowing us to provide you with medical care today. We realize that you have many choices for your emergency care needs. We thank you for choosing [address] [place] [address]. Please choose us in the future for any continued health care needs. The exam and treatment you received in the emergency department were for an emergent problem and are not intended as complete care. It is important that you follow-up with a doctor. If your symptoms worsen or you do not improve you should return to the emergency department. We are available 24 hours a day. Please make an appointment with your health care provider for follow-up of your emergency department visit. Take this sheet with you when you go to your follow-up visit.

## 2022-11-03 ENCOUNTER — OFFICE VISIT (OUTPATIENT)
Dept: INTERNAL MEDICINE CLINIC | Age: 77
End: 2022-11-03
Payer: MEDICARE

## 2022-11-03 VITALS
HEART RATE: 78 BPM | TEMPERATURE: 98.1 F | DIASTOLIC BLOOD PRESSURE: 75 MMHG | BODY MASS INDEX: 35.31 KG/M2 | HEIGHT: 65 IN | OXYGEN SATURATION: 96 % | RESPIRATION RATE: 18 BRPM | SYSTOLIC BLOOD PRESSURE: 128 MMHG

## 2022-11-03 DIAGNOSIS — M10.00 IDIOPATHIC GOUT, UNSPECIFIED CHRONICITY, UNSPECIFIED SITE: ICD-10-CM

## 2022-11-03 DIAGNOSIS — M25.461 EFFUSION OF RIGHT KNEE JOINT: ICD-10-CM

## 2022-11-03 DIAGNOSIS — N18.30 STAGE 3 CHRONIC KIDNEY DISEASE, UNSPECIFIED WHETHER STAGE 3A OR 3B CKD (HCC): ICD-10-CM

## 2022-11-03 DIAGNOSIS — E11.59 TYPE 2 DIABETES MELLITUS WITH OTHER CIRCULATORY COMPLICATION, WITHOUT LONG-TERM CURRENT USE OF INSULIN (HCC): ICD-10-CM

## 2022-11-03 DIAGNOSIS — M25.561 ACUTE PAIN OF RIGHT KNEE: Primary | ICD-10-CM

## 2022-11-03 PROCEDURE — 1101F PT FALLS ASSESS-DOCD LE1/YR: CPT | Performed by: INTERNAL MEDICINE

## 2022-11-03 PROCEDURE — G8417 CALC BMI ABV UP PARAM F/U: HCPCS | Performed by: INTERNAL MEDICINE

## 2022-11-03 PROCEDURE — 99214 OFFICE O/P EST MOD 30 MIN: CPT | Performed by: INTERNAL MEDICINE

## 2022-11-03 PROCEDURE — 3051F HG A1C>EQUAL 7.0%<8.0%: CPT | Performed by: INTERNAL MEDICINE

## 2022-11-03 PROCEDURE — G8536 NO DOC ELDER MAL SCRN: HCPCS | Performed by: INTERNAL MEDICINE

## 2022-11-03 PROCEDURE — 2022F DILAT RTA XM EVC RTNOPTHY: CPT | Performed by: INTERNAL MEDICINE

## 2022-11-03 PROCEDURE — G8432 DEP SCR NOT DOC, RNG: HCPCS | Performed by: INTERNAL MEDICINE

## 2022-11-03 PROCEDURE — 3017F COLORECTAL CA SCREEN DOC REV: CPT | Performed by: INTERNAL MEDICINE

## 2022-11-03 PROCEDURE — 1123F ACP DISCUSS/DSCN MKR DOCD: CPT | Performed by: INTERNAL MEDICINE

## 2022-11-03 PROCEDURE — G8427 DOCREV CUR MEDS BY ELIG CLIN: HCPCS | Performed by: INTERNAL MEDICINE

## 2022-11-03 NOTE — PROGRESS NOTES
ASSESSMENT/PLAN:  Below is the assessment and plan developed based on review of pertinent history, physical exam, labs, studies, and medications. 1. Knee pain, unspecified chronicity, unspecified laterality  -     XR KNEE RT MIN 4 V; Future  2. Acute idiopathic gout of right knee    Return Referral to rheumatology. In discussion with the patient, we considered the numerus possible diagnoses that could be contributing to their present symptoms. We also deliberated on the extensive management options that must be considered to treat their current condition. We reviewed their accessible prior medical records, diagnostic tests, and current health and employment information. We considered how these symptoms were affecting the patient´s activities of daily living as well as employment and fitness activities. The patient had various questions regarding the possible risks, benefits, complications, morbidity and mortality regarding their diagnosis and treatment options. The patients´ comorbidities were considered, and I advocated that they consider maximizing lifestyle modification through nutrition and exercise to aid in addressing their symptoms. Shared decision making yielded an understanding to move forward with conservation treatment preferences. The patient expressed understanding that if conservative management fails to alleviate the present symptoms they will return to office for re-evaluation and consideration of additional diagnostic tests and potential surgical options. In the interim, we have recommended ice, elevation, and take prescription anti-inflammatory medications along with a physician directed home exercise program. We discussed the risks and common side effects of anti-inflammatory medications and instructed the patient to discontinue the medication and contact us if they experienced any side effects.  The patient was encouraged to discuss the possible side effects with their family physician or pharmacist prior to initiating any new medications. We discussed the fact that many of the recommended treatment options presented are significantly limited by the patient´s social determinants of health. We also reviewed the circumstances surrounding the environment that they live and work which affect a wide range of health risk. We considered the limited access to appropriate educational resources regarding proper nutrition and exercise as well as the economic and social support necessary to maintain health and wellbeing. We talked about the fact we were concerned for gout in his knee. We are happy to refer him to rheumatology. He will continue his medications per his primary care. SUBJECTIVE/OBJECTIVE:  Karrie Gay (: 1945) is a 78 y.o. male, patient,here for evaluation of the Knee Pain (Right knee pain )  . Patient reports he has had a 3-month history of right knee pain. He reports has been diagnosed with gout. He reports he never had an injury. He reports he has had some swelling and warmth to the knee. He ports he has had some redness. He denies any fevers chills or night sweats. PHYSICAL EXAM:    Upon physical examination, the patient is well developed, well nourished, alert and oriented times three, with normal mood and affect and walks with an antalgic gait. Upon examination of the right knee, the patient is tender to palpation along the medial joint line, and has an effusion. They have crepitus of the patellofemoral joint with range of motion and discomfort with patella grind testing. The patient has discomfort with Joe´s maneuvers, and the knee is stable. They lack full flexion secondary to the effusion, but have full extension. They have 5/5 strength, and are neurovascularly intact distally. He does have some erythema and warmth to the knee.       On examination of the contralateral extremity, the patient is nontender to palpation and has excellent range of motion, stability and strength. IMAGIN views of the right knee show no fracture or dislocation. No Known Allergies    Current Outpatient Medications   Medication Sig    predniSONE (STERAPRED DS) 10 mg dose pack Take entire package exactly as directed (Patient not taking: Reported on 11/3/2022)    gabapentin (NEURONTIN) 100 mg capsule     pantoprazole (PROTONIX) 40 mg tablet Take 1 Tablet by mouth daily. Take instead of 20mg tab (prior 20mg 2 times daily). amLODIPine (NORVASC) 5 mg tablet Take 1 Tablet by mouth daily. febuxostat (Uloric) 40 mg tab tablet Take 1 Tablet by mouth daily. latanoprost (XALATAN) 0.005 % ophthalmic solution latanoprost 0.005 % eye drops   INSTILL 1 DROP INTO BOTH EYES EVERY DAY (Patient not taking: No sig reported)    metFORMIN (GLUCOPHAGE) 1,000 mg tablet Take 1,000 mg by mouth two (2) times a day. cholecalciferol (Vitamin D3) (2,000 UNITS /50 MCG) cap capsule     fluticasone propionate (FLONASE) 50 mcg/actuation nasal spray 2 Sprays by Both Nostrils route daily. azelastine (ASTELIN) 137 mcg (0.1 %) nasal spray 2 Sprays by Both Nostrils route two (2) times daily as needed for Rhinitis. FreeStyle Ashlee 14 Day Sensor kit     simvastatin (ZOCOR) 20 mg tablet Take 20 mg by mouth nightly. aspirin delayed-release 81 mg tablet Take 1 Tab by mouth daily. No current facility-administered medications for this visit. Past Medical History:   Diagnosis Date    CAD (coronary artery disease)     Chronic kidney disease     stones    Chronic obstructive pulmonary disease ([address]     Cirrhosis ([address]     Diabetes ([address]     Hypertension     Stroke ([address] 1980s    right hand neuropathy       Past Surgical History:   Procedure Laterality Date    COLONOSCOPY N/A 2018    COLONOS[facility] performed by Juan Landry MD at [facility] ENDOSCOPY    HX CHOLECYSTECTOMY      HX SKIN BIOPSY  2019    skin cancer removed from left leg.         Family History   Problem Relation Age of Onset    Heart Disease Mother     Heart Disease Father     Cancer Sister         breast ca       Social History     Socioeconomic History    Marital status: LIFE PARTNER     Spouse name: Not on file    Number of children: Not on file    Years of education: Not on file    Highest education level: Not on file   Occupational History    Not on file   Tobacco Use    Smoking status: Former     Packs/day: 2.00     Years: 45.00     Pack years: 90.00     Types: Cigarettes     Quit date: 2013     Years since quittin.3    Smokeless tobacco: Never   Vaping Use    Vaping Use: Never used   Substance and Sexual Activity    Alcohol use: No     Comment: 1 gallon whiskey a week stopped 4 years ago    Drug use: Never    Sexual activity: Not on file   Other Topics Concern    Not on file   Social History Narrative    Not on file     Social Determinants of Health     Financial Resource Strain: Not on file   Food Insecurity: Not on file   Transportation Needs: Not on file   Physical Activity: Not on file   Stress: Not on file   Social Connections: Not on file   Intimate Partner Violence: Not on file   Housing Stability: Not on file       Review of Systems    No flowsheet data found. Vitals:  Ht 5' 5\" (1.651 m)   Wt 206 lb (93.4 kg)   BMI 34.28 kg/m²    Body mass index is 34.28 kg/m². An electronic signature was used to authenticate this note.   -- Chaz Krause MD

## 2022-11-03 NOTE — PATIENT INSTRUCTIONS
1:  Go to Ortho on Call today as reviewed, to see if they can aspirate (drain) the fluid from your knee and do testing to rule out infection and gout. 2.  See Dr. Joann Myers tomorrow at [address] at 9:20 AM.    We will work on the prior auth for this visit today. 3.  Ortho On Call Locations:    Urgent Care Orthopedic Referral Locations:  [address] [place] [address], [address]  [address]  [phone]    Hours  Monday - Friday 5:30 pm - 9:00 pm  Saturday 8:00 am - noon    [place]  [address]  [phone])    Hours  Monday-Saturday 9:00 am - 9:00 pm  Sunday 11:00 am - 5:00 pm    Kimberli Bland  [address]  [phone]([phone])    Hours  Monday - Saturday 9:00 am - 9:00 pm  Sunday 11:00 am - 5:00 pm    Visit website: [email or web address]

## 2022-11-03 NOTE — PROGRESS NOTES
Rm 17    Right foot and knee pain 10/10    Chief Complaint   Patient presents with    ED Follow-up     [facility]    Gout     1. Have you been to the ER, urgent care clinic since your last visit? Hospitalized since your last visit? Yes Where: [facility]    2. Have you seen or consulted any other health care providers outside of the [address] [place] [address] since your last visit? Include any pap smears or colon screening.  No    Visit Vitals  /75 (BP 1 Location: Left upper arm, BP Patient Position: Sitting, BP Cuff Size: Large adult)   Pulse 78   Temp 98.1 °F (36.7 °C) (Oral)   Resp 18   Ht 5' 5\" (1.651 m)   SpO2 96%   BMI 35.31 kg/m²

## 2022-11-03 NOTE — PROGRESS NOTES
Bret Aguilar (: 1945) is a 78 y.o. male, established patient, here for evaluation of the following chief complaint(s):  Chief Complaint   Patient presents with    ED Follow-up     [facility]    Gout       Assessment and Plan:       ICD-10-CM ICD-9-CM    1. Acute pain of right knee  M25.561 719.46 REFERRAL TO ORTHOPEDICS      2. Effusion of right knee joint  M25.461 719.06       3. Stage 3 chronic kidney disease, unspecified whether stage 3a or 3b CKD ([facility])  N18.30 585.3       4. Idiopathic gout, unspecified chronicity, unspecified site  M10.00 274.9       5. Type 2 diabetes mellitus with other circulatory complication, without long-term current use of insulin ([facility])  E11.59 250.70           1-2: Reviewed evaluation with urgent care orthopedic clinic today, as degree of pain and warmth of the effusion with normal uric acid level concerning for possible joint infection versus inflammation from gout. Since unable to aspirate knee in clinic or sent for appropriate studies, plan evaluation and referral to orthopedic urgent care clinic today, as reviewed with patient and girlfriend at visit. Appointment with [facility] [address] provider scheduled by nursing at visit today for evaluation tomorrow morning as follow-up to urgent care evaluation today. Reviewed plan with patient and girlfriend today at visit. 3,4: Prior management with oral steroids from emergency room visit has helped foot and ankle pain but not knee pain. Plan evaluation as above. Follow-up and Dispositions    Return if symptoms worsen or fail to improve.       lab results and schedule of future lab studies reviewed with patient  reviewed medications and side effects in detail  radiology results and schedule of future radiology studies reviewed with patient    For additional documentation of information and/or recommendations discussed this visit, please see notes in instructions.       Plan and evaluation (above) reviewed with pt at visit  Patient voiced understanding of plan and provided with time to ask/review questions. After Visit Summary (AVS) provided to pt after visit with additional instructions as needed/reviewed. Future Appointments   Date Time Provider Ping Rufina   11/7/2022  2:00 PM Destinee Milton MD [facility]  AMB   12/6/2022 10:00 AM MD ANDREAS Turcios BS AMB   12/14/2022 10:10 AM Archie Zhou MD R[facility] BS AMB   12/23/2022  8:00 AM Clementina Angulo NP [facility] BS AMB   1/13/2023  8:00 AM DO ALICIA Ivy BS AMB   --Updated future visits after patient check-out. History of Present Illness:     Notes (nursing/rooming note copied below in italics):  Right foot and knee pain 10/10    Here for RLE pain eval.    Seen in ED on 10/21 with evaluation for LLE pain then--knee, foot, great toe. Felt entire LLE swollen. Exam indicated pain on RLE, right knee and right great toe (Different from HPI localization in ER note). Pt notes pain with ER eval was right side. They managed with pain meds and steroid then--planned a prednisone dose pack--10mg from 10-21 visit. They also gave him #12 x Percocet 5-325mg PRN pain. Imaging with no RLE DVT. Normal stable WBC with elevated creatinine. Uric acid as below:    Lab Results   Component Value Date/Time    Uric acid 4.3 10/21/2022 02:39 PM     Uric acid was 4.2 on 10-7-22. Reviewed management and plan above with patient and girlfriend at visit. Prescription Monitoring Program ([place]) database query with fills:      He noted the oxycodone/Percocet prescription from the ER was difficult to tolerate due to mental status change on medication. He could not remember the prior medication he received from his dentist and asked to review that with his girlfriend who was in the waiting room.     Reviewed with his girlfriend, Claudine Parr, and she stated she thought the oxycodone was not causing mental status changes but that patient was confused. [address] Barragan was interviewed separately from the patient during the visit. [address] Jonathan Barragan stated she thought the gabapentin was causing confusion--since this is from pain management reviewed he discuss with them at follow-up visit there. Reviewed plan with patient and girlfriend to continue Percocet as tolerated. He did think the pain medication his dentist gave him (the hydrocodone above), was better tolerated. Due to concern about patient possibly taking both opiate medications and causing worse mental status changes, reviewed did not plan further changes in opiate pain management at this time pending Ortho evaluation and possible aspiration/drainage of knee fluid--since right knee is primarily the cause of severe pain right now. Patient girlfriend agreeable with plan above. Nursing screenings reviewed by provider at visit. Past Medical History:   Diagnosis Date    CAD (coronary artery disease)     Chronic kidney disease     stones    Chronic obstructive pulmonary disease ([address]     Cirrhosis ([address]     Diabetes ([address]     Hypertension     Stroke ([address] 1980s    right hand neuropathy     Past Surgical History:   Procedure Laterality Date    COLONOSCOPY N/A 4/12/2018    COLONOSCOPY performed by Nomi Jaramillo MD at [facility] ENDOSCOPY    HX CHOLECYSTECTOMY  2020    HX SKIN BIOPSY  2019    skin cancer removed from left leg. Prior to Admission medications    Medication Sig Start Date End Date Taking? Authorizing Provider   gabapentin (NEURONTIN) 100 mg capsule  9/15/22  Yes Provider, Historical   pantoprazole (PROTONIX) 40 mg tablet Take 1 Tablet by mouth daily. Take instead of 20mg tab (prior 20mg 2 times daily). 9/26/22  Yes Shana Vazquez MD   amLODIPine (NORVASC) 5 mg tablet Take 1 Tablet by mouth daily. 9/14/22  Yes Clementina Angulo, NP   febuxostat (Uloric) 40 mg tab tablet Take 1 Tablet by mouth daily.  9/7/22  Yes Shana Vazquez MD   metFORMIN (GLUCOPHAGE) 1,000 mg tablet Take 1,000 mg by mouth two (2) times a day. 5/30/22  Yes Provider, Historical   cholecalciferol (Vitamin D3) (2,000 UNITS /50 MCG) cap capsule    Yes Provider, Historical   fluticasone propionate (FLONASE) 50 mcg/actuation nasal spray 2 Sprays by Both Nostrils route daily. 6/13/22  Yes Paola Simpson MD   azelastine (ASTELIN) 137 mcg (0.1 %) nasal spray 2 Sprays by Both Nostrils route two (2) times daily as needed for Rhinitis. 6/13/22  Yes MD Kylah BeStyle Ashlee 14 Day Sensor kit  8/26/21  Yes Provider, Historical   simvastatin (ZOCOR) 20 mg tablet Take 20 mg by mouth nightly. Yes Provider, Historical   aspirin delayed-release 81 mg tablet Take 1 Tab by mouth daily. 5/29/19  Yes Pura Bentley NP   predniSONE (STERAPRED DS) 10 mg dose pack Take entire package exactly as directed  Patient not taking: Reported on 11/3/2022 10/21/22   Guero Barber NP   latanoprost (XALATAN) 0.005 % ophthalmic solution latanoprost 0.005 % eye drops   INSTILL 1 DROP INTO BOTH EYES EVERY DAY  Patient not taking: No sig reported    Provider, Historical        ROS    Vitals:    11/03/22 1207   BP: 128/75   Pulse: 78   Resp: 18   Temp: 98.1 °F (36.7 °C)   TempSrc: Oral   SpO2: 96%   Height: 5' 5\" (1.651 m)      Body mass index is 35.31 kg/m². Physical Exam:     Physical Exam  Vitals and nursing note reviewed. Constitutional:       General: He is not in acute distress. Appearance: Normal appearance. He is well-developed. He is not diaphoretic. HENT:      Head: Normocephalic and atraumatic. Mouth/Throat:      Mouth: Mucous membranes are moist.   Eyes:      General: No scleral icterus. Right eye: No discharge. Left eye: No discharge. Conjunctiva/sclera: Conjunctivae normal.   Cardiovascular:      Rate and Rhythm: Normal rate and regular rhythm. Pulses: Normal pulses. Heart sounds: Normal heart sounds. No murmur heard. No friction rub. No gallop.    Pulmonary: Effort: Pulmonary effort is normal. No respiratory distress. Breath sounds: Normal breath sounds. No stridor. No wheezing, rhonchi or rales. Abdominal:      General: Bowel sounds are normal. There is no distension. Palpations: Abdomen is soft. Tenderness: There is no abdominal tenderness. There is no guarding or rebound. Musculoskeletal:         General: No swelling, tenderness or deformity. Comments: Mild edema of right ankle and foot, but no significant pain to examination of foot or ankle. His primary source of pain and tenderness is from a right knee effusion, which is moderate in size and moderately tender to examination. Right knee is also moderately warm to touch. He cooperates and tolerates a physical exam of the right knee, and cannot transfer from chair to wheelchair but had difficulty during visit ambulating from the waiting room and was transferred from the waiting room to the exam room in a wheelchair due to pain. Skin:     General: Skin is warm. Coloration: Skin is not jaundiced or pale. Findings: No bruising, erythema or rash. Neurological:      General: No focal deficit present. Mental Status: He is alert. Motor: No abnormal muscle tone. Coordination: Coordination normal.      Gait: Gait normal.   Psychiatric:         Mood and Affect: Mood normal.         Behavior: Behavior normal.         Thought Content: Thought content normal.         Judgment: Judgment normal.       An electronic signature was used to authenticate this note.   -- Sha Weaver MD

## 2022-11-04 ENCOUNTER — OFFICE VISIT (OUTPATIENT)
Dept: ORTHOPEDIC SURGERY | Age: 77
End: 2022-11-04
Payer: MEDICARE

## 2022-11-04 VITALS — HEIGHT: 65 IN | BODY MASS INDEX: 34.32 KG/M2 | WEIGHT: 206 LBS

## 2022-11-04 DIAGNOSIS — M25.569 KNEE PAIN, UNSPECIFIED CHRONICITY, UNSPECIFIED LATERALITY: Primary | ICD-10-CM

## 2022-11-04 DIAGNOSIS — M10.061 ACUTE IDIOPATHIC GOUT OF RIGHT KNEE: ICD-10-CM

## 2022-11-04 PROCEDURE — G8417 CALC BMI ABV UP PARAM F/U: HCPCS | Performed by: ORTHOPAEDIC SURGERY

## 2022-11-04 PROCEDURE — G8536 NO DOC ELDER MAL SCRN: HCPCS | Performed by: ORTHOPAEDIC SURGERY

## 2022-11-04 PROCEDURE — 3017F COLORECTAL CA SCREEN DOC REV: CPT | Performed by: ORTHOPAEDIC SURGERY

## 2022-11-04 PROCEDURE — 1123F ACP DISCUSS/DSCN MKR DOCD: CPT | Performed by: ORTHOPAEDIC SURGERY

## 2022-11-04 PROCEDURE — 1101F PT FALLS ASSESS-DOCD LE1/YR: CPT | Performed by: ORTHOPAEDIC SURGERY

## 2022-11-04 PROCEDURE — G8432 DEP SCR NOT DOC, RNG: HCPCS | Performed by: ORTHOPAEDIC SURGERY

## 2022-11-04 PROCEDURE — G8427 DOCREV CUR MEDS BY ELIG CLIN: HCPCS | Performed by: ORTHOPAEDIC SURGERY

## 2022-11-04 PROCEDURE — 99204 OFFICE O/P NEW MOD 45 MIN: CPT | Performed by: ORTHOPAEDIC SURGERY

## 2022-11-04 NOTE — LETTER
NOTIFICATION of referral    11/4/2022 10:48 AM    Mr. Shannon Law  [address] [place] [phone]      To Whom It May Concern:    Shannon Law is currently under the care of [facility]. Patient has a long history of gout. Please have him seen by rheumatologist as soon as possible. If there are questions or concerns please have the patient contact our office.         Sincerely,      Stanislav Major MD

## 2022-11-07 ENCOUNTER — OFFICE VISIT (OUTPATIENT)
Dept: INTERNAL MEDICINE CLINIC | Age: 77
End: 2022-11-07
Payer: MEDICARE

## 2022-11-07 VITALS
HEIGHT: 65 IN | WEIGHT: 205.4 LBS | OXYGEN SATURATION: 96 % | SYSTOLIC BLOOD PRESSURE: 122 MMHG | DIASTOLIC BLOOD PRESSURE: 68 MMHG | RESPIRATION RATE: 16 BRPM | BODY MASS INDEX: 34.22 KG/M2 | TEMPERATURE: 98.1 F | HEART RATE: 80 BPM

## 2022-11-07 DIAGNOSIS — M25.461 EFFUSION OF RIGHT KNEE: Primary | ICD-10-CM

## 2022-11-07 DIAGNOSIS — M10.00 IDIOPATHIC GOUT, UNSPECIFIED CHRONICITY, UNSPECIFIED SITE: ICD-10-CM

## 2022-11-07 DIAGNOSIS — J01.00 ACUTE NON-RECURRENT MAXILLARY SINUSITIS: ICD-10-CM

## 2022-11-07 DIAGNOSIS — N18.30 STAGE 3 CHRONIC KIDNEY DISEASE, UNSPECIFIED WHETHER STAGE 3A OR 3B CKD (HCC): ICD-10-CM

## 2022-11-07 PROCEDURE — G8536 NO DOC ELDER MAL SCRN: HCPCS | Performed by: INTERNAL MEDICINE

## 2022-11-07 PROCEDURE — 3017F COLORECTAL CA SCREEN DOC REV: CPT | Performed by: INTERNAL MEDICINE

## 2022-11-07 PROCEDURE — 1123F ACP DISCUSS/DSCN MKR DOCD: CPT | Performed by: INTERNAL MEDICINE

## 2022-11-07 PROCEDURE — G8510 SCR DEP NEG, NO PLAN REQD: HCPCS | Performed by: INTERNAL MEDICINE

## 2022-11-07 PROCEDURE — 99214 OFFICE O/P EST MOD 30 MIN: CPT | Performed by: INTERNAL MEDICINE

## 2022-11-07 PROCEDURE — 1101F PT FALLS ASSESS-DOCD LE1/YR: CPT | Performed by: INTERNAL MEDICINE

## 2022-11-07 PROCEDURE — G8427 DOCREV CUR MEDS BY ELIG CLIN: HCPCS | Performed by: INTERNAL MEDICINE

## 2022-11-07 PROCEDURE — G8417 CALC BMI ABV UP PARAM F/U: HCPCS | Performed by: INTERNAL MEDICINE

## 2022-11-07 RX ORDER — DOXYCYCLINE 100 MG/1
100 CAPSULE ORAL 2 TIMES DAILY
Qty: 14 CAPSULE | Refills: 0 | Status: SHIPPED | OUTPATIENT
Start: 2022-11-07 | End: 2022-11-14

## 2022-11-07 NOTE — PROGRESS NOTES
Anum Shafer is a 78 y.o. male    Chief Complaint   Patient presents with    Follow-up     Med Check  Right knee pain for over 3 months       Visit Vitals  /68 (BP 1 Location: Right upper arm, BP Patient Position: Sitting, BP Cuff Size: Adult)   Pulse 80   Temp 98.1 °F (36.7 °C) (Oral)   Resp 16   Ht 5' 5\" (1.651 m)   Wt 205 lb 6.4 oz (93.2 kg)   SpO2 96%   BMI 34.18 kg/m²           1. Have you been to the ER, urgent care clinic since your last visit? Hospitalized since your last visit? YES    2. Have you seen or consulted any other health care providers outside of the [address] [place] [address] since your last visit? Include any pap smears or colon screening.  NO

## 2022-11-07 NOTE — PROGRESS NOTES
Bret Aguilar (: 1945) is a 78 y.o. male, established patient, here for evaluation of the following chief complaint(s):  Chief Complaint   Patient presents with    Follow-up     Med Check  Right knee pain for over 3 months       Assessment and Plan:       ICD-10-CM ICD-9-CM    1. Effusion of right knee  M25.461 719.06       2. Idiopathic gout, unspecified chronicity, unspecified site  M10.00 274.9       3. Stage 3 chronic kidney disease, unspecified whether stage 3a or 3b CKD ([facility])  N18.30 585.3       4. Acute non-recurrent maxillary sinusitis  J01.00 461.0 doxycycline (MONODOX) 100 mg capsule          1-3:  Evaluation completed with ortho (on-call and Vera Devine on 11/3 and  respectively)--joint aspirate results pending. Rheum to see sooner--contacted Dr. Jacqueline Chowdary today during visit as below. Ortho had also sent message from last week's visit. 4.  Medication(s), management and follow-up based on response reviewed at visit. Reviewed dosing without food. Monitor scattered wheezing since has had recent steroids, and moving air well. Reviewed findings with pt at visit. Follow-up and Dispositions    Return in about 2 weeks (around 2022), or if symptoms worsen or fail to improve, for referral follow-up, cough follow-up.       lab results and schedule of future lab studies reviewed with patient  reviewed medications and side effects in detail  radiology results and schedule of future radiology studies reviewed with patient    Plan and evaluation (above) reviewed with pt at visit  Patient voiced understanding of plan and provided with time to ask/review questions. After Visit Summary (AVS) provided to pt after visit with additional instructions as needed/reviewed.       Future Appointments   Date Time Provider Ping Almaraz   2022 10:00 AM MD ANDREAS Key BS AMB   2022 10:10 AM MD NINO Ramsey [facility] BS AMB   2022  8:00 AM Clementina Angulo NP [facility] BS AMB 1/13/2023  8:00 AM DO ALICIA Ivy BS AMB   --Updated future visits after patient check-out. History of Present Illness:     Notes (nursing/rooming note copied below in italics):  As above    Here for knee pain, RLE pain follow-up. Seen 11/3 and referred to Ortho On Call--seen at [facility] location that day in evening. He received a steroid injection in right knee then and saw [facility] at follow-up on 11/4--first time seeing CHARLENE AT [facility] for this. The 11/4 visit was scheduled by nursing here at his 11/3 eval with me. Dr. Alyssa Schofield aspirated right knee and sent for crystal analysis, cell count, culture (aerobic and anaerobic). All studies pending at this time. He has been waiting to fill meds for gout mgt, but he will refill his Uloric to continue for gout mgt. He notes intermittent hot/cold in right knee. He is not taking oral steroids. He is having right ankle swelling now, but not painful. He is not having as much right knee pain after steroid injection 11/3. Reviewed coordinating mgt with rheum and ortho. Messaged Dr. Mitzi Jack with [facility] during visit to see if could see sooner for mgt right knee pain/effusion and ankle pain and gout. Dr. Mitzi Jack responded during visit, and able to infor pt that his office would contact him for sooner appt. He notes cough more productive this weekend with brown, green mucus. Followed by pulmonary but no upcoming appt soon. He has been using inhalers regularly. He has sinus drainage and some right nasal bleeding this AM only. Mg with antibiotic and follow-up with his pulmonologist reviewed if not improving. He notes is picking up inhalers today, but not out of meds; taking as recommended by pulmonary. Nursing screenings reviewed by provider at visit. Prior to Admission medications    Medication Sig Start Date End Date Taking?  Authorizing Provider   gabapentin (NEURONTIN) 100 mg capsule 9/15/22  Yes Provider, Historical   pantoprazole (PROTONIX) 40 mg tablet Take 1 Tablet by mouth daily. Take instead of 20mg tab (prior 20mg 2 times daily). 9/26/22  Yes Primitivo Mendiola MD   amLODIPine (NORVASC) 5 mg tablet Take 1 Tablet by mouth daily. 9/14/22  Yes Clementina Angulo NP   febuxostat (Uloric) 40 mg tab tablet Take 1 Tablet by mouth daily. 9/7/22  Yes Primitivo Mendiola MD   metFORMIN (GLUCOPHAGE) 1,000 mg tablet Take 1,000 mg by mouth two (2) times a day. 5/30/22  Yes Provider, Historical   cholecalciferol (Vitamin D3) (2,000 UNITS /50 MCG) cap capsule    Yes Provider, Historical   fluticasone propionate (FLONASE) 50 mcg/actuation nasal spray 2 Sprays by Both Nostrils route daily. 6/13/22  Yes Primitivo Mendiola MD   azelastine (ASTELIN) 137 mcg (0.1 %) nasal spray 2 Sprays by Both Nostrils route two (2) times daily as needed for Rhinitis. 6/13/22  Yes Primitivo Mendiola MD   FreeStyle Ashlee 14 Day Sensor kit  8/26/21  Yes Provider, Historical   simvastatin (ZOCOR) 20 mg tablet Take 20 mg by mouth nightly. Yes Provider, Historical   aspirin delayed-release 81 mg tablet Take 1 Tab by mouth daily. 5/29/19  Yes Gwen Colon NP   predniSONE (STERAPRED DS) 10 mg dose pack Take entire package exactly as directed  Patient not taking: No sig reported 10/21/22   Connie Pacheco NP   latanoprost (XALATAN) 0.005 % ophthalmic solution latanoprost 0.005 % eye drops   INSTILL 1 DROP INTO BOTH EYES EVERY DAY  Patient not taking: No sig reported    Provider, Historical        ROS    Vitals:    11/07/22 1355   BP: 122/68   Pulse: 80   Resp: 16   Temp: 98.1 °F (36.7 °C)   TempSrc: Oral   SpO2: 96%   Weight: 205 lb 6.4 oz (93.2 kg)   Height: 5' 5\" (1.651 m)   PainSc:   8   PainLoc: Knee      Body mass index is 34.18 kg/m². Physical Exam:     Physical Exam  Vitals and nursing note reviewed. Constitutional:       General: He is not in acute distress. Appearance: Normal appearance.  He is well-developed. He is not diaphoretic. HENT:      Head: Normocephalic and atraumatic. Right Ear: Tympanic membrane, ear canal and external ear normal.      Left Ear: Tympanic membrane, ear canal and external ear normal.      Nose: Nose normal.      Comments: No bleeding noted. No significant NP edema bilat. Mouth/Throat:      Mouth: Mucous membranes are moist.      Pharynx: Oropharynx is clear. Eyes:      General: No scleral icterus. Right eye: No discharge. Left eye: No discharge. Conjunctiva/sclera: Conjunctivae normal.   Cardiovascular:      Rate and Rhythm: Normal rate and regular rhythm. Pulses: Normal pulses. Heart sounds: Normal heart sounds. No murmur heard. No friction rub. No gallop. Pulmonary:      Effort: Pulmonary effort is normal. No respiratory distress. Breath sounds: No stridor. Wheezing present. No rhonchi or rales. Comments: Scattered end-expiratory wheezing bilat. Good air movement. Mgt reviewed. Abdominal:      General: Bowel sounds are normal. There is no distension. Palpations: Abdomen is soft. Tenderness: There is no abdominal tenderness. There is no guarding or rebound. Musculoskeletal:         General: No swelling, tenderness or deformity. Right lower leg: Edema present. Left lower leg: Edema present. Comments: 2+ edema bilat--stable distal LE edema. Right knee with increased warmth, but decreased size effusion/swelling c/t eval on 11/3 here. Increased warmth primarily on lateral aspect right knee. No erythema knee today. Ambulating with walker (non-rollator)--in wheelchair for visit last week. Skin:     General: Skin is warm. Coloration: Skin is not jaundiced or pale. Findings: No bruising, erythema or rash. Neurological:      General: No focal deficit present. Mental Status: He is alert. Motor: No abnormal muscle tone.       Coordination: Coordination normal. Gait: Gait normal.   Psychiatric:         Mood and Affect: Mood normal.         Behavior: Behavior normal.         Thought Content: Thought content normal.         Judgment: Judgment normal.       An electronic signature was used to authenticate this note.   -- Matthew Malone MD

## 2022-11-14 ENCOUNTER — TELEPHONE (OUTPATIENT)
Dept: PHARMACY | Age: 77
End: 2022-11-14

## 2022-11-17 PROBLEM — M10.061 ACUTE IDIOPATHIC GOUT OF RIGHT KNEE: Status: ACTIVE | Noted: 2022-11-17

## 2022-11-17 NOTE — PROGRESS NOTES
ASSESSMENT/PLAN:  Below is the assessment and plan developed based on review of pertinent history, physical exam, labs, studies, and medications. 1. Knee pain, unspecified chronicity, unspecified laterality  -     XR KNEE RT MIN 4 V; Future  2. Acute idiopathic gout of right knee    Return Referral to rheumatology. In discussion with the patient, we considered the numerus possible diagnoses that could be contributing to their present symptoms. We also deliberated on the extensive management options that must be considered to treat their current condition. We reviewed their accessible prior medical records, diagnostic tests, and current health and employment information. We considered how these symptoms were affecting the patient´s activities of daily living as well as employment and fitness activities. The patient had various questions regarding the possible risks, benefits, complications, morbidity and mortality regarding their diagnosis and treatment options. The patients´ comorbidities were considered, and I advocated that they consider maximizing lifestyle modification through nutrition and exercise to aid in addressing their symptoms. Shared decision making yielded an understanding to move forward with conservation treatment preferences. The patient expressed understanding that if conservative management fails to alleviate the present symptoms they will return to office for re-evaluation and consideration of additional diagnostic tests and potential surgical options. In the interim, we have recommended ice, elevation, and take prescription anti-inflammatory medications along with a physician directed home exercise program. We discussed the risks and common side effects of anti-inflammatory medications and instructed the patient to discontinue the medication and contact us if they experienced any side effects.  The patient was encouraged to discuss the possible side effects with their family physician or pharmacist prior to initiating any new medications. We discussed the fact that many of the recommended treatment options presented are significantly limited by the patient´s social determinants of health. We also reviewed the circumstances surrounding the environment that they live and work which affect a wide range of health risk. We considered the limited access to appropriate educational resources regarding proper nutrition and exercise as well as the economic and social support necessary to maintain health and wellbeing. We discussed the possibility of an aspiration of the knee followed by an injection of a steroid mixed with a local anesthetic to relieve pain and decrease swelling and inflammation of the right knee. The risks of a steroid injection which include but are not limited to cartilage damage, death of nearby bone, joint infection, nerve damage, temporary facial flushing, temporary steroid flare of pain and inflammation in the joint, temporary increase in blood sugar, tendon weakening or rupture, thinning of nearby bone (osteoporosis), thinning of skin and soft tissue around the injection site, and whitening or lightening of the skin around the injection site were reviewed at length. We specifically advised that patients with diabetes talk to their primary care to ensure they are safe for a steroid injection due to the transient increase in blood sugar associated with the injection. We discussed the chance of increased bleeding and bruising if the patient is on blood thinners or certain dietary supplements that have a blood-thinning effect. We advised patients that have an active infection, history of allergic reactions to steroids or take medications that may prohibit them from receiving a steroid injection to talk to their primary care physician before receiving and injection.  We also talked about limiting the number of injections because these potential side effects increase with larger doses and repeated use. After explaining the risks and benefits of the procedure and obtaining verbal informed consent from the patient, the proposed area for injection was confirmed with the patient. After all questions and concerns were addressed, the skin was prepped with alcohol to reduce the chances of infection. The skin was anesthetized with topical ethylene chloride spray and a local anesthetic was injected into the skin around the site of aspiration. After the local anesthetic became affective, the knee fluid was aspirated, and a mixture of two milliliters of Triamcinolone Acetonide (40mg/ml), one milliliter of Lidocaine and one milliliter of Marcaine was injected slowly into the intra-articular space of right knee in a sterile fashion without difficulty. The needle was removed and disposed of in a sterile container. The patient tolerated the injection well and a band-aid was placed on the skin. The patient was counseled on protecting the injection area, avoiding water submersion for 2 days, icing for pain relief and looking for signs and symptoms of infection. We requested that the patient contact us if any symptoms persist greater than 48 hours after the injection. We talked about the fact we were concerned for gout in his knee. We are happy to refer him to rheumatology. He will continue his medications per his primary care. SUBJECTIVE/OBJECTIVE:  Bret Aguilar (: 1945) is a 78 y.o. male, patient,here for evaluation of the Knee Pain (Right knee pain )  . Patient reports he has had a 3-month history of right knee pain. He reports has been diagnosed with gout. He reports he never had an injury. He reports he has had some swelling and warmth to the knee. He ports he has had some redness. He denies any fevers chills or night sweats.     PHYSICAL EXAM:    Upon physical examination, the patient is well developed, well nourished, alert and oriented times three, with normal mood and affect and walks with an antalgic gait. Upon examination of the right knee, the patient is tender to palpation along the medial joint line, and has an effusion. They have crepitus of the patellofemoral joint with range of motion and discomfort with patella grind testing. The patient has discomfort with Joe´s maneuvers, and the knee is stable. They lack full flexion secondary to the effusion, but have full extension. They have 5/5 strength, and are neurovascularly intact distally. He does have some erythema and warmth to the knee. On examination of the contralateral extremity, the patient is nontender to palpation and has excellent range of motion, stability and strength. IMAGIN views of the right knee show no fracture or dislocation. No Known Allergies    Current Outpatient Medications   Medication Sig    predniSONE (STERAPRED DS) 10 mg dose pack Take entire package exactly as directed (Patient not taking: Reported on 11/3/2022)    gabapentin (NEURONTIN) 100 mg capsule     pantoprazole (PROTONIX) 40 mg tablet Take 1 Tablet by mouth daily. Take instead of 20mg tab (prior 20mg 2 times daily). amLODIPine (NORVASC) 5 mg tablet Take 1 Tablet by mouth daily. febuxostat (Uloric) 40 mg tab tablet Take 1 Tablet by mouth daily. latanoprost (XALATAN) 0.005 % ophthalmic solution latanoprost 0.005 % eye drops   INSTILL 1 DROP INTO BOTH EYES EVERY DAY (Patient not taking: No sig reported)    metFORMIN (GLUCOPHAGE) 1,000 mg tablet Take 1,000 mg by mouth two (2) times a day. cholecalciferol (Vitamin D3) (2,000 UNITS /50 MCG) cap capsule     fluticasone propionate (FLONASE) 50 mcg/actuation nasal spray 2 Sprays by Both Nostrils route daily. azelastine (ASTELIN) 137 mcg (0.1 %) nasal spray 2 Sprays by Both Nostrils route two (2) times daily as needed for Rhinitis. FreeStyle Ashlee 14 Day Sensor kit     simvastatin (ZOCOR) 20 mg tablet Take 20 mg by mouth nightly.     aspirin delayed-release 81 mg tablet Take 1 Tab by mouth daily. No current facility-administered medications for this visit. Past Medical History:   Diagnosis Date    CAD (coronary artery disease)     Chronic kidney disease     stones    Chronic obstructive pulmonary disease ([address]     Cirrhosis ([facility] [address]     Diabetes ([facility] [address]     Hypertension     Stroke ([facility] [address] 1980s    right hand neuropathy       Past Surgical History:   Procedure Laterality Date    COLONOSCOPY N/A 2018    COLONOSCOPY performed by Angela Raymundo MD at [facility] ENDOSCOPY    HX CHOLECYSTECTOMY      HX SKIN BIOPSY  2019    skin cancer removed from left leg. Family History   Problem Relation Age of Onset    Heart Disease Mother     Heart Disease Father     Cancer Sister         breast ca       Social History     Socioeconomic History    Marital status: LIFE PARTNER     Spouse name: Not on file    Number of children: Not on file    Years of education: Not on file    Highest education level: Not on file   Occupational History    Not on file   Tobacco Use    Smoking status: Former     Packs/day: 2.00     Years: 45.00     Pack years: 90.00     Types: Cigarettes     Quit date: 2013     Years since quittin.3    Smokeless tobacco: Never   Vaping Use    Vaping Use: Never used   Substance and Sexual Activity    Alcohol use: No     Comment: 1 gallon whiskey a week stopped 4 years ago    Drug use: Never    Sexual activity: Not on file   Other Topics Concern    Not on file   Social History Narrative    Not on file     Social Determinants of Health     Financial Resource Strain: Not on file   Food Insecurity: Not on file   Transportation Needs: Not on file   Physical Activity: Not on file   Stress: Not on file   Social Connections: Not on file   Intimate Partner Violence: Not on file   Housing Stability: Not on file       Review of Systems    No flowsheet data found.     Vitals:  Ht 5' 5\" (1.651 m)   Wt 206 lb (93.4 kg)   BMI 34.28 kg/m²    Body mass index is 34.28 kg/m². An electronic signature was used to authenticate this note.   -- Thierry Dozier MD

## 2022-11-18 ENCOUNTER — OFFICE VISIT (OUTPATIENT)
Dept: ORTHOPEDIC SURGERY | Age: 77
End: 2022-11-18
Payer: MEDICARE

## 2022-11-18 VITALS — HEIGHT: 65 IN | BODY MASS INDEX: 34.16 KG/M2 | WEIGHT: 205 LBS

## 2022-11-18 DIAGNOSIS — M10.061 ACUTE IDIOPATHIC GOUT OF RIGHT KNEE: Primary | ICD-10-CM

## 2022-11-18 PROCEDURE — 1123F ACP DISCUSS/DSCN MKR DOCD: CPT | Performed by: ORTHOPAEDIC SURGERY

## 2022-11-18 PROCEDURE — G8432 DEP SCR NOT DOC, RNG: HCPCS | Performed by: ORTHOPAEDIC SURGERY

## 2022-11-18 PROCEDURE — 99214 OFFICE O/P EST MOD 30 MIN: CPT | Performed by: ORTHOPAEDIC SURGERY

## 2022-11-18 PROCEDURE — 20610 DRAIN/INJ JOINT/BURSA W/O US: CPT | Performed by: ORTHOPAEDIC SURGERY

## 2022-11-18 PROCEDURE — 3017F COLORECTAL CA SCREEN DOC REV: CPT | Performed by: ORTHOPAEDIC SURGERY

## 2022-11-18 PROCEDURE — G8417 CALC BMI ABV UP PARAM F/U: HCPCS | Performed by: ORTHOPAEDIC SURGERY

## 2022-11-18 PROCEDURE — G8536 NO DOC ELDER MAL SCRN: HCPCS | Performed by: ORTHOPAEDIC SURGERY

## 2022-11-18 PROCEDURE — G8427 DOCREV CUR MEDS BY ELIG CLIN: HCPCS | Performed by: ORTHOPAEDIC SURGERY

## 2022-11-18 PROCEDURE — 1101F PT FALLS ASSESS-DOCD LE1/YR: CPT | Performed by: ORTHOPAEDIC SURGERY

## 2022-11-18 RX ORDER — BUPIVACAINE HYDROCHLORIDE 5 MG/ML
2 INJECTION, SOLUTION EPIDURAL; INTRACAUDAL ONCE
Status: COMPLETED | OUTPATIENT
Start: 2022-11-18 | End: 2022-11-18

## 2022-11-18 RX ORDER — TRIAMCINOLONE ACETONIDE 40 MG/ML
40 INJECTION, SUSPENSION INTRA-ARTICULAR; INTRAMUSCULAR ONCE
Status: COMPLETED | OUTPATIENT
Start: 2022-11-18 | End: 2022-11-18

## 2022-11-18 RX ADMIN — TRIAMCINOLONE ACETONIDE 40 MG: 40 INJECTION, SUSPENSION INTRA-ARTICULAR; INTRAMUSCULAR at 08:25

## 2022-11-18 RX ADMIN — BUPIVACAINE HYDROCHLORIDE 10 MG: 5 INJECTION, SOLUTION EPIDURAL; INTRACAUDAL at 08:25

## 2022-11-21 ENCOUNTER — OFFICE VISIT (OUTPATIENT)
Dept: RHEUMATOLOGY | Age: 77
End: 2022-11-21
Payer: MEDICARE

## 2022-11-21 VITALS
TEMPERATURE: 98 F | DIASTOLIC BLOOD PRESSURE: 73 MMHG | OXYGEN SATURATION: 97 % | WEIGHT: 203 LBS | RESPIRATION RATE: 16 BRPM | HEART RATE: 71 BPM | BODY MASS INDEX: 33.78 KG/M2 | SYSTOLIC BLOOD PRESSURE: 122 MMHG

## 2022-11-21 DIAGNOSIS — M54.2 CERVICALGIA: ICD-10-CM

## 2022-11-21 DIAGNOSIS — K85.10 GALLSTONE PANCREATITIS: ICD-10-CM

## 2022-11-21 DIAGNOSIS — K75.81 NASH (NONALCOHOLIC STEATOHEPATITIS): ICD-10-CM

## 2022-11-21 DIAGNOSIS — M54.9 CHRONIC UPPER BACK PAIN: ICD-10-CM

## 2022-11-21 DIAGNOSIS — N18.32 STAGE 3B CHRONIC KIDNEY DISEASE (HCC): ICD-10-CM

## 2022-11-21 DIAGNOSIS — M10.9 GOUT WITHOUT TOPHUS: Primary | ICD-10-CM

## 2022-11-21 DIAGNOSIS — G89.29 CHRONIC UPPER BACK PAIN: ICD-10-CM

## 2022-11-21 PROCEDURE — G8510 SCR DEP NEG, NO PLAN REQD: HCPCS | Performed by: INTERNAL MEDICINE

## 2022-11-21 PROCEDURE — 1123F ACP DISCUSS/DSCN MKR DOCD: CPT | Performed by: INTERNAL MEDICINE

## 2022-11-21 PROCEDURE — G8417 CALC BMI ABV UP PARAM F/U: HCPCS | Performed by: INTERNAL MEDICINE

## 2022-11-21 PROCEDURE — 99205 OFFICE O/P NEW HI 60 MIN: CPT | Performed by: INTERNAL MEDICINE

## 2022-11-21 PROCEDURE — G8536 NO DOC ELDER MAL SCRN: HCPCS | Performed by: INTERNAL MEDICINE

## 2022-11-21 PROCEDURE — 1101F PT FALLS ASSESS-DOCD LE1/YR: CPT | Performed by: INTERNAL MEDICINE

## 2022-11-21 PROCEDURE — G8427 DOCREV CUR MEDS BY ELIG CLIN: HCPCS | Performed by: INTERNAL MEDICINE

## 2022-11-21 PROCEDURE — 3017F COLORECTAL CA SCREEN DOC REV: CPT | Performed by: INTERNAL MEDICINE

## 2022-11-21 NOTE — Clinical Note
11/21/2022    Patient: Cesar Livingston   YOB: 1945   Date of Visit: 11/21/2022     Andrei Davey MD  [address] [place] [id number]  Via In Basket    Dear Andrei Davey MD,      Thank you for referring Mr. Lashae Middleton to [facility] for evaluation. My notes for this consultation are attached. If you have questions, please do not hesitate to call me. I look forward to following your patient along with you.       Sincerely,    Jessi Miller MD

## 2022-11-21 NOTE — PROGRESS NOTES
Chief Complaint   Patient presents with    Joint Pain     1. Have you been to the ER, urgent care clinic since your last visit? Hospitalized since your last visit? ER about 3 weeks ago d/t pain in legs, right knee     2. Have you seen or consulted any other health care providers outside of the [address] [place] [address] since your last visit? Include any pap smears or colon screening.

## 2022-11-21 NOTE — PATIENT INSTRUCTIONS
I think gout is the correct diagnosis for you, but it takes time for the new medications to settle down flares and reduce the frequency of flaring. For now, continue Uloric 40mg once daily. For Tylenol, you can safely take 650mg up to 3x/day even with liver disease. Look for black cherry extract on [address] and follow package instructions, to help prevent flares. I'm providing a PT referral to trial trigger point injections for your upper back/neck pain. Repeat labs in 1 month, I'm including a few other labs including pancreas tests  Return in 6 weeks. If you have a flare in the meantime, call our clinic and I can usually find a spot for you to come in within 24 hours for an injection.

## 2022-11-21 NOTE — PROGRESS NOTES
REASON FOR VISIT:   Lydia Sampson is a 78 y.o. male with history of cirrhosis, gallstone pancreatitis, DM2, CKD3, and h/o CVA  who is being referred to [facility] at the request of Dr. Roque Burger, regarding a diagnosis of gout. 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 Acutely would flare in either ankle, and last for 2-3 days before resolving spontaneously. Once he stopped nightly crab legs, flares stopped, and he had no flares over the last 10 years. Over 3 months ago, started having pain in the right ankle with acute onset of pain after eating steak, was also eating a lot of roast beef (1/2 - 3/4 pound) every 1-2 weeks. Responded to 1 week prednisone taper, but returned as soon as he completed the prednisone. Was wheelchair-dependent over this time. Uric acid in August was 9.2. Dr. Roque Burger prescribed him febuxostat 40mg daily which he doesn't believe has felt helpful to date, though last uric acid on 10/21/22 was improved to 4.3. He was prescribed colchicine, but he doesn't recall taking this. About 2 weeks ago the right ankle pain resolved, but his right knee became painful and swollen. Friday, underwent arthrocentesis and steroid injection to the right knee. Now, feels better than he has in the last 3 months. Crystal analysis and cell counts were ordered on the aspirate, but do not appear to have resulted. Had stroke in July 2013, after which he stopped all alcohol. Still eats catfish, red meat until recently. Night has been difficult, partly because he has separate issues with insomnia that he doesn't attribute to pain, but has also had worse pain at the end of the day. Now, the only site of pain outside fo the right leg is midline between the shoulder blades in the mid/upper back. No relation to eating.     Has burning dysesthesias of the hands. Last A1c in December 2021 was 6.8, he has been taken off of insulin with sustained blood sugars at target. No known family history of gout, sister has \"crippling arthritis\"  and has had both hips replaced as well as deformities of the fingers. No other autoimmune disease such as RA, lupus, or psoriasis. Stationed at [facility] for about 1.5 years. 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 REVIEW OF SYSTEMS  A comprehensive review of systems was negative except for that written in the HPI. A 10-point review of systems is per the new patient questionnaire, which has been reviewed extensively and scanned into the electronic medical record for future reference. Review of systems is as above and is otherwise negative. ALLERGIES  Patient has no known allergies. MEDICATIONS  Current Outpatient Medications   Medication Sig    predniSONE (STERAPRED DS) 10 mg dose pack Take entire package exactly as directed (Patient not taking: No sig reported)    gabapentin (NEURONTIN) 100 mg capsule     pantoprazole (PROTONIX) 40 mg tablet Take 1 Tablet by mouth daily. Take instead of 20mg tab (prior 20mg 2 times daily). amLODIPine (NORVASC) 5 mg tablet Take 1 Tablet by mouth daily. febuxostat (Uloric) 40 mg tab tablet Take 1 Tablet by mouth daily. latanoprost (XALATAN) 0.005 % ophthalmic solution latanoprost 0.005 % eye drops   INSTILL 1 DROP INTO BOTH EYES EVERY DAY (Patient not taking: No sig reported)    metFORMIN (GLUCOPHAGE) 1,000 mg tablet Take 1,000 mg by mouth two (2) times a day. cholecalciferol (Vitamin D3) (2,000 UNITS /50 MCG) cap capsule     fluticasone propionate (FLONASE) 50 mcg/actuation nasal spray 2 Sprays by Both Nostrils route daily. azelastine (ASTELIN) 137 mcg (0.1 %) nasal spray 2 Sprays by Both Nostrils route two (2) times daily as needed for Rhinitis. FreeStyle Ashlee 14 Day Sensor kit     simvastatin (ZOCOR) 20 mg tablet Take 20 mg by mouth nightly. aspirin delayed-release 81 mg tablet Take 1 Tab by mouth daily. No current facility-administered medications for this visit. PAST MEDICAL HISTORY  Past Medical History:   Diagnosis Date    CAD (coronary artery disease)     Chronic kidney disease     stones    Chronic obstructive pulmonary disease ([facility] [address]     Cirrhosis ([facility] [address]     Diabetes ([facility] [address]     Hypertension     Stroke ([facility] 1980s    right hand neuropathy       FAMILY HISTORY  family history includes Cancer in his sister; Heart Disease in his father and mother. SOCIAL HISTORY  He  reports that he quit smoking about 9 years ago. His smoking use included cigarettes. He has a 90.00 pack-year smoking history. He has never used smokeless tobacco. He reports that he does not drink alcohol and does not use drugs. Social History     Social History Narrative    Not on file   Stationed at [facility] [employer] for about 1.5 years. 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 DATA  Visit Vitals  /73 (BP 1 Location: Left upper arm, BP Patient Position: Sitting, BP Cuff Size: Adult)   Pulse 71   Temp 98 °F (36.7 °C) (Oral)   Resp 16   Wt 203 lb (92.1 kg)   SpO2 97%   BMI 33.78 kg/m²     General:  The patient is well developed, well nourished, alert, and in no apparent distress. Eyes: Sclera are anicteric. No conjunctival injection. HEENT:  Oropharynx is clear. No oral ulcers. Adequate salivary pooling. No cervical or supraclavicular lymphadenopathy. Lungs:  Clear to auscultation bilaterally, without wheeze or stridor. Normal respiratory effort. Cor:  Regular rate and rhythm. No murmur rub or gallop. Abdomen: Soft, non-tender, without hepatomegaly or masses. Extremities: No calf tenderness or edema. Warm and well perfused. Skin:  No significant abnormalities. No tophi involving ears, elbows, knees, hands, or feet. Neuro: Nonfocal, normal unassisted gait, no foot or wrist drop.   Musculoskeletal:    A comprehensive musculoskeletal exam was performed for all joints of each upper and lower extremity and assessed for swelling, tenderness and range of motion. Results are documented as below:  Medial trapezius tenderness and posterior basilar muscular soreness adjacent to midline. Full painless ROM in shoulders. Small right > left ankle effusion and swelling with joint line tenderness. No evidence of synovitis in the small joints of the hands, wrists, shoulders, elbows, hips, knees or ankles. Labs:  10/21/22: WBC 6.2, Hgb 12.4, Plt 178; Cr 1.87, LFTs WNL; uric acid 4.3  22 RF and CCP neg, SANDY IFA negative, ESR 15    Imagin22: XR R knee: No fracture, on my review no chondrocalcinosis or erosions    22 MRI abd:  Diffuse hepatic steatosis and morphologic features of cirrhosis. No focal liver lesion. Stable pancreatic head IPMN. No ascites. ASSESSMENT AND PLAN  Mr. Enedina Corey is a 78 y.o. male with steatohepatitis and radiographic cirrhosis, CKD3, and gallstone pancreatitis who presents for evaluation of episodic asymmetric ankle and knee arthritis with improving hyperuricemia on febuxostat and resolved knee synovitis after injection. Now about 2 months into febuxostat, at which point anticipate early flares related to urate lowering therapy should start slowing. While febuxostat has boxed warnings re: cardiovascular disease, this remains controversial with more recent studies reassuring that this is safe to continue in this context. Reviewed addition of black cherry extract to reduce longer-term flare risk. Keeping a low threshold to add Plaquenil (hydroxychloroquine) for further flare suppression. With comorbid liver and kidney disease, paresthesias, and diabetes, would avoid NSAIDs, colchicine, and minimize systemic steroids for acute flares. Acute injection is the most appealing acute treatment; alternative acute treatments would be either Acthar or anakinra, but holding off on these for now due to expense. .     1.  Gout without tophus  - Cont febuxostat 40mg daily.  - GLUCOSE-6-PHOSPHATE DEHYDROGENASE; Future  - URIC ACID; Future  - C REACTIVE PROTEIN, QT; Future  - CBC WITH AUTOMATED DIFF; Future  - METABOLIC PANEL, COMPREHENSIVE; Future  - SED RATE (ESR); Future  - LIPASE; Future    2. Stage 3b chronic kidney disease ([facility] [address]  - GLUCOSE-6-PHOSPHATE DEHYDROGENASE; Future  - URIC ACID; Future  - C REACTIVE PROTEIN, QT; Future  - CBC WITH AUTOMATED DIFF; Future  - METABOLIC PANEL, COMPREHENSIVE; Future  - SED RATE (ESR); Future  - LIPASE; Future    3. ALFARO (nonalcoholic steatohepatitis)  - Cont with Dr. Vijaya Leroy  - Tylenol up to 650mg 3x/day is still low-risk    4. Gallstone pancreatitis  - GLUCOSE-6-PHOSPHATE DEHYDROGENASE; Future  - LIPASE; Future    5. Cervicalgia/Upper back pain  - Lipase  - Physical therapy referral      Orders Placed This Encounter    GLUCOSE-6-PHOSPHATE DEHYDROGENASE    URIC ACID    C REACTIVE PROTEIN, QT    CBC WITH AUTOMATED DIFF    METABOLIC PANEL, COMPREHENSIVE    SED RATE (ESR)    LIPASE       Medications: I am having Josefa Aranda. Hans Crowell maintain his aspirin delayed-release, simvastatin, FreeStyle Ashlee 14 Day Sensor, Vitamin D3, fluticasone propionate, azelastine, metFORMIN, latanoprost, febuxostat, amLODIPine, pantoprazole, gabapentin, and predniSONE. Follow up: Return in about 6 weeks (around 1/2/2023).     Face to face time: 58 minutes  Note preparation and records review day of service: 19 minutes  Total provider time day of service: 77 minutes    Jessi Miller MD    Adult Rheumatology   [address] [place] [address], [address] [place] [address]   Phone [phone]  Fax [phone]

## 2022-12-02 LAB — CREATININE, EXTERNAL: 1.43

## 2022-12-03 DIAGNOSIS — M10.00 IDIOPATHIC GOUT, UNSPECIFIED CHRONICITY, UNSPECIFIED SITE: ICD-10-CM

## 2022-12-08 ENCOUNTER — HOSPITAL ENCOUNTER (OUTPATIENT)
Dept: PHYSICAL THERAPY | Age: 77
Discharge: HOME | End: 2022-12-08
Payer: MEDICARE

## 2022-12-08 PROCEDURE — 97110 THERAPEUTIC EXERCISES: CPT

## 2022-12-08 PROCEDURE — 97162 PT EVAL MOD COMPLEX 30 MIN: CPT

## 2022-12-08 PROCEDURE — 97140 MANUAL THERAPY 1/> REGIONS: CPT

## 2022-12-08 RX ORDER — FEBUXOSTAT 40 MG/1
TABLET, FILM COATED ORAL
Qty: 30 TABLET | Refills: 2 | Status: SHIPPED | OUTPATIENT
Start: 2022-12-08

## 2022-12-08 NOTE — TELEPHONE ENCOUNTER
Refill request(s) approved--Uloric--30-day supply with 2 refill(s). Seen by rheumatology Dr. Louie Roman and continued medication. Visit with rheum on 11-21-22.

## 2022-12-08 NOTE — PROGRESS NOTES
PT INITIAL EVALUATION NOTE - [facility] 2-15    Patient Name: Bret Aguilar \"Jermaine\"  OBNI:  : 1945  [x]  Patient  Verified  Payor: [employer] / Plan: [id number] [employer] [place] HMO / Product Type: Managed Care Medicare /    In time: 8220  Out time: 3520  Total Treatment Time (min): 67  Total Timed Codes (min): 25  1:1 Treatment Time ( only): 25   Visit #: 1     Treatment Area: Neck pain [M54.2]  Other low back pain [M54.59]    SUBJECTIVE  Pain Level (0-10 scale): \"<= 5\" in L > R-sided neck. The patient describes symptoms as a \"sharp pain like stabbing\" along his upper back and neck. Any medication changes, allergies to medications, adverse drug reactions, diagnosis change, or new procedure performed?: [] No    [x] Yes (see summary sheet for update)  Subjective:      *The patient is L hand dominant*    The patient reports insidious onset of upper back and neck pain approximately six months or so ago; he notes this tends to go along with \"dizziness\" when he first stands up which is better when sitting. He notes this \"dizziness\" has been affecting him since he had a stroke affecting his R side in , and he has learned to manage and be careful for fear of falling; he has seen cardiologist/ENT/physical therapy/etc. with many tests performed (including for vestibular, orthostatic hypotension, and cardiac contributions) and no one can explain the dizziness. He denies any headaches and notes he has not recently fallen. He additionally notes onset of \"significant\" R knee pain for the past three months, which only improved after several injections and medication changes at ED; he reports that his R leg is much better now, however while it was bothering him his dizziness and upper back pain did not bother him, but returned as R leg improved. He additionally notes that sometimes his hearing fades randomly when seated watching TV.  He reports he was working with physical therapist at [facility] related to dizziness, which was helping last year, however his insurance cut him off at that time. He does report \"numbness\" in R arm since CVA however worsening over past month, and notes his R hand feels \"rough\". His MD performed \"nerve tests\" on his arm the other day, which came back (-). He is also bruising more easily recently. Current functional limitations include: turning head L > R and looking up. Goals: \"I'd like to get away from some of my pain. \"    OBJECTIVE/EXAMINATION    Posture:  Forward head posture, increased bilateral shoulder internal rotation, increased thoracic kyphosis, bilateral scapular winging/tipping  Other Observations: Shoulder AROM: Noted increased bilateral accessory muscle recruitment with multiplanar overhead shoulder AROM  Gait and Functional Mobility: The patient ambulates with decreased fidelia, mild steppage gait pattern with foot flat at initial contact, decreased bilateral step length, increased bilateral hip/knee flexion throughout gait cycle, and decreased bilateral hip extension during terminal stance  Palpation: Tender to palpation at L > R first rib/scalenes/R C4-5/T2-4 spinous processes  Vitals: /91   HR 78 in seated position at outset of session; patient requested to check again and /93   HR 76 at that time        Cervical AROM:        R  L    Flexion    29      Extension   47 (upper back \"pressure\")      Side Bending   25 (L-sided neck p!)  22 (L-sided neck p!)    Rotation   57  60 (L-sided upper back p!)        Thoracic AROM:        R  L    Flexion    24      Extension   24      Side Bending   18 (L-sided neck p!)  20    Rotation   55  45    AROM Shoulder:   Right   Left   Flexion   WNL                            WNL   Abduction  WNL                            WNL   IR   Hand to T6  Hand to T6   ER   Hand to T5             Hand to T4    Joint Mobility Assessment: Cervical: Noted mild hypomobility to R > L cervical joint mobilizations (C2-7) Thoracic: Noted significant hypomobility to mid-thoracic PA joint mobilizations (T4-10)    Flexibility: \Noted grossly decreased R > L upper trapezius/pectoralis major/minor/latissimus dorsi/subscapularis muscle length as assessed in standing/seated/mat table positions    UPPER QUARTER     MUSCLE STRENGTH  KEY        R  L  0 - No Contraction   Flexion   4+  4+  1 - Trace    Extension (elbow) 4  4  2 - Poor    Abduction  4+  4+  3 - Fair     IR   5  5  4 - Good    ER   4  4 (L-sided neck p!)  5 - Normal    Periscapular MMT:                          R                              L  Middle Trapezius                              4                               4  Lower Trapezius                       4-                              4-    Special Tests: Yergason: (+) bilaterally                           Cervical Compression: (-)                  Cervical Distraction: (+)                           ULNTT: Covertly (+) for L median nerve/MSK response for L radial/ulnar nerves; covertly (+) for R ulnar nerve/MSK response for L median/ulnar nerves                                                                      Longus Colli Endurance Test: 26 seconds    15 min Therapeutic Exercise:  [x] See flow sheet :   Rationale: increase ROM, increase strength, improve coordination, and increase proprioception to improve the patients ability to turn head and look upward    10 min Manual Therapy: Bilateral cervical PA/rotational/lateral joint mobilizations (Grade III-IV, C2-7); supine bilateral first rib inferior joint mobilization (Grade II-III with ipsilateral cervical side bending); prone mid-thoracic PA joint mobilizations (T4-10, Grade III-IV, (-) cavitations); STM/TPR bilateral cervical paraspinals/upper trapezius/levator scapula/scalenes/supraspinatus/infraspinatus/teres minor/subscapularis/pectoralis major/minor/short head of biceps   Rationale: decrease pain, increase ROM, increase tissue extensibility, decrease trigger points, and increase postural awareness to improve the patients ability to turn head and look upward          With   [x] TE   [] TA   [] Neuro   [] SC   [] other: Patient Education: [x] Review HEP    [] Progressed/Changed HEP based on:   [] positioning   [] body mechanics   [] transfers   [] heat/ice application    [] other:      Other Objective/Functional Measures: FOTO Functional Measure: 51/100                Pain Level (0-10 scale) post treatment: Patient noting decrease in familiar L > R-sided neck pain to 1-2/10 on VAS with multiplanar cervical AROM post-evaluation and treatment today    ASSESSMENT/Changes in Function:     [x]  See Plan of [address] [facility] Narciso, PT, DPT 12/8/2022

## 2022-12-09 NOTE — THERAPY EVALUATION
[facility]. Physical Therapy  [address] [place] Reza Kumari  Phone: [phone] Fax: [phone]     Plan of Care/Statement of Necessity for Physical Therapy Services  2-15    Patient name: Renan Graves  : 1945  Provider#: [id number]  Referral source: Jim Umanzor MD      Medical/Treatment Diagnosis: Neck pain [M54.2]  Other low back pain [M54.59]     Prior Hospitalization: see medical history     Comorbidities: Diabetes, arthritis, hypertension, visual impairment, hearing impairment (hears better out of L ear), history of CVA with R hemiparesis, cholecystectomy, liver cyrrhosis  Prior Level of Function: Independent, moderately active  Medications: Verified on Patient Summary List  Start of Care: 24      Onset Date: Approximately summer 2022   The [address] [place] [address] and following information is based on the information from the initial evaluation. Assessment/ key information:     The patient is a 78year old male who presents to physical therapy services due to chronic upper back and neck pain. He demonstrates signs and symptoms consistent with mobility, muscle power, and movement coordination impairments, including (-) [place] cluster with (+) cervical distraction test, decreased and painful multiplanar cervicothoracic AROM, decreased cervicothoracic joint mobility, decreased bilateral UE muscle length, decreased multiplanar UE/periscapular strength, decreased deep neck flexor endurance (longus colli endurance test = 26 seconds), and aberrant movement patterns with turning his head side to side. Patient noting significant improvement in familiar symptoms, improved bilateral cervical rotation AROM with test-retest post-evaluation and treatment today.  The patient would benefit from continued skilled physical therapy services to improve symptom management and safety/endurance/independence with functional tasks such as turning his head and looking upward. Cervical AROM:                                                                       R                      L                        Flexion                                     29                                              Extension                                47 (upper back \"pressure\")                                           Side Bending                           25 (L-sided neck p!)                22 (L-sided neck p!)                  Rotation                                   57                    60 (L-sided upper back p!)                                                     Thoracic AROM:                                                                      R                      L                        Flexion                                     24                                              Extension                                24                                              Side Bending                           18 (L-sided neck p!)                20                      Rotation                                   55                    45     AROM Shoulder:                   Right                           Left              Flexion                         WNL                            WNL              Abduction                    WNL                            WNL              IR                                 Hand to T6                  Hand to T6              ER                               Hand to T5                  Hand to T4    Evaluation Complexity History HIGH Complexity :3+ comorbidities / personal factors will impact the outcome/ POC ; Examination HIGH Complexity : 4+ Standardized tests and measures addressing body structure, function, activity limitation and / or participation in recreation  ;Presentation MEDIUM Complexity : Evolving with changing characteristics  ; Clinical Decision Making MEDIUM Complexity : FOTO score of 26-74  Overall Complexity Rating: MEDIUM    Problem List: pain affecting function, decrease ROM, decrease strength, decrease ADL/ functional abilitiies, decrease activity tolerance, decrease flexibility/ joint mobility, and decrease transfer abilities   Treatment Plan may include any combination of the following: Therapeutic exercise, Neuromuscular reeducation, Manual therapy, Therapeutic activity, Self care/home management, Electric stim unattended , Vasopneumatic device, Gait training, Ultrasound, Mechanical traction, Electric stim attended, Needle insertion w/o injection (1 or 2 muscles), and Needle insertion w/o injection (3+ muscles)  Patient / Family readiness to learn indicated by: asking questions, trying to perform skills, and interest  Persons(s) to be included in education: patient (P)  Barriers to Learning/Limitations: yes;  sensory deficits-vision/hearing/speech  Patient Goal (s): I'd like to get away from some of my pain.   Patient Self Reported Health Status: fair  Rehabilitation Potential: good    Short Term Goals: To be accomplished in 6-8 treatments: The patient will demonstrate understanding of and compliance with updated and progressive HEP toward improved participation in physical therapy plan of care. The patient will demonstrate bilateral cervical rotation AROM >= 65 degrees toward improved ability to turn head with functional tasks such as driving. The patient will demonstrate bilateral thoracic rotation AROM >= 60 degrees toward improved ability to turn body with functional tasks such as driving. Long Term Goals: To be accomplished in 16-20 treatments: The patient will demonstrate multiplanar bilateral UE/periscapular strength increased by >= 1/2 MMT grade toward improved endurance with functional activities such as turning head and looking upward.               The patient will demonstrate ability to complete >= 38 seconds on longus colli endurance test toward improved postural endurance with turning head and looking upward. The patient will score >= 58 on FOTO to demonstrate significantly improved subjective report of function. Frequency / Duration: Patient to be seen 2 times per week for 16-20 treatments. Patient/ Caregiver education and instruction: self care, activity modification, and exercises    [x]  Plan of care has been reviewed with PTA    Certification Period: 12/08/22-03/08/22    Ridge Potter PT, DPT 12/8/2022     ________________________________________________________________________    I certify that the above Therapy Services are being furnished while the patient is under my care. I agree with the treatment plan and certify that this therapy is necessary.     Physician's Signature:____________________  Date:____________Time: _________         Princess Brandi MD

## 2022-12-12 ENCOUNTER — HOSPITAL ENCOUNTER (OUTPATIENT)
Dept: PHYSICAL THERAPY | Age: 77
Discharge: HOME | End: 2022-12-12
Payer: MEDICARE

## 2022-12-12 PROCEDURE — 97140 MANUAL THERAPY 1/> REGIONS: CPT

## 2022-12-12 PROCEDURE — 97110 THERAPEUTIC EXERCISES: CPT

## 2022-12-12 NOTE — PROGRESS NOTES
PT DAILY TREATMENT NOTE - [facility] 2-15    Patient Name: Leander Awan  Date:2022  : 1945  [x]  Patient  Verified  Payor: BLUE CROSS MEDICARE / Plan: [id number] [employer] [facility] / Product Type: Managed Care Medicare /    In time: 1092  Out time: 5167  Total Treatment Time (min): 48  Total Timed Codes (min): 48  1:1 Treatment Time ( only): 25  Visit #: 2    Treatment Area: Neck pain [M54.2]  Other low back pain [M54.59]    SUBJECTIVE  Pain Level (0-10 scale): 3-4 in L-sided neck  Any medication changes, allergies to medications, adverse drug reactions, diagnosis change, or new procedure performed?: [x] No    [] Yes (see summary sheet for update)  Subjective functional status/changes:   [] No changes reported    The patient reports he has been working on his home exercises regularly and daily over the weekend; he felt \"very good with very little dizziness\" waking up this morning, however notes he has had these \"better\" days before.     OBJECTIVE    37 min Therapeutic Exercise:  [x] See flow sheet :   Rationale: increase ROM, increase strength, improve coordination, improve balance, and increase proprioception to improve the patients ability to turn head and look upward    11 min Manual Therapy: Bilateral cervical PA/rotational/lateral joint mobilizations (Grade III-IV, C2-7); supine L first rib inferior joint mobilization (Grade II-III with ipsilateral cervical side bending); L levator scapula MWM (pin + stretch with contralateral cervical rotation/flexion AROM, 10x); prone mid-thoracic PA joint mobilizations (T4-10, Grade III-IV, (-) cavitations); STM/TPR bilateral cervical paraspinals/upper trapezius/levator scapula/scalenes/supraspinatus/infraspinatus/teres minor/subscapularis/pectoralis major/minor/short head of biceps    Rationale: decrease pain, increase ROM, increase tissue extensibility, decrease trigger points, and increase postural awareness to improve the patients ability to turn head and look upward          With   [x] TE   [] TA   [] Neuro   [] SC   [] other: Patient Education: [x] Review HEP    [] Progressed/Changed HEP based on:   [] positioning   [] body mechanics   [] transfers   [] heat/ice application    [] other:      Other Objective/Functional Measures: Patient demonstrating improved L cervical rotation AROM  with test-retest post-manual intervention    Pain Level (0-10 scale) post treatment: 3-4 in L-sided neck, patient demonstrating improved L cervical rotation AROM post-session today    ASSESSMENT/Changes in Function:   The patient tolerated therapy visit well at this date. He demonstrates continued elevated L first rib and decreased L > R cervical rotational joint mobility, improved post-manual intervention. Patient demonstrates increased accessory muscle recruitment during resisted rows > extensions, improved with cueing and repetition for scapular retraction/depression. Patient noting mild increase in bilateral neck/shoulder exercise-induced \"soreness\", significant muscular fatigue post-session today. Continue to progress as tolerated. Patient will continue to benefit from skilled PT services to modify and progress therapeutic interventions, address functional mobility deficits, address ROM deficits, address strength deficits, analyze and address soft tissue restrictions, analyze and cue movement patterns, analyze and modify body mechanics/ergonomics, assess and modify postural abnormalities, and instruct in home and community integration to attain remaining goals. [x]  See Plan of Care  []  See progress note/recertification  []  See Discharge Summary         Progress towards goals / Updated goals:    Short Term Goals: To be accomplished in 6-8 treatments: The patient will demonstrate understanding of and compliance with updated and progressive HEP toward improved participation in physical therapy plan of care.  - Progressing              The patient will demonstrate bilateral cervical rotation AROM >= 65 degrees toward improved ability to turn head with functional tasks such as driving. - Progressing              The patient will demonstrate bilateral thoracic rotation AROM >= 60 degrees toward improved ability to turn body with functional tasks such as driving. - Progressing   Long Term Goals: To be accomplished in 16-20 treatments: The patient will demonstrate multiplanar bilateral UE/periscapular strength increased by >= 1/2 MMT grade toward improved endurance with functional activities such as turning head and looking upward. - Progressing              The patient will demonstrate ability to complete >= 38 seconds on longus colli endurance test toward improved postural endurance with turning head and looking upward. - Progressing              The patient will score >= 58 on FOTO to demonstrate significantly improved subjective report of function. - Progressing   Frequency / Duration: Patient to be seen 2 times per week for 16-20 treatments.     PLAN  [x]  Upgrade activities as tolerated     [x]  Continue plan of care  [x]  Update interventions per flow sheet       []  Discharge due to:_  []  Other:_      Fernando Clifton PT, DPT 12/12/2022

## 2022-12-14 ENCOUNTER — OFFICE VISIT (OUTPATIENT)
Dept: ENDOCRINOLOGY | Age: 77
End: 2022-12-14
Payer: MEDICARE

## 2022-12-14 VITALS
HEIGHT: 65 IN | WEIGHT: 202 LBS | DIASTOLIC BLOOD PRESSURE: 75 MMHG | BODY MASS INDEX: 33.66 KG/M2 | SYSTOLIC BLOOD PRESSURE: 145 MMHG | HEART RATE: 81 BPM

## 2022-12-14 DIAGNOSIS — N18.31 TYPE 2 DIABETES MELLITUS WITH STAGE 3A CHRONIC KIDNEY DISEASE, WITHOUT LONG-TERM CURRENT USE OF INSULIN (HCC): ICD-10-CM

## 2022-12-14 DIAGNOSIS — I10 PRIMARY HYPERTENSION: ICD-10-CM

## 2022-12-14 DIAGNOSIS — E11.22 TYPE 2 DIABETES MELLITUS WITH STAGE 3A CHRONIC KIDNEY DISEASE, WITHOUT LONG-TERM CURRENT USE OF INSULIN (HCC): ICD-10-CM

## 2022-12-14 DIAGNOSIS — E78.2 MIXED HYPERLIPIDEMIA: ICD-10-CM

## 2022-12-14 DIAGNOSIS — Z86.73 HISTORY OF STROKE: ICD-10-CM

## 2022-12-14 DIAGNOSIS — E11.65 TYPE 2 DIABETES MELLITUS WITH HYPERGLYCEMIA, WITHOUT LONG-TERM CURRENT USE OF INSULIN (HCC): Primary | ICD-10-CM

## 2022-12-14 PROCEDURE — G8536 NO DOC ELDER MAL SCRN: HCPCS | Performed by: INTERNAL MEDICINE

## 2022-12-14 PROCEDURE — 3074F SYST BP LT 130 MM HG: CPT | Performed by: INTERNAL MEDICINE

## 2022-12-14 PROCEDURE — 99204 OFFICE O/P NEW MOD 45 MIN: CPT | Performed by: INTERNAL MEDICINE

## 2022-12-14 PROCEDURE — 3078F DIAST BP <80 MM HG: CPT | Performed by: INTERNAL MEDICINE

## 2022-12-14 PROCEDURE — 95251 CONT GLUC MNTR ANALYSIS I&R: CPT | Performed by: INTERNAL MEDICINE

## 2022-12-14 PROCEDURE — G8427 DOCREV CUR MEDS BY ELIG CLIN: HCPCS | Performed by: INTERNAL MEDICINE

## 2022-12-14 PROCEDURE — G8417 CALC BMI ABV UP PARAM F/U: HCPCS | Performed by: INTERNAL MEDICINE

## 2022-12-14 PROCEDURE — 1123F ACP DISCUSS/DSCN MKR DOCD: CPT | Performed by: INTERNAL MEDICINE

## 2022-12-14 PROCEDURE — 2022F DILAT RTA XM EVC RTNOPTHY: CPT | Performed by: INTERNAL MEDICINE

## 2022-12-14 PROCEDURE — 1101F PT FALLS ASSESS-DOCD LE1/YR: CPT | Performed by: INTERNAL MEDICINE

## 2022-12-14 PROCEDURE — 3017F COLORECTAL CA SCREEN DOC REV: CPT | Performed by: INTERNAL MEDICINE

## 2022-12-14 PROCEDURE — G8432 DEP SCR NOT DOC, RNG: HCPCS | Performed by: INTERNAL MEDICINE

## 2022-12-14 PROCEDURE — 3051F HG A1C>EQUAL 7.0%<8.0%: CPT | Performed by: INTERNAL MEDICINE

## 2022-12-14 RX ORDER — TAMSULOSIN HYDROCHLORIDE 0.4 MG/1
CAPSULE ORAL
COMMUNITY
Start: 2022-12-01

## 2022-12-14 NOTE — PROGRESS NOTES
Chief Complaint   Patient presents with    Diabetes       Patient was last seen: New Patient Visit     General:   DM2 ~10 years  On metformin  No PECK, no TZD, DPP4, SGLT, GLP1    Was on insulin from start until 1-2 years ago  Was very active   Gout for 3 months so not active (ankle, then knee)   Had steroids, shots in knee too  Gout gone a couple week ago     Getting PT for neck issues    Changing to [facility] in January  No donut hole so far     A1c: last a1c was 7.0     DM Medications:    Metformin 1000mg BID    Last Changes: : no recent changes    Sugar Checks: checks more than 4 times a day and uses [employer] CGM     AM: reports:      PM: reports:          LOWs:  denies low sugars     DIET: is working on it, has received diabetic meal training  - long ago    EXERCISE: engages in activity, several times a week 4 miles a day (but gout issues)    HTN: at goal, on Ca-Blk, HTN followed by PCP     LIPIDS: on statin, lipids followed by PCP    RENAL: has moderate renal insufficiency, is followed by Nephrology     EYES: eye exam in past year, has no retinopathy     FEET: has no current issues     DENTAL:  overdue for dentist, needs dental work     HEART:  no chest pain, shortness of breath or claudication, has no cardiac history h/o stroke    ASA:  is on aspirin     SYMPTOMS: no polyuria, thirst or blurred vision     THYROID: no known thyroid issue    DIABETES HISTORY: see above      LABS/STUDIES:     Lab Results   Component Value Date/Time    Hemoglobin A1c (POC) 5.4 09/20/2021 11:36 AM       Lab Results   Component Value Date/Time    Hemoglobin A1c 6.8 (H) 12/28/2021 11:22 AM    Hemoglobin A1c 5.9 (H) 06/18/2021 02:55 PM    Hemoglobin A1c 7.1 (H) 05/29/2019 05:09 AM    Hemoglobin A1c, External 7.0 06/01/2022 12:00 AM    Hemoglobin A1c, External 5.6 01/12/2021 12:00 AM    Glucose 111 (H) 10/21/2022 02:39 PM    Glucose (POC) 139 (H) 06/04/2021 11:59 AM    GFR est AA 53 (L) 09/15/2021 10:30 AM    GFR est non-AA 46 (L) 09/15/2021 10:30 AM    eGFR 37 (L) 10/21/2022 02:39 PM    eGFR 40 (L) 03/04/2022 12:00 AM    Microalb/Creat ratio (ug/mg creat.) 12 12/28/2021 12:00 AM    Creatinine 1.87 (H) 10/21/2022 02:39 PM      Lab Results   Component Value Date/Time    GFR est non-AA 46 (L) 09/15/2021 10:30 AM    GFR est non-AA 52 (L) 08/04/2021 12:00 AM    GFR est non-AA 64 06/11/2021 12:00 AM    eGFR 37 (L) 10/21/2022 02:39 PM    eGFR 40 (L) 03/04/2022 12:00 AM       Lab Results   Component Value Date/Time    Cholesterol, total 149 08/16/2021 12:00 AM    Triglyceride 89 08/16/2021 12:00 AM    HDL Cholesterol 54 08/16/2021 12:00 AM    LDL, calculated 78 08/16/2021 12:00 AM    LDL, calculated 84.6 10/01/2020 11:43 AM          Past Medical History:   Diagnosis Date    CAD (coronary artery disease)     Chronic kidney disease     stones    Chronic obstructive pulmonary disease ([facility] [address]     Cirrhosis ([facility] [address]     Diabetes ([facility] [address]     Hypertension     Stroke ([facility] [address] 1980s    right hand neuropathy      Employer:  Retired     Blood pressure (!) 145/75, pulse 81, height 5' 5\" (1.651 m), weight 202 lb (91.6 kg). Weight Metrics 12/14/2022 11/21/2022 11/18/2022 11/7/2022 11/4/2022 11/3/2022 10/7/2022   Weight 202 lb 203 lb 205 lb 205 lb 6.4 oz 206 lb - 212 lb 3.2 oz   BMI 33.61 kg/m2 33.78 kg/m2 34.11 kg/m2 34.18 kg/m2 34.28 kg/m2 35.31 kg/m2 35.31 kg/m2        EXAM  - not done today     Assessment/Plan:   1. Type 2 diabetes mellitus with hyperglycemia, without long-term current use of insulin (HCC)  Had been doing well, but  the last several months dealing with gout, so pain and decreased activity and steroids that have made his sugars higher.   Discussed given history of stroke would be beneficial to use an SGLT or GLP-1; patient also had concerns about continued metformin use  But he is changing insurance next year and does not want to make many changes until that settled in  So for now we will continue the metformin we will have him use his cgm more to adjust his diet  This will allow more time for the steroids to get out of his system and for him to build up to his prior activity level  Will refer to the [facility] for more education  At follow-up we can discuss other medication options again  Would not upgrade to Ariel Barreto as may no longer be covered since off insulin    2. Primary hypertension  On meds per PCP    3. Mixed hyperlipidemia  On statin per PCP    4. History of stroke  Considering SGLT or GLP-1 in near future    5. Type 2 diabetes mellitus with stage 3a chronic kidney disease, without long-term current use of insulin ([facility] [address]  Followed by Nephrology      No orders of the defined types were placed in this encounter. Follow-up and Dispositions    Return in about 3 months (around 3/14/2023).

## 2022-12-14 NOTE — LETTER
12/14/2022    Patient: Rhea Stein   YOB: 1945   Date of Visit: 12/14/2022     Boaz Deutsch MD  [address] [place] [address]  Via In Basket    Dear Boaz Deutsch MD,      Thank you for referring Mr. Luca Lobato [facility]. My notes for this consultation are attached. If you have questions, please do not hesitate to call me. I look forward to following your patient along with you.       Sincerely,    Linda Perea MD

## 2022-12-16 ENCOUNTER — TELEPHONE (OUTPATIENT)
Dept: INTERNAL MEDICINE CLINIC | Age: 77
End: 2022-12-16

## 2022-12-16 ENCOUNTER — HOSPITAL ENCOUNTER (OUTPATIENT)
Dept: PHYSICAL THERAPY | Age: 77
Discharge: HOME | End: 2022-12-16
Payer: MEDICARE

## 2022-12-16 PROCEDURE — 97110 THERAPEUTIC EXERCISES: CPT

## 2022-12-16 PROCEDURE — 97140 MANUAL THERAPY 1/> REGIONS: CPT

## 2022-12-16 NOTE — PROGRESS NOTES
PT DAILY TREATMENT NOTE - [facility] 2-15    Patient Name: Leander Awan  Date:2022  : 1945  [x]  Patient  Verified  Payor: BLUE CROSS MEDICARE / Plan: [id number] [address] [employer] HMO / Product Type: Managed Care Medicare /    In time: 818  Out time: 2447  Total Treatment Time (min): 51  Total Timed Codes (min): 51  1:1 Treatment Time ( only): 30  Visit #: 3    Treatment Area: Neck pain [M54.2]  Other low back pain [M54.59]    SUBJECTIVE  Pain Level (0-10 scale): 6 in L-sided neck  Any medication changes, allergies to medications, adverse drug reactions, diagnosis change, or new procedure performed?: [x] No    [] Yes (see summary sheet for update)  Subjective functional status/changes:   [] No changes reported    Patient noted they felt their knees buckle while they were outside using their leaf blower since last time they were in the clinic, but did not fall, also noted some increased pain in the knees, but did not note increase in neck/low back pain following.      OBJECTIVE    41 min Therapeutic Exercise:  [x] See flow sheet :   Rationale: increase ROM, increase strength, improve coordination, improve balance, and increase proprioception to improve the patients ability to turn head and look upward    10 min Manual Therapy: Bilateral cervical PA/rotational/lateral joint mobilizations (Grade III-IV, C2-7);prone mid-thoracic PA joint mobilizations (T4-10, Grade III-IV); STM/TPR bilateral cervical paraspinals/upper trapezius/levator scapula/scalenes/supraspinatus/infraspinatus/teres minor/subscapularis/pectoralis major/minor/short head of biceps    Rationale: decrease pain, increase ROM, increase tissue extensibility, decrease trigger points, and increase postural awareness to improve the patients ability to turn head and look upward          With   [x] TE   [] TA   [] Neuro   [] SC   [] other: Patient Education: [x] Review HEP    [x] Progressed/Changed HEP based on:   [] positioning   [] body mechanics [] transfers   [] heat/ice application    [] other:      Other Objective/Functional Measures: NA  Pain Level (0-10 scale) post treatment: 3 in L-sided neck, patient demonstrating improved L cervical rotation AROM post-session today    ASSESSMENT/Changes in Function:   Patient tolerated treatment session well today, able to perform new exercises and progressions while decreasing symptoms post treatment session. Patient continues to note decreased L cervical rotation, did note slight improvement following manual therapy. Patient noted increased soreness and fatigue following treatment session today. Continue to progress as tolerated. Patient will continue to benefit from skilled PT services to modify and progress therapeutic interventions, address functional mobility deficits, address ROM deficits, address strength deficits, analyze and address soft tissue restrictions, analyze and cue movement patterns, analyze and modify body mechanics/ergonomics, assess and modify postural abnormalities, and instruct in home and community integration to attain remaining goals. [x]  See Plan of Care  []  See progress note/recertification  []  See Discharge Summary         Progress towards goals / Updated goals:    Short Term Goals: To be accomplished in 6-8 treatments: The patient will demonstrate understanding of and compliance with updated and progressive HEP toward improved participation in physical therapy plan of care. - Progressing              The patient will demonstrate bilateral cervical rotation AROM >= 65 degrees toward improved ability to turn head with functional tasks such as driving. - Progressing              The patient will demonstrate bilateral thoracic rotation AROM >= 60 degrees toward improved ability to turn body with functional tasks such as driving. - Progressing   Long Term Goals: To be accomplished in 16-20 treatments:               The patient will demonstrate multiplanar bilateral UE/periscapular strength increased by >= 1/2 MMT grade toward improved endurance with functional activities such as turning head and looking upward. - Progressing              The patient will demonstrate ability to complete >= 38 seconds on longus colli endurance test toward improved postural endurance with turning head and looking upward. - Progressing              The patient will score >= 58 on FOTO to demonstrate significantly improved subjective report of function. - Progressing   Frequency / Duration: Patient to be seen 2 times per week for 16-20 treatments.     PLAN  [x]  Upgrade activities as tolerated     [x]  Continue plan of care  [x]  Update interventions per flow sheet       []  Discharge due to:_  []  Other:_      Anila Wilson, PTA   12/16/2022

## 2022-12-19 ENCOUNTER — TELEPHONE (OUTPATIENT)
Dept: INTERNAL MEDICINE CLINIC | Age: 77
End: 2022-12-19

## 2022-12-19 ENCOUNTER — OFFICE VISIT (OUTPATIENT)
Dept: INTERNAL MEDICINE CLINIC | Age: 77
End: 2022-12-19
Payer: MEDICARE

## 2022-12-19 ENCOUNTER — HOSPITAL ENCOUNTER (OUTPATIENT)
Dept: PHYSICAL THERAPY | Age: 77
Discharge: HOME | End: 2022-12-19
Payer: MEDICARE

## 2022-12-19 VITALS
SYSTOLIC BLOOD PRESSURE: 127 MMHG | BODY MASS INDEX: 33.82 KG/M2 | HEIGHT: 65 IN | TEMPERATURE: 97.8 F | DIASTOLIC BLOOD PRESSURE: 68 MMHG | HEART RATE: 76 BPM | OXYGEN SATURATION: 97 % | WEIGHT: 203 LBS | RESPIRATION RATE: 18 BRPM

## 2022-12-19 DIAGNOSIS — N18.30 STAGE 3 CHRONIC KIDNEY DISEASE, UNSPECIFIED WHETHER STAGE 3A OR 3B CKD (HCC): ICD-10-CM

## 2022-12-19 DIAGNOSIS — E11.59 TYPE 2 DIABETES MELLITUS WITH OTHER CIRCULATORY COMPLICATION, WITHOUT LONG-TERM CURRENT USE OF INSULIN (HCC): ICD-10-CM

## 2022-12-19 DIAGNOSIS — M10.00 IDIOPATHIC GOUT, UNSPECIFIED CHRONICITY, UNSPECIFIED SITE: Primary | ICD-10-CM

## 2022-12-19 DIAGNOSIS — M25.461 EFFUSION OF RIGHT KNEE: ICD-10-CM

## 2022-12-19 PROCEDURE — 1101F PT FALLS ASSESS-DOCD LE1/YR: CPT | Performed by: INTERNAL MEDICINE

## 2022-12-19 PROCEDURE — 3017F COLORECTAL CA SCREEN DOC REV: CPT | Performed by: INTERNAL MEDICINE

## 2022-12-19 PROCEDURE — G8536 NO DOC ELDER MAL SCRN: HCPCS | Performed by: INTERNAL MEDICINE

## 2022-12-19 PROCEDURE — 97140 MANUAL THERAPY 1/> REGIONS: CPT

## 2022-12-19 PROCEDURE — 99214 OFFICE O/P EST MOD 30 MIN: CPT | Performed by: INTERNAL MEDICINE

## 2022-12-19 PROCEDURE — G8417 CALC BMI ABV UP PARAM F/U: HCPCS | Performed by: INTERNAL MEDICINE

## 2022-12-19 PROCEDURE — 1123F ACP DISCUSS/DSCN MKR DOCD: CPT | Performed by: INTERNAL MEDICINE

## 2022-12-19 PROCEDURE — 97110 THERAPEUTIC EXERCISES: CPT

## 2022-12-19 PROCEDURE — 2022F DILAT RTA XM EVC RTNOPTHY: CPT | Performed by: INTERNAL MEDICINE

## 2022-12-19 PROCEDURE — G8432 DEP SCR NOT DOC, RNG: HCPCS | Performed by: INTERNAL MEDICINE

## 2022-12-19 PROCEDURE — G8427 DOCREV CUR MEDS BY ELIG CLIN: HCPCS | Performed by: INTERNAL MEDICINE

## 2022-12-19 NOTE — PROGRESS NOTES
Chief Complaint   Patient presents with    Follow-up          Vitals:    12/19/22 1417   BP: 127/68   Pulse: 76   Resp: 18   Temp: 97.8 °F (36.6 °C)   TempSrc: Oral   SpO2: 97%   Weight: 203 lb (92.1 kg)   Height: 5' 5\" (1.651 m)   PainSc:   5   PainLoc: Knee       Current Outpatient Medications on File Prior to Visit   Medication Sig Dispense Refill    tamsulosin (FLOMAX) 0.4 mg capsule       febuxostat (ULORIC) 40 mg tab tablet TAKE ONE TABLET BY MOUTH DAILY 30 Tablet 2    pantoprazole (PROTONIX) 40 mg tablet Take 1 Tablet by mouth daily. Take instead of 20mg tab (prior 20mg 2 times daily). 90 Tablet 1    amLODIPine (NORVASC) 5 mg tablet Take 1 Tablet by mouth daily. 30 Tablet 5    metFORMIN (GLUCOPHAGE) 1,000 mg tablet Take 1,000 mg by mouth two (2) times a day. cholecalciferol (Vitamin D3) (2,000 UNITS /50 MCG) cap capsule       fluticasone propionate (FLONASE) 50 mcg/actuation nasal spray 2 Sprays by Both Nostrils route daily. 16 g 5    azelastine (ASTELIN) 137 mcg (0.1 %) nasal spray 2 Sprays by Both Nostrils route two (2) times daily as needed for Rhinitis. 30 Each 5    FreeStyle Ashlee 14 Day Sensor kit       simvastatin (ZOCOR) 20 mg tablet Take 20 mg by mouth nightly. aspirin delayed-release 81 mg tablet Take 1 Tab by mouth daily. 30 Tab 1     No current facility-administered medications on file prior to visit. Health Maintenance Due   Topic    Pneumococcal 65+ years (1 - PCV)    Eye Exam Retinal or Dilated     Hepatitis B Vaccine (1 of 3 - Risk 3-dose series)    DTaP/Tdap/Td series (1 - Tdap)    Low dose CT lung screening     COVID-19 Vaccine (4 - Booster)    Foot Exam Q1     Medicare Yearly Exam     A1C test (Diabetic or Prediabetic)     MICROALBUMIN Q1        1. \"Have you been to the ER, urgent care clinic since your last visit? Hospitalized since your last visit? \" No    2.  \"Have you seen or consulted any other health care providers outside of the [address] [place] [address] since your last visit? \" No     3. For patients aged 39-70: Has the patient had a colonoscopy / FIT/ Cologuard? Yes - no Care Gap present      If the patient is female:    4. For patients aged 41-77: Has the patient had a mammogram within the past 2 years? NA - based on age or sex      11. For patients aged 21-65: Has the patient had a pap smear?  NA - based on age or sex

## 2022-12-19 NOTE — PROGRESS NOTES
PT DAILY TREATMENT NOTE - [facility] 2-15    Patient Name: Jamse Severe  Date:2022  : 1945  [x]  Patient  Verified  Payor: Geoffrey Mccallum / Plan: [id number] [employer] HMO / Product Type: Managed Care Medicare /    In time: 964  Out time: 57  Total Treatment Time (min): 46  Total Timed Codes (min): 46  1:1 Treatment Time ( only): 23  Visit #: 4    Treatment Area: Neck pain [M54.2]  Other low back pain [M54.59]    SUBJECTIVE  Pain Level (0-10 scale): 2-3 in L-sided neck  Any medication changes, allergies to medications, adverse drug reactions, diagnosis change, or new procedure performed?: [x] No    [] Yes (see summary sheet for update)  Subjective functional status/changes:   [] No changes reported    The patient reports his R hand has been more numb since over the weekend; his R knee has been limiting him more with getting off the floor during his exercises, which he backed down since that time. He feels as though his neck has been feeling somewhat better since initiating physical therapy plan of care.     OBJECTIVE    35 min Therapeutic Exercise:  [x] See flow sheet :   Rationale: increase ROM, increase strength, improve coordination, improve balance, and increase proprioception to improve the patients ability to turn head and look upward    11 min Manual Therapy: Bilateral cervical PA/rotational/lateral joint mobilizations (Grade III-IV, C2-7); supine L first rib inferior joint mobilization (Grade II-III with ipsilateral cervical side bending); L levator scapula MWM (pin + stretch with contralateral cervical rotation/flexion AROM, 10x); prone mid-thoracic PA joint mobilizations (T4-10, Grade III-IV, (-) cavitations); STM/TPR bilateral cervical paraspinals/upper trapezius/levator scapula/scalenes/supraspinatus/infraspinatus/teres minor/subscapularis/pectoralis major/minor/short head of biceps    Rationale: decrease pain, increase ROM, increase tissue extensibility, decrease trigger points, and increase postural awareness to improve the patients ability to turn head and look upward          With   [x] TE   [] TA   [] Neuro   [] SC   [] other: Patient Education: [x] Review HEP    [] Progressed/Changed HEP based on:   [] positioning   [] body mechanics   [] transfers   [] heat/ice application    [] other:      Other Objective/Functional Measures: Patient demonstrating improved L cervical rotation AROM with test-retest post-manual intervention    Pain Level (0-10 scale) post treatment: Patient reporting mild decrease in familiar symptoms, demonstrating improved L cervical rotation AROM post-session today    ASSESSMENT/Changes in Function:   The patient tolerated therapy visit well at this date. He demonstrates continued elevated L first rib and decreased L > R cervical rotational joint mobility, improved post-manual intervention. Patient demonstrates increased accessory muscle recruitment during seated chin tucks, first rib mobilization, and progression to standing no moneys, improved with visual cueing using mirror feedback and repetition for scapular retraction/depression. Noted tendency to internally rotate bilateral shoulders with overhead AROM during wall slides. Patient fatigues rapidly and requires frequent rest breaks due to decreased posterior shoulder muscle endurance during wall clocks. Patient noting mild decrease in bilateral neck/shoulder exercise-induced \"soreness\", significant muscular fatigue post-session today. Continue to progress as tolerated. Patient will continue to benefit from skilled PT services to modify and progress therapeutic interventions, address functional mobility deficits, address ROM deficits, address strength deficits, analyze and address soft tissue restrictions, analyze and cue movement patterns, analyze and modify body mechanics/ergonomics, assess and modify postural abnormalities, and instruct in home and community integration to attain remaining goals. [x]  See Plan of Care  []  See progress note/recertification  []  See Discharge Summary         Progress towards goals / Updated goals:    Short Term Goals: To be accomplished in 6-8 treatments: The patient will demonstrate understanding of and compliance with updated and progressive HEP toward improved participation in physical therapy plan of care. - Progressing              The patient will demonstrate bilateral cervical rotation AROM >= 65 degrees toward improved ability to turn head with functional tasks such as driving. - Progressing              The patient will demonstrate bilateral thoracic rotation AROM >= 60 degrees toward improved ability to turn body with functional tasks such as driving. - Progressing   Long Term Goals: To be accomplished in 16-20 treatments: The patient will demonstrate multiplanar bilateral UE/periscapular strength increased by >= 1/2 MMT grade toward improved endurance with functional activities such as turning head and looking upward. - Progressing              The patient will demonstrate ability to complete >= 38 seconds on longus colli endurance test toward improved postural endurance with turning head and looking upward. - Progressing              The patient will score >= 58 on FOTO to demonstrate significantly improved subjective report of function. - Progressing   Frequency / Duration: Patient to be seen 2 times per week for 16-20 treatments.     PLAN  [x]  Upgrade activities as tolerated     [x]  Continue plan of care  [x]  Update interventions per flow sheet       []  Discharge due to:_  []  Other:_      Ke Newman, PT, DPT 12/19/2022

## 2022-12-19 NOTE — TELEPHONE ENCOUNTER
Pt had orders for analysis of knee joint fluid on 11-18-22--no results in computer yet. Can you clarify if these were sent? Think first sample didn't make it to lab for testing. Thank you. Please update pt if results available.

## 2022-12-19 NOTE — PROGRESS NOTES
Nery Taylor (: 1945) is a 78 y.o. male, established patient, here for evaluation of the following chief complaint(s):  Chief Complaint   Patient presents with    Follow-up       Assessment and Plan:       ICD-10-CM ICD-9-CM    1. Idiopathic gout, unspecified chronicity, unspecified site  M10.00 274.9       2. Effusion of right knee  M25.461 719.06       3. Stage 3 chronic kidney disease, unspecified whether stage 3a or 3b CKD (HCC)  N18.30 585.3       4. Type 2 diabetes mellitus with other circulatory complication, without long-term current use of insulin (HCC)  E11.59 250.70           1,4:  Continue current mgt, with follow-up labs reviewed as below. Continue current medications pending future lab results/review. 2.  PRN mgt with rheumatology or orthopedics reviewed. 3.  Monitoring with renal reviewed. 4.  Transitioning care from [facility] provider in future--review further at follow-up. Follow-up and Dispositions    Return in about 3 months (around 3/19/2023) for referral follow-up, medication follow-up.       lab results and schedule of future lab studies reviewed with patient  reviewed diet, exercise and weight control  reviewed medications and side effects in detail    Plan and evaluation (above) reviewed with pt at visit  Patient voiced understanding of plan and provided with time to ask/review questions. After Visit Summary (AVS) provided to pt after visit with additional instructions as needed/reviewed.       Future Appointments   Date Time Provider Ping Almaraz   2022  8:00 AM Clementina Angulo NP [facility] BS AMB   2022 12:00 PM [facility]ffi Donato [facility]   2022 10:00 AM Earl Quintana, PT MRM OPP[facility]   2022 11:30 AM Earl Quintana PT [facility]   2023  2:20 PM Chace Washington MD [facility] BS AMB   2023  8:00 AM DO ALICIA Karimi BS AMB   3/16/2023  9:50 AM Tato Olivier MD E [facility] BS AMB Future Appointments   Date Time Provider Ping Rufina   1/4/2023  2:20 PM Jed Adams MD AO[facility] BS AMB   1/13/2023  8:00 AM DO ALICIA Bruner BS AMB   3/16/2023  9:50 AM MD NINO Chávez [facility] BS AMB   3/20/2023  2:30 PM Luigi Enamorado MD CPIM BS AMB   4/6/2023  9:30 AM Clementina Angulo, NP L[facility]R  AMB   --Updated future visits after patient check-out. History of Present Illness:     Notes (nursing/rooming note copied below in italics):  As above    LOV here 11-7-22. He was seen as requested by Rheum/Dr. Stanley Orellana for mgt gout. He updated labs, continued Uloric, and planned 6wk follow-up. Planned 2mo referral (rheum) and cough follow-up with last visit with me. Notes had injection in right knee with ortho and this helped most.  He is having some pain in his right knee. .  Just some pain at times with walking. He had seen Dr. Jazz Caldwell and had fluid tested. He has just seen his renal provider, and no concerns/changes recommended. He has been taking metformin for some time. He is interested in decreasing metformin dose. He is currently taking 500mg BID. He has gotten shoes from [facility] yearly. He will clarify if he can use [facility] as podiatrist--he likes services there. He is working on [employer] metformin with endocrine. Nursing screenings reviewed by provider at visit. Prior to Admission medications    Medication Sig Start Date End Date Taking? Authorizing Provider   tamsulosin (FLOMAX) 0.4 mg capsule  12/1/22  Yes Provider, Historical   febuxostat (ULORIC) 40 mg tab tablet TAKE ONE TABLET BY MOUTH DAILY 12/8/22  Yes Luigi Enamorado MD   pantoprazole (PROTONIX) 40 mg tablet Take 1 Tablet by mouth daily. Take instead of 20mg tab (prior 20mg 2 times daily). 9/26/22  Yes Luigi Enamorado MD   amLODIPine (NORVASC) 5 mg tablet Take 1 Tablet by mouth daily.  9/14/22  Yes Clementina Angulo, NP   metFORMIN (GLUCOPHAGE) 1,000 mg tablet Take 1,000 mg by mouth two (2) times a day. 5/30/22  Yes Provider, Historical   cholecalciferol (Vitamin D3) (2,000 UNITS /50 MCG) cap capsule    Yes Provider, Historical   fluticasone propionate (FLONASE) 50 mcg/actuation nasal spray 2 Sprays by Both Nostrils route daily. 6/13/22  Yes Joes Corado MD   azelastine (ASTELIN) 137 mcg (0.1 %) nasal spray 2 Sprays by Both Nostrils route two (2) times daily as needed for Rhinitis. 6/13/22  Yes Jose Corado MD   FreeStyle Ashlee 14 Day Sensor kit  8/26/21  Yes Provider, Historical   simvastatin (ZOCOR) 20 mg tablet Take 20 mg by mouth nightly. Yes Provider, Historical   aspirin delayed-release 81 mg tablet Take 1 Tab by mouth daily. 5/29/19  Yes Moose Yusuf NP        ROS    Vitals:    12/19/22 1417   BP: 127/68   Pulse: 76   Resp: 18   Temp: 97.8 °F (36.6 °C)   TempSrc: Oral   SpO2: 97%   Weight: 203 lb (92.1 kg)   Height: 5' 5\" (1.651 m)   PainSc:   5   PainLoc: Knee      Body mass index is 33.78 kg/m². Physical Exam:     Physical Exam  Vitals and nursing note reviewed. Constitutional:       General: He is not in acute distress. Appearance: Normal appearance. He is well-developed. He is not diaphoretic. HENT:      Head: Normocephalic and atraumatic. Mouth/Throat:      Mouth: Mucous membranes are moist.   Eyes:      General: No scleral icterus. Right eye: No discharge. Left eye: No discharge. Conjunctiva/sclera: Conjunctivae normal.   Cardiovascular:      Rate and Rhythm: Normal rate and regular rhythm. Pulses: Normal pulses. Heart sounds: Normal heart sounds. No murmur heard. No friction rub. No gallop. Pulmonary:      Effort: Pulmonary effort is normal. No respiratory distress. Breath sounds: Normal breath sounds. No stridor. No wheezing, rhonchi or rales. Abdominal:      General: Bowel sounds are normal. There is no distension. Palpations: Abdomen is soft. Tenderness:  There is no abdominal tenderness. There is no guarding or rebound. Musculoskeletal:         General: No swelling, tenderness or deformity. Right lower leg: No edema. Left lower leg: No edema. Comments: No edema and no significant pain right knee. Skin:     General: Skin is warm. Coloration: Skin is not jaundiced or pale. Findings: No bruising, erythema or rash. Neurological:      General: No focal deficit present. Mental Status: He is alert. Motor: No abnormal muscle tone. Coordination: Coordination normal.      Gait: Gait normal.   Psychiatric:         Mood and Affect: Mood normal.         Behavior: Behavior normal.         Thought Content: Thought content normal.         Judgment: Judgment normal.       An electronic signature was used to authenticate this note.   -- Casimiro You MD

## 2022-12-22 ENCOUNTER — TELEPHONE (OUTPATIENT)
Dept: HEMATOLOGY | Age: 77
End: 2022-12-22

## 2022-12-22 NOTE — TELEPHONE ENCOUNTER
Patient called to cancel and r/s appt for tomorrow because his girlfriend has covid. Offered a virtual option but they do not have face to face capabilities. Patient would like a call back to see when provider wants him to be seen considering.

## 2022-12-23 ENCOUNTER — TELEPHONE (OUTPATIENT)
Dept: HEMATOLOGY | Age: 77
End: 2022-12-23

## 2022-12-23 ENCOUNTER — VIRTUAL VISIT (OUTPATIENT)
Dept: HEMATOLOGY | Age: 77
End: 2022-12-23
Payer: MEDICARE

## 2022-12-23 ENCOUNTER — APPOINTMENT (OUTPATIENT)
Dept: PHYSICAL THERAPY | Age: 77
End: 2022-12-23
Payer: MEDICARE

## 2022-12-23 DIAGNOSIS — K74.60 CIRRHOSIS OF LIVER WITHOUT ASCITES, UNSPECIFIED HEPATIC CIRRHOSIS TYPE (HCC): Primary | ICD-10-CM

## 2022-12-23 DIAGNOSIS — N18.32 STAGE 3B CHRONIC KIDNEY DISEASE (HCC): ICD-10-CM

## 2022-12-23 DIAGNOSIS — K75.81 NASH (NONALCOHOLIC STEATOHEPATITIS): ICD-10-CM

## 2022-12-23 PROCEDURE — G8432 DEP SCR NOT DOC, RNG: HCPCS | Performed by: NURSE PRACTITIONER

## 2022-12-23 PROCEDURE — G8427 DOCREV CUR MEDS BY ELIG CLIN: HCPCS | Performed by: NURSE PRACTITIONER

## 2022-12-23 PROCEDURE — 1101F PT FALLS ASSESS-DOCD LE1/YR: CPT | Performed by: NURSE PRACTITIONER

## 2022-12-23 PROCEDURE — 3017F COLORECTAL CA SCREEN DOC REV: CPT | Performed by: NURSE PRACTITIONER

## 2022-12-23 PROCEDURE — 1123F ACP DISCUSS/DSCN MKR DOCD: CPT | Performed by: NURSE PRACTITIONER

## 2022-12-23 PROCEDURE — 99214 OFFICE O/P EST MOD 30 MIN: CPT | Performed by: NURSE PRACTITIONER

## 2022-12-23 NOTE — PROGRESS NOTES
Identified pt with two pt identifiers(name and ). Reviewed record in preparation for visit and have obtained necessary documentation. Chief Complaint   Patient presents with    Cirrhosis Of Liver     3 mo f/u      There were no vitals filed for this visit. Health Maintenance Review: Patient reminded of \"due or due soon\" health maintenance. I have asked the patient to contact his/her primary care provider (PCP) for follow-up on his/her health maintenance. Coordination of Care Questionnaire:  :   1) Have you been to an emergency room, urgent care, or hospitalized since your last visit? If yes, where when, and reason for visit? Yes, 2 months ago [facility] for Gout      2. Have seen or consulted any other health care provider since your last visit? If yes, where when, and reason for visit? YES, PCP follow up and Dr Stefan Daniel, Rheumatology, PT 2x weekly        Patient is accompanied by self I have received verbal consent from Jenna Harrison to discuss any/all medical information while they are present in the room.

## 2022-12-23 NOTE — PROGRESS NOTES
[address] [facility], Ian SHAFER, [facility], [place]      TERI Schmidt, [facility]-BC     Clementina Angulo, [facility]   Sameera Crowe P-MARILIN Elizabeth, [facility]       Bernard [address]    at [address], [address]    [phone]    FAX: [address] [place] [address] Avenue    at [address], [address] [place] [address], [address]    [phone]    FAX: [phone]       Patient Care Team:  Dean Geronimo MD as PCP - General (Infectious Disease Physician)  Dean Geronimo MD as PCP - [facility] Provider  Maximino Mohan MD (General Surgery)  Benigno Ramirez MD as Consulting Provider (Sleep Medicine Physician)  Dean Geronimo MD as Referring Provider (Infectious Disease Physician)  Radha Sheehan NP as Consulting Provider (Nurse Practitioner)  Bonnie Guerrero MD as Physician (Endocrinology Physician)  Annalise Levi MD as Consulting Provider (General Surgery)      Problem List  Date Reviewed: 11/27/2022            Codes Class Noted    Acute idiopathic gout of right knee ICD-10-CM: M10.061  ICD-9-CM: 274.01  11/17/2022        ALFARO (nonalcoholic steatohepatitis) ICD-10-CM: K75.81  ICD-9-CM: 571.8  9/17/2022        Dysautonomia ([facility] ICD-10-CM: G90.1  ICD-9-CM: 337.9  6/13/2022        Chronic obstructive pulmonary disease, unspecified COPD type ([facility] ICD-10-CM: J44.9  ICD-9-CM: 496  6/13/2022        Chronic renal disease, stage III ICD-10-CM: N18.30  ICD-9-CM: 585.3  6/8/2022        History of cholecystectomy ICD-10-CM: Z90.49  ICD-9-CM: V45.79  6/25/2021        Gallstone pancreatitis ICD-10-CM: K85.10  ICD-9-CM: 577.0, 574.20  6/3/2021        Cirrhosis ([facility] ICD-10-CM: K74.60  ICD-9-CM: 571.5  3/12/2021        History of CVA (cerebrovascular accident) ICD-10-CM: Z86.73  ICD-9-CM: V12.54  2/3/2021        Thrombocytopenia ([facility] ICD-10-CM: D69.6  ICD-9-CM: 287.5  12/3/2020        Class 1 obesity due to excess calories with serious comorbidity and body mass index (BMI) of 32.0 to 32.9 in adult ICD-10-CM: E66.09, B45.30  ICD-9-CM: 278.00, V85.32  12/3/2020        Type 2 diabetes mellitus with other circulatory complication, without long-term current use of insulin ([facility] ICD-10-CM: E11.59  ICD-9-CM: 250.70  12/3/2020         VIRTUAL TELEHEALTH VISIT PERFORMED DUE TO COVID-19 EPIDEMIC    CONSENT:    Wei Veras, was evaluated through a synchronous (real-time) audio-video encounter. The patient (or guardian if applicable) is aware that this is a billable service, which includes applicable co-pays. This Virtual Visit was conducted with patient's (and/or legal guardian's) consent. The visit was conducted pursuant to the emergency declaration under the [address] [place] [address] authority and the [email or web address] General Act. Patient identification was verified, and a caregiver was present when appropriate. 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 The active problem list, all pertinent past medical history, medications, liver histology, endoscopic studies, and laboratory findings related to the liver disorder were reviewed and discussed with the patient. The patient is a 70year old  male who was found to have thrombocytopenia in 12/2020. A liver biopsy in 5/2021 demonstrated ALFARO with cirrhosis. Following the biopsy he developed severe abdominal pain and was found to have acute gallstone pancreatitis. This resolved with conservative treatment.  He underwent a cholecystectomy 5/2021. Since the last office visit the patient started in physical therapy for vertigo. This has improved. Gout is well controlled on Uloric. The patient has the following symptoms which could be attributed to the liver disorder:  Generalized weakness. The patient is not experiencing the following symptoms which are commonly seen in this liver disorder: nausea, vomiting, pain on the right side over the liver or abdominal pain. The patient has mild limitations in functional activities which can be attributed to a past CVA. ASSESSMENT AND PLAN:  Cirrhosis  Cirrhosis is probably secondary to past alcohol use and NAFLD  The diagnosis of cirrhosis is based upon liver histology, imaging, laboratory studies    Have performed laboratory testing to monitor liver function and degree of liver injury. This included BMP, hepatic panel, CBC with platelet count and INR. Laboratory testing from 10/21/2022 reviewed in detail. Follow-up testing ordered today. The liver transaminases, ALP, liver function and platelet count are normal.     The patient has never developed any complications of cirrhosis to date. The CTP is 5. Child class A. The MELD score is 12. ALFARO  The diagnosis is based upon liver biopsy, Fiboscan CAP score, features of metabolic syndrome, serologic studies that are negative for other causes of chronic liver disease,     A liver biopsy performed in 5/2021 demonstrates ALFARO. 25-33% macrovesicualr and micovesicular steatosis, mild inflammation, mild ballooning, and cirrhosis. Fibroscan in 3/2021 demonstrated 51 kPa and  suggesting fatty liver and cirrhosis. If the patient looses 20% of current body weight, which is 36 pounds, down to a weight of of 150 pounds, steatosis should resolve. Once all steatosis has resolved inflammation will resolve. Weight is down to 196 lbs per patient report. He has been more active with improvement in vertigo. Elevated Sr. Creatinine  It is unclear why the Sr. Creatinine is elevated. This could be related to DM and Hypertension. Recommend follow-up with Nephrology    Hypertension  Well controlled on amlodipine 5 mg daily. Vertigo  The patient states this is a chronic issues since the CVA. It has impacted his gait and he is fearful of having a fall. Currently improved with PT. Counseling for diet and weight loss in patients with confirmed or suspected NAFLD  The patient was counseled regarding diet and exercise to achieve weight loss. The best diet for patients with fatty liver is one very low in carbohydrates and enriched with protein. The patient was told not to consume any food products and drinks containing fructose as this enhances hepatic fat synthesis. Screening for Esophageal varices   The patient does not have esophageal or gastric varices. The last EGD to assess for varices was performed in 5/2021. No varices found. Hepatic encephalopathy   Overt HE has not developed to date. There is no need for treatment with lactulose and/or Xifaxan at this time. Anemia   This is due to multifactorial causes including recent prolonged hospitalization and surgery. The HGB has improved. Previous iron studies were normal. Will check again at the next office visit. Thrombocytopenia   This is secondary to cirrhosis. There is no evidence of overt bleeding. No treatment is required. The platelet count is adequate for the patient to undergo procedures without the need for platelet transfusion or platelet growth factors. Screening for Hepatocellular Carcinoma  [address] screening was last performed with MRI 7/2022 and does not suggest [address] AFP was normal. Will order AFP and ultrasound.      Treatment of other medical problems in patients with chronic liver disease  There are no contraindications for the patient to take most medications that are necessary for treatment of other medical issues. The patient has cirrhrosis and should avoid taking NSAIDs which are associated with a higher rate of developing ROBBIE. The patient can take Any medications utilized for treatment of DM, Statins to treat hypercholesterolemia    Counseling for alcohol in patients with chronic liver disease  The patient has cirrhosis and was advised to be abstinent from all alcohol including non-alcoholic beer which does contain some alcohol. The patient has previously consumed alcohol in excess. The patient has not consumed alcohol since 2013. Osteoporosis  The risk of osteoporosis is increased in patients with cirrhosis. DEXA bone density to assess for osteoporosis has not been performed. This should be ordered by the patients primary care physician. Vaccinations   Vaccination for viral hepatitis A is not needed. The patient has serologic evidence of prior exposure or vaccination with immunity. Routine vaccinations against other bacterial and viral agents can be performed as indicated. Annual flu vaccination should be administered if indicated. ALLERGIES  No Known Allergies    MEDICATIONS  Current Outpatient Medications   Medication Sig    tamsulosin (FLOMAX) 0.4 mg capsule Take 0.4 mg by mouth daily. febuxostat (ULORIC) 40 mg tab tablet TAKE ONE TABLET BY MOUTH DAILY    pantoprazole (PROTONIX) 40 mg tablet Take 1 Tablet by mouth daily. Take instead of 20mg tab (prior 20mg 2 times daily). amLODIPine (NORVASC) 5 mg tablet Take 1 Tablet by mouth daily. metFORMIN (GLUCOPHAGE) 1,000 mg tablet Take 1,000 mg by mouth two (2) times a day. cholecalciferol (Vitamin D3) (2,000 UNITS /50 MCG) cap capsule     fluticasone propionate (FLONASE) 50 mcg/actuation nasal spray 2 Sprays by Both Nostrils route daily. azelastine (ASTELIN) 137 mcg (0.1 %) nasal spray 2 Sprays by Both Nostrils route two (2) times daily as needed for Rhinitis.     FreeStyle Ashlee 14 Day Sensor kit     simvastatin (ZOCOR) 20 mg tablet Take 20 mg by mouth nightly. aspirin delayed-release 81 mg tablet Take 1 Tab by mouth daily. No current facility-administered medications for this visit. SYSTEM REVIEW NOT RELATED TO LIVER DISEASE OR REVIEWED ABOVE:  Constitution systems: Negative for fever, chills, weight gain, weight loss. Eyes: Negative for visual changes. ENT: Negative for sore throat, painful swallowing. Respiratory: Negative for cough, hemoptysis, SOB. Cardiology: Negative for chest pain, palpitations. GI:  Negative for constipation or diarrhea. : Negative for urinary frequency, dysuria, hematuria, nocturia. Skin: Negative for rash. Hematology: Negative for easy bruising, blood clots. Musculo-skeletal: Negative for back pain, muscle pain, weakness. Neurologic: Negative for headaches, dizziness, vertigo, memory problems not related to HE. Psychology: Negative for anxiety, depression. FAMILY HISTORY:  There is no family history of liver disease. There is no family history of immune disorders. SOCIAL HISTORY:  The patient is   The patient has no children. The patient stopped using tobacco products in 2009  The patient has previously consumed alcohol in excess. The patient is a retired barnes    PHYSICAL EXAMINATION PERFORMED BY [employer]:  VS: Not performed   General: No acute distress. Eyes: Sclera anicteric. ENT: No oral lesions. Skin: No rashes. spider angiomata. No jaundice. Abdomen: No obvious distention suggesting ascites. Extremities: No edema. No muscle wasting. Neurologic: Alert and oriented. Cranial nerves grossly intact.     LABORATORY STUDIES:  Liver [place] of [address] Units 10/21/2022 10/7/2022   WBC 4.1 - 11.1 K/uL 6.2 5.7   ANC 1.8 - 8.0 K/UL 3.9 4.1   HGB 12.1 - 17.0 g/dL 12.4 12.7 (L)    - 400 K/uL 178 CANCELED   INR 0.9 - 1.2     AST 15 - 37 U/L 22 18   ALT 12 - 78 U/L 34 20   Alk Phos 45 - 117 U/L 79 72 Bili, Total 0.2 - 1.0 MG/DL 0.6 0.4   Bili, Direct 0.00 - 0.40 mg/dL     Albumin 3.5 - 5.0 g/dL 3.7 4.6   BUN 6 - 20 MG/DL 17 17   Creat 0.70 - 1.30 MG/DL 1.87 (H) 1.48 (H)   Na 136 - 145 mmol/L 141 145 (H)   K 3.5 - 5.1 mmol/L 4.5 4.5   Cl 97 - 108 mmol/L 110 (H) 110 (H)   CO2 21 - 32 mmol/L 24 18 (L)   Glucose 65 - 100 mg/dL 111 (H) 132 (H)     Cancer Screening Latest Ref Rng & Units 3/4/2022 8/4/2021   AFP, Serum 0.0 - 8.0 ng/mL 2.0 2.3   AFP-L3% 0.0 - 9.9 % Comment Comment     Laboratory testing from 10/21/2022 reviewed in detail. Additional testing included to evaluate progression or regression of disease. Laboratory testing results from today will be communicated by My Chart. SEROLOGIES:  Serologies Latest Ref Rng & Units 2/3/2021   Hep A Ab, Total Negative   Positive (A)   Hep B Surface Ag Index <0.10   Hep B Surface Ag Interp Negative   Negative   Hep B Core Ab, Total Negative   Negative   Hep B Surface Ab mIU/mL <3.10   Hep B Surface Ab Interp NONREACTIVE   NONREACTIVE   Hep C Ab 0.0 - 0.9 s/co ratio <0.1   Ferritin 26 - 388 NG/ML 27   Iron % Saturation 20 - 50 % 26     LIVER HISTOLOGY:  3/2021: FibroScan performed at [address]. EkPa was 50.8. IQR/med 20%. . The results suggested a fibrosis level of F 4. The CAP score suggests there is hepatic steatosis. 5/2021. Slides reviewed by MLS. Fatty liver. AFL vs NAFLD. 25-33% macrovesicualr and micovesicular steatosis, mild inflammation, mild ballooning, Stage 4 fibrosis. DARIEL (111). ENDOSCOPIC PROCEDURES:  5/2021. EGD performed by MLS. No esophageal varices. No gastric varices. Repeat in 3 years. RADIOLOGY:  12/2020: Liver US:  demonstrated heterogeneous hepatic echotexture with an area of focal sparing in the right lobe. Mildly enlarged spleen measuring 13.1 cm in length.    5/2021. Dynamic MRI/MRCP liver. Changes consistent with cirrhosis. No liver mass lesions. No dilated bile ducts.   No bile duct strictures. Pancreatitis. Mild ascites. 9/2021. Ultrasound of liver. Echogenic consistent with cirrhosis. No liver mass lesions. No dilated bile ducts. No ascites. 3/2022. Ultrasound of liver. Echogenic consistent with cirrhosis. No liver mass lesions. No dilated bile ducts. No ascites. 7/2022. MRI of liver. Diffuse hepatic steatosis. No focal lesion. Stable IPMN. No ascites. No ductal dilatation or stones. OTHER TESTING:  Not available or performed    FOLLOW-UP AFTER VIRTUAL VISIT:  Pursuant to the emergency declaration under the [facility], [facility] waiver authority and the [employer] Act, this Virtual  Visit was conducted, with the patient's (and/or their legal guardian's) consent, to reduce the patient's risk of exposure to COVID-19 and provide necessary medical care. Services were provided through a video synchronous discussion virtually to substitute for an in-person clinic visit. The patient was located in their home. The provider was located in the [facility] [employer] office. All of the issues listed above in the Assessment and Plan were discussed with the patient. All questions were answered. The patient expressed a clear understanding of the above. Orders to obtain laboratory testing will be mailed to the patient. An in-person follow-up visit will be scheduled at [place] [address] in 3 months for ongoing monitoring and treatment. Clementina Angulo, L[facility] of [address] [id number] [place], [address] [facility] [address]  [address] [facility]

## 2022-12-23 NOTE — Clinical Note
NOTIFICATION RETURN TO WORK / SCHOOL    12/23/2022 10:22 AM    Mr. Paul Childs  [address] [place] [id number]      To Whom It May Concern:    Paul Childs is currently under the care of [address]. He will return to work/school on: ***    If there are questions or concerns please have the patient contact our office.         Sincerely,      April Vonita Schwab, NP

## 2022-12-27 ENCOUNTER — OFFICE VISIT (OUTPATIENT)
Dept: URGENT CARE | Age: 77
End: 2022-12-27
Payer: MEDICARE

## 2022-12-27 ENCOUNTER — APPOINTMENT (OUTPATIENT)
Dept: PHYSICAL THERAPY | Age: 77
End: 2022-12-27
Payer: MEDICARE

## 2022-12-27 VITALS
OXYGEN SATURATION: 99 % | TEMPERATURE: 98.4 F | DIASTOLIC BLOOD PRESSURE: 75 MMHG | SYSTOLIC BLOOD PRESSURE: 121 MMHG | HEART RATE: 77 BPM | RESPIRATION RATE: 16 BRPM | BODY MASS INDEX: 33.38 KG/M2 | WEIGHT: 200.6 LBS

## 2022-12-27 DIAGNOSIS — J18.9 PNEUMONIA OF LEFT LUNG DUE TO INFECTIOUS ORGANISM, UNSPECIFIED PART OF LUNG: Primary | ICD-10-CM

## 2022-12-27 DIAGNOSIS — R05.1 ACUTE COUGH: ICD-10-CM

## 2022-12-27 DIAGNOSIS — R06.2 WHEEZING: ICD-10-CM

## 2022-12-27 LAB — SARS-COV-2 PCR, POC: POSITIVE

## 2022-12-27 PROCEDURE — G8427 DOCREV CUR MEDS BY ELIG CLIN: HCPCS | Performed by: NURSE PRACTITIONER

## 2022-12-27 PROCEDURE — 3017F COLORECTAL CA SCREEN DOC REV: CPT | Performed by: NURSE PRACTITIONER

## 2022-12-27 PROCEDURE — 87635 SARS-COV-2 COVID-19 AMP PRB: CPT | Performed by: NURSE PRACTITIONER

## 2022-12-27 PROCEDURE — 99203 OFFICE O/P NEW LOW 30 MIN: CPT | Performed by: NURSE PRACTITIONER

## 2022-12-27 PROCEDURE — G8432 DEP SCR NOT DOC, RNG: HCPCS | Performed by: NURSE PRACTITIONER

## 2022-12-27 PROCEDURE — G8417 CALC BMI ABV UP PARAM F/U: HCPCS | Performed by: NURSE PRACTITIONER

## 2022-12-27 PROCEDURE — 1101F PT FALLS ASSESS-DOCD LE1/YR: CPT | Performed by: NURSE PRACTITIONER

## 2022-12-27 PROCEDURE — G8536 NO DOC ELDER MAL SCRN: HCPCS | Performed by: NURSE PRACTITIONER

## 2022-12-27 PROCEDURE — 1123F ACP DISCUSS/DSCN MKR DOCD: CPT | Performed by: NURSE PRACTITIONER

## 2022-12-27 RX ORDER — ALBUTEROL SULFATE 90 UG/1
2 AEROSOL, METERED RESPIRATORY (INHALATION)
Qty: 18 G | Refills: 0 | Status: SHIPPED | OUTPATIENT
Start: 2022-12-27 | End: 2023-01-16

## 2022-12-27 RX ORDER — BUDESONIDE AND FORMOTEROL FUMARATE DIHYDRATE 160; 4.5 UG/1; UG/1
2 AEROSOL RESPIRATORY (INHALATION) 2 TIMES DAILY
Qty: 1 EACH | Refills: 0 | Status: SHIPPED | OUTPATIENT
Start: 2022-12-27 | End: 2023-01-16

## 2022-12-27 RX ORDER — LEVOFLOXACIN 750 MG/1
750 TABLET ORAL DAILY
Qty: 7 TABLET | Refills: 0 | Status: SHIPPED | OUTPATIENT
Start: 2022-12-27 | End: 2023-01-03

## 2022-12-27 NOTE — PROGRESS NOTES
The history is provided by the patient. Cough  The history is provided by the Patient and spouse. This is a new problem. The current episode started more than 1 week ago. The problem occurs every few minutes. The problem has been gradually worsening. The cough is Non-productive. There has been no fever. Associated symptoms include headaches, rhinorrhea and wheezing. Pertinent negatives include no chest pain, no chills, no ear congestion, no ear pain, no sore throat, no myalgias, no shortness of breath, no nausea and no vomiting. He has tried nothing for the symptoms. Risk factors: exposure to covid (wife) He is not a smoker. Past medical history comments: liver cirrhosis. Past Medical History:   Diagnosis Date    CAD (coronary artery disease)     Chronic kidney disease     stones    Chronic obstructive pulmonary disease ([address]     Cirrhosis ([address]     Colon polyp     Diabetes ([address]     Hypertension     Stroke ([facility] 1980s    right hand neuropathy        Past Surgical History:   Procedure Laterality Date    BIOPSY LIVER  06/04/21    COLONOSCOPY N/A 04/12/2018    COLONOSCOPY performed by Savannah Zamora MD at [address] HX CHOLECYSTECTOMY  2020    HX COLONOSCOPY  Not sure when    Maybe do    HX ENDOSCOPY  06/03/21    HX SKIN BIOPSY  2019    skin cancer removed from left leg.           Family History   Problem Relation Age of Onset    Heart Disease Mother     Migraines Mother         When she was a young child    Heart Disease Father     Alcohol abuse Father     Arthritis Sister    Sampson Goodell Sister         breast ca    Arthritis-rheumatoid Sister     OSTEOARTHRITIS Sister    Dyane Handy Sister     Cancer Sister         Breast    Hypertension Sister     Heart Disease Brother         Heart attack        Social History     Socioeconomic History    Marital status: LIFE PARTNER     Spouse name: Not on file    Number of children: Not on file    Years of education: Not on file   Lenka Keller education level: Not on file   Occupational History    Not on file   Tobacco Use    Smoking status: Former     Packs/day: 3.00     Years: 30.00     Pack years: 90.00     Types: Cigarettes     Quit date: 2005     Years since quittin.5    Smokeless tobacco: Never   Vaping Use    Vaping Use: Never used   Substance and Sexual Activity    Alcohol use: Not Currently     Alcohol/week: 140.0 standard drinks     Types: 140 Shots of liquor per week     Comment: Drank Too much liquor    Drug use: Never    Sexual activity: Not Currently     Partners: Female     Birth control/protection: None     Comment: ED   Other Topics Concern    Not on file   Social History Narrative    Not on file     Social Determinants of Health     Financial Resource Strain: Low Risk     Difficulty of Paying Living Expenses: Not hard at all   Food Insecurity: No Food Insecurity    Worried About Running Out of Food in the Last Year: Never true    Coleen of Food in the Last Year: Never true   Transportation Needs: Not on file   Physical Activity: Not on file   Stress: Not on file   Social Connections: Not on file   Intimate Partner Violence: Not on file   Housing Stability: Not on file                ALLERGIES: Patient has no known allergies. Review of Systems   Constitutional:  Positive for activity change and fatigue. Negative for appetite change, chills and fever. HENT:  Positive for congestion, postnasal drip and rhinorrhea. Negative for ear pain, sinus pressure, sinus pain and sore throat. Respiratory:  Positive for cough and wheezing. Negative for shortness of breath. Cardiovascular:  Negative for chest pain. Gastrointestinal:  Negative for abdominal pain, diarrhea, nausea and vomiting. Musculoskeletal:  Negative for myalgias. Neurological:  Positive for dizziness and headaches. Negative for syncope and weakness.      Vitals:    22 1010   BP: 121/75   Pulse: 77   Resp: 16   Temp: 98.4 °F (36.9 °C)   SpO2: 99%   Weight: 200 lb 9.6 oz (91 kg)       Physical Exam  Constitutional:       Appearance: Normal appearance. He is ill-appearing. Comments: Tired appearing   HENT:      Right Ear: Tympanic membrane normal.      Left Ear: Tympanic membrane normal.      Nose: Rhinorrhea present. No congestion. Mouth/Throat:      Pharynx: Oropharynx is clear. No oropharyngeal exudate or posterior oropharyngeal erythema. Comments: Post nasal drip  Eyes:      Pupils: Pupils are equal, round, and reactive to light. Neck:      Vascular: No carotid bruit. Cardiovascular:      Rate and Rhythm: Normal rate and regular rhythm. Pulses: Normal pulses. Heart sounds: Normal heart sounds. Pulmonary:      Effort: Pulmonary effort is normal.      Breath sounds: Examination of the right-upper field reveals wheezing. Examination of the left-upper field reveals wheezing, rhonchi and rales. Examination of the left-middle field reveals rhonchi and rales. Examination of the left-lower field reveals rhonchi and rales. Wheezing, rhonchi and rales present. Abdominal:      General: Bowel sounds are normal.      Palpations: Abdomen is soft. Lymphadenopathy:      Cervical: Cervical adenopathy present. Right cervical: Superficial cervical adenopathy present. Left cervical: Superficial cervical adenopathy present. Neurological:      Mental Status: He is alert.      MDM     Differential Diagnosis; Clinical Impression; Plan:     (J18.9) Pneumonia of left lung due to infectious organism, unspecified part of lung  (primary encounter diagnosis)  (R05.1) Acute cough  (R06.2) Wheezing    Ordered Chest x-ray and Covid PCR    Patient Education: rest, increased fluids  Medication: Levaquin, Albuterol and Symbicort  F/U: as needed for new or worsening symptoms     Procedures

## 2022-12-27 NOTE — PROGRESS NOTES
Called and LMV for patient to call back for test results. Please try calling again, to alert him that he was positive for covid. Although it will not change treatment. cooperative

## 2022-12-27 NOTE — PATIENT INSTRUCTIONS
Patient Education: rest, increased fluids  Medication: Levaquin, Albuterol and Symbicort  F/U: as needed for new or worsening symptoms

## 2022-12-30 ENCOUNTER — APPOINTMENT (OUTPATIENT)
Dept: PHYSICAL THERAPY | Age: 77
End: 2022-12-30
Payer: MEDICARE

## 2023-01-02 ENCOUNTER — OFFICE VISIT (OUTPATIENT)
Dept: URGENT CARE | Age: 78
End: 2023-01-02
Payer: MEDICARE

## 2023-01-02 VITALS
BODY MASS INDEX: 33.32 KG/M2 | RESPIRATION RATE: 22 BRPM | OXYGEN SATURATION: 98 % | HEART RATE: 69 BPM | TEMPERATURE: 98.2 F | SYSTOLIC BLOOD PRESSURE: 126 MMHG | DIASTOLIC BLOOD PRESSURE: 69 MMHG | WEIGHT: 200.2 LBS

## 2023-01-02 DIAGNOSIS — U07.1 COVID-19: ICD-10-CM

## 2023-01-02 DIAGNOSIS — M79.671 ACUTE FOOT PAIN, RIGHT: Primary | ICD-10-CM

## 2023-01-02 LAB — SARS-COV-2 PCR, POC: POSITIVE

## 2023-01-02 PROCEDURE — G8427 DOCREV CUR MEDS BY ELIG CLIN: HCPCS | Performed by: FAMILY MEDICINE

## 2023-01-02 PROCEDURE — 99213 OFFICE O/P EST LOW 20 MIN: CPT | Performed by: FAMILY MEDICINE

## 2023-01-02 PROCEDURE — 3017F COLORECTAL CA SCREEN DOC REV: CPT | Performed by: FAMILY MEDICINE

## 2023-01-02 PROCEDURE — 1123F ACP DISCUSS/DSCN MKR DOCD: CPT | Performed by: FAMILY MEDICINE

## 2023-01-02 PROCEDURE — 1101F PT FALLS ASSESS-DOCD LE1/YR: CPT | Performed by: FAMILY MEDICINE

## 2023-01-02 PROCEDURE — 87635 SARS-COV-2 COVID-19 AMP PRB: CPT | Performed by: FAMILY MEDICINE

## 2023-01-02 PROCEDURE — G8536 NO DOC ELDER MAL SCRN: HCPCS | Performed by: FAMILY MEDICINE

## 2023-01-02 PROCEDURE — G8417 CALC BMI ABV UP PARAM F/U: HCPCS | Performed by: FAMILY MEDICINE

## 2023-01-02 PROCEDURE — G8432 DEP SCR NOT DOC, RNG: HCPCS | Performed by: FAMILY MEDICINE

## 2023-01-02 RX ORDER — PREDNISONE 5 MG/1
TABLET ORAL
Qty: 21 TABLET | Refills: 0 | Status: SHIPPED
Start: 2023-01-02 | End: 2023-01-05 | Stop reason: SINTOL

## 2023-01-02 NOTE — PROGRESS NOTES
Dee Andrews is a 78 y.o. male who presents with right foot pain, swelling x 4-6 days; NKI. Has hx of gout. On [employer]. Has been applying ice. Was seen here 6 days ago, tested positive to COVID and was placed on Levaquin for possible PNA. Reports slight cough and nasal congestion, improved. Denies fever, SOB, N/V/D. Pt would like to be re-tested for COVID today       The history is provided by the patient. Past Medical History:   Diagnosis Date    CAD (coronary artery disease)     Chronic kidney disease     stones    Chronic obstructive pulmonary disease ([address]     Cirrhosis ([facility]     Colon polyp     Diabetes ([facility]     Hypertension     Stroke [facility]) 1980s    right hand neuropathy        Past Surgical History:   Procedure Laterality Date    BIOPSY LIVER  21    COLONOSCOPY N/A 2018    COLONOSCOPY performed by Brodie Baca MD at [facility] ENDOSCOPY    [address]      HX COLONOSCOPY  Not sure when    Maybe do    HX ENDOSCOPY  21    HX SKIN BIOPSY  2019    skin cancer removed from left leg.           Family History   Problem Relation Age of Onset    Heart Disease Mother     Migraines Mother         When she was a young child    Heart Disease Father     Alcohol abuse Father     Arthritis Sister     Cancer Sister         breast ca    Arthritis-rheumatoid Sister     OSTEOARTHRITIS Sister     OSTEOARTHRITIS Sister     Cancer Sister         Breast    Hypertension Sister     Heart Disease Brother         Heart attack        Social History     Socioeconomic History    Marital status: LIFE PARTNER     Spouse name: Not on file    Number of children: Not on file    Years of education: Not on file    Highest education level: Not on file   Occupational History    Not on file   Tobacco Use    Smoking status: Former     Packs/day: 3.00     Years: 30.00     Pack years: 90.00     Types: Cigarettes     Quit date: 2005     Years since quittin.5    Smokeless tobacco: Never   Vaping Use    Vaping Use: Never used   Substance and Sexual Activity    Alcohol use: Not Currently     Alcohol/week: 140.0 standard drinks     Types: 140 Shots of liquor per week     Comment: Drank Too much liquor    Drug use: Never    Sexual activity: Not Currently     Partners: Female     Birth control/protection: None     Comment: ED   Other Topics Concern    Not on file   Social History Narrative    Not on file     Social Determinants of Health     Financial Resource Strain: Low Risk     Difficulty of Paying Living Expenses: Not hard at all   Food Insecurity: No Food Insecurity    Worried About Running Out of Food in the Last Year: Never true    Ran Out of Food in the Last Year: Never true   Transportation Needs: Not on file   Physical Activity: Not on file   Stress: Not on file   Social Connections: Not on file   Intimate Partner Violence: Not on file   Housing Stability: Not on file                ALLERGIES: Patient has no known allergies. Review of Systems   Constitutional:  Negative for fever. HENT:  Positive for congestion. Respiratory:  Positive for cough. Negative for shortness of breath. Gastrointestinal:  Negative for diarrhea, nausea and vomiting. Musculoskeletal:  Positive for arthralgias and joint swelling. Vitals:    01/02/23 0855   BP: 126/69   Pulse: 69   Resp: 22   Temp: 98.2 °F (36.8 °C)   SpO2: 98%   Weight: 200 lb 3.2 oz (90.8 kg)       Physical Exam  Vitals and nursing note reviewed. Constitutional:       General: He is not in acute distress. Appearance: He is well-developed. He is not diaphoretic. Cardiovascular:      Rate and Rhythm: Normal rate and regular rhythm. Heart sounds: Normal heart sounds. Pulmonary:      Effort: Pulmonary effort is normal. No respiratory distress. Breath sounds: Normal breath sounds. No stridor. No wheezing, rhonchi or rales. Musculoskeletal:      Right foot: Normal range of motion and normal capillary refill.  Swelling (3rd toe), tenderness (3rd toe, 3rd MT) and bony tenderness (3rd toe, 3rd MT) present. Normal pulse. Neurological:      Mental Status: He is alert. Psychiatric:         Behavior: Behavior normal.         Thought Content: Thought content normal.         Judgment: Judgment normal.       [facility]    ICD-10-CM ICD-9-CM   1. Acute foot pain, right  M79.671 729.5   2. COVID-19  U07.1 079.89       Orders Placed This Encounter    XR FOOT RT MIN 3 V     Standing Status:   Future     Number of Occurrences:   1     Standing Expiration Date:   1/3/2024     Order Specific Question:   Reason for Exam     Answer:   sudden pain and swelling x 4 days; NKI; TTP @ 3rd toe and 3rd MT    POCT COVID-19, SARS-COV-2, PCR     Order Specific Question:   Is this test for diagnosis or screening? Answer:   Screening     Order Specific Question:   Symptomatic for COVID-19 as defined by CDC? Answer:   No     Order Specific Question:   Hospitalized for COVID-19? Answer:   No     Order Specific Question:   Admitted to ICU for COVID-19? Answer:   No     Order Specific Question:   Employed in healthcare setting? Answer:   Unknown     Order Specific Question:   Resident in a congregate (group) care setting? Answer:   Unknown     Order Specific Question:   Previously tested for COVID-19? Answer:   Yes    predniSONE (STERAPRED) 5 mg dose pack     Sig: See administration instruction per 5mg dose pack     Dispense:  21 Tablet     Refill:  0      Possible gout    Stop Levaquin    Elevate right foot, ice    Start prednisone    Follow up with PCP    If signs and symptoms become worse the pt is to go to the ER.      Results for orders placed or performed in visit on 01/02/23   POCT COVID-19, SARS-COV-2, PCR   Result Value Ref Range    SARS-COV-2 PCR, POC Positive (A) Negative       XR Results (most recent):  Results from Appointment encounter on 01/02/23    XR FOOT RT MIN 3 V    Narrative  EXAM: XR FOOT RT MIN 3 V    INDICATION: sudden right foot pain and swelling x 4 days; NKI; TTP @ 3rd toe and  3rd MT.    COMPARISON: None. FINDINGS: Three views of the right foot demonstrate no fracture or other acute  osseous or articular abnormality. The soft tissues are within normal limits. Impression  No acute abnormality.      Procedures

## 2023-01-05 ENCOUNTER — APPOINTMENT (OUTPATIENT)
Dept: GENERAL RADIOLOGY | Age: 78
End: 2023-01-05
Attending: STUDENT IN AN ORGANIZED HEALTH CARE EDUCATION/TRAINING PROGRAM
Payer: MEDICARE

## 2023-01-05 ENCOUNTER — HOSPITAL ENCOUNTER (EMERGENCY)
Age: 78
Discharge: HOME | End: 2023-01-06
Attending: STUDENT IN AN ORGANIZED HEALTH CARE EDUCATION/TRAINING PROGRAM
Payer: MEDICARE

## 2023-01-05 DIAGNOSIS — E86.0 DEHYDRATION: ICD-10-CM

## 2023-01-05 DIAGNOSIS — R73.9 HYPERGLYCEMIA: Primary | ICD-10-CM

## 2023-01-05 LAB
ANION GAP SERPL CALC-SCNC: 13 MMOL/L (ref 5–15)
BASOPHILS # BLD: 0 K/UL (ref 0–0.1)
BASOPHILS NFR BLD: 0 % (ref 0–1)
BNP SERPL-MCNC: 874 PG/ML (ref 0–450)
BUN SERPL-MCNC: 25 MG/DL (ref 6–20)
BUN/CREAT SERPL: 14 (ref 12–20)
CALCIUM SERPL-MCNC: 8.7 MG/DL (ref 8.5–10.1)
CHLORIDE SERPL-SCNC: 106 MMOL/L (ref 97–108)
CO2 SERPL-SCNC: 22 MMOL/L (ref 21–32)
CREAT SERPL-MCNC: 1.74 MG/DL (ref 0.7–1.3)
DIFFERENTIAL METHOD BLD: ABNORMAL
EOSINOPHIL # BLD: 0 K/UL (ref 0–0.4)
EOSINOPHIL NFR BLD: 0 % (ref 0–7)
ERYTHROCYTE [DISTWIDTH] IN BLOOD BY AUTOMATED COUNT: 15.1 % (ref 11.5–14.5)
GLUCOSE BLD STRIP.AUTO-MCNC: 305 MG/DL (ref 65–117)
GLUCOSE BLD STRIP.AUTO-MCNC: 342 MG/DL (ref 65–117)
GLUCOSE BLD STRIP.AUTO-MCNC: 443 MG/DL (ref 65–117)
GLUCOSE SERPL-MCNC: 452 MG/DL (ref 65–100)
HCT VFR BLD AUTO: 35.2 % (ref 36.6–50.3)
HGB BLD-MCNC: 12 G/DL (ref 12.1–17)
IMM GRANULOCYTES # BLD AUTO: 0 K/UL (ref 0–0.04)
IMM GRANULOCYTES NFR BLD AUTO: 1 % (ref 0–0.5)
LYMPHOCYTES # BLD: 0.6 K/UL (ref 0.8–3.5)
LYMPHOCYTES NFR BLD: 17 % (ref 12–49)
MCH RBC QN AUTO: 27.8 PG (ref 26–34)
MCHC RBC AUTO-ENTMCNC: 34.1 G/DL (ref 30–36.5)
MCV RBC AUTO: 81.5 FL (ref 80–99)
MONOCYTES # BLD: 0.2 K/UL (ref 0–1)
MONOCYTES NFR BLD: 5 % (ref 5–13)
NEUTS SEG # BLD: 3 K/UL (ref 1.8–8)
NEUTS SEG NFR BLD: 77 % (ref 32–75)
NRBC # BLD: 0 K/UL (ref 0–0.01)
NRBC BLD-RTO: 0 PER 100 WBC
PLATELET # BLD AUTO: 125 K/UL (ref 150–400)
PMV BLD AUTO: 11.1 FL (ref 8.9–12.9)
POTASSIUM SERPL-SCNC: 4.3 MMOL/L (ref 3.5–5.1)
RBC # BLD AUTO: 4.32 M/UL (ref 4.1–5.7)
RBC MORPH BLD: ABNORMAL
SERVICE CMNT-IMP: ABNORMAL
SODIUM SERPL-SCNC: 141 MMOL/L (ref 136–145)
TROPONIN-HIGH SENSITIVITY: 10 NG/L (ref 0–76)
WBC # BLD AUTO: 3.8 K/UL (ref 4.1–11.1)

## 2023-01-05 PROCEDURE — 80048 BASIC METABOLIC PNL TOTAL CA: CPT

## 2023-01-05 PROCEDURE — 82962 GLUCOSE BLOOD TEST: CPT

## 2023-01-05 PROCEDURE — 93005 ELECTROCARDIOGRAM TRACING: CPT

## 2023-01-05 PROCEDURE — 36415 COLL VENOUS BLD VENIPUNCTURE: CPT

## 2023-01-05 PROCEDURE — 96374 THER/PROPH/DIAG INJ IV PUSH: CPT

## 2023-01-05 PROCEDURE — 99285 EMERGENCY DEPT VISIT HI MDM: CPT

## 2023-01-05 PROCEDURE — 96361 HYDRATE IV INFUSION ADD-ON: CPT

## 2023-01-05 PROCEDURE — 71045 X-RAY EXAM CHEST 1 VIEW: CPT

## 2023-01-05 PROCEDURE — 84484 ASSAY OF TROPONIN QUANT: CPT

## 2023-01-05 PROCEDURE — 74011636637 HC RX REV CODE- 636/637: Performed by: STUDENT IN AN ORGANIZED HEALTH CARE EDUCATION/TRAINING PROGRAM

## 2023-01-05 PROCEDURE — 85025 COMPLETE CBC W/AUTO DIFF WBC: CPT

## 2023-01-05 PROCEDURE — 74011250636 HC RX REV CODE- 250/636: Performed by: STUDENT IN AN ORGANIZED HEALTH CARE EDUCATION/TRAINING PROGRAM

## 2023-01-05 PROCEDURE — 83880 ASSAY OF NATRIURETIC PEPTIDE: CPT

## 2023-01-05 RX ADMIN — Medication 6 UNITS: at 23:15

## 2023-01-05 RX ADMIN — SODIUM CHLORIDE, POTASSIUM CHLORIDE, SODIUM LACTATE AND CALCIUM CHLORIDE 1000 ML: 600; 310; 30; 20 INJECTION, SOLUTION INTRAVENOUS at 20:31

## 2023-01-05 RX ADMIN — SODIUM CHLORIDE, POTASSIUM CHLORIDE, SODIUM LACTATE AND CALCIUM CHLORIDE 1000 ML: 600; 310; 30; 20 INJECTION, SOLUTION INTRAVENOUS at 23:15

## 2023-01-06 VITALS
DIASTOLIC BLOOD PRESSURE: 83 MMHG | HEART RATE: 69 BPM | OXYGEN SATURATION: 97 % | HEIGHT: 65 IN | RESPIRATION RATE: 16 BRPM | TEMPERATURE: 97.8 F | BODY MASS INDEX: 33.32 KG/M2 | SYSTOLIC BLOOD PRESSURE: 148 MMHG

## 2023-01-06 LAB
ATRIAL RATE: 77 BPM
CALCULATED P AXIS, ECG09: 51 DEGREES
CALCULATED R AXIS, ECG10: -13 DEGREES
CALCULATED T AXIS, ECG11: 77 DEGREES
DIAGNOSIS, 93000: NORMAL
GLUCOSE BLD STRIP.AUTO-MCNC: 198 MG/DL (ref 65–117)
P-R INTERVAL, ECG05: 152 MS
Q-T INTERVAL, ECG07: 368 MS
QRS DURATION, ECG06: 106 MS
QTC CALCULATION (BEZET), ECG08: 416 MS
SERVICE CMNT-IMP: ABNORMAL
VENTRICULAR RATE, ECG03: 77 BPM

## 2023-01-06 PROCEDURE — 82962 GLUCOSE BLOOD TEST: CPT

## 2023-01-06 PROCEDURE — 96361 HYDRATE IV INFUSION ADD-ON: CPT

## 2023-01-06 NOTE — ED NOTES
AOX4, Pleasant and cooperative. Ambulated to the  without assistance, with steady gait. Denies pain or discomfort at present time. PIV Right AC intact. Last glucose check - 304 mg/dl.

## 2023-01-06 NOTE — ED PROVIDER NOTES
Chief Complaint   Patient presents with    High Blood Sugar           Shortness of Breath       INITIAL ASSESSMENT & PLAN   11:01 PM  Differential diagnosis includes but is not limited to ***  ***    I have obtained additional independent history from:   I have personally reviewed patient's NON-[facility] ED external prior records from:  --[employer] summarizing: ***    MEDICAL DECISION MAKING     In summary, Chetan Atwood is a 78 y.o. male presented to the ED with ***    Results independently interpreted below, notably:  ***      I opted against but considered ordering the following:  --CT  -- ***  Additional relevant contributory comorbidities managed:  ***  Social Determinants of Health addressed:  ***    HISTORY OF PRESENT ILLNESS   Additional independent historian:      High Blood Sugar     Shortness of Breath  ***    REVIEW OF SYSTEMS  Review of Systems   Respiratory:  Positive for shortness of breath. {ROS:1234:p:\"Limited 2/2 the above\", \"I performed a 10-point review of systems which have been otherwise included in the HPI as documented, otherwise all other systems have been reviewed and are negative. \"}    MEDICAL HISTORY  Past Medical History:   Diagnosis Date    CAD (coronary artery disease)     Chronic kidney disease     stones    Chronic obstructive pulmonary disease ([address]     Cirrhosis ([address]     Colon polyp     Diabetes ([address]     Hypertension     Stroke ([address] 1980s    right hand neuropathy     Past Surgical History:   Procedure Laterality Date    BIOPSY LIVER  06/04/21    COLONOSCOPY N/A 04/12/2018    COLONOSCOPY performed by Karolina Orlando MD at [address] HX CHOLECYSTECTOMY  2020    HX COLONOSCOPY  Not sure when    Maybe do    HX ENDOSCOPY  06/03/21    HX SKIN BIOPSY  2019    skin cancer removed from left leg.       Family History:   Problem Relation Age of Onset    Heart Disease Mother     Migraines Mother         When she was a young child   [place] Heart Disease Father  Alcohol abuse Father     Arthritis Sister     Cancer Sister         breast ca   [de-identified] Arthritis-rheumatoid Sister     OSTEOARTHRITIS Sister     OSTEOARTHRITIS Sister     Cancer Sister         Breast    Hypertension Sister     Heart Disease Brother         Heart attack     Social History     Tobacco Use    Smoking status: Former     Packs/day: 3.00     Years: 30.00     Pack years: 90.00     Types: Cigarettes     Quit date: 2005     Years since quittin.5    Smokeless tobacco: Never   Vaping Use    Vaping Use: Never used   Substance Use Topics    Alcohol use: Not Currently     Alcohol/week: 140.0 standard drinks     Types: 140 Shots of liquor per week     Comment: Drank Too much liquor    Drug use: Never     ALLERGIES: Patient has no known allergies. Medications  No current facility-administered medications on file prior to encounter. Current Outpatient Medications on File Prior to Encounter   Medication Sig Dispense Refill    predniSONE (STERAPRED) 5 mg dose pack See administration instruction per 5mg dose pack 21 Tablet 0    budesonide-formoteroL (SYMBICORT) 160-4.5 mcg/actuation HFAA Take 2 Puffs by inhalation two (2) times a day for 20 days. 1 Each 0    albuterol (PROVENTIL HFA, VENTOLIN HFA, PROAIR HFA) 90 mcg/actuation inhaler Take 2 Puffs by inhalation every six (6) hours as needed for Wheezing for up to 20 days. 18 g 0    tamsulosin (FLOMAX) 0.4 mg capsule Take 0.4 mg by mouth daily.  febuxostat (ULORIC) 40 mg tab tablet TAKE ONE TABLET BY MOUTH DAILY 30 Tablet 2    pantoprazole (PROTONIX) 40 mg tablet Take 1 Tablet by mouth daily. Take instead of 20mg tab (prior 20mg 2 times daily). 90 Tablet 1    amLODIPine (NORVASC) 5 mg tablet Take 1 Tablet by mouth daily. 30 Tablet 5    metFORMIN (GLUCOPHAGE) 1,000 mg tablet Take 1,000 mg by mouth two (2) times a day.       cholecalciferol (Vitamin D3) (2,000 UNITS /50 MCG) cap capsule       fluticasone propionate (FLONASE) 50 mcg/actuation nasal spray 2 Sprays by Both Nostrils route daily. 16 g 5    azelastine (ASTELIN) 137 mcg (0.1 %) nasal spray 2 Sprays by Both Nostrils route two (2) times daily as needed for Rhinitis. 30 Each 5    FreeStyle Ashlee 14 Day Sensor kit       simvastatin (ZOCOR) 20 mg tablet Take 20 mg by mouth nightly.  aspirin delayed-release 81 mg tablet Take 1 Tab by mouth daily. 30 Tab 1       PHYSICAL EXAM     Vitals:    01/05/23 2058 01/05/23 2128 01/05/23 2148 01/05/23 2203   BP: (!) 153/95 (!) 152/89 (!) 159/99 (!) 147/98   Pulse:       Resp:       Temp:       SpO2: 99% 95% 95% 95%   Height:         Physical Exam    DIAGNOSTIC STUDIES     11:01 PM: EKG: *** bpm, rhythm: ***, normal axis, no acute ST segment or T wave changes. Interpreted independently by me. Laboratory Results:   If not already interpreted as below in ED course, I have personally ordered, reviewed, and independently interpreted all laboratory results, notable for:  --***  Recent Results (from the past 24 hour(s))   GLUCOSE, POC    Collection Time: 01/05/23  7:13 PM   Result Value Ref Range    Glucose (POC) 443 (H) 65 - 117 mg/dL    Performed by Naldo Ji RN    EKG, 12 LEAD, INITIAL    Collection Time: 01/05/23  7:19 PM   Result Value Ref Range    Ventricular Rate 77 BPM    Atrial Rate 77 BPM    P-R Interval 152 ms    QRS Duration 106 ms    Q-T Interval 368 ms    QTC Calculation (Bezet) 416 ms    Calculated P Axis 51 degrees    Calculated R Axis -13 degrees    Calculated T Axis 77 degrees    Diagnosis       Normal sinus rhythm  Normal ECG  When compared with ECG of 28-MAY-2019 17:34,  No significant change was found     CBC WITH AUTOMATED DIFF    Collection Time: 01/05/23  7:26 PM   Result Value Ref Range    WBC 3.8 (L) 4.1 - 11.1 K/uL    RBC 4.32 4.10 - 5.70 M/uL    HGB 12.0 (L) 12.1 - 17.0 g/dL    HCT 35.2 (L) 36.6 - 50.3 %    MCV 81.5 80.0 - 99.0 FL    MCH 27.8 26.0 - 34.0 PG    MCHC 34.1 30.0 - 36.5 g/dL    RDW 15.1 (H) 11.5 - 14.5 %    PLATELET 053 (L) 391 - 400 K/uL    MPV 11.1 8.9 - 12.9 FL    NRBC 0.0 0  WBC    ABSOLUTE NRBC 0.00 0.00 - 0.01 K/uL    NEUTROPHILS 77 (H) 32 - 75 %    LYMPHOCYTES 17 12 - 49 %    MONOCYTES 5 5 - 13 %    EOSINOPHILS 0 0 - 7 %    BASOPHILS 0 0 - 1 %    IMMATURE GRANULOCYTES 1 (H) 0.0 - 0.5 %    ABS. NEUTROPHILS 3.0 1.8 - 8.0 K/UL    ABS. LYMPHOCYTES 0.6 (L) 0.8 - 3.5 K/UL    ABS. MONOCYTES 0.2 0.0 - 1.0 K/UL    ABS. EOSINOPHILS 0.0 0.0 - 0.4 K/UL    ABS. BASOPHILS 0.0 0.0 - 0.1 K/UL    ABS. IMM. GRANS. 0.0 0.00 - 0.04 K/UL    DF SMEAR SCANNED      RBC COMMENTS ANISOCYTOSIS  1+       METABOLIC PANEL, BASIC    Collection Time: 01/05/23  7:26 PM   Result Value Ref Range    Sodium 141 136 - 145 mmol/L    Potassium 4.3 3.5 - 5.1 mmol/L    Chloride 106 97 - 108 mmol/L    CO2 22 21 - 32 mmol/L    Anion gap 13 5 - 15 mmol/L    Glucose 452 (H) 65 - 100 mg/dL    BUN 25 (H) 6 - 20 MG/DL    Creatinine 1.74 (H) 0.70 - 1.30 MG/DL    BUN/Creatinine ratio 14 12 - 20      eGFR 40 (L) >60 ml/min/1.73m2    Calcium 8.7 8.5 - 10.1 MG/DL   NT-PRO BNP    Collection Time: 01/05/23  7:26 PM   Result Value Ref Range    NT pro- (H) 0 - 450 PG/ML   GLUCOSE, POC    Collection Time: 01/05/23  8:27 PM   Result Value Ref Range    Glucose (POC) 342 (H) 65 - 117 mg/dL    Performed by Nora Duran RN    TROPONIN-HIGH SENSITIVITY    Collection Time: 01/05/23  8:30 PM   Result Value Ref Range    Troponin-High Sensitivity 10 0 - 76 ng/L   GLUCOSE, POC    Collection Time: 01/05/23  9:56 PM   Result Value Ref Range    Glucose (POC) 305 (H) 65 - 117 mg/dL    Performed by Nora Duran RN      Imaging Results: If not already interpreted as below in ED course, I have personally ordered, reviewed, and interpreted the following radiology results:  ***  XR CHEST PORT   Final Result   Moderate pulmonary vascular congestive changes.               ED COURSE     ED Course as of 01/05/23 2301   Thu Jan 05, 2023 1928 EKG, 12 LEAD, INITIAL  7:28 PM--EKG, interpreted independently by me. Rate: 77  Rhythm: Normal sinus rhythm  Axis: Normal  Ischemia: No acute ST segment or T wave abnormalities to suggest acute ischemia. [AL]   1959 CBC WITH AUTOMATED DIFF(!):    WBC 3.8(!)   RBC 4.32   HGB 12.0(!)   HCT 35.2(!)   MCV 81.5   MCH 27.8   MCHC 34.1   RDW 15.1(!)   PLATELET 799(!)   MPV 11.1   NRBC 0.0   ABSOLUTE NRBC 0.00   NEUTROPHILS 77(!)   LYMPHOCYTES 17   MONOCYTES 5   EOSINOPHILS 0   BASOPHILS 0   IMMATURE GRANULOCYTES 1(!)   ABS. NEUTROPHILS 3.0   ABS. LYMPHOCYTES 0.6(!)   ABS. MONOCYTES 0.2   ABS. EOSINOPHILS 0.0   ABS. BASOPHILS 0.0   ABS. IMM. GRANS. 0.0   DF SMEAR SCANNED   RBC COMMENTS ANISOCYTOSIS  1+    Nonspecific leukopenia likely 2/2 viral suppression mild anemia nonspecific as well [AL]   0074 METABOLIC PANEL, BASIC(!):    Sodium 141   Potassium 4.3   Chloride 106   CO2 22   Anion gap 13   Glucose 452(!)   BUN 25(!)   Creatinine 1.74(!)   BUN/Creatinine ratio 14   eGFR 40(!)   Calcium 8.7  Minimal anion gap likely 2/2 mild ketosis with lower limit nl bicarb  Sam likely 2/2 dehydration. Hypernatremia mild when corrected. Will need ivf [AL]   2213 Troponin-High Sensitivity: 10  nl [AL]      ED Course User Index  [AL] Wicho Maharaj DO     PROCEDURES  Procedures    Medications Ordered/Administered in ED  Medications   lactated ringers bolus infusion 1,000 mL (0 mL IntraVENous IV Completed 1/5/23 2150)       FINAL ASSESSMENT AND DISPOSITION     ED DIAGNOSIS  1. Hyperglycemia    2.  Dehydration        DISPOSITION  Discharged    Labs/Imaging Studies Ordered/Performed in ED  Orders Placed This Encounter    XR CHEST PORT    CBC WITH AUTOMATED DIFF    METABOLIC PANEL, BASIC    TROPONIN-HIGH SENSITIVITY    NT-PRO BNP    GLUCOSE, POC    GLUCOSE, POC    GLUCOSE, POC    EKG, 12 LEAD, INITIAL    lactated ringers bolus infusion 1,000 mL       Electronically signed by GLUCOSE, POC    Collection Time: 01/05/23  9:56 PM   Result Value Ref Range    Glucose (POC) 305 (H) 65 - 117 mg/dL    Performed by Chris Story RN      Imaging Results: If not already interpreted as below in ED course, I have personally ordered, reviewed, and interpreted the following radiology results:  Cxr 1-view as compared to prior, CM, no infiltrate but with likely pulm edema, no effusion, no ptx  XR CHEST PORT   Final Result   Moderate pulmonary vascular congestive changes. ED COURSE     ED Course as of 01/20/23 1644   Thu Jan 05, 2023 1928 EKG, 12 LEAD, INITIAL  7:28 PM--EKG, interpreted independently by me. Rate: 77  Rhythm: Normal sinus rhythm  Axis: Normal  Ischemia: No acute ST segment or T wave abnormalities to suggest acute ischemia. [AL]   1959 CBC WITH AUTOMATED DIFF(!):    WBC 3.8(!)   RBC 4.32   HGB 12.0(!)   HCT 35.2(!)   MCV 81.5   MCH 27.8   MCHC 34.1   RDW 15.1(!)   PLATELET 563(!)   MPV 11.1   NRBC 0.0   ABSOLUTE NRBC 0.00   NEUTROPHILS 77(!)   LYMPHOCYTES 17   MONOCYTES 5   EOSINOPHILS 0   BASOPHILS 0   IMMATURE GRANULOCYTES 1(!)   ABS. NEUTROPHILS 3.0   ABS. LYMPHOCYTES 0.6(!)   ABS. MONOCYTES 0.2   ABS. EOSINOPHILS 0.0   ABS. BASOPHILS 0.0   ABS. IMM. GRANS. 0.0   DF SMEAR SCANNED   RBC COMMENTS ANISOCYTOSIS  1+    Nonspecific leukopenia likely 2/2 viral suppression mild anemia nonspecific as well [AL]   2627 METABOLIC PANEL, BASIC(!):    Sodium 141   Potassium 4.3   Chloride 106   CO2 22   Anion gap 13   Glucose 452(!)   BUN 25(!)   Creatinine 1.74(!)   BUN/Creatinine ratio 14   eGFR 40(!)   Calcium 8.7  Minimal anion gap likely 2/2 mild ketosis with lower limit nl bicarb  Sam likely 2/2 dehydration. Hypernatremia mild when corrected.   Will need ivf [AL]   2213 Troponin-High Sensitivity: 10  nl [AL]      ED Course User Index  [AL] Blake Dance, DO     PROCEDURES  Procedures    Medications Ordered/Administered in ED  Medications   lactated ringers bolus infusion 1,000 mL (0 mL IntraVENous IV Completed 1/5/23 2150)   insulin regular (NOVOLIN R, HUMULIN R) injection 6 Units (6 Units IntraVENous Given 1/5/23 2315)   lactated ringers bolus infusion 1,000 mL (0 mL IntraVENous IV Completed 1/6/23 0039)       FINAL ASSESSMENT AND DISPOSITION     ED DIAGNOSIS  1. Hyperglycemia    2.  Dehydration        DISPOSITION  Discharged    Labs/Imaging Studies Ordered/Performed in ED  Orders Placed This Encounter    XR CHEST PORT    CBC WITH AUTOMATED DIFF    METABOLIC PANEL, BASIC    TROPONIN-HIGH SENSITIVITY    NT-PRO BNP    GLUCOSE, POC    GLUCOSE, POC    GLUCOSE, POC    GLUCOSE, POC    EKG, 12 LEAD, INITIAL    lactated ringers bolus infusion 1,000 mL    insulin regular (NOVOLIN R, HUMULIN R) injection 6 Units    lactated ringers bolus infusion 1,000 mL       Electronically signed by

## 2023-01-06 NOTE — ED TRIAGE NOTES
at home today, COVID a week ago, reports SOB since with dry cough. Denies n/v, reports polydipsia and polyuria. Denies CP at this time. Currently on steroids.

## 2023-01-09 ENCOUNTER — TELEPHONE (OUTPATIENT)
Dept: ENDOCRINOLOGY | Age: 78
End: 2023-01-09

## 2023-01-09 ENCOUNTER — DOCUMENTATION ONLY (OUTPATIENT)
Dept: HEMATOLOGY | Age: 78
End: 2023-01-09

## 2023-01-09 DIAGNOSIS — E11.65 TYPE 2 DIABETES MELLITUS WITH HYPERGLYCEMIA, WITHOUT LONG-TERM CURRENT USE OF INSULIN (HCC): Primary | ICD-10-CM

## 2023-01-09 DIAGNOSIS — E11.59 TYPE 2 DIABETES MELLITUS WITH OTHER CIRCULATORY COMPLICATION, WITHOUT LONG-TERM CURRENT USE OF INSULIN (HCC): Primary | ICD-10-CM

## 2023-01-09 RX ORDER — FLASH GLUCOSE SENSOR
KIT MISCELLANEOUS
Qty: 6 KIT | Refills: 3 | Status: SHIPPED | OUTPATIENT
Start: 2023-01-09

## 2023-01-09 RX ORDER — GLIMEPIRIDE 4 MG/1
TABLET ORAL
Qty: 60 TABLET | Refills: 2 | Status: SHIPPED | OUTPATIENT
Start: 2023-01-09

## 2023-01-09 NOTE — TELEPHONE ENCOUNTER
I returned this call and relayed the message from Dr. Chanda Chase.  Ms. Marcelo Hagen seemed to understand this information and will pass it on to Mr. Chris Cameron

## 2023-01-09 NOTE — TELEPHONE ENCOUNTER
1/9/2023  1:00 PM    Reba burton called to let dr Michelle Christensen know that the patient has had covid and pneumonia and the doctor put him on an antibiotic and now his sugar levels are at 300 or higher and wont come down. He has an appt in march but needs to speak with someone today about his sugar levels.   Please call her back at [phone] or [phone]

## 2023-01-09 NOTE — PROGRESS NOTES
Called patient regarding the blood work results. The liver enzymes and function are normal. The glucose levels are very high. Advised he contact endocrinology.

## 2023-01-09 NOTE — PROGRESS NOTES
Returned patient call. The liver enzymes and function are normal. His glucose is high in the 300-500 range. Advised he contact the endocrinologist. He may need to restart insulin.

## 2023-01-10 NOTE — TELEPHONE ENCOUNTER
Refill request(s) approved--Freestyle Ashlee sensor. Requested Prescriptions     Signed Prescriptions Disp Refills    FreeStyle Ashlee 14 Day Sensor kit 6 Kit 3     Sig: Use as directed for glucose monitoring.      Authorizing Provider: Gilberto Chance

## 2023-01-11 ENCOUNTER — HOSPITAL ENCOUNTER (OUTPATIENT)
Dept: PHYSICAL THERAPY | Age: 78
Discharge: HOME | End: 2023-01-11
Payer: MEDICARE

## 2023-01-11 ENCOUNTER — TELEPHONE (OUTPATIENT)
Dept: INTERNAL MEDICINE CLINIC | Age: 78
End: 2023-01-11

## 2023-01-11 PROCEDURE — 97110 THERAPEUTIC EXERCISES: CPT

## 2023-01-11 PROCEDURE — 97140 MANUAL THERAPY 1/> REGIONS: CPT

## 2023-01-11 NOTE — PROGRESS NOTES
[facility] Physical Therapy  [address] [facility] ([facility] [place]  Phone: [phone] Fax: [phone]    Progress Note    Name: Ashley Arriaza   : 1945   MD: Josh Oconnell MD     Treatment Diagnosis: Neck pain [M54.2]  Other low back pain [M54.59]  Start of Care: 22    Visits from Start of Care: 5  Missed Visits: 1    Summary of Care: Therapy has included therapeutic exercise and manual therapy techniques to improve bilateral UE muscle length, cervicothoracic/bilateral UE mobility, and periscapular/bilateral UE/postural endurance due to chronic upper back and neck pain. Assessment / Recommendations: The patient is a 78year old male who has participated in 5 skilled physical therapy visits due to chronic upper back and neck pain. Physical therapy plan of care has been somewhat limited secondary to recent ED visit status post COVID-19/pneumonia diagnosis, elevated glucose levels, and associated sequelae. He demonstrates continued however improving signs and symptoms consistent with mobility, muscle power, and movement coordination impairments, including increased multiplanar cervicothoracic AROM, increased multiplanar UE/periscapular strength, increased deep neck flexor endurance (longus colli endurance test >= 40 seconds), and improved movement patterns with turning his head side to side. Patient continues to report intermittent however decreased onset of familiar neck/upper back symptoms with turning head, however improved significantly since initiating physical therapy plan of care. The patient would benefit from continued skilled physical therapy services to improve symptom management and safety/endurance/independence with functional tasks such as turning his head and looking upward.      Cervical AROM:                                                                       R                      L Flexion                                    41                                              Extension                                56 (\"very little pain L neck p!)                                           Side Bending                          39 (L-sided neck p!)                38 (L-sided neck p!)                  Rotation                                   75                    65                                                   Thoracic AROM:                                                                      R                      L                        Flexion                                    NT                                              Extension                                NT                                              Side Bending                          NT                     Rotation                                  64                     59     Short Term Goals: To be accomplished in 6-8 treatments: The patient will demonstrate understanding of and compliance with updated and progressive HEP toward improved participation in physical therapy plan of care. - Progressing              The patient will demonstrate bilateral cervical rotation AROM >= 65 degrees toward improved ability to turn head with functional tasks such as driving. - Met              The patient will demonstrate bilateral thoracic rotation AROM >= 60 degrees toward improved ability to turn body with functional tasks such as driving. - Met on R; Progressing on L  Long Term Goals: To be accomplished in 16-20 treatments: The patient will demonstrate multiplanar bilateral UE/periscapular strength increased by >= 1/2 MMT grade toward improved endurance with functional activities such as turning head and looking upward.  - Progressing              The patient will demonstrate ability to complete >= 38 seconds on longus colli endurance test toward improved postural endurance with turning head and looking upward. - Met              The patient will score >= 58 on FOTO to demonstrate significantly improved subjective report of function. - Met  Frequency / Duration: Patient to be seen 1-2 times per week for 10-14 treatments.     Alessandro Bradley PT, DPT 1/11/2023

## 2023-01-11 NOTE — PROGRESS NOTES
PT DAILY TREATMENT NOTE - [facility] 2-15    Patient Name: Linda Welsh  Date:2023  : 1945  [x]  Patient  Verified  Payor: / Geovanna Armenta / Plan: Danyelle Schaffer HMO / Product Type: Managed Care Medicare /    In time: 9466  Out time: 8583  Total Treatment Time (min): 54  Total Timed Codes (min): 54  1:1 Treatment Time ( only): 40  Visit #: 5    Treatment Area: Neck pain [M54.2]  Other low back pain [M54.59]    SUBJECTIVE  Pain Level (0-10 scale): 2 in L-sided neck  Any medication changes, allergies to medications, adverse drug reactions, diagnosis change, or new procedure performed?: [x] No    [] Yes (see summary sheet for update)  Subjective functional status/changes:   [] No changes reported    The patient noted they have had a \"rough couple of weeks recently,\" noted they contracted covid and then pneumonia which they took a little bit to recover from, and then following that they had to be admitted into the ER due to increased levels of glucose, noting it was getting upwards of 500 mg/dl. Noted they have been in contact with their doctors and have begun new medication to reduce glucose levels moving forward. Did note their neck has improved since previous treatment session and noted they have had a lot less pain and noticed they can move a lot better as well, also noted their back has improved and they don't feel much pain there anymore.      OBJECTIVE    44 min Therapeutic Exercise:  [x] See flow sheet :   Rationale: increase ROM, increase strength, improve coordination, improve balance, and increase proprioception to improve the patients ability to turn head and look upward    10 min Manual Therapy: Bilateral cervical PA/rotational/lateral joint mobilizations (Grade III-IV, C2-7); prone mid-thoracic PA joint mobilizations (T4-10, Grade III-IV, (+) cavitations); STM/TPR bilateral cervical paraspinals/upper trapezius/levator scapula/scalenes/supraspinatus/infraspinatus/teres minor/subscapularis/pectoralis major/minor/short head of biceps    Rationale: decrease pain, increase ROM, increase tissue extensibility, decrease trigger points, and increase postural awareness to improve the patients ability to turn head and look upward          With   [x] TE   [] TA   [] Neuro   [] SC   [] other: Patient Education: [x] Review HEP    [x] Progressed/Changed HEP based on:   [x] positioning   [x] body mechanics   [x] transfers   [] heat/ice application    [] other:      Other Objective/Functional Measures:    FOTO: 63/100    Cervical AROM:                                                                       R                      L                        Flexion                                      41                                              Extension                                 56 (\"very little pain L neck p!)                                           Side Bending                           39 (L-sided neck p!)                38 (L-sided neck p!)                  Rotation                                   75                    65                                                   Thoracic AROM:                                                                      R                      L                        Flexion                                     NT                                              Extension                                 NT                                              Side Bending                            NT                     Rotation                                  64     59     UPPER QUARTER                                                     MUSCLE STRENGTH  KEY                                                                                         R                      L  0 - No Contraction                               Flexion                         5                      5  1 - Trace                                              Extension (elbow)       4 4  2 - Poor                                               Abduction                     5                      5  3 - Fair                                                 IR                                 5                      5  4 - Good                                              ER                               4                      4+    Periscapular MMT:                          R                              L  Middle Trapezius                              4                               4  Lower Trapezius                               4                               4     Special Tests: Analilia: (-) bilaterally                            Longus Colli Endurance Test:  >40 seconds    Pain Level (0-10 scale) post treatment: 2/10  ASSESSMENT/Changes in Function:   Patient is a 78year old being seen due to chronic back and neck pain. Patient has been seen for 5 visits and has made improvements with cervical/thoracic ROM, manual strengthening, as well as with functional outcome measures. Patient demonstrated improved muscular strength today during upper quarter testing today, noting improved upper quarter flexion/abduction. Patient also demonstrated improvements with cervical flexion today (41 deg v 29 deg), extension (56 deg v 47 deg), side bending (R=39 deg; L=38 deg v R= 25 deg; L=22 deg), and cervical rotation (R=75; L=65 v R=57; L=60). Patient also demonstrated improvements with their thoracic rotation today (R=64 deg; L=59 deg). Patient also demonstrated improvement with pain symptoms during testing today, noted (-) Yeenedinason's test and decreased symptom reproduction during ROM/upper quarter strength testing. Patient also noted improved deep neck flexor endurance during longus colli endurance testing (>40 seconds v 26 seconds).  Patient has made steady progress towards goals despite inconsistent attendance due to COVID/pneumonia, as well as issues with managing their glucose levels, which they have addressed with their doctors, glucose levels continue to be elevated and they have begun new medication to manage this moving forward. Patient has achieved 4/6 goals set for treatment with progress being made towards final goals for rehab. Patient will continue to benefit from skilled PT services to modify and progress therapeutic interventions, address functional mobility deficits, address ROM deficits, address strength deficits, analyze and address soft tissue restrictions, analyze and cue movement patterns, analyze and modify body mechanics/ergonomics, assess and modify postural abnormalities, and instruct in home and community integration to attain remaining goals. [x]  See Plan of Care  []  See progress note/recertification  []  See Discharge Summary         Progress towards goals / Updated goals:    Short Term Goals: To be accomplished in 6-8 treatments: The patient will demonstrate understanding of and compliance with updated and progressive HEP toward improved participation in physical therapy plan of care. - Progressing              The patient will demonstrate bilateral cervical rotation AROM >= 65 degrees toward improved ability to turn head with functional tasks such as driving. - MET              The patient will demonstrate bilateral thoracic rotation AROM >= 60 degrees toward improved ability to turn body with functional tasks such as driving. - Met on R; Progressing on L  Long Term Goals: To be accomplished in 16-20 treatments: The patient will demonstrate multiplanar bilateral UE/periscapular strength increased by >= 1/2 MMT grade toward improved endurance with functional activities such as turning head and looking upward. - Progressing              The patient will demonstrate ability to complete >= 38 seconds on longus colli endurance test toward improved postural endurance with turning head and looking upward.  - MET              The patient will score >= 58 on FOTO to demonstrate significantly improved subjective report of function. - MET  Frequency / Duration: Patient to be seen 2 times per week for 16-20 treatments.     PLAN  [x]  Upgrade activities as tolerated     [x]  Continue plan of care  [x]  Update interventions per flow sheet       []  Discharge due to:_  []  Other:_      Martha Pollack, TAQUERIA   1/11/2023

## 2023-01-11 NOTE — TELEPHONE ENCOUNTER
[id number] on 1/11/2023 at 11:35 am est  spoke with Gina London , she stated that  received the Rx for the [facility] but it was not covered by the patient insurance .  I contacted the patient spoke with Theodora Saravia after reviewing clearance via medical release form and relayed this information to her at this time

## 2023-01-13 ENCOUNTER — OFFICE VISIT (OUTPATIENT)
Dept: NEUROLOGY | Age: 78
End: 2023-01-13
Payer: MEDICARE

## 2023-01-13 VITALS
HEART RATE: 78 BPM | OXYGEN SATURATION: 96 % | BODY MASS INDEX: 33.32 KG/M2 | SYSTOLIC BLOOD PRESSURE: 138 MMHG | WEIGHT: 200 LBS | HEIGHT: 65 IN | DIASTOLIC BLOOD PRESSURE: 70 MMHG

## 2023-01-13 DIAGNOSIS — I95.1 DYSAUTONOMIA ORTHOSTATIC HYPOTENSION SYNDROME: Primary | ICD-10-CM

## 2023-01-13 DIAGNOSIS — Z86.73 REMOTE HISTORY OF STROKE: ICD-10-CM

## 2023-01-13 DIAGNOSIS — R42 POSTURAL DIZZINESS: ICD-10-CM

## 2023-01-13 NOTE — PROGRESS NOTES
Neurology Clinic Follow up Note    Patient ID:  Paul Childs  [id number]  78 y.o.  1945      Mr. Claudia Tapia is here for follow up today of  Chief Complaint   Patient presents with    Other     H/O stroke and dizziness still has the dizziness recently had Covid and Pneumonia             Last Appointment With Me:  7/13/2022    \"LHM with a h/o HTN, HPL, DM, COPD, remote stroke 2013 vs brain abscess with residual R paresthesias, dizziness/imbalance (R medullary distribution) presenting with worsening of his baseline dizziness, RUE paresthesias and dysarthria 5/28/19. Head CT reviewed and without acute process. MRI Brain/MRA subsequently completed showing no acute ischemia, mild chronic microvascular disease, no significant occlusion/stenosis. Suspect possible posterior circulation TIA. Advise resuming antiplatelet therapy for stroke prevention moving forward. \"    Interval History:   Patient returns for follow up. Reports recent COVID PNA. DM has also been uncontrolled since his last visit. Dizziness is waxing/waning. He did report a period of 3 months where symptoms resolved but later returned. He describes this as a lightheadedness/imbalance when standing, better when seated. Denies vertigo, diplopia, N/V, focal weakness/numbness, speech/vision deficits. Denies syncope. He is drinking plenty of fluids but reports no longer using salt in the diet. He completed CUS 7/11/22 with patent vessels, no significant stenosis. He remains on ASA for stroke prevention. PMHx/ PSHx/ FHx/ SHx:  Reviewed and unchanged previous visit. Past Medical History:   Diagnosis Date    CAD (coronary artery disease)     Chronic kidney disease     stones    Chronic obstructive [facility] [address]     Cirrhosis [address] [address]     Colon polyp     Diabetes ([facility] [address]     Hypertension     Stroke ([facility] [address] 1980s    right hand neuropathy         ROS:  Comprehensive review of systems negative except for as noted above.        Objective: Meds:  Current Outpatient Medications   Medication Sig Dispense Refill    FreeStyle Ashlee 14 Day Sensor kit Use as directed for glucose monitoring. 6 Kit 3    glimepiride (AMARYL) 4 mg tablet One tablet twice a day before meals 60 Tablet 2    budesonide-formoteroL (SYMBICORT) 160-4.5 mcg/actuation HFAA Take 2 Puffs by inhalation two (2) times a day for 20 days. 1 Each 0    albuterol (PROVENTIL HFA, VENTOLIN HFA, PROAIR HFA) 90 mcg/actuation inhaler Take 2 Puffs by inhalation every six (6) hours as needed for Wheezing for up to 20 days. 18 g 0    tamsulosin (FLOMAX) 0.4 mg capsule Take 0.4 mg by mouth daily. febuxostat (ULORIC) 40 mg tab tablet TAKE ONE TABLET BY MOUTH DAILY 30 Tablet 2    pantoprazole (PROTONIX) 40 mg tablet Take 1 Tablet by mouth daily. Take instead of 20mg tab (prior 20mg 2 times daily). 90 Tablet 1    amLODIPine (NORVASC) 5 mg tablet Take 1 Tablet by mouth daily. 30 Tablet 5    metFORMIN (GLUCOPHAGE) 1,000 mg tablet Take 1,000 mg by mouth two (2) times a day. cholecalciferol (Vitamin D3) (2,000 UNITS /50 MCG) cap capsule       fluticasone propionate (FLONASE) 50 mcg/actuation nasal spray 2 Sprays by Both Nostrils route daily. 16 g 5    azelastine (ASTELIN) 137 mcg (0.1 %) nasal spray 2 Sprays by Both Nostrils route two (2) times daily as needed for Rhinitis. 30 Each 5    simvastatin (ZOCOR) 20 mg tablet Take 20 mg by mouth nightly. aspirin delayed-release 81 mg tablet Take 1 Tab by mouth daily. 30 Tab 1       Exam:  Visit Vitals  /70   Pulse 78   Ht 5' 5\" (1.651 m)   Wt 200 lb (90.7 kg)   SpO2 96%   BMI 33.28 kg/m²     NEUROLOGICAL EXAM:  General: Awake, alert, speech fluent  CN: PERRL, EOMI without nystagmus, VFF to confrontation, facial sensation and strength are normal and symmetric, hearing is intact to finger rub bilaterally, palate and tongue movements are intact and symmetric. Motor: Normal tone, bulk and strength bilaterally.   Reflexes: 1/4 and symmetric, plantar stimulation is flexor. Coordination: FNF, YUSEF, HTS intact. Sensation: LT intact throughout. Gait: Normal-based and steady    LABS  Results for orders placed or performed during the hospital encounter of 01/05/23   CBC WITH AUTOMATED DIFF   Result Value Ref Range    WBC 3.8 (L) 4.1 - 11.1 K/uL    RBC 4.32 4.10 - 5.70 M/uL    HGB 12.0 (L) 12.1 - 17.0 g/dL    HCT 35.2 (L) 36.6 - 50.3 %    MCV 81.5 80.0 - 99.0 FL    MCH 27.8 26.0 - 34.0 PG    MCHC 34.1 30.0 - 36.5 g/dL    RDW 15.1 (H) 11.5 - 14.5 %    PLATELET 208 (L) 419 - 400 K/uL    MPV 11.1 8.9 - 12.9 FL    NRBC 0.0 0  WBC    ABSOLUTE NRBC 0.00 0.00 - 0.01 K/uL    NEUTROPHILS 77 (H) 32 - 75 %    LYMPHOCYTES 17 12 - 49 %    MONOCYTES 5 5 - 13 %    EOSINOPHILS 0 0 - 7 %    BASOPHILS 0 0 - 1 %    IMMATURE GRANULOCYTES 1 (H) 0.0 - 0.5 %    ABS. NEUTROPHILS 3.0 1.8 - 8.0 K/UL    ABS. LYMPHOCYTES 0.6 (L) 0.8 - 3.5 K/UL    ABS. MONOCYTES 0.2 0.0 - 1.0 K/UL    ABS. EOSINOPHILS 0.0 0.0 - 0.4 K/UL    ABS. BASOPHILS 0.0 0.0 - 0.1 K/UL    ABS. IMM.  GRANS. 0.0 0.00 - 0.04 K/UL    DF SMEAR SCANNED      RBC COMMENTS ANISOCYTOSIS  1+       METABOLIC PANEL, BASIC   Result Value Ref Range    Sodium 141 136 - 145 mmol/L    Potassium 4.3 3.5 - 5.1 mmol/L    Chloride 106 97 - 108 mmol/L    CO2 22 21 - 32 mmol/L    Anion gap 13 5 - 15 mmol/L    Glucose 452 (H) 65 - 100 mg/dL    BUN 25 (H) 6 - 20 MG/DL    Creatinine 1.74 (H) 0.70 - 1.30 MG/DL    BUN/Creatinine ratio 14 12 - 20      eGFR 40 (L) >60 ml/min/1.73m2    Calcium 8.7 8.5 - 10.1 MG/DL   TROPONIN-HIGH SENSITIVITY   Result Value Ref Range    Troponin-High Sensitivity 10 0 - 76 ng/L   NT-PRO BNP   Result Value Ref Range    NT pro- (H) 0 - 450 PG/ML   GLUCOSE, POC   Result Value Ref Range    Glucose (POC) 443 (H) 65 - 117 mg/dL    Performed by Mony Chatman RN    GLUCOSE, POC   Result Value Ref Range    Glucose (POC) 342 (H) 65 - 117 mg/dL    Performed by Mony Chatman RN    GLUCOSE, POC   Result Value Ref Range Glucose (POC) 305 (H) 65 - 117 mg/dL    Performed by Elza Srivastava RN    GLUCOSE, POC   Result Value Ref Range    Glucose (POC) 198 (H) 65 - 117 mg/dL    Performed by Cheryl Gaffney (CARLOS ALBERTO RN)    EKG, 12 LEAD, INITIAL   Result Value Ref Range    Ventricular Rate 77 BPM    Atrial Rate 77 BPM    P-R Interval 152 ms    QRS Duration 106 ms    Q-T Interval 368 ms    QTC Calculation (Bezet) 416 ms    Calculated P Axis 51 degrees    Calculated R Axis -13 degrees    Calculated T Axis 77 degrees    Diagnosis       Normal sinus rhythm  Nonspecific intraventricular conduction delay  When compared with ECG of 28-MAY-2019 17:34,  No significant change was found  Confirmed by Rody Gaston M.D., Fredi Patten ([id number]) on 1/6/2023 2:17:34 PM         IMAGING:  MRI Results (most recent):  Results from Hospital Encounter encounter on 07/07/22    MRI ABD W WO CONT    Narrative  EXAM:  MRI ABD W WO CONT    INDICATION: Cirrhosis. Evaluate for liver mass. COMPARISON: March 16, 2022    TECHNIQUE: Coronal T2 and postcontrast T1; Axial T2, in/out of phase, MRCP, pre-  and dynamic postcontrast T1 MRI of the abdomen. 20 mL Dotarem. Subtractions were  performed. FINDINGS: Liver: Nodular contour of the liver compatible with cirrhosis. Diffuse  loss of signal on opposed phase imaging consistent with steatosis. No concerning  liver lesion. Biliary tree: Status post cholecystectomy. No biliary dilation. Common bile duct  measures 3 mm. Pancreas: Normal pancreatic parenchymal signal intensity. Stable cystic lesion  in the pancreatic head with communication with the main duct. No enhancement. No  solid pancreatic mass. Spleen: Normal    Adrenal glands: Stable 3 cm left adrenal adenoma. Kidneys: Normal    Vasculature: Normal caliber abdominal aorta. Main portal vein, splenic vein, and  SMV are patent. Bowel: Normal caliber bowel. Lymph nodes: No lymphadenopathy. Miscellaneous: No ascites.     Impression  Diffuse hepatic steatosis and morphologic features of cirrhosis. No focal liver lesion. Stable pancreatic head IPMN. No ascites. Assessment:     Encounter Diagnoses     ICD-10-CM ICD-9-CM   1. Dysautonomia orthostatic hypotension syndrome  I95.1 458.0   2. Postural dizziness  R42 780.4   3. Remote history of stroke  Z80.78 V16.53      78year old male with a h/o HTN, HPL, DM, COPD, remote stroke 2013 with residual R paresthesias, dizziness/imbalance (R medullary distribution), hospitalization for worsening of his baseline dizziness/dysarthria/RUE paresthesias 5/28/19 with negative neuroimaging. MRI Brain/MRV were previously completed 9/15/19 showing no acute intracranial process, minimal chronic microvascular ischemia, no evidence of vessel malformation. ANS testing also completed due to abnormal sweating, dizziness consistent with mild cardiovagal dysfunction c/w diabetic autonomic neuropathy likely contributing to orthostatic intolerance. He reports waxing/waning dizziness today upon standing. He appears to be hydrating appropriately. Advised that he reintroduce some salt into his diet. May also discuss possible reduction/discontinuation of antihypertensive medication if symptoms are persistent. Examination otherwise appears non-focal. He will continue with current antiplatelet therapy for stroke prevention. Plan:   Continue conservative measures for orthostatic hypotension related to diabetic dysautonomia  Consider adjustment/reduction of antihypertensive medications-will defer to PCP  DM management to prevent progression of DM related dysautonomia  Continue ASA 81mg daily for stroke prevention       Follow-up and Dispositions    Return if symptoms worsen or fail to improve.          Signed:  Helen Diaz DO  1/13/2023

## 2023-01-17 ENCOUNTER — APPOINTMENT (OUTPATIENT)
Dept: ULTRASOUND IMAGING | Age: 78
End: 2023-01-17
Attending: PHYSICIAN ASSISTANT
Payer: MEDICARE

## 2023-01-17 ENCOUNTER — APPOINTMENT (OUTPATIENT)
Dept: GENERAL RADIOLOGY | Age: 78
End: 2023-01-17
Attending: PHYSICIAN ASSISTANT
Payer: MEDICARE

## 2023-01-17 ENCOUNTER — APPOINTMENT (OUTPATIENT)
Dept: CT IMAGING | Age: 78
End: 2023-01-17
Attending: PHYSICIAN ASSISTANT
Payer: MEDICARE

## 2023-01-17 ENCOUNTER — HOSPITAL ENCOUNTER (OUTPATIENT)
Dept: PHYSICAL THERAPY | Age: 78
Discharge: HOME | End: 2023-01-17
Payer: MEDICARE

## 2023-01-17 ENCOUNTER — HOSPITAL ENCOUNTER (EMERGENCY)
Age: 78
Discharge: HOME | End: 2023-01-17
Attending: EMERGENCY MEDICINE
Payer: MEDICARE

## 2023-01-17 VITALS
DIASTOLIC BLOOD PRESSURE: 78 MMHG | OXYGEN SATURATION: 93 % | RESPIRATION RATE: 20 BRPM | WEIGHT: 208.78 LBS | SYSTOLIC BLOOD PRESSURE: 144 MMHG | TEMPERATURE: 98 F | HEART RATE: 73 BPM | BODY MASS INDEX: 34.74 KG/M2

## 2023-01-17 DIAGNOSIS — M79.89 LEG SWELLING: Primary | ICD-10-CM

## 2023-01-17 DIAGNOSIS — I82.812 CHRONIC SUPERFICIAL VENOUS THROMBOSIS OF LEFT LOWER EXTREMITY: ICD-10-CM

## 2023-01-17 LAB
ALBUMIN SERPL-MCNC: 3.3 G/DL (ref 3.5–5)
ALBUMIN/GLOB SERPL: 1 (ref 1.1–2.2)
ALP SERPL-CCNC: 52 U/L (ref 45–117)
ALT SERPL-CCNC: 34 U/L (ref 12–78)
ANION GAP SERPL CALC-SCNC: 13 MMOL/L (ref 5–15)
AST SERPL-CCNC: 21 U/L (ref 15–37)
ATRIAL RATE: 75 BPM
BASOPHILS # BLD: 0 K/UL (ref 0–0.1)
BASOPHILS NFR BLD: 0 % (ref 0–1)
BILIRUB SERPL-MCNC: 0.4 MG/DL (ref 0.2–1)
BNP SERPL-MCNC: 407 PG/ML (ref 0–450)
BUN SERPL-MCNC: 13 MG/DL (ref 6–20)
BUN/CREAT SERPL: 8 (ref 12–20)
CALCIUM SERPL-MCNC: 8.9 MG/DL (ref 8.5–10.1)
CALCULATED P AXIS, ECG09: 37 DEGREES
CALCULATED R AXIS, ECG10: -13 DEGREES
CALCULATED T AXIS, ECG11: 77 DEGREES
CHLORIDE SERPL-SCNC: 114 MMOL/L (ref 97–108)
CO2 SERPL-SCNC: 22 MMOL/L (ref 21–32)
CREAT SERPL-MCNC: 1.55 MG/DL (ref 0.7–1.3)
D DIMER PPP FEU-MCNC: 7.9 MG/L FEU (ref 0–0.65)
DIAGNOSIS, 93000: NORMAL
DIFFERENTIAL METHOD BLD: ABNORMAL
EOSINOPHIL # BLD: 0.1 K/UL (ref 0–0.4)
EOSINOPHIL NFR BLD: 1 % (ref 0–7)
ERYTHROCYTE [DISTWIDTH] IN BLOOD BY AUTOMATED COUNT: 15.9 % (ref 11.5–14.5)
GLOBULIN SER CALC-MCNC: 3.2 G/DL (ref 2–4)
GLUCOSE SERPL-MCNC: 104 MG/DL (ref 65–100)
HCT VFR BLD AUTO: 35.4 % (ref 36.6–50.3)
HGB BLD-MCNC: 11.7 G/DL (ref 12.1–17)
IMM GRANULOCYTES # BLD AUTO: 0 K/UL (ref 0–0.04)
IMM GRANULOCYTES NFR BLD AUTO: 1 % (ref 0–0.5)
LYMPHOCYTES # BLD: 1.1 K/UL (ref 0.8–3.5)
LYMPHOCYTES NFR BLD: 19 % (ref 12–49)
MCH RBC QN AUTO: 28.1 PG (ref 26–34)
MCHC RBC AUTO-ENTMCNC: 33.1 G/DL (ref 30–36.5)
MCV RBC AUTO: 85.1 FL (ref 80–99)
MONOCYTES # BLD: 0.4 K/UL (ref 0–1)
MONOCYTES NFR BLD: 7 % (ref 5–13)
NEUTS SEG # BLD: 4.4 K/UL (ref 1.8–8)
NEUTS SEG NFR BLD: 72 % (ref 32–75)
NRBC # BLD: 0 K/UL (ref 0–0.01)
NRBC BLD-RTO: 0 PER 100 WBC
P-R INTERVAL, ECG05: 140 MS
PLATELET # BLD AUTO: 133 K/UL (ref 150–400)
PMV BLD AUTO: 10.3 FL (ref 8.9–12.9)
POTASSIUM SERPL-SCNC: 3.7 MMOL/L (ref 3.5–5.1)
PROT SERPL-MCNC: 6.5 G/DL (ref 6.4–8.2)
Q-T INTERVAL, ECG07: 378 MS
QRS DURATION, ECG06: 96 MS
QTC CALCULATION (BEZET), ECG08: 422 MS
RBC # BLD AUTO: 4.16 M/UL (ref 4.1–5.7)
SODIUM SERPL-SCNC: 149 MMOL/L (ref 136–145)
TROPONIN-HIGH SENSITIVITY: 9 NG/L (ref 0–76)
VENTRICULAR RATE, ECG03: 75 BPM
WBC # BLD AUTO: 6 K/UL (ref 4.1–11.1)

## 2023-01-17 PROCEDURE — 97530 THERAPEUTIC ACTIVITIES: CPT

## 2023-01-17 PROCEDURE — 71275 CT ANGIOGRAPHY CHEST: CPT

## 2023-01-17 PROCEDURE — 84484 ASSAY OF TROPONIN QUANT: CPT

## 2023-01-17 PROCEDURE — 80053 COMPREHEN METABOLIC PANEL: CPT

## 2023-01-17 PROCEDURE — 74011000636 HC RX REV CODE- 636: Performed by: EMERGENCY MEDICINE

## 2023-01-17 PROCEDURE — 71045 X-RAY EXAM CHEST 1 VIEW: CPT

## 2023-01-17 PROCEDURE — 97110 THERAPEUTIC EXERCISES: CPT

## 2023-01-17 PROCEDURE — 93970 EXTREMITY STUDY: CPT

## 2023-01-17 PROCEDURE — 36415 COLL VENOUS BLD VENIPUNCTURE: CPT

## 2023-01-17 PROCEDURE — 85379 FIBRIN DEGRADATION QUANT: CPT

## 2023-01-17 PROCEDURE — 83880 ASSAY OF NATRIURETIC PEPTIDE: CPT

## 2023-01-17 PROCEDURE — 93005 ELECTROCARDIOGRAM TRACING: CPT

## 2023-01-17 PROCEDURE — 99285 EMERGENCY DEPT VISIT HI MDM: CPT

## 2023-01-17 PROCEDURE — 85025 COMPLETE CBC W/AUTO DIFF WBC: CPT

## 2023-01-17 RX ADMIN — IOPAMIDOL 80 ML: 755 INJECTION, SOLUTION INTRAVENOUS at 17:03

## 2023-01-17 NOTE — ED PROVIDER NOTES
Nolan Tamayo is a 78 y.o. male  who presents by private vehicle to ER with c/o Patient presents with:  Leg Swelling  Fatigue  Shortness of Breath  Patient was seen by PT today and noted to have swelling to lower legs, patient noted left > right. Patient was instructed to follow up with PCP however was unable to be seen so patient was instructed to come to ED. Patient with recent covid/pneumonia. Reports ongoing shortness of breath with cough. Notes left sided chest pain last week that resolved on its own. Patient denies any cardiac history however reviewing chart noted to have history of CAD. Patient denies any significant leg pain. He specifically denies any fevers, chills, nausea, vomiting, headache, rash, diarrhea, abdominal pain, urinary/bowel changes, sweating or weight loss. PCP: Benito Guadarrama MD   PMHx significant for: Past Medical History:  No date: CAD (coronary artery disease)  No date: Chronic kidney disease      Comment:  stones  No date: Chronic obstructive pulmonary disease ([facility] [address]  No date: Cirrhosis ([facility] [address]  No date: Colon polyp  No date: Diabetes ([facility] [address]  No date: Hypertension  1980s: Stroke [facility])      Comment:  right hand neuropathy   PSHx significant for: Past Surgical History:  21: BIOPSY LIVER  2018: COLONOSCOPY; N/A      Comment:  COLONOSCOPY performed by Vicki Owusu MD at [facility]                ENDOSCOPY  2020: HX CHOLECYSTECTOMY  Not sure when: HX COLONOSCOPY      Comment:  Maybe do  21: HX ENDOSCOPY  2019: HX SKIN BIOPSY      Comment:  skin cancer removed from left leg. Social Hx: Tobacco use: Social History    Tobacco Use      Smoking status: Former        Packs/day: 3.00        Years: 30.00        Pack years: 80        Types: Cigarettes        Quit date: 2005        Years since quittin.5      Smokeless tobacco: Never  ; EtOH use: The patient states he drinks 0 per week.; Illicit Drug use:      Allergies:  No Known Allergies    There are no other complaints, changes or physical findings at this time. Past Medical History:   Diagnosis Date    CAD (coronary artery disease)     Chronic kidney disease     stones    Chronic obstructive pulmonary disease ([facility] [address]     Cirrhosis ([facility] [address]     Colon polyp     Diabetes ([facility] [address]     Hypertension     Stroke [facility]) 1980s    right hand neuropathy       Past Surgical History:   Procedure Laterality Date    BIOPSY LIVER  21    COLONOSCOPY N/A 2018    COLONOSCOPY performed by Federica Saucedo MD at [facility] ENDOSCOPY    [address]      HX COLONOSCOPY  Not sure when    Maybe do    HX ENDOSCOPY  21    HX SKIN BIOPSY  2019    skin cancer removed from left leg.           Family History:   Problem Relation Age of Onset    Heart Disease Mother     Migraines Mother         When she was a young child    Heart Disease Father     Alcohol abuse Father     Arthritis Sister     Cancer Sister         breast ca    Arthritis-rheumatoid Sister     OSTEOARTHRITIS Sister     OSTEOARTHRITIS Sister     Cancer Sister         Breast    Hypertension Sister     Heart Disease Brother         Heart attack       Social History     Socioeconomic History    Marital status: LIFE PARTNER     Spouse name: Not on file    Number of children: Not on file    Years of education: Not on file    Highest education level: Not on file   Occupational History    Not on file   Tobacco Use    Smoking status: Former     Packs/day: 3.00     Years: 30.00     Pack years: 90.00     Types: Cigarettes     Quit date: 2005     Years since quittin.5    Smokeless tobacco: Never   Vaping Use    Vaping Use: Never used   Substance and Sexual Activity    Alcohol use: Not Currently     Alcohol/week: 140.0 standard drinks     Types: 140 Shots of liquor per week     Comment: Drank Too much liquor    Drug use: Never    Sexual activity: Not Currently     Partners: Female     Birth control/protection: None     Comment: ED   Other Topics Concern    Not on file Social History Narrative    Not on file     Social Determinants of Health     Financial Resource Strain: Low Risk     Difficulty of Paying Living Expenses: Not hard at all   Food Insecurity: No Food Insecurity    Worried About Running Out of Food in the Last Year: Never true    Ran Out of Food in the Last Year: Never true   Transportation Needs: Not on file   Physical Activity: Not on file   Stress: Not on file   Social Connections: Not on file   Intimate Partner Violence: Not on file   Housing Stability: Not on file         ALLERGIES: Patient has no known allergies. Review of Systems   Constitutional:  Negative for activity change, appetite change, chills and fever. HENT:  Negative for congestion and sore throat. Respiratory:  Positive for shortness of breath. Negative for cough. Cardiovascular:  Positive for chest pain and leg swelling. Gastrointestinal:  Negative for abdominal pain, diarrhea, nausea and vomiting. Genitourinary:  Negative for dysuria. Musculoskeletal:  Negative for arthralgias and myalgias. Skin:  Negative for color change. Neurological:  Negative for dizziness. Psychiatric/Behavioral:  The patient is not nervous/anxious. All other systems reviewed and are negative. Vitals:    01/17/23 1253   BP: 137/80   Pulse: 76   Resp: 24   Temp: 98 °F (36.7 °C)   SpO2: 98%   Weight: 94.7 kg (208 lb 12.4 oz)            Physical Exam  Vitals and nursing note reviewed. Constitutional:       Appearance: He is well-developed. HENT:      Head: Normocephalic and atraumatic. Right Ear: External ear normal.      Left Ear: External ear normal.      Mouth/Throat:      Pharynx: No oropharyngeal exudate. Eyes:      General: No scleral icterus. Right eye: No discharge. Left eye: No discharge. Conjunctiva/sclera: Conjunctivae normal.      Pupils: Pupils are equal, round, and reactive to light. Neck:      Thyroid: No thyromegaly.       Trachea: No tracheal deviation. Cardiovascular:      Rate and Rhythm: Normal rate and regular rhythm. Pulses: Normal pulses. Heart sounds: Normal heart sounds. No murmur heard. Pulmonary:      Effort: Pulmonary effort is normal. Tachypnea present. No respiratory distress. Breath sounds: Normal breath sounds. No decreased breath sounds, wheezing, rhonchi or rales. Abdominal:      General: Bowel sounds are normal. There is no distension. Palpations: Abdomen is soft. Tenderness: There is no abdominal tenderness. There is no guarding or rebound. Musculoskeletal:         General: No tenderness. Normal range of motion. Cervical back: Normal range of motion and neck supple. Right lower le+ Pitting Edema present. Left lower le+ Pitting Edema present. Lymphadenopathy:      Cervical: No cervical adenopathy. Skin:     General: Skin is warm. Findings: No erythema or rash. Neurological:      Mental Status: He is alert and oriented to person, place, and time. Cranial Nerves: No cranial nerve deficit. Coordination: Coordination normal.   Psychiatric:         Behavior: Behavior normal.         Thought Content: Thought content normal.         Judgment: Judgment normal.        Medical Decision Making  Assesment/Plan- 78 y.o. Patient presents with:  Leg Swelling  Fatigue  Shortness of Breath  differential includes: PE, DVT, COPD, pneumonia. Labs and imaging reviewed with elevated d dimer, age indeterminate occlusive thrombosis in greater saphenous in left ankle. Patients vitals stable. Recommend PCP follow up. Patient educated on reasons to return to the ED. Problems Addressed:  Chronic superficial venous thrombosis of left lower extremity: chronic illness or injury  Leg swelling: acute illness or injury    Amount and/or Complexity of Data Reviewed  External Data Reviewed: labs, radiology and ECG. Labs: ordered. Radiology: ordered.   ECG/medicine tests: ordered. Risk  Prescription drug management. ED Course as of 01/17/23 1305   Tue Jan 17, 2023   1253 EKG shows normal sinus rhythm with rate of 75. Normal intervals. No axis deviation. No concerning ST elevations or depressions. No arrhythmias noted.  [MM]      ED Course User Index  [MM] Lisa Chong MD       Procedures

## 2023-01-17 NOTE — PROGRESS NOTES
PT DAILY TREATMENT NOTE - [facility]-15    Patient Name: Maya Juan  Date:2023  : 1945  [x]  Patient  Verified  Payor: Susie Amaya / Plan: [address] [place] [address] HMO / Product Type: Managed Care Medicare /    In time: 4866  Out time: 5045  Total Treatment Time (min): 42  Total Timed Codes (min): 42  1:1 Treatment Time ( only): 24  Visit #: 6    Treatment Area: Neck pain [M54.2]  Other low back pain [M54.59]    SUBJECTIVE  Pain Level (0-10 scale): 0  Any medication changes, allergies to medications, adverse drug reactions, diagnosis change, or new procedure performed?: [x] No    [] Yes (see summary sheet for update)  Subjective functional status/changes:   [] No changes reported    The patient reports he has had little energy recently, and has not been working on home exercises secondary to this. He notes COVID-19 bout \"drained him\". He reports that over the past few days, his L leg has swelled significantly with associated bruising; this is not painful, however he also notes his shortness of breath and fatigue have increased recently. OBJECTIVE    26 min Therapeutic Exercise:  [x] See flow sheet :   Rationale: increase ROM, increase strength, improve coordination, improve balance, and increase proprioception to improve the patients ability to turn head and look upward    16 min Therapeutic Activity:  []  See flow sheet : Includes time spent on inspecting L lower leg for signs of PE/DVT. Additionally, includes time spent assessing Wells criteria and educating patient on results (see below for details). Patient verbalizing and demonstrating understanding of provided education with 100% accuracy using teach back method.    Rationale: increase ROM, increase strength, improve coordination, improve balance, and increase proprioception  to improve the patients ability to turn head and look upward     NT min Manual Therapy: Bilateral cervical PA/rotational/lateral joint mobilizations (Grade III-IV, C2-7); supine L first rib inferior joint mobilization (Grade II-III with ipsilateral cervical side bending); L levator scapula MWM (pin + stretch with contralateral cervical rotation/flexion AROM, 10x); prone mid-thoracic PA joint mobilizations (T4-10, Grade III-IV, (-) cavitations); STM/TPR bilateral cervical paraspinals/upper trapezius/levator scapula/scalenes/supraspinatus/infraspinatus/teres minor/subscapularis/pectoralis major/minor/short head of biceps    Rationale: decrease pain, increase ROM, increase tissue extensibility, decrease trigger points, and increase postural awareness to improve the patients ability to turn head and look upward          With   [x] TE   [x] TA   [] Neuro   [] SC   [] other: Patient Education: [x] Review HEP    [] Progressed/Changed HEP based on:   [] positioning   [] body mechanics   [] transfers   [] heat/ice application    [] other:      Other Objective/Functional Measures: Wells Criteria = 4.5 (moderate risk category)    Heart Rate: 82 bpm seated edge of table at outset of session; SpO2 = 99%     Girth Measurements: R = 38.25cm 10cm below tibiofemoral joint line                                        L = 39.25cm 10cm below tibiofemoral joint line      Cheikh's Sign: (-) on L     Pain Level (0-10 scale) post treatment: 0    ASSESSMENT/Changes in Function:   The patient did not tolerate abbreviated therapy visit well at this date. He presents demonstrating increasing shortness of breath/(-) L Cheikh's sign/increased L lower leg pitting edema/increased warmth/bruising at arrival. General Bone criteria = 4.5 (moderate risk for PE/DVT at this time); deferred physical activity and recommended patient follow up with PCP/ER for immediate Doppler to rule out PE/DVT prior to returning to physical therapy services due to signs and symptoms consistent with moderate risk for PE/DVT. Patient verbalizing agreement with immediate plan at this time.  Continue to progress as tolerated pending results of further imaging/testing to rule out PE/DVT as instructed. Patient will continue to benefit from skilled PT services to modify and progress therapeutic interventions, address functional mobility deficits, address ROM deficits, address strength deficits, analyze and address soft tissue restrictions, analyze and cue movement patterns, analyze and modify body mechanics/ergonomics, assess and modify postural abnormalities, and instruct in home and community integration to attain remaining goals. [x]  See Plan of Care  []  See progress note/recertification  []  See Discharge Summary         Progress towards goals / Updated goals:    Short Term Goals: To be accomplished in 6-8 treatments: The patient will demonstrate understanding of and compliance with updated and progressive HEP toward improved participation in physical therapy plan of care. - Progressing              The patient will demonstrate bilateral cervical rotation AROM >= 65 degrees toward improved ability to turn head with functional tasks such as driving. - Progressing              The patient will demonstrate bilateral thoracic rotation AROM >= 60 degrees toward improved ability to turn body with functional tasks such as driving. - Progressing   Long Term Goals: To be accomplished in 16-20 treatments: The patient will demonstrate multiplanar bilateral UE/periscapular strength increased by >= 1/2 MMT grade toward improved endurance with functional activities such as turning head and looking upward. - Progressing              The patient will demonstrate ability to complete >= 38 seconds on longus colli endurance test toward improved postural endurance with turning head and looking upward. - Progressing              The patient will score >= 58 on FOTO to demonstrate significantly improved subjective report of function. - Progressing   Frequency / Duration: Patient to be seen 2 times per week for 16-20 treatments.     PLAN  [x] Upgrade activities as tolerated     [x]  Continue plan of care  [x]  Update interventions per flow sheet       []  Discharge due to:_  []  Other:_      Rebeca Patrick, PT, DPT 1/17/2023

## 2023-01-17 NOTE — ED TRIAGE NOTES
Triage Note: Patient arrives to ER complaining of shortness of breath, left foot swelling, and fatigue. Patient reports his foot has been swelling for 3 days. Patient reports he just got over pneumonia and covid.

## 2023-01-20 ENCOUNTER — HOSPITAL ENCOUNTER (OUTPATIENT)
Dept: PHYSICAL THERAPY | Age: 78
Discharge: HOME | End: 2023-01-20
Payer: MEDICARE

## 2023-01-20 PROCEDURE — 97530 THERAPEUTIC ACTIVITIES: CPT

## 2023-01-20 PROCEDURE — 97110 THERAPEUTIC EXERCISES: CPT

## 2023-01-20 PROCEDURE — 97140 MANUAL THERAPY 1/> REGIONS: CPT

## 2023-01-20 NOTE — PROGRESS NOTES
PT DAILY TREATMENT NOTE - [facility] 2-15    Patient Name: Roddy Dent  Date:2023  : 1945  [x]  Patient  Verified  Payor: Jimy Charles / Plan: [address] [place] [address] HMO / Product Type: Managed Care Medicare /    In time: 309  Out time: 192  Total Treatment Time (min): 50  Total Timed Codes (min): 50  1:1 Treatment Time ( only): 27  Visit #: 7    Treatment Area: Neck pain [M54.2]  Other low back pain [M54.59]    SUBJECTIVE  Pain Level (0-10 scale): 0  Any medication changes, allergies to medications, adverse drug reactions, diagnosis change, or new procedure performed?: [x] No    [] Yes (see summary sheet for update)  Subjective functional status/changes:   [] No changes reported    The patient notes they did go to the ER on Tuesday, noted they ran a good amount of tests and everything seemed to check out and did note imaging showed no signs of present blood clots. Did note imaging seemed to show an injury to the L ankle and that there were blood clots in the past but none when he visiting the ER. Noted neck was feeling pretty good and that they haven't been doing too many of their exercises. Also noted they have been experiencing black dark stool recently.      OBJECTIVE    30 min Therapeutic Exercise:  [x] See flow sheet :   Rationale: increase ROM, increase strength, improve coordination, improve balance, and increase proprioception to improve the patients ability to turn head and look upward    10 min Therapeutic Activity:  []  See flow sheet : Time spent reviewing results from ER visit and patients symptoms and GI issues and steps for moving forward to address with patient's healthcare team.    Rationale: increase ROM, increase strength, improve coordination, improve balance, and increase proprioception  to improve the patients ability to turn head and look upward     10 min Manual Therapy: Bilateral cervical PA/rotational/lateral joint mobilizations; prone mid-thoracic PA joint mobilizations (T4-10, Grade III-IV, (-) cavitations); STM/TPR bilateral cervical paraspinals/upper trapezius/levator scapula/scalenes/supraspinatus/infraspinatus/teres minor/subscapularis/pectoralis major/minor/short head of biceps    Rationale: decrease pain, increase ROM, increase tissue extensibility, decrease trigger points, and increase postural awareness to improve the patients ability to turn head and look upward          With   [x] TE   [x] TA   [] Neuro   [] SC   [] other: Patient Education: [x] Review HEP    [] Progressed/Changed HEP based on:   [] positioning   [] body mechanics   [] transfers   [] heat/ice application    [] other:      Other Objective/Functional Measures: NA    Pain Level (0-10 scale) post treatment: 0    ASSESSMENT/Changes in Function:   The patient tolerated treatment session well today, able to perform exercises and progressions while decreasing pain symptoms post treatment session. Patient presented today following trip to ER earlier this week, time spent reviewing results from visit and discussing other health issues. Patient continues to note decreased compliance with HEP and favorable response to manual therapy today. Continue to progress as tolerated. Patient will continue to benefit from skilled PT services to modify and progress therapeutic interventions, address functional mobility deficits, address ROM deficits, address strength deficits, analyze and address soft tissue restrictions, analyze and cue movement patterns, analyze and modify body mechanics/ergonomics, assess and modify postural abnormalities, and instruct in home and community integration to attain remaining goals. [x]  See Plan of Care  []  See progress note/recertification  []  See Discharge Summary         Progress towards goals / Updated goals:    Short Term Goals: To be accomplished in 6-8 treatments:               The patient will demonstrate understanding of and compliance with updated and progressive HEP toward improved participation in physical therapy plan of care. - Progressing              The patient will demonstrate bilateral cervical rotation AROM >= 65 degrees toward improved ability to turn head with functional tasks such as driving. - Progressing              The patient will demonstrate bilateral thoracic rotation AROM >= 60 degrees toward improved ability to turn body with functional tasks such as driving. - Progressing   Long Term Goals: To be accomplished in 16-20 treatments: The patient will demonstrate multiplanar bilateral UE/periscapular strength increased by >= 1/2 MMT grade toward improved endurance with functional activities such as turning head and looking upward. - Progressing              The patient will demonstrate ability to complete >= 38 seconds on longus colli endurance test toward improved postural endurance with turning head and looking upward. - Progressing              The patient will score >= 58 on FOTO to demonstrate significantly improved subjective report of function. - Progressing   Frequency / Duration: Patient to be seen 2 times per week for 16-20 treatments.     PLAN  [x]  Upgrade activities as tolerated     [x]  Continue plan of care  [x]  Update interventions per flow sheet       []  Discharge due to:_  []  Other:_      Bryn Baeza PTA, DPT 1/20/2023

## 2023-01-23 RX ORDER — LANCETS
EACH MISCELLANEOUS
Qty: 300 EACH | Refills: 3 | Status: SHIPPED | OUTPATIENT
Start: 2023-01-23

## 2023-01-23 RX ORDER — LANCING DEVICE
EACH MISCELLANEOUS
Qty: 300 STRIP | Refills: 3 | Status: SHIPPED | OUTPATIENT
Start: 2023-01-23

## 2023-01-23 RX ORDER — INSULIN PUMP SYRINGE, 3 ML
EACH MISCELLANEOUS
Qty: 1 KIT | Refills: 0 | Status: SHIPPED | OUTPATIENT
Start: 2023-01-23

## 2023-01-24 ENCOUNTER — HOSPITAL ENCOUNTER (OUTPATIENT)
Dept: PHYSICAL THERAPY | Age: 78
Discharge: HOME | End: 2023-01-24
Payer: MEDICARE

## 2023-01-24 PROCEDURE — 97110 THERAPEUTIC EXERCISES: CPT

## 2023-01-24 PROCEDURE — 97140 MANUAL THERAPY 1/> REGIONS: CPT

## 2023-01-24 NOTE — PROGRESS NOTES
PT DAILY TREATMENT NOTE - [facility] 2-15    Patient Name: Chetan Atwood  Date:2023  : 1945  [x]  Patient  Verified  Payor: Tori Bell / Plan: [address] [place] [address] HMO / Product Type: Managed Care Medicare /    In time:   Out time: 111  Total Treatment Time (min): 44  Total Timed Codes (min): 44  1:1 Treatment Time ( only): 30  Visit #: 8    Treatment Area: Neck pain [M54.2]  Other low back pain [M54.59]    SUBJECTIVE  Pain Level (0-10 scale): 0  Any medication changes, allergies to medications, adverse drug reactions, diagnosis change, or new procedure performed?: [x] No    [] Yes (see summary sheet for update)  Subjective functional status/changes:   [] No changes reported    The patient noted they continue to feel a lot better, noted \"I feel great,\". Noted they have been feeling relatively no pain since the weekend, noted only having small instances of slight pain in the L neck occasionally but not very often or as severe.      OBJECTIVE    34 min Therapeutic Exercise:  [x] See flow sheet :   Rationale: increase ROM, increase strength, improve coordination, improve balance, and increase proprioception to improve the patients ability to turn head and look upward    NT min Therapeutic Activity:  []  See flow sheet : Time spent reviewing results from ER visit and patients symptoms and GI issues and steps for moving forward to address with patient's healthcare team.    Rationale: increase ROM, increase strength, improve coordination, improve balance, and increase proprioception  to improve the patients ability to turn head and look upward     10 min Manual Therapy: Bilateral cervical PA/rotational/lateral joint mobilizations; prone mid-thoracic PA joint mobilizations (T4-10, Grade III-IV, (-) cavitations); STM/TPR bilateral cervical paraspinals/upper trapezius/levator scapula/scalenes/supraspinatus/infraspinatus/teres minor/subscapularis/pectoralis major/minor/short head of biceps    Rationale: decrease pain, increase ROM, increase tissue extensibility, decrease trigger points, and increase postural awareness to improve the patients ability to turn head and look upward          With   [x] TE   [x] TA   [] Neuro   [] SC   [] other: Patient Education: [x] Review HEP    [] Progressed/Changed HEP based on:   [] positioning   [] body mechanics   [] transfers   [] heat/ice application    [] other:      Other Objective/Functional Measures: NA    Pain Level (0-10 scale) post treatment: 0    ASSESSMENT/Changes in Function:   The patient tolerated treatment session well today, able to perform exercises and progressions without increasing pain symptoms post treatment session. Patient continues to demonstrate improving cervical rotation during treatment session and was able to progress with strengthening during therex today. Plan to discharge next visit barring major setback. Continue to progress as tolerated. Patient will continue to benefit from skilled PT services to modify and progress therapeutic interventions, address functional mobility deficits, address ROM deficits, address strength deficits, analyze and address soft tissue restrictions, analyze and cue movement patterns, analyze and modify body mechanics/ergonomics, assess and modify postural abnormalities, and instruct in home and community integration to attain remaining goals. [x]  See Plan of Care  []  See progress note/recertification  []  See Discharge Summary         Progress towards goals / Updated goals:    Short Term Goals: To be accomplished in 6-8 treatments: The patient will demonstrate understanding of and compliance with updated and progressive HEP toward improved participation in physical therapy plan of care.  - Progressing              The patient will demonstrate bilateral cervical rotation AROM >= 65 degrees toward improved ability to turn head with functional tasks such as driving. - Progressing              The patient will demonstrate bilateral thoracic rotation AROM >= 60 degrees toward improved ability to turn body with functional tasks such as driving. - Progressing   Long Term Goals: To be accomplished in 16-20 treatments: The patient will demonstrate multiplanar bilateral UE/periscapular strength increased by >= 1/2 MMT grade toward improved endurance with functional activities such as turning head and looking upward. - Progressing              The patient will demonstrate ability to complete >= 38 seconds on longus colli endurance test toward improved postural endurance with turning head and looking upward. - Progressing              The patient will score >= 58 on FOTO to demonstrate significantly improved subjective report of function. - Progressing   Frequency / Duration: Patient to be seen 2 times per week for 16-20 treatments.     PLAN  [x]  Upgrade activities as tolerated     [x]  Continue plan of care  [x]  Update interventions per flow sheet       []  Discharge due to:_  []  Other:_      Melanie Ballard, PTA   1/24/2023

## 2023-01-25 ENCOUNTER — HOSPITAL ENCOUNTER (OUTPATIENT)
Dept: ULTRASOUND IMAGING | Age: 78
Discharge: HOME | End: 2023-01-25
Attending: NURSE PRACTITIONER
Payer: MEDICARE

## 2023-01-25 ENCOUNTER — PATIENT OUTREACH (OUTPATIENT)
Dept: CASE MANAGEMENT | Age: 78
End: 2023-01-25

## 2023-01-25 DIAGNOSIS — K74.60 CIRRHOSIS OF LIVER WITHOUT ASCITES, UNSPECIFIED HEPATIC CIRRHOSIS TYPE (HCC): ICD-10-CM

## 2023-01-25 PROCEDURE — 76700 US EXAM ABDOM COMPLETE: CPT

## 2023-01-25 NOTE — PROGRESS NOTES
01/25/23  Attempted to outreach with pt to introduce self and offer CM services, referral received from [facility]. LM x2 to return this call. Will also sent MY Chart GIT letter. Arminda Hoang, MSN, RN - Ambulatory Care Manager - [phone]     Ambulatory Care Management Note    Date/Time:  1/25/2023 4:56 PM    This patient was received as a referral from [facility]. Ambulatory Care Manager outreached to patient today to offer care management services. Introduction to self and role of care manager provided. Patient accepted care management services at this time. Follow up call scheduled at this time. Patient has Ambulatory Care Manager's contact number for any questions or concerns. Spoke with both pt & SO. Pt reports he has improved from Covid PNA earlier in the month though he still gets SOB with activity. Pt reports his swelling has improved since he is wearing support socks. Pt reports he has recently received the [facility] [place] though now with [facility] is having difficulties with them covering the discs - prior auth needed? SO will ask pt's Endo. Pt reports his BG is up and down and he liked using the CGM vs regular glucometer because he could follow the trends easier. Pt reports he is trying to eat healthier - low salt with less sweets. Pt reports he took a long walk recently and on return to the house noted that he was having rectal bleeding which he thinks is hemorrhoids. He said he does not currently have constipation though has had this previously. Discussed hemorrhoid care: Prep H, wipes/hygiene, sitz bath & increasing water and fiber intake. He has seen GI Dr Laura Luna in the past and is considering scheduling an appointment.    Arminda Hoang, MSN, RN - Ambulatory Care Manager - [phone] The treatment team recommends ongoing medication management upon discharge  A referral has been made on your behalf to [facility] located at [address] [address], Phone   Your intake appointment is scheduled for tomorrow, you will be picked up by [facility] and transported directly to program from the hospital today 3/12/2019 at 0700  Your discharge will be faxed to this provider for continuity of care  You have declined for a follow up appointment to be scheduled with your primary care physician Dr Gus Chacon, DO, should you choose to follow up you may reach him at [phone]  Your discharge will be faxed to this provider for continuity of care  You have declined to schedule a follow up appointment with your therapist Madhavi Jimenez at [facility], should you choose to follow up you may reach her at [phone]  Your discharge will be faxed to this provider for continuity of care  Your Blended  Chana Ramirez with [facility] will reach out to you to schedule a follow up appointment, she may be reached at [phone]  Your discharge will be faxed to this provider for continuity of care

## 2023-01-26 ENCOUNTER — OFFICE VISIT (OUTPATIENT)
Dept: RHEUMATOLOGY | Age: 78
End: 2023-01-26
Payer: MEDICARE

## 2023-01-26 VITALS
BODY MASS INDEX: 33.78 KG/M2 | SYSTOLIC BLOOD PRESSURE: 121 MMHG | TEMPERATURE: 98 F | HEART RATE: 77 BPM | DIASTOLIC BLOOD PRESSURE: 72 MMHG | RESPIRATION RATE: 16 BRPM | OXYGEN SATURATION: 97 % | WEIGHT: 203 LBS

## 2023-01-26 DIAGNOSIS — M10.9 GOUT WITHOUT TOPHUS: Primary | ICD-10-CM

## 2023-01-26 DIAGNOSIS — N18.32 STAGE 3B CHRONIC KIDNEY DISEASE (HCC): ICD-10-CM

## 2023-01-26 PROCEDURE — G8417 CALC BMI ABV UP PARAM F/U: HCPCS | Performed by: INTERNAL MEDICINE

## 2023-01-26 PROCEDURE — G8510 SCR DEP NEG, NO PLAN REQD: HCPCS | Performed by: INTERNAL MEDICINE

## 2023-01-26 PROCEDURE — 1123F ACP DISCUSS/DSCN MKR DOCD: CPT | Performed by: INTERNAL MEDICINE

## 2023-01-26 PROCEDURE — 99214 OFFICE O/P EST MOD 30 MIN: CPT | Performed by: INTERNAL MEDICINE

## 2023-01-26 PROCEDURE — G8536 NO DOC ELDER MAL SCRN: HCPCS | Performed by: INTERNAL MEDICINE

## 2023-01-26 PROCEDURE — 1101F PT FALLS ASSESS-DOCD LE1/YR: CPT | Performed by: INTERNAL MEDICINE

## 2023-01-26 PROCEDURE — 3017F COLORECTAL CA SCREEN DOC REV: CPT | Performed by: INTERNAL MEDICINE

## 2023-01-26 PROCEDURE — G8427 DOCREV CUR MEDS BY ELIG CLIN: HCPCS | Performed by: INTERNAL MEDICINE

## 2023-01-26 NOTE — LETTER
1/26/2023    Patient: Edna Anderson   YOB: 1945   Date of Visit: 1/26/2023     Liz Novak MD  [address] [place] [address]  Via In Basket    Dear Liz Novak MD,    This was scribed by Kendell Sandifer in the presence of Dr. Issac Dozier. The note was reviewed and amended personally, and I agree with the above information.     Apolinar Garg MD    Adult Rheumatology   [facility]  A Part of [facility], [address] [place] [address]   Phone [phone]  Fax [phone]

## 2023-01-26 NOTE — PROGRESS NOTES
Chief Complaint   Patient presents with    Joint Pain     1. Have you been to the ER, urgent care clinic since your last visit? Hospitalized since your last visit? YES ER 1/17/23 d/t leg swelling    2. Have you seen or consulted any other health care providers outside of the [address] [place] [address] since your last visit? Include any pap smears or colon screening.  No

## 2023-01-26 NOTE — PATIENT INSTRUCTIONS
1) Continue to take 40mg of Uloric daily. 2) Let me know if you have any more gout flares. 3) Send me a reminder a month or two before your next visit so I can order new labs. 4) Follow up in one year. Let me know if you have any questions or concerns in the meantime.

## 2023-01-26 NOTE — PROGRESS NOTES
REASON FOR VISIT:   Jay Muñoz is a 78 y.o. male with history of cirrhosis, gallstone pancreatitis, DM2, CKD3, and h/o CVA  who is returning for initial Rheumatology followup of nontophaceous gout. HISTORY OF PRESENT ILLNESS    Pt returns for a follow up . Last visit 11/21/2022. Pt still takes 40mg of Uloric daily. He has not started any new medications recently. Pt reports that his L foot was swollen earlier this month without any pain. He went to the ER for this and had a lot of tests ran. His foot is still slightly swollen today, and his ankle is sore to the touch. He says that his R foot has been good. He denies numbness and pain in his feet. Pt has no knee pain today. Pt says that his R hand stays numb \"basically all of the time. \" He has pain in his hands, especially when he hits his fingers on something. He has not noticed specific fingers that hurt more than the other. Pt has been going to physical therapy twice a week. He says that this has been helping his neck and back. His last day is tomorrow. Pt got Covid Pneumonia on last month. He went to the hospital for this on 12/27 and he was held in the hospital for 7 hours. He says that Covid infection took a lot out of him. He was given antibiotics and two inhalers when he left the hospital. He still has a cough, he gets out of breath easily, and he has no strength. Pt eats some fish, but he does not eat shellfish, crab, sardines. He says that he eats some catfish. He has not drank any alcohol since 2013. Pt says that he had black stool for a week. It is back to normal now. He does not take Ibuprofen or other NSAIDs. REVIEW OF SYSTEMS  A comprehensive review of systems was negative except for that written in the HPI. A 10-point review of systems is per the new patient questionnaire, which has been reviewed extensively and scanned into the electronic medical record for future reference.   Review of systems is as above and is otherwise negative. ALLERGIES  Patient has no known allergies. MEDICATIONS  Current Outpatient Medications   Medication Sig    Blood-Glucose Meter monitoring kit Use to test blood sugars 3 times per day. USE BRAND PREFERRED BY INSURANCE    glucose blood VI test strips (Pharmacist Choice) strip Use preferred brand; Check glucose 3 times daily, Diagnosis E11.65    lancets misc Use to test blood sugars 3 times per day. USE BRAND PREFERRED BY INSURANCE    FreeStyle Ashlee 14 Day Sensor kit Use as directed for glucose monitoring. glimepiride (AMARYL) 4 mg tablet One tablet twice a day before meals    tamsulosin (FLOMAX) 0.4 mg capsule Take 0.4 mg by mouth daily. febuxostat (ULORIC) 40 mg tab tablet TAKE ONE TABLET BY MOUTH DAILY    pantoprazole (PROTONIX) 40 mg tablet Take 1 Tablet by mouth daily. Take instead of 20mg tab (prior 20mg 2 times daily). amLODIPine (NORVASC) 5 mg tablet Take 1 Tablet by mouth daily. metFORMIN (GLUCOPHAGE) 1,000 mg tablet Take 1,000 mg by mouth two (2) times a day. cholecalciferol (Vitamin D3) (2,000 UNITS /50 MCG) cap capsule     fluticasone propionate (FLONASE) 50 mcg/actuation nasal spray 2 Sprays by Both Nostrils route daily. azelastine (ASTELIN) 137 mcg (0.1 %) nasal spray 2 Sprays by Both Nostrils route two (2) times daily as needed for Rhinitis. simvastatin (ZOCOR) 20 mg tablet Take 20 mg by mouth nightly. aspirin delayed-release 81 mg tablet Take 1 Tab by mouth daily. No current facility-administered medications for this visit. PAST MEDICAL HISTORY  Past Medical History:   Diagnosis Date    CAD (coronary artery disease)     Chronic kidney disease     stones    Chronic obstructive pulmonary disease (HCC)     Cir[facility]osis ([facility]     Colon polyp     Diabetes ([facility]     Hypertension     Stroke ([facility] 1980s    right hand neuropathy       FAMILY HISTORY  family history includes Alcohol abuse in his father; Arthritis in his sister;  Arthritis-rheumatoid in his sister; Cancer in his sister and sister; Heart Disease in his brother, father, and mother; Hypertension in his sister; Migraines in his mother; OSTEOARTHRITIS in his sister and sister. SOCIAL HISTORY  He  reports that he quit smoking about 17 years ago. His smoking use included cigarettes. He has a 90.00 pack-year smoking history. He has never used smokeless tobacco. He reports that he does not currently use alcohol after a past usage of about 140.0 standard drinks per week. He reports that he does not use drugs. Social History     Social History Narrative    Not on file   Stationed at [facility] [place] for about 1.5 years. 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 DATA  Visit Vitals  /72 (BP 1 Location: Left upper arm, BP Patient Position: Sitting, BP Cuff Size: Adult long)   Pulse 77   Temp 98 °F (36.7 °C) (Oral)   Resp 16   Wt 203 lb (92.1 kg)   SpO2 97%   BMI 33.78 kg/m²     General:  The patient is well developed, well nourished, alert, and in no apparent distress. Eyes: Sclera are anicteric. No conjunctival injection. HEENT:  Oropharynx is clear. No oral ulcers. Adequate salivary pooling. No cervical or supraclavicular lymphadenopathy. Lungs:  Clear to auscultation bilaterally, without wheeze or stridor. Normal respiratory effort. Cor:  Regular rate and rhythm. No murmur rub or gallop. Abdomen: Soft, non-tender, without hepatomegaly or masses. Extremities: No calf tenderness or edema. Warm and well perfused. Skin:  No significant abnormalities. No tophi involving ears, elbows, knees, hands, or feet. Neuro: Nonfocal, normal unassisted gait, no foot or wrist drop. Musculoskeletal:    A comprehensive musculoskeletal exam was performed for all joints of each upper and lower extremity and assessed for swelling, tenderness and range of motion. Results are documented as below:  Interval improved trapezius tenderness. Full painless ROM in shoulders. 1+ bipedal edema extending to knees.  No tenderness with active or passive ROM, no warmth or synovitis. No evidence of synovitis in the small joints of the hands, wrists, shoulders, elbows, hips, knees or ankles. Labs:  1/17/23: D dimer 7.9, NT proBNP 407, Troponin 9, Cr 1.55, LFT WNL, WBC 6, HGB 11.7, Plt 133  1/6/23: LFT WNL, Lipase 50, ESR 8, Cr 1.37, LFT WNL, WBC 4.3, HGB 12.7, Plt cancelled, CRP <1 mg/L, Uric acid 2.5, G-6-PD QUANT 263  10/21/22: WBC 6.2, Hgb 12.4, Plt 178; Cr 1.87, LFTs WNL; uric acid 4.3  8/23/22 RF and CCP neg, SANDY IFA negative, ESR 15    Imaging:  CTA CHEST W OR W WO CONT (1/17/23):    1. No pulmonary emboli. 2. Bilateral groundglass parenchymal lung changes nonspecific    1/5/23 XR CHEST: Moderate pulmonary vascular congestive changes. 1/2/23 XR FOOT RT MIN 3 V: No acute abnormality. 12/27/22 XR CHEST PA LAT: 1. No acute cardiopulmonary disease    11/4/22: XR R knee: No fracture, on my review no chondrocalcinosis or erosions    6/8/22 MRI abd:  Diffuse hepatic steatosis and morphologic features of cirrhosis. No focal liver lesion. Stable pancreatic head IPMN. No ascites. ASSESSMENT AND PLAN  Mr. Sky Gamino is a 78 y.o. male with steatohepatitis and radiographic cirrhosis, CKD3, and gallstone pancreatitis who returns after an episode of LEFT ankle swelling without significant pain, a few days after receiving IV fluids at urgent care for COVID pneumonia. Uric acid reassuringly 2.5 on febuxostat; while possible for this to be acutely depressed during a gout flare, given his reported lack of pain and differing character from prior gout flares, this seems very unlikely to reflect a true gout flare. Agree with continuation of febuxostat 40mg daily as now prescribed by his PCP. We reviewed dietary modifications to minimize purine intake such as avoidance of seafood and red meat. He is not interested in black cherry extract as his gout flares are now very infrequent.     Will bring him back for 1 year followup, he is aware to call with flaring joint pain in the meantime. Would favor joint injections for acute flares given his multiple comorbidities. 1. Gout without tophus  - Continue febuxostat 40mg daily through his PCP    2. Stage 3b chronic kidney disease (HCC)  -Avoiding NSAIDs, colchicine, extended prednisone. No orders of the defined types were placed in this encounter. Medications: I am having Zack Pastures. Wanguerita Dust maintain his aspirin delayed-release, simvastatin, Vitamin D3, fluticasone propionate, azelastine, metFORMIN, amLODIPine, pantoprazole, febuxostat, tamsulosin, FreeStyle Ashlee 14 Day Sensor, glimepiride, Blood-Glucose Meter, Pharmacist Choice, and lancets. Follow up: Return in about 1 year (around 1/26/2024). Face to face time: 22 minutes  Note preparation and records review day of service: 10 minutes  Total provider time day of service: 32 minutes    This was scribed by Brianne Justice in the presence of Dr. Theresa Spear. The note was reviewed and amended personally, and I agree with the above information.     Mounika Vogt MD    Adult Rheumatology   [facility]  A Part of [facility], [address]   Phone [phone]  Fax [phone]

## 2023-01-27 ENCOUNTER — HOSPITAL ENCOUNTER (OUTPATIENT)
Dept: PHYSICAL THERAPY | Age: 78
Discharge: HOME | End: 2023-01-27
Payer: MEDICARE

## 2023-01-27 DIAGNOSIS — K76.9 LIVER LESION, LEFT LOBE: Primary | ICD-10-CM

## 2023-01-27 PROCEDURE — 97110 THERAPEUTIC EXERCISES: CPT

## 2023-01-27 NOTE — PROGRESS NOTES
PT DAILY TREATMENT NOTE - [facility] 2-15    Patient Name: Javi Kirkpatrick  Date:2023  : 1945  [x]  Patient  Verified  Payor: Kenton Brito / Plan: [address] [place] [address] HMO / Product Type: Managed Care Medicare /    In time: 260  Out time: 179  Total Treatment Time (min): 38  Total Timed Codes (min): 38  1:1 Treatment Time ( only): 38  Visit #: 9    Treatment Area: Neck pain [M54.2]  Other low back pain [M54.59]    SUBJECTIVE  Pain Level (0-10 scale): 0  Any medication changes, allergies to medications, adverse drug reactions, diagnosis change, or new procedure performed?: [x] No    [] Yes (see summary sheet for update)  Subjective functional status/changes:   [] No changes reported    The patient noted they continue to note minimal to no pain since previous treatment session. Noted they did have a small amount of soreness following lifting and moving 3 cases of water from the store and loading it into their car. Noted at times they sometimes feel a small bit of neck pain but not as severe or frequently as previously.      OBJECTIVE    28 min Therapeutic Exercise:  [x] See flow sheet :   Rationale: increase ROM, increase strength, improve coordination, improve balance, and increase proprioception to improve the patients ability to turn head and look upward    NT min Therapeutic Activity:  []  See flow sheet : Time spent reviewing results from ER visit and patients symptoms and GI issues and steps for moving forward to address with patient's healthcare team.    Rationale: increase ROM, increase strength, improve coordination, improve balance, and increase proprioception  to improve the patients ability to turn head and look upward     10 min Manual Therapy: Bilateral cervical PA/rotational/lateral joint mobilizations; prone mid-thoracic PA joint mobilizations (T4-10, Grade III-IV, (-) cavitations); STM/TPR bilateral cervical paraspinals/upper trapezius/levator scapula/scalenes/supraspinatus/infraspinatus/teres minor/subscapularis/pectoralis major/minor/short head of biceps    Rationale: decrease pain, increase ROM, increase tissue extensibility, decrease trigger points, and increase postural awareness to improve the patients ability to turn head and look upward          With   [x] TE   [x] TA   [] Neuro   [] SC   [] other: Patient Education: [x] Review HEP    [] Progressed/Changed HEP based on:   [] positioning   [] body mechanics   [] transfers   [] heat/ice application    [] other:      Other Objective/Functional Measures: FOTO: 67/100    Cervical AROM:                                                                       R                      L                        Flexion                                       54                                             Extension                                        60 --> slight thoracic extension                                           Side Bending                           45 \"p! \"                52 \"slight p!\"                  Rotation                                   85                    79                                                   Thoracic AROM:                                                                      R                      L                        Flexion                                     NT                                              Extension                                        NT                                              Side Bending                              NT                     Rotation                                  71  62     UPPER QUARTER                                                     MUSCLE STRENGTH  KEY                                                                                         R                      L  0 - No Contraction                               Flexion                         5                      5  1 - Trace Extension (elbow)        5                      5  2 - Poor                                               Abduction                     5                      5  3 - Fair                                                 IR                                 5                      5  4 - Good                                              ER                               4+                     5   Periscapular MMT:                          R                              L  Middle Trapezius                              4+                              4+  Lower Trapezius                               4                               4+    Pain Level (0-10 scale) post treatment: 0    ASSESSMENT/Changes in Function:   Patient is a 78year old being seen due to chronic back and neck pain. Patient has been seen for 9 visits and has made improvements with cervical/thoracic ROM, muscular strength, as well as with functional outcome measures. Patient demonstrated improved muscular strength today during upper quarter testing today, noting improved upper quarter flexion/abduction. Patient also demonstrated improvements with cervical flexion today (54 deg v 41 deg), extension (60 deg v 47 deg), side bending (R=45 deg; L=47 deg v R= 38 deg; L=39 deg), and cervical rotation (R=85; L=79 v R=75; L=65). Patient also demonstrated improvements with their thoracic rotation today (R=71 deg; L=62 deg) and improved global upper quarter strength during today's reassessment. Patient has also reported improvements with pain symptoms and activity tolerance since initial treatment session and functional outcome measures (67/100 v 63/100). Patient has achieved 6/6 goals set for treatment and continues to demonstrate improved HEP compliance and will be discharged following today's treatment session and will continue to manage/progress with HEP moving forward.         []  See Plan of Care  []  See progress note/recertification  [x]  See Discharge Summary         Progress towards goals / Updated goals:    Short Term Goals: To be accomplished in 6-8 treatments: The patient will demonstrate understanding of and compliance with updated and progressive HEP toward improved participation in physical therapy plan of care. - MET              The patient will demonstrate bilateral cervical rotation AROM >= 65 degrees toward improved ability to turn head with functional tasks such as driving. - MET              The patient will demonstrate bilateral thoracic rotation AROM >= 60 degrees toward improved ability to turn body with functional tasks such as driving. - MET   Long Term Goals: To be accomplished in 16-20 treatments: The patient will demonstrate multiplanar bilateral UE/periscapular strength increased by >= 1/2 MMT grade toward improved endurance with functional activities such as turning head and looking upward. - MET              The patient will demonstrate ability to complete >= 38 seconds on longus colli endurance test toward improved postural endurance with turning head and looking upward. - MET              The patient will score >= 58 on FOTO to demonstrate significantly improved subjective report of function. - MET   Frequency / Duration: Patient to be seen 2 times per week for 16-20 treatments. PLAN  []  Upgrade activities as tolerated     []  Continue plan of care  []  Update interventions per flow sheet       [x]  Discharge due to reaching goals set for treatment.    []  Other:_      Hahn Liter, PTA   1/27/2023

## 2023-01-27 NOTE — PROGRESS NOTES
[facility]. Physical Therapy  [address] ([facility] [address] [place]  Phone: [phone] Fax: [phone]    Discharge Summary 2-15    Patient name: Leander Awan  : 1945  Provider#: [id number]  Referral source: Chavez Tyler MD      Medical/Treatment Diagnosis: Neck pain [M54.2]  Other low back pain [M54.59]     Prior Hospitalization: see medical history     Comorbidities: See Plan of Care  Prior Level of Function: See Plan of Care  Medications: Verified on Patient Summary List    Start of Care: 24      Onset Date: Approximately summer 2022   Visits from Start of Care: 9     Missed Visits: 1  Reporting Period : 22 to 23    Assessment/Summary of care: The patient is a 78year old male who has participated in 9 skilled physical therapy visits due to chronic upper back and neck pain. He demonstrates significantly improved signs and symptoms consistent with mobility, muscle power, and movement coordination impairments, including increased multiplanar cervicothoracic AROM, increased multiplanar UE/periscapular strength, and improved movement patterns with turning his head side to side. The patient scored 79 on FOTO, demonstrating significantly improved subjective report of function over physical therapy plan of care. He has met 6/6 physical therapy goals at this time. Due to demonstrated significant subjective and objective progress over physical therapy plan of care, 6/6 physical therapy goals met, and demonstrated independence with updated and progressive HEP, the patient no longer requires skilled physical therapy services and is discharged to independent and progressive HEP for continued maintenance at this date.     Cervical AROM:                                                                       R                      L                        Flexion                                     54 Extension                                60 --> slight thoracic extension                                           Side Bending                           45 \"p! \"             52 \"slight p!\"                  Rotation                                   85                    79                                                   Thoracic AROM:                                                                      R                      L                                        Rotation                                  71                   62    Short Term Goals: To be accomplished in 6-8 treatments: The patient will demonstrate understanding of and compliance with updated and progressive HEP toward improved participation in physical therapy plan of care. - Met              The patient will demonstrate bilateral cervical rotation AROM >= 65 degrees toward improved ability to turn head with functional tasks such as driving. - Met              The patient will demonstrate bilateral thoracic rotation AROM >= 60 degrees toward improved ability to turn body with functional tasks such as driving. - Met   Long Term Goals: To be accomplished in 16-20 treatments: The patient will demonstrate multiplanar bilateral UE/periscapular strength increased by >= 1/2 MMT grade toward improved endurance with functional activities such as turning head and looking upward. - Met              The patient will demonstrate ability to complete >= 38 seconds on longus colli endurance test toward improved postural endurance with turning head and looking upward.  - Met              The patient will score >= 58 on FOTO to demonstrate significantly improved subjective report of function. - Met     RECOMMENDATIONS:  [x]Discontinue therapy: [x]Patient has reached or is progressing toward set goals     []Patient is non-compliant or has abdicated     []Due to lack of appreciable progress towards set goals     []Other    Leela Bobo, PT, DPT 1/27/2023

## 2023-01-27 NOTE — PROGRESS NOTES
Just received call back from [facility] and [place]. Explained that MRI has been ordered to further investigate lesion seen on ultrasound imaging.

## 2023-01-27 NOTE — PROGRESS NOTES
Attempted to call patient at both numbers on file, and both went to voice mail. Sent [employer] message to patient letting him know that I ordered and MRI for a lesion seen on recent ultrasound imaging.

## 2023-02-03 ENCOUNTER — PATIENT OUTREACH (OUTPATIENT)
Dept: CASE MANAGEMENT | Age: 78
End: 2023-02-03

## 2023-02-03 NOTE — PROGRESS NOTES
Ambulatory Care Management Note    Date/Time:  2/3/2023 4:28 PM     Attempted to outreach with pt for CM follow up. GF answered, though she was at the grocery store. Tried other number & LM. Will try back in 3-4 days.   Richard Castillo, MSN, RN - Ambulatory Care Manager - [phone]

## 2023-02-06 ENCOUNTER — HOSPITAL ENCOUNTER (OUTPATIENT)
Dept: MRI IMAGING | Age: 78
Discharge: HOME | End: 2023-02-06
Attending: NURSE PRACTITIONER
Payer: MEDICARE

## 2023-02-06 VITALS — BODY MASS INDEX: 32.95 KG/M2 | WEIGHT: 198 LBS

## 2023-02-06 DIAGNOSIS — K76.9 LIVER LESION, LEFT LOBE: ICD-10-CM

## 2023-02-06 PROCEDURE — 74011250636 HC RX REV CODE- 250/636: Performed by: RADIOLOGY

## 2023-02-06 PROCEDURE — A9575 INJ GADOTERATE MEGLUMI 0.1ML: HCPCS | Performed by: RADIOLOGY

## 2023-02-06 PROCEDURE — 74183 MRI ABD W/O CNTR FLWD CNTR: CPT

## 2023-02-06 RX ORDER — GADOTERATE MEGLUMINE 376.9 MG/ML
18 INJECTION INTRAVENOUS ONCE
Status: COMPLETED | OUTPATIENT
Start: 2023-02-06 | End: 2023-02-06

## 2023-02-06 RX ADMIN — GADOTERATE MEGLUMINE 18 ML: 376.9 INJECTION INTRAVENOUS at 07:57

## 2023-02-09 ENCOUNTER — PATIENT OUTREACH (OUTPATIENT)
Dept: CASE MANAGEMENT | Age: 78
End: 2023-02-09

## 2023-02-09 NOTE — PROGRESS NOTES
Ambulatory Care Management Note    Date/Time:  2/9/2023 1:55 PM    Patient Current Location: [place]    This [facility]) reviewed and updated the following screenings during this call; general assessment, self management assessment, and SDOH assessments    Patient's challenges to self-management identified:   depression, functional physical ability, level of motivation, medical condition, and polypharmacy      Medication Management:  good adherence and good understanding    Advance Care Planning:   Does patient have an Advance Directive:   will discuss later ; Alejandro Woods is listed as primary [address], Referrals, and Durable Medical Equipment: just completed PT for neck pain      Health Maintenance Due   Topic Date Due    Pneumococcal 65+ years (1 - PCV) Never done    Eye Exam Retinal or Dilated  Never done    Hepatitis B Vaccine (1 of 3 - Risk 3-dose series) Never done    DTaP/Tdap/Td series (1 - Tdap) Never done    COVID-19 Vaccine (4 - Booster) 06/05/2021    Foot Exam Q1  08/09/2022    Medicare Yearly Exam  08/10/2022    A1C test (Diabetic or Prediabetic)  12/01/2022     Health Maintenance Reviewed: will discuss later    Ambulatory Care Management Note      Date/Time:  2/9/2023 2:13 PM    Patient Current Location: [place]     This patient was received as a referral from [address].   Top Challenges reviewed with the provider   Weak, deconditioned  SOB with exertion  Occ dizziness         Ambulatory  contacted patient for discussion and case management of improving diet & exercise   Summary of patients top problems:   DM2  Gout  Post Covid weakness    PCP/Specialist follow up:   Future Appointments   Date Time Provider Ping Almaraz   3/16/2023  9:50 AM MD NINO Rivera [facility] [place] BS AMB   3/20/2023  2:30 PM Mamadou Nino MD [facility] BS AMB   4/6/2023  9:30 AM Clementina Angulo NP [facility] BS AMB   1/26/2024  2:00 PM MD ANDREAS Russ BS AMB Goals         Patient verbalizes understanding of self -management goals of living with Diabetes. 02/09/23  Pt has DM2 on Metformin & Glimepiride, last A1C was 7.0 on 6/1/22. He reports a good appetite. He reports occ lows during night/am. He reports skipping meals occ. Discussed reading food labels, paying attention to serving size, carbs/sugar. Also discussed eating a protein snack vs skipping a meal; a protein snack at bedtime may prevent low BG during the night/am. Pt voiced his understanding. Pt will: Take Metformin & Glimepiride as directed  Monitor BG at least twice a day  Instead of skipping lunch drink protein shake or have a protein snack to avoid lows  Have protein snack in evening before bed (cheese stick, turkey and cheese roll up, greek yogurt cup)  Plan to follow up with pt in 2 weeks. Bebe Pabon MSN, RN - Ambulatory Care Manager - [phone]           Patient will increase activity as tolerated over the next 90 days (pt-stated)       02/09/23  Pt reports he is very weak since having covid PNA in early Jan. He also gets SOB very easily. He does not ambulate with any assist device and denies any falls. Discussed walking for exercise, taking his time and gradually doing more. Also discussed using light hand weights 3-5 lbs (he has 5, 10, 15 lbs) while sitting/watching TV. He thought this was a good idea since he is trying to increase his upper body strength. Pt will:  Walk for exercise 3 times a week, short intervals and gradually increase  Use the light hand weights 4-5 times a week  Plan to follow up with pt in 2 weeks. JOHN Almonte, RN - [facility] - [phone]                  Patient verbalized understanding of all information discussed. Patient has this Ambulatory Care Manager's contact information for any further questions, concerns, or needs.

## 2023-03-07 ENCOUNTER — PATIENT OUTREACH (OUTPATIENT)
Dept: CASE MANAGEMENT | Age: 78
End: 2023-03-07

## 2023-03-07 NOTE — PROGRESS NOTES
Ambulatory Care Management Note    Date/Time:  3/7/2023 4:40 PM  Patient Current Location: [place]   Goals Addressed                      This Visit's Progress      Patient verbalizes understanding of self -management goals of living with Diabetes. On track      03/07/23  Pt reports he is taking his meds as directed  He does monitor his BG daily which he reports have mostly been good. He said he did have a low in the past week at 79 which improved after eating. Discussed following the My Plate meal plan method  Discussed importance of drinking plenty of water  Pt does cont to c/o hemorrhoids/black stool. He reports he is taking an iron supplement - notified him this could cause dark/black stools. Discussed increasing fiber/ plenty of water & no prolonged sitting  Plan to follow up with pt in 2-3 weeks. JOHN Wiley, RN - Ambulatory Care Manager - [phone]     02/09/23  Pt has DM2 on Metformin & Glimepiride, last A1C was 7.0 on 6/1/22. He reports a good appetite. He reports occ lows during night/am. He reports skipping meals occ. Discussed reading food labels, paying attention to serving size, carbs/sugar. Also discussed eating a protein snack vs skipping a meal; a protein snack at bedtime may prevent low BG during the night/am. Pt voiced his understanding. Pt will: Take Metformin & Glimepiride as directed  Monitor BG at least twice a day  Instead of skipping lunch drink protein shake or have a protein snack to avoid lows  Have protein snack in evening before bed (cheese stick, turkey and cheese roll up, greek yogurt cup)  Plan to follow up with pt in 2 weeks. JOHN Wiley, RN - Ambulatory Care Manager - [phone]           Patient will increase activity as tolerated over the next 90 days (pt-stated)   On track      03/07/23  Pt reports he has been trying to walk almost daily 1 - 1 1/2 miles  He does cont ot c/o dizziness which he feels improves with exercise.  He reports he has had this dizziness for years, since his stroke. Discussed slow position changes & getting up slowly. He reports his BP has been stable. Plan to follow up with pt in 2-3 weeks. JOHN Tobias, RN - Ambulatory Care Manager - [phone]     02/09/23  Pt reports he is very weak since having covid PNA in early Jan. He also gets SOB very easily. He does not ambulate with any assist device and denies any falls. Discussed walking for exercise, taking his time and gradually doing more. Also discussed using light hand weights 3-5 lbs (he has 5, 10, 15 lbs) while sitting/watching TV. He thought this was a good idea since he is trying to increase his upper body strength. Pt will:  Walk for exercise 3 times a week, short intervals and gradually increase  Use the light hand weights 4-5 times a week  Plan to follow up with pt in 2 weeks. JOHN Tobias, RN - [facility] - [phone]           Self-schedules and keeps appointments          03/07/23  Discussed pt's Care Gaps and need for Medicare Yearly exam/foot - he is considering changing providers. Told him to schedule \"medicare yearly exam\". Discussed using [facility]. Discussed scheduling eye exam. Also discussed follow up with ENT re: dizziness and decreased hearing & Sleep dr re: OSBALDO not able to use CPAP. Pt & GF voice understanding. Sent My Chart message with ayah. Pt will:  Schedule appoint with PCP for Medicare yearly exam  Schedule appoint for eye exam  Schedule with ENT for hearing test & dizziness  Schedule with Sleep Dr to discuss OSBALDO and CPAP options/alternatives  Plan to follow up with pt in 2-3 weeks.    JOHN Tobias, RN - Ambulatory Care Manager - [phone]

## 2023-03-11 DIAGNOSIS — I10 PRIMARY HYPERTENSION: ICD-10-CM

## 2023-03-12 DIAGNOSIS — M10.00 IDIOPATHIC GOUT, UNSPECIFIED CHRONICITY, UNSPECIFIED SITE: ICD-10-CM

## 2023-03-12 RX ORDER — FEBUXOSTAT 40 MG/1
TABLET, FILM COATED ORAL
Qty: 30 TABLET | Refills: 5 | Status: SHIPPED | OUTPATIENT
Start: 2023-03-12

## 2023-03-12 RX ORDER — AMLODIPINE BESYLATE 5 MG/1
TABLET ORAL
Qty: 30 TABLET | Refills: 5 | Status: SHIPPED | OUTPATIENT
Start: 2023-03-12

## 2023-03-12 NOTE — TELEPHONE ENCOUNTER
Refill request(s) approved--[facility].     Future Appointments   Date Time Provider Ping Almaraz   3/16/2023  9:50 AM [facility]   3/20/2023  2:30 PM Reji Osborne MD [facility] BS AMB   4/6/2023  9:30 AM Clementina Angulo NP [facility] BS AMB   5/24/2023  1:00 PM Geneva Capps NP [facility] BS AMB   1/26/2024  2:00 PM Ileene Gilford, MD [facility] BS AMB

## 2023-03-16 ENCOUNTER — OFFICE VISIT (OUTPATIENT)
Dept: ENDOCRINOLOGY | Age: 78
End: 2023-03-16

## 2023-03-16 VITALS
WEIGHT: 206 LBS | SYSTOLIC BLOOD PRESSURE: 145 MMHG | DIASTOLIC BLOOD PRESSURE: 77 MMHG | HEIGHT: 65 IN | HEART RATE: 91 BPM | BODY MASS INDEX: 34.32 KG/M2

## 2023-03-16 DIAGNOSIS — E78.2 MIXED HYPERLIPIDEMIA: ICD-10-CM

## 2023-03-16 DIAGNOSIS — E11.65 TYPE 2 DIABETES MELLITUS WITH HYPERGLYCEMIA, WITHOUT LONG-TERM CURRENT USE OF INSULIN (HCC): Primary | ICD-10-CM

## 2023-03-16 DIAGNOSIS — I10 PRIMARY HYPERTENSION: ICD-10-CM

## 2023-03-16 RX ORDER — DULAGLUTIDE 1.5 MG/.5ML
1.5 INJECTION, SOLUTION SUBCUTANEOUS
Qty: 4 EACH | Refills: 1 | Status: SHIPPED | OUTPATIENT
Start: 2023-03-16

## 2023-03-16 RX ORDER — DULAGLUTIDE 0.75 MG/.5ML
0.75 INJECTION, SOLUTION SUBCUTANEOUS
Qty: 4 EACH | Refills: 2 | Status: SHIPPED | OUTPATIENT
Start: 2023-03-16

## 2023-03-16 RX ORDER — DULAGLUTIDE 1.5 MG/.5ML
1.5 INJECTION, SOLUTION SUBCUTANEOUS
Qty: 4 EACH | Refills: 1 | Status: SHIPPED
Start: 2023-03-16 | End: 2023-03-16 | Stop reason: DRUGHIGH

## 2023-03-16 RX ORDER — LANOLIN ALCOHOL/MO/W.PET/CERES
1000 CREAM (GRAM) TOPICAL DAILY
COMMUNITY

## 2023-03-16 RX ORDER — LANOLIN ALCOHOL/MO/W.PET/CERES
CREAM (GRAM) TOPICAL
COMMUNITY

## 2023-03-16 NOTE — PATIENT INSTRUCTIONS
Add trulicity 2.76GG once a week for 4 weeks  Then fill Rx for 1.5mg once a week for 4 weeks  The call to get Rx for 3.0mg to take weekly    Stop the metformin after 2 weeks    Referral made to [facility] ([phone])

## 2023-03-16 NOTE — LETTER
3/16/2023    Patient: Damián Allison   YOB: 1945   Date of Visit: 3/16/2023     Aruna Peterson MD  [address] [place] [address] [address] [place] [address]  Via In Basket    Dear Aruna Peterson MD,      Thank you for referring Mr. Olga Zavala to [facility] for evaluation. My notes for this consultation are attached. If you have questions, please do not hesitate to call me. I look forward to following your patient along with you.       Sincerely,    Becky Altamirano MD

## 2023-03-16 NOTE — PROGRESS NOTES
Chief Complaint   Patient presents with    Diabetes       Patient was last seen: 3 months ago 12/14/2022     General:   No more steroids  Not more active - but some walking - but gets dizzy, recent virus, ER visits     Changed to [facility] [employer] in January  Won't cover CGM    No donut hole so far     A1c: last a1c was 7.0     DM Medications:    Metformin 1000mg BID    Last Changes: : no recent changes    Sugar Checks: checks up to 2 x day     AM: reports:   69, 102    PM: reports:  152, 220(ate something bad)    LOWs:  has low sugars     DIET: is working on it, has received diabetic meal training  - long ago    EXERCISE: engages in activity, several times a week not much recently     HTN: at goal, on Ca-Blk, HTN followed by PCP     LIPIDS: on statin, lipids followed by PCP    RENAL: has moderate renal insufficiency, is followed by Nephrology     EYES: eye exam in past year, has no retinopathy     FEET: has no current issues     DENTAL:  overdue for dentist, needs dental work     HEART:  no chest pain, shortness of breath or claudication, has no cardiac history h/o stroke    ASA:  is on aspirin     SYMPTOMS: no polyuria, thirst or blurred vision     THYROID: no known thyroid issue    DIABETES HISTORY:   From initial visit 12/14/2022   DM2 ~10 years  On metformin  No PECK, no TZD, DPP4, SGLT, GLP1    Was on insulin from start until 1-2 years ago  Was very active   Gout for 3 months so not active (ankle, then knee)   Had steroids, shots in knee too  Gout gone a couple week ago     Getting PT for neck issues      LABS/STUDIES:   12/1/22 GFR 51%    Lab Results   Component Value Date/Time    Hemoglobin A1c (POC) 5.4 09/20/2021 11:36 AM       Lab Results   Component Value Date/Time    Hemoglobin A1c 6.8 (H) 12/28/2021 11:22 AM    Hemoglobin A1c 5.9 (H) 06/18/2021 02:55 PM    Hemoglobin A1c 7.1 (H) 05/29/2019 05:09 AM    Hemoglobin A1c, External 7.0 06/01/2022 12:00 AM    Hemoglobin A1c, External 5.6 01/12/2021 12:00 AM    Glucose 104 (H) 01/17/2023 12:55 PM    Glucose (POC) 198 (H) 01/06/2023 12:36 AM    GFR est AA 53 (L) 09/15/2021 10:30 AM    GFR est non-AA 46 (L) 09/15/2021 10:30 AM    eGFR 46 (L) 01/17/2023 12:55 PM    eGFR 54 (L) 01/06/2023 12:46 PM    Microalb/Creat ratio (ug/mg creat.) 12 12/28/2021 12:00 AM    Creatinine 1.55 (H) 01/17/2023 12:55 PM      Lab Results   Component Value Date/Time    GFR est non-AA 46 (L) 09/15/2021 10:30 AM    GFR est non-AA 52 (L) 08/04/2021 12:00 AM    GFR est non-AA 64 06/11/2021 12:00 AM    eGFR 46 (L) 01/17/2023 12:55 PM    eGFR 54 (L) 01/06/2023 12:46 PM    eGFR 48 (L) 01/06/2023 12:45 PM    eGFR 40 (L) 01/05/2023 07:26 PM    eGFR 37 (L) 10/21/2022 02:39 PM    eGFR 40 (L) 03/04/2022 12:00 AM       Lab Results   Component Value Date/Time    Cholesterol, total 149 08/16/2021 12:00 AM    Triglyceride 89 08/16/2021 12:00 AM    HDL Cholesterol 54 08/16/2021 12:00 AM    LDL, calculated 78 08/16/2021 12:00 AM    LDL, calculated 84.6 10/01/2020 11:43 AM        Lab Results   Component Value Date/Time    TSH 1.340 06/18/2021 02:55 PM    TSH 1.740 08/14/2019 02:31 PM    TSH 0.71 05/28/2019 11:00 PM         Past Medical History:   Diagnosis Date    CAD (coronary artery disease)     Chronic kidney disease     stones    Chronic obstructive pulmonary disease ([facility]     Cirrhosis ([facility] [address]     Colon polyp     Diabetes ([facility] [address]     Hypertension     Stroke ([facility] 1980s    right hand neuropathy      Employer:  Retired     Blood pressure (!) 145/77, pulse 91, height 5' 5\" (1.651 m), weight 206 lb (93.4 kg). Weight Metrics 3/16/2023 2/6/2023 1/26/2023 1/17/2023 1/13/2023 1/5/2023 1/2/2023   Weight 206 lb 198 lb 203 lb 208 lb 12.4 oz 200 lb - 200 lb 3.2 oz   BMI 34.28 kg/m2 32.95 kg/m2 33.78 kg/m2 34.74 kg/m2 33.28 kg/m2 33.32 kg/m2 33.32 kg/m2        EXAM  - not done today     Assessment/Plan:   1.  Type 2 diabetes mellitus with hyperglycemia, without long-term current use of insulin ([facility]  New insurance does not cover continuous glucose monitor b/c not on insulin    Given CVA recommend SGLT or GLP - opted for later   Add trulicity 9.06EZ once a week for 4 weeks  Then fill Rx for 1.5mg once a week for 4 weeks  The call to get Rx for 3.0mg to take weekly    Stop the metformin after 2 weeks (he had concerns about this med and wanted to stop it if possible)    Referral made to [facility] ([phone])    Significant discussion with patient about his history of pancreatitis and risk for pancreatitis with GLP1. Prior episode was gallstone related so more a structural issue that resolved with choly. Patient still wished to pursue GLP1 treatment aware if the risk, which is likely low    Discussed patient assistance programs if goes in donut hole - seems to think would qualify    2. Primary hypertension  On meds per PCP    3. Mixed hyperlipidemia  On statin per PCP    4. History of stroke  Considering SGLT or GLP-1----- GLP1 start today    5. Type 2 diabetes mellitus with stage 3a chronic kidney disease, without long-term current use of insulin ([facility]  Followed by Nephrology      Orders Placed This Encounter    HEMOGLOBIN A1C WITH EAG    METABOLIC PANEL, BASIC    MICROALBUMIN, UR, RAND W/ MICROALB/CREAT RATIO    REFERRAL TO DIABETIC EDUCATION     Referral Priority:   Routine     Referral Type:   Consultation     Referral Reason:   Specialty Services Required     Number of Visits Requested:   1    dulaglutide (Trulicity) 0.54 EL/2.0 mL sub-q pen     Si.5 mL by SubCUTAneous route every seven (7) days. Dispense:  4 Each     Refill:  2    DISCONTD: dulaglutide (Trulicity) 1.5 FC/2.5 mL sub-q pen     Si.5 mL by SubCUTAneous route every seven (7) days. Dispense:  4 Each     Refill:  1    dulaglutide (Trulicity) 1.5 GY/4.9 mL sub-q pen     Si.5 mL by SubCUTAneous route every seven (7) days.      Dispense:  4 Each     Refill:  1

## 2023-03-20 ENCOUNTER — OFFICE VISIT (OUTPATIENT)
Dept: INTERNAL MEDICINE CLINIC | Age: 78
End: 2023-03-20
Payer: MEDICARE

## 2023-03-20 VITALS
BODY MASS INDEX: 35.02 KG/M2 | DIASTOLIC BLOOD PRESSURE: 78 MMHG | SYSTOLIC BLOOD PRESSURE: 131 MMHG | HEIGHT: 65 IN | TEMPERATURE: 98 F | RESPIRATION RATE: 16 BRPM | HEART RATE: 76 BPM | WEIGHT: 210.2 LBS | OXYGEN SATURATION: 96 %

## 2023-03-20 DIAGNOSIS — K21.9 GASTROESOPHAGEAL REFLUX DISEASE, UNSPECIFIED WHETHER ESOPHAGITIS PRESENT: ICD-10-CM

## 2023-03-20 DIAGNOSIS — G90.1 DYSAUTONOMIA (HCC): Primary | ICD-10-CM

## 2023-03-20 DIAGNOSIS — M10.00 IDIOPATHIC GOUT, UNSPECIFIED CHRONICITY, UNSPECIFIED SITE: ICD-10-CM

## 2023-03-20 DIAGNOSIS — R09.81 NASAL CONGESTION: ICD-10-CM

## 2023-03-20 PROCEDURE — G8427 DOCREV CUR MEDS BY ELIG CLIN: HCPCS | Performed by: INTERNAL MEDICINE

## 2023-03-20 PROCEDURE — 1101F PT FALLS ASSESS-DOCD LE1/YR: CPT | Performed by: INTERNAL MEDICINE

## 2023-03-20 PROCEDURE — 1123F ACP DISCUSS/DSCN MKR DOCD: CPT | Performed by: INTERNAL MEDICINE

## 2023-03-20 PROCEDURE — G8510 SCR DEP NEG, NO PLAN REQD: HCPCS | Performed by: INTERNAL MEDICINE

## 2023-03-20 PROCEDURE — 99214 OFFICE O/P EST MOD 30 MIN: CPT | Performed by: INTERNAL MEDICINE

## 2023-03-20 PROCEDURE — G8536 NO DOC ELDER MAL SCRN: HCPCS | Performed by: INTERNAL MEDICINE

## 2023-03-20 PROCEDURE — 3017F COLORECTAL CA SCREEN DOC REV: CPT | Performed by: INTERNAL MEDICINE

## 2023-03-20 PROCEDURE — G8417 CALC BMI ABV UP PARAM F/U: HCPCS | Performed by: INTERNAL MEDICINE

## 2023-03-20 RX ORDER — FLUTICASONE PROPIONATE 50 MCG
2 SPRAY, SUSPENSION (ML) NASAL DAILY
Qty: 16 G | Refills: 5 | Status: SHIPPED | OUTPATIENT
Start: 2023-03-20

## 2023-03-20 RX ORDER — AZELASTINE 1 MG/ML
2 SPRAY, METERED NASAL
Qty: 30 EACH | Refills: 5 | Status: SHIPPED | OUTPATIENT
Start: 2023-03-20

## 2023-03-20 RX ORDER — PANTOPRAZOLE SODIUM 40 MG/1
40 TABLET, DELAYED RELEASE ORAL DAILY
Qty: 90 TABLET | Refills: 1 | Status: SHIPPED | OUTPATIENT
Start: 2023-03-20

## 2023-03-20 NOTE — PROGRESS NOTES
Becky Sin (: 1945) is a 78 y.o. male, established patient, here for evaluation of the following chief complaint(s):  Chief Complaint   Patient presents with    Follow-up     3 month follow up       Assessment and Plan:       ICD-10-CM ICD-9-CM    1. Dysautonomia ([address]  G90.1 337.9       2. Nasal congestion  R09.81 478.19 azelastine (ASTELIN) 137 mcg (0.1 %) nasal spray      fluticasone propionate (FLONASE) 50 mcg/actuation nasal spray      3. Gastroesophageal reflux disease, unspecified whether esophagitis present  K21.9 530.81 pantoprazole (PROTONIX) 40 mg tablet      4. Idiopathic gout, unspecified chronicity, unspecified site  M10.00 274.9           1:  Neuro rec's to add limited salt to see if would help dizziness reviewed. He is not interested in recommendations; not planning neuro follow-up at this time. Reviewed follow-up with Dr. Anny Palma note was PRN, but that he could see to review plan to manage dizziness. He also thinks she is not helping him and not planning follow-up at this time. 2-4:  Refilled medications here that are due--from me as his PCP until establishes with new provider. He had both nasal sprays in medicine bag with him today, but not pantoprazole. He notes only taking meds in bag, but in clarification of PPI refill, requested refill, since he still has GERD symptoms and needs for mgt. 4.  Has current refill for gout medication. Plan medication monitoring with new PCP at follow-up below. He notes only not dizzy during mgt for gout. Reviewed prednisone could have contributed to some fluid retention, which would be same effect neuro had reviewed with increase salt slightly in diet. He will review with new PCP for further rec's and mgt. Follow-up and Dispositions    Return for as scheduled with new PCP on 23.        reviewed medications and side effects in detail    Plan and evaluation (above) reviewed with pt at visit  Patient voiced understanding of plan and provided with time to ask/review questions. After Visit Summary (AVS) provided to pt after visit with additional instructions as needed/reviewed. Future Appointments   Date Time Provider Ping Almaraz   4/6/2023  9:30 AM Clementina Angulo NP [facility] BS AMB   5/24/2023  1:00 PM Benjamín Cappsnojohnathan Antonio NP [facility] BS AMB   6/23/2023  3:30 PM MD NNIO Rivera [facility] BS AMB   1/26/2024  2:00 PM MD ANDREAS Russ BS AMB   --Updated future visits after patient check-out. History of Present Illness:     Notes (nursing/rooming note copied below in italics):  Been very dizziness     He has been having CM with Nurse Micah--last contact 3-7-23 reviewed. Seeing providers in [facility] for hepatology, rheumatology, DM since last visit here. He also saw Dr. Judy Lopez in Jan 2023 for likely cause of dizziness--dysautonomia orthostatic hypotension syndrome. She recommended re-introducing some salt due to dysautonomia symptoms and discontinuation/reduction of some HTN meds as appropriate. He is currently only on amlodipine from DORA Angulo with hepatology. She had written last in Sept 14, 2022 for pt as well--then as 30-day with 1 refill. LOV with me was Dec 2022 with 3mo follow-up planned. He notes he has added some salt to his food. He is just adding at the table, but his GF tells him not to. He feels like his doctors/providers are not helping him. He doesn't feel like anyone here has helped him either. He noted in review of specialists mgt plans and attempts to clarify and reinforce his current meds and mgt. Tried to clarify how I could help, either by additional evaluation or mgt, and he again stated no one here had helped him either. He noted he was planning to seei new PCP, but unclear on provider's name or location. Clarified as below that provider is in [facility] as below--at [facility].     Since he is not planning follow-up here, no follow-up recommended today.      Nursing screenings reviewed by provider at visit. Prior to Admission medications    Medication Sig Start Date End Date Taking? Authorizing Provider   cyanocobalamin 1,000 mcg tablet Take 1,000 mcg by mouth daily. Yes Provider, Historical   ferrous sulfate 325 mg (65 mg iron) tablet Take  by mouth Daily (before breakfast). Yes Provider, Historical   dulaglutide (Trulicity) 6.85 KZ/3.3 mL sub-q pen 0.5 mL by SubCUTAneous route every seven (7) days. 3/16/23  Yes Real Segovia MD   dulaglutide (Trulicity) 1.5 MD/2.1 mL sub-q pen 0.5 mL by SubCUTAneous route every seven (7) days. 3/16/23  Yes Real Segovia MD   amLODIPine (NORVASC) 5 mg tablet TAKE ONE TABLET BY MOUTH DAILY 3/12/23  Yes Clementina Angulo, NP   febuxostat (ULORIC) 40 mg tab tablet TAKE ONE TABLET BY MOUTH DAILY 3/12/23  Yes Benito Guadarrama MD   glucose blood VI test strips (Pharmacist Choice) strip Use preferred brand; Check glucose 3 times daily, Diagnosis E11.65 1/23/23  Yes Real Segovia MD   lancets misc Use to test blood sugars 3 times per day. USE BRAND PREFERRED BY INSURANCE 1/23/23  Yes Real Segovia MD   glimepiride (AMARYL) 4 mg tablet One tablet twice a day before meals 1/9/23  Yes Real Segovia MD   pantoprazole (PROTONIX) 40 mg tablet Take 1 Tablet by mouth daily. Take instead of 20mg tab (prior 20mg 2 times daily). 9/26/22  Yes Benito Guadarrama MD   metFORMIN (GLUCOPHAGE) 1,000 mg tablet Take 1,000 mg by mouth two (2) times a day. 5/30/22  Yes Provider, Historical   cholecalciferol (Vitamin D3) (2,000 UNITS /50 MCG) cap capsule    Yes Provider, Historical   fluticasone propionate (FLONASE) 50 mcg/actuation nasal spray 2 Sprays by Both Nostrils route daily. 6/13/22  Yes Benito Guadarrama MD   azelastine (ASTELIN) 137 mcg (0.1 %) nasal spray 2 Sprays by Both Nostrils route two (2) times daily as needed for Rhinitis.  6/13/22  Yes Benito Guadarrama MD   simvastatin (ZOCOR) 20 mg tablet Take 20 mg by mouth nightly. Yes Provider, Historical   aspirin delayed-release 81 mg tablet Take 1 Tab by mouth daily. 5/29/19  Yes Zora Max NP   Blood-Glucose Meter monitoring kit Use to test blood sugars 3 times per day. USE BRAND PREFERRED BY INSURANCE  Patient not taking: Reported on 3/20/2023 1/23/23   MD JUAN Borges    Vitals:    03/20/23 1433 03/20/23 1501   BP: (!) 143/83 131/78   Pulse: 76    Resp: 16    Temp: 98 °F (36.7 °C)    TempSrc: Oral    SpO2: 96%    Weight: 210 lb 3.2 oz (95.3 kg)    Height: 5' 5\" (1.651 m)       Body mass index is 34.98 kg/m². Physical Exam:     Physical Exam  Vitals and nursing note reviewed. Constitutional:       General: He is not in acute distress. Appearance: Normal appearance. He is well-developed. He is not diaphoretic. HENT:      Head: Normocephalic and atraumatic. Mouth/Throat:      Mouth: Mucous membranes are moist.   Eyes:      General: No scleral icterus. Right eye: No discharge. Left eye: No discharge. Conjunctiva/sclera: Conjunctivae normal.   Cardiovascular:      Rate and Rhythm: Normal rate and regular rhythm. Pulses: Normal pulses. Heart sounds: Normal heart sounds. No murmur heard. No friction rub. No gallop. Pulmonary:      Effort: Pulmonary effort is normal. No respiratory distress. Breath sounds: Normal breath sounds. No stridor. No wheezing, rhonchi or rales. Abdominal:      General: Bowel sounds are normal. There is no distension. Palpations: Abdomen is soft. Tenderness: There is no abdominal tenderness. There is no guarding or rebound. Musculoskeletal:         General: No swelling, tenderness or deformity. Right lower leg: No edema. Left lower leg: No edema. Skin:     General: Skin is warm. Coloration: Skin is not jaundiced or pale. Findings: No bruising, erythema or rash. Neurological:      General: No focal deficit present. Mental Status: He is alert. Motor: No abnormal muscle tone. Coordination: Coordination normal.      Gait: Gait normal.   Psychiatric:         Mood and Affect: Mood normal.         Behavior: Behavior normal.         Thought Content: Thought content normal.         Judgment: Judgment normal.       An electronic signature was used to authenticate this note.   -- Noe Dennis MD

## 2023-03-20 NOTE — PROGRESS NOTES
Rm: 19  Been very dizziness     Chief Complaint   Patient presents with    Follow-up     3 month follow up       Visit Vitals  BP (!) 143/83 (BP 1 Location: Left upper arm, BP Patient Position: Sitting, BP Cuff Size: Adult long)   Pulse 76   Temp 98 °F (36.7 °C) (Oral)   Resp 16   Ht 5' 5\" (1.651 m)   Wt 210 lb 3.2 oz (95.3 kg)   SpO2 96%   BMI 34.98 kg/m²       1. Have you been to the ER, urgent care clinic since your last visit? Hospitalized since your last visit? No    2. Have you seen or consulted any other health care providers outside of the [address] [place] [address] since your last visit? Include any pap smears or colon screening. Yes Where: [facility]              Prior to Admission medications    Medication Sig Start Date End Date Taking? Authorizing Provider   cyanocobalamin 1,000 mcg tablet Take 1,000 mcg by mouth daily. Yes Provider, Historical   ferrous sulfate 325 mg (65 mg iron) tablet Take  by mouth Daily (before breakfast). Yes Provider, Historical   dulaglutide (Trulicity) 0.04 WB/3.8 mL sub-q pen 0.5 mL by SubCUTAneous route every seven (7) days. 3/16/23  Yes Linda Dickens MD   dulaglutide (Trulicity) 1.5 UV/8.9 mL sub-q pen 0.5 mL by SubCUTAneous route every seven (7) days. 3/16/23  Yes Linda Dickens MD   amLODIPine (NORVASC) 5 mg tablet TAKE ONE TABLET BY MOUTH DAILY 3/12/23  Yes Clementina Angulo, NP   febuxostat (ULORIC) 40 mg tab tablet TAKE ONE TABLET BY MOUTH DAILY 3/12/23  Yes Lalitha Anglin MD   glucose blood VI test strips (Pharmacist Choice) strip Use preferred brand; Check glucose 3 times daily, Diagnosis E11.65 1/23/23  Yes Linda Dickens MD   lancets misc Use to test blood sugars 3 times per day. USE BRAND PREFERRED BY INSURANCE 1/23/23  Yes Linda Dickens MD   glimepiride (AMARYL) 4 mg tablet One tablet twice a day before meals 1/9/23  Yes Linda Dickens MD   pantoprazole (PROTONIX) 40 mg tablet Take 1 Tablet by mouth daily.  Take instead of 20mg tab (prior 20mg 2 times daily). 9/26/22  Yes Parker Warren MD   metFORMIN (GLUCOPHAGE) 1,000 mg tablet Take 1,000 mg by mouth two (2) times a day. 5/30/22  Yes Provider, Historical   cholecalciferol (Vitamin D3) (2,000 UNITS /50 MCG) cap capsule    Yes Provider, Historical   fluticasone propionate (FLONASE) 50 mcg/actuation nasal spray 2 Sprays by Both Nostrils route daily. 6/13/22  Yes Parker Warren MD   azelastine (ASTELIN) 137 mcg (0.1 %) nasal spray 2 Sprays by Both Nostrils route two (2) times daily as needed for Rhinitis. 6/13/22  Yes Parker Warren MD   simvastatin (ZOCOR) 20 mg tablet Take 20 mg by mouth nightly. Yes Provider, Historical   aspirin delayed-release 81 mg tablet Take 1 Tab by mouth daily. 5/29/19  Yes Effie Ordaz NP   Blood-Glucose Meter monitoring kit Use to test blood sugars 3 times per day.  USE BRAND PREFERRED BY INSURANCE  Patient not taking: Reported on 3/20/2023 1/23/23   Narendra Tatum MD

## 2023-03-24 LAB
ALBUMIN/CREAT UR: 25 MG/G CREAT (ref 0–29)
BUN SERPL-MCNC: 14 MG/DL (ref 8–27)
BUN/CREAT SERPL: 10 (ref 10–24)
CALCIUM SERPL-MCNC: 9.1 MG/DL (ref 8.6–10.2)
CHLORIDE SERPL-SCNC: 106 MMOL/L (ref 96–106)
CO2 SERPL-SCNC: 23 MMOL/L (ref 20–29)
CREAT SERPL-MCNC: 1.39 MG/DL (ref 0.76–1.27)
CREAT UR-MCNC: 79.1 MG/DL
EGFRCR SERPLBLD CKD-EPI 2021: 53 ML/MIN/1.73
EST. AVERAGE GLUCOSE BLD GHB EST-MCNC: 126 MG/DL
GLUCOSE SERPL-MCNC: 103 MG/DL (ref 70–99)
HBA1C MFR BLD: 6 % (ref 4.8–5.6)
MICROALBUMIN UR-MCNC: 19.9 UG/ML
POTASSIUM SERPL-SCNC: 4.2 MMOL/L (ref 3.5–5.2)
SODIUM SERPL-SCNC: 143 MMOL/L (ref 134–144)

## 2023-03-30 ENCOUNTER — PATIENT OUTREACH (OUTPATIENT)
Dept: CASE MANAGEMENT | Age: 78
End: 2023-03-30

## 2023-04-06 ENCOUNTER — OFFICE VISIT (OUTPATIENT)
Dept: HEMATOLOGY | Age: 78
End: 2023-04-06

## 2023-04-06 NOTE — PROGRESS NOTES
[facility], [place], [address], [facility], [place]      TERI Rios S Adele, AGPCNP-BC   Eric Tapia, ACNPC-AG   Mau Yoon, FNP-C  Rachid Shaw, FNP-C   Patsy Lundborg, AGPCNP-BC      [address]   at [address], [address]   [phone]   FAX: [address]   at [address], [address] [place] [address], [address]   [phone]   FAX: [phone]       Patient Care Team:  Attila Aguilar MD as PCP - General (Infectious Disease Physician)  Attila Aguilar MD as PCP - [facility] Empaneled Provider  Tosin Faith MD (General Surgery)  Darya Dinh MD as Consulting Provider (Sleep Medicine Physician)  Attila Aguilar MD as Referring Provider (Infectious Disease Physician)  Buck Eisenberg NP as Consulting Provider (Nurse Practitioner)  Emma Jang MD as Physician (Endocrinology Physician)  Javed Jimenez MD as Consulting Provider (General Surgery)  Estevan Stern MD (Rheumatology Internal Medicine)  Mariana Childers RN as Ambulatory Care Manager      Problem List  Date Reviewed: 1/26/2023            Codes Class Noted    Acute idiopathic gout of right knee ICD-10-CM: M10.061  ICD-9-CM: 274.01  11/17/2022        ALFARO (nonalcoholic steatohepatitis) ICD-10-CM: K75.81  ICD-9-CM: 571.8  9/17/2022        Dysautonomia ([facility] ICD-10-CM: G90.1  ICD-9-CM: 337.9  6/13/2022        Chronic obstructive pulmonary disease, unspecified COPD type ([facility].) ICD-10-CM: J44.9  ICD-9-CM: 496  6/13/2022        Chronic renal disease, stage III ICD-10-CM: N18.30  ICD-9-CM: 585.3  6/8/2022        History of cholecystectomy ICD-10-CM: Z90.49  ICD-9-CM: V45.79  6/25/2021        Gallstone pancreatitis ICD-10-CM: K85.10  ICD-9-CM: 577.0, 574.20  6/3/2021 Cirrhosis ([facility].) ICD-10-CM: K74.60  ICD-9-CM: 571.5  3/12/2021        History of CVA (cerebrovascular accident) ICD-10-CM: Z86.73  ICD-9-CM: V12.54  2/3/2021        Thrombocytopenia ([facility].) ICD-10-CM: D69.6  ICD-9-CM: 287.5  12/3/2020        Class 1 obesity due to excess calories with serious comorbidity and body mass index (BMI) of 32.0 to 32.9 in adult ICD-10-CM: E66.09, W40.42  ICD-9-CM: 278.00, V85.32  12/3/2020        Type 2 diabetes mellitus with other circulatory complication, without long-term current use of insulin [facility]) ICD-10-CM: E11.59  ICD-9-CM: 250.70  12/3/2020         Damián Allison [facility] for 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 The active problem list, all pertinent past medical history, medications, liver histology, endoscopic studies, and laboratory findings related to the liver disorder were reviewed and discussed with the patient. The patient is a 70year old  male who was found to have thrombocytopenia in 12/2020. A liver biopsy in 5/2021 demonstrated ALFARO with cirrhosis. Following the biopsy he developed severe abdominal pain and was found to have acute gallstone pancreatitis. This resolved with conservative treatment. He underwent a cholecystectomy 5/2021. Since the last office visit the patient has lingering affects of being diagnosed with Covid 12/2022 followed by pneumonia and gastroenteritis. He notes lower extremity weakness and shortness of breath. Vertigo is ongoing but he has started walking 2-4 miles daily. Metformin was discontinued and he was started on Trulicity 0/8984. The patient has the following symptoms which could be attributed to the liver disorder:  Generalized weakness. The patient is not experiencing the following symptoms which are commonly seen in this liver disorder: nausea, vomiting, pain on the right side over the liver or abdominal pain.      The patient has mild limitations in functional activities which can be attributed to a past CVA. ASSESSMENT AND PLAN:  Cirrhosis  Cirrhosis is probably secondary to past alcohol use and NAFLD  The diagnosis of cirrhosis is based upon liver histology, imaging, laboratory studies    Have performed laboratory testing to monitor liver function and degree of liver injury. This included BMP, hepatic panel, CBC with platelet count and INR. Laboratory testing from 1/6/2023 reviewed in detail. Follow-up testing ordered today. The liver transaminases and ALP are normal. The ALP is normal. The liver function is normal. The platelet count is depressed. The patient has never developed any complications of cirrhosis to date. The CTP is 5. Child class A. The MELD score is 10 based on most recent sr. Creatinine and assuming the INR is still 1.0. The sr. Creatinine elevated the MELD and may not reflect the true severity of disease. ALFARO  The diagnosis is based upon liver biopsy, Fiboscan CAP score, features of metabolic syndrome, serologic studies that are negative for other causes of chronic liver disease,     A liver biopsy performed in 5/2021 demonstrates ALFARO. 25-33% macrovesicualr and micovesicular steatosis, mild inflammation, mild ballooning, and cirrhosis. Fibroscan in 3/2021 demonstrated 51 kPa and  suggesting fatty liver and cirrhosis. If the patient looses 20% of current body weight, which is 36 pounds, down to a weight of of 150 pounds, steatosis should resolve. Once all steatosis has resolved inflammation will resolve. Weight increased after his illnesses earlier this year. He has returned to exercise and is changing eating habits. DM  Improved on Trulicity. Followed by endocrinology. Elevated Sr. Creatinine  It is unclear why the Sr. Creatinine is elevated. This could be related to DM and Hypertension.    Recommend follow-up with Nephrology    Hypertension  Fairly well controlled on amlodipine 5 mg daily. Was borderline today. Vertigo  The patient states this is a chronic issues since the CVA. It has impacted his gait and he is fearful of having a fall. Remains ongoing, PT helped initially but this has now returned. Counseling for diet and weight loss in patients with confirmed or suspected NAFLD  The patient was counseled regarding diet and exercise to achieve weight loss. The best diet for patients with fatty liver is one very low in carbohydrates and enriched with protein. The patient was told not to consume any food products and drinks containing fructose as this enhances hepatic fat synthesis. Screening for Esophageal varices   The patient does not have esophageal or gastric varices. The last EGD to assess for varices was performed in 5/2021. No varices found. Hepatic encephalopathy   Overt HE has not developed to date. There is no need for treatment with lactulose and/or Xifaxan at this time. Anemia   This is due to multifactorial causes including recent prolonged hospitalization and surgery. Will order ferritin and iron sat today. Thrombocytopenia   This is secondary to cirrhosis. There is no evidence of overt bleeding. No treatment is required. The platelet count is adequate for the patient to undergo procedures without the need for platelet transfusion or platelet growth factors. Screening for Hepatocellular Carcinoma  [address] screening was last performed with MRI 2/2023 and did not suggest [address] AFP normal 1/2023. Will repeat AFP today due to questionable mass on ultrasound. Treatment of other medical problems in patients with chronic liver disease  There are no contraindications for the patient to take most medications that are necessary for treatment of other medical issues. The patient has cirrhrosis and should avoid taking NSAIDs which are associated with a higher rate of developing ROBBIE.         The patient can take Any medications utilized for treatment of DM, Statins to treat hypercholesterolemia    Counseling for alcohol in patients with chronic liver disease  The patient has cirrhosis and was advised to be abstinent from all alcohol including non-alcoholic beer which does contain some alcohol. The patient has previously consumed alcohol in excess. The patient has not consumed alcohol since 2013. Osteoporosis  The risk of osteoporosis is increased in patients with cirrhosis. DEXA bone density to assess for osteoporosis has not been performed. This should be ordered by the patients primary care physician. Vaccinations   Vaccination for viral hepatitis A is not needed. The patient has serologic evidence of prior exposure or vaccination with immunity. Routine vaccinations against other bacterial and viral agents can be performed as indicated. Annual flu vaccination should be administered if indicated. ALLERGIES  No Known Allergies    MEDICATIONS  Current Outpatient Medications   Medication Sig    azelastine (ASTELIN) 137 mcg (0.1 %) nasal spray 2 Sprays by Both Nostrils route two (2) times daily as needed for Rhinitis. fluticasone propionate (FLONASE) 50 mcg/actuation nasal spray 2 Sprays by Both Nostrils route daily. pantoprazole (PROTONIX) 40 mg tablet Take 1 Tablet by mouth daily. Take instead of 20mg tab (prior 20mg 2 times daily). cyanocobalamin 1,000 mcg tablet Take 1,000 mcg by mouth daily. ferrous sulfate 325 mg (65 mg iron) tablet Take  by mouth Daily (before breakfast). dulaglutide (Trulicity) 2.14 GK/6.1 mL sub-q pen 0.5 mL by SubCUTAneous route every seven (7) days. dulaglutide (Trulicity) 1.5 JH/1.9 mL sub-q pen 0.5 mL by SubCUTAneous route every seven (7) days. amLODIPine (NORVASC) 5 mg tablet TAKE ONE TABLET BY MOUTH DAILY    febuxostat (ULORIC) 40 mg tab tablet TAKE ONE TABLET BY MOUTH DAILY    Blood-Glucose Meter monitoring kit Use to test blood sugars 3 times per day. USE BRAND PREFERRED BY INSURANCE (Patient not taking: Reported on 3/20/2023)    glucose blood VI test strips (Pharmacist Choice) strip Use preferred brand; Check glucose 3 times daily, Diagnosis E11.65    lancets misc Use to test blood sugars 3 times per day. USE BRAND PREFERRED BY INSURANCE    glimepiride (AMARYL) 4 mg tablet One tablet twice a day before meals    cholecalciferol (Vitamin D3) (2,000 UNITS /50 MCG) cap capsule     simvastatin (ZOCOR) 20 mg tablet Take 20 mg by mouth nightly. aspirin delayed-release 81 mg tablet Take 1 Tab by mouth daily. No current facility-administered medications for this visit. SYSTEM REVIEW NOT RELATED TO LIVER DISEASE OR REVIEWED ABOVE:  Constitution systems: Negative for fever, chills, weight gain, weight loss. Eyes: Negative for visual changes. ENT: Negative for sore throat, painful swallowing. Respiratory: Negative for cough, hemoptysis, SOB. Cardiology: Negative for chest pain, palpitations. GI:  Negative for constipation or diarrhea. : Negative for urinary frequency, dysuria, hematuria, nocturia. Skin: Negative for rash. Hematology: Negative for easy bruising, blood clots. Musculo-skeletal: Negative for back pain, muscle pain, weakness. Neurologic: Negative for headaches, dizziness, vertigo, memory problems not related to HE. Psychology: Negative for anxiety, depression. FAMILY HISTORY:  There is no family history of liver disease. There is no family history of immune disorders. SOCIAL HISTORY:  The patient is   The patient has no children. The patient stopped using tobacco products in 2009  The patient has previously consumed alcohol in excess.     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 Eyes: Sclera anicteric. ENT: No oral lesions. Thyroid normal.  Nodes: No adenopathy. Skin: No spider angiomata. No jaundice. No palmar erythema. Respiratory: Left lung clear, right lung coarse rhonchi throughout. History of smoking. Cardiovascular: Regular heart rate. No murmurs. No JVD. Abdomen: Soft non-tender, liver size normal to percussion/palpation. Spleen not palpable. No obvious ascites. Extremities: No edema. No muscle wasting. No gross arthritic changes. Neurologic: Alert and oriented. Cranial nerves grossly intact. No asterixis.     LABORATORY STUDIES:  Liver [place] [address] & Units 1/6/2023   WBC 4.1 - 11.1 K/uL 4.4   ANC 1.8 - 8.0 K/UL 3.3   HGB 12.1 - 17.0 g/dL 12.4 (L)    - 400 K/uL CANCELED   INR 0.9 - 1.2 1.0   AST 15 - 37 U/L 32   ALT 12 - 78 U/L 36   Alk Phos 45 - 117 U/L 60   Bili, Total 0.2 - 1.0 MG/DL 0.5   Bili, Direct 0.00 - 0.40 mg/dL 0.17   Albumin 3.5 - 5.0 g/dL 4.0   BUN 8 - 27 mg/dL 20   Creat 0.76 - 1.27 mg/dL 1.50 (H)   Na 134 - 144 mmol/L 139   K 3.5 - 5.2 mmol/L 4.0   Cl 96 - 106 mmol/L 105   CO2 20 - 29 mmol/L 20   Glucose 70 - 99 mg/dL 325 (H)     Liver [place] [facility] Latest Ref Rng & Units 10/21/2022 10/7/2022   WBC 4.1 - 11.1 K/uL 6.2 5.7   ANC 1.8 - 8.0 K/UL 3.9 4.1   HGB 12.1 - 17.0 g/dL 12.4 12.7 (L)    - 400 K/uL 178 CANCELED   INR 0.9 - 1.2     AST 15 - 37 U/L 22 18   ALT 12 - 78 U/L 34 20   Alk Phos 45 - 117 U/L 79 72   Bili, Total 0.2 - 1.0 MG/DL 0.6 0.4   Bili, Direct 0.00 - 0.40 mg/dL     Albumin 3.5 - 5.0 g/dL 3.7 4.6   BUN 6 - 20 MG/DL 17 17   Creat 0.70 - 1.30 MG/DL 1.87 (H) 1.48 (H)   Na 136 - 145 mmol/L 141 145 (H)   K 3.5 - 5.1 mmol/L 4.5 4.5   Cl 97 - 108 mmol/L 110 (H) 110 (H)   CO2 21 - 32 mmol/L 24 18 (L)   Glucose 65 - 100 mg/dL 111 (H) 132 (H)     Cancer Screening Latest Ref Rng & Units 1/6/2023 3/4/2022 8/4/2021   AFP, Serum 0.0 - 8.4 ng/mL 1.8 2.0 2. 3   AFP-L3% 0.0 - 9.9 % Comment Comment Comment     Laboratory testing from 1/06/2023 reviewed in detail. Additional testing included to evaluate progression or regression of disease. Laboratory testing results from today will be communicated by My Chart. SEROLOGIES:  Serologies Latest Ref Rng & Units 2/3/2021   Hep A Ab, Total Negative   Positive (A)   Hep B Surface Ag Index <0.10   Hep B Surface Ag Interp Negative   Negative   Hep B Core Ab, Total Negative   Negative   Hep B Surface Ab mIU/mL <3.10   Hep B Surface Ab Interp NONREACTIVE   NONREACTIVE   Hep C Ab 0.0 - 0.9 s/co ratio <0.1   Ferritin 26 - 388 NG/ML 27   Iron % Saturation 20 - 50 % 26     LIVER HISTOLOGY:  3/2021: FibroScan performed at [address]. EkPa was 50.8. IQR/med 20%. . The results suggested a fibrosis level of F 4. The CAP score suggests there is hepatic steatosis. 5/2021. Slides reviewed by MLS. Fatty liver. AFL vs NAFLD. 25-33% macrovesicualr and micovesicular steatosis, mild inflammation, mild ballooning, Stage 4 fibrosis. DARIEL (111). ENDOSCOPIC PROCEDURES:  5/2021. EGD performed by MLS. No esophageal varices. No gastric varices. Repeat in 3 years. RADIOLOGY:  12/2020: Liver US:  demonstrated heterogeneous hepatic echotexture with an area of focal sparing in the right lobe. Mildly enlarged spleen measuring 13.1 cm in length.    5/2021. Dynamic MRI/MRCP liver. Changes consistent with cirrhosis. No liver mass lesions. No dilated bile ducts. No bile duct strictures. Pancreatitis. Mild ascites. 9/2021. Ultrasound of liver. Echogenic consistent with cirrhosis. No liver mass lesions. No dilated bile ducts. No ascites. 3/2022. Ultrasound of liver. Echogenic consistent with cirrhosis. No liver mass lesions. No dilated bile ducts. No ascites. 7/2022. MRI of liver. Diffuse hepatic steatosis. No focal lesion. Stable IPMN. No ascites. No ductal dilatation or stones. 1/2023. Ultrasound of liver. Echogenic consistent with cirrhosis. 13 x 18 hypoechoic lesion in the left lobe. No dilated bile ducts. No ascites. 2/2023. MRI of liver. No lesion or mass. Stable IPMN measuring 10 x 12 mm in the pancreatic head. No dilatation. No change in adrenal adenoma measuring 2.4 x 2.5 cm. Areas of ductal stricturing and feeding most prominent in the medial inferior right lobe could reflect PSC. OTHER TESTING:  3/2023. HGB A1C 6.0%    FOLLOW-UP:  All of the issues listed above in the Assessment and Plan were discussed with the patient. All questions were answered. The patient expressed a clear understanding of the above. [facility] in 4 months for ongoing monitoring and treatment. LYNDA Salguero-[facility] of [address] [address] [facility] [address] [id number] [address], 2000 [facility] 22.  [facility]

## 2023-04-06 NOTE — PROGRESS NOTES
Identified pt with two pt identifiers(name and ). Reviewed record in preparation for visit and have obtained necessary documentation. Chief Complaint   Patient presents with    Follow-up      Vitals:    23 0948   BP: (!) 140/91   Pulse: 73   Temp: 97 °F (36.1 °C)   TempSrc: Temporal   SpO2: 98%   Weight: 204 lb (92.5 kg)   Height: 5' 5\" (1.651 m)   PainSc:   0 - No pain       Health Maintenance Review: Patient reminded of \"due or due soon\" health maintenance. I have asked the patient to contact his/her primary care provider (PCP) for follow-up on his/her health maintenance. Coordination of Care Questionnaire:  :   1) Have you been to an emergency room, urgent care, or hospitalized since your last visit? If yes, where when, and reason for visit? no       2. Have seen or consulted any other health care provider since your last visit? If yes, where when, and reason for visit? Yes for check up      Patient is accompanied by self I have received verbal consent from Damián Allison to discuss any/all medical information while they are present in the room.

## 2023-04-07 ENCOUNTER — CLINICAL SUPPORT (OUTPATIENT)
Dept: DIABETES SERVICES | Age: 78
End: 2023-04-07

## 2023-04-18 ENCOUNTER — CLINICAL SUPPORT (OUTPATIENT)
Dept: DIABETES SERVICES | Age: 78
End: 2023-04-18
Payer: MEDICARE

## 2023-04-18 DIAGNOSIS — E11.65 TYPE 2 DIABETES MELLITUS WITH HYPERGLYCEMIA, WITHOUT LONG-TERM CURRENT USE OF INSULIN (HCC): Primary | ICD-10-CM

## 2023-04-18 PROCEDURE — G0108 DIAB MANAGE TRN  PER INDIV: HCPCS

## 2023-04-18 NOTE — PROGRESS NOTES
[facility] Program for Diabetes Health  Diabetes Self-Management Education & Support Program  Encounter Note    SUMMARY  Diabetes self-care management training was completed related to reducing risks. The participant will return on April 27 to continue DSMES related to healthy eating. The participant did not identify SMART Goal(s) and will practice knowledge and skills related to reducing risks, healthy eating and monitoring, being active and medications, and healthy coping and problem solving to improve diabetes self-management. EVALUATION:  Today we covered What Is Diabetes and Reducing Risks, Mr. Amber Alamo expressed understanding of all topics, see boxes below for details. He is up to date on all vaccinations and exams except for a DM foot exam. No signs of retinopathy, small cataracts detected, no glaucoma, denies neuropathy, I did an unofficial foot exam in the office - notable lower extremity edema present, skin warm/dry/appropriate for race, no wounds/cracks, pulse present but difficult to detect due to edema, light touch and vibratory sensation intact bilaterally, one toenail on left foot is a bit overgrown. He has been dx with OSBALDO, does not wear CPAP, takes Zzzquil (by Nyquil) to help with falling asleep, wakes 3-4x/night to urinate and/or just wakes up and cannot go back to sleep. I have asked him to discuss with DORA Capps if this OTC med is ok to take with his other meds and if he needs a new referral for sleep medicine, he agreed. Mr. Amber Alamo has had bouts of \"dizziness\" since his CVA in 2013, has seen ENT, cardio, etc., no cause found. It was worse the first part of last week for reasons unknown (4/10/2023) and on 4/12/2023 he fell in his back yard when stepping off a low concrete pad into the grass, no injury. Dizziness improves with sitting. I took his BP in the office, seated - 135/83, and then standing - 142/87, pulse and O2 normal.  His BP is typically 120-130/80s at home.   BGs have been as expected when he monitored at home during these episodes. I have asked him to discuss this with DORA Capps sooner than scheduled visit on 5/24/2023 if symptoms worsen, he agrees. RECOMMENDATIONS:  Continue to stand from a seating position slowly and ambulate with caution, especially when on walks around the neighborhood alone. TOPICS DISCUSSED TODAY:  WHAT IS DIABETES? Minutes: Seth? 30      Next provider visit is scheduled for 4/27/2023 for DSMES       SMART GOAL(S)   TBD  ACHIEVEMENT OF GOAL(S) :     2.     ACHIEVEMENT OF GOAL(S) :      3.     ACHIEVEMENT OF GOAL(S) :           DATE DSMES TOPIC EVALUATION     4/18/2023 WHAT IS DIABETES? Role of the normal pancreas in energy balance and blood glucose control   The defect seen in diabetes   Signs & symptoms of diabetes   Diagnosis of diabetes   Types of diabetes   Blood glucose targets in non-pregnant & non-pregnant adults       The participant knows  Their type of diabetes: Yes  The basic physiologic defect: Yes  Blood glucose targets: Yes     DATE DSMES TOPIC EVALUATION     4/18/2023 HOW DO I STAY HEALTHY? Prevention   Vaccinations   Preconception care (if applicable)  Examinations   Eye    Foot   Diabetic complications' prevention   Dental health   Heart health   Kidney Health   Nerve health   Sleep health      The participant has a personal diabetes care record to keep abreast of diabetes health Yes     The participant needs to address - see notes above for issues to be discussed with DORA Capps at first visit. Keyonna Marcus RN on 4/18/2023 at 2:12 PM    I have personally reviewed the health record, including provider notes, laboratory data and current medications before making these care and education recommendations. The time spent in this effort is included in the total time.   Total minutes: 60

## 2023-04-20 ENCOUNTER — PATIENT OUTREACH (OUTPATIENT)
Dept: CASE MANAGEMENT | Age: 78
End: 2023-04-20

## 2023-04-20 NOTE — PROGRESS NOTES
Ambulatory Care Management Note    Date/Time:  4/20/2023 3:50 PM  Patient Current Location: [place]   Goals Addressed                      This Visit's Progress      Patient verbalizes understanding of self -management goals of living with Diabetes. On track      04/20/23  Pt continues to take all meds as directed  Continues to monitor his BG which he reports is doing well - 114 this am  He is participating in the DM Ed program & reports he is enjoying this. Encouraged SO to attend next class with him  Pt reports he is drinking plenty of water, luis when he is working outside  EnishJennifer is not wanting to cover his Febuxostat for Gout. He feels this has been helping his sx. He is in the process of switching PCP providers, appoint with new PCP is 5/24 w/ no sooner appoint available. Suggested he call Dr Brittany Mcmillan to request alternate to the medication or call new provider to request sooner appoint or be put on cancellation list.  Discussed avoiding gout triggers: red meat, shell fish, turkey, sweets  Follow low fat, low sugar, high fiber diet  Plan to follow up with pt in 2-3 weeks. JOHN Sawant, RN - Ambulatory Care Manager - [phone]     03/07/23  Pt reports he is taking his meds as directed  He does monitor his BG daily which he reports have mostly been good. He said he did have a low in the past week at 79 which improved after eating. Discussed following the My Plate meal plan method  Discussed importance of drinking plenty of water  Pt does cont to c/o hemorrhoids/black stool. He reports he is taking an iron supplement - notified him this could cause dark/black stools. Discussed increasing fiber/ plenty of water & no prolonged sitting  Plan to follow up with pt in 2-3 weeks. JOHN Sawant, RN - Ambulatory Care Manager - [phone]     02/09/23  Pt has DM2 on Metformin & Glimepiride, last A1C was 7.0 on 6/1/22. He reports a good appetite.  He reports occ lows during night/am. He reports skipping meals occ. Discussed reading food labels, paying attention to serving size, carbs/sugar. Also discussed eating a protein snack vs skipping a meal; a protein snack at bedtime may prevent low BG during the night/am. Pt voiced his understanding. Pt will: Take Metformin & Glimepiride as directed  Monitor BG at least twice a day  Instead of skipping lunch drink protein shake or have a protein snack to avoid lows  Have protein snack in evening before bed (cheese stick, turkey and cheese roll up, greek yogurt cup)  Plan to follow up with pt in 2 weeks. JOHN Duque, RN - [facility] - [phone]           COMPLETED: Patient will increase activity as tolerated over the next 90 days (pt-stated)         04/20/23  Pt continues to walk 2 + miles daily, he is enjoying this & tolerating it well  He does cont to have bouts of dizziness though said he deals with it  He has been working outside in the yard a lot and reports he does have a lot to do in the yard since he was not able to work outside last year  JOHN Duque, RN - [facility] - [phone]     03/07/23  Pt reports he has been trying to walk almost daily 1 - 1 1/2 miles  He does cont ot c/o dizziness which he feels improves with exercise. He reports he has had this dizziness for years, since his stroke. Discussed slow position changes & getting up slowly. He reports his BP has been stable. Plan to follow up with pt in 2-3 weeks. JOHN Duque, RN - Ambulatory Care Manager - [phone]     02/09/23  Pt reports he is very weak since having covid PNA in early Jan. He also gets SOB very easily. He does not ambulate with any assist device and denies any falls. Discussed walking for exercise, taking his time and gradually doing more. Also discussed using light hand weights 3-5 lbs (he has 5, 10, 15 lbs) while sitting/watching TV.  He thought this was a good idea since he is trying to increase his upper body strength. Pt will:  Walk for exercise 3 times a week, short intervals and gradually increase  Use the light hand weights 4-5 times a week  Plan to follow up with pt in 2 weeks. JOHN Goff, RN - [facility] - [phone]           Self-schedules and keeps appointments    On track      04/20/23  Pt will attend appoint with DM Ed on 4/27 - he is enjoying this  Pt will attend appoint with new PCP Karla Goins on 5/24  Pt will attend Endo appoint 6/23  Pt is aware of other needed appointments  JOHN Goff, [facility] - [phone]     03/30/23  Attempted to outreach with pt for CM follow up. LM to return this call. Linwood Matamoros    03/07/23  Discussed pt's Care Gaps and need for Medicare Yearly exam/foot - he is considering changing providers. Told him to schedule \"medicare yearly exam\". Discussed using [facility]. Discussed scheduling eye exam. Also discussed follow up with ENT re: dizziness and decreased hearing & Sleep  re: OSBALDO not able to use CPAP. Pt & GF voice understanding. Sent My Chart message with recap. Pt will:  Schedule appoint with PCP for Medicare yearly exam  Schedule appoint for eye exam  Schedule with ENT for hearing test & dizziness  Schedule with Sleep Dr to discuss OSBALDO and CPAP options/alternatives  Plan to follow up with pt in 2-3 weeks.    JOHN Goff, RN - Ambulatory Care Manager - [phone]

## 2023-04-27 ENCOUNTER — CLINICAL SUPPORT (OUTPATIENT)
Dept: DIABETES SERVICES | Age: 78
End: 2023-04-27
Payer: MEDICARE

## 2023-04-27 DIAGNOSIS — E11.65 TYPE 2 DIABETES MELLITUS WITH HYPERGLYCEMIA, WITHOUT LONG-TERM CURRENT USE OF INSULIN (HCC): Primary | ICD-10-CM

## 2023-04-27 PROCEDURE — G0108 DIAB MANAGE TRN  PER INDIV: HCPCS

## 2023-05-10 SDOH — HEALTH STABILITY: PHYSICAL HEALTH: ON AVERAGE, HOW MANY MINUTES DO YOU ENGAGE IN EXERCISE AT THIS LEVEL?: 40 MIN

## 2023-05-10 SDOH — HEALTH STABILITY: PHYSICAL HEALTH: ON AVERAGE, HOW MANY DAYS PER WEEK DO YOU ENGAGE IN MODERATE TO STRENUOUS EXERCISE (LIKE A BRISK WALK)?: 7 DAYS

## 2023-05-12 ENCOUNTER — OFFICE VISIT (OUTPATIENT)
Dept: PRIMARY CARE CLINIC | Facility: CLINIC | Age: 78
End: 2023-05-12
Payer: MEDICARE

## 2023-05-12 ENCOUNTER — NURSE ONLY (OUTPATIENT)
Age: 78
End: 2023-05-12

## 2023-05-12 VITALS
HEIGHT: 65 IN | HEART RATE: 81 BPM | BODY MASS INDEX: 35.69 KG/M2 | OXYGEN SATURATION: 97 % | DIASTOLIC BLOOD PRESSURE: 66 MMHG | TEMPERATURE: 97.8 F | SYSTOLIC BLOOD PRESSURE: 136 MMHG | WEIGHT: 214.2 LBS | RESPIRATION RATE: 18 BRPM

## 2023-05-12 DIAGNOSIS — E11.65 TYPE 2 DIABETES MELLITUS WITH HYPERGLYCEMIA, WITHOUT LONG-TERM CURRENT USE OF INSULIN (HCC): Primary | ICD-10-CM

## 2023-05-12 DIAGNOSIS — K74.60 HEPATIC CIRRHOSIS, UNSPECIFIED HEPATIC CIRRHOSIS TYPE, UNSPECIFIED WHETHER ASCITES PRESENT (HCC): ICD-10-CM

## 2023-05-12 DIAGNOSIS — E66.01 CLASS 2 SEVERE OBESITY DUE TO EXCESS CALORIES WITH SERIOUS COMORBIDITY AND BODY MASS INDEX (BMI) OF 35.0 TO 35.9 IN ADULT (HCC): ICD-10-CM

## 2023-05-12 DIAGNOSIS — J44.9 CHRONIC OBSTRUCTIVE PULMONARY DISEASE, UNSPECIFIED COPD TYPE (HCC): ICD-10-CM

## 2023-05-12 DIAGNOSIS — N18.31 STAGE 3A CHRONIC KIDNEY DISEASE (HCC): ICD-10-CM

## 2023-05-12 DIAGNOSIS — Z86.73 HISTORY OF CVA (CEREBROVASCULAR ACCIDENT): ICD-10-CM

## 2023-05-12 DIAGNOSIS — E78.5 HYPERLIPIDEMIA, UNSPECIFIED HYPERLIPIDEMIA TYPE: ICD-10-CM

## 2023-05-12 DIAGNOSIS — R42 DIZZINESS: ICD-10-CM

## 2023-05-12 DIAGNOSIS — E11.22 TYPE 2 DIABETES MELLITUS WITH CHRONIC KIDNEY DISEASE, WITHOUT LONG-TERM CURRENT USE OF INSULIN, UNSPECIFIED CKD STAGE (HCC): Primary | ICD-10-CM

## 2023-05-12 DIAGNOSIS — M1A.0710 IDIOPATHIC CHRONIC GOUT OF RIGHT ANKLE WITHOUT TOPHUS: ICD-10-CM

## 2023-05-12 DIAGNOSIS — D69.6 THROMBOCYTOPENIA, UNSPECIFIED (HCC): ICD-10-CM

## 2023-05-12 PROCEDURE — 3044F HG A1C LEVEL LT 7.0%: CPT

## 2023-05-12 PROCEDURE — G8417 CALC BMI ABV UP PARAM F/U: HCPCS

## 2023-05-12 PROCEDURE — 1036F TOBACCO NON-USER: CPT

## 2023-05-12 PROCEDURE — 3017F COLORECTAL CA SCREEN DOC REV: CPT

## 2023-05-12 PROCEDURE — 2022F DILAT RTA XM EVC RTNOPTHY: CPT

## 2023-05-12 PROCEDURE — G8427 DOCREV CUR MEDS BY ELIG CLIN: HCPCS

## 2023-05-12 PROCEDURE — 3023F SPIROM DOC REV: CPT

## 2023-05-12 PROCEDURE — 1123F ACP DISCUSS/DSCN MKR DOCD: CPT

## 2023-05-12 PROCEDURE — 99214 OFFICE O/P EST MOD 30 MIN: CPT

## 2023-05-12 RX ORDER — FLUTICASONE PROPIONATE 50 MCG
2 SPRAY, SUSPENSION (ML) NASAL DAILY
COMMUNITY
Start: 2022-11-03

## 2023-05-12 RX ORDER — FEBUXOSTAT 40 MG/1
TABLET, FILM COATED ORAL
COMMUNITY
Start: 2023-03-13 | End: 2023-05-12

## 2023-05-12 RX ORDER — AMLODIPINE BESYLATE 5 MG/1
TABLET ORAL
COMMUNITY
Start: 2023-05-11

## 2023-05-12 RX ORDER — ASPIRIN 81 MG/1
81 TABLET ORAL DAILY
COMMUNITY
Start: 2019-05-29

## 2023-05-12 RX ORDER — GLIMEPIRIDE 4 MG/1
TABLET ORAL
COMMUNITY
Start: 2023-04-07

## 2023-05-12 RX ORDER — SIMVASTATIN 20 MG
TABLET ORAL
COMMUNITY
Start: 2023-04-09

## 2023-05-12 RX ORDER — AZELASTINE HYDROCHLORIDE 137 UG/1
SPRAY, METERED NASAL
COMMUNITY
Start: 2023-03-20

## 2023-05-12 RX ORDER — PANTOPRAZOLE SODIUM 40 MG/1
40 TABLET, DELAYED RELEASE ORAL DAILY
COMMUNITY
Start: 2023-03-20

## 2023-05-12 RX ORDER — FEBUXOSTAT 40 MG/1
40 TABLET, FILM COATED ORAL DAILY
Qty: 90 TABLET | Refills: 0 | Status: SHIPPED | OUTPATIENT
Start: 2023-05-12

## 2023-05-12 SDOH — ECONOMIC STABILITY: FOOD INSECURITY: WITHIN THE PAST 12 MONTHS, YOU WORRIED THAT YOUR FOOD WOULD RUN OUT BEFORE YOU GOT MONEY TO BUY MORE.: NEVER TRUE

## 2023-05-12 SDOH — ECONOMIC STABILITY: INCOME INSECURITY: HOW HARD IS IT FOR YOU TO PAY FOR THE VERY BASICS LIKE FOOD, HOUSING, MEDICAL CARE, AND HEATING?: NOT HARD AT ALL

## 2023-05-12 SDOH — ECONOMIC STABILITY: FOOD INSECURITY: WITHIN THE PAST 12 MONTHS, THE FOOD YOU BOUGHT JUST DIDN'T LAST AND YOU DIDN'T HAVE MONEY TO GET MORE.: NEVER TRUE

## 2023-05-12 SDOH — ECONOMIC STABILITY: HOUSING INSECURITY
IN THE LAST 12 MONTHS, WAS THERE A TIME WHEN YOU DID NOT HAVE A STEADY PLACE TO SLEEP OR SLEPT IN A SHELTER (INCLUDING NOW)?: NO

## 2023-05-12 ASSESSMENT — PATIENT HEALTH QUESTIONNAIRE - PHQ9
SUM OF ALL RESPONSES TO PHQ QUESTIONS 1-9: 0
SUM OF ALL RESPONSES TO PHQ9 QUESTIONS 1 & 2: 0
SUM OF ALL RESPONSES TO PHQ QUESTIONS 1-9: 0
SUM OF ALL RESPONSES TO PHQ QUESTIONS 1-9: 0
2. FEELING DOWN, DEPRESSED OR HOPELESS: 0
1. LITTLE INTEREST OR PLEASURE IN DOING THINGS: 0
SUM OF ALL RESPONSES TO PHQ QUESTIONS 1-9: 0

## 2023-05-12 ASSESSMENT — ENCOUNTER SYMPTOMS
COLOR CHANGE: 0
BACK PAIN: 1
NAUSEA: 0
SHORTNESS OF BREATH: 0
DIARRHEA: 0
COUGH: 0
VOMITING: 0
CHEST TIGHTNESS: 0
CONSTIPATION: 0

## 2023-05-12 NOTE — PROGRESS NOTES
[employer] [facility] for Diabetes Health  Diabetes Self-Management Education & Support Program  Encounter Note      SUMMARY  Diabetes self-care management training was completed related to monitoring. The participant will return on May 19 to continue DSMES related to taking medications and physical activity. The participant did not identify SMART Goal(s) and will practice knowledge and skills related to reducing risks, healthy eating and monitoring, being active and medications, and healthy coping and problem solving to improve diabetes self-management. EVALUATION:  Mr. Temi Montenegro expressed understanding of all topics related to Monitoring, see boxes below for details. He monitors fasting and HS BGs consistently (), and occasionally monitors 2hr postprandials. He enjoyed using the [facility], but is not able to afford it out of pocket, it is not covered by Medicare. He reports his BGs were typical last week while on vacation and says he is \"working really hard\" to use Healthy Plate routinely. He met his SMART Goal to build a balanced meal for dinner 2x since the last appointment. RECOMMENDATIONS:  Continue to use the Healthy Plate model to make balanced meals with controlled portions of carbs. Occasionally look at postprandials for feedback on meal choices. TOPICS DISCUSSED TODAY:  HOW CAN BLOOD GLUCOSE MONITORING HELP ME? 30      Next provider visit is scheduled for 5/19/2023 for DSMES. SMART GOAL(S)  Practice building a balanced meal for dinner at least 2 times over the next week. ACHIEVEMENT OF GOAL(S) : %    2. ACHIEVEMENT OF GOAL(S) :      3.     ACHIEVEMENT OF GOAL(S) :         DATE DSMES TOPIC EVALUATION     5/12/2023 HOW CAN BLOOD GLUCOSE MONITORING HELP ME?    Value of blood glucose monitoring   Realistic expectations   Blood glucose monitoring targets   Target adjustments   Setting a1c & blood glucose targets with provider   Meter selection    Technique for

## 2023-05-12 NOTE — PROGRESS NOTES
Identified pt with two pt identifiers(name and ). Chief Complaint   Patient presents with    Establish Care        No flowsheet data found. Vitals:    23 1341   BP: 136/66   Site: Left Upper Arm   Position: Sitting   Cuff Size: Medium Adult   Pulse: 81   Resp: 18   Temp: 97.8 °F (36.6 °C)   TempSrc: Temporal   SpO2: 97%   Weight: 214 lb 3.2 oz (97.2 kg)   Height: 5' 5\" (1.651 m)        Health Maintenance Due   Topic Date Due    Hepatitis A vaccine (1 of 2 - Risk 2-dose series) Never done    Pneumococcal 65+ years Vaccine (1 - PCV) Never done    Diabetic retinal exam  Never done    Hepatitis B vaccine (1 of 3 - Risk 3-dose series) Never done    DTaP/Tdap/Td vaccine (1 - Tdap) Never done    Low dose CT lung screening  Never done    COVID-19 Vaccine (4 - Booster) 2021    Diabetic foot exam  2022    Annual Wellness Visit (AWV)  08/10/2022    Lipids  2023        1. Have you been to the ER, urgent care clinic since your last visit? Hospitalized since your last visit? No    2. Have you seen or consulted any other health care providers outside of the [address] [place] [address] since your last visit? Include any pap smears or colon screening. No    3. For patients aged 39-70: Has the patient had a colonoscopy / FIT/ Cologuard?  N/A
with serious comorbidity and body mass index (BMI) of 35.0 to 35.9 in adult [facility])  - Discussed need for weight loss. Encouraged him to continue to modify his diet and limit carbohydrates. 3. Chronic obstructive pulmonary disease, unspecified COPD type ([address]  -    Patient currently does not take any inhaler for COPD. Previously seen by Dr Savanna Reyes with [facility]. Would like to be evaluated to see what he suggests. Discussed that if he is not able to be seen in next 1 month to contact our clinic. Referred to Dr Savanna Reyes provided. - Denies dyspnea, wheezing, limitations in his activity. - Arpit George MD, Pulmonology, [address] [facility] ([address] [place] [address])  4. Thrombocytopenia, unspecified ([address]  - Most recent platelet count is 485. Will recheck with upcoming physical, patient declines labs today. 5. Hepatic cirrhosis, unspecified hepatic cirrhosis type, unspecified whether ascites present ([address]  - Followed by Hepatologist. Recent labs stable. He   6. Stage 3a chronic kidney disease ([address]  - Followed by Nephrologist, unsure of the name of this provider. Avoids NSAID drugs and stays well hydrated. eGFR recently checked, stable. Creatinine recently increased from 1.39 to 1.6. Metformin stopped. Will recheck kidney function with labs in 1 month. 7. History of CVA (cerebrovascular accident)  - With residual transient dizziness. Neuro exam intact. Taking daily ASA for stroke prevention. BP at goal.   8. Idiopathic chronic gout of right ankle without tophus  -     I continued his Uloric. Provided him with [employer] card and sent to [facility]. - febuxostat (ULORIC) 40 MG TABS tablet; Take 1 tablet by mouth daily, Disp-90 tablet, R-0Normal  9. Dizziness  - Comes and goes. Recently has experienced this less. Per Neurology notes dizziness is related to HTN/T2DM which are well managed currently. He is careful with position change. 10. Hyperlipidemia, unspecified hyperlipidemia type  - Continue Simvastatin at current dose for now.  He

## 2023-05-19 ENCOUNTER — NURSE ONLY (OUTPATIENT)
Age: 78
End: 2023-05-19

## 2023-05-19 DIAGNOSIS — E11.65 TYPE 2 DIABETES MELLITUS WITH HYPERGLYCEMIA, WITHOUT LONG-TERM CURRENT USE OF INSULIN (HCC): Primary | ICD-10-CM

## 2023-05-19 NOTE — PROGRESS NOTES
[facility] for Diabetes Health  Diabetes Self-Management Education & Support Program  Encounter Note      SUMMARY  Diabetes self-care management training was completed related to taking medications, healthy coping, and problem solving. he participant will return on July 21 for DSMES follow-up. The participant did not identify SMART Goal(s) and will practice knowledge and skills related to reducing risks, healthy eating and monitoring, healthy coping and problem solving, and medications to improve diabetes self-management. EVALUATION:  Fasting BGs 120s over the past week. Today we covered Medications, Healthy Coping and Problem Solving, Mr. Pallavi Nevarez expressed understanding of all topics, see boxes below for details. He is taking all medications as directed, is working hard to prevent hypoglycemia and understands when/how to treat if necessary (15-15 Rule). Mr. Pallavi Nevarez states his girlfriend and sisters are a great support system, his girlfriend also has T2DM. At this time, he feels comfortable with applying the DM self-management skills we have discussed, but is aware he has remaining DSMES hours available, if needed. RECOMMENDATIONS:  Continue to have balanced meals to reduce likelihood of hypoglycemia. Report changes in left foot (bruising, swelling, onset of pain) related to fall to PCP.      TOPICS DISCUSSED TODAY:  HOW DO MY DIABETES MEDICATIONS WORK? 30  HOW DO I FIGURE OUT WHAT'S INFLUENCING MY BLOOD GLUCOSES? 30      Next provider visit is scheduled for   6/22/2023  2:00 PM [facility] LEANDER Luciano NP    Appointment Notes:    Medicare wellness           6/23/2023  3:30 PM FOLLOW UP [facility] Alison Silva MD           7/21/2023  1:00 PM DIABETES ASSESSMENT FOLLOW UP [facility] for Diabetes Health [address], RN   Appointment Notes:    DSMES, follow-up

## 2023-05-22 DIAGNOSIS — E11.65 TYPE 2 DIABETES MELLITUS WITH HYPERGLYCEMIA, WITHOUT LONG-TERM CURRENT USE OF INSULIN (HCC): Primary | ICD-10-CM

## 2023-05-22 RX ORDER — DULAGLUTIDE 3 MG/.5ML
INJECTION, SOLUTION SUBCUTANEOUS
Qty: 2 ML | Refills: 2 | Status: SHIPPED | OUTPATIENT
Start: 2023-05-22

## 2023-05-23 RX ORDER — AZELASTINE 1 MG/ML
2 SPRAY, METERED NASAL 2 TIMES DAILY PRN
COMMUNITY
Start: 2023-03-20 | End: 2023-06-22

## 2023-05-23 RX ORDER — FEBUXOSTAT 40 MG/1
1 TABLET, FILM COATED ORAL DAILY
COMMUNITY
Start: 2023-03-12 | End: 2023-06-22 | Stop reason: SDUPTHER

## 2023-05-23 RX ORDER — FERROUS SULFATE 325(65) MG
TABLET ORAL
COMMUNITY
End: 2023-06-22

## 2023-05-23 RX ORDER — AMLODIPINE BESYLATE 5 MG/1
1 TABLET ORAL DAILY
COMMUNITY
Start: 2023-03-12

## 2023-05-23 RX ORDER — SIMVASTATIN 20 MG
20 TABLET ORAL NIGHTLY
COMMUNITY

## 2023-05-23 RX ORDER — DULAGLUTIDE 0.75 MG/.5ML
INJECTION, SOLUTION SUBCUTANEOUS
COMMUNITY
Start: 2023-03-16 | End: 2023-06-22

## 2023-05-23 RX ORDER — LANCETS 30 GAUGE
EACH MISCELLANEOUS
COMMUNITY
Start: 2023-01-23

## 2023-05-23 RX ORDER — DULAGLUTIDE 1.5 MG/.5ML
INJECTION, SOLUTION SUBCUTANEOUS
COMMUNITY
Start: 2023-03-16 | End: 2023-06-22

## 2023-06-21 SDOH — HEALTH STABILITY: PHYSICAL HEALTH: ON AVERAGE, HOW MANY DAYS PER WEEK DO YOU ENGAGE IN MODERATE TO STRENUOUS EXERCISE (LIKE A BRISK WALK)?: 7 DAYS

## 2023-06-21 SDOH — HEALTH STABILITY: PHYSICAL HEALTH: ON AVERAGE, HOW MANY MINUTES DO YOU ENGAGE IN EXERCISE AT THIS LEVEL?: 120 MIN

## 2023-06-21 ASSESSMENT — LIFESTYLE VARIABLES
HOW MANY STANDARD DRINKS CONTAINING ALCOHOL DO YOU HAVE ON A TYPICAL DAY: PATIENT DOES NOT DRINK
HOW OFTEN DO YOU HAVE SIX OR MORE DRINKS ON ONE OCCASION: 1
HOW OFTEN DO YOU HAVE A DRINK CONTAINING ALCOHOL: NEVER
HOW MANY STANDARD DRINKS CONTAINING ALCOHOL DO YOU HAVE ON A TYPICAL DAY: 0
HOW OFTEN DO YOU HAVE A DRINK CONTAINING ALCOHOL: 1

## 2023-06-21 ASSESSMENT — PATIENT HEALTH QUESTIONNAIRE - PHQ9
9. THOUGHTS THAT YOU WOULD BE BETTER OFF DEAD, OR OF HURTING YOURSELF: 0
SUM OF ALL RESPONSES TO PHQ QUESTIONS 1-9: 12
1. LITTLE INTEREST OR PLEASURE IN DOING THINGS: 2
4. FEELING TIRED OR HAVING LITTLE ENERGY: 1
2. FEELING DOWN, DEPRESSED OR HOPELESS: 1
5. POOR APPETITE OR OVEREATING: 1
6. FEELING BAD ABOUT YOURSELF - OR THAT YOU ARE A FAILURE OR HAVE LET YOURSELF OR YOUR FAMILY DOWN: 0
SUM OF ALL RESPONSES TO PHQ QUESTIONS 1-9: 12
8. MOVING OR SPEAKING SO SLOWLY THAT OTHER PEOPLE COULD HAVE NOTICED. OR THE OPPOSITE, BEING SO FIGETY OR RESTLESS THAT YOU HAVE BEEN MOVING AROUND A LOT MORE THAN USUAL: 3
7. TROUBLE CONCENTRATING ON THINGS, SUCH AS READING THE NEWSPAPER OR WATCHING TELEVISION: 1
3. TROUBLE FALLING OR STAYING ASLEEP: 3
10. IF YOU CHECKED OFF ANY PROBLEMS, HOW DIFFICULT HAVE THESE PROBLEMS MADE IT FOR YOU TO DO YOUR WORK, TAKE CARE OF THINGS AT HOME, OR GET ALONG WITH OTHER PEOPLE: 1
SUM OF ALL RESPONSES TO PHQ9 QUESTIONS 1 & 2: 3
SUM OF ALL RESPONSES TO PHQ QUESTIONS 1-9: 12
SUM OF ALL RESPONSES TO PHQ QUESTIONS 1-9: 12

## 2023-06-22 ENCOUNTER — OFFICE VISIT (OUTPATIENT)
Dept: PRIMARY CARE CLINIC | Facility: CLINIC | Age: 78
End: 2023-06-22
Payer: MEDICARE

## 2023-06-22 VITALS
SYSTOLIC BLOOD PRESSURE: 131 MMHG | RESPIRATION RATE: 18 BRPM | WEIGHT: 211.8 LBS | HEIGHT: 65 IN | BODY MASS INDEX: 35.29 KG/M2 | OXYGEN SATURATION: 98 % | HEART RATE: 73 BPM | TEMPERATURE: 97.8 F | DIASTOLIC BLOOD PRESSURE: 68 MMHG

## 2023-06-22 DIAGNOSIS — M1A.0710 IDIOPATHIC CHRONIC GOUT OF RIGHT ANKLE WITHOUT TOPHUS: ICD-10-CM

## 2023-06-22 DIAGNOSIS — K74.60 HEPATIC CIRRHOSIS, UNSPECIFIED HEPATIC CIRRHOSIS TYPE, UNSPECIFIED WHETHER ASCITES PRESENT (HCC): ICD-10-CM

## 2023-06-22 DIAGNOSIS — R06.02 SHORTNESS OF BREATH: ICD-10-CM

## 2023-06-22 DIAGNOSIS — Z00.00 MEDICARE ANNUAL WELLNESS VISIT, SUBSEQUENT: Primary | ICD-10-CM

## 2023-06-22 DIAGNOSIS — E78.2 MIXED HYPERLIPIDEMIA: ICD-10-CM

## 2023-06-22 DIAGNOSIS — H91.93 DECREASED HEARING OF BOTH EARS: ICD-10-CM

## 2023-06-22 DIAGNOSIS — Z13.31 SCREENING FOR DEPRESSION: ICD-10-CM

## 2023-06-22 DIAGNOSIS — R42 DIZZINESS: ICD-10-CM

## 2023-06-22 DIAGNOSIS — I10 ESSENTIAL (PRIMARY) HYPERTENSION: ICD-10-CM

## 2023-06-22 DIAGNOSIS — B35.1 ONYCHOMYCOSIS: ICD-10-CM

## 2023-06-22 DIAGNOSIS — E11.22 TYPE 2 DIABETES MELLITUS WITH CHRONIC KIDNEY DISEASE, WITHOUT LONG-TERM CURRENT USE OF INSULIN, UNSPECIFIED CKD STAGE (HCC): ICD-10-CM

## 2023-06-22 PROCEDURE — 3075F SYST BP GE 130 - 139MM HG: CPT

## 2023-06-22 PROCEDURE — 1123F ACP DISCUSS/DSCN MKR DOCD: CPT

## 2023-06-22 PROCEDURE — 99214 OFFICE O/P EST MOD 30 MIN: CPT

## 2023-06-22 PROCEDURE — 3044F HG A1C LEVEL LT 7.0%: CPT

## 2023-06-22 PROCEDURE — 1036F TOBACCO NON-USER: CPT

## 2023-06-22 PROCEDURE — G8427 DOCREV CUR MEDS BY ELIG CLIN: HCPCS

## 2023-06-22 PROCEDURE — 3078F DIAST BP <80 MM HG: CPT

## 2023-06-22 PROCEDURE — 2022F DILAT RTA XM EVC RTNOPTHY: CPT

## 2023-06-22 PROCEDURE — 3017F COLORECTAL CA SCREEN DOC REV: CPT

## 2023-06-22 PROCEDURE — G8417 CALC BMI ABV UP PARAM F/U: HCPCS

## 2023-06-22 RX ORDER — CICLOPIROX 7.7 MG/G
GEL TOPICAL
Qty: 100 G | Refills: 0 | Status: SHIPPED | OUTPATIENT
Start: 2023-06-22

## 2023-06-22 RX ORDER — FLUTICASONE FUROATE, UMECLIDINIUM BROMIDE AND VILANTEROL TRIFENATATE 100; 62.5; 25 UG/1; UG/1; UG/1
POWDER RESPIRATORY (INHALATION)
COMMUNITY
Start: 2023-06-16

## 2023-06-22 RX ORDER — FEBUXOSTAT 40 MG/1
40 TABLET, FILM COATED ORAL DAILY
Qty: 90 TABLET | Refills: 0 | Status: SHIPPED | OUTPATIENT
Start: 2023-06-22

## 2023-06-22 RX ORDER — MECLIZINE HYDROCHLORIDE 25 MG/1
25 TABLET ORAL 3 TIMES DAILY PRN
Qty: 15 TABLET | Refills: 0 | Status: SHIPPED | OUTPATIENT
Start: 2023-06-22 | End: 2023-07-02

## 2023-06-22 ASSESSMENT — ENCOUNTER SYMPTOMS
SHORTNESS OF BREATH: 1
STRIDOR: 0
WHEEZING: 0
CHEST TIGHTNESS: 0
COUGH: 0

## 2023-06-22 NOTE — PATIENT INSTRUCTIONS
of these benefits include a comprehensive review of your medical history including lifestyle, illnesses that may run in your family, and various assessments and screenings as appropriate. After reviewing your medical record and screening and assessments performed today your provider may have ordered immunizations, labs, imaging, and/or referrals for you. A list of these orders (if applicable) as well as your Preventive Care list are included within your After Visit Summary for your review. Other Preventive Recommendations:    A preventive eye exam performed by an eye specialist is recommended every 1-2 years to screen for glaucoma; cataracts, macular degeneration, and other eye disorders. A preventive dental visit is recommended every 6 months. Try to get at least 150 minutes of exercise per week or 10,000 steps per day on a pedometer . Order or download the FREE \"Exercise & Physical Activity: Your Everyday Guide\" from [employer]. Call 8-671.691.9383 or search [employer] online. You need 7238-9432 mg of calcium and 4551-4685 IU of vitamin D per day. It is possible to meet your calcium requirement with diet alone, but a vitamin D supplement is usually necessary to meet this goal.  When exposed to the sun, use a sunscreen that protects against both UVA and UVB radiation with an SPF of 30 or greater. Reapply every 2 to 3 hours or after sweating, drying off with a towel, or swimming. Always wear a seat belt when traveling in a car. Always wear a helmet when riding a bicycle or motorcycle.

## 2023-06-22 NOTE — PROGRESS NOTES
Identified pt with two pt identifiers(name and ). Chief Complaint   Patient presents with    Medicare AWV       Vitals:    23 1410   BP: 131/68   Site: Left Upper Arm   Position: Sitting   Cuff Size: Medium Adult   Pulse: 73   Resp: 18   Temp: 97.8 °F (36.6 °C)   TempSrc: Temporal   SpO2: 98%   Weight: 211 lb 12.8 oz (96.1 kg)   Height: 5' 5\" (1.651 m)        Health Maintenance Due   Topic Date Due    Hepatitis A vaccine (1 of 2 - Risk 2-dose series) Never done    Diabetic retinal exam  Never done    Hepatitis B vaccine (1 of 3 - Risk 3-dose series) Never done    COVID-19 Vaccine (4 - Booster) 2021    Diabetic foot exam  2022    Annual Wellness Visit (AWV)  08/10/2022    Lipids  2023        1. Have you been to the ER, urgent care clinic since your last visit? Hospitalized since your last visit? No    2. Have you seen or consulted any other health care providers outside of the [address] [place] [address] since your last visit? Include any pap smears or colon screening. No    3. For patients aged 39-70: Has the patient had a colonoscopy / FIT/ Cologuard?  Yes
Medicare Annual Wellness Visit    Chip Arceo is here for Medicare AWV    Assessment & Plan     See alternate encounter for assessment and plan. Recommendations for Preventive Services Due: see orders and patient instructions/AVS.  Recommended screening schedule for the next 5-10 years is provided to the patient in written form: see Patient Instructions/AVS.     No follow-ups on file. Subjective       Patient's complete Health Risk Assessment and screening values have been reviewed and are found in Flowsheets. The following problems were reviewed today and where indicated follow up appointments were made and/or referrals ordered. Positive Risk Factor Screenings with Interventions:    Fall Risk:  Do you feel unsteady or are you worried about falling? : (!) yes  2 or more falls in past year?: (!) yes  Fall with injury in past year?: (!) yes     Interventions:    Patient comments: states this is related to worsening dizziness. See previous encounter for details of first fall. He recently had GLF with position change at home, did not hit his head. He has previously completed PT for dizziness and did not find it helpful. Would like to see ENT. Cognitive:    Words recalled: 3 Words Recalled   Clock Drawing Test (CDT): (!) Abnormal   Total Score: 3   Total Score Interpretation: Normal Mini-Cog      Interventions:  Patient declines any further evaluation or treatment    Depression:  PHQ-2 Score: 3  PHQ-9 Total Score: 12    Interpretation:   1-4 = minimal  5-9 = mild  10-14 = moderate  15-19 = moderately severe  20-27 = severe  Interventions:  Patient declines any further evaluation or treatment           Social and Emotional Support:  Do you get the social and emotional support that you need?: (!) No    Interventions:  Patient declined any further interventions or treatment    Weight and Activity:  Physical Activity: Sufficiently Active    Days of Exercise per Week: 7 days    Minutes of Exercise per
Constitutional:  Negative for activity change, appetite change, chills, diaphoresis, fatigue, fever and unexpected weight change. Respiratory:  Positive for shortness of breath. Negative for cough, chest tightness, wheezing and stridor. Cardiovascular:  Negative for chest pain and palpitations. Musculoskeletal:  Negative for arthralgias and myalgias. Neurological:  Positive for dizziness. Psychiatric/Behavioral:  Negative for dysphoric mood and self-injury. The patient is not nervous/anxious. PHYSICAL EXAM   /68 (Site: Left Upper Arm, Position: Sitting, Cuff Size: Medium Adult)   Pulse 73   Temp 97.8 °F (36.6 °C) (Temporal)   Resp 18   Ht 5' 5\" (1.651 m)   Wt 211 lb 12.8 oz (96.1 kg)   SpO2 98%   BMI 35.25 kg/m²      Physical Exam  Vitals and nursing note reviewed. Constitutional:       Appearance: Normal appearance. He is obese. Cardiovascular:      Rate and Rhythm: Normal rate and regular rhythm. Pulses: Normal pulses. Dorsalis pedis pulses are 2+ on the right side and 2+ on the left side. Posterior tibial pulses are 2+ on the right side and 2+ on the left side. Heart sounds: Normal heart sounds. No murmur heard. Pulmonary:      Effort: Pulmonary effort is normal. No respiratory distress. Breath sounds: Normal breath sounds. No stridor. No wheezing or rales. Musculoskeletal:      Right lower leg: Edema present. Left lower leg: Edema present. Feet:      Right foot:      Skin integrity: Skin integrity normal.      Toenail Condition: Fungal disease present. Left foot:      Skin integrity: Skin integrity normal.   Neurological:      General: No focal deficit present. Mental Status: He is alert and oriented to person, place, and time. Mental status is at baseline. Cranial Nerves: No cranial nerve deficit. Sensory: No sensory deficit. Motor: No weakness.       Gait: Gait normal.      Visual

## 2023-06-23 ENCOUNTER — OFFICE VISIT (OUTPATIENT)
Age: 78
End: 2023-06-23
Payer: MEDICARE

## 2023-06-23 VITALS
HEART RATE: 84 BPM | HEIGHT: 65 IN | BODY MASS INDEX: 35.49 KG/M2 | SYSTOLIC BLOOD PRESSURE: 152 MMHG | DIASTOLIC BLOOD PRESSURE: 82 MMHG | WEIGHT: 213 LBS

## 2023-06-23 DIAGNOSIS — N18.31 TYPE 2 DIABETES MELLITUS WITH STAGE 3A CHRONIC KIDNEY DISEASE, WITHOUT LONG-TERM CURRENT USE OF INSULIN (HCC): ICD-10-CM

## 2023-06-23 DIAGNOSIS — R06.02 SHORTNESS OF BREATH: ICD-10-CM

## 2023-06-23 DIAGNOSIS — E78.2 MIXED HYPERLIPIDEMIA: ICD-10-CM

## 2023-06-23 DIAGNOSIS — E11.65 TYPE 2 DIABETES MELLITUS WITH HYPERGLYCEMIA, WITHOUT LONG-TERM CURRENT USE OF INSULIN (HCC): Primary | ICD-10-CM

## 2023-06-23 DIAGNOSIS — E11.22 TYPE 2 DIABETES MELLITUS WITH STAGE 3A CHRONIC KIDNEY DISEASE, WITHOUT LONG-TERM CURRENT USE OF INSULIN (HCC): ICD-10-CM

## 2023-06-23 DIAGNOSIS — R42 DIZZINESS: ICD-10-CM

## 2023-06-23 DIAGNOSIS — I10 PRIMARY HYPERTENSION: ICD-10-CM

## 2023-06-23 DIAGNOSIS — Z86.73 HISTORY OF STROKE: ICD-10-CM

## 2023-06-23 LAB
ALBUMIN SERPL-MCNC: 3.5 G/DL (ref 3.5–5)
ALBUMIN/GLOB SERPL: 1.2 (ref 1.1–2.2)
ALP SERPL-CCNC: 68 U/L (ref 45–117)
ALT SERPL-CCNC: 33 U/L (ref 12–78)
ANION GAP SERPL CALC-SCNC: 2 MMOL/L (ref 5–15)
AST SERPL-CCNC: 20 U/L (ref 15–37)
BILIRUB SERPL-MCNC: 0.4 MG/DL (ref 0.2–1)
BUN SERPL-MCNC: 18 MG/DL (ref 6–20)
BUN/CREAT SERPL: 11 (ref 12–20)
CALCIUM SERPL-MCNC: 8.5 MG/DL (ref 8.5–10.1)
CHLORIDE SERPL-SCNC: 116 MMOL/L (ref 97–108)
CHOLEST SERPL-MCNC: 126 MG/DL
CO2 SERPL-SCNC: 26 MMOL/L (ref 21–32)
COMMENT:: NORMAL
CREAT SERPL-MCNC: 1.57 MG/DL (ref 0.7–1.3)
ERYTHROCYTE [DISTWIDTH] IN BLOOD BY AUTOMATED COUNT: 13.5 % (ref 11.5–14.5)
GLOBULIN SER CALC-MCNC: 2.9 G/DL (ref 2–4)
GLUCOSE SERPL-MCNC: 82 MG/DL (ref 65–100)
HCT VFR BLD AUTO: 38 % (ref 36.6–50.3)
HDLC SERPL-MCNC: 44 MG/DL
HDLC SERPL: 2.9 (ref 0–5)
HGB BLD-MCNC: 12 G/DL (ref 12.1–17)
LDLC SERPL CALC-MCNC: 71 MG/DL (ref 0–100)
MCH RBC QN AUTO: 26.9 PG (ref 26–34)
MCHC RBC AUTO-ENTMCNC: 31.6 G/DL (ref 30–36.5)
MCV RBC AUTO: 85.2 FL (ref 80–99)
NRBC # BLD: 0 K/UL (ref 0–0.01)
NRBC BLD-RTO: 0 PER 100 WBC
NT PRO BNP: 105 PG/ML
PLATELET # BLD AUTO: ABNORMAL K/UL (ref 150–400)
PMV BLD AUTO: 10.7 FL (ref 8.9–12.9)
POTASSIUM SERPL-SCNC: 4.1 MMOL/L (ref 3.5–5.1)
PROT SERPL-MCNC: 6.4 G/DL (ref 6.4–8.2)
RBC # BLD AUTO: 4.46 M/UL (ref 4.1–5.7)
SODIUM SERPL-SCNC: 144 MMOL/L (ref 136–145)
SPECIMEN HOLD: NORMAL
TRIGL SERPL-MCNC: 55 MG/DL
VLDLC SERPL CALC-MCNC: 11 MG/DL
WBC # BLD AUTO: 3.8 K/UL (ref 4.1–11.1)

## 2023-06-23 PROCEDURE — 1123F ACP DISCUSS/DSCN MKR DOCD: CPT | Performed by: INTERNAL MEDICINE

## 2023-06-23 PROCEDURE — 2022F DILAT RTA XM EVC RTNOPTHY: CPT | Performed by: INTERNAL MEDICINE

## 2023-06-23 PROCEDURE — 3079F DIAST BP 80-89 MM HG: CPT | Performed by: INTERNAL MEDICINE

## 2023-06-23 PROCEDURE — G8428 CUR MEDS NOT DOCUMENT: HCPCS | Performed by: INTERNAL MEDICINE

## 2023-06-23 PROCEDURE — 99214 OFFICE O/P EST MOD 30 MIN: CPT | Performed by: INTERNAL MEDICINE

## 2023-06-23 PROCEDURE — 1036F TOBACCO NON-USER: CPT | Performed by: INTERNAL MEDICINE

## 2023-06-23 PROCEDURE — 3017F COLORECTAL CA SCREEN DOC REV: CPT | Performed by: INTERNAL MEDICINE

## 2023-06-23 PROCEDURE — G8417 CALC BMI ABV UP PARAM F/U: HCPCS | Performed by: INTERNAL MEDICINE

## 2023-06-23 PROCEDURE — 3044F HG A1C LEVEL LT 7.0%: CPT | Performed by: INTERNAL MEDICINE

## 2023-06-23 PROCEDURE — 3077F SYST BP >= 140 MM HG: CPT | Performed by: INTERNAL MEDICINE

## 2023-06-23 NOTE — PROGRESS NOTES
GLP1      5. Type 2 diabetes mellitus with stage 3a chronic kidney disease, without long-term current use of insulin [address] [address]  Followed by Nephrology    No orders of the defined types were placed in this encounter. No orders of the defined types were placed in this encounter. Return in about 14 weeks (around 9/29/2023).

## 2023-06-27 LAB
ALBUMIN SERPL-MCNC: 4.1 G/DL (ref 3.7–4.7)
ALBUMIN/GLOB SERPL: 1.6 {RATIO} (ref 1.2–2.2)
ALP SERPL-CCNC: 72 IU/L (ref 44–121)
ALT SERPL-CCNC: 26 IU/L (ref 0–44)
AST SERPL-CCNC: 20 IU/L (ref 0–40)
BASOPHILS # BLD AUTO: 0.1 X10E3/UL (ref 0–0.2)
BASOPHILS NFR BLD AUTO: 1 %
BILIRUB SERPL-MCNC: 0.3 MG/DL (ref 0–1.2)
BUN SERPL-MCNC: 18 MG/DL (ref 8–27)
BUN/CREAT SERPL: 10 (ref 10–24)
CALCIUM SERPL-MCNC: 9.1 MG/DL (ref 8.6–10.2)
CHLORIDE SERPL-SCNC: 111 MMOL/L (ref 96–106)
CHOLEST SERPL-MCNC: 129 MG/DL (ref 100–199)
CO2 SERPL-SCNC: 21 MMOL/L (ref 20–29)
CREAT SERPL-MCNC: 1.79 MG/DL (ref 0.76–1.27)
EGFRCR SERPLBLD CKD-EPI 2021: 39 ML/MIN/1.73
EOSINOPHIL # BLD AUTO: 0.1 X10E3/UL (ref 0–0.4)
EOSINOPHIL NFR BLD AUTO: 3 %
ERYTHROCYTE [DISTWIDTH] IN BLOOD BY AUTOMATED COUNT: 14 % (ref 11.6–15.4)
GLOBULIN SER CALC-MCNC: 2.5 G/DL (ref 1.5–4.5)
GLUCOSE SERPL-MCNC: 116 MG/DL (ref 70–99)
HBA1C MFR BLD: 5.7 % (ref 4.8–5.6)
HCT VFR BLD AUTO: 37.1 % (ref 37.5–51)
HDLC SERPL-MCNC: 40 MG/DL
HGB BLD-MCNC: 12.1 G/DL (ref 13–17.7)
IMM GRANULOCYTES # BLD AUTO: 0 X10E3/UL (ref 0–0.1)
IMM GRANULOCYTES NFR BLD AUTO: 0 %
LDLC SERPL CALC-MCNC: 63 MG/DL (ref 0–99)
LYMPHOCYTES # BLD AUTO: 1.1 X10E3/UL (ref 0.7–3.1)
LYMPHOCYTES NFR BLD AUTO: 32 %
MCH RBC QN AUTO: 27.3 PG (ref 26.6–33)
MCHC RBC AUTO-ENTMCNC: 32.6 G/DL (ref 31.5–35.7)
MCV RBC AUTO: 84 FL (ref 79–97)
MONOCYTES # BLD AUTO: 0.4 X10E3/UL (ref 0.1–0.9)
MONOCYTES NFR BLD AUTO: 11 %
MORPHOLOGY BLD-IMP: ABNORMAL
NEUTROPHILS # BLD AUTO: 1.9 X10E3/UL (ref 1.4–7)
NEUTROPHILS NFR BLD AUTO: 53 %
NT-PROBNP SERPL-MCNC: 121 PG/ML (ref 0–486)
PLATELET # BLD AUTO: ABNORMAL X10E3/UL
POTASSIUM SERPL-SCNC: 4.2 MMOL/L (ref 3.5–5.2)
PROT SERPL-MCNC: 6.6 G/DL (ref 6–8.5)
RBC # BLD AUTO: 4.44 X10E6/UL (ref 4.14–5.8)
SODIUM SERPL-SCNC: 144 MMOL/L (ref 134–144)
TRIGL SERPL-MCNC: 154 MG/DL (ref 0–149)
VLDLC SERPL CALC-MCNC: 26 MG/DL (ref 5–40)
WBC # BLD AUTO: 3.6 X10E3/UL (ref 3.4–10.8)

## 2023-07-07 RX ORDER — GLIMEPIRIDE 4 MG/1
TABLET ORAL
Qty: 180 TABLET | Refills: 2 | Status: SHIPPED | OUTPATIENT
Start: 2023-07-07

## 2023-07-21 ENCOUNTER — OFFICE VISIT (OUTPATIENT)
Age: 78
End: 2023-07-21

## 2023-07-21 DIAGNOSIS — E11.65 TYPE 2 DIABETES MELLITUS WITH HYPERGLYCEMIA, WITHOUT LONG-TERM CURRENT USE OF INSULIN (HCC): Primary | ICD-10-CM

## 2023-07-21 NOTE — PROGRESS NOTES
[facility] for Diabetes Health  Diabetes Self-Management Education & Support Program  Post-program Evaluation    EVALUATION @ [address] Naomy Chung completed 4 hours of diabetes self-management education. The participant acquired new knowledge and demonstrated new skills during the program.     The participant worked on the following SMART GOAL(s) to improve their diabetes self-management:    Practice building a balanced meal for dinner at least 2 times over the next week. ACHIEVEMENT OF GOAL(S) : %    2. Have new A1c lab done before DSMES follow up visit. ACHIEVEMENT OF GOAL(S) : %      The participant's pre-program A1c was 6.0  Lab Results   Component Value Date/Time    IWG3CFDS 5.4 09/20/2021 11:36 AM    LABA1C 5.7 06/23/2023 02:14 PM   . The post-evaluation A1c is 5.7. The participant improved their Diabetes Skills, Confidence and Preparedness Index (scored out of 7): Total score: 6  Skills: 5.8  Confidence: 5.9  Preparedness: 6.7    FINAL RECOMMENDATIONS:  Continue to use Healthy Plate to manage carb portions to prevent hypoglycemia and hyperglycemia. Speak to Dr. Frankie Hannah about switching to Ozempic to enroll in the patient financial assistance program versus paying for Trulicity for the remainder of the year. Next provider visit is scheduled for - as desired by patient, he has 6 hours of DSMES covered time remaining through 4/6/2024. Sharyn Jung RN on 7/21/2023     Metric Patient's responses (7/21/2023) Areas for improvement     Healthy Eating       The participant is using the Healthy Plate to control carbohydrate intake - Yes    The participant reads food labels accurately - Yes   Continue to use Healthy Plate/controlled portion of carbs at each meal to prevent hypoglycemia and hyperglycemia.      Being Active       The participant performs physical activity where your heart beats faster and your breathing is harder than normal for 30

## 2023-08-08 ENCOUNTER — OFFICE VISIT (OUTPATIENT)
Age: 78
End: 2023-08-08
Payer: MEDICARE

## 2023-08-08 VITALS
WEIGHT: 214 LBS | TEMPERATURE: 96.9 F | HEART RATE: 71 BPM | OXYGEN SATURATION: 97 % | BODY MASS INDEX: 35.65 KG/M2 | SYSTOLIC BLOOD PRESSURE: 134 MMHG | DIASTOLIC BLOOD PRESSURE: 71 MMHG | HEIGHT: 65 IN

## 2023-08-08 DIAGNOSIS — N18.31 STAGE 3A CHRONIC KIDNEY DISEASE (HCC): ICD-10-CM

## 2023-08-08 DIAGNOSIS — M1A.09X0 IDIOPATHIC CHRONIC GOUT OF MULTIPLE SITES WITHOUT TOPHUS: ICD-10-CM

## 2023-08-08 DIAGNOSIS — K75.81 NASH (NONALCOHOLIC STEATOHEPATITIS): ICD-10-CM

## 2023-08-08 DIAGNOSIS — K74.60 HEPATIC CIRRHOSIS, UNSPECIFIED HEPATIC CIRRHOSIS TYPE, UNSPECIFIED WHETHER ASCITES PRESENT (HCC): Primary | ICD-10-CM

## 2023-08-08 DIAGNOSIS — R42 VERTIGO: ICD-10-CM

## 2023-08-08 PROCEDURE — G8417 CALC BMI ABV UP PARAM F/U: HCPCS | Performed by: NURSE PRACTITIONER

## 2023-08-08 PROCEDURE — 99214 OFFICE O/P EST MOD 30 MIN: CPT | Performed by: NURSE PRACTITIONER

## 2023-08-08 PROCEDURE — 3017F COLORECTAL CA SCREEN DOC REV: CPT | Performed by: NURSE PRACTITIONER

## 2023-08-08 PROCEDURE — 1036F TOBACCO NON-USER: CPT | Performed by: NURSE PRACTITIONER

## 2023-08-08 PROCEDURE — 1123F ACP DISCUSS/DSCN MKR DOCD: CPT | Performed by: NURSE PRACTITIONER

## 2023-08-08 PROCEDURE — G8427 DOCREV CUR MEDS BY ELIG CLIN: HCPCS | Performed by: NURSE PRACTITIONER

## 2023-08-08 NOTE — PROGRESS NOTES
[address] [facility] [address] MD Roger, FACP, [place], [place]      Anirudh Wray, LUCY Parson, [facility]-BC   Terri Huston, [facility]   Lanre Madrigal, FNP-C  Clarissa Hickman, FNP-C   Carlee Urbina, [facility]      [address]   at [facility]   [address] [facility], [address] [facility], [address]   [phone]   FAX: [phone]   at [facility], [address] [facility], [address]   [phone]   FAX: [phone]     Patient Care Team:  LEANDER Méndez NP as PCP - General (Nurse Practitioner)  LEANDER Méndez NP as PCP - Empaneled Provider  Barak Rosales MD as Consulting Physician  Vazquez Garibay MD as Referring Physician  LEANDER Glass NP as Consulting Physician  Yumi Stone MD as Consulting Physician  Manuel Camilo MD as Consulting Physician  Yumi Stone MD as Physician  Stella Michel RN as Ambulatory Care Manager  Vernida Boas, MD (Pulmonary Disease)    Patient Active Problem List   Diagnosis    Cirrhosis ([address])    Gallstone pancreatitis    History of CVA (cerebrovascular accident)    Thrombocytopenia ([address])    Class 1 obesity due to excess calories with serious comorbidity and body mass index (BMI) of 32.0 to 32.9 in adult    History of cholecystectomy    Chronic renal disease, stage III ([address])    Dysautonomia ([address])    Chronic obstructive pulmonary disease, unspecified COPD type ([address])    Type 2 diabetes mellitus with other circulatory complication, without long-term current use of insulin ([address])    MILLER (nonalcoholic steatohepatitis)    Acute idiopathic gout of right knee    Severe obesity (BMI 35.0-39. 9) with comorbidity ([address])       J Carlos [facility] for management of cirrhosis secondary to non-alcoholic fatty

## 2023-08-09 PROBLEM — R42 VERTIGO: Status: ACTIVE | Noted: 2023-08-09

## 2023-08-09 LAB
ALBUMIN SERPL-MCNC: 3.8 G/DL (ref 3.5–5)
ALBUMIN/GLOB SERPL: 1.2 (ref 1.1–2.2)
ALP SERPL-CCNC: 79 U/L (ref 45–117)
ALT SERPL-CCNC: 50 U/L (ref 12–78)
ANION GAP SERPL CALC-SCNC: 6 MMOL/L (ref 5–15)
AST SERPL-CCNC: 24 U/L (ref 15–37)
BASOPHILS # BLD: 0.1 K/UL (ref 0–0.1)
BASOPHILS NFR BLD: 1 % (ref 0–1)
BILIRUB DIRECT SERPL-MCNC: 0.1 MG/DL (ref 0–0.2)
BILIRUB SERPL-MCNC: 0.3 MG/DL (ref 0.2–1)
BUN SERPL-MCNC: 16 MG/DL (ref 6–20)
BUN/CREAT SERPL: 9 (ref 12–20)
CALCIUM SERPL-MCNC: 9 MG/DL (ref 8.5–10.1)
CHLORIDE SERPL-SCNC: 112 MMOL/L (ref 97–108)
CO2 SERPL-SCNC: 26 MMOL/L (ref 21–32)
CREAT SERPL-MCNC: 1.74 MG/DL (ref 0.7–1.3)
DIFFERENTIAL METHOD BLD: NORMAL
EOSINOPHIL # BLD: 0.2 K/UL (ref 0–0.4)
EOSINOPHIL NFR BLD: 3 % (ref 0–7)
ERYTHROCYTE [DISTWIDTH] IN BLOOD BY AUTOMATED COUNT: 14 % (ref 11.5–14.5)
GLOBULIN SER CALC-MCNC: 3.2 G/DL (ref 2–4)
GLUCOSE SERPL-MCNC: 96 MG/DL (ref 65–100)
HCT VFR BLD AUTO: 40 % (ref 36.6–50.3)
HGB BLD-MCNC: 12.3 G/DL (ref 12.1–17)
IMM GRANULOCYTES # BLD AUTO: 0 K/UL (ref 0–0.04)
IMM GRANULOCYTES NFR BLD AUTO: 0 % (ref 0–0.5)
LYMPHOCYTES # BLD: 1.5 K/UL (ref 0.8–3.5)
LYMPHOCYTES NFR BLD: 26 % (ref 12–49)
MCH RBC QN AUTO: 26.5 PG (ref 26–34)
MCHC RBC AUTO-ENTMCNC: 30.8 G/DL (ref 30–36.5)
MCV RBC AUTO: 86 FL (ref 80–99)
MONOCYTES # BLD: 0.5 K/UL (ref 0–1)
MONOCYTES NFR BLD: 9 % (ref 5–13)
NEUTS SEG # BLD: 3.5 K/UL (ref 1.8–8)
NEUTS SEG NFR BLD: 61 % (ref 32–75)
NRBC # BLD: 0 K/UL (ref 0–0.01)
NRBC BLD-RTO: 0 PER 100 WBC
PLATELET # BLD AUTO: NORMAL K/UL (ref 150–400)
PLATELET COMMENT: NORMAL
POTASSIUM SERPL-SCNC: 4.2 MMOL/L (ref 3.5–5.1)
PROT SERPL-MCNC: 7 G/DL (ref 6.4–8.2)
RBC # BLD AUTO: 4.65 M/UL (ref 4.1–5.7)
RBC MORPH BLD: NORMAL
SODIUM SERPL-SCNC: 144 MMOL/L (ref 136–145)
URATE SERPL-MCNC: 4.8 MG/DL (ref 3.5–7.2)
WBC # BLD AUTO: 5.8 K/UL (ref 4.1–11.1)

## 2023-08-10 DIAGNOSIS — K76.82 HEPATIC ENCEPHALOPATHY (HCC): Primary | ICD-10-CM

## 2023-08-10 LAB
AMMONIA PLAS-SCNC: 71 UMOL/L
INR PPP: 1.1 (ref 0.9–1.1)
PROTHROMBIN TIME: 11.1 SEC (ref 9–11.1)

## 2023-08-10 RX ORDER — LACTULOSE 10 G/15ML
SOLUTION ORAL
Qty: 900 ML | Refills: 5 | Status: SHIPPED | OUTPATIENT
Start: 2023-08-10

## 2023-08-11 LAB
AFP L3 MFR SERPL: NORMAL % (ref 0–9.9)
AFP SERPL-MCNC: 2.6 NG/ML (ref 0–8.4)

## 2023-08-23 ENCOUNTER — CLINICAL DOCUMENTATION (OUTPATIENT)
Age: 78
End: 2023-08-23

## 2023-09-07 ENCOUNTER — TELEPHONE (OUTPATIENT)
Age: 78
End: 2023-09-07

## 2023-09-07 NOTE — TELEPHONE ENCOUNTER
I attempted to call Mr. Tavon Fitzpatrick to let him know that the shipment of Ozempic from [facility] has arrived and reached his voice mail. I left a message.   Ole Thomas

## 2023-09-15 ENCOUNTER — OFFICE VISIT (OUTPATIENT)
Age: 78
End: 2023-09-15

## 2023-09-15 DIAGNOSIS — E11.65 TYPE 2 DIABETES MELLITUS WITH HYPERGLYCEMIA, WITHOUT LONG-TERM CURRENT USE OF INSULIN (HCC): Primary | ICD-10-CM

## 2023-09-15 NOTE — PROGRESS NOTES
[facility]  Diabetes Self-Management Education & Support Program  Encounter Note      SUMMARY  Diabetes self-care management training was completed related to healthy eating. he participant will return on  TBD to continue DSMES related to TBD. The participant did not identify SMART Goal(s) and will practice knowledge and skills related to TBD to improve diabetes self-management. EVALUATION:  Mr. Hue Johnson was seen today for a quick check on how he feels about his maintenance of Healthy Eating and DM self-mgmt, in general.  He states he continues to use Healthy Plate some of the time, but realizes his breakfast choices need attention. He has started semaglutide, he could not recall his current dose and had questions about that, he will call Dr. Vamshi Daniel office for clarification as it is not in his med list. He continues to take his glimepiride before meals. He anticipates his next A1c may be higher given he went without a GLP1 for about 2 months. No episodes of hypoglycemia in the last month. Mr. Annie Baca had his hearing checked yesterday, he will be getting hearing aids. He is upset because he has been told the dizziness and hearing loss \"should have been addressed years ago\" and other than PT to lessen the dizziness, there is nothing more that can be done to reduce his symptoms. While in the office, Mr. Annie Baca had an intense episode of dizziness and pain/numbness in his right leg from the hip to his foot, which was most likely nerve sensation related to his leg falling asleep. Vitals taken at 10:24 AM, 144/74, 72 bpm, 96%. He sat for about 5 minutes, new vitals the same with BP of 148/72. He was able to stand without pain and stated he was ok to walk and drive. Mr. Annie Baca shared he is ready to give up driving because of the dizziness. He expressed feeling \"down\" and \"angry\" some of the time due to his health conditions, the news, and family situations.  He is not

## 2023-09-18 RX ORDER — PANTOPRAZOLE SODIUM 40 MG/1
TABLET, DELAYED RELEASE ORAL
Qty: 90 TABLET | OUTPATIENT
Start: 2023-09-18

## 2023-09-20 RX ORDER — AMLODIPINE BESYLATE 5 MG/1
5 TABLET ORAL DAILY
Qty: 30 TABLET | Refills: 5 | Status: SHIPPED | OUTPATIENT
Start: 2023-09-20

## 2023-09-27 ENCOUNTER — CLINICAL DOCUMENTATION (OUTPATIENT)
Dept: SPIRITUAL SERVICES | Age: 78
End: 2023-09-27

## 2023-10-09 ENCOUNTER — OFFICE VISIT (OUTPATIENT)
Age: 78
End: 2023-10-09
Payer: MEDICARE

## 2023-10-09 VITALS — WEIGHT: 219 LBS | BODY MASS INDEX: 36.49 KG/M2 | HEIGHT: 65 IN

## 2023-10-09 DIAGNOSIS — E11.65 TYPE 2 DIABETES MELLITUS WITH HYPERGLYCEMIA, WITHOUT LONG-TERM CURRENT USE OF INSULIN (HCC): Primary | ICD-10-CM

## 2023-10-09 PROCEDURE — 1036F TOBACCO NON-USER: CPT | Performed by: INTERNAL MEDICINE

## 2023-10-09 PROCEDURE — 1123F ACP DISCUSS/DSCN MKR DOCD: CPT | Performed by: INTERNAL MEDICINE

## 2023-10-09 PROCEDURE — G8428 CUR MEDS NOT DOCUMENT: HCPCS | Performed by: INTERNAL MEDICINE

## 2023-10-09 PROCEDURE — G8484 FLU IMMUNIZE NO ADMIN: HCPCS | Performed by: INTERNAL MEDICINE

## 2023-10-09 PROCEDURE — 3044F HG A1C LEVEL LT 7.0%: CPT | Performed by: INTERNAL MEDICINE

## 2023-10-09 PROCEDURE — 99214 OFFICE O/P EST MOD 30 MIN: CPT | Performed by: INTERNAL MEDICINE

## 2023-10-09 PROCEDURE — G8417 CALC BMI ABV UP PARAM F/U: HCPCS | Performed by: INTERNAL MEDICINE

## 2023-10-09 NOTE — PROGRESS NOTES
02:21 PM    LABGLOM 40 08/08/2023 02:21 PM    LABGLOM 39 06/23/2023 02:14 PM    EGFR 45 04/06/2023 01:29 PM    [facility] 63 06/23/2023 02:14 PM    MALBCR 25 03/23/2023 11:38 AM       Lab Results   Component Value Date/Time    CHOL 129 06/23/2023 02:14 PM    CHOL 126 06/23/2023 08:43 AM    TRIG 154 06/23/2023 02:14 PM    HDL 40 06/23/2023 02:14 PM    LDLCALC 63 06/23/2023 02:14 PM       Lab Results   Component Value Date/Time    TSH 1.340 06/18/2021 02:55 PM       No results found for: \"CPEPLT\"    Past Medical History:   Diagnosis Date    CAD (coronary artery disease)     Cerebrovascular disease 2013    Still has very bad dizziness every day    Chronic kidney disease     stones    Chronic obstructive pulmonary disease (HCC)     Cirrhosis ([address])     Colon polyp     Diabetes ([address])     Erectile dysfunction     Hearing loss     Hypertension     Obesity     Osteoarthritis     Sleep apnea     Stroke ([address]) 1980s    right hand neuropathy    Type 2 diabetes mellitus without complication ([address])           Height 5' 5\" (1.651 m), weight 219 lb (99.3 kg). No flowsheet data found. EXAM  - not done today       Assessment/Plan:   1. Type 2 diabetes mellitus with hyperglycemia, without long-term current use of insulin (HCC)  Doing great on Trulicity alone  But somehow sulfonylurea added back when higher doses of trulicity not available  Sugars are good, and patient does not wish to change things (such as increase GLP and lower RUBIN)  Only one low sugar in past few months; CGM not covered  No changes today   Working with [facility]     [In past significant discussion with patient about his history of pancreatitis and risk for pancreatitis with GLP1. Prior episode was gallstone related so more a structural issue that resolved with choly.   Patient still wished to pursue GLP1 treatment aware if the risk, which is likely low]     [facility] [place] [facility] [place] that really reduced his costs

## 2023-10-10 ENCOUNTER — PATIENT MESSAGE (OUTPATIENT)
Dept: PRIMARY CARE CLINIC | Facility: CLINIC | Age: 78
End: 2023-10-10

## 2023-10-10 DIAGNOSIS — E78.2 MIXED HYPERLIPIDEMIA: Primary | ICD-10-CM

## 2023-10-10 NOTE — TELEPHONE ENCOUNTER
PCP: LEANDER Wilson NP    Last Visit 6/22/2023   Future Appointments   Date Time Provider [address]   11/14/2023  2:00 PM Aleja Parson APRN - NP [facility]       Requested Prescriptions     Pending Prescriptions Disp Refills    simvastatin (ZOCOR) 20 MG tablet 30 tablet      Sig: Take 1 tablet by mouth nightly         Other Comments: Last Refill   N/a

## 2023-10-11 RX ORDER — SIMVASTATIN 20 MG
20 TABLET ORAL NIGHTLY
Qty: 90 TABLET | Refills: 0 | Status: SHIPPED | OUTPATIENT
Start: 2023-10-11

## 2023-10-14 LAB — HBA1C MFR BLD: 6.6 % (ref 4.8–5.6)

## 2023-10-19 ENCOUNTER — CLINICAL DOCUMENTATION (OUTPATIENT)
Dept: SPIRITUAL SERVICES | Age: 78
End: 2023-10-19

## 2023-10-19 NOTE — ACP (ADVANCE CARE PLANNING)
Advance Care Planning     Advance Care Planning Clinical Specialist  Conversation Note      Date of ACP Conversation: 10/19/2023    Conversation Conducted with: Patient with Decision Making Capacity    ACP Clinical Specialist: Catarino Recinos, [address] [facility] Decision Maker:     Current Designated Healthcare Decision Maker:     Primary Decision Maker: Mikhail Ahumada - Brother/Sister - [phone]    Primary Decision Maker: Jareth Duque - Brother/Sister - [phone]      Care Preferences    Hospitalization: \"If your health worsens and it becomes clear that your chance of recovery is unlikely, what would your preference be regarding hospitalization? \"    Choice:  [x] The patient wants hospitalization. [] The patient prefers comfort-focused treatment without hospitalization. Ventilation: \"If you were in your present state of health and suddenly became very ill and were unable to breathe on your own, what would your preference be about the use of a ventilator (breathing machine) if it were available to you? \"      If the patient would desire the use of ventilator (breathing machine), answer \"yes\". If not, \"no\": yes    \"If your health worsens and it becomes clear that your chance of recovery is unlikely, what would your preference be about the use of a ventilator (breathing machine) if it were available to you? \"     Would the patient desire the use of ventilator (breathing machine)?: No      Resuscitation  \"CPR works best to restart the heart when there is a sudden event, like a heart attack, in someone who is otherwise healthy. Unfortunately, CPR does not typically restart the heart for people who have serious health conditions or who are very sick. \"    \"In the event your heart stopped as a result of an underlying serious health condition, would you want attempts to be made to restart your heart (answer \"yes\" for attempt to resuscitate) or would you prefer a natural death (answer \"no\" for do not

## 2023-10-23 ENCOUNTER — NURSE TRIAGE (OUTPATIENT)
Dept: OTHER | Facility: CLINIC | Age: 78
End: 2023-10-23

## 2023-10-24 ENCOUNTER — OFFICE VISIT (OUTPATIENT)
Age: 78
End: 2023-10-24

## 2023-10-24 VITALS
DIASTOLIC BLOOD PRESSURE: 69 MMHG | OXYGEN SATURATION: 96 % | BODY MASS INDEX: 36.62 KG/M2 | WEIGHT: 219.8 LBS | HEART RATE: 72 BPM | HEIGHT: 65 IN | RESPIRATION RATE: 18 BRPM | SYSTOLIC BLOOD PRESSURE: 118 MMHG

## 2023-10-24 DIAGNOSIS — K59.00 CONSTIPATION, UNSPECIFIED CONSTIPATION TYPE: Primary | ICD-10-CM

## 2023-10-24 RX ORDER — POLYETHYLENE GLYCOL 3350 17 G/17G
17 POWDER, FOR SOLUTION ORAL 2 TIMES DAILY
Qty: 225 G | Refills: 1 | Status: SHIPPED | OUTPATIENT
Start: 2023-10-24

## 2023-10-24 ASSESSMENT — ENCOUNTER SYMPTOMS: CONSTIPATION: 1

## 2023-10-24 NOTE — PROGRESS NOTES
Eliza Tucker (:  1945) is a 70 y.o. male,Established patient, here for evaluation of the following chief complaint(s):  Constipation (Has not defecated in nearly a week. Back is starting to hurt.)      ASSESSMENT/PLAN:             Follow up in 7 days if symptoms persist or if symptoms worsen. SUBJECTIVE/OBJECTIVE:    Constipation         70 y.o. male presents with symptoms of  Constipation  Patient complains of constipation. Onset was 5 days ago. Patient has been having occasional formed stools per day. Defecation has been difficult. Co-Morbid conditions:dietary change increased cheese . Symptoms have gradually worsened. Current Health Habits: Eating fiber? no, Exercise? yes - 3 miles per day, Adequate hydration? yes - water. Current over the counter/prescription laxative:  none  which has been  ineffective . Vitals:    10/24/23 1544   BP: 118/69   Pulse: 72   Resp: 18   SpO2: 96%   Weight: 99.7 kg (219 lb 12.8 oz)   Height: 1.651 m (5' 5\")       No results found for this visit on 10/24/23. Physical Exam  Constitutional:       Appearance: Normal appearance. Cardiovascular:      Rate and Rhythm: Normal rate and regular rhythm. Pulses: Normal pulses. Heart sounds: Normal heart sounds. Pulmonary:      Effort: Pulmonary effort is normal.      Breath sounds: Normal breath sounds. Abdominal:      General: Bowel sounds are decreased. There is distension. Palpations: Abdomen is rigid. Musculoskeletal:         General: Normal range of motion. Skin:     General: Skin is warm and dry. Neurological:      Mental Status: He is alert. An electronic signature was used to authenticate this note.      LEANDER Wharton NP

## 2023-10-30 ENCOUNTER — PATIENT MESSAGE (OUTPATIENT)
Dept: PRIMARY CARE CLINIC | Facility: CLINIC | Age: 78
End: 2023-10-30

## 2023-11-14 ENCOUNTER — TELEMEDICINE (OUTPATIENT)
Age: 78
End: 2023-11-14
Payer: MEDICARE

## 2023-11-14 DIAGNOSIS — K75.81 NASH (NONALCOHOLIC STEATOHEPATITIS): ICD-10-CM

## 2023-11-14 DIAGNOSIS — K74.60 HEPATIC CIRRHOSIS, UNSPECIFIED HEPATIC CIRRHOSIS TYPE, UNSPECIFIED WHETHER ASCITES PRESENT (HCC): Primary | ICD-10-CM

## 2023-11-14 DIAGNOSIS — R42 VERTIGO: ICD-10-CM

## 2023-11-14 PROCEDURE — G8427 DOCREV CUR MEDS BY ELIG CLIN: HCPCS | Performed by: NURSE PRACTITIONER

## 2023-11-14 PROCEDURE — 1123F ACP DISCUSS/DSCN MKR DOCD: CPT | Performed by: NURSE PRACTITIONER

## 2023-11-14 PROCEDURE — 99214 OFFICE O/P EST MOD 30 MIN: CPT | Performed by: NURSE PRACTITIONER

## 2023-11-14 NOTE — PROGRESS NOTES
[address] [facility] [address], FACP, [place], [place]      LUCY Mora, [facility]   Terri Huston, [facility]   Lanre Madrigal, FNP-C  Clarissa Hickman, FNP-C   Carlee Urbina, PCNP-BC      [address]   at [facility]   [address] [facility], [address] [facility], [address]   [phone]   FAX: [address]   at [facility], [address] [facility], [address]   [phone]   FAX: [phone]     Patient Care Team:  LEANDER Méndez NP as PCP - General (Nurse Practitioner)  LEANDER Méndez NP as PCP - Empaneled Provider  Barak Rosales MD as Consulting Physician  Vazquez Garibay MD as Referring Physician  LEANDER Glass NP as Consulting Physician  Manuel Camilo MD as Consulting Physician  Yumi Stone MD as Physician (Endocrinology)  Stella Michel RN as [address]  Vernida Boas, MD (Pulmonary Disease)    Patient Active Problem List   Diagnosis    Cirrhosis ([address])    Gallstone pancreatitis    History of CVA (cerebrovascular accident)    Thrombocytopenia ([address])    Class 1 obesity due to excess calories with serious comorbidity and body mass index (BMI) of 32.0 to 32.9 in adult    History of cholecystectomy    Chronic renal disease, stage III ([address])    Dysautonomia ([address])    Chronic obstructive pulmonary disease, unspecified COPD type ([address])    Type 2 diabetes mellitus with other circulatory complication, without long-term current use of insulin ([address])    MILLER (nonalcoholic steatohepatitis)    Acute idiopathic gout of right knee    Severe obesity (BMI 35.0-39. 9) with comorbidity ([address])    Vertigo     VIRTUAL TELEHEALTH VISIT PERFORMED DUE TO COVID-19 EPIDEMIC    CONSENT:    J Carlos Baer, was evaluated through a synchronous (real-time)

## 2023-11-14 NOTE — PROGRESS NOTES
Identified pt with two pt identifiers(name and ). Reviewed record in preparation for visit and have obtained necessary documentation. Chief Complaint   Patient presents with    Follow-up     There were no vitals taken for this visit. 1. \"Have you been to the ER, urgent care clinic since your last visit? Hospitalized since your last visit? \" No    2. \"Have you seen or consulted any other health care providers outside of the [address] [place] [address] since your last visit? \" No     Patient is accompanied by adult caretaker I have received verbal consent from Nelsy Leach to discuss any/all medical information while they are present in the room.

## 2023-11-30 ENCOUNTER — CLINICAL DOCUMENTATION (OUTPATIENT)
Age: 78
End: 2023-11-30

## 2023-11-30 ENCOUNTER — TELEPHONE (OUTPATIENT)
Age: 78
End: 2023-11-30

## 2023-11-30 NOTE — TELEPHONE ENCOUNTER
I called Mr. Eileen Bee to let him know that we received 3 boxes of 1mg Ozempic from Patient Juan Luis Zapata Oxygen saturation at rest, no oxygen- 87%  Oxygen saturation with 2LNC- 95%  Patient is unable to ambulate due to chronic weakness and severe shortness of breath.

## 2023-12-01 ENCOUNTER — HOSPITAL ENCOUNTER (OUTPATIENT)
Facility: HOSPITAL | Age: 78
End: 2023-12-01
Payer: MEDICARE

## 2023-12-01 ENCOUNTER — OFFICE VISIT (OUTPATIENT)
Dept: PRIMARY CARE CLINIC | Facility: CLINIC | Age: 78
End: 2023-12-01
Payer: MEDICARE

## 2023-12-01 VITALS
WEIGHT: 223 LBS | BODY MASS INDEX: 37.15 KG/M2 | HEART RATE: 97 BPM | SYSTOLIC BLOOD PRESSURE: 138 MMHG | TEMPERATURE: 97.8 F | HEIGHT: 65 IN | RESPIRATION RATE: 16 BRPM | DIASTOLIC BLOOD PRESSURE: 77 MMHG | OXYGEN SATURATION: 96 %

## 2023-12-01 DIAGNOSIS — E78.2 MIXED HYPERLIPIDEMIA: ICD-10-CM

## 2023-12-01 DIAGNOSIS — J44.9 CHRONIC OBSTRUCTIVE PULMONARY DISEASE, UNSPECIFIED COPD TYPE (HCC): ICD-10-CM

## 2023-12-01 DIAGNOSIS — I10 ESSENTIAL (PRIMARY) HYPERTENSION: ICD-10-CM

## 2023-12-01 DIAGNOSIS — N18.32 TYPE 2 DIABETES MELLITUS WITH STAGE 3B CHRONIC KIDNEY DISEASE, WITHOUT LONG-TERM CURRENT USE OF INSULIN (HCC): ICD-10-CM

## 2023-12-01 DIAGNOSIS — H91.93 BILATERAL HEARING LOSS, UNSPECIFIED HEARING LOSS TYPE: ICD-10-CM

## 2023-12-01 DIAGNOSIS — E11.22 TYPE 2 DIABETES MELLITUS WITH STAGE 3B CHRONIC KIDNEY DISEASE, WITHOUT LONG-TERM CURRENT USE OF INSULIN (HCC): ICD-10-CM

## 2023-12-01 DIAGNOSIS — K74.60 HEPATIC CIRRHOSIS, UNSPECIFIED HEPATIC CIRRHOSIS TYPE, UNSPECIFIED WHETHER ASCITES PRESENT (HCC): ICD-10-CM

## 2023-12-01 DIAGNOSIS — R06.02 SHORTNESS OF BREATH: Primary | ICD-10-CM

## 2023-12-01 DIAGNOSIS — R42 DIZZINESS: ICD-10-CM

## 2023-12-01 DIAGNOSIS — Z86.73 HISTORY OF STROKE: ICD-10-CM

## 2023-12-01 DIAGNOSIS — R06.02 SHORTNESS OF BREATH: ICD-10-CM

## 2023-12-01 PROCEDURE — 1036F TOBACCO NON-USER: CPT

## 2023-12-01 PROCEDURE — 76700 US EXAM ABDOM COMPLETE: CPT

## 2023-12-01 PROCEDURE — 3078F DIAST BP <80 MM HG: CPT

## 2023-12-01 PROCEDURE — 99214 OFFICE O/P EST MOD 30 MIN: CPT

## 2023-12-01 PROCEDURE — G8427 DOCREV CUR MEDS BY ELIG CLIN: HCPCS

## 2023-12-01 PROCEDURE — G8417 CALC BMI ABV UP PARAM F/U: HCPCS

## 2023-12-01 PROCEDURE — G8484 FLU IMMUNIZE NO ADMIN: HCPCS

## 2023-12-01 PROCEDURE — 3044F HG A1C LEVEL LT 7.0%: CPT

## 2023-12-01 PROCEDURE — 3075F SYST BP GE 130 - 139MM HG: CPT

## 2023-12-01 PROCEDURE — 1123F ACP DISCUSS/DSCN MKR DOCD: CPT

## 2023-12-01 PROCEDURE — 3023F SPIROM DOC REV: CPT

## 2023-12-01 PROCEDURE — 71046 X-RAY EXAM CHEST 2 VIEWS: CPT

## 2023-12-01 RX ORDER — FLUTICASONE FUROATE, UMECLIDINIUM BROMIDE AND VILANTEROL TRIFENATATE 100; 62.5; 25 UG/1; UG/1; UG/1
1 POWDER RESPIRATORY (INHALATION) DAILY
Qty: 3 EACH | Refills: 1 | Status: SHIPPED | OUTPATIENT
Start: 2023-12-01

## 2023-12-01 RX ORDER — MECLIZINE HYDROCHLORIDE 25 MG/1
25 TABLET ORAL 3 TIMES DAILY PRN
Qty: 30 TABLET | Refills: 0 | Status: SHIPPED | OUTPATIENT
Start: 2023-12-01 | End: 2023-12-11

## 2023-12-01 RX ORDER — ALBUTEROL SULFATE 2.5 MG/3ML
2.5 SOLUTION RESPIRATORY (INHALATION) ONCE
Status: SHIPPED | OUTPATIENT
Start: 2023-12-01

## 2023-12-01 ASSESSMENT — PATIENT HEALTH QUESTIONNAIRE - PHQ9
SUM OF ALL RESPONSES TO PHQ QUESTIONS 1-9: 0
SUM OF ALL RESPONSES TO PHQ QUESTIONS 1-9: 0
SUM OF ALL RESPONSES TO PHQ9 QUESTIONS 1 & 2: 0
2. FEELING DOWN, DEPRESSED OR HOPELESS: 0
SUM OF ALL RESPONSES TO PHQ QUESTIONS 1-9: 0
1. LITTLE INTEREST OR PLEASURE IN DOING THINGS: 0
SUM OF ALL RESPONSES TO PHQ QUESTIONS 1-9: 0

## 2023-12-01 ASSESSMENT — ENCOUNTER SYMPTOMS
COUGH: 1
DIARRHEA: 0
WHEEZING: 1
SHORTNESS OF BREATH: 1
CONSTIPATION: 0
VOMITING: 0
NAUSEA: 0
CHEST TIGHTNESS: 0

## 2023-12-01 NOTE — PROGRESS NOTES
Identified pt with two pt identifiers(name and ). Reviewed record in preparation for visit and have obtained necessary documentation. Chief Complaint   Patient presents with    Post OP Follow Up     6/3/21: lap chol w grams; refill requested for ibuprofen 800mg      Vitals:    21 1109   BP: (!) 97/56   Pulse: 81   Resp: 17   Temp: 98.5 °F (36.9 °C)   TempSrc: Oral   SpO2: 96%   Weight: 182 lb (82.6 kg)   Height: 5' 5\" (1.651 m)   PainSc:   0 - No pain       Health Maintenance Review: Patient reminded of \"due or due soon\" health maintenance. I have asked the patient to contact his/her primary care provider (PCP) for follow-up on his/her health maintenance. Coordination of Care Questionnaire:  :   1) Have you been to an emergency room, urgent care, or hospitalized since your last visit? If yes, where when, and reason for visit? no       2. Have seen or consulted any other health care provider since your last visit? If yes, where when, and reason for visit? YES- Dr. Josiah Davis (hep)      Patient is accompanied by self I have received verbal consent from Jacinta Chandler to discuss any/all medical information while they are present in the room.
Subjective:      Leah Scott is a 70 y.o. male presents for postop care following laparoscopic cholecystectomy by Dr. Marline Johansen. Appetite is good. Eating a regular diet. without difficulty. Bowel movements are regular. The patient is not having any pain. .Denies fever, nausea, redness at incision site, vomiting and diarrhea     Pt reports that he has been having dizziness since 2013 but it has been worse since surgery, only when he stands up and walks. He is fine and not dizzy with sitting or moving his head around. Spironolactone, enalapril, furosemide - discontinued by Dr. Rajiv Alvarado who said the dizziness would improve around 2pm today. Some of those meds were started by Dr. Rajiv Alvarado after surgery due to swelling in lower extremities and to stop the drainage from the former drain site. Pt checked his BP prior to leaving for his appointment today and BP was in 120s. Pt says he feels \"perfectly fine from the surgery. \"  Drinking at least 8 cups of water per day. Objective:     Visit Vitals  BP (!) 97/56   Pulse 81   Temp 98.5 °F (36.9 °C) (Oral)   Resp 17   Ht 5' 5\" (1.651 m)   Wt 182 lb (82.6 kg)   SpO2 96%   BMI 30.29 kg/m²       General:  alert, no distress   Abdomen: soft, bowel sounds active, non-tender   Incision:   healing well, no drainage, no erythema, no seroma, no swelling, no dehiscence, incisions well approximated   Heart: regular rate and rhythm, S1, S2 normal, no murmur, click, rub or gallop   Lungs: clear to auscultation bilaterally     Assessment:     status post lap rené. dizziness    Plan:     1. Dizziness - call Dr. Rajiv Alvarado if dizziness has not improved by later today. Drink at least 8 cups of water per day. 2. Follow-up: prn. Mr. Cristy Bridges has a reminder for a \"due or due soon\" health maintenance. I have asked that he contact his primary care provider for follow-up on this health maintenance. Patient verbalized understanding and agreement.
non-verbal indicators absent (Rating = 0)

## 2023-12-07 DIAGNOSIS — E78.5 HYPERLIPIDEMIA LDL GOAL <70: ICD-10-CM

## 2023-12-07 DIAGNOSIS — R79.89 ABNORMAL CBC: Primary | ICD-10-CM

## 2023-12-07 LAB
ALBUMIN SERPL-MCNC: 4.1 G/DL (ref 3.8–4.8)
ALP SERPL-CCNC: 69 IU/L (ref 44–121)
ALT SERPL-CCNC: 29 IU/L (ref 0–44)
AST SERPL-CCNC: 22 IU/L (ref 0–40)
BASOPHILS # BLD AUTO: 0.1 X10E3/UL (ref 0–0.2)
BASOPHILS NFR BLD AUTO: 1 %
BILIRUB DIRECT SERPL-MCNC: 0.16 MG/DL (ref 0–0.4)
BILIRUB SERPL-MCNC: 0.5 MG/DL (ref 0–1.2)
BUN SERPL-MCNC: 14 MG/DL (ref 8–27)
BUN/CREAT SERPL: 9 (ref 10–24)
CALCIUM SERPL-MCNC: 8.9 MG/DL (ref 8.6–10.2)
CHLORIDE SERPL-SCNC: 109 MMOL/L (ref 96–106)
CHOLEST SERPL-MCNC: 149 MG/DL (ref 100–199)
CO2 SERPL-SCNC: 23 MMOL/L (ref 20–29)
CREAT SERPL-MCNC: 1.59 MG/DL (ref 0.76–1.27)
EGFRCR SERPLBLD CKD-EPI 2021: 45 ML/MIN/1.73
EOSINOPHIL # BLD AUTO: 0.3 X10E3/UL (ref 0–0.4)
EOSINOPHIL NFR BLD AUTO: 7 %
ERYTHROCYTE [DISTWIDTH] IN BLOOD BY AUTOMATED COUNT: 15.4 % (ref 11.6–15.4)
GLUCOSE SERPL-MCNC: 67 MG/DL (ref 70–99)
HCT VFR BLD AUTO: 40.6 % (ref 37.5–51)
HDLC SERPL-MCNC: 44 MG/DL
HGB BLD-MCNC: 12.7 G/DL (ref 13–17.7)
IMM GRANULOCYTES # BLD AUTO: 0 X10E3/UL (ref 0–0.1)
IMM GRANULOCYTES NFR BLD AUTO: 0 %
INR PPP: 1 (ref 0.9–1.2)
LDLC SERPL CALC-MCNC: 86 MG/DL (ref 0–99)
LYMPHOCYTES # BLD AUTO: 1.6 X10E3/UL (ref 0.7–3.1)
LYMPHOCYTES NFR BLD AUTO: 35 %
MCH RBC QN AUTO: 26 PG (ref 26.6–33)
MCHC RBC AUTO-ENTMCNC: 31.3 G/DL (ref 31.5–35.7)
MCV RBC AUTO: 83 FL (ref 79–97)
MONOCYTES # BLD AUTO: 0.4 X10E3/UL (ref 0.1–0.9)
MONOCYTES NFR BLD AUTO: 8 %
MORPHOLOGY BLD-IMP: ABNORMAL
NEUTROPHILS # BLD AUTO: 2.2 X10E3/UL (ref 1.4–7)
NEUTROPHILS NFR BLD AUTO: 49 %
PLATELET # BLD AUTO: 84 X10E3/UL (ref 150–450)
POTASSIUM SERPL-SCNC: 4.5 MMOL/L (ref 3.5–5.2)
PROT SERPL-MCNC: 6.5 G/DL (ref 6–8.5)
PROTHROMBIN TIME: 11.2 SEC (ref 9.1–12)
RBC # BLD AUTO: 4.89 X10E6/UL (ref 4.14–5.8)
SODIUM SERPL-SCNC: 146 MMOL/L (ref 134–144)
TRIGL SERPL-MCNC: 106 MG/DL (ref 0–149)
VLDLC SERPL CALC-MCNC: 19 MG/DL (ref 5–40)
WBC # BLD AUTO: 4.6 X10E3/UL (ref 3.4–10.8)

## 2023-12-07 RX ORDER — SIMVASTATIN 40 MG
40 TABLET ORAL NIGHTLY
Qty: 90 TABLET | Refills: 0 | Status: SHIPPED | OUTPATIENT
Start: 2023-12-07

## 2023-12-08 LAB — BNP SERPL-MCNC: 33.2 PG/ML (ref 0–100)

## 2023-12-11 RX ORDER — PANTOPRAZOLE SODIUM 40 MG/1
TABLET, DELAYED RELEASE ORAL
Qty: 90 TABLET | OUTPATIENT
Start: 2023-12-11

## 2023-12-11 NOTE — TELEPHONE ENCOUNTER
Pt of Dr. ANGELA Morataya.     For Pharmacy Admin Tracking Only    Program: Medication Refill  Intervention Detail: New Rx: 1, reason: Patient Preference  Time Spent (min): 5

## 2024-01-04 ENCOUNTER — HOSPITAL ENCOUNTER (OUTPATIENT)
Facility: HOSPITAL | Age: 79
Discharge: HOME | End: 2024-01-04
Payer: MEDICARE

## 2024-01-04 DIAGNOSIS — Z86.73 HISTORY OF STROKE: ICD-10-CM

## 2024-01-04 DIAGNOSIS — R42 DIZZINESS: ICD-10-CM

## 2024-01-04 PROCEDURE — 70553 MRI BRAIN STEM W/O & W/DYE: CPT

## 2024-01-04 PROCEDURE — 6360000004 HC RX CONTRAST MEDICATION: Performed by: RADIOLOGY

## 2024-01-04 PROCEDURE — A9579 GAD-BASE MR CONTRAST NOS,1ML: HCPCS | Performed by: RADIOLOGY

## 2024-01-04 RX ADMIN — GADOTERIDOL 20 ML: 279.3 INJECTION, SOLUTION INTRAVENOUS at 10:48

## 2024-01-05 DIAGNOSIS — R90.89 ABNORMAL BRAIN MRI: Primary | ICD-10-CM

## 2024-01-05 DIAGNOSIS — R42 DIZZINESS: ICD-10-CM

## 2024-01-05 DIAGNOSIS — Z86.73 HISTORY OF CVA (CEREBROVASCULAR ACCIDENT): ICD-10-CM

## 2024-01-08 ENCOUNTER — TELEPHONE (OUTPATIENT)
Age: 79
End: 2024-01-08

## 2024-01-08 NOTE — TELEPHONE ENCOUNTER
Patients life partner, Margaret, called to say they would prefer to switch doctors due to 'no good vibes and they didn't get along.' They would prefer to stay at the [facility]. If approved, please give them a call. Thank you!

## 2024-01-15 ENCOUNTER — OFFICE VISIT (OUTPATIENT)
Age: 79
End: 2024-01-15
Payer: MEDICARE

## 2024-01-15 VITALS
SYSTOLIC BLOOD PRESSURE: 132 MMHG | BODY MASS INDEX: 38.15 KG/M2 | HEART RATE: 82 BPM | WEIGHT: 229 LBS | DIASTOLIC BLOOD PRESSURE: 74 MMHG | HEIGHT: 65 IN

## 2024-01-15 DIAGNOSIS — Z86.73 HISTORY OF STROKE: ICD-10-CM

## 2024-01-15 DIAGNOSIS — E78.2 MIXED HYPERLIPIDEMIA: ICD-10-CM

## 2024-01-15 DIAGNOSIS — I10 PRIMARY HYPERTENSION: ICD-10-CM

## 2024-01-15 DIAGNOSIS — E11.65 TYPE 2 DIABETES MELLITUS WITH HYPERGLYCEMIA, WITHOUT LONG-TERM CURRENT USE OF INSULIN (HCC): Primary | ICD-10-CM

## 2024-01-15 DIAGNOSIS — E16.2 HYPOGLYCEMIA: ICD-10-CM

## 2024-01-15 DIAGNOSIS — E11.22 TYPE 2 DIABETES MELLITUS WITH STAGE 3B CHRONIC KIDNEY DISEASE, WITHOUT LONG-TERM CURRENT USE OF INSULIN (HCC): ICD-10-CM

## 2024-01-15 DIAGNOSIS — N18.32 TYPE 2 DIABETES MELLITUS WITH STAGE 3B CHRONIC KIDNEY DISEASE, WITHOUT LONG-TERM CURRENT USE OF INSULIN (HCC): ICD-10-CM

## 2024-01-15 PROCEDURE — 1123F ACP DISCUSS/DSCN MKR DOCD: CPT | Performed by: INTERNAL MEDICINE

## 2024-01-15 PROCEDURE — G8417 CALC BMI ABV UP PARAM F/U: HCPCS | Performed by: INTERNAL MEDICINE

## 2024-01-15 PROCEDURE — 3075F SYST BP GE 130 - 139MM HG: CPT | Performed by: INTERNAL MEDICINE

## 2024-01-15 PROCEDURE — 99214 OFFICE O/P EST MOD 30 MIN: CPT | Performed by: INTERNAL MEDICINE

## 2024-01-15 PROCEDURE — G2211 COMPLEX E/M VISIT ADD ON: HCPCS | Performed by: INTERNAL MEDICINE

## 2024-01-15 PROCEDURE — G8428 CUR MEDS NOT DOCUMENT: HCPCS | Performed by: INTERNAL MEDICINE

## 2024-01-15 PROCEDURE — 1036F TOBACCO NON-USER: CPT | Performed by: INTERNAL MEDICINE

## 2024-01-15 PROCEDURE — G8484 FLU IMMUNIZE NO ADMIN: HCPCS | Performed by: INTERNAL MEDICINE

## 2024-01-15 PROCEDURE — 3078F DIAST BP <80 MM HG: CPT | Performed by: INTERNAL MEDICINE

## 2024-01-15 RX ORDER — DULAGLUTIDE 1.5 MG/.5ML
INJECTION, SOLUTION SUBCUTANEOUS
Qty: 2 ML | Refills: 0
Start: 2024-01-15

## 2024-01-15 RX ORDER — AZELASTINE 1 MG/ML
1 SPRAY, METERED NASAL 2 TIMES DAILY
COMMUNITY

## 2024-01-15 NOTE — PROGRESS NOTES
Chief Complaint   Patient presents with    Diabetes       Patient was last seen: 3 months ago 10/9/2023       General:   MRI for dizziness recently      No donut hole so far     A1c: last a1c was 6.6 10/2023    DM Medications:    Trulicity 1.5mg once a week  Amaryl 4mg BID AC     Last Changes: :no changes    Sugar Checks: checks up to 2 x day     AM: reports:  80-120s      PM: reports:  same     LOWs:  has low sugars -problematic lows under 55 on more than one occasion    DIET: is working on [employer], enrolled [facility]    EXERCISE: daily, walks trying to join gym      HTN: on Ca-Blk, HTN followed by PCP     LIPIDS: on statin, lipids followed by PCP    RENAL: has moderate renal insufficiency, is followed by Nephrology     EYES: eye exam in past year, has no retinopathy     DENTAL:  has seen dentist in past year, needs dental work -working on it     FEET: has no current issues, no numbness or tingling - to see Podiatry --gave list (not done yet)     HEART:  no chest pain, shortness of breath or claudication, has no cardiac history  H/O STROKE    ASA:  is on aspirin     SYMPTOMS: no polyuria, thirst or blurred vision     THYROID: no known thyroid issue    DIABETES HISTORY:   From initial visit 12/14/2022   DM2 ~10 years  On metformin  No RUBIN, no TZD, DPP4, SGLT, GLP1     Was on insulin from start until 1-2 years ago  Was very active   Gout for 3 months so not active (ankle, then knee)   Had steroids, shots in knee too  Gout gone a couple week ago      Metformin stopped 3/23 when trulicity started      LABS/STUDIES:     Lab Results   Component Value Date/Time    GFM7LTZW 5.4 09/20/2021 11:36 AM       Lab Results   Component Value Date/Time    LABA1C 6.6 10/13/2023 12:52 PM    LABA1C 5.7 06/23/2023 02:14 PM    LABA1C 6.0 03/23/2023 11:38 AM    IPSE9DNNT 7.0 06/01/2022 12:00 AM    CREATININE 1.59 12/06/2023 09:37 AM    LABGLOM 45 12/06/2023 09:37 AM    EGFR 45 04/06/2023 01:29 PM    LDLCALC 86

## 2024-01-15 NOTE — PATIENT INSTRUCTIONS
Sent form for Denisse 3 continuous glucose monitor to [employer] [phone]  - call them if you don't hear anything within a week.

## 2024-01-18 ENCOUNTER — OFFICE VISIT (OUTPATIENT)
Dept: PRIMARY CARE CLINIC | Facility: CLINIC | Age: 79
End: 2024-01-18
Payer: MEDICARE

## 2024-01-18 VITALS
SYSTOLIC BLOOD PRESSURE: 126 MMHG | TEMPERATURE: 97.9 F | WEIGHT: 227.6 LBS | HEIGHT: 65 IN | DIASTOLIC BLOOD PRESSURE: 66 MMHG | RESPIRATION RATE: 16 BRPM | HEART RATE: 80 BPM | BODY MASS INDEX: 37.92 KG/M2 | OXYGEN SATURATION: 98 %

## 2024-01-18 DIAGNOSIS — J44.9 CHRONIC OBSTRUCTIVE PULMONARY DISEASE, UNSPECIFIED COPD TYPE (HCC): ICD-10-CM

## 2024-01-18 DIAGNOSIS — E11.59 TYPE 2 DIABETES MELLITUS WITH OTHER CIRCULATORY COMPLICATION, WITHOUT LONG-TERM CURRENT USE OF INSULIN (HCC): ICD-10-CM

## 2024-01-18 DIAGNOSIS — K21.9 GASTROESOPHAGEAL REFLUX DISEASE, UNSPECIFIED WHETHER ESOPHAGITIS PRESENT: ICD-10-CM

## 2024-01-18 DIAGNOSIS — R41.3 MEMORY LOSS: ICD-10-CM

## 2024-01-18 DIAGNOSIS — Z91.81 AT HIGH RISK FOR FALLS: ICD-10-CM

## 2024-01-18 DIAGNOSIS — R42 DIZZINESS: Primary | ICD-10-CM

## 2024-01-18 DIAGNOSIS — E66.01 SEVERE OBESITY (BMI 35.0-39.9) WITH COMORBIDITY (HCC): ICD-10-CM

## 2024-01-18 DIAGNOSIS — R53.81 PHYSICAL DEBILITY: ICD-10-CM

## 2024-01-18 PROCEDURE — G8417 CALC BMI ABV UP PARAM F/U: HCPCS

## 2024-01-18 PROCEDURE — 1123F ACP DISCUSS/DSCN MKR DOCD: CPT

## 2024-01-18 PROCEDURE — 3023F SPIROM DOC REV: CPT

## 2024-01-18 PROCEDURE — 99214 OFFICE O/P EST MOD 30 MIN: CPT

## 2024-01-18 PROCEDURE — G8484 FLU IMMUNIZE NO ADMIN: HCPCS

## 2024-01-18 PROCEDURE — G8427 DOCREV CUR MEDS BY ELIG CLIN: HCPCS

## 2024-01-18 PROCEDURE — 1036F TOBACCO NON-USER: CPT

## 2024-01-18 RX ORDER — MECLIZINE HYDROCHLORIDE 25 MG/1
25 TABLET ORAL 3 TIMES DAILY PRN
Qty: 270 TABLET | Refills: 0 | Status: SHIPPED | OUTPATIENT
Start: 2024-01-18

## 2024-01-18 RX ORDER — PANTOPRAZOLE SODIUM 40 MG/1
40 TABLET, DELAYED RELEASE ORAL DAILY
Qty: 90 TABLET | Refills: 0 | Status: SHIPPED | OUTPATIENT
Start: 2024-01-18

## 2024-01-18 SDOH — ECONOMIC STABILITY: FOOD INSECURITY: WITHIN THE PAST 12 MONTHS, THE FOOD YOU BOUGHT JUST DIDN'T LAST AND YOU DIDN'T HAVE MONEY TO GET MORE.: NEVER TRUE

## 2024-01-18 SDOH — ECONOMIC STABILITY: INCOME INSECURITY: HOW HARD IS IT FOR YOU TO PAY FOR THE VERY BASICS LIKE FOOD, HOUSING, MEDICAL CARE, AND HEATING?: NOT VERY HARD

## 2024-01-18 SDOH — ECONOMIC STABILITY: FOOD INSECURITY: WITHIN THE PAST 12 MONTHS, YOU WORRIED THAT YOUR FOOD WOULD RUN OUT BEFORE YOU GOT MONEY TO BUY MORE.: NEVER TRUE

## 2024-01-18 ASSESSMENT — PATIENT HEALTH QUESTIONNAIRE - PHQ9
1. LITTLE INTEREST OR PLEASURE IN DOING THINGS: 0
SUM OF ALL RESPONSES TO PHQ QUESTIONS 1-9: 0
SUM OF ALL RESPONSES TO PHQ9 QUESTIONS 1 & 2: 0
2. FEELING DOWN, DEPRESSED OR HOPELESS: 0
SUM OF ALL RESPONSES TO PHQ QUESTIONS 1-9: 0

## 2024-01-18 ASSESSMENT — ENCOUNTER SYMPTOMS
CHEST TIGHTNESS: 0
VOMITING: 0
DIARRHEA: 0
NAUSEA: 0
COUGH: 0
CONSTIPATION: 0
SHORTNESS OF BREATH: 0

## 2024-01-18 NOTE — PROGRESS NOTES
Patient identified with two identification factors, Name and Date of Birth.    Chief Complaint   Patient presents with    Follow-up    Dizziness     Pt stated becoming worse than normal       /66 (Site: Left Upper Arm, Position: Sitting, Cuff Size: Large Adult)   Pulse 80   Temp 97.9 °F (36.6 °C) (Temporal)   Resp 16   Ht 1.651 m (5' 5\")   Wt 103.2 kg (227 lb 9.6 oz)   SpO2 98%   BMI 37.87 kg/m²       1. \"Have you been to the ER, urgent care clinic since your last visit?  Hospitalized since your last visit?\" No    2. \"Have you seen or consulted any other health care providers outside of the [facility] since your last visit?\" No    
physical activity. I referred him to Physical Therapy for balance, strengthening.  4. Chronic obstructive pulmonary disease, unspecified COPD type (HCC)  - Breathing improved with Trelegy. Follow up with Dr Richter, Pulmonologist as indicated.   5. Gastroesophageal reflux disease, unspecified whether esophagitis present  -     I refilled his Protonix.   - pantoprazole (PROTONIX) 40 MG tablet; Take 1 tablet by mouth daily, Disp-90 tablet, R-0Normal  6. At high risk for falls  -     [facility] - Physical Therapy at [facility]  7. Physical debility  -     [facility] - Physical Therapy at [facility]  8. Memory loss  -     I referred him to Dr Matute, Neuropsychologist, for cognitive evaluation.   - [facility] - Harinder Matute, PhD, Neuropsychology, [place]       Follow up in 3 months    Disclaimer:  Advised patient to call back or return to office if symptoms worsen/change/persist.  Discussed expected course/resolution/complications of diagnosis in detail with patient.     Medication risks/benefits/alternatives discussed with patient.  Patient was given an after visit summary which includes diagnoses, current medications, & vitals.      Discussed patient instructions and advised to read to all patient instructions regarding care.      Patient expressed understanding with the diagnosis and plan.       LEANDER LUIS - NP  1/18/2024

## 2024-02-01 ENCOUNTER — OFFICE VISIT (OUTPATIENT)
Age: 79
End: 2024-02-01
Payer: MEDICARE

## 2024-02-01 VITALS
BODY MASS INDEX: 37.82 KG/M2 | OXYGEN SATURATION: 97 % | SYSTOLIC BLOOD PRESSURE: 128 MMHG | WEIGHT: 227 LBS | HEART RATE: 74 BPM | RESPIRATION RATE: 18 BRPM | HEIGHT: 65 IN | DIASTOLIC BLOOD PRESSURE: 69 MMHG

## 2024-02-01 DIAGNOSIS — I95.1 ORTHOSTATIC HYPOTENSION: ICD-10-CM

## 2024-02-01 DIAGNOSIS — R42 DIZZINESS AND GIDDINESS: Primary | ICD-10-CM

## 2024-02-01 DIAGNOSIS — G90.1 DYSAUTONOMIA, FAMILIAL (HCC): ICD-10-CM

## 2024-02-01 DIAGNOSIS — Z86.73 PERSONAL HISTORY OF TRANSIENT ISCHEMIC ATTACK (TIA), AND CEREBRAL INFARCTION WITHOUT RESIDUAL DEFICITS: ICD-10-CM

## 2024-02-01 PROCEDURE — 1036F TOBACCO NON-USER: CPT

## 2024-02-01 PROCEDURE — 99215 OFFICE O/P EST HI 40 MIN: CPT

## 2024-02-01 PROCEDURE — G8427 DOCREV CUR MEDS BY ELIG CLIN: HCPCS

## 2024-02-01 PROCEDURE — G8417 CALC BMI ABV UP PARAM F/U: HCPCS

## 2024-02-01 PROCEDURE — G8484 FLU IMMUNIZE NO ADMIN: HCPCS

## 2024-02-01 PROCEDURE — 1123F ACP DISCUSS/DSCN MKR DOCD: CPT

## 2024-02-01 ASSESSMENT — PATIENT HEALTH QUESTIONNAIRE - PHQ9
SUM OF ALL RESPONSES TO PHQ QUESTIONS 1-9: 0
SUM OF ALL RESPONSES TO PHQ QUESTIONS 1-9: 0
2. FEELING DOWN, DEPRESSED OR HOPELESS: 0
SUM OF ALL RESPONSES TO PHQ QUESTIONS 1-9: 0
2. FEELING DOWN, DEPRESSED OR HOPELESS: 0
SUM OF ALL RESPONSES TO PHQ QUESTIONS 1-9: 0
SUM OF ALL RESPONSES TO PHQ QUESTIONS 1-9: 0
1. LITTLE INTEREST OR PLEASURE IN DOING THINGS: 0
SUM OF ALL RESPONSES TO PHQ9 QUESTIONS 1 & 2: 0
SUM OF ALL RESPONSES TO PHQ9 QUESTIONS 1 & 2: 0
1. LITTLE INTEREST OR PLEASURE IN DOING THINGS: 0
SUM OF ALL RESPONSES TO PHQ QUESTIONS 1-9: 0

## 2024-02-01 NOTE — PROGRESS NOTES
Chief Complaint   Patient presents with    Follow-up     dizziness      Vitals:    02/01/24 1410   BP: 128/69   Pulse: 74   Resp: 18   SpO2: 97%      
pancreatic head IPMN.  No ascites.            Assessment and Plan   Shemar was seen today for follow-up.    Diagnoses and all orders for this visit:    Dizziness and giddiness    Dysautonomia, familial (HCC)    Orthostatic hypotension    Personal history of transient ischemic attack (TIA), and cerebral infarction without residual deficits      78 year old with History of TIA/CVA and dysautonomia. He is still suffering with the same s/s as last year. He was just referred back to PT to help. I encouraged him to do the PT and then continue with the exercises that they teach him after he is done. I explained that his stroke now has been long enough out that his symptoms will not resolve completely. He has a vagal dysfunction that we cant fix. I reviewed his most recent MRI brain with him and it was completely normal. Nothing concerning there to help explain his dizziness. He understands. Continue to stay on the aspirin daily for future stroke prevention. He can FU here as needed.       I spent 45 minutes of time today reviewing the medical record and notes, imaging, examining the patient, and face-to-face time, patient/family teaching and medication side effects, and time spent completing documentation.      Umair Valdes, APRN - NP

## 2024-03-08 DIAGNOSIS — E78.5 HYPERLIPIDEMIA LDL GOAL <70: ICD-10-CM

## 2024-03-08 RX ORDER — SIMVASTATIN 40 MG
40 TABLET ORAL NIGHTLY
Qty: 90 TABLET | Refills: 0 | Status: SHIPPED | OUTPATIENT
Start: 2024-03-08

## 2024-03-16 DIAGNOSIS — M1A.0710 IDIOPATHIC CHRONIC GOUT OF RIGHT ANKLE WITHOUT TOPHUS: ICD-10-CM

## 2024-03-18 RX ORDER — FEBUXOSTAT 40 MG/1
40 TABLET, FILM COATED ORAL DAILY
Qty: 30 TABLET | OUTPATIENT
Start: 2024-03-18

## 2024-03-18 NOTE — TELEPHONE ENCOUNTER
Pt is not a pt of [facility]. Pt has NP ANGELA Morataya as his PCP.     For Pharmacy Admin Tracking Only    Program: Medication Refill  Intervention Detail: Discontinued Rx: 1, reason: Duplicate Therapy  Time Spent (min): 5      Requested Prescriptions     Pending Prescriptions Disp Refills    febuxostat (ULORIC) 40 MG TABS tablet [Pharmacy Med Name: FEBUXOSTAT 40 MG TABLET] 30 tablet      Sig: TAKE ONE TABLET BY MOUTH DAILY

## 2024-03-19 ENCOUNTER — OFFICE VISIT (OUTPATIENT)
Age: 79
End: 2024-03-19
Payer: MEDICARE

## 2024-03-19 VITALS
BODY MASS INDEX: 38.15 KG/M2 | OXYGEN SATURATION: 98 % | WEIGHT: 229 LBS | HEIGHT: 65 IN | SYSTOLIC BLOOD PRESSURE: 136 MMHG | DIASTOLIC BLOOD PRESSURE: 80 MMHG | HEART RATE: 69 BPM | TEMPERATURE: 96.6 F

## 2024-03-19 DIAGNOSIS — K75.81 NASH (NONALCOHOLIC STEATOHEPATITIS): ICD-10-CM

## 2024-03-19 DIAGNOSIS — R42 VERTIGO: ICD-10-CM

## 2024-03-19 DIAGNOSIS — K74.60 HEPATIC CIRRHOSIS, UNSPECIFIED HEPATIC CIRRHOSIS TYPE, UNSPECIFIED WHETHER ASCITES PRESENT (HCC): Primary | ICD-10-CM

## 2024-03-19 DIAGNOSIS — E11.59 TYPE 2 DIABETES MELLITUS WITH OTHER CIRCULATORY COMPLICATION, WITHOUT LONG-TERM CURRENT USE OF INSULIN (HCC): ICD-10-CM

## 2024-03-19 PROCEDURE — 99214 OFFICE O/P EST MOD 30 MIN: CPT | Performed by: NURSE PRACTITIONER

## 2024-03-19 PROCEDURE — G8417 CALC BMI ABV UP PARAM F/U: HCPCS | Performed by: NURSE PRACTITIONER

## 2024-03-19 PROCEDURE — 1123F ACP DISCUSS/DSCN MKR DOCD: CPT | Performed by: NURSE PRACTITIONER

## 2024-03-19 PROCEDURE — G8427 DOCREV CUR MEDS BY ELIG CLIN: HCPCS | Performed by: NURSE PRACTITIONER

## 2024-03-19 PROCEDURE — 1036F TOBACCO NON-USER: CPT | Performed by: NURSE PRACTITIONER

## 2024-03-19 PROCEDURE — G8484 FLU IMMUNIZE NO ADMIN: HCPCS | Performed by: NURSE PRACTITIONER

## 2024-03-19 RX ORDER — AMLODIPINE BESYLATE 5 MG/1
5 TABLET ORAL DAILY
Qty: 30 TABLET | Refills: 5 | Status: SHIPPED | OUTPATIENT
Start: 2024-03-19

## 2024-03-19 NOTE — PROGRESS NOTES
Identified pt with two pt identifiers(name and ). Reviewed record in preparation for visit and have obtained necessary documentation.    Chief Complaint   Patient presents with    Follow-up     /80 (Site: Left Upper Arm, Position: Sitting, Cuff Size: Large Adult)   Pulse 69   Temp (!) 96.6 °F (35.9 °C) (Temporal)   Ht 1.651 m (5' 5\")   Wt 103.9 kg (229 lb)   SpO2 98%   BMI 38.11 kg/m²       1. \"Have you been to the ER, urgent care clinic since your last visit?  Hospitalized since your last visit?\" No    2. \"Have you seen or consulted any other health care providers outside of the [facility] since your last visit?\" No     Patient is accompanied by self  I have received verbal consent from Shemar Gonzalez to discuss any/all medical information while they are present in the room.        
non-alcoholic beer which does contain some alcohol.  The patient has previously consumed alcohol in excess.  The patient has not consumed alcohol since 2013.    Osteoporosis  The risk of osteoporosis is increased in patients with cirrhosis.    DEXA bone density to assess for osteoporosis has not been performed.    This should be ordered by the patients primary care physician.       Vaccinations   Vaccination for viral hepatitis A is not needed.  The patient has serologic evidence of prior exposure or vaccination with immunity.  Routine vaccinations against other bacterial and viral agents can be performed as indicated.  Annual flu vaccination should be administered if indicated.    ALLERGIES  No Known Allergies    MEDICATIONS  Current Outpatient Medications   Medication Instructions    amLODIPine (NORVASC) 5 mg, Oral, DAILY    aspirin 81 mg, Oral, DAILY    azelastine (ASTELIN) 0.1 % nasal spray 1 spray, Nasal, 2 TIMES DAILY, Use in each nostril as directed    Cholecalciferol 50 MCG (2000 UT) CAPS No dose, route, or frequency recorded.    Ciclopirox (LOPROX) 0.77 % gel Apply to affected toenail adjacent skin once daily in combination with weekly nail trimming and periodic nail debridement.    cyanocobalamin 1,000 mcg, Oral, DAILY    febuxostat (ULORIC) 40 mg, Oral, DAILY    fluticasone-umeclidin-vilant (TRELEGY ELLIPTA) 100-62.5-25 MCG/ACT AEPB inhaler 1 puff, Inhalation, DAILY    glimepiride (AMARYL) 4 MG tablet TAKE ONE TABLET BY MOUTH TWICE A DAY BEFORE MEALS    Lancets MISC Use to test blood sugars 3 times per day. USE BRAND PREFERRED BY INSURANCE    meclizine (ANTIVERT) 25 mg, Oral, 3 TIMES DAILY PRN    pantoprazole (PROTONIX) 40 mg, Oral, DAILY    polyethylene glycol (GLYCOLAX) 17 g, Oral, 2 TIMES DAILY    simvastatin (ZOCOR) 40 mg, Oral, NIGHTLY    TRULICITY 1.5 MG/0.5ML SC injection Inject 1.5mg subcutaneously every 7 days       SYSTEM REVIEW NOT RELATED TO LIVER DISEASE OR REVIEWED ABOVE:  Constitution

## 2024-03-20 LAB
ALBUMIN SERPL-MCNC: 3.9 G/DL (ref 3.5–5)
ALBUMIN/GLOB SERPL: 1.3 (ref 1.1–2.2)
ALP SERPL-CCNC: 67 U/L (ref 45–117)
ALT SERPL-CCNC: 34 U/L (ref 12–78)
ANION GAP SERPL CALC-SCNC: 5 MMOL/L (ref 5–15)
AST SERPL-CCNC: 20 U/L (ref 15–37)
BILIRUB DIRECT SERPL-MCNC: 0.2 MG/DL (ref 0–0.2)
BILIRUB SERPL-MCNC: 0.6 MG/DL (ref 0.2–1)
BUN SERPL-MCNC: 15 MG/DL (ref 6–20)
BUN/CREAT SERPL: 8 (ref 12–20)
CALCIUM SERPL-MCNC: 9.2 MG/DL (ref 8.5–10.1)
CHLORIDE SERPL-SCNC: 112 MMOL/L (ref 97–108)
CO2 SERPL-SCNC: 28 MMOL/L (ref 21–32)
CREAT SERPL-MCNC: 1.77 MG/DL (ref 0.7–1.3)
GLOBULIN SER CALC-MCNC: 3.1 G/DL (ref 2–4)
GLUCOSE SERPL-MCNC: 45 MG/DL (ref 65–100)
INR PPP: 1 (ref 0.9–1.1)
PLATELET # BLD AUTO: 163 K/UL (ref 150–400)
POTASSIUM SERPL-SCNC: 4.7 MMOL/L (ref 3.5–5.1)
PROT SERPL-MCNC: 7 G/DL (ref 6.4–8.2)
PROTHROMBIN TIME: 10.9 SEC (ref 9–11.1)
SODIUM SERPL-SCNC: 145 MMOL/L (ref 136–145)

## 2024-03-22 LAB
AFP L3 MFR SERPL: NORMAL % (ref 0–9.9)
AFP SERPL-MCNC: 2.4 NG/ML (ref 0–8.4)

## 2024-03-29 ENCOUNTER — HOSPITAL ENCOUNTER (OUTPATIENT)
Facility: HOSPITAL | Age: 79
End: 2024-03-29
Payer: MEDICARE

## 2024-03-29 DIAGNOSIS — K74.60 HEPATIC CIRRHOSIS, UNSPECIFIED HEPATIC CIRRHOSIS TYPE, UNSPECIFIED WHETHER ASCITES PRESENT (HCC): ICD-10-CM

## 2024-03-29 PROCEDURE — 76700 US EXAM ABDOM COMPLETE: CPT

## 2024-04-02 ENCOUNTER — TELEPHONE (OUTPATIENT)
Age: 79
End: 2024-04-02

## 2024-04-02 RX ORDER — GLIMEPIRIDE 4 MG/1
TABLET ORAL
Qty: 180 TABLET | Refills: 0 | Status: SHIPPED | OUTPATIENT
Start: 2024-04-02

## 2024-04-14 DIAGNOSIS — K21.9 GASTROESOPHAGEAL REFLUX DISEASE, UNSPECIFIED WHETHER ESOPHAGITIS PRESENT: ICD-10-CM

## 2024-04-15 DIAGNOSIS — K21.9 GASTROESOPHAGEAL REFLUX DISEASE, UNSPECIFIED WHETHER ESOPHAGITIS PRESENT: ICD-10-CM

## 2024-04-15 SDOH — HEALTH STABILITY: PHYSICAL HEALTH: ON AVERAGE, HOW MANY DAYS PER WEEK DO YOU ENGAGE IN MODERATE TO STRENUOUS EXERCISE (LIKE A BRISK WALK)?: 0 DAYS

## 2024-04-15 SDOH — HEALTH STABILITY: PHYSICAL HEALTH: ON AVERAGE, HOW MANY MINUTES DO YOU ENGAGE IN EXERCISE AT THIS LEVEL?: 0 MIN

## 2024-04-16 RX ORDER — PANTOPRAZOLE SODIUM 40 MG/1
40 TABLET, DELAYED RELEASE ORAL DAILY
Qty: 90 TABLET | Refills: 1 | Status: SHIPPED | OUTPATIENT
Start: 2024-04-16 | End: 2024-04-16

## 2024-04-16 RX ORDER — PANTOPRAZOLE SODIUM 40 MG/1
40 TABLET, DELAYED RELEASE ORAL DAILY
Qty: 90 TABLET | Refills: 0 | Status: SHIPPED | OUTPATIENT
Start: 2024-04-16

## 2024-04-18 ENCOUNTER — HOSPITAL ENCOUNTER (OUTPATIENT)
Facility: HOSPITAL | Age: 79
Discharge: HOME | End: 2024-04-18
Attending: FAMILY MEDICINE
Payer: MEDICARE

## 2024-04-18 ENCOUNTER — OFFICE VISIT (OUTPATIENT)
Dept: PRIMARY CARE CLINIC | Facility: CLINIC | Age: 79
End: 2024-04-18

## 2024-04-18 VITALS
TEMPERATURE: 97.5 F | SYSTOLIC BLOOD PRESSURE: 131 MMHG | WEIGHT: 231.8 LBS | HEIGHT: 65 IN | HEART RATE: 66 BPM | BODY MASS INDEX: 38.62 KG/M2 | OXYGEN SATURATION: 94 % | RESPIRATION RATE: 12 BRPM | DIASTOLIC BLOOD PRESSURE: 65 MMHG

## 2024-04-18 DIAGNOSIS — R06.02 SHORTNESS OF BREATH: ICD-10-CM

## 2024-04-18 DIAGNOSIS — M25.562 CHRONIC PAIN OF LEFT KNEE: ICD-10-CM

## 2024-04-18 DIAGNOSIS — J44.9 CHRONIC OBSTRUCTIVE PULMONARY DISEASE, UNSPECIFIED COPD TYPE (HCC): ICD-10-CM

## 2024-04-18 DIAGNOSIS — N18.30 STAGE 3 CHRONIC KIDNEY DISEASE, UNSPECIFIED WHETHER STAGE 3A OR 3B CKD (HCC): ICD-10-CM

## 2024-04-18 DIAGNOSIS — K74.60 HEPATIC CIRRHOSIS, UNSPECIFIED HEPATIC CIRRHOSIS TYPE, UNSPECIFIED WHETHER ASCITES PRESENT (HCC): ICD-10-CM

## 2024-04-18 DIAGNOSIS — R06.09 DYSPNEA ON EXERTION: ICD-10-CM

## 2024-04-18 DIAGNOSIS — R60.1 GENERALIZED EDEMA: Primary | ICD-10-CM

## 2024-04-18 DIAGNOSIS — G89.29 CHRONIC PAIN OF LEFT KNEE: ICD-10-CM

## 2024-04-18 DIAGNOSIS — D69.6 THROMBOCYTOPENIA, UNSPECIFIED (HCC): ICD-10-CM

## 2024-04-18 DIAGNOSIS — E11.59 TYPE 2 DIABETES MELLITUS WITH OTHER CIRCULATORY COMPLICATION, WITHOUT LONG-TERM CURRENT USE OF INSULIN (HCC): ICD-10-CM

## 2024-04-18 PROCEDURE — 71046 X-RAY EXAM CHEST 2 VIEWS: CPT

## 2024-04-18 RX ORDER — FLUTICASONE FUROATE, UMECLIDINIUM BROMIDE AND VILANTEROL TRIFENATATE 100; 62.5; 25 UG/1; UG/1; UG/1
1 POWDER RESPIRATORY (INHALATION) DAILY
Qty: 3 EACH | Refills: 1 | Status: SHIPPED | OUTPATIENT
Start: 2024-04-18

## 2024-04-18 RX ORDER — CETIRIZINE HYDROCHLORIDE 10 MG/1
10 TABLET ORAL DAILY
Qty: 30 TABLET | Refills: 3 | Status: SHIPPED | OUTPATIENT
Start: 2024-04-18

## 2024-04-18 SDOH — ECONOMIC STABILITY: INCOME INSECURITY: HOW HARD IS IT FOR YOU TO PAY FOR THE VERY BASICS LIKE FOOD, HOUSING, MEDICAL CARE, AND HEATING?: NOT HARD AT ALL

## 2024-04-18 SDOH — ECONOMIC STABILITY: FOOD INSECURITY: WITHIN THE PAST 12 MONTHS, YOU WORRIED THAT YOUR FOOD WOULD RUN OUT BEFORE YOU GOT MONEY TO BUY MORE.: NEVER TRUE

## 2024-04-18 SDOH — ECONOMIC STABILITY: FOOD INSECURITY: WITHIN THE PAST 12 MONTHS, THE FOOD YOU BOUGHT JUST DIDN'T LAST AND YOU DIDN'T HAVE MONEY TO GET MORE.: NEVER TRUE

## 2024-04-18 ASSESSMENT — PATIENT HEALTH QUESTIONNAIRE - PHQ9
2. FEELING DOWN, DEPRESSED OR HOPELESS: SEVERAL DAYS
SUM OF ALL RESPONSES TO PHQ QUESTIONS 1-9: 1
SUM OF ALL RESPONSES TO PHQ QUESTIONS 1-9: 1
1. LITTLE INTEREST OR PLEASURE IN DOING THINGS: NOT AT ALL
SUM OF ALL RESPONSES TO PHQ9 QUESTIONS 1 & 2: 1
SUM OF ALL RESPONSES TO PHQ QUESTIONS 1-9: 1
SUM OF ALL RESPONSES TO PHQ QUESTIONS 1-9: 1

## 2024-04-18 NOTE — PROGRESS NOTES
HPI     Chief Complaint   Patient presents with    Establish Care    Leg Swelling     Pt states complaints of bilateral legs pain and edema that a couple of weeks ago-     Generalized Body Aches     ongoing     He is a 78 y.o. male who presents to establish care.    Establishing Care     PMHx of HTN, T2DM, HLD, gout, MILLER with cirrhosis, thrombocytopenia, VERONA, Gout, obesity, COPD, CKD stage 3B, CVA.     Pt is a poor historian.     Hx TIA, CVA, dysautonomia. Chronic dizziness since CVA in 2013. Evaluated by neurology in February. Unsteady gait. States he's not yet seen PT for this.     COPD. Following with Pulm, Dr Beltran. Recent increase in HERNANDEZ and coughing, in the past 4-6 weeks. No fevers. Pt does not know if he is actually taking trelegy or not.     DM2. Following with with Dr Geovany Allen.     Hx Cirrhosis. Following with hepatology, Eb.     CKD3b. Following with nephrology.     C/o increased bilat LE edema in the past month.     C/o chronic left knee pain and would like a referral.      - Chronic medical problems:  Past Medical History:   Diagnosis Date    CAD (coronary artery disease)     Cerebrovascular disease 2013    Still has very bad dizziness every day    Chronic kidney disease     stones    Chronic obstructive pulmonary disease (HCC)     Cirrhosis (HCC)     Colon polyp     Diabetes (HCC)     Erectile dysfunction     Hearing loss     Hypertension     Obesity     Osteoarthritis     Sleep apnea     Stroke (HCC) 1980s    right hand neuropathy    Type 2 diabetes mellitus without complication (HCC)      - Current medications:   Current Outpatient Medications   Medication Sig    cetirizine (ZYRTEC) 10 MG tablet Take 1 tablet by mouth daily    pantoprazole (PROTONIX) 40 MG tablet Take 1 tablet by mouth daily    glimepiride (AMARYL) 4 MG tablet TAKE ONE TABLET BY MOUTH TWO TIMES A DAY BEFORE A MEAL    amLODIPine (NORVASC) 5 MG tablet TAKE 1 TABLET BY MOUTH DAILY    simvastatin (ZOCOR) 40 MG tablet

## 2024-04-18 NOTE — PROGRESS NOTES
\"Have you been to the ER, urgent care clinic since your last visit?  Hospitalized since your last visit?\"    NO    “Have you seen or consulted any other health care providers outside of [facility] since your last visit?”    NO          Click Here for Release of Records Request

## 2024-04-26 DIAGNOSIS — J44.9 CHRONIC OBSTRUCTIVE PULMONARY DISEASE, UNSPECIFIED COPD TYPE (HCC): ICD-10-CM

## 2024-04-26 DIAGNOSIS — R06.09 DYSPNEA ON EXERTION: ICD-10-CM

## 2024-04-26 DIAGNOSIS — R60.1 GENERALIZED EDEMA: ICD-10-CM

## 2024-04-26 DIAGNOSIS — D69.6 THROMBOCYTOPENIA (HCC): Primary | ICD-10-CM

## 2024-04-26 DIAGNOSIS — N18.30 STAGE 3 CHRONIC KIDNEY DISEASE, UNSPECIFIED WHETHER STAGE 3A OR 3B CKD (HCC): ICD-10-CM

## 2024-04-26 LAB
ALBUMIN SERPL-MCNC: 3.6 G/DL (ref 3.5–5)
ALBUMIN/GLOB SERPL: 1.1 (ref 1.1–2.2)
ALP SERPL-CCNC: 70 U/L (ref 45–117)
ALT SERPL-CCNC: 33 U/L (ref 12–78)
ANION GAP SERPL CALC-SCNC: 5 MMOL/L (ref 5–15)
AST SERPL-CCNC: 20 U/L (ref 15–37)
BILIRUB SERPL-MCNC: 0.5 MG/DL (ref 0.2–1)
BUN SERPL-MCNC: 23 MG/DL (ref 6–20)
BUN/CREAT SERPL: 12 (ref 12–20)
CALCIUM SERPL-MCNC: 9.1 MG/DL (ref 8.5–10.1)
CHLORIDE SERPL-SCNC: 111 MMOL/L (ref 97–108)
CO2 SERPL-SCNC: 27 MMOL/L (ref 21–32)
COMMENT:: NORMAL
CREAT SERPL-MCNC: 1.85 MG/DL (ref 0.7–1.3)
ERYTHROCYTE [DISTWIDTH] IN BLOOD BY AUTOMATED COUNT: 14.4 % (ref 11.5–14.5)
GLOBULIN SER CALC-MCNC: 3.2 G/DL (ref 2–4)
GLUCOSE SERPL-MCNC: 79 MG/DL (ref 65–100)
HCT VFR BLD AUTO: 37.3 % (ref 36.6–50.3)
HGB BLD-MCNC: 11.9 G/DL (ref 12.1–17)
MCH RBC QN AUTO: 26.3 PG (ref 26–34)
MCHC RBC AUTO-ENTMCNC: 31.9 G/DL (ref 30–36.5)
MCV RBC AUTO: 82.5 FL (ref 80–99)
NRBC # BLD: 0 K/UL (ref 0–0.01)
NRBC BLD-RTO: 0 PER 100 WBC
NT PRO BNP: 141 PG/ML
PLATELET # BLD AUTO: ABNORMAL K/UL (ref 150–400)
POTASSIUM SERPL-SCNC: 4.1 MMOL/L (ref 3.5–5.1)
PROT SERPL-MCNC: 6.8 G/DL (ref 6.4–8.2)
RBC # BLD AUTO: 4.52 M/UL (ref 4.1–5.7)
SODIUM SERPL-SCNC: 143 MMOL/L (ref 136–145)
SPECIMEN HOLD: NORMAL
WBC # BLD AUTO: 5.3 K/UL (ref 4.1–11.1)

## 2024-04-27 DIAGNOSIS — M1A.0710 IDIOPATHIC CHRONIC GOUT OF RIGHT ANKLE WITHOUT TOPHUS: ICD-10-CM

## 2024-04-29 RX ORDER — FEBUXOSTAT 40 MG/1
40 TABLET, FILM COATED ORAL DAILY
Qty: 30 TABLET | OUTPATIENT
Start: 2024-04-29

## 2024-04-29 NOTE — TELEPHONE ENCOUNTER
Pt is now a pt of Dr. Yesenia Breen and Dr. ANGELA Castellano.     For Pharmacy Admin Tracking Only    Program: Medication Refill  Intervention Detail: Discontinued Rx: 1, reason: Duplicate Therapy  Time Spent (min): 5    Requested Prescriptions     Pending Prescriptions Disp Refills    febuxostat (ULORIC) 40 MG TABS tablet [Pharmacy Med Name: FEBUXOSTAT 40 MG TABLET] 30 tablet      Sig: TAKE ONE TABLET BY MOUTH DAILY

## 2024-05-02 DIAGNOSIS — M1A.0710 IDIOPATHIC CHRONIC GOUT OF RIGHT ANKLE WITHOUT TOPHUS: ICD-10-CM

## 2024-05-02 NOTE — TELEPHONE ENCOUNTER
----- Message from Shemar Gonzalez sent at 2024 10:42 PM EDT -----  Regarding: Febuxostat 49 mg  Contact: [phone]  If you will, can you write another prescription for this medicine. The pharmacy can not because the refill due fill by date has just  with two refills left on the prescription.   Have a great day

## 2024-05-02 NOTE — TELEPHONE ENCOUNTER
PCP: Claudia Breen DO    Last Visit 4/18/2024   Future Appointments   Date Time Provider Department Center   7/15/2024  1:30 PM Isatu Armenta MD RDE [facility] AMB   9/17/2024  2:30 PM Aleja Parson APRN - [facility] AMB   10/23/2024  9:00 AM Fady Bradley PSYD [facility]  AMB   10/28/2024  8:00 AM [facility]  AMB   11/11/2024  2:30 PM Fady Bradley PSYD [facility] AMB       Requested Prescriptions     Pending Prescriptions Disp Refills    febuxostat (ULORIC) 40 MG TABS tablet 90 tablet 0     Sig: Take 1 tablet by mouth daily         Other Comments: Last Refill

## 2024-05-03 DIAGNOSIS — M1A.0710 IDIOPATHIC CHRONIC GOUT OF RIGHT ANKLE WITHOUT TOPHUS: ICD-10-CM

## 2024-05-03 RX ORDER — FEBUXOSTAT 40 MG/1
40 TABLET, FILM COATED ORAL DAILY
Qty: 90 TABLET | Refills: 0 | Status: SHIPPED | OUTPATIENT
Start: 2024-05-03

## 2024-05-03 NOTE — TELEPHONE ENCOUNTER
----- Message from Shemar Gonzalez sent at 5/3/2024 11:02 AM EDT -----  Regarding: Febuxostat 49 mg  Contact: [phone]  Leni thanks for your help. Sorry to have to bother you again about this. I have been without this prescription since last week. I am worried about getting GOUT again. I am already having swelling problems in both legs right now. I explained problem about why the pharmacy could not refill from my insurance, they suggested to do a newPrescription order. Maybe I could get today

## 2024-05-06 RX ORDER — FEBUXOSTAT 40 MG/1
40 TABLET, FILM COATED ORAL DAILY
Qty: 90 TABLET | Refills: 0 | Status: SHIPPED | OUTPATIENT
Start: 2024-05-06

## 2024-05-17 ENCOUNTER — TELEPHONE (OUTPATIENT)
Dept: PRIMARY CARE CLINIC | Facility: CLINIC | Age: 79
End: 2024-05-17

## 2024-05-17 NOTE — TELEPHONE ENCOUNTER
----- Message from Shemar Gonzalez sent at 5/16/2024  9:41 PM EDT -----  Regarding: Cetirizine HCL 10  Contact: [phone]  Saida Breen, I had to stop taken this medication. It was causing me to be dizzier.    Please advise

## 2024-06-05 DIAGNOSIS — E78.5 HYPERLIPIDEMIA LDL GOAL <70: ICD-10-CM

## 2024-06-06 RX ORDER — SIMVASTATIN 40 MG
40 TABLET ORAL NIGHTLY
Qty: 90 TABLET | Refills: 0 | Status: SHIPPED | OUTPATIENT
Start: 2024-06-06

## 2024-06-23 SDOH — HEALTH STABILITY: PHYSICAL HEALTH: ON AVERAGE, HOW MANY MINUTES DO YOU ENGAGE IN EXERCISE AT THIS LEVEL?: 0 MIN

## 2024-06-23 SDOH — HEALTH STABILITY: PHYSICAL HEALTH: ON AVERAGE, HOW MANY DAYS PER WEEK DO YOU ENGAGE IN MODERATE TO STRENUOUS EXERCISE (LIKE A BRISK WALK)?: 0 DAYS

## 2024-06-23 ASSESSMENT — PATIENT HEALTH QUESTIONNAIRE - PHQ9
SUM OF ALL RESPONSES TO PHQ QUESTIONS 1-9: 15
4. FEELING TIRED OR HAVING LITTLE ENERGY: NEARLY EVERY DAY
6. FEELING BAD ABOUT YOURSELF - OR THAT YOU ARE A FAILURE OR HAVE LET YOURSELF OR YOUR FAMILY DOWN: SEVERAL DAYS
8. MOVING OR SPEAKING SO SLOWLY THAT OTHER PEOPLE COULD HAVE NOTICED. OR THE OPPOSITE, BEING SO FIGETY OR RESTLESS THAT YOU HAVE BEEN MOVING AROUND A LOT MORE THAN USUAL: SEVERAL DAYS
10. IF YOU CHECKED OFF ANY PROBLEMS, HOW DIFFICULT HAVE THESE PROBLEMS MADE IT FOR YOU TO DO YOUR WORK, TAKE CARE OF THINGS AT HOME, OR GET ALONG WITH OTHER PEOPLE: SOMEWHAT DIFFICULT
7. TROUBLE CONCENTRATING ON THINGS, SUCH AS READING THE NEWSPAPER OR WATCHING TELEVISION: NOT AT ALL
SUM OF ALL RESPONSES TO PHQ QUESTIONS 1-9: 15
3. TROUBLE FALLING OR STAYING ASLEEP: NEARLY EVERY DAY
SUM OF ALL RESPONSES TO PHQ QUESTIONS 1-9: 15
9. THOUGHTS THAT YOU WOULD BE BETTER OFF DEAD, OR OF HURTING YOURSELF: NOT AT ALL
SUM OF ALL RESPONSES TO PHQ9 QUESTIONS 1 & 2: 6
SUM OF ALL RESPONSES TO PHQ QUESTIONS 1-9: 15
1. LITTLE INTEREST OR PLEASURE IN DOING THINGS: NEARLY EVERY DAY
2. FEELING DOWN, DEPRESSED OR HOPELESS: NEARLY EVERY DAY
5. POOR APPETITE OR OVEREATING: SEVERAL DAYS

## 2024-06-23 ASSESSMENT — LIFESTYLE VARIABLES
HOW MANY STANDARD DRINKS CONTAINING ALCOHOL DO YOU HAVE ON A TYPICAL DAY: PATIENT DOES NOT DRINK
HOW OFTEN DO YOU HAVE A DRINK CONTAINING ALCOHOL: NEVER
HOW OFTEN DO YOU HAVE SIX OR MORE DRINKS ON ONE OCCASION: 1
HOW OFTEN DO YOU HAVE A DRINK CONTAINING ALCOHOL: 1
HOW MANY STANDARD DRINKS CONTAINING ALCOHOL DO YOU HAVE ON A TYPICAL DAY: 0

## 2024-06-23 ASSESSMENT — COLUMBIA-SUICIDE SEVERITY RATING SCALE - C-SSRS
6. HAVE YOU EVER DONE ANYTHING, STARTED TO DO ANYTHING, OR PREPARED TO DO ANYTHING TO END YOUR LIFE?: NO
1. WITHIN THE PAST MONTH, HAVE YOU WISHED YOU WERE DEAD OR WISHED YOU COULD GO TO SLEEP AND NOT WAKE UP?: NO
2. HAVE YOU ACTUALLY HAD ANY THOUGHTS OF KILLING YOURSELF?: NO

## 2024-06-24 ENCOUNTER — OFFICE VISIT (OUTPATIENT)
Dept: PRIMARY CARE CLINIC | Facility: CLINIC | Age: 79
End: 2024-06-24
Payer: MEDICARE

## 2024-06-24 VITALS
DIASTOLIC BLOOD PRESSURE: 66 MMHG | HEART RATE: 75 BPM | HEIGHT: 65 IN | SYSTOLIC BLOOD PRESSURE: 100 MMHG | TEMPERATURE: 97.1 F | BODY MASS INDEX: 38.49 KG/M2 | OXYGEN SATURATION: 96 % | WEIGHT: 231 LBS | RESPIRATION RATE: 16 BRPM

## 2024-06-24 DIAGNOSIS — I10 ESSENTIAL (PRIMARY) HYPERTENSION: ICD-10-CM

## 2024-06-24 DIAGNOSIS — K74.60 HEPATIC CIRRHOSIS, UNSPECIFIED HEPATIC CIRRHOSIS TYPE, UNSPECIFIED WHETHER ASCITES PRESENT (HCC): ICD-10-CM

## 2024-06-24 DIAGNOSIS — R42 DIZZINESS: ICD-10-CM

## 2024-06-24 DIAGNOSIS — N18.30 STAGE 3 CHRONIC KIDNEY DISEASE, UNSPECIFIED WHETHER STAGE 3A OR 3B CKD (HCC): ICD-10-CM

## 2024-06-24 DIAGNOSIS — E11.59 TYPE 2 DIABETES MELLITUS WITH OTHER CIRCULATORY COMPLICATION, WITHOUT LONG-TERM CURRENT USE OF INSULIN (HCC): ICD-10-CM

## 2024-06-24 DIAGNOSIS — Z00.00 MEDICARE ANNUAL WELLNESS VISIT, SUBSEQUENT: Primary | ICD-10-CM

## 2024-06-24 DIAGNOSIS — R60.1 GENERALIZED EDEMA: ICD-10-CM

## 2024-06-24 DIAGNOSIS — R26.81 GAIT INSTABILITY: ICD-10-CM

## 2024-06-24 DIAGNOSIS — Z86.73 HISTORY OF CVA (CEREBROVASCULAR ACCIDENT): ICD-10-CM

## 2024-06-24 DIAGNOSIS — R06.02 SHORTNESS OF BREATH: ICD-10-CM

## 2024-06-24 PROCEDURE — G0439 PPPS, SUBSEQ VISIT: HCPCS | Performed by: FAMILY MEDICINE

## 2024-06-24 PROCEDURE — 1123F ACP DISCUSS/DSCN MKR DOCD: CPT | Performed by: FAMILY MEDICINE

## 2024-06-24 PROCEDURE — 3078F DIAST BP <80 MM HG: CPT | Performed by: FAMILY MEDICINE

## 2024-06-24 PROCEDURE — 3074F SYST BP LT 130 MM HG: CPT | Performed by: FAMILY MEDICINE

## 2024-06-24 NOTE — PATIENT INSTRUCTIONS
mg of calcium and 5635-4736 IU of vitamin D per day. It is possible to meet your calcium requirement with diet alone, but a vitamin D supplement is usually necessary to meet this goal.  When exposed to the sun, use a sunscreen that protects against both UVA and UVB radiation with an SPF of 30 or greater. Reapply every 2 to 3 hours or after sweating, drying off with a towel, or swimming.  Always wear a seat belt when traveling in a car. Always wear a helmet when riding a bicycle or motorcycle.

## 2024-06-24 NOTE — PROGRESS NOTES
\"Have you been to the ER, urgent care clinic since your last visit?  Hospitalized since your last visit?\"    NO    “Have you seen or consulted any other health care providers outside of [facility] since your last visit?”    NO          Click Here for Release of Records Request  
(NORVASC) 5 MG tablet TAKE 1 TABLET BY MOUTH DAILY    meclizine (ANTIVERT) 25 MG tablet Take 1 tablet by mouth 3 times daily as needed for Dizziness    azelastine (ASTELIN) 0.1 % nasal spray 1 spray by Nasal route 2 times daily Use in each nostril as directed    TRULICITY 1.5 MG/0.5ML SC injection Inject 1.5mg subcutaneously every 7 days    Lancets MISC Use to test blood sugars 3 times per day. USE BRAND PREFERRED BY INSURANCE    cyanocobalamin 1000 MCG tablet Take 1 tablet by mouth daily    aspirin 81 MG EC tablet Take 1 tablet by mouth daily    Cholecalciferol 50 MCG (2000 UT) CAPS     febuxostat (ULORIC) 40 MG TABS tablet Take 1 tablet by mouth daily    cetirizine (ZYRTEC) 10 MG tablet Take 1 tablet by mouth daily    polyethylene glycol (GLYCOLAX) 17 GM/SCOOP powder Take 17 g by mouth 2 times daily (Patient not taking: Reported on 3/19/2024)    Ciclopirox (LOPROX) 0.77 % gel Apply to affected toenail adjacent skin once daily in combination with weekly nail trimming and periodic nail debridement.     Current Facility-Administered Medications   Medication Dose Route Frequency    albuterol (PROVENTIL) (2.5 MG/3ML) 0.083% nebulizer solution 2.5 mg  2.5 mg Nebulization Once     - Family history:   Family History   Problem Relation Age of Onset    Heart Disease Brother         Heart attack    Early Death Brother         Heart attack    Hypertension Sister     Cancer Sister         Breast    Osteoarthritis Sister     Breast Cancer Sister     Depression Sister     Kidney Disease Sister     Rheum Arthritis Sister     Rheum Arthritis Sister     Osteoarthritis Sister     Arthritis Sister     Rheum Arthritis Sister     Cancer Sister         breast ca    Arthritis Sister     Alcohol Abuse Father     Heart Disease Father     Migraines Mother         When she was a young child    Heart Disease Mother      - Allergies: No Known Allergies  - Surgical history:   Past Surgical History:   Procedure Laterality Date    BIOPSY LIVER

## 2024-06-28 ENCOUNTER — HOSPITAL ENCOUNTER (EMERGENCY)
Facility: HOSPITAL | Age: 79
Discharge: HOME | End: 2024-06-28
Attending: EMERGENCY MEDICINE
Payer: MEDICARE

## 2024-06-28 ENCOUNTER — APPOINTMENT (OUTPATIENT)
Facility: HOSPITAL | Age: 79
End: 2024-06-28
Payer: MEDICARE

## 2024-06-28 VITALS
OXYGEN SATURATION: 93 % | BODY MASS INDEX: 38.42 KG/M2 | DIASTOLIC BLOOD PRESSURE: 74 MMHG | WEIGHT: 230.6 LBS | SYSTOLIC BLOOD PRESSURE: 126 MMHG | RESPIRATION RATE: 19 BRPM | HEART RATE: 80 BPM | TEMPERATURE: 98.2 F | HEIGHT: 65 IN

## 2024-06-28 DIAGNOSIS — J44.1 COPD WITH ACUTE EXACERBATION (HCC): Primary | ICD-10-CM

## 2024-06-28 LAB
ALBUMIN SERPL-MCNC: 3.4 G/DL (ref 3.5–5)
ALBUMIN/GLOB SERPL: 0.9 (ref 1.1–2.2)
ALP SERPL-CCNC: 67 U/L (ref 45–117)
ALT SERPL-CCNC: 44 U/L (ref 12–78)
ANION GAP SERPL CALC-SCNC: 5 MMOL/L (ref 5–15)
APPEARANCE UR: CLEAR
AST SERPL-CCNC: 29 U/L (ref 15–37)
BACTERIA URNS QL MICRO: NEGATIVE /HPF
BASOPHILS # BLD: 0 K/UL (ref 0–0.1)
BASOPHILS NFR BLD: 0 % (ref 0–1)
BILIRUB SERPL-MCNC: 0.6 MG/DL (ref 0.2–1)
BILIRUB UR QL: NEGATIVE
BUN SERPL-MCNC: 15 MG/DL (ref 6–20)
BUN/CREAT SERPL: 8 (ref 12–20)
CALCIUM SERPL-MCNC: 8.7 MG/DL (ref 8.5–10.1)
CHLORIDE SERPL-SCNC: 110 MMOL/L (ref 97–108)
CO2 SERPL-SCNC: 26 MMOL/L (ref 21–32)
COLOR UR: ABNORMAL
COMMENT:: NORMAL
CREAT SERPL-MCNC: 1.92 MG/DL (ref 0.7–1.3)
DIFFERENTIAL METHOD BLD: ABNORMAL
EOSINOPHIL # BLD: 0 K/UL (ref 0–0.4)
EOSINOPHIL NFR BLD: 1 % (ref 0–7)
EPITH CASTS URNS QL MICRO: ABNORMAL /LPF
ERYTHROCYTE [DISTWIDTH] IN BLOOD BY AUTOMATED COUNT: 15.4 % (ref 11.5–14.5)
GLOBULIN SER CALC-MCNC: 3.6 G/DL (ref 2–4)
GLUCOSE SERPL-MCNC: 74 MG/DL (ref 65–100)
GLUCOSE UR STRIP.AUTO-MCNC: NEGATIVE MG/DL
HCT VFR BLD AUTO: 38 % (ref 36.6–50.3)
HGB BLD-MCNC: 11.9 G/DL (ref 12.1–17)
HGB UR QL STRIP: NEGATIVE
IMM GRANULOCYTES # BLD AUTO: 0.1 K/UL (ref 0–0.04)
IMM GRANULOCYTES NFR BLD AUTO: 1 % (ref 0–0.5)
KETONES UR QL STRIP.AUTO: NEGATIVE MG/DL
LACTATE SERPL-SCNC: 1.7 MMOL/L (ref 0.4–2)
LEUKOCYTE ESTERASE UR QL STRIP.AUTO: NEGATIVE
LYMPHOCYTES # BLD: 1 K/UL (ref 0.8–3.5)
LYMPHOCYTES NFR BLD: 14 % (ref 12–49)
MCH RBC QN AUTO: 25.2 PG (ref 26–34)
MCHC RBC AUTO-ENTMCNC: 31.3 G/DL (ref 30–36.5)
MCV RBC AUTO: 80.3 FL (ref 80–99)
MONOCYTES # BLD: 0.7 K/UL (ref 0–1)
MONOCYTES NFR BLD: 10 % (ref 5–13)
NEUTS SEG # BLD: 5.6 K/UL (ref 1.8–8)
NEUTS SEG NFR BLD: 74 % (ref 32–75)
NITRITE UR QL STRIP.AUTO: NEGATIVE
NRBC # BLD: 0 K/UL (ref 0–0.01)
NRBC BLD-RTO: 0 PER 100 WBC
NT PRO BNP: 218 PG/ML
PH UR STRIP: 6.5 (ref 5–8)
PLATELET # BLD AUTO: 150 K/UL (ref 150–400)
PMV BLD AUTO: 10.4 FL (ref 8.9–12.9)
POTASSIUM SERPL-SCNC: 3.9 MMOL/L (ref 3.5–5.1)
PROT SERPL-MCNC: 7 G/DL (ref 6.4–8.2)
PROT UR STRIP-MCNC: ABNORMAL MG/DL
RBC # BLD AUTO: 4.73 M/UL (ref 4.1–5.7)
RBC #/AREA URNS HPF: ABNORMAL /HPF (ref 0–5)
SODIUM SERPL-SCNC: 141 MMOL/L (ref 136–145)
SP GR UR REFRACTOMETRY: 1.01 (ref 1–1.03)
SPECIMEN HOLD: NORMAL
TROPONIN I SERPL HS-MCNC: 12 NG/L (ref 0–76)
TROPONIN I SERPL HS-MCNC: 17 NG/L (ref 0–76)
UROBILINOGEN UR QL STRIP.AUTO: 1 EU/DL (ref 0.2–1)
WBC # BLD AUTO: 7.5 K/UL (ref 4.1–11.1)
WBC URNS QL MICRO: ABNORMAL /HPF (ref 0–4)

## 2024-06-28 PROCEDURE — 83605 ASSAY OF LACTIC ACID: CPT

## 2024-06-28 PROCEDURE — 36415 COLL VENOUS BLD VENIPUNCTURE: CPT

## 2024-06-28 PROCEDURE — 93005 ELECTROCARDIOGRAM TRACING: CPT | Performed by: EMERGENCY MEDICINE

## 2024-06-28 PROCEDURE — 81001 URINALYSIS AUTO W/SCOPE: CPT

## 2024-06-28 PROCEDURE — 87040 BLOOD CULTURE FOR BACTERIA: CPT

## 2024-06-28 PROCEDURE — 6370000000 HC RX 637 (ALT 250 FOR IP): Performed by: EMERGENCY MEDICINE

## 2024-06-28 PROCEDURE — 99285 EMERGENCY DEPT VISIT HI MDM: CPT

## 2024-06-28 PROCEDURE — 96374 THER/PROPH/DIAG INJ IV PUSH: CPT

## 2024-06-28 PROCEDURE — 2580000003 HC RX 258: Performed by: EMERGENCY MEDICINE

## 2024-06-28 PROCEDURE — 80053 COMPREHEN METABOLIC PANEL: CPT

## 2024-06-28 PROCEDURE — 83880 ASSAY OF NATRIURETIC PEPTIDE: CPT

## 2024-06-28 PROCEDURE — 84484 ASSAY OF TROPONIN QUANT: CPT

## 2024-06-28 PROCEDURE — 6360000002 HC RX W HCPCS: Performed by: EMERGENCY MEDICINE

## 2024-06-28 PROCEDURE — 71046 X-RAY EXAM CHEST 2 VIEWS: CPT

## 2024-06-28 PROCEDURE — 85025 COMPLETE CBC W/AUTO DIFF WBC: CPT

## 2024-06-28 RX ORDER — BENZONATATE 100 MG/1
100 CAPSULE ORAL 3 TIMES DAILY PRN
Qty: 30 CAPSULE | Refills: 0 | Status: SHIPPED | OUTPATIENT
Start: 2024-06-28 | End: 2024-07-08

## 2024-06-28 RX ORDER — ALBUTEROL SULFATE 90 UG/1
2 AEROSOL, METERED RESPIRATORY (INHALATION) EVERY 4 HOURS PRN
Qty: 18 G | Refills: 0 | Status: SHIPPED | OUTPATIENT
Start: 2024-06-28

## 2024-06-28 RX ORDER — IPRATROPIUM BROMIDE AND ALBUTEROL SULFATE 2.5; .5 MG/3ML; MG/3ML
1 SOLUTION RESPIRATORY (INHALATION)
Status: COMPLETED | OUTPATIENT
Start: 2024-06-28 | End: 2024-06-28

## 2024-06-28 RX ORDER — LEVOFLOXACIN 250 MG/1
250 TABLET, FILM COATED ORAL DAILY
Qty: 5 TABLET | Refills: 0 | Status: SHIPPED | OUTPATIENT
Start: 2024-06-28 | End: 2024-07-03

## 2024-06-28 RX ORDER — PREDNISONE 10 MG/1
TABLET ORAL
Qty: 1 EACH | Refills: 0 | Status: SHIPPED | OUTPATIENT
Start: 2024-06-28

## 2024-06-28 RX ADMIN — WATER 125 MG: 1 INJECTION INTRAMUSCULAR; INTRAVENOUS; SUBCUTANEOUS at 17:47

## 2024-06-28 RX ADMIN — IPRATROPIUM BROMIDE AND ALBUTEROL SULFATE 1 DOSE: .5; 3 SOLUTION RESPIRATORY (INHALATION) at 17:47

## 2024-06-28 ASSESSMENT — LIFESTYLE VARIABLES
HOW OFTEN DO YOU HAVE A DRINK CONTAINING ALCOHOL: NEVER
HOW MANY STANDARD DRINKS CONTAINING ALCOHOL DO YOU HAVE ON A TYPICAL DAY: PATIENT DOES NOT DRINK

## 2024-06-28 ASSESSMENT — PAIN SCALES - GENERAL: PAINLEVEL_OUTOF10: 0

## 2024-06-28 ASSESSMENT — PAIN - FUNCTIONAL ASSESSMENT: PAIN_FUNCTIONAL_ASSESSMENT: NONE - DENIES PAIN

## 2024-06-28 NOTE — ED TRIAGE NOTES
[facility] here for pneumonia. He has had shortness of breath with a cough since yesterday. His family has been ill with similar symptoms. Seen by a provide in triage.

## 2024-06-28 NOTE — ED PROVIDER NOTES
PHYSICAL EXAM    (up to 7 for level 4, 8 or more for level 5)     ED Triage Vitals [06/28/24 1356]   BP Temp Temp Source Pulse Respirations SpO2 Height Weight - Scale   112/73 99.8 °F (37.7 °C) Oral 90 24 94 % 1.651 m (5' 5\") 104.6 kg (230 lb 9.6 oz)       Body mass index is 38.37 kg/m².    Physical Exam    DIAGNOSTIC RESULTS     EKG: All EKG's are interpreted by the Emergency Department Physician who either signs or Co-signs this chart in the absence of a cardiologist.        RADIOLOGY:   Non-plain film images such as CT, Ultrasound and MRI are read by the radiologist. Plain radiographic images are visualized and preliminarily interpreted by the emergency physician with the below findings:        Interpretation per the Radiologist below, if available at the time of this note:    XR CHEST (2 VW)   Final Result   No acute cardiopulmonary findings.               Electronically signed by AURELIA GARSIA           LABS:  Labs Reviewed   CBC WITH AUTO DIFFERENTIAL - Abnormal; Notable for the following components:       Result Value    Hemoglobin 11.9 (*)     MCH 25.2 (*)     RDW 15.4 (*)     Immature Granulocytes % 1 (*)     Immature Granulocytes Absolute 0.1 (*)     All other components within normal limits   COMPREHENSIVE METABOLIC PANEL - Abnormal; Notable for the following components:    Chloride 110 (*)     Creatinine 1.92 (*)     BUN/Creatinine Ratio 8 (*)     Est, Glom Filt Rate 36 (*)     Albumin 3.4 (*)     Albumin/Globulin Ratio 0.9 (*)     All other components within normal limits   CULTURE, BLOOD 1   CULTURE, BLOOD 2   LACTIC ACID   TROPONIN   BRAIN NATRIURETIC PEPTIDE   EXTRA TUBES HOLD   LACTIC ACID   URINALYSIS WITH MICROSCOPIC   TROPONIN       All other labs were within normal range or not returned as of this dictation.    EMERGENCY DEPARTMENT COURSE and DIFFERENTIAL DIAGNOSIS/MDM:   Vitals:    Vitals:    06/28/24 1356 06/28/24 1700 06/28/24 1730   BP: 112/73 131/81 113/74   Pulse: 90  83   Resp: 24   (90 Base) MCG/ACT inhaler Inhale 2 puffs into the lungs every 4 hours as needed for Wheezing or Shortness of Breath (Cough), Disp-18 g, R-0Normal      levoFLOXacin (LEVAQUIN) 250 MG tablet Take 1 tablet by mouth daily for 5 days, Disp-5 tablet, R-0Normal               (Please note that portions of this note were completed with a voice recognition program.  Efforts were made to edit the dictations but occasionally words are mis-transcribed.)    Karla Metz MD (electronically signed)  Emergency Attending Physician / Physician Assistant / Nurse Practitioner              Karla Metz MD  07/01/24 5980

## 2024-06-28 NOTE — ED NOTES
Verbal shift change report given to KATE Guerra (oncoming nurse) by KATE Marroquin (offgoing nurse). Report included the following information Nurse Handoff Report, ED Encounter Summary, ED SBAR, Adult Overview, Intake/Output, MAR, Recent Results, and Neuro Assessment.

## 2024-06-29 LAB
EKG ATRIAL RATE: 83 BPM
EKG DIAGNOSIS: NORMAL
EKG P AXIS: 41 DEGREES
EKG P-R INTERVAL: 146 MS
EKG Q-T INTERVAL: 360 MS
EKG QRS DURATION: 92 MS
EKG QTC CALCULATION (BAZETT): 423 MS
EKG R AXIS: -1 DEGREES
EKG T AXIS: 61 DEGREES
EKG VENTRICULAR RATE: 83 BPM

## 2024-06-29 PROCEDURE — 93010 ELECTROCARDIOGRAM REPORT: CPT | Performed by: SPECIALIST

## 2024-06-29 NOTE — ED NOTES
Patient discharged by MD Chavo Metz pt sent to the front lobby, with strong and steady gait, no acute distress noted at time of discharge - Discharge information / home RX / and reasons to return to the ED were reviewed by the ED provider.

## 2024-06-29 NOTE — ED NOTES
Ambulatory Road test completed by this RN. Pulse ox and HR monitored by this RN during activity. MD Lashay aware of findings.

## 2024-06-30 LAB
BACTERIA SPEC CULT: NORMAL
BACTERIA SPEC CULT: NORMAL
SERVICE CMNT-IMP: NORMAL
SERVICE CMNT-IMP: NORMAL

## 2024-07-02 RX ORDER — GLIMEPIRIDE 4 MG/1
TABLET ORAL
Qty: 180 TABLET | Refills: 2 | Status: SHIPPED | OUTPATIENT
Start: 2024-07-02

## 2024-07-05 DIAGNOSIS — B35.1 ONYCHOMYCOSIS: ICD-10-CM

## 2024-07-07 ENCOUNTER — OFFICE VISIT (OUTPATIENT)
Age: 79
End: 2024-07-07

## 2024-07-07 VITALS
OXYGEN SATURATION: 98 % | SYSTOLIC BLOOD PRESSURE: 99 MMHG | TEMPERATURE: 98.7 F | DIASTOLIC BLOOD PRESSURE: 65 MMHG | BODY MASS INDEX: 36.44 KG/M2 | HEART RATE: 77 BPM | WEIGHT: 219 LBS

## 2024-07-07 DIAGNOSIS — T14.8XXA BLISTER: ICD-10-CM

## 2024-07-07 DIAGNOSIS — B37.0 ORAL CANDIDOSIS: Primary | ICD-10-CM

## 2024-07-07 RX ORDER — LIDOCAINE HYDROCHLORIDE 20 MG/ML
15 SOLUTION OROPHARYNGEAL
Qty: 100 ML | Refills: 1 | Status: SHIPPED | OUTPATIENT
Start: 2024-07-07

## 2024-07-07 RX ORDER — CICLOPIROX 7.7 MG/G
GEL TOPICAL
Qty: 30 G | Refills: 0 | Status: SHIPPED | OUTPATIENT
Start: 2024-07-07

## 2024-07-07 NOTE — PROGRESS NOTES
Subjective: (As above and below)     The patient/guardian gave verbal consent to treat.        Chief Complaint   Patient presents with    Thrush     Patient presents for tongue pain and blisters which began on 07//05/24. Patient suspects symptoms may be due to Albuterol nebulizing treatments and Trelegy inhaler. Patient reports gargling salt water, hydrogen peroxide and mouthwash without improvement.        Shemar Gonzalez is a 78 y.o. male who presents for evaluation of : Tongue pain, swollen and has white patchy blisters also has blisters on philtrum.  Patient does albuterol neb treatment and trilogy inhaler he does not rinse his mouth regularly as he is supposed to after using the inhaler.      HPI      Review of Systems    Review of Systems - negative except as listed above    Reviewed PmHx, RxHx, FmHx, SocHx, AllgHx and updated in chart.  Family History   Problem Relation Age of Onset    Heart Disease Brother         Heart attack    Early Death Brother         Heart attack    Hypertension Sister     Cancer Sister         Breast    Osteoarthritis Sister     Breast Cancer Sister     Depression Sister     Kidney Disease Sister     Rheum Arthritis Sister     Rheum Arthritis Sister     Osteoarthritis Sister     Arthritis Sister     Rheum Arthritis Sister     Cancer Sister         breast ca    Arthritis Sister     Alcohol Abuse Father     Heart Disease Father     Migraines Mother         When she was a young child    Heart Disease Mother        Past Medical History:   Diagnosis Date    CAD (coronary artery disease)     Cancer (HCC)     Skin, on left shin    Cerebrovascular disease 2013    Still has very bad dizziness every day    Chronic kidney disease     stones    Chronic obstructive pulmonary disease (HCC)     Cirrhosis (HCC)     Colon polyp     Diabetes (HCC)     Erectile dysfunction     Hearing loss     Hypertension     Obesity     Osteoarthritis     Sleep apnea     Stroke (HCC) 1980s    right hand neuropathy

## 2024-07-15 ENCOUNTER — OFFICE VISIT (OUTPATIENT)
Age: 79
End: 2024-07-15
Payer: MEDICARE

## 2024-07-15 ENCOUNTER — OFFICE VISIT (OUTPATIENT)
Dept: PRIMARY CARE CLINIC | Facility: CLINIC | Age: 79
End: 2024-07-15
Payer: MEDICARE

## 2024-07-15 VITALS
HEIGHT: 65 IN | TEMPERATURE: 97.8 F | SYSTOLIC BLOOD PRESSURE: 130 MMHG | WEIGHT: 226 LBS | OXYGEN SATURATION: 95 % | RESPIRATION RATE: 16 BRPM | HEART RATE: 76 BPM | DIASTOLIC BLOOD PRESSURE: 72 MMHG | BODY MASS INDEX: 37.65 KG/M2

## 2024-07-15 VITALS
BODY MASS INDEX: 37.52 KG/M2 | SYSTOLIC BLOOD PRESSURE: 135 MMHG | DIASTOLIC BLOOD PRESSURE: 79 MMHG | OXYGEN SATURATION: 97 % | TEMPERATURE: 97.1 F | RESPIRATION RATE: 12 BRPM | HEIGHT: 65 IN | HEART RATE: 72 BPM | WEIGHT: 225.2 LBS

## 2024-07-15 DIAGNOSIS — N18.32 TYPE 2 DIABETES MELLITUS WITH STAGE 3B CHRONIC KIDNEY DISEASE, WITHOUT LONG-TERM CURRENT USE OF INSULIN (HCC): ICD-10-CM

## 2024-07-15 DIAGNOSIS — Z86.73 HISTORY OF STROKE: ICD-10-CM

## 2024-07-15 DIAGNOSIS — E11.59 TYPE 2 DIABETES MELLITUS WITH OTHER CIRCULATORY COMPLICATION, WITHOUT LONG-TERM CURRENT USE OF INSULIN (HCC): ICD-10-CM

## 2024-07-15 DIAGNOSIS — N18.30 STAGE 3 CHRONIC KIDNEY DISEASE, UNSPECIFIED WHETHER STAGE 3A OR 3B CKD (HCC): ICD-10-CM

## 2024-07-15 DIAGNOSIS — I10 ESSENTIAL (PRIMARY) HYPERTENSION: Primary | ICD-10-CM

## 2024-07-15 DIAGNOSIS — E11.22 TYPE 2 DIABETES MELLITUS WITH STAGE 3B CHRONIC KIDNEY DISEASE, WITHOUT LONG-TERM CURRENT USE OF INSULIN (HCC): ICD-10-CM

## 2024-07-15 DIAGNOSIS — E78.2 MIXED HYPERLIPIDEMIA: ICD-10-CM

## 2024-07-15 DIAGNOSIS — E11.65 TYPE 2 DIABETES MELLITUS WITH HYPERGLYCEMIA, WITHOUT LONG-TERM CURRENT USE OF INSULIN (HCC): Primary | ICD-10-CM

## 2024-07-15 DIAGNOSIS — R60.1 GENERALIZED EDEMA: ICD-10-CM

## 2024-07-15 DIAGNOSIS — I10 PRIMARY HYPERTENSION: ICD-10-CM

## 2024-07-15 PROCEDURE — 3075F SYST BP GE 130 - 139MM HG: CPT | Performed by: INTERNAL MEDICINE

## 2024-07-15 PROCEDURE — 3075F SYST BP GE 130 - 139MM HG: CPT | Performed by: FAMILY MEDICINE

## 2024-07-15 PROCEDURE — 1036F TOBACCO NON-USER: CPT | Performed by: FAMILY MEDICINE

## 2024-07-15 PROCEDURE — 1036F TOBACCO NON-USER: CPT | Performed by: INTERNAL MEDICINE

## 2024-07-15 PROCEDURE — 1123F ACP DISCUSS/DSCN MKR DOCD: CPT | Performed by: INTERNAL MEDICINE

## 2024-07-15 PROCEDURE — G8427 DOCREV CUR MEDS BY ELIG CLIN: HCPCS | Performed by: FAMILY MEDICINE

## 2024-07-15 PROCEDURE — 3078F DIAST BP <80 MM HG: CPT | Performed by: INTERNAL MEDICINE

## 2024-07-15 PROCEDURE — G8427 DOCREV CUR MEDS BY ELIG CLIN: HCPCS | Performed by: INTERNAL MEDICINE

## 2024-07-15 PROCEDURE — 99214 OFFICE O/P EST MOD 30 MIN: CPT | Performed by: INTERNAL MEDICINE

## 2024-07-15 PROCEDURE — 99214 OFFICE O/P EST MOD 30 MIN: CPT | Performed by: FAMILY MEDICINE

## 2024-07-15 PROCEDURE — G2211 COMPLEX E/M VISIT ADD ON: HCPCS | Performed by: INTERNAL MEDICINE

## 2024-07-15 PROCEDURE — 1123F ACP DISCUSS/DSCN MKR DOCD: CPT | Performed by: FAMILY MEDICINE

## 2024-07-15 PROCEDURE — G8417 CALC BMI ABV UP PARAM F/U: HCPCS | Performed by: FAMILY MEDICINE

## 2024-07-15 PROCEDURE — G8417 CALC BMI ABV UP PARAM F/U: HCPCS | Performed by: INTERNAL MEDICINE

## 2024-07-15 PROCEDURE — 3078F DIAST BP <80 MM HG: CPT | Performed by: FAMILY MEDICINE

## 2024-07-15 RX ORDER — BENZONATATE 100 MG/1
100 CAPSULE ORAL 3 TIMES DAILY PRN
COMMUNITY

## 2024-07-15 SDOH — ECONOMIC STABILITY: FOOD INSECURITY: WITHIN THE PAST 12 MONTHS, THE FOOD YOU BOUGHT JUST DIDN'T LAST AND YOU DIDN'T HAVE MONEY TO GET MORE.: NEVER TRUE

## 2024-07-15 SDOH — ECONOMIC STABILITY: FOOD INSECURITY: WITHIN THE PAST 12 MONTHS, YOU WORRIED THAT YOUR FOOD WOULD RUN OUT BEFORE YOU GOT MONEY TO BUY MORE.: NEVER TRUE

## 2024-07-15 SDOH — ECONOMIC STABILITY: INCOME INSECURITY: HOW HARD IS IT FOR YOU TO PAY FOR THE VERY BASICS LIKE FOOD, HOUSING, MEDICAL CARE, AND HEATING?: NOT HARD AT ALL

## 2024-07-15 ASSESSMENT — PATIENT HEALTH QUESTIONNAIRE - PHQ9
2. FEELING DOWN, DEPRESSED OR HOPELESS: NOT AT ALL
SUM OF ALL RESPONSES TO PHQ QUESTIONS 1-9: 0
SUM OF ALL RESPONSES TO PHQ9 QUESTIONS 1 & 2: 0
1. LITTLE INTEREST OR PLEASURE IN DOING THINGS: NOT AT ALL

## 2024-07-15 NOTE — PROGRESS NOTES
Chief Complaint   Patient presents with    Diabetes       Patient was last seen: 6 months ago 1/15/2024        General:   Still dizzy, but still doing great (MRI did not show anything, has had > 10 years)      No donut hole so far this year     Has been out of trulicity for 1 month - sugars have been ok     To ER for URI - O2 was low in upper 80s - left AMA b/c taking to long   Issues with thrush     Asks about eyes and GLP1 - just had eye exam - all good and no retinopathy     A1c: last a1c was 6.6 10/2023    DM Medications:    Trulicity 1.5mg once a week  Amaryl 4mg BID AC     Last Changes: :no changes    Sugar Checks: checks up to 2 x day     AM: reports:  110-120s     PM: reports:  rare unless feels bad - no high #s     LOWs:  has low sugars     DIET: is working on it, completed the Program for Diabetes [facility] -    EXERCISE: daily, walks - lined up for class once tongue improved (next week)       HTN: on Ca-Blk, HTN followed by PCP     LIPIDS: on statin, lipids followed by PCP    RENAL: has moderate renal insufficiency, is followed by Nephrology     EYES: eye exam in past year, has no retinopathy     DENTAL:  has seen dentist in past year, needs dental work -working on it     FEET: has no current issues, no numbness or tingling - to see Podiatry --gave list (not done yet)     HEART:  no chest pain, shortness of breath or claudication, has no cardiac history  H/O STROKE  EKG '24 w/o issues; to have ECHO     ASA:  is on aspirin     SYMPTOMS: no polyuria, thirst or blurred vision     THYROID: no known thyroid issue    DIABETES HISTORY:   From initial visit 12/14/2022   DM2 ~10 years  On metformin  No RUBIN, no TZD, DPP4, SGLT, GLP1     Was on insulin from start until 1-2 years ago  Was very active   Gout for 3 months so not active (ankle, then knee)   Had steroids, shots in knee too  Gout gone a couple week ago      Metformin stopped 3/23 when trulicity started      LABS/STUDIES:     Lab Results   Component Value

## 2024-07-15 NOTE — PROGRESS NOTES
HPI     Chief Complaint   Patient presents with    Follow-up     He is a 78 y.o. male       PMHx of HTN, T2DM, HLD, gout, MILLER with cirrhosis, thrombocytopenia, VERONA, Gout, obesity, COPD, CKD stage 3B, CVA.     Pt is a poor historian.     Hx TIA, CVA, dysautonomia. Chronic dizziness since CVA in 2013. Evaluated by neurology in February. Unsteady gait. States he's not yet seen PT for this.     COPD. Following with Pulm, Dr Beltran.   ER visit June 28 due to COPD exacerbation. Subsequently developed oral candidiasis.  He stopped his trelegy due to concerns about oral candidiasis.     DM2. On trulicity. He reports home sugars are controlled. Denies recent low sugars. Following with with Endo, Dr Armenta.     Hx Cirrhosis. Following with hepatology, Eb.     CKD3b. Following with nephrology.     Lives at home with his girlfriend.    Last OV stopped amlodipine due to chronic LE edema. This is improved but not resolved.   He's scheduled cardiac echo.     - Chronic medical problems:  Past Medical History:   Diagnosis Date    CAD (coronary artery disease)     Cancer (HCC)     Skin, on left shin    Cerebrovascular disease 2013    Still has very bad dizziness every day    Chronic kidney disease     stones    Chronic obstructive pulmonary disease (HCC)     Cirrhosis (HCC)     Colon polyp     Diabetes (HCC)     Erectile dysfunction     Hearing loss     Hypertension     Obesity     Osteoarthritis     Sleep apnea     Stroke (HCC) 1980s    right hand neuropathy    Type 2 diabetes mellitus without complication (HCC)      - Current medications:   Current Outpatient Medications   Medication Sig    benzonatate (TESSALON) 100 MG capsule Take 1 capsule by mouth 3 times daily as needed for Cough    lidocaine viscous hcl (XYLOCAINE) 2 % SOLN solution Take 15 mLs by mouth every 3 hours as needed for Other (oral pain)    glimepiride (AMARYL) 4 MG tablet TAKE ONE TABLET BY MOUTH TWO TIMES A DAY BEFORE A MEAL    simvastatin (ZOCOR) 40

## 2024-07-15 NOTE — PROGRESS NOTES
No chief complaint on file.    /72   Pulse 76   Temp 97.8 °F (36.6 °C)   Resp 16   Ht 1.651 m (5' 5\")   Wt 102.5 kg (226 lb)   SpO2 95%   BMI 37.61 kg/m²   \"Have you been to the ER, urgent care clinic since your last visit?  Hospitalized since your last visit?\"    YES  “Have you seen or consulted any other health care providers outside of [facility] since your last visit?”    NO          Click Here for Release of Records Request

## 2024-07-15 NOTE — PROGRESS NOTES
\"Have you been to the ER, urgent care clinic since your last visit?  Hospitalized since your last visit?\"    07/07/24 urgent care for ora candidosis     “Have you seen or consulted any other health care providers outside of [facility] since your last visit?”    NO          Click Here for Release of Records Request

## 2024-07-18 LAB — HBA1C MFR BLD: 7.4 % (ref 4.8–5.6)

## 2024-07-18 RX ORDER — BLOOD-GLUCOSE METER
EACH MISCELLANEOUS
Qty: 1 KIT | Refills: 0 | Status: SHIPPED | OUTPATIENT
Start: 2024-07-18

## 2024-07-18 NOTE — TELEPHONE ENCOUNTER
Is there a way I can get a script for a one touch sugar monitor so I can get my sugar reading for myself. Now I use my partner’s. She has an one-touch and I been custome to using that one, plus my own strips and needles. I just saw my A1c from [facility].  Thank you

## 2024-07-22 ENCOUNTER — TELEPHONE (OUTPATIENT)
Dept: PRIMARY CARE CLINIC | Facility: CLINIC | Age: 79
End: 2024-07-22

## 2024-07-22 NOTE — TELEPHONE ENCOUNTER
Left voicemail that his endocrinologist will need to refill his diabetic medications and any diabetic supplies if needed.

## 2024-07-22 NOTE — TELEPHONE ENCOUNTER
Patient came into the office requesting a refill of true metrix air test strips and disposable needle

## 2024-07-24 RX ORDER — LANCETS 30 GAUGE
1 EACH MISCELLANEOUS DAILY
Qty: 300 EACH | Refills: 3 | Status: SHIPPED | OUTPATIENT
Start: 2024-07-24

## 2024-07-24 RX ORDER — BLOOD SUGAR DIAGNOSTIC
STRIP MISCELLANEOUS
Qty: 300 EACH | Refills: 3 | Status: SHIPPED | OUTPATIENT
Start: 2024-07-24

## 2024-08-02 ENCOUNTER — ANCILLARY PROCEDURE (OUTPATIENT)
Age: 79
End: 2024-08-02
Payer: MEDICARE

## 2024-08-02 VITALS — BODY MASS INDEX: 37.65 KG/M2 | HEIGHT: 65 IN | WEIGHT: 226 LBS

## 2024-08-02 DIAGNOSIS — R06.02 SHORTNESS OF BREATH: ICD-10-CM

## 2024-08-02 LAB
ECHO AO ASC DIAM: 3.4 CM
ECHO AO ASCENDING AORTA INDEX: 1.63 CM/M2
ECHO AO ROOT DIAM: 3.5 CM
ECHO AO ROOT INDEX: 1.68 CM/M2
ECHO AV AREA PEAK VELOCITY: 2.2 CM2
ECHO AV AREA VTI: 2.2 CM2
ECHO AV AREA/BSA PEAK VELOCITY: 1.1 CM2/M2
ECHO AV AREA/BSA VTI: 1.1 CM2/M2
ECHO AV MEAN GRADIENT: 4 MMHG
ECHO AV MEAN VELOCITY: 1 M/S
ECHO AV PEAK GRADIENT: 7 MMHG
ECHO AV PEAK VELOCITY: 1.3 M/S
ECHO AV VELOCITY RATIO: 0.62
ECHO AV VTI: 26.9 CM
ECHO BSA: 2.17 M2
ECHO EST RA PRESSURE: 5 MMHG
ECHO LA DIAMETER INDEX: 1.73 CM/M2
ECHO LA DIAMETER: 3.6 CM
ECHO LA TO AORTIC ROOT RATIO: 1.03
ECHO LA VOL A-L A2C: 48 ML (ref 18–58)
ECHO LA VOL A-L A4C: 92 ML (ref 18–58)
ECHO LA VOL BP: 68 ML (ref 18–58)
ECHO LA VOL MOD A2C: 46 ML (ref 18–58)
ECHO LA VOL MOD A4C: 83 ML (ref 18–58)
ECHO LA VOL/BSA BIPLANE: 33 ML/M2 (ref 16–34)
ECHO LA VOLUME AREA LENGTH: 74 ML
ECHO LA VOLUME INDEX A-L A2C: 23 ML/M2 (ref 16–34)
ECHO LA VOLUME INDEX A-L A4C: 44 ML/M2 (ref 16–34)
ECHO LA VOLUME INDEX AREA LENGTH: 36 ML/M2 (ref 16–34)
ECHO LA VOLUME INDEX MOD A2C: 22 ML/M2 (ref 16–34)
ECHO LA VOLUME INDEX MOD A4C: 40 ML/M2 (ref 16–34)
ECHO LEFT PULMONARY ARTERY DIAMETER: 1.7 CM
ECHO LV E' LATERAL VELOCITY: 8 CM/S
ECHO LV E' SEPTAL VELOCITY: 6 CM/S
ECHO LV EDV A2C: 57 ML
ECHO LV EDV A4C: 44 ML
ECHO LV EDV BP: 54 ML (ref 67–155)
ECHO LV EDV INDEX A4C: 21 ML/M2
ECHO LV EDV INDEX BP: 26 ML/M2
ECHO LV EDV NDEX A2C: 27 ML/M2
ECHO LV EJECTION FRACTION A2C: 59 %
ECHO LV EJECTION FRACTION A4C: 59 %
ECHO LV EJECTION FRACTION BIPLANE: 61 % (ref 55–100)
ECHO LV ESV A2C: 23 ML
ECHO LV ESV A4C: 18 ML
ECHO LV ESV BP: 21 ML (ref 22–58)
ECHO LV ESV INDEX A2C: 11 ML/M2
ECHO LV ESV INDEX A4C: 9 ML/M2
ECHO LV ESV INDEX BP: 10 ML/M2
ECHO LV FRACTIONAL SHORTENING: 24 % (ref 28–44)
ECHO LV INTERNAL DIMENSION DIASTOLE INDEX: 1.97 CM/M2
ECHO LV INTERNAL DIMENSION DIASTOLIC: 4.1 CM (ref 4.2–5.9)
ECHO LV INTERNAL DIMENSION SYSTOLIC INDEX: 1.49 CM/M2
ECHO LV INTERNAL DIMENSION SYSTOLIC: 3.1 CM
ECHO LV IVSD: 1 CM (ref 0.6–1)
ECHO LV MASS 2D: 132.1 G (ref 88–224)
ECHO LV MASS INDEX 2D: 63.5 G/M2 (ref 49–115)
ECHO LV POSTERIOR WALL DIASTOLIC: 1 CM (ref 0.6–1)
ECHO LV RELATIVE WALL THICKNESS RATIO: 0.49
ECHO LVOT AREA: 3.5 CM2
ECHO LVOT AV VTI INDEX: 0.62
ECHO LVOT DIAM: 2.1 CM
ECHO LVOT MEAN GRADIENT: 2 MMHG
ECHO LVOT PEAK GRADIENT: 3 MMHG
ECHO LVOT PEAK VELOCITY: 0.8 M/S
ECHO LVOT STROKE VOLUME INDEX: 28 ML/M2
ECHO LVOT SV: 58.2 ML
ECHO LVOT VTI: 16.8 CM
ECHO MAIN PULMONARY ARTERY DIAMETER: 2 CM
ECHO MV A VELOCITY: 0.78 M/S
ECHO MV E DECELERATION TIME (DT): 324.5 MS
ECHO MV E VELOCITY: 0.6 M/S
ECHO MV E/A RATIO: 0.77
ECHO MV E/E' LATERAL: 7.5
ECHO MV E/E' RATIO (AVERAGED): 8.75
ECHO MV E/E' SEPTAL: 10
ECHO PV MAX VELOCITY: 1.4 M/S
ECHO PV PEAK GRADIENT: 8 MMHG
ECHO RIGHT VENTRICULAR SYSTOLIC PRESSURE (RVSP): 29 MMHG
ECHO RV TAPSE: 2.2 CM (ref 1.7–?)
ECHO TV REGURGITANT MAX VELOCITY: 2.44 M/S
ECHO TV REGURGITANT PEAK GRADIENT: 24 MMHG

## 2024-08-02 PROCEDURE — 93306 TTE W/DOPPLER COMPLETE: CPT | Performed by: SPECIALIST

## 2024-08-06 ENCOUNTER — PATIENT MESSAGE (OUTPATIENT)
Dept: PRIMARY CARE CLINIC | Facility: CLINIC | Age: 79
End: 2024-08-06

## 2024-08-27 ENCOUNTER — OFFICE VISIT (OUTPATIENT)
Age: 79
End: 2024-08-27
Payer: MEDICARE

## 2024-08-27 VITALS
BODY MASS INDEX: 39.09 KG/M2 | HEIGHT: 65 IN | OXYGEN SATURATION: 96 % | RESPIRATION RATE: 18 BRPM | WEIGHT: 234.6 LBS | SYSTOLIC BLOOD PRESSURE: 120 MMHG | DIASTOLIC BLOOD PRESSURE: 70 MMHG | HEART RATE: 70 BPM

## 2024-08-27 DIAGNOSIS — R06.02 SHORTNESS OF BREATH: ICD-10-CM

## 2024-08-27 DIAGNOSIS — Z86.73 HISTORY OF CVA (CEREBROVASCULAR ACCIDENT): ICD-10-CM

## 2024-08-27 DIAGNOSIS — J44.9 CHRONIC OBSTRUCTIVE PULMONARY DISEASE, UNSPECIFIED COPD TYPE (HCC): ICD-10-CM

## 2024-08-27 DIAGNOSIS — E11.59 TYPE 2 DIABETES MELLITUS WITH OTHER CIRCULATORY COMPLICATION, WITHOUT LONG-TERM CURRENT USE OF INSULIN (HCC): ICD-10-CM

## 2024-08-27 DIAGNOSIS — I35.8 AORTIC VALVE SCLEROSIS: Primary | ICD-10-CM

## 2024-08-27 DIAGNOSIS — N18.32 STAGE 3B CHRONIC KIDNEY DISEASE (HCC): ICD-10-CM

## 2024-08-27 DIAGNOSIS — Z71.89 CARDIAC RISK COUNSELING: ICD-10-CM

## 2024-08-27 PROCEDURE — G8417 CALC BMI ABV UP PARAM F/U: HCPCS | Performed by: SPECIALIST

## 2024-08-27 PROCEDURE — G8428 CUR MEDS NOT DOCUMENT: HCPCS | Performed by: SPECIALIST

## 2024-08-27 PROCEDURE — 3023F SPIROM DOC REV: CPT | Performed by: SPECIALIST

## 2024-08-27 PROCEDURE — 99204 OFFICE O/P NEW MOD 45 MIN: CPT | Performed by: SPECIALIST

## 2024-08-27 RX ORDER — ALBUTEROL SULFATE 90 UG/1
2 AEROSOL, METERED RESPIRATORY (INHALATION) EVERY 6 HOURS PRN
COMMUNITY

## 2024-08-27 RX ORDER — MECLIZINE HYDROCHLORIDE 25 MG/1
25 TABLET ORAL 3 TIMES DAILY PRN
COMMUNITY

## 2024-08-27 RX ORDER — ELECTROLYTES/DEXTROSE
SOLUTION, ORAL ORAL
COMMUNITY

## 2024-08-27 ASSESSMENT — PATIENT HEALTH QUESTIONNAIRE - PHQ9
SUM OF ALL RESPONSES TO PHQ QUESTIONS 1-9: 0
SUM OF ALL RESPONSES TO PHQ QUESTIONS 1-9: 0
SUM OF ALL RESPONSES TO PHQ9 QUESTIONS 1 & 2: 0
SUM OF ALL RESPONSES TO PHQ QUESTIONS 1-9: 0
1. LITTLE INTEREST OR PLEASURE IN DOING THINGS: NOT AT ALL
2. FEELING DOWN, DEPRESSED OR HOPELESS: NOT AT ALL
SUM OF ALL RESPONSES TO PHQ QUESTIONS 1-9: 0

## 2024-08-27 NOTE — PROGRESS NOTES
Shemar Gonzalez     1945       Perlita Fitzgerald MD, [facility]  Date of Visit-8/27/2024   PCP is Claudia Breen DO [facility] [place]  [facility]  HPI:  Shemar Gonzalez is a 78 y.o. male   Pt of Dr Breen , refer from Dr Carlson to review echo  Echo done for edema  Echo shows normal EF and aortic sclerosis without stenosis  He has no CAD or CHF  Hx includes DM, obesity, CVA and HTN, for DM sees Endocrine- Dr Armenta  He also has COPD, MILLER. CKD3b, gout  Says he had a large stroke in 2013 in hospital for 2 months, stopped all alcohol , his residual is dizziness  He stopped smoking 20 years ago, worked as a echevarria [place]    He denies CP or syncope  Has no tachycardia , palpitations or sense of arrythmia  He does have weight gain in past year and decreased mobility and some HERNANDEZ as a result  His edema improved with stopping amlodipine but still present  Recent Pt First visit 7/18/24 with fever to 102.7 and PNA,now on oxygen  Had thrush 7/7/24  He is up about 20# in a year, see weights at bottom of note    08/02/24    ECHO (TTE) COMPLETE (PRN CONTRAST/BUBBLE/STRAIN/3D) 08/02/2024  4:23 PM (Final)    Interpretation Summary    Left Ventricle: Normal left ventricular systolic function. EF by 2D Simpsons Biplane is 61%. Left ventricle size is normal. Normal wall thickness. Normal wall motion.    Aortic Valve: Mild sclerosis of the aortic valve cusps.    Signed by: Perlita Fitzgerald MD on 8/2/2024  4:23 PM      Encounter Date: 06/28/24   EKG 12 Lead   Result Value    Ventricular Rate 83    Atrial Rate 83    P-R Interval 146    QRS Duration 92    Q-T Interval 360    QTc Calculation (Bazett) 423    P Axis 41    R Axis -1    T Axis 61    Diagnosis      Normal sinus rhythm  Nonspecific T wave abnormality  Abnormal ECG  When compared with ECG of 17-JAN-2023 12:55,  No significant change was found  Confirmed by TAVARES Mckay Massimo ([id number]) on 6/29/2024 6:25:11 PM     BMI 39.04 kg/m²    @Constitutional:  NAD, comfortable  Head: NC,AT. Eyes: No scleral icterus. Neck:  Neck supple. No JVD present.Throat: moist mucous membranes.  Chest: Effort normal & normal respiratory excursion .Neurological: alert, conversant and oriented . Skin: Skin is not cold. No obvious systemic rash noted. Not diaphoretic. No erythema.   Psychiatric:  Grossly normal mood and affect.  Behavior appears normal.   Extremities:  no clubbing or cyanosis. Abdomen: non distended    Lungs:breath sounds normal. No stridor. distress, wheezes or  Rales.  Heart:    normal rate, regular rhythm, normal S1, S2, no murmurs, rubs, clicks or gallops , PMI non displaced.    Edema: Edema is none.      Lab Results   Component Value Date    CHOL 149 12/06/2023    CHOL 129 06/23/2023    CHOL 126 06/23/2023     Lab Results   Component Value Date    TRIG 106 12/06/2023    TRIG 154 (H) 06/23/2023    TRIG 55 06/23/2023     Lab Results   Component Value Date    HDL 44 12/06/2023    HDL 40 06/23/2023    HDL 44 06/23/2023     No components found for: \"LDLCHOLESTEROL\", \"LDLCALC\"  Lab Results   Component Value Date    VLDL 19 12/06/2023    VLDL 26 06/23/2023    VLDL 11 06/23/2023     Lab Results   Component Value Date    CHOLHDLRATIO 2.9 06/23/2023    CHOLHDLRATIO 3.1 10/01/2020    CHOLHDLRATIO 3.2 05/29/2019      Lab Results   Component Value Date/Time     06/28/2024 02:33 PM    K 3.9 06/28/2024 02:33 PM     06/28/2024 02:33 PM    CO2 26 06/28/2024 02:33 PM    BUN 15 06/28/2024 02:33 PM    CREATININE 1.92 06/28/2024 02:33 PM    GLUCOSE 74 06/28/2024 02:33 PM    GLUCOSE 67 12/06/2023 09:37 AM    CALCIUM 8.7 06/28/2024 02:33 PM    LABGLOM 36 06/28/2024 02:33 PM    LABGLOM 37 04/26/2024 11:29 AM    LABGLOM 45 12/06/2023 09:37 AM       Wt Readings from Last 20 Encounters:   08/27/24 106.4 kg (234 lb 9.6 oz)   08/02/24 102.5 kg (226 lb)   07/15/24 102.5 kg (226 lb)   07/15/24 102.2 kg (225 lb 3.2 oz)   07/07/24 99.3 kg (219 lb)

## 2024-09-12 RX ORDER — AMLODIPINE BESYLATE 5 MG/1
5 TABLET ORAL DAILY
Qty: 90 TABLET | OUTPATIENT
Start: 2024-09-12

## 2024-09-17 ENCOUNTER — OFFICE VISIT (OUTPATIENT)
Age: 79
End: 2024-09-17
Payer: MEDICARE

## 2024-09-17 VITALS
WEIGHT: 235 LBS | HEIGHT: 65 IN | SYSTOLIC BLOOD PRESSURE: 133 MMHG | BODY MASS INDEX: 39.15 KG/M2 | OXYGEN SATURATION: 96 % | TEMPERATURE: 97.8 F | DIASTOLIC BLOOD PRESSURE: 72 MMHG

## 2024-09-17 DIAGNOSIS — K76.82 HEPATIC ENCEPHALOPATHY (HCC): ICD-10-CM

## 2024-09-17 DIAGNOSIS — K74.60 HEPATIC CIRRHOSIS, UNSPECIFIED HEPATIC CIRRHOSIS TYPE, UNSPECIFIED WHETHER ASCITES PRESENT (HCC): Primary | ICD-10-CM

## 2024-09-17 PROCEDURE — 99214 OFFICE O/P EST MOD 30 MIN: CPT | Performed by: NURSE PRACTITIONER

## 2024-09-17 PROCEDURE — 1123F ACP DISCUSS/DSCN MKR DOCD: CPT | Performed by: NURSE PRACTITIONER

## 2024-09-17 PROCEDURE — G8417 CALC BMI ABV UP PARAM F/U: HCPCS | Performed by: NURSE PRACTITIONER

## 2024-09-17 PROCEDURE — G8427 DOCREV CUR MEDS BY ELIG CLIN: HCPCS | Performed by: NURSE PRACTITIONER

## 2024-09-17 PROCEDURE — 1036F TOBACCO NON-USER: CPT | Performed by: NURSE PRACTITIONER

## 2024-09-17 RX ORDER — LACTULOSE 10 G/15ML
10 SOLUTION ORAL EVERY EVENING
Qty: 950 ML | Refills: 5 | Status: SHIPPED | OUTPATIENT
Start: 2024-09-17

## 2024-09-17 ASSESSMENT — ANXIETY QUESTIONNAIRES
4. TROUBLE RELAXING: NOT AT ALL
5. BEING SO RESTLESS THAT IT IS HARD TO SIT STILL: NOT AT ALL
3. WORRYING TOO MUCH ABOUT DIFFERENT THINGS: NOT AT ALL
IF YOU CHECKED OFF ANY PROBLEMS ON THIS QUESTIONNAIRE, HOW DIFFICULT HAVE THESE PROBLEMS MADE IT FOR YOU TO DO YOUR WORK, TAKE CARE OF THINGS AT HOME, OR GET ALONG WITH OTHER PEOPLE: NOT DIFFICULT AT ALL
2. NOT BEING ABLE TO STOP OR CONTROL WORRYING: NOT AT ALL
6. BECOMING EASILY ANNOYED OR IRRITABLE: NOT AT ALL
GAD7 TOTAL SCORE: 0
1. FEELING NERVOUS, ANXIOUS, OR ON EDGE: NOT AT ALL
7. FEELING AFRAID AS IF SOMETHING AWFUL MIGHT HAPPEN: NOT AT ALL

## 2024-09-17 ASSESSMENT — PATIENT HEALTH QUESTIONNAIRE - PHQ9
DEPRESSION UNABLE TO ASSESS: FUNCTIONAL CAPACITY MOTIVATION LIMITS ACCURACY
2. FEELING DOWN, DEPRESSED OR HOPELESS: NOT AT ALL
1. LITTLE INTEREST OR PLEASURE IN DOING THINGS: NOT AT ALL
SUM OF ALL RESPONSES TO PHQ QUESTIONS 1-9: 0
SUM OF ALL RESPONSES TO PHQ QUESTIONS 1-9: 0
SUM OF ALL RESPONSES TO PHQ9 QUESTIONS 1 & 2: 0
SUM OF ALL RESPONSES TO PHQ QUESTIONS 1-9: 0
SUM OF ALL RESPONSES TO PHQ QUESTIONS 1-9: 0

## 2024-09-18 LAB
ALBUMIN SERPL-MCNC: 3.8 G/DL (ref 3.5–5)
ALBUMIN/GLOB SERPL: 1.2 (ref 1.1–2.2)
ALP SERPL-CCNC: 71 U/L (ref 45–117)
ALT SERPL-CCNC: 40 U/L (ref 12–78)
AMMONIA PLAS-SCNC: 100 UMOL/L
ANION GAP SERPL CALC-SCNC: 7 MMOL/L (ref 2–12)
AST SERPL-CCNC: 28 U/L (ref 15–37)
BASOPHILS # BLD: 0.1 K/UL (ref 0–0.1)
BASOPHILS NFR BLD: 2 % (ref 0–1)
BILIRUB DIRECT SERPL-MCNC: 0.1 MG/DL (ref 0–0.2)
BILIRUB SERPL-MCNC: 0.4 MG/DL (ref 0.2–1)
BUN SERPL-MCNC: 19 MG/DL (ref 6–20)
BUN/CREAT SERPL: 11 (ref 12–20)
CALCIUM SERPL-MCNC: 8.8 MG/DL (ref 8.5–10.1)
CHLORIDE SERPL-SCNC: 111 MMOL/L (ref 97–108)
CO2 SERPL-SCNC: 26 MMOL/L (ref 21–32)
CREAT SERPL-MCNC: 1.8 MG/DL (ref 0.7–1.3)
DIFFERENTIAL METHOD BLD: ABNORMAL
EOSINOPHIL # BLD: 0.4 K/UL (ref 0–0.4)
EOSINOPHIL NFR BLD: 7 % (ref 0–7)
ERYTHROCYTE [DISTWIDTH] IN BLOOD BY AUTOMATED COUNT: 15.8 % (ref 11.5–14.5)
GLOBULIN SER CALC-MCNC: 3.3 G/DL (ref 2–4)
GLUCOSE SERPL-MCNC: 176 MG/DL (ref 65–100)
HCT VFR BLD AUTO: 37.7 % (ref 36.6–50.3)
HGB BLD-MCNC: 11.5 G/DL (ref 12.1–17)
IMM GRANULOCYTES # BLD AUTO: 0 K/UL (ref 0–0.04)
IMM GRANULOCYTES NFR BLD AUTO: 0 % (ref 0–0.5)
INR PPP: 1 (ref 0.9–1.1)
LYMPHOCYTES # BLD: 1.9 K/UL (ref 0.8–3.5)
LYMPHOCYTES NFR BLD: 35 % (ref 12–49)
MCH RBC QN AUTO: 25.3 PG (ref 26–34)
MCHC RBC AUTO-ENTMCNC: 30.5 G/DL (ref 30–36.5)
MCV RBC AUTO: 82.9 FL (ref 80–99)
MONOCYTES # BLD: 0.5 K/UL (ref 0–1)
MONOCYTES NFR BLD: 10 % (ref 5–13)
NEUTS SEG # BLD: 2.4 K/UL (ref 1.8–8)
NEUTS SEG NFR BLD: 46 % (ref 32–75)
NRBC # BLD: 0 K/UL (ref 0–0.01)
NRBC BLD-RTO: 0 PER 100 WBC
PLATELET # BLD AUTO: ABNORMAL K/UL (ref 150–400)
POTASSIUM SERPL-SCNC: 3.8 MMOL/L (ref 3.5–5.1)
PROT SERPL-MCNC: 7.1 G/DL (ref 6.4–8.2)
PROTHROMBIN TIME: 10.4 SEC (ref 9–11.1)
RBC # BLD AUTO: 4.55 M/UL (ref 4.1–5.7)
RBC MORPH BLD: ABNORMAL
SODIUM SERPL-SCNC: 144 MMOL/L (ref 136–145)
WBC # BLD AUTO: 5.3 K/UL (ref 4.1–11.1)

## 2024-09-20 LAB
AFP L3 MFR SERPL: NORMAL % (ref 0–9.9)
AFP SERPL-MCNC: 3.3 NG/ML (ref 0–8.4)

## 2024-09-25 ENCOUNTER — HOSPITAL ENCOUNTER (OUTPATIENT)
Facility: HOSPITAL | Age: 79
Discharge: HOME | End: 2024-09-28
Payer: MEDICARE

## 2024-09-25 DIAGNOSIS — K74.60 HEPATIC CIRRHOSIS, UNSPECIFIED HEPATIC CIRRHOSIS TYPE, UNSPECIFIED WHETHER ASCITES PRESENT (HCC): ICD-10-CM

## 2024-09-25 PROCEDURE — 76700 US EXAM ABDOM COMPLETE: CPT

## 2024-10-02 ENCOUNTER — TELEPHONE (OUTPATIENT)
Age: 79
End: 2024-10-02

## 2024-10-02 NOTE — TELEPHONE ENCOUNTER
----- Message from LEANDER Vela NP sent at 10/1/2024  8:58 PM EDT -----  Can you please let him know there are no concerning findings on ultrasound  ----- Message -----  From: Mona Solis Incoming Orders Results To [facility]  Sent: 10/1/2024   3:32 PM EDT  To: LEANDER Jamison NP    10/2/2024 10:02am: Above message relayed to Margaret (domestic partner). Verbalized understanding. SQ

## 2024-10-17 DIAGNOSIS — E78.5 HYPERLIPIDEMIA LDL GOAL <70: ICD-10-CM

## 2024-10-17 RX ORDER — SIMVASTATIN 40 MG
40 TABLET ORAL NIGHTLY
Qty: 90 TABLET | Refills: 3 | Status: SHIPPED | OUTPATIENT
Start: 2024-10-17 | End: 2024-10-18

## 2024-10-17 NOTE — TELEPHONE ENCOUNTER
PCP: Claudia Breen DO    Last Visit 7/15/2024   Future Appointments   Date Time Provider Department Center   10/18/2024  9:00 AM Claudia Breen DO [facility]   10/23/2024  9:00 AM Fady Bradley PSYD [facility] AM[facility]   10/28/2024  8:00 AM NEUROPSYCH TESTING \[facility] [place] [facility]"" [facility]   11/11/2024  2:30 PM Fady Bradley PSYD [facility]   1/10/2025 10:30 AM Aleja Parson APRN - NP [facility] AM[facility]   1/20/2025  1:30 PM Isatu Armenta MD RDE [facility] AMB   2/6/2025  1:00 PM Andrew Forte MD [facility] AMB       Requested Prescriptions     Pending Prescriptions Disp Refills    simvastatin (ZOCOR) 40 MG tablet 90 tablet 0     Sig: Take 1 tablet by mouth nightly         Other Comments: Last Refill

## 2024-10-17 NOTE — TELEPHONE ENCOUNTER
[facility] insurance called to request simvastatin 40mg to be sent to [facility] on [address]

## 2024-10-18 ENCOUNTER — OFFICE VISIT (OUTPATIENT)
Dept: PRIMARY CARE CLINIC | Facility: CLINIC | Age: 79
End: 2024-10-18
Payer: MEDICARE

## 2024-10-18 VITALS
SYSTOLIC BLOOD PRESSURE: 111 MMHG | OXYGEN SATURATION: 94 % | HEIGHT: 65 IN | WEIGHT: 238 LBS | RESPIRATION RATE: 16 BRPM | HEART RATE: 61 BPM | TEMPERATURE: 97.5 F | BODY MASS INDEX: 39.65 KG/M2 | DIASTOLIC BLOOD PRESSURE: 64 MMHG

## 2024-10-18 DIAGNOSIS — E11.59 TYPE 2 DIABETES MELLITUS WITH OTHER CIRCULATORY COMPLICATION, WITHOUT LONG-TERM CURRENT USE OF INSULIN (HCC): ICD-10-CM

## 2024-10-18 DIAGNOSIS — K74.60 HEPATIC CIRRHOSIS, UNSPECIFIED HEPATIC CIRRHOSIS TYPE, UNSPECIFIED WHETHER ASCITES PRESENT (HCC): ICD-10-CM

## 2024-10-18 DIAGNOSIS — B37.0 THRUSH: Primary | ICD-10-CM

## 2024-10-18 DIAGNOSIS — N18.30 STAGE 3 CHRONIC KIDNEY DISEASE, UNSPECIFIED WHETHER STAGE 3A OR 3B CKD (HCC): ICD-10-CM

## 2024-10-18 DIAGNOSIS — I10 ESSENTIAL (PRIMARY) HYPERTENSION: ICD-10-CM

## 2024-10-18 PROCEDURE — 1123F ACP DISCUSS/DSCN MKR DOCD: CPT | Performed by: FAMILY MEDICINE

## 2024-10-18 PROCEDURE — 1036F TOBACCO NON-USER: CPT | Performed by: FAMILY MEDICINE

## 2024-10-18 PROCEDURE — G8417 CALC BMI ABV UP PARAM F/U: HCPCS | Performed by: FAMILY MEDICINE

## 2024-10-18 PROCEDURE — G8427 DOCREV CUR MEDS BY ELIG CLIN: HCPCS | Performed by: FAMILY MEDICINE

## 2024-10-18 PROCEDURE — 3074F SYST BP LT 130 MM HG: CPT | Performed by: FAMILY MEDICINE

## 2024-10-18 PROCEDURE — 3051F HG A1C>EQUAL 7.0%<8.0%: CPT | Performed by: FAMILY MEDICINE

## 2024-10-18 PROCEDURE — G8484 FLU IMMUNIZE NO ADMIN: HCPCS | Performed by: FAMILY MEDICINE

## 2024-10-18 PROCEDURE — 3078F DIAST BP <80 MM HG: CPT | Performed by: FAMILY MEDICINE

## 2024-10-18 PROCEDURE — 99214 OFFICE O/P EST MOD 30 MIN: CPT | Performed by: FAMILY MEDICINE

## 2024-10-18 RX ORDER — NYSTATIN 100000 [USP'U]/ML
500000 SUSPENSION ORAL 4 TIMES DAILY
Qty: 200 ML | Refills: 0 | Status: SHIPPED | OUTPATIENT
Start: 2024-10-18 | End: 2024-10-28

## 2024-10-18 RX ORDER — FUROSEMIDE 20 MG
TABLET ORAL AS NEEDED
COMMUNITY

## 2024-10-18 RX ORDER — UBIDECARENONE 75 MG
50 CAPSULE ORAL DAILY
COMMUNITY

## 2024-10-18 RX ORDER — ATORVASTATIN CALCIUM 20 MG/1
20 TABLET, FILM COATED ORAL DAILY
Qty: 30 TABLET | Refills: 11 | Status: SHIPPED | OUTPATIENT
Start: 2024-10-18

## 2024-10-18 SDOH — ECONOMIC STABILITY: FOOD INSECURITY: WITHIN THE PAST 12 MONTHS, THE FOOD YOU BOUGHT JUST DIDN'T LAST AND YOU DIDN'T HAVE MONEY TO GET MORE.: NEVER TRUE

## 2024-10-18 SDOH — ECONOMIC STABILITY: INCOME INSECURITY: HOW HARD IS IT FOR YOU TO PAY FOR THE VERY BASICS LIKE FOOD, HOUSING, MEDICAL CARE, AND HEATING?: NOT HARD AT ALL

## 2024-10-18 SDOH — ECONOMIC STABILITY: FOOD INSECURITY: WITHIN THE PAST 12 MONTHS, YOU WORRIED THAT YOUR FOOD WOULD RUN OUT BEFORE YOU GOT MONEY TO BUY MORE.: NEVER TRUE

## 2024-10-18 ASSESSMENT — PATIENT HEALTH QUESTIONNAIRE - PHQ9
1. LITTLE INTEREST OR PLEASURE IN DOING THINGS: NOT AT ALL
SUM OF ALL RESPONSES TO PHQ QUESTIONS 1-9: 0
SUM OF ALL RESPONSES TO PHQ QUESTIONS 1-9: 0
SUM OF ALL RESPONSES TO PHQ9 QUESTIONS 1 & 2: 0
SUM OF ALL RESPONSES TO PHQ QUESTIONS 1-9: 0
SUM OF ALL RESPONSES TO PHQ QUESTIONS 1-9: 0
2. FEELING DOWN, DEPRESSED OR HOPELESS: NOT AT ALL

## 2024-10-18 NOTE — PROGRESS NOTES
\"Have you been to the ER, urgent care clinic since your last visit?  Hospitalized since your last visit?\"    NO    “Have you seen or consulted any other health care providers outside of [facility] since your last visit?”    NO          Click Here for Release of Records Request

## 2024-10-18 NOTE — PROGRESS NOTES
Subjective  Shemar Gonzalez is an 79 y.o. male who presents for follow up.     PMHx of Aortic valve sclerosis, HTN, T2DM, HLD, gout, MILLER with cirrhosis, thrombocytopenia, VERONA, Gout, obesity, COPD, CKD stage 3B, CVA.      Pt is a poor historian.      Hx TIA, CVA, dysautonomia. Chronic dizziness since CVA in 2013. Evaluated by neurology in February. Unsteady gait.      COPD. Following with Pulm, Dr Beltran.   Back on trelegy. C/o thrush on his tongue and requesting rx for this.      DM2. On trulicity. Following with with EndoDr Armenta.      Hx Cirrhosis. Following with hepatology, Eb.      CKD3b. Following with nephrology.      Lives at home with his girlfriend.      Allergies - reviewed:   No Known Allergies      Medications - reviewed:   Current Outpatient Medications   Medication Sig    furosemide (LASIX) 20 MG tablet Take by mouth as needed    vitamin B-12 (CYANOCOBALAMIN) 100 MCG tablet Take 0.5 tablets by mouth daily    atorvastatin (LIPITOR) 20 MG tablet Take 1 tablet by mouth daily    nystatin (MYCOSTATIN) 563042 UNIT/ML suspension Take 5 mLs by mouth 4 times daily for 10 days Retain in mouth as long as possible    lactulose (CHRONULAC) 10 GM/15ML solution Take 15 mLs by mouth every evening    Pyridoxine HCl (VITAMIN B6) 100 MG TABS     albuterol sulfate HFA (VENTOLIN HFA) 108 (90 Base) MCG/ACT inhaler Inhale 2 puffs into the lungs every 6 hours as needed for Wheezing    meclizine (ANTIVERT) 25 MG tablet Take 1 tablet by mouth 3 times daily as needed    blood glucose test strips (RELION TRUE METRIX TEST STRIPS) strip Use to test blood sugar 3 times per day. USE BRAND PREFERRED BY INSURANCE    Lancets MISC 1 each by Does not apply route daily Use to test blood sugars 3 times per day. USE BRAND PREFERRED BY INSURANCE    Blood Glucose Monitoring Suppl (ONE TOUCH ULTRA 2) w/Device KIT Use to test blood sugars daily. USE BRAND PREFERRED BY INSURANCE    glimepiride (AMARYL) 4 MG tablet TAKE ONE TABLET BY

## 2024-10-22 RX ORDER — SIMVASTATIN 40 MG
TABLET ORAL
COMMUNITY
End: 2024-10-22 | Stop reason: SDUPTHER

## 2024-10-22 NOTE — PROGRESS NOTES
friends, peers)    WEAKNESSES:  Health problems and Cognitive limitations    IMPRESSION:  The patient is a 79-year-old male who presents for neuropsychological evaluation secondary concerns regarding his cognitive functioning.  The degree to which his cognitive difficulties are related to his stroke versus metabolic factors versus normal aging versus psychiatric factors versus history of alcohol abuse versus emerging neurodegenerative conditions versus other requires further clarification.  Comprehensive evaluation will assist with obtaining a quantitative assessment of the patient's cognitive and emotional functioning in order to guide differential diagnosis and treatment planning.    ASSESSMENT:  History of CVA  Memory loss  Word finding problem    PLAN:  Patient will participate in comprehensive evaluation in order to obtain a quantitative assessment of their cognitive and emotional functioning  Differential diagnosis and treatment planning will be based upon results from clinical interview and objective testing  Patient will be provided with review of results, impressions, and recommendations during feedback session  Patient will be encouraged to follow-up with referring provider for ongoing management    TIME DOCUMENTATION:  Clinical Interview: 0900 - 0925 = 25 minutes    BILLING:  90791x1

## 2024-10-23 ENCOUNTER — OFFICE VISIT (OUTPATIENT)
Age: 79
End: 2024-10-23
Payer: MEDICARE

## 2024-10-23 DIAGNOSIS — Z86.73 HISTORY OF CVA (CEREBROVASCULAR ACCIDENT): Primary | ICD-10-CM

## 2024-10-23 DIAGNOSIS — R47.89 WORD FINDING PROBLEM: ICD-10-CM

## 2024-10-23 DIAGNOSIS — R41.3 MEMORY LOSS: ICD-10-CM

## 2024-10-23 PROCEDURE — 1123F ACP DISCUSS/DSCN MKR DOCD: CPT | Performed by: CLINICAL NEUROPSYCHOLOGIST

## 2024-10-23 PROCEDURE — 1036F TOBACCO NON-USER: CPT | Performed by: CLINICAL NEUROPSYCHOLOGIST

## 2024-10-23 PROCEDURE — 90791 PSYCH DIAGNOSTIC EVALUATION: CPT | Performed by: CLINICAL NEUROPSYCHOLOGIST

## 2024-10-23 RX ORDER — SIMVASTATIN 40 MG
40 TABLET ORAL NIGHTLY
Qty: 90 TABLET | Refills: 1 | Status: SHIPPED | OUTPATIENT
Start: 2024-10-23

## 2024-10-28 ENCOUNTER — PROCEDURE VISIT (OUTPATIENT)
Age: 79
End: 2024-10-28
Payer: MEDICARE

## 2024-10-28 DIAGNOSIS — F43.22 ADJUSTMENT DISORDER WITH ANXIOUS MOOD: ICD-10-CM

## 2024-10-28 DIAGNOSIS — N18.31 STAGE 3A CHRONIC KIDNEY DISEASE (HCC): ICD-10-CM

## 2024-10-28 DIAGNOSIS — Z86.73 HISTORY OF CVA (CEREBROVASCULAR ACCIDENT): ICD-10-CM

## 2024-10-28 DIAGNOSIS — G31.84 MILD NEUROCOGNITIVE DISORDER: Primary | ICD-10-CM

## 2024-10-28 DIAGNOSIS — K75.81 NASH (NONALCOHOLIC STEATOHEPATITIS): ICD-10-CM

## 2024-10-28 PROCEDURE — 96132 NRPSYC TST EVAL PHYS/QHP 1ST: CPT | Performed by: CLINICAL NEUROPSYCHOLOGIST

## 2024-10-28 PROCEDURE — 96138 PSYCL/NRPSYC TECH 1ST: CPT | Performed by: CLINICAL NEUROPSYCHOLOGIST

## 2024-10-28 PROCEDURE — 96133 NRPSYC TST EVAL PHYS/QHP EA: CPT | Performed by: CLINICAL NEUROPSYCHOLOGIST

## 2024-10-28 PROCEDURE — 96139 PSYCL/NRPSYC TST TECH EA: CPT | Performed by: CLINICAL NEUROPSYCHOLOGIST

## 2024-11-11 ENCOUNTER — OFFICE VISIT (OUTPATIENT)
Age: 79
End: 2024-11-11
Payer: MEDICARE

## 2024-11-11 DIAGNOSIS — F43.22 ADJUSTMENT DISORDER WITH ANXIOUS MOOD: ICD-10-CM

## 2024-11-11 DIAGNOSIS — G31.84 MILD COGNITIVE IMPAIRMENT: Primary | ICD-10-CM

## 2024-11-11 PROCEDURE — 96132 NRPSYC TST EVAL PHYS/QHP 1ST: CPT | Performed by: CLINICAL NEUROPSYCHOLOGIST

## 2024-11-11 NOTE — PROGRESS NOTES
Spiritual Care Progress Note    2/10/2023  Patient: Jessica Ferreira : 1962  Admission Date & Time: 2023 1501  MRN: [id number] CSN: [id number]     visited patient per physician referral  Concepcion Augustine introduced self & role, facilitated catharsis via sharing and naming emotions, explored patient's hopes and fears, affirmed pt's experience, and offered reframing  Patient described feeling fearful and exhausted regarding his medical condition and emphasized his concern for losing his strength and his connection to what he loves  Patient thanked  for visiting and requested  return when pt's wife is present  Spiritual care will remain available to support               Chaplaincy Interventions Utilized:   Empowerment: Normalized experience of patient/family, Provided anticipatory guidance, and Reframed experience of patient/family    Exploration: Explored emotional needs & resources    Collaboration: Consulted with interdisciplinary team    Relationship Building: Cultivated a relationship of care and support and Listened empathically      Chaplaincy Outcomes Achieved:  Developed chaplaincy care plan, Distress reduced, Emotional resources utilized, and Verbally processed emotions       02/10/23 1100   Clinical Encounter Type   Visited With Patient   Routine Visit Introduction   Referral From Physician errors  Inhibition/Switching: Not administered due to impairment on preceding subtest  Visuospatial:  Basic visuoperception: Within normal limits  Pattern reconstruction: Exceptionally low  Complex figure copy: Within normal limits  Language:  Confrontation naming: Below average  Letter fluency: Low average  Semantic Fluency: Exceptionally low  Learning   List learning: Exceptionally low  Story learning: Below average  Basic figure learning: Exceptionally low  Memory:  List recall: Low average (recalling 167%)  Story recall: Below average  Basic figure recall: Exceptionally low (recalling 100%)  Recognition:  List recognition: Exceptionally low  Story recognition: Within normal limits  Basic figure recognition: Below average  Functional:   Pillbox organization: Failed  Bill pay: Below average  Practical judgment: Low average  Psychiatric/Sleep:   Depression: Mild  Anxiety: Within normal limits  Daytime sleepiness: Elevated  Sleep quality: Elevated    TIME:  Clinical Interview: 0900 - 0925 = 25 minutes  Testing by technician: 0806 - 1025 = 139 minutes  Scoring by technician: 16 minutes  Neuropsychological testing evaluation services*: 165 minutes    BILLING:  90791 x 1 Unit  96138 x 1 Unit  96139 x 4 Units  96132 x 1 Unit  96133 x 2 Units    *Neuropsychological testing evaluation services include: Integration of patient data, interpretation of standardized test results and clinical data, clinical decision-making, treatment planning and report, and interactive feedback to the patient, family member(s) or caregiver(s), when performed.

## 2024-11-11 NOTE — PROGRESS NOTES
Chief complaint: Patient presented for review of previously completed neuropsychological evaluation and discussion of impressions/recommendations.    Prior to seeing the patient for today's visit, I reviewed pertinent records, including the previously completed report, the records in Epic, and any updated visits from other providers since the patient's last visit.    I provided feedback services related to the previously completed report. Attendees included: Patient and Spouse. Education was provided regarding my diagnostic impressions, and we discussed treatment plan/options. Attendees were provided with the opportunity to ask questions, which were answered to the best of my ability.    We discussed, in detail, the following:  Reviewed findings from evaluation including test results, diagnosis, and suspected contributing factors  Discussed recommendations outlined in report  Answered questions to the best of my ability    The patient needs to:   Follow up with referring provider for ongoing management, Use practical strategies to compensate for cognitive weaknesses (See handout), and Emphasize modifiable risk and protective factors for cognitive functioning (e.g., exercise, diet, cognitive stimulation; see handout)    The patient had the following reactions to recommendations: Patient and wife reported understanding results and recommendations.    The patient had the following concerns which I deferred to their referring provider:   meds for anxiety     ASSESSMENT:  Mild neurocognitive disorder due to multiple etiologies  Adjustment disorder with anxious mood    TIME SPENT PROVIDING SERVICES:   31 minutes     BILLING:  96132 x 1 Units    *Code 28110 Neuropsychological testing evaluation services include: Integration of patient data, interpretation of standardized test results and clinical data, clinical decision-making, treatment planning and report, and interactive feedback to the patient, family member(s) or

## 2024-11-12 DIAGNOSIS — M1A.0710 IDIOPATHIC CHRONIC GOUT OF RIGHT ANKLE WITHOUT TOPHUS: ICD-10-CM

## 2024-11-12 RX ORDER — FEBUXOSTAT 40 MG/1
40 TABLET, FILM COATED ORAL DAILY
Qty: 90 TABLET | Refills: 0 | Status: SHIPPED | OUTPATIENT
Start: 2024-11-12

## 2024-12-15 DIAGNOSIS — E11.65 TYPE 2 DIABETES MELLITUS WITH HYPERGLYCEMIA, WITHOUT LONG-TERM CURRENT USE OF INSULIN (HCC): ICD-10-CM

## 2025-01-10 ENCOUNTER — OFFICE VISIT (OUTPATIENT)
Age: 80
End: 2025-01-10
Payer: MEDICARE

## 2025-01-10 VITALS
OXYGEN SATURATION: 94 % | TEMPERATURE: 97.1 F | HEIGHT: 65 IN | DIASTOLIC BLOOD PRESSURE: 77 MMHG | WEIGHT: 263 LBS | RESPIRATION RATE: 16 BRPM | BODY MASS INDEX: 43.82 KG/M2 | SYSTOLIC BLOOD PRESSURE: 129 MMHG | HEART RATE: 70 BPM

## 2025-01-10 DIAGNOSIS — K74.60 HEPATIC CIRRHOSIS, UNSPECIFIED HEPATIC CIRRHOSIS TYPE, UNSPECIFIED WHETHER ASCITES PRESENT (HCC): Primary | ICD-10-CM

## 2025-01-10 DIAGNOSIS — K75.81 NASH (NONALCOHOLIC STEATOHEPATITIS): ICD-10-CM

## 2025-01-10 PROCEDURE — 1036F TOBACCO NON-USER: CPT | Performed by: NURSE PRACTITIONER

## 2025-01-10 PROCEDURE — G8427 DOCREV CUR MEDS BY ELIG CLIN: HCPCS | Performed by: NURSE PRACTITIONER

## 2025-01-10 PROCEDURE — 99214 OFFICE O/P EST MOD 30 MIN: CPT | Performed by: NURSE PRACTITIONER

## 2025-01-10 PROCEDURE — 1123F ACP DISCUSS/DSCN MKR DOCD: CPT | Performed by: NURSE PRACTITIONER

## 2025-01-10 PROCEDURE — G8417 CALC BMI ABV UP PARAM F/U: HCPCS | Performed by: NURSE PRACTITIONER

## 2025-01-10 PROCEDURE — 1160F RVW MEDS BY RX/DR IN RCRD: CPT | Performed by: NURSE PRACTITIONER

## 2025-01-10 PROCEDURE — 1159F MED LIST DOCD IN RCRD: CPT | Performed by: NURSE PRACTITIONER

## 2025-01-10 ASSESSMENT — PATIENT HEALTH QUESTIONNAIRE - PHQ9
SUM OF ALL RESPONSES TO PHQ9 QUESTIONS 1 & 2: 0
SUM OF ALL RESPONSES TO PHQ QUESTIONS 1-9: 0
1. LITTLE INTEREST OR PLEASURE IN DOING THINGS: NOT AT ALL
2. FEELING DOWN, DEPRESSED OR HOPELESS: NOT AT ALL
SUM OF ALL RESPONSES TO PHQ QUESTIONS 1-9: 0

## 2025-01-10 NOTE — PROGRESS NOTES
Identified pt with two pt identifiers(name and ). Reviewed record in preparation for visit and have obtained necessary documentation. All patient medications has been reviewed.  Chief Complaint   Patient presents with    Follow-up     Patient states he has questions about his dizziness,frequent urination and lower backpain         Wt Readings from Last 3 Encounters:   01/10/25 119.3 kg (263 lb)   10/18/24 108 kg (238 lb)   24 106.6 kg (235 lb)     Temp Readings from Last 3 Encounters:   01/10/25 97.1 °F (36.2 °C) (Temporal)   10/18/24 97.5 °F (36.4 °C) (Temporal)   24 97.8 °F (36.6 °C) (Temporal)     BP Readings from Last 3 Encounters:   01/10/25 129/77   10/18/24 111/64   24 133/72     Pulse Readings from Last 3 Encounters:   01/10/25 70   10/18/24 61   24 70       \"Have you been to the ER, urgent care clinic since your last visit?  Hospitalized since your last visit?\"    NO    “Have you seen or consulted any other health care providers outside of [facility] since your last visit?”    NO    Click Here for Release of Records Request             
alcohol in excess.    The patient is a retired echevarria    LABORATORY STUDIES:   Latest Ref Rng 9/17/2024   SAJAN - Routine Labs     HGB 12.1 - 17.0 g/dL 11.5 (L)     - 400 K/uL UNABLE TO REPORT ACCURATE COUNT DUE TO PLATELET AGGREGATION,     - 400 K/uL    INR 0.9 - 1.1   1.0    AST 15 - 37 U/L 28    ALT 12 - 78 U/L 40    Alk Phos 45 - 117 U/L 71    Bili, Total 0.2 - 1.0 MG/DL 0.4    Bili, Direct 0.0 - 0.2 MG/DL 0.1    Albumin 3.5 - 5.0 g/dL 3.8    BUN 6 - 20 MG/DL 19    Creat 0.70 - 1.30 MG/DL 1.80 (H)    Na 136 - 145 mmol/L 144    K 3.5 - 5.1 mmol/L 3.8    Cl 97 - 108 mmol/L 111 (H)    CO2 21 - 32 mmol/L 26    Glucose 65 - 100 mg/dL 176 (H)    Ammonia <32 UMOL/L 100 (H)        Latest Ref Rng 6/28/2024   SAJAN - Routine Labs     WBC 4.1 - 11.1 K/uL 7.5    ANC 1.8 - 8.0 K/UL 5.6    HGB 12.1 - 17.0 g/dL 11.9 (L)     - 400 K/uL 150     - 400 K/uL    INR 0.9 - 1.1      AST 15 - 37 U/L 29    ALT 12 - 78 U/L 44    Alk Phos 45 - 117 U/L 67    Bili, Total 0.2 - 1.0 MG/DL 0.6    Bili, Direct 0.0 - 0.2 MG/DL    Albumin 3.5 - 5.0 g/dL 3.4 (L)    BUN 6 - 20 MG/DL 15    Creat 0.70 - 1.30 MG/DL 1.92 (H)    Na 136 - 145 mmol/L 141    K 3.5 - 5.1 mmol/L 3.9    Cl 97 - 108 mmol/L 110 (H)    CO2 21 - 32 mmol/L 26    Glucose 65 - 100 mg/dL 74       Latest Ref Rng 7/17/2024   SAJAN - Routine Labs     Hemoglobin A1C 4.8 - 5.6 % 7.4 (H)       Latest Ref Rng 9/17/2024   SAJAN - Cancer Screening     AFP 0.0 - 8.4 ng/mL 3.3    AFP-L3% 0.0 - 9.9 % Comment      Laboratory testing from 9/17/2024 reviewed in detail. Additional testing included to evaluate progression or regression of disease. Laboratory testing results from today will be communicated by My Chart.     SEROLOGIES:  Serologies Latest Ref Rng & Units 2/3/2021   Hep A Ab, Total Negative   Positive (A)   Hep B Surface Ag Index <0.10   Hep B Surface Ag Interp Negative   Negative   Hep B Core Ab, Total Negative   Negative   Hep B Surface Ab mIU/mL <3.10   Hep B

## 2025-01-11 LAB
ALBUMIN SERPL-MCNC: 3.5 G/DL (ref 3.5–5)
ALBUMIN/GLOB SERPL: 1 (ref 1.1–2.2)
ALP SERPL-CCNC: 74 U/L (ref 45–117)
ALT SERPL-CCNC: 30 U/L (ref 12–78)
AMMONIA PLAS-SCNC: 75 UMOL/L
ANION GAP SERPL CALC-SCNC: 6 MMOL/L (ref 2–12)
AST SERPL-CCNC: 20 U/L (ref 15–37)
BASOPHILS # BLD: 0.09 K/UL (ref 0–0.1)
BASOPHILS NFR BLD: 2.1 % (ref 0–1)
BILIRUB DIRECT SERPL-MCNC: 0.1 MG/DL (ref 0–0.2)
BILIRUB SERPL-MCNC: 0.6 MG/DL (ref 0.2–1)
BUN SERPL-MCNC: 23 MG/DL (ref 6–20)
BUN/CREAT SERPL: 11 (ref 12–20)
CALCIUM SERPL-MCNC: 9 MG/DL (ref 8.5–10.1)
CHLORIDE SERPL-SCNC: 106 MMOL/L (ref 97–108)
CO2 SERPL-SCNC: 27 MMOL/L (ref 21–32)
CREAT SERPL-MCNC: 2.06 MG/DL (ref 0.7–1.3)
DIFFERENTIAL METHOD BLD: ABNORMAL
EOSINOPHIL # BLD: 0.32 K/UL (ref 0–0.4)
EOSINOPHIL NFR BLD: 7.5 % (ref 0–7)
ERYTHROCYTE [DISTWIDTH] IN BLOOD BY AUTOMATED COUNT: 15.3 % (ref 11.5–14.5)
GLOBULIN SER CALC-MCNC: 3.4 G/DL (ref 2–4)
GLUCOSE SERPL-MCNC: 263 MG/DL (ref 65–100)
HCT VFR BLD AUTO: 36.7 % (ref 36.6–50.3)
HGB BLD-MCNC: 11.6 G/DL (ref 12.1–17)
IMM GRANULOCYTES # BLD AUTO: 0.01 K/UL (ref 0–0.04)
IMM GRANULOCYTES NFR BLD AUTO: 0.2 % (ref 0–0.5)
INR PPP: 1 (ref 0.9–1.1)
LYMPHOCYTES # BLD: 1.33 K/UL (ref 0.8–3.5)
LYMPHOCYTES NFR BLD: 31.1 % (ref 12–49)
MCH RBC QN AUTO: 25.4 PG (ref 26–34)
MCHC RBC AUTO-ENTMCNC: 31.6 G/DL (ref 30–36.5)
MCV RBC AUTO: 80.5 FL (ref 80–99)
MONOCYTES # BLD: 0.37 K/UL (ref 0–1)
MONOCYTES NFR BLD: 8.7 % (ref 5–13)
NEUTS SEG # BLD: 2.15 K/UL (ref 1.8–8)
NEUTS SEG NFR BLD: 50.4 % (ref 32–75)
NRBC # BLD: 0 K/UL (ref 0–0.01)
NRBC BLD-RTO: 0 PER 100 WBC
PLATELET # BLD AUTO: 108 K/UL (ref 150–400)
PMV BLD AUTO: 11.7 FL (ref 8.9–12.9)
POTASSIUM SERPL-SCNC: 4 MMOL/L (ref 3.5–5.1)
PROT SERPL-MCNC: 6.9 G/DL (ref 6.4–8.2)
PROTHROMBIN TIME: 10.8 SEC (ref 9–11.1)
RBC # BLD AUTO: 4.56 M/UL (ref 4.1–5.7)
SODIUM SERPL-SCNC: 139 MMOL/L (ref 136–145)
WBC # BLD AUTO: 4.3 K/UL (ref 4.1–11.1)

## 2025-01-13 DIAGNOSIS — K21.9 GASTROESOPHAGEAL REFLUX DISEASE, UNSPECIFIED WHETHER ESOPHAGITIS PRESENT: ICD-10-CM

## 2025-01-13 RX ORDER — PANTOPRAZOLE SODIUM 40 MG/1
40 TABLET, DELAYED RELEASE ORAL DAILY
Qty: 90 TABLET | Refills: 0 | Status: SHIPPED | OUTPATIENT
Start: 2025-01-13

## 2025-01-20 ENCOUNTER — OFFICE VISIT (OUTPATIENT)
Age: 80
End: 2025-01-20
Payer: MEDICARE

## 2025-01-20 VITALS — WEIGHT: 245 LBS | HEIGHT: 65 IN | BODY MASS INDEX: 40.82 KG/M2

## 2025-01-20 DIAGNOSIS — I10 PRIMARY HYPERTENSION: ICD-10-CM

## 2025-01-20 DIAGNOSIS — E78.2 MIXED HYPERLIPIDEMIA: ICD-10-CM

## 2025-01-20 DIAGNOSIS — E11.22 TYPE 2 DIABETES MELLITUS WITH STAGE 3B CHRONIC KIDNEY DISEASE, WITHOUT LONG-TERM CURRENT USE OF INSULIN (HCC): ICD-10-CM

## 2025-01-20 DIAGNOSIS — E11.65 TYPE 2 DIABETES MELLITUS WITH HYPERGLYCEMIA, WITHOUT LONG-TERM CURRENT USE OF INSULIN (HCC): Primary | ICD-10-CM

## 2025-01-20 DIAGNOSIS — N18.32 TYPE 2 DIABETES MELLITUS WITH STAGE 3B CHRONIC KIDNEY DISEASE, WITHOUT LONG-TERM CURRENT USE OF INSULIN (HCC): ICD-10-CM

## 2025-01-20 DIAGNOSIS — Z86.73 HISTORY OF STROKE: ICD-10-CM

## 2025-01-20 PROCEDURE — G8428 CUR MEDS NOT DOCUMENT: HCPCS | Performed by: INTERNAL MEDICINE

## 2025-01-20 PROCEDURE — 1036F TOBACCO NON-USER: CPT | Performed by: INTERNAL MEDICINE

## 2025-01-20 PROCEDURE — G2211 COMPLEX E/M VISIT ADD ON: HCPCS | Performed by: INTERNAL MEDICINE

## 2025-01-20 PROCEDURE — 99214 OFFICE O/P EST MOD 30 MIN: CPT | Performed by: INTERNAL MEDICINE

## 2025-01-20 PROCEDURE — 1123F ACP DISCUSS/DSCN MKR DOCD: CPT | Performed by: INTERNAL MEDICINE

## 2025-01-20 PROCEDURE — G8417 CALC BMI ABV UP PARAM F/U: HCPCS | Performed by: INTERNAL MEDICINE

## 2025-01-20 RX ORDER — DULAGLUTIDE 1.5 MG/.5ML
INJECTION, SOLUTION SUBCUTANEOUS
Qty: 2 ML | Refills: 0 | Status: SHIPPED | OUTPATIENT
Start: 2025-01-20

## 2025-01-20 RX ORDER — DULAGLUTIDE 0.75 MG/.5ML
INJECTION, SOLUTION SUBCUTANEOUS
Qty: 2 ML | Refills: 0 | Status: SHIPPED | OUTPATIENT
Start: 2025-01-20

## 2025-01-20 RX ORDER — DULAGLUTIDE 3 MG/.5ML
INJECTION, SOLUTION SUBCUTANEOUS
Qty: 2 ML | Refills: 5 | Status: SHIPPED | OUTPATIENT
Start: 2025-01-20

## 2025-01-20 NOTE — PATIENT INSTRUCTIONS
Add back Trulicity 0.75mg once a week - sent to pharmacy    After one month, increase to the 1.5mg once a week - written Rx given    After one more month, increase to the 3.0mg once a week - written Rx given    If at any point you start to have low sugars (under 70) contact us to lower your glimepiride     Please get updated labs in 4 months

## 2025-01-20 NOTE — PROGRESS NOTES
Chief Complaint   Patient presents with    Diabetes       Patient was last seen: 6 months ago 7/15/2024         General:   Dizzy for 2 weeks - worse than has ever been   Has been out of trulicity     A1c: last a1c was 7.4 7/2024    DM Medications:    Amaryl 4mg BID AC     Last Changes: :no changes    Sugar Checks: checks up to 2 x day     AM: reports:  140-150s     PM: reports:  180-190s    LOWs:  has low sugars     DIET: is working on it, completed the Program for Diabetes Health -    EXERCISE: daily, walks - but too dizzy now      HTN: on Ca-Blk, HTN followed by PCP     LIPIDS: on statin, lipids followed by PCP    RENAL: has moderate renal insufficiency, is followed by Nephrology     EYES: eye exam in past year, has no retinopathy     DENTAL:  has seen dentist in past year, needs dental work -working on it     FEET: has no current issues, no numbness or tingling - to see Podiatry --gave list     HEART:  no chest pain, shortness of breath or claudication, has no cardiac history  H/O STROKE  EKG '24 w/o issues; to have ECHO     ASA:  is on aspirin     SYMPTOMS: no polyuria, thirst or blurred vision     THYROID: no known thyroid issue    DIABETES HISTORY:   From initial visit 12/14/2022   DM2 ~10 years  On metformin  No RUBIN, no TZD, DPP4, SGLT, GLP1     Was on insulin from start until 1-2 years ago  Was very active   Gout for 3 months so not active (ankle, then knee)   Had steroids, shots in knee too  Gout gone a couple week ago      Metformin stopped 3/23 when [facility] started      LABS/STUDIES:     Lab Results   Component Value Date/Time    GXS2GBAT 5.4 09/20/2021 11:36 AM     Lab Results   Component Value Date/Time    LABA1C 7.4 07/17/2024 11:23 AM    LABA1C 6.6 10/13/2023 12:52 PM    LABA1C 5.7 06/23/2023 02:14 PM    KJFX1AYLO 6.7 08/09/2022 12:00 AM    CREATININE 2.06 01/10/2025 11:17 AM    LABGLOM 33 01/10/2025 11:17 AM    LABGLOM 37 04/26/2024 11:29 AM    LABGLOM 45 12/06/2023 09:37 AM     Lab Results

## 2025-01-25 ENCOUNTER — PREP FOR PROCEDURE (OUTPATIENT)
Age: 80
End: 2025-01-25

## 2025-01-25 PROBLEM — K74.60 HEPATIC CIRRHOSIS (HCC): Status: ACTIVE | Noted: 2025-01-25

## 2025-01-30 ENCOUNTER — TELEPHONE (OUTPATIENT)
Age: 80
End: 2025-01-30

## 2025-01-30 NOTE — TELEPHONE ENCOUNTER
1/30/25 1143: Pt reminded of EGD appt scheduled on 2/6/25. Date, time, location and prep given. Pt verbalized understanding. SQ   Siliq Counseling:  I discussed with the patient the risks of Siliq including but not limited to new or worsening depression, suicidal thoughts and behavior, immunosuppression, malignancy, posterior leukoencephalopathy syndrome, and serious infections.  The patient understands that monitoring is required including a PPD at baseline and must alert us or the primary physician if symptoms of infection or other concerning signs are noted. There is also a special program designed to monitor depression which is required with Siliq.

## 2025-02-06 ENCOUNTER — TELEPHONE (OUTPATIENT)
Age: 80
End: 2025-02-06

## 2025-02-06 DIAGNOSIS — M1A.0710 IDIOPATHIC CHRONIC GOUT OF RIGHT ANKLE WITHOUT TOPHUS: ICD-10-CM

## 2025-02-06 RX ORDER — FEBUXOSTAT 40 MG/1
40 TABLET, FILM COATED ORAL DAILY
Qty: 90 TABLET | Refills: 0 | Status: SHIPPED | OUTPATIENT
Start: 2025-02-06

## 2025-02-10 DIAGNOSIS — E11.65 TYPE 2 DIABETES MELLITUS WITH HYPERGLYCEMIA, WITHOUT LONG-TERM CURRENT USE OF INSULIN (HCC): Primary | ICD-10-CM

## 2025-02-10 RX ORDER — SEMAGLUTIDE 0.68 MG/ML
INJECTION, SOLUTION SUBCUTANEOUS
Qty: 3 ML | Refills: 1 | Status: SHIPPED | OUTPATIENT
Start: 2025-02-10

## 2025-02-11 RX ORDER — SEMAGLUTIDE 0.68 MG/ML
INJECTION, SOLUTION SUBCUTANEOUS
Qty: 3 ML | Refills: 0 | OUTPATIENT
Start: 2025-02-11

## 2025-03-31 ENCOUNTER — PREP FOR PROCEDURE (OUTPATIENT)
Age: 80
End: 2025-03-31

## 2025-03-31 PROBLEM — K74.60 CIRRHOSIS OF LIVER (HCC): Status: ACTIVE | Noted: 2025-03-31

## 2025-04-01 ENCOUNTER — HOSPITAL ENCOUNTER (OUTPATIENT)
Facility: HOSPITAL | Age: 80
Discharge: HOME | End: 2025-04-04
Payer: MEDICARE

## 2025-04-01 DIAGNOSIS — K74.60 HEPATIC CIRRHOSIS, UNSPECIFIED HEPATIC CIRRHOSIS TYPE, UNSPECIFIED WHETHER ASCITES PRESENT (HCC): ICD-10-CM

## 2025-04-01 PROCEDURE — 76700 US EXAM ABDOM COMPLETE: CPT

## 2025-04-03 ENCOUNTER — TELEPHONE (OUTPATIENT)
Age: 80
End: 2025-04-03

## 2025-04-03 NOTE — TELEPHONE ENCOUNTER
4/3/25 1024am: Spoke with pt partner Margaret. Date, time, location and prep given for EGD scheduled on 4/10/25. Verbalized understanding. SQ

## 2025-04-04 ENCOUNTER — RESULTS FOLLOW-UP (OUTPATIENT)
Age: 80
End: 2025-04-04

## 2025-04-10 ENCOUNTER — HOSPITAL ENCOUNTER (OUTPATIENT)
Facility: HOSPITAL | Age: 80
Setting detail: OUTPATIENT SURGERY
Discharge: HOME | End: 2025-04-10
Attending: INTERNAL MEDICINE | Admitting: INTERNAL MEDICINE
Payer: MEDICARE

## 2025-04-10 ENCOUNTER — ANESTHESIA EVENT (OUTPATIENT)
Facility: HOSPITAL | Age: 80
End: 2025-04-10
Payer: MEDICARE

## 2025-04-10 ENCOUNTER — ANESTHESIA (OUTPATIENT)
Facility: HOSPITAL | Age: 80
End: 2025-04-10
Payer: MEDICARE

## 2025-04-10 ENCOUNTER — OFFICE VISIT (OUTPATIENT)
Age: 80
End: 2025-04-10
Payer: MEDICARE

## 2025-04-10 VITALS
HEART RATE: 61 BPM | OXYGEN SATURATION: 93 % | DIASTOLIC BLOOD PRESSURE: 58 MMHG | RESPIRATION RATE: 13 BRPM | SYSTOLIC BLOOD PRESSURE: 131 MMHG | TEMPERATURE: 98.6 F

## 2025-04-10 VITALS
SYSTOLIC BLOOD PRESSURE: 114 MMHG | DIASTOLIC BLOOD PRESSURE: 67 MMHG | WEIGHT: 239 LBS | BODY MASS INDEX: 39.82 KG/M2 | HEIGHT: 65 IN | HEART RATE: 67 BPM

## 2025-04-10 DIAGNOSIS — K21.9 GASTROESOPHAGEAL REFLUX DISEASE, UNSPECIFIED WHETHER ESOPHAGITIS PRESENT: ICD-10-CM

## 2025-04-10 DIAGNOSIS — E11.65 TYPE 2 DIABETES MELLITUS WITH HYPERGLYCEMIA, WITHOUT LONG-TERM CURRENT USE OF INSULIN (HCC): Primary | ICD-10-CM

## 2025-04-10 DIAGNOSIS — Z86.73 HISTORY OF STROKE: ICD-10-CM

## 2025-04-10 DIAGNOSIS — E11.22 TYPE 2 DIABETES MELLITUS WITH STAGE 3B CHRONIC KIDNEY DISEASE, WITHOUT LONG-TERM CURRENT USE OF INSULIN (HCC): ICD-10-CM

## 2025-04-10 DIAGNOSIS — I10 PRIMARY HYPERTENSION: ICD-10-CM

## 2025-04-10 DIAGNOSIS — N18.32 TYPE 2 DIABETES MELLITUS WITH STAGE 3B CHRONIC KIDNEY DISEASE, WITHOUT LONG-TERM CURRENT USE OF INSULIN (HCC): ICD-10-CM

## 2025-04-10 DIAGNOSIS — E78.2 MIXED HYPERLIPIDEMIA: ICD-10-CM

## 2025-04-10 PROCEDURE — 7100000011 HC PHASE II RECOVERY - ADDTL 15 MIN: Performed by: INTERNAL MEDICINE

## 2025-04-10 PROCEDURE — 3074F SYST BP LT 130 MM HG: CPT | Performed by: INTERNAL MEDICINE

## 2025-04-10 PROCEDURE — 3600007502: Performed by: INTERNAL MEDICINE

## 2025-04-10 PROCEDURE — G8428 CUR MEDS NOT DOCUMENT: HCPCS | Performed by: INTERNAL MEDICINE

## 2025-04-10 PROCEDURE — 99215 OFFICE O/P EST HI 40 MIN: CPT | Performed by: INTERNAL MEDICINE

## 2025-04-10 PROCEDURE — 2720000010 HC SURG SUPPLY STERILE: Performed by: INTERNAL MEDICINE

## 2025-04-10 PROCEDURE — G2211 COMPLEX E/M VISIT ADD ON: HCPCS | Performed by: INTERNAL MEDICINE

## 2025-04-10 PROCEDURE — 3078F DIAST BP <80 MM HG: CPT | Performed by: INTERNAL MEDICINE

## 2025-04-10 PROCEDURE — 1123F ACP DISCUSS/DSCN MKR DOCD: CPT | Performed by: INTERNAL MEDICINE

## 2025-04-10 PROCEDURE — G8417 CALC BMI ABV UP PARAM F/U: HCPCS | Performed by: INTERNAL MEDICINE

## 2025-04-10 PROCEDURE — 1036F TOBACCO NON-USER: CPT | Performed by: INTERNAL MEDICINE

## 2025-04-10 PROCEDURE — 6360000002 HC RX W HCPCS: Performed by: NURSE ANESTHETIST, CERTIFIED REGISTERED

## 2025-04-10 PROCEDURE — 43235 EGD DIAGNOSTIC BRUSH WASH: CPT | Performed by: INTERNAL MEDICINE

## 2025-04-10 PROCEDURE — 3700000000 HC ANESTHESIA ATTENDED CARE: Performed by: INTERNAL MEDICINE

## 2025-04-10 PROCEDURE — 7100000010 HC PHASE II RECOVERY - FIRST 15 MIN: Performed by: INTERNAL MEDICINE

## 2025-04-10 RX ORDER — AMLODIPINE BESYLATE 5 MG/1
5 TABLET ORAL DAILY
COMMUNITY

## 2025-04-10 RX ORDER — GLIMEPIRIDE 4 MG/1
TABLET ORAL
Qty: 180 TABLET | Refills: 2 | Status: SHIPPED | OUTPATIENT
Start: 2025-04-10

## 2025-04-10 RX ORDER — EMPAGLIFLOZIN 10 MG/1
TABLET, FILM COATED ORAL
Qty: 90 TABLET | Refills: 1 | Status: SHIPPED | OUTPATIENT
Start: 2025-04-10

## 2025-04-10 RX ORDER — PANTOPRAZOLE SODIUM 40 MG/1
40 TABLET, DELAYED RELEASE ORAL DAILY
Qty: 90 TABLET | Refills: 0 | Status: SHIPPED | OUTPATIENT
Start: 2025-04-10

## 2025-04-10 RX ADMIN — PROPOFOL 50 MG: 10 INJECTION, EMULSION INTRAVENOUS at 15:58

## 2025-04-10 RX ADMIN — PROPOFOL 50 MG: 10 INJECTION, EMULSION INTRAVENOUS at 15:59

## 2025-04-10 RX ADMIN — PROPOFOL 50 MG: 10 INJECTION, EMULSION INTRAVENOUS at 15:57

## 2025-04-10 ASSESSMENT — PAIN SCALES - GENERAL
PAINLEVEL_OUTOF10: 0
PAINLEVEL_OUTOF10: 0

## 2025-04-10 NOTE — H&P
[facility]     Andrew Forte MD, FACP, MACG, FAASLD   MD Chanel Tay PA-C April S Ashworth, [facility]   Amisha Antunez, [facility]   Su Pinto, FNP-C  Fareed Aguilar, FNP-C   Leeann Nur, [facility]   at [facility]   [address], [address]   [place]  [address] [phone]   FAX: [phone]  [facility]   [address], [address]   [place]  [address] [phone]   FAX: [phone]       PRE-PROCEDURE NOTE - EGD    H and P from last office visit reviewed.    Allergies reviewed.  Out-patient medicaton list reviewed.    Patient Active Problem List   Diagnosis    Cirrhosis (HCC)    Gallstone pancreatitis    History of CVA (cerebrovascular accident)    Thrombocytopenia    Class 1 obesity due to excess calories with serious comorbidity and body mass index (BMI) of 32.0 to 32.9 in adult    History of cholecystectomy    Chronic renal disease, stage III (HCC)    Dysautonomia (HCC)    Chronic obstructive pulmonary disease, unspecified COPD type (HCC)    Type 2 diabetes mellitus with other circulatory complication, without long-term current use of insulin    MILLER (nonalcoholic steatohepatitis)    Acute idiopathic gout of right knee    Severe obesity (BMI 35.0-39.9) with comorbidity    Vertigo    Hepatic cirrhosis (HCC)    Cirrhosis of liver (HCC)         No Known Allergies    No current facility-administered medications on file prior to encounter.     Current Outpatient Medications on File Prior to Encounter   Medication Sig Dispense Refill    febuxostat (ULORIC) 40 MG TABS tablet TAKE 1 TABLET BY MOUTH DAILY 90 tablet 0    pantoprazole (PROTONIX) 40 MG tablet Take 1 tablet by mouth daily 90 tablet 0    furosemide (LASIX) 20 MG tablet Take 2 tablets by mouth daily       Value Date    INR 1.0 01/10/2025          PRE-PROCEDURE DOCUMENTATION  The risks of the procedure were discussed with the patient.  These included reaction to anesthesia, pain, perforation and bleeding.    All questions were answered.    The patient wishes to proceed with the procedure.    ASA status  3    Airway assessment:   Mouth opening:  3   Mallampati:  3   Dentition:  Intact     ASSESSMENT AND PLAN:  EGD to assess for esophageal and/or gastric varices.  Sedation per anesthesiology      Andrew Forte MD  [address], [address]  [place]  [address] [phone]  [facility]

## 2025-04-10 NOTE — PROGRESS NOTES
Chief Complaint   Patient presents with    Diabetes     The patient (or guardian, if applicable) and other individuals in attendance with the patient were advised that Artificial Intelligence will be utilized during this visit to record, process the conversation to generate a clinical note, and support improvement of the AI technology. The patient (or guardian, if applicable) and other individuals in attendance at the appointment consented to the use of AI, including the recording.        Patient was last seen: 2-3 months ago 1/20/2025          General:   History of Present Illness  The patient presents for evaluation of diabetes mellitus.    He is currently not on insulin therapy, having discontinued it some time ago. His morning blood glucose levels range from 140 to 190, but he does not monitor his levels later in the day. He has been taking glimepiride before breakfast and dinner. He had stopped taking metformin awhile back after 7 to 8 years due to concerns about its long-term effects. He reports no cardiac issues and states that his heart is in good condition. He had an ultrasound on Monday and is scheduled for a procedure today to check his kidneys and liver due to a spot found on both organs. He has not yet seen a podiatrist. He has not had his A1c levels checked recently. He also discontinued Trulicity injections due to difficulty with administration and cost issues (cost may have been the Medicare deductible as was $46 for 90 days 2nd time went this year).    He reports worsening dizziness, which he believes may be related to his previous stroke. He has been under the care of 3 different neurologists over the past few years.        A1c: no recent a1c     DM Medications:    Amaryl 4mg BID AC     Last Changes: :tried to add back trulicity    Sugar Checks: checks up to 2 x day     AM: reports:  140-190s    PM: reports:  not checking     LOWs:  has low sugars     DIET: is working on it, completed the Program

## 2025-04-10 NOTE — OP NOTE
[facility]     Andrew Forte MD, FACP, MACG, FAASLD   MD Chanel Tay, LUCY Parson, [facility]-BC   Amisha Antunez, [facility]   Su Blair, FNP-C  Fareed Aguilar, [facility]-C   Leeann Nur, [facility]   at [facility]   [address], [address]   [place]  [address] [phone]   FAX: [phone]  [facility]   [address], [address]   [place]  [address] [phone]   FAX: [phone]         UPPER ENDOSCOPY PROCEDURE NOTE    NAME: Shemar Gonzalez  :  1945  MRN:  [id number]    INDICATION: Cirrhosis.  Screening for esophageal varices with variceal ligation if  indicated.    : Andrew Forte MD    SURGICAL ASSISTANT:  None    PROSTHETIC DEVISES, TISSUE GRAFTS, ORGAN TRANSPLANTS:  Not applicable     ANESTHESIA/SEDATION: MAC Sedation per anesthesiology          PROCEDURE DESCRIPTION:  Inf[employer] consent was obtained from the patient for the procedure.  All risks and benefits of the procedure explained.     The procedure was performed in the endoscopy suite.  The patient was laying on a stretcher and moved to the left lateral decubitus position prior to administration of sedation.    Sedation was administered by anesthesiology.  See their note for details.      The endoscope was inserted into the mouth and advanced under direct vision to the second portion of the duodenum.  Careful inspection of upper gastrointestinal tract was made as the endoscope was inserted and withdrawn.  Retroflexion of the endoscope to view of the cardia of the stomach was performed.  The findings and interventions are described below.      FINDINGS:  Esophagus:    Normal.      Stomach:   Mild portal hypertensive gastropathy of the body of the stomach  No gastric varices  identified.    Duodenum:   Normal bulb and second portion    SPECIMENS COLLECTED:   None    INTERVENTIONS:  None    COMPLICATIONS: None.  The patient tolerated the procedure well.    EBL: Negligible.           RECOMMENDATIONS:  Observe until discharge parameters are achieved.  May resume previous diet.  Repeat endoscopy to reassess varices and need for banding in 3 years.  Follow-up Liver [facility] office as scheduled.      Andrew Forte MD  [address], [address]  [place]  [address] [phone]  [facility]

## 2025-04-10 NOTE — PROGRESS NOTES
Verified patient name and date of birth, scheduled procedure, and informed consent. Reviewed general discharge instructions and  information.  Assessed patient. Awake, alert, and oriented per baseline. Vital signs stable (see vital sign flowsheet). Respiratory status within defined limits, abdomen soft and non tender. Skin with in defined limits.     Initial RN admission and assessment performed and documented in Endoscopy navigator.     Patient evaluated by anesthesia in pre-procedure holding.     All procedural vital signs, airway assessment, and level of consciousness information monitored and recorded by anesthesia staff on the anesthesia record.     Report received from CRNA post procedure.  Patient transported to recovery area by RN.    Endoscopy post procedure time out was performed and specimens were verified with physician.    Endoscope was pre-cleaned at bedside immediately following procedure by Linda.

## 2025-04-10 NOTE — ANESTHESIA POSTPROCEDURE EVALUATION
Post-Anesthesia Evaluation and Assessment    Patient: Shemar Gonzalez MRN: [id number]  SSN: [id number]    YOB: 1945  Age: 79 y.o.  Sex: male      I have evaluated the patient and they are stable and ready for discharge from the PACU.     Cardiovascular Function/Vital Signs  Visit Vitals  BP (!) 131/58   Pulse 61   Temp 98.6 °F (37 °C)   Resp 13   SpO2 93%       Patient is status post Monitor Anesthesia Care anesthesia for Procedure(s):  ESOPHAGOGASTRODUODENOSCOPY.    Nausea/Vomiting: None    Postoperative hydration reviewed and adequate.    Pain:      Managed    Neurological Status:       At baseline    Mental Status, Level of Consciousness: Alert and  oriented to person, place, and time    Pulmonary Status:       Adequate oxygenation and airway patent    Complications related to anesthesia: None    Post-anesthesia assessment completed. No concerns    Signed By: Sandro Watson MD     April 10, 2025

## 2025-04-10 NOTE — ANESTHESIA PRE PROCEDURE
w/Device KIT Use to test blood sugars daily. USE BRAND PREFERRED BY INSURANCE 7/18/24   Isatu Armenta MD   Lancets MISC  1/23/23   Automatic Reconciliation, Ar       Current medications:    No current facility-administered medications for this encounter.       Allergies:  No Known Allergies    Problem List:    Patient Active Problem List   Diagnosis Code    Cirrhosis (HCC) K74.60    Gallstone pancreatitis K85.10    History of CVA (cerebrovascular accident) Z86.73    Thrombocytopenia D69.6    Class 1 obesity due to excess calories with serious comorbidity and body mass index (BMI) of 32.0 to 32.9 in adult E66.811, E66.09, Z68.32    History of cholecystectomy Z90.49    Chronic renal disease, stage III (HCC) N18.30    Dysautonomia (HCC) G90.1    Chronic obstructive pulmonary disease, unspecified COPD type (HCC) J44.9    Type 2 diabetes mellitus with other circulatory complication, without long-term current use of insulin E11.59    MILLER (nonalcoholic steatohepatitis) K75.81    Acute idiopathic gout of right knee M10.061    Severe obesity (BMI 35.0-39.9) with comorbidity E66.01    Vertigo R42    Hepatic cirrhosis (HCC) K74.60    Cirrhosis of liver (HCC) K74.60       Past Medical History:        Diagnosis Date    CAD (coronary artery disease)     Cancer (HCC)     Skin, on left shin    Cerebrovascular disease 2013    Still has very bad dizziness every day    Chronic kidney disease     stones    Chronic obstructive pulmonary disease (HCC)     Cirrhosis (HCC)     Colon polyp     Diabetes (HCC)     Erectile dysfunction     Hearing loss     Hypertension     Obesity     Osteoarthritis     Sleep apnea     Stroke (HCC) 1980s    right hand neuropathy    Type 2 diabetes mellitus without complication        Past Surgical History:        Procedure Laterality Date    BIOPSY LIVER  06/04/21    CHOLECYSTECTOMY  2020    COLONOSCOPY N/A 04/12/2018    COLONOSCOPY performed by Wei Hickman MD at [facility] ENDOSCOPY    COLONOSCOPY  Not

## 2025-04-10 NOTE — DISCHARGE INSTRUCTIONS
[facility]     Andrew Forte MD, FACP, MAC, FAASLD   MD Chanel Tay PA-C April S Ashworth, [facility]   Amisha Antunez, [facility]   Su Pinto, FN-C  Fareed Aguilar, [facility]-C   Leeann Nur, [facility]   [address], [address]   [place]  [address] [phone]   FAX: [phone]  [facility]   [address], [address]   [place]  [address] [phone]   FAX: [phone]         ENDOSCOPY DISCHARGE INSTRUCTIONS      Shemar Gonzalez  1945  Date: 4/10/2025    DISCOMFORT:  Use lozenges or warm salt water gargle for sore thoat  Apply warm compress to IV site if red.  If redness or soreness persists call the office.  You may experience gas and bloating.  Walking and belching will help relieve this.  You may experience chest discomfort or pressure especially if banding of esophageal varices was performed.    DIET:  You may resume your normal diet.    ACTIVITY:  Spend the remainder of the day resting.  Avoid any strenuous activity.  You may not operate a vehicle for 12 hours.  You may not engage in an occupation involving machinery or appliances for rest of today.   Avoid making any critical or financial decisions for at least 24 hour.    Call the Liver [facility] Office if you have any of the following:  Increasing chest or abdominal pain, nausea, vomiting, vomiting blood, abdominal distension or swelling, fever or chills, bloody discharge from nose or mouth or shortness of breath.    Follow-up Instructions:  Call Dr. Forte for any questions or problems at the phone number listed above.  If a biopsy was performed, you will be contacted by the office staff or Dr Forte within 1 week.   If you have not heard from us by then you may call the  office at the phone number listed above to inquire about the results.    ENDOSCOPY FINDINGS:  Your endoscopy demonstrated mild swelling in the stomach.  Will repeat EGD to look for development of varices in 3 years.  Keep follow-up appointment with April on 7/15/2025.    DISCHARGE SUMMARY from the Nurse:  The following personal items collected during your admission are returned to you:   Dental Appliances: None  Vision - Corrective Lenses: Eyeglasses  Hearing Aid: Bilateral hearing aids

## 2025-04-15 LAB
ALBUMIN/CREAT UR: 8 MG/G CREAT (ref 0–29)
BUN SERPL-MCNC: 21 MG/DL (ref 8–27)
BUN/CREAT SERPL: 12 (ref 10–24)
CALCIUM SERPL-MCNC: 9 MG/DL (ref 8.6–10.2)
CHLORIDE SERPL-SCNC: 105 MMOL/L (ref 96–106)
CO2 SERPL-SCNC: 26 MMOL/L (ref 20–29)
CREAT SERPL-MCNC: 1.81 MG/DL (ref 0.76–1.27)
CREAT UR-MCNC: 123.3 MG/DL
EGFRCR SERPLBLD CKD-EPI 2021: 38 ML/MIN/1.73
GLUCOSE SERPL-MCNC: 167 MG/DL (ref 70–99)
HBA1C MFR BLD: 9.5 % (ref 4.8–5.6)
MICROALBUMIN UR-MCNC: 10.2 UG/ML
POTASSIUM SERPL-SCNC: 4 MMOL/L (ref 3.5–5.2)
SODIUM SERPL-SCNC: 145 MMOL/L (ref 134–144)

## 2025-04-16 ENCOUNTER — RESULTS FOLLOW-UP (OUTPATIENT)
Age: 80
End: 2025-04-16

## 2025-05-09 PROBLEM — D50.9 ANEMIA, IRON DEFICIENCY: Status: ACTIVE | Noted: 2025-05-09

## 2025-05-09 RX ORDER — SODIUM CHLORIDE 9 MG/ML
5-250 INJECTION, SOLUTION INTRAVENOUS PRN
OUTPATIENT
Start: 2025-05-15

## 2025-05-15 ENCOUNTER — HOSPITAL ENCOUNTER (OUTPATIENT)
Facility: HOSPITAL | Age: 80
Setting detail: INFUSION SERIES
End: 2025-05-15

## 2025-05-15 DIAGNOSIS — M1A.0710 IDIOPATHIC CHRONIC GOUT OF RIGHT ANKLE WITHOUT TOPHUS: ICD-10-CM

## 2025-05-15 DIAGNOSIS — D50.8 OTHER IRON DEFICIENCY ANEMIA: Primary | ICD-10-CM

## 2025-05-19 RX ORDER — FEBUXOSTAT 40 MG/1
40 TABLET, FILM COATED ORAL DAILY
Qty: 90 TABLET | Refills: 0 | Status: SHIPPED | OUTPATIENT
Start: 2025-05-19

## 2025-05-19 SDOH — HEALTH STABILITY: PHYSICAL HEALTH: ON AVERAGE, HOW MANY MINUTES DO YOU ENGAGE IN EXERCISE AT THIS LEVEL?: 0 MIN

## 2025-05-19 SDOH — HEALTH STABILITY: PHYSICAL HEALTH: ON AVERAGE, HOW MANY DAYS PER WEEK DO YOU ENGAGE IN MODERATE TO STRENUOUS EXERCISE (LIKE A BRISK WALK)?: 0 DAYS

## 2025-05-20 ENCOUNTER — OFFICE VISIT (OUTPATIENT)
Dept: PRIMARY CARE CLINIC | Facility: CLINIC | Age: 80
End: 2025-05-20
Payer: MEDICARE

## 2025-05-20 VITALS
TEMPERATURE: 97.9 F | SYSTOLIC BLOOD PRESSURE: 108 MMHG | RESPIRATION RATE: 18 BRPM | WEIGHT: 235 LBS | BODY MASS INDEX: 39.15 KG/M2 | HEART RATE: 60 BPM | DIASTOLIC BLOOD PRESSURE: 66 MMHG | OXYGEN SATURATION: 99 % | HEIGHT: 65 IN

## 2025-05-20 DIAGNOSIS — E78.5 HYPERLIPIDEMIA, UNSPECIFIED HYPERLIPIDEMIA TYPE: ICD-10-CM

## 2025-05-20 DIAGNOSIS — R60.9 EDEMA, UNSPECIFIED TYPE: ICD-10-CM

## 2025-05-20 DIAGNOSIS — M79.645 CHRONIC PAIN OF LEFT THUMB: ICD-10-CM

## 2025-05-20 DIAGNOSIS — I95.1 ORTHOSTATIC HYPOTENSION: ICD-10-CM

## 2025-05-20 DIAGNOSIS — R42 DIZZINESS: Primary | ICD-10-CM

## 2025-05-20 DIAGNOSIS — M1A.9XX0 CHRONIC GOUT WITHOUT TOPHUS, UNSPECIFIED CAUSE, UNSPECIFIED SITE: ICD-10-CM

## 2025-05-20 DIAGNOSIS — G90.1 DYSAUTONOMIA, FAMILIAL (HCC): ICD-10-CM

## 2025-05-20 DIAGNOSIS — R68.89 OTHER GENERAL SYMPTOMS AND SIGNS: ICD-10-CM

## 2025-05-20 DIAGNOSIS — G89.29 CHRONIC PAIN OF LEFT THUMB: ICD-10-CM

## 2025-05-20 DIAGNOSIS — I10 ESSENTIAL (PRIMARY) HYPERTENSION: ICD-10-CM

## 2025-05-20 DIAGNOSIS — E11.65 TYPE 2 DIABETES MELLITUS WITH HYPERGLYCEMIA, WITHOUT LONG-TERM CURRENT USE OF INSULIN (HCC): ICD-10-CM

## 2025-05-20 DIAGNOSIS — J44.9 CHRONIC OBSTRUCTIVE PULMONARY DISEASE, UNSPECIFIED COPD TYPE (HCC): ICD-10-CM

## 2025-05-20 PROCEDURE — 1126F AMNT PAIN NOTED NONE PRSNT: CPT | Performed by: FAMILY MEDICINE

## 2025-05-20 PROCEDURE — G8427 DOCREV CUR MEDS BY ELIG CLIN: HCPCS | Performed by: FAMILY MEDICINE

## 2025-05-20 PROCEDURE — 99215 OFFICE O/P EST HI 40 MIN: CPT | Performed by: FAMILY MEDICINE

## 2025-05-20 PROCEDURE — 1123F ACP DISCUSS/DSCN MKR DOCD: CPT | Performed by: FAMILY MEDICINE

## 2025-05-20 PROCEDURE — 3023F SPIROM DOC REV: CPT | Performed by: FAMILY MEDICINE

## 2025-05-20 PROCEDURE — 3074F SYST BP LT 130 MM HG: CPT | Performed by: FAMILY MEDICINE

## 2025-05-20 PROCEDURE — 3078F DIAST BP <80 MM HG: CPT | Performed by: FAMILY MEDICINE

## 2025-05-20 PROCEDURE — 1036F TOBACCO NON-USER: CPT | Performed by: FAMILY MEDICINE

## 2025-05-20 PROCEDURE — G8417 CALC BMI ABV UP PARAM F/U: HCPCS | Performed by: FAMILY MEDICINE

## 2025-05-20 SDOH — ECONOMIC STABILITY: FOOD INSECURITY: WITHIN THE PAST 12 MONTHS, YOU WORRIED THAT YOUR FOOD WOULD RUN OUT BEFORE YOU GOT MONEY TO BUY MORE.: NEVER TRUE

## 2025-05-20 SDOH — ECONOMIC STABILITY: FOOD INSECURITY: WITHIN THE PAST 12 MONTHS, THE FOOD YOU BOUGHT JUST DIDN'T LAST AND YOU DIDN'T HAVE MONEY TO GET MORE.: NEVER TRUE

## 2025-05-20 NOTE — PROGRESS NOTES
HPI     Chief Complaint   Patient presents with    Establish Care     NTP     He is a 79 y.o. male who presents for establishing care.     Gout - On Uloric. Has been on this over 2 years. Has not had gout since he started taking it.     HLD - On Lipitor. '    Dm2 - On Amaryl, Jardiance. Followed by Dr. Armenta. Has appt today. He is followed by Optho Dr. Morales. Has cataracts but no diabetic retinopathy. Not followed by Podiatry.     GERD - On Protonix.    Cirrhosis - On Lactulose. Was heavy drinker before his stroke. Does not drink since his stroke. Has quit smoking since then. Followed by Aleja Parson NP.     COPD - On Trelegy, Albuterol. Followed by Dr. Resendiz. Was told he didn't have COPD. He is on oxygen normally at baseline. Typically on 2 L NC.     HTN - On Norvasc, Lasix. States he has some edema at baseline in lower ext. No redness/ warmth. Equal bilaterally. Chronic. Does not wear compression stockings.     Hx of stroke in 2013. Had R sided paralysis. Had extensive rehab, feeding tube. Followed by Neurology Dr. Griffith. Saw Dr. Bradley for memory asessment. He has been having dizziness since his stroke in 2013 but feels it has been getting worse as time goes on. He did PT 2 years ago. He thought it might have helped a little. Did orthostatics recently with another doctor and states it wasn't normal. He has not seen Cards recently. No new stroke symptoms. States he has also been told he has vertigo in past.     States he was pushing up on a trailer and he hurt the 1st MCP joint of his L hand. States it hurts when he bends the finger. Wonders if he could have a fracture. Interested in Xray.     CKD3B - Followed by Dr. Kelly.     Saw Dr. Fitzgerald in the past but was told heart looked okay.     Reviewed PmHx, RxHx, FmHx, SocHx, AllgHx and updated and dated in the chart.    Physical Exam:  /66 (BP Site: Left Upper Arm, Patient Position: Standing)   Pulse 60   Temp 97.9 °F (36.6 °C) (Oral)   Resp

## 2025-05-20 NOTE — PROGRESS NOTES
Health Decision Maker has been checked with the patient   Primary Decision Maker: CarlosDomi Phuong - Brother/Sister - [phone]    Primary Decision Maker: Nohelia Nuñez - Brother/Sister - [phone]     AI form was signed    Chief Complaint   Patient presents with    Establish Care     NTP       \"Have you been to the ER, urgent care clinic since your last visit?  Hospitalized since your last visit?\"    NO    “Have you seen or consulted any other health care providers outside of [facility] since your last visit?”    NO      There were no vitals filed for this visit.   Depression: Not at risk (1/10/2025)    PHQ-2     PHQ-2 Score: 0            Click Here for Release of Records Request    Chart reviewed: immunizations are documented.   Immunization History   Administered Date(s) Administered    COVID-19, MODERNA BLUE border, Primary or Immunocompromised, (age 12y+), IM, 100 mcg/0.5mL 03/13/2021, 04/10/2021, 04/21/2021    COVID-19, PFIZER PURPLE top, DILUTE for use, (age 12 y+), 30mcg/0.3mL 04/01/2021    Influenza, AFLURIA, FLUZONE, (age 6-35 m), IM, Quadv PF, 0.25mL 11/18/2021    Influenza, FLUAD, (age 65 y+), IM, Quadv, 0.5mL 09/20/2021, 10/07/2022, 10/15/2024    Influenza, FLUZONE High Dose (age 65 y+), IM, Quadv, 0.7mL 10/27/2023    Influenza, FLUZONE High Dose, (age 65 y+), IM, Trivalent PF, 0.5mL 09/19/2017, 10/26/2018, 11/26/2019, 09/30/2020, 10/13/2020    Pneumococcal, PCV-13, PREVNAR 13, (age 6w+), IM, 0.5mL 09/19/2017    Pneumococcal, PPSV23, PNEUMOVAX 23, (age 2y+), SC/IM, 0.5mL 11/26/2019    RSV, ABRYSVO, (Pregnant or age 60y+), PF, IM, 0.5mL 01/06/2024    TDaP, ADACEL (age 10y-64y), BOOSTRIX (age 10y+), IM, 0.5mL 09/19/2017    Zoster Recombinant (Shingrix) 06/16/2020, 08/17/2020

## 2025-05-21 ENCOUNTER — HOSPITAL ENCOUNTER (OUTPATIENT)
Facility: HOSPITAL | Age: 80
Setting detail: INFUSION SERIES
End: 2025-05-21

## 2025-05-21 RX ORDER — AMLODIPINE BESYLATE 5 MG/1
5 TABLET ORAL DAILY
Qty: 90 TABLET | OUTPATIENT
Start: 2025-05-21

## 2025-05-23 ENCOUNTER — HOSPITAL ENCOUNTER (OUTPATIENT)
Facility: HOSPITAL | Age: 80
Discharge: HOME | End: 2025-05-26
Payer: MEDICARE

## 2025-05-23 DIAGNOSIS — I10 ESSENTIAL (PRIMARY) HYPERTENSION: ICD-10-CM

## 2025-05-23 DIAGNOSIS — M79.645 CHRONIC PAIN OF LEFT THUMB: ICD-10-CM

## 2025-05-23 DIAGNOSIS — G89.29 CHRONIC PAIN OF LEFT THUMB: ICD-10-CM

## 2025-05-23 DIAGNOSIS — M1A.9XX0 CHRONIC GOUT WITHOUT TOPHUS, UNSPECIFIED CAUSE, UNSPECIFIED SITE: ICD-10-CM

## 2025-05-23 DIAGNOSIS — R42 DIZZINESS: ICD-10-CM

## 2025-05-23 DIAGNOSIS — R68.89 OTHER GENERAL SYMPTOMS AND SIGNS: ICD-10-CM

## 2025-05-23 DIAGNOSIS — E78.5 HYPERLIPIDEMIA, UNSPECIFIED HYPERLIPIDEMIA TYPE: ICD-10-CM

## 2025-05-23 PROCEDURE — 73130 X-RAY EXAM OF HAND: CPT

## 2025-05-24 LAB
CHOLEST SERPL-MCNC: 148 MG/DL
HDLC SERPL-MCNC: 36 MG/DL
HDLC SERPL: 4.1 (ref 0–5)
LDLC SERPL CALC-MCNC: 56.8 MG/DL (ref 0–100)
MAGNESIUM SERPL-MCNC: 2.3 MG/DL (ref 1.6–2.4)
TRIGL SERPL-MCNC: 276 MG/DL
TSH SERPL DL<=0.05 MIU/L-ACNC: 1.88 UIU/ML (ref 0.36–3.74)
URATE SERPL-MCNC: 5.8 MG/DL (ref 3.5–7.2)
VIT B12 SERPL-MCNC: 541 PG/ML (ref 193–986)
VLDLC SERPL CALC-MCNC: 55.2 MG/DL

## 2025-05-27 ENCOUNTER — RESULTS FOLLOW-UP (OUTPATIENT)
Dept: PRIMARY CARE CLINIC | Facility: CLINIC | Age: 80
End: 2025-05-27

## 2025-05-28 PROBLEM — D50.9 IRON DEFICIENCY ANEMIA, UNSPECIFIED: Status: ACTIVE | Noted: 2025-05-28

## 2025-05-28 PROBLEM — N18.30 CHRONIC KIDNEY DISEASE, STAGE III (MODERATE) (HCC): Status: ACTIVE | Noted: 2022-06-08

## 2025-05-28 RX ORDER — HEPARIN 100 UNIT/ML
500 SYRINGE INTRAVENOUS PRN
OUTPATIENT
Start: 2025-06-05

## 2025-05-28 RX ORDER — SODIUM CHLORIDE 0.9 % (FLUSH) 0.9 %
5-40 SYRINGE (ML) INJECTION PRN
OUTPATIENT
Start: 2025-06-05

## 2025-05-28 RX ORDER — SODIUM CHLORIDE 9 MG/ML
5-250 INJECTION, SOLUTION INTRAVENOUS PRN
OUTPATIENT
Start: 2025-06-05

## 2025-06-04 ENCOUNTER — TELEPHONE (OUTPATIENT)
Age: 80
End: 2025-06-04

## 2025-06-04 ENCOUNTER — OFFICE VISIT (OUTPATIENT)
Age: 80
End: 2025-06-04
Payer: MEDICARE

## 2025-06-04 VITALS
WEIGHT: 233 LBS | HEART RATE: 74 BPM | HEIGHT: 65 IN | DIASTOLIC BLOOD PRESSURE: 67 MMHG | BODY MASS INDEX: 38.82 KG/M2 | SYSTOLIC BLOOD PRESSURE: 122 MMHG

## 2025-06-04 DIAGNOSIS — I10 PRIMARY HYPERTENSION: ICD-10-CM

## 2025-06-04 DIAGNOSIS — E11.22 TYPE 2 DIABETES MELLITUS WITH STAGE 3B CHRONIC KIDNEY DISEASE, WITHOUT LONG-TERM CURRENT USE OF INSULIN (HCC): ICD-10-CM

## 2025-06-04 DIAGNOSIS — E78.2 MIXED HYPERLIPIDEMIA: ICD-10-CM

## 2025-06-04 DIAGNOSIS — E11.65 TYPE 2 DIABETES MELLITUS WITH HYPERGLYCEMIA, WITHOUT LONG-TERM CURRENT USE OF INSULIN (HCC): Primary | ICD-10-CM

## 2025-06-04 DIAGNOSIS — Z86.73 HISTORY OF STROKE: ICD-10-CM

## 2025-06-04 DIAGNOSIS — N18.32 TYPE 2 DIABETES MELLITUS WITH STAGE 3B CHRONIC KIDNEY DISEASE, WITHOUT LONG-TERM CURRENT USE OF INSULIN (HCC): ICD-10-CM

## 2025-06-04 PROCEDURE — 99214 OFFICE O/P EST MOD 30 MIN: CPT | Performed by: INTERNAL MEDICINE

## 2025-06-04 PROCEDURE — G2211 COMPLEX E/M VISIT ADD ON: HCPCS | Performed by: INTERNAL MEDICINE

## 2025-06-04 PROCEDURE — 3078F DIAST BP <80 MM HG: CPT | Performed by: INTERNAL MEDICINE

## 2025-06-04 PROCEDURE — G8428 CUR MEDS NOT DOCUMENT: HCPCS | Performed by: INTERNAL MEDICINE

## 2025-06-04 PROCEDURE — G8417 CALC BMI ABV UP PARAM F/U: HCPCS | Performed by: INTERNAL MEDICINE

## 2025-06-04 PROCEDURE — 3074F SYST BP LT 130 MM HG: CPT | Performed by: INTERNAL MEDICINE

## 2025-06-04 PROCEDURE — 1123F ACP DISCUSS/DSCN MKR DOCD: CPT | Performed by: INTERNAL MEDICINE

## 2025-06-04 PROCEDURE — 3046F HEMOGLOBIN A1C LEVEL >9.0%: CPT | Performed by: INTERNAL MEDICINE

## 2025-06-04 PROCEDURE — 1036F TOBACCO NON-USER: CPT | Performed by: INTERNAL MEDICINE

## 2025-06-04 RX ORDER — DULAGLUTIDE 0.75 MG/.5ML
INJECTION, SOLUTION SUBCUTANEOUS
Qty: 2 ML | Refills: 1 | Status: SHIPPED | OUTPATIENT
Start: 2025-06-04

## 2025-06-04 NOTE — PROGRESS NOTES
Chief Complaint   Patient presents with    Diabetes       Patient was last seen: 6 weeks ago 4/10/2025           General:   Finger pricks hurt too much -but not on insulin   SOB and weakness (on O2) makes him inactive and so sugars higher  Pain all over too   To get an infusion tomorrow - may be platelets?    A1c: recent a1c was 9.5    DM Medications:    Amaryl 4mg BID AC   Jardiance 10mg per day     Last Changes: :jardiance added     Sugar Checks: checks up to 2 x day     AM: reports:  140-190s still; rare over 200; lowest 136     PM: reports:  not checking     LOWs:  denies low sugars     DIET: is working on it, completed the Program for Diabetes Health     EXERCISE: does not exercise - too dizzy too much pain overall    HTN: on Ca-Blk, HTN followed by PCP     LIPIDS: on statin, lipids followed by PCP    RENAL: has moderate renal insufficiency, is followed by Nephrology     EYES: eye exam in past year, has no retinopathy     DENTAL:  has seen dentist in past year, needs dental work -working on it     FEET: has no current issues, no numbness or tingling - to see Podiatry --gave list -did not make appt     HEART:  no chest pain, shortness of breath or claudication, has no cardiac history  H/O STROKE  EKG '24 w/o issues; to have ECHO     ASA:  is on aspirin     SYMPTOMS: no polyuria, thirst or blurred vision     THYROID: no known thyroid issue    DIABETES HISTORY:   From initial visit 12/14/2022   DM2 ~10 years  On metformin  No RUBIN, no TZD, DPP4, SGLT, GLP1     Was on insulin from start until 1-2 years ago  Was very active   Gout for 3 months so not active (ankle, then knee)   Had steroids, shots in knee too  Gout gone a couple week ago      Metformin stopped 3/23 when trulicity started    GLP stopped b/c cost (probably yearly  deductible) and issues figuring out how to use    LABS/STUDIES:     Lab Results   Component Value Date/Time    JKI9KTFB 5.4 09/20/2021 11:36 AM     Lab Results   Component Value Date/Time

## 2025-06-05 ENCOUNTER — HOSPITAL ENCOUNTER (OUTPATIENT)
Facility: HOSPITAL | Age: 80
Setting detail: INFUSION SERIES
Discharge: HOME | End: 2025-06-05
Payer: MEDICARE

## 2025-06-05 VITALS
HEART RATE: 71 BPM | RESPIRATION RATE: 20 BRPM | DIASTOLIC BLOOD PRESSURE: 72 MMHG | SYSTOLIC BLOOD PRESSURE: 131 MMHG | TEMPERATURE: 97.6 F

## 2025-06-05 DIAGNOSIS — N18.30 STAGE 3 CHRONIC KIDNEY DISEASE, UNSPECIFIED WHETHER STAGE 3A OR 3B CKD (HCC): ICD-10-CM

## 2025-06-05 DIAGNOSIS — D50.9 IRON DEFICIENCY ANEMIA, UNSPECIFIED IRON DEFICIENCY ANEMIA TYPE: ICD-10-CM

## 2025-06-05 DIAGNOSIS — D50.8 OTHER IRON DEFICIENCY ANEMIA: Primary | ICD-10-CM

## 2025-06-05 PROCEDURE — 96365 THER/PROPH/DIAG IV INF INIT: CPT

## 2025-06-05 PROCEDURE — 96366 THER/PROPH/DIAG IV INF ADDON: CPT

## 2025-06-05 PROCEDURE — 2580000003 HC RX 258: Performed by: INTERNAL MEDICINE

## 2025-06-05 PROCEDURE — 6360000002 HC RX W HCPCS: Performed by: INTERNAL MEDICINE

## 2025-06-05 RX ORDER — SODIUM CHLORIDE 9 MG/ML
5-250 INJECTION, SOLUTION INTRAVENOUS PRN
OUTPATIENT
Start: 2025-06-19

## 2025-06-05 RX ORDER — HEPARIN 100 UNIT/ML
500 SYRINGE INTRAVENOUS PRN
OUTPATIENT
Start: 2025-06-19

## 2025-06-05 RX ORDER — SODIUM CHLORIDE 9 MG/ML
5-250 INJECTION, SOLUTION INTRAVENOUS PRN
Status: DISCONTINUED | OUTPATIENT
Start: 2025-06-05 | End: 2025-06-06 | Stop reason: HOSPADM

## 2025-06-05 RX ORDER — SODIUM CHLORIDE 0.9 % (FLUSH) 0.9 %
5-40 SYRINGE (ML) INJECTION PRN
OUTPATIENT
Start: 2025-06-19

## 2025-06-05 RX ADMIN — IRON SUCROSE 300 MG: 20 INJECTION, SOLUTION INTRAVENOUS at 13:30

## 2025-06-05 NOTE — PROGRESS NOTES
OPIC Progress Note    Date: June 5, 2025        1300: Pt arrived ambulatory to [facility] for Venofer in stable condition.  Assessment completed. PIV placed to left AC with positive blood return. Labs drawn and sent for processing.        Patient Vitals for the past 12 hrs:   Temp Pulse Resp BP   06/05/25 1245 97.6 °F (36.4 °C) 71 20 131/72         Medications Administered         iron sucrose (VENOFER) 300 mg in sodium chloride 0.9 % 250 mL IVPB Admin Date  06/05/2025 Action  New Bag Dose  300 mg Rate  193.3 mL/hr Route  IntraVENous Documented By  Nancy Rubio, RN               Mr. Gonzalez tolerated the infusion, and had no complaints.    PIV flushed and removed. 2x2 and coban placed    [facility]. Patient is aware of next scheduled [facility] appointment.         Future Appointments   Date Time Provider Department Center   6/19/2025  1:15 PM TERRI SO CHAIR 19 COLE [facility]   7/15/2025  2:40 PM Perlita Fitzgerald MD CAV BS [facility]   7/15/2025  3:00 PM Aleja Parson APRN - NP LIVR BS [facility]   9/25/2025  1:30 PM Isatu Armenta MD RDE [facility]         Nancy Rubio, RN, RN  June 5, 2025

## 2025-06-19 ENCOUNTER — HOSPITAL ENCOUNTER (OUTPATIENT)
Facility: HOSPITAL | Age: 80
Setting detail: INFUSION SERIES
Discharge: HOME | End: 2025-06-19
Payer: MEDICARE

## 2025-06-19 VITALS
DIASTOLIC BLOOD PRESSURE: 76 MMHG | HEIGHT: 65 IN | HEART RATE: 62 BPM | RESPIRATION RATE: 18 BRPM | SYSTOLIC BLOOD PRESSURE: 131 MMHG | BODY MASS INDEX: 38.15 KG/M2 | TEMPERATURE: 98.2 F | OXYGEN SATURATION: 99 % | WEIGHT: 229 LBS

## 2025-06-19 DIAGNOSIS — D50.9 IRON DEFICIENCY ANEMIA, UNSPECIFIED IRON DEFICIENCY ANEMIA TYPE: ICD-10-CM

## 2025-06-19 DIAGNOSIS — D50.8 OTHER IRON DEFICIENCY ANEMIA: Primary | ICD-10-CM

## 2025-06-19 DIAGNOSIS — N18.30 STAGE 3 CHRONIC KIDNEY DISEASE, UNSPECIFIED WHETHER STAGE 3A OR 3B CKD (HCC): ICD-10-CM

## 2025-06-19 PROCEDURE — 96366 THER/PROPH/DIAG IV INF ADDON: CPT

## 2025-06-19 PROCEDURE — 2580000003 HC RX 258: Performed by: INTERNAL MEDICINE

## 2025-06-19 PROCEDURE — 96365 THER/PROPH/DIAG IV INF INIT: CPT

## 2025-06-19 PROCEDURE — 6360000002 HC RX W HCPCS: Performed by: INTERNAL MEDICINE

## 2025-06-19 RX ORDER — SODIUM CHLORIDE 9 MG/ML
5-250 INJECTION, SOLUTION INTRAVENOUS PRN
Status: DISCONTINUED | OUTPATIENT
Start: 2025-06-19 | End: 2025-06-20 | Stop reason: HOSPADM

## 2025-06-19 RX ORDER — SODIUM CHLORIDE 9 MG/ML
5-250 INJECTION, SOLUTION INTRAVENOUS PRN
OUTPATIENT
Start: 2025-07-03

## 2025-06-19 RX ORDER — SODIUM CHLORIDE 0.9 % (FLUSH) 0.9 %
5-40 SYRINGE (ML) INJECTION PRN
OUTPATIENT
Start: 2025-07-03

## 2025-06-19 RX ORDER — HEPARIN 100 UNIT/ML
500 SYRINGE INTRAVENOUS PRN
OUTPATIENT
Start: 2025-07-03

## 2025-06-19 RX ORDER — SODIUM CHLORIDE 0.9 % (FLUSH) 0.9 %
5-40 SYRINGE (ML) INJECTION PRN
Status: DISCONTINUED | OUTPATIENT
Start: 2025-06-19 | End: 2025-06-20 | Stop reason: HOSPADM

## 2025-06-19 RX ADMIN — IRON SUCROSE 300 MG: 20 INJECTION, SOLUTION INTRAVENOUS at 13:58

## 2025-06-19 ASSESSMENT — PAIN SCALES - GENERAL
PAINLEVEL_OUTOF10: 0
PAINLEVEL_OUTOF10: 0

## 2025-06-19 NOTE — PROGRESS NOTES
OPIC Visit Notes                 Date: 2025  Name: Shemar Gonzalez  MRN: [id number]       : 1945    Pt admit to \[facility]\"" for Iron Sucrose   No acute distress noted. Patient is ambulatory and in stable condition.   Denies flu-like symptoms. No immediate needs or concerns for this visit.     IV placed peripherally in Left AC 24g with positive blood return.  Venofer infused through PIV without issue. Tolerated well.   PIV removed post infusion. Coban and Gauze applied to site.     Patient aware of upcoming appointment. Patient declined post- monitoring time. Education provided.     Mr. Gonzalez's vitals were reviewed prior to treatment.   Patient Vitals for the past 12 hrs:   Temp Pulse Resp BP SpO2   25 1532 -- 62 18 131/76 --   25 1307 98.2 °F (36.8 °C) 79 18 134/73 99 %     Medications Administered         iron sucrose (VENOFER) 300 mg in sodium chloride 0.9 % 250 mL IVPB Admin Date  2025 Action  New Bag Dose  300 mg Rate  193.3 mL/hr Route  IntraVENous Documented By  Chanel Velasquez RN          Future Appointments   Date Time Provider Department Center   7/3/2025 10:30 AM Tati Carlson DO [facility] DEP   7/15/2025  2:40 PM Perlita Fitzgerald MD CAV BS [facility]   7/15/2025  3:00 PM Aleja Parson APRN - KYLAH BACA BS [facility]   2025  1:30 PM Isatu Armenta MD RDE [facility] BS [facility]     Chanel Velasquez RN  2025

## 2025-06-23 DIAGNOSIS — E11.65 TYPE 2 DIABETES MELLITUS WITH HYPERGLYCEMIA, WITHOUT LONG-TERM CURRENT USE OF INSULIN (HCC): Primary | ICD-10-CM

## 2025-06-23 RX ORDER — DULAGLUTIDE 1.5 MG/.5ML
INJECTION, SOLUTION SUBCUTANEOUS
Qty: 2 ML | Refills: 0 | Status: SHIPPED | OUTPATIENT
Start: 2025-06-23

## 2025-07-08 DIAGNOSIS — K21.9 GASTROESOPHAGEAL REFLUX DISEASE, UNSPECIFIED WHETHER ESOPHAGITIS PRESENT: ICD-10-CM

## 2025-07-08 RX ORDER — PANTOPRAZOLE SODIUM 40 MG/1
40 TABLET, DELAYED RELEASE ORAL DAILY
Qty: 90 TABLET | Refills: 0 | Status: SHIPPED | OUTPATIENT
Start: 2025-07-08

## 2025-07-15 ENCOUNTER — OFFICE VISIT (OUTPATIENT)
Age: 80
End: 2025-07-15
Payer: MEDICARE

## 2025-07-15 VITALS
HEIGHT: 65 IN | BODY MASS INDEX: 37.36 KG/M2 | SYSTOLIC BLOOD PRESSURE: 127 MMHG | DIASTOLIC BLOOD PRESSURE: 77 MMHG | HEART RATE: 76 BPM | OXYGEN SATURATION: 93 % | WEIGHT: 224.2 LBS | TEMPERATURE: 98.4 F

## 2025-07-15 DIAGNOSIS — K74.60 HEPATIC CIRRHOSIS, UNSPECIFIED HEPATIC CIRRHOSIS TYPE, UNSPECIFIED WHETHER ASCITES PRESENT (HCC): Primary | ICD-10-CM

## 2025-07-15 DIAGNOSIS — E11.59 TYPE 2 DIABETES MELLITUS WITH OTHER CIRCULATORY COMPLICATION, WITHOUT LONG-TERM CURRENT USE OF INSULIN (HCC): ICD-10-CM

## 2025-07-15 DIAGNOSIS — K75.81 NASH (NONALCOHOLIC STEATOHEPATITIS): ICD-10-CM

## 2025-07-15 PROCEDURE — 3046F HEMOGLOBIN A1C LEVEL >9.0%: CPT | Performed by: NURSE PRACTITIONER

## 2025-07-15 PROCEDURE — 99214 OFFICE O/P EST MOD 30 MIN: CPT | Performed by: NURSE PRACTITIONER

## 2025-07-15 PROCEDURE — G8427 DOCREV CUR MEDS BY ELIG CLIN: HCPCS | Performed by: NURSE PRACTITIONER

## 2025-07-15 PROCEDURE — G8417 CALC BMI ABV UP PARAM F/U: HCPCS | Performed by: NURSE PRACTITIONER

## 2025-07-15 PROCEDURE — 1123F ACP DISCUSS/DSCN MKR DOCD: CPT | Performed by: NURSE PRACTITIONER

## 2025-07-15 PROCEDURE — 1036F TOBACCO NON-USER: CPT | Performed by: NURSE PRACTITIONER

## 2025-07-15 RX ORDER — DULAGLUTIDE 0.75 MG/.5ML
INJECTION, SOLUTION SUBCUTANEOUS
COMMUNITY
Start: 2025-06-01

## 2025-07-15 NOTE — PROGRESS NOTES
Chief Complaint   Patient presents with    Follow-up     N/A     Vitals:    07/15/25 1428   BP: 127/77   BP Site: Left Upper Arm   Patient Position: Sitting   Pulse: 76   Temp: 98.4 °F (36.9 °C)   TempSrc: Temporal   SpO2: 93%   Weight: 101.7 kg (224 lb 3.2 oz)   Height: 1.651 m (5' 5\")     .  \"Have you been to the ER, urgent care clinic since your last visit?  Hospitalized since your last visit?\"    NO    “Have you seen or consulted any other health care providers outside of [facility] since your last visit?”    NO          Click Here for Release of Records Request

## 2025-07-15 NOTE — PROGRESS NOTES
[facility] LIVER [facility]      Andrew Forte MD, FACP, FACG, FAASLD      Chanel Unger, PA-C    Aleja Parson, [facility]-BC   Amisha Harmony, [facility]   Su Pinto, FNP-C  Fareed Aguilar FNP-C   Leeann Nur, [facility]      Liver [facility]   [address], [address]   [place]  [address] [phone]   FAX: [phone]  [facility]   [address], [address]   [place]  [address] [phone]   FAX: [phone]     Patient Care Team:  Tati Carlson DO as PCP - General (Family Medicine)  Tati Carlson DO as PCP - Empaneled Provider  Jennifer Matias MD as Consulting Physician  Roscoe Mckeon MD as Referring Physician  Bozena Aguillon APRN - NP as Consulting Physician  Felipe Eason MD as Consulting Physician  Isatu Armenta MD as Physician (Endocrinology)  Minoo Hanson, RN as Ambulatory Care Manager  David Richter MD (Pulmonary Disease)  Perlita Fitzgerald MD as Consulting Physician (Cardiology)    Patient Active Problem List   Diagnosis    Cirrhosis (HCC)    Gallstone pancreatitis    History of CVA (cerebrovascular accident)    Thrombocytopenia    Class 1 obesity due to excess calories with serious comorbidity and body mass index (BMI) of 32.0 to 32.9 in adult    History of cholecystectomy    Chronic kidney disease, stage III (moderate) (HCC)    Dysautonomia (HCC)    Chronic obstructive pulmonary disease, unspecified COPD type (HCC)    Type 2 diabetes mellitus with other circulatory complication, without long-term current use of insulin (HCC)    MILLER (nonalcoholic steatohepatitis)    Acute idiopathic gout of right knee    Severe obesity (BMI 35.0-39.9) with comorbidity (HCC)    Vertigo    Hepatic cirrhosis (HCC)    Cirrhosis of liver (HCC)    Anemia, iron deficiency    Iron

## 2025-07-16 ENCOUNTER — OFFICE VISIT (OUTPATIENT)
Dept: PRIMARY CARE CLINIC | Facility: CLINIC | Age: 80
End: 2025-07-16
Payer: MEDICARE

## 2025-07-16 ENCOUNTER — OFFICE VISIT (OUTPATIENT)
Age: 80
End: 2025-07-16
Payer: MEDICARE

## 2025-07-16 VITALS
HEIGHT: 65 IN | WEIGHT: 224 LBS | SYSTOLIC BLOOD PRESSURE: 116 MMHG | DIASTOLIC BLOOD PRESSURE: 72 MMHG | HEART RATE: 72 BPM | OXYGEN SATURATION: 92 % | BODY MASS INDEX: 37.32 KG/M2

## 2025-07-16 VITALS
BODY MASS INDEX: 37.59 KG/M2 | OXYGEN SATURATION: 96 % | HEIGHT: 65 IN | RESPIRATION RATE: 18 BRPM | TEMPERATURE: 97.9 F | SYSTOLIC BLOOD PRESSURE: 108 MMHG | DIASTOLIC BLOOD PRESSURE: 65 MMHG | HEART RATE: 71 BPM | WEIGHT: 225.6 LBS

## 2025-07-16 DIAGNOSIS — J44.9 CHRONIC OBSTRUCTIVE PULMONARY DISEASE, UNSPECIFIED COPD TYPE (HCC): ICD-10-CM

## 2025-07-16 DIAGNOSIS — R20.0 NUMBNESS AND TINGLING IN BOTH HANDS: ICD-10-CM

## 2025-07-16 DIAGNOSIS — R06.02 SHORTNESS OF BREATH: ICD-10-CM

## 2025-07-16 DIAGNOSIS — N18.32 STAGE 3B CHRONIC KIDNEY DISEASE (HCC): ICD-10-CM

## 2025-07-16 DIAGNOSIS — G62.9 NEUROPATHY: ICD-10-CM

## 2025-07-16 DIAGNOSIS — E11.65 TYPE 2 DIABETES MELLITUS WITH HYPERGLYCEMIA, WITHOUT LONG-TERM CURRENT USE OF INSULIN (HCC): ICD-10-CM

## 2025-07-16 DIAGNOSIS — E11.65 UNCONTROLLED TYPE 2 DIABETES MELLITUS WITH HYPERGLYCEMIA (HCC): ICD-10-CM

## 2025-07-16 DIAGNOSIS — R55 PRE-SYNCOPE: ICD-10-CM

## 2025-07-16 DIAGNOSIS — E16.2 HYPOGLYCEMIA: Primary | ICD-10-CM

## 2025-07-16 DIAGNOSIS — R42 DIZZINESS: Primary | ICD-10-CM

## 2025-07-16 DIAGNOSIS — R20.2 NUMBNESS AND TINGLING IN BOTH HANDS: ICD-10-CM

## 2025-07-16 DIAGNOSIS — Z86.73 HISTORY OF CVA (CEREBROVASCULAR ACCIDENT): ICD-10-CM

## 2025-07-16 DIAGNOSIS — E11.59 TYPE 2 DIABETES MELLITUS WITH OTHER CIRCULATORY COMPLICATION, WITHOUT LONG-TERM CURRENT USE OF INSULIN (HCC): ICD-10-CM

## 2025-07-16 DIAGNOSIS — I35.8 AORTIC VALVE SCLEROSIS: ICD-10-CM

## 2025-07-16 DIAGNOSIS — E66.01 MORBID (SEVERE) OBESITY DUE TO EXCESS CALORIES (HCC): ICD-10-CM

## 2025-07-16 DIAGNOSIS — Z71.89 CARDIAC RISK COUNSELING: ICD-10-CM

## 2025-07-16 LAB
ALBUMIN SERPL-MCNC: 3.8 G/DL (ref 3.5–5)
ALBUMIN/GLOB SERPL: 1.1 (ref 1.1–2.2)
ALP SERPL-CCNC: 92 U/L (ref 45–117)
ALT SERPL-CCNC: 47 U/L (ref 12–78)
ANION GAP SERPL CALC-SCNC: 11 MMOL/L (ref 2–12)
AST SERPL-CCNC: 28 U/L (ref 15–37)
BASOPHILS # BLD: 0.1 K/UL (ref 0–0.1)
BASOPHILS NFR BLD: 1.7 % (ref 0–1)
BILIRUB SERPL-MCNC: 0.6 MG/DL (ref 0.2–1)
BUN SERPL-MCNC: 22 MG/DL (ref 6–20)
BUN/CREAT SERPL: 10 (ref 12–20)
CALCIUM SERPL-MCNC: 9.8 MG/DL (ref 8.5–10.1)
CHLORIDE SERPL-SCNC: 109 MMOL/L (ref 97–108)
CO2 SERPL-SCNC: 24 MMOL/L (ref 21–32)
CREAT SERPL-MCNC: 2.15 MG/DL (ref 0.7–1.3)
DIFFERENTIAL METHOD BLD: ABNORMAL
EOSINOPHIL # BLD: 0.32 K/UL (ref 0–0.4)
EOSINOPHIL NFR BLD: 5.4 % (ref 0–7)
ERYTHROCYTE [DISTWIDTH] IN BLOOD BY AUTOMATED COUNT: 20.5 % (ref 11.5–14.5)
EST. AVERAGE GLUCOSE BLD GHB EST-MCNC: 163 MG/DL
FERRITIN SERPL-MCNC: 43 NG/ML (ref 26–388)
GLOBULIN SER CALC-MCNC: 3.6 G/DL (ref 2–4)
GLUCOSE SERPL-MCNC: 163 MG/DL (ref 65–100)
HBA1C MFR BLD: 7.3 % (ref 4–5.6)
HCT VFR BLD AUTO: 47.7 % (ref 36.6–50.3)
HGB BLD-MCNC: 14.3 G/DL (ref 12.1–17)
IMM GRANULOCYTES # BLD AUTO: 0.02 K/UL (ref 0–0.04)
IMM GRANULOCYTES NFR BLD AUTO: 0.3 % (ref 0–0.5)
INR PPP: 1.1 (ref 0.9–1.1)
IRON SATN MFR SERPL: 19 % (ref 20–50)
IRON SERPL-MCNC: 77 UG/DL (ref 35–150)
LYMPHOCYTES # BLD: 2.06 K/UL (ref 0.8–3.5)
LYMPHOCYTES NFR BLD: 34.4 % (ref 12–49)
MCH RBC QN AUTO: 25.4 PG (ref 26–34)
MCHC RBC AUTO-ENTMCNC: 30 G/DL (ref 30–36.5)
MCV RBC AUTO: 84.9 FL (ref 80–99)
MONOCYTES # BLD: 0.52 K/UL (ref 0–1)
MONOCYTES NFR BLD: 8.7 % (ref 5–13)
NEUTS SEG # BLD: 2.98 K/UL (ref 1.8–8)
NEUTS SEG NFR BLD: 49.5 % (ref 32–75)
NRBC # BLD: 0 K/UL (ref 0–0.01)
NRBC BLD-RTO: 0 PER 100 WBC
PLATELET # BLD AUTO: ABNORMAL K/UL (ref 150–400)
PLATELET COMMENT: ABNORMAL
POTASSIUM SERPL-SCNC: 3.5 MMOL/L (ref 3.5–5.1)
PROT SERPL-MCNC: 7.4 G/DL (ref 6.4–8.2)
PROTHROMBIN TIME: 11.3 SEC (ref 9.2–11.2)
RBC # BLD AUTO: 5.62 M/UL (ref 4.1–5.7)
RBC MORPH BLD: ABNORMAL
SODIUM SERPL-SCNC: 144 MMOL/L (ref 136–145)
TIBC SERPL-MCNC: 405 UG/DL (ref 250–450)
WBC # BLD AUTO: 6 K/UL (ref 4.1–11.1)

## 2025-07-16 PROCEDURE — 99215 OFFICE O/P EST HI 40 MIN: CPT | Performed by: FAMILY MEDICINE

## 2025-07-16 PROCEDURE — 1036F TOBACCO NON-USER: CPT | Performed by: FAMILY MEDICINE

## 2025-07-16 PROCEDURE — 1036F TOBACCO NON-USER: CPT | Performed by: NURSE PRACTITIONER

## 2025-07-16 PROCEDURE — G8417 CALC BMI ABV UP PARAM F/U: HCPCS | Performed by: NURSE PRACTITIONER

## 2025-07-16 PROCEDURE — G8427 DOCREV CUR MEDS BY ELIG CLIN: HCPCS | Performed by: FAMILY MEDICINE

## 2025-07-16 PROCEDURE — 99214 OFFICE O/P EST MOD 30 MIN: CPT | Performed by: NURSE PRACTITIONER

## 2025-07-16 PROCEDURE — G8427 DOCREV CUR MEDS BY ELIG CLIN: HCPCS | Performed by: NURSE PRACTITIONER

## 2025-07-16 PROCEDURE — 1123F ACP DISCUSS/DSCN MKR DOCD: CPT | Performed by: FAMILY MEDICINE

## 2025-07-16 PROCEDURE — 3051F HG A1C>EQUAL 7.0%<8.0%: CPT | Performed by: FAMILY MEDICINE

## 2025-07-16 PROCEDURE — 1126F AMNT PAIN NOTED NONE PRSNT: CPT | Performed by: FAMILY MEDICINE

## 2025-07-16 PROCEDURE — 3051F HG A1C>EQUAL 7.0%<8.0%: CPT | Performed by: NURSE PRACTITIONER

## 2025-07-16 PROCEDURE — 3023F SPIROM DOC REV: CPT | Performed by: NURSE PRACTITIONER

## 2025-07-16 PROCEDURE — 93005 ELECTROCARDIOGRAM TRACING: CPT | Performed by: NURSE PRACTITIONER

## 2025-07-16 PROCEDURE — 93010 ELECTROCARDIOGRAM REPORT: CPT | Performed by: NURSE PRACTITIONER

## 2025-07-16 PROCEDURE — G8417 CALC BMI ABV UP PARAM F/U: HCPCS | Performed by: FAMILY MEDICINE

## 2025-07-16 PROCEDURE — 1123F ACP DISCUSS/DSCN MKR DOCD: CPT | Performed by: NURSE PRACTITIONER

## 2025-07-16 RX ORDER — ACYCLOVIR 400 MG/1
TABLET ORAL
Qty: 3 EACH | Refills: 2 | Status: SHIPPED | OUTPATIENT
Start: 2025-07-16

## 2025-07-16 ASSESSMENT — PATIENT HEALTH QUESTIONNAIRE - PHQ9
2. FEELING DOWN, DEPRESSED OR HOPELESS: SEVERAL DAYS
SUM OF ALL RESPONSES TO PHQ QUESTIONS 1-9: 1
1. LITTLE INTEREST OR PLEASURE IN DOING THINGS: NOT AT ALL
SUM OF ALL RESPONSES TO PHQ QUESTIONS 1-9: 1

## 2025-07-16 NOTE — PATIENT INSTRUCTIONS
- Were going to work on seeing if we can get a CGM device covered to help with your blood sugar management  - I have sent a referral to the pharmacist Emilee to see if you qaulify for financial assistance for Trulicity or Jardiiance if expensive  - Please call the Hepatology office and schedule an appointment with Amisha Antunez  -

## 2025-07-16 NOTE — PROGRESS NOTES
Health Decision Maker has been checked with the patient   Primary Decision Maker: CarlosDomi Phuong - Brother/Sister - [phone]    Primary Decision Maker: Nohelia Nuñez - Brother/Sister - [phone]     AI form WAS signed    Chief Complaint   Patient presents with    Follow-up       \"Have you been to the ER, urgent care clinic since your last visit?  Hospitalized since your last visit?\"    NO    “Have you seen or consulted any other health care providers outside of [facility] since your last visit?”    NO      Vitals:    07/16/25 1603   Temp: 97.9 °F (36.6 °C)   TempSrc: Oral   Weight: 102.3 kg (225 lb 9.6 oz)   Height: 1.651 m (5' 5\")      Depression: Not at risk (7/16/2025)    PHQ-2     PHQ-2 Score: 1            Click Here for Release of Records Request    Chart reviewed: immunizations are documented.   Immunization History   Administered Date(s) Administered    COVID-19, MODERNA BLUE border, Primary or Immunocompromised, (age 12y+), IM, 100 mcg/0.5mL 03/13/2021, 04/10/2021, 04/21/2021    COVID-19, PFIZER PURPLE top, DILUTE for use, (age 12 y+), 30mcg/0.3mL 04/01/2021    Influenza, AFLURIA, FLUZONE, (age 6-35 m), IM, Quadv PF, 0.25mL 11/18/2021    Influenza, FLUAD, (age 65 y+), IM, Quadv, 0.5mL 09/20/2021, 10/07/2022, 10/15/2024    Influenza, FLUZONE High Dose (age 65 y+), IM, Quadv, 0.7mL 10/27/2023    Influenza, FLUZONE High Dose, (age 65 y+), IM, Trivalent PF, 0.5mL 09/19/2017, 10/26/2018, 11/26/2019, 09/30/2020, 10/13/2020    Pneumococcal, PCV-13, PREVNAR 13, (age 6w+), IM, 0.5mL 09/19/2017    Pneumococcal, PPSV23, PNEUMOVAX 23, (age 2y+), SC/IM, 0.5mL 11/26/2019    RSV, ABRYSVO, (Pregnant or age 60y+), PF, IM, 0.5mL 01/06/2024    TDaP, ADACEL (age 10y-64y), BOOSTRIX (age 10y+), IM, 0.5mL 09/19/2017    Zoster Recombinant (Shingrix) 06/16/2020, 08/17/2020      
but has not scheduled appt with [facility] clinic yet.     CKD3B - Followed by Dr. Kelly.     CRYSTAL - Not compliant with CPAP due to inability to tolerate. Has tried on 2 occasions but can't wear it. States he did testing with Dr. Resendiz.     Reviewed PmHx, RxHx, FmHx, SocHx, AllgHx and updated and dated in the chart.    Physical Exam:  /65   Pulse 71   Temp 97.9 °F (36.6 °C) (Oral)   Resp 18   Ht 1.651 m (5' 5\")   Wt 102.3 kg (225 lb 9.6 oz)   SpO2 96%   BMI 37.54 kg/m²   Physical Exam  Vitals and nursing note reviewed.   Constitutional:       General: He is not in acute distress.     Appearance: Normal appearance. He is not ill-appearing.   Cardiovascular:      Rate and Rhythm: Normal rate and regular rhythm.      Heart sounds: No murmur heard.  Pulmonary:      Effort: Pulmonary effort is normal. No respiratory distress.      Breath sounds: Normal breath sounds. No wheezing, rhonchi or rales.   Musculoskeletal:      Right lower leg: No edema.      Left lower leg: No edema.   Neurological:      General: No focal deficit present.      Mental Status: He is alert.   Psychiatric:         Mood and Affect: Mood normal.         Behavior: Behavior normal.       No results found for this or any previous visit (from the past 12 hours).       Assessment / Plan     Shemar was seen today for follow-up.    Diagnoses and all orders for this visit:    Hypoglycemia    Neuropathy  -     Continuous Glucose Sensor (DEXCOM G7 SENSOR) MISC; To check blood sugars before meals and at bedtime.    Numbness and tingling in both hands  -     Continuous Glucose Sensor (DEXCOM G7 SENSOR) MISC; To check blood sugars before meals and at bedtime.    Uncontrolled type 2 diabetes mellitus with hyperglycemia (HCC)  -     Continuous Glucose Sensor (DEXCOM G7 SENSOR) MISC; To check blood sugars before meals and at bedtime.  -     [facility] - [facility] Referral to Office-Based Clinical Pharmacy    Morbid (severe) obesity due to excess calories

## 2025-07-16 NOTE — PATIENT INSTRUCTIONS
Reduce coffee to 2 cups daily.     Continue to drink lots of water.     Ultrasound of your heart and carotid arteries to be performed.     A heart monitor will be mailed to you.     Return for follow up in 4 months with Leti Winter.

## 2025-07-17 ENCOUNTER — TELEPHONE (OUTPATIENT)
Age: 80
End: 2025-07-17

## 2025-07-17 ENCOUNTER — TELEPHONE (OUTPATIENT)
Dept: PRIMARY CARE CLINIC | Facility: CLINIC | Age: 80
End: 2025-07-17

## 2025-07-17 LAB — AFP-TM SERPL-MCNC: 2 NG/ML (ref 0–8.4)

## 2025-07-17 NOTE — TELEPHONE ENCOUNTER
**[facility]**    Attempt made to contact patient at the mobile number.    Left a message requesting a call back at [phone] to schedule the appointment    Swati Rodrigez   Ambulatory Pharmacy Technician Clinical   [phone]  Department, toll free: [phone], option 2

## 2025-07-17 NOTE — TELEPHONE ENCOUNTER
Reason for referral: DM  Referring provider: Dr Carlson  Referring provider office: [facility]  Referred to: Emilee Lomas, PharmD, BCGP, BCACP  Status of Patient: New Patient  Length of Appt: 60 minutes  Type of Appt: In Person   Patient need address: No

## 2025-07-17 NOTE — TELEPHONE ENCOUNTER
----- Message from LANNY Moses sent at 7/16/2025 11:32 AM EDT -----  Will you please mail a 7 day Holter to Mr. Gonzalez, dx: dizziness and presyncope.     Thank you!

## 2025-07-18 NOTE — TELEPHONE ENCOUNTER
**[facility] is to be scheduled with the [facility]**    Second Attempt made to contact patient at the mobile number.    Left a message requesting a call back at [phone] to schedule the appointment    Swati Rodrigez   Ambulatory Pharmacy Technician Clinical   [phone]  Department, toll free: [phone], option 2

## 2025-07-21 ENCOUNTER — RESULTS FOLLOW-UP (OUTPATIENT)
Age: 80
End: 2025-07-21

## 2025-07-21 NOTE — TELEPHONE ENCOUNTER
**[facility] is to [facility] scheduled with [facility]**    Third Attempt made to contact patient at the mobile number.    Left a message requesting a call back at [phone] to schedule the appointment    Letter Sent    Swati Rodrigez   Ambulatory Pharmacy Technician Clinical   [phone]  Department, toll free: [phone], option 2    For Pharmacy Admin Tracking Only    Program: Medical Group  Gap Closed?: No   Time Spent (min): 5

## 2025-07-21 NOTE — PROGRESS NOTES
07/15/2025 03:00 PM    LABGLOM 31 07/15/2025 03:00 PM    LABGLOM 37 04/26/2024 11:29 AM    LABGLOM 45 12/06/2023 09:37 AM       Wt Readings from Last 20 Encounters:   07/16/25 102.3 kg (225 lb 9.6 oz)   07/16/25 101.6 kg (224 lb)   07/15/25 101.7 kg (224 lb 3.2 oz)   06/19/25 103.9 kg (229 lb)   06/04/25 105.7 kg (233 lb)   05/20/25 106.6 kg (235 lb)   04/10/25 108.4 kg (239 lb)   01/20/25 111.1 kg (245 lb)   01/10/25 119.3 kg (263 lb)   10/18/24 108 kg (238 lb)   09/17/24 106.6 kg (235 lb)   08/27/24 106.4 kg (234 lb 9.6 oz)   08/02/24 102.5 kg (226 lb)   07/15/24 102.5 kg (226 lb)   07/15/24 102.2 kg (225 lb 3.2 oz)   07/07/24 99.3 kg (219 lb)   06/28/24 104.6 kg (230 lb 9.6 oz)   06/24/24 104.8 kg (231 lb)   04/19/24 107 kg (236 lb)   04/18/24 105.1 kg (231 lb 12.8 oz)      BP Readings from Last 3 Encounters:   07/16/25 108/65   07/16/25 116/72   07/15/25 127/77        Current Outpatient Medications:     dulaglutide (TRULICITY) 0.75 MG/0.5ML SOAJ SC injection, , Disp: , Rfl:     pantoprazole (PROTONIX) 40 MG tablet, Take 1 tablet by mouth daily, Disp: 90 tablet, Rfl: 0    febuxostat (ULORIC) 40 MG TABS tablet, Take 1 tablet by mouth daily, Disp: 90 tablet, Rfl: 0    glimepiride (AMARYL) 4 MG tablet, TAKE 1 TABLET BY MOUTH 2 TIMES A DAY BEFORE A MEAL, Disp: 180 tablet, Rfl: 2    JARDIANCE 10 MG tablet, One tablet by mouth once a day, Disp: 90 tablet, Rfl: 1    furosemide (LASIX) 20 MG tablet, Take 2 tablets by mouth daily, Disp: , Rfl:     aspirin 81 MG EC tablet, Take 1 tablet by mouth daily, Disp: , Rfl:     Continuous Glucose Sensor (DEXCOM G7 SENSOR) MISC, To check blood sugars before meals and at bedtime., Disp: 3 each, Rfl: 2   Impression see above.     Leti Winter, RIVKAP

## 2025-07-21 NOTE — TELEPHONE ENCOUNTER
**[facility] is to [facility] with the [facility]**    Incoming Call, Margaret     Patient verified understanding and scheduled a in person appointment .  Appointment scheduled for 8/6/25 at 10:00 am.    Swati Rodrigez   Ambulatory Pharmacy Technician Clinical   [phone]  Department, toll free: [phone], option 2     For Pharmacy Admin Tracking Only    Program: Medical Group  Recommendation Provided To: Patient/Caregiver: 5 via Telephone  Intervention Detail: Scheduled Appointment  Intervention Accepted By: Patient/Caregiver: 5  Gap Closed?: Yes   Time Spent (min): 10

## 2025-07-22 ENCOUNTER — TELEPHONE (OUTPATIENT)
Dept: PRIMARY CARE CLINIC | Facility: CLINIC | Age: 80
End: 2025-07-22

## 2025-07-22 NOTE — TELEPHONE ENCOUNTER
PA for dexcom denied. Per patients insurance: requires treatment with insulin; OR has a documented history of problematic hypoglycemia with documentation of one of the following: More than one level 2 hypoglycemic event (defined as glucose less than 54mg/dL (3.0mmol/L)) that persists despite more than one attempt to adjust medication therapy or modification of diabetes treatment plan OR History of one level 3 hypoglycemic event (glucose less than 54mg/dL (3.0mmol/L)) characterized by altered mental or physical state requiring hypoglycemia treatment. You do not meet these requirements.

## 2025-07-22 NOTE — TELEPHONE ENCOUNTER
Called Margaret melendez who is on patients PHI.     She is asking about two referrals for diabetes education and pharmacy. Advised both were placed in chart at last visit, however it is up to patient on if he wants to do them.

## 2025-07-25 ENCOUNTER — OFFICE VISIT (OUTPATIENT)
Age: 80
End: 2025-07-25
Payer: MEDICARE

## 2025-07-25 DIAGNOSIS — E11.65 TYPE 2 DIABETES MELLITUS WITH HYPERGLYCEMIA, WITHOUT LONG-TERM CURRENT USE OF INSULIN (HCC): Primary | ICD-10-CM

## 2025-07-25 PROCEDURE — G0108 DIAB MANAGE TRN  PER INDIV: HCPCS

## 2025-07-25 NOTE — PROGRESS NOTES
[facility]  Diabetes Self-Management Education & Support Program  Encounter Note      SUMMARY  Diabetes self-care management training was completed related to healthy eating. The participant will return on - To be determined by patient - to continue DSMES related to - to be determined by patient. The participant did not identify SMART Goal(s) and will practice knowledge and skills related to reducing risks, healthy eating and monitoring, healthy coping and problem solving, and medications to improve diabetes self-management.        EVALUATION:  Mr. Gonzalez was seen today for one of two hours of Medicare covered DSMES for 2025.    Mr. Gonzalez states his A1c increase is likely related to his sedentary lifestyle. He suffers from vertigo and orthostatic hypotension, which limits his ability to ambulate safely.  Based on his recall, he has continued to follow the nutrition recommendations from previous DSMES with me.  He does occasionally splurge, we reviewed the Healthy Plate model. He does not drink any sugary beverages.      He is having difficulty monitoring his glucose because of arthritis that limits his dexterity.  Mr. Gonzalez does experience hypoglycemia. He would benefit from CGM, however this has been denied by Medicare. He is taking all diabetes medications as prescribed.     RECOMMENDATIONS:  Mr. Gonzalez will benefit from working with Emilee Lomas RPH to see if he qualifies for any drug  programs that would make it possible for him to add a GLP-1 to his diabetes management.      TOPICS DISCUSSED TODAY:  WHAT CAN I EAT? 60        Next provider visit is scheduled for   Future Appointments         Provider Specialty Dept Phone    8/6/2025 10:00 AM Emilee Lomas RPH Primary Care [phone]    8/6/2025 2:00 PM BHAVESH KIRAN VASCULAR Cardiology [phone]    8/6/2025 3:45 PM FARHAD KIRAN ECHO 4 Cardiology [phone]    9/25/2025 1:30 PM Isatu Armenta MD

## 2025-07-27 DIAGNOSIS — E11.65 TYPE 2 DIABETES MELLITUS WITH HYPERGLYCEMIA, WITHOUT LONG-TERM CURRENT USE OF INSULIN (HCC): Primary | ICD-10-CM

## 2025-07-27 RX ORDER — DULAGLUTIDE 3 MG/.5ML
INJECTION, SOLUTION SUBCUTANEOUS
Qty: 2 ML | Refills: 5 | Status: SHIPPED | OUTPATIENT
Start: 2025-07-27

## 2025-08-06 ENCOUNTER — ANCILLARY PROCEDURE (OUTPATIENT)
Age: 80
End: 2025-08-06
Payer: MEDICARE

## 2025-08-06 VITALS
HEIGHT: 65 IN | BODY MASS INDEX: 37.49 KG/M2 | DIASTOLIC BLOOD PRESSURE: 65 MMHG | SYSTOLIC BLOOD PRESSURE: 110 MMHG | WEIGHT: 225 LBS

## 2025-08-06 DIAGNOSIS — R55 PRE-SYNCOPE: ICD-10-CM

## 2025-08-06 DIAGNOSIS — R06.02 SHORTNESS OF BREATH: ICD-10-CM

## 2025-08-06 LAB
ECHO AO ASC DIAM: 3.6 CM
ECHO AO ASCENDING AORTA INDEX: 1.73 CM/M2
ECHO AO ROOT DIAM: 3.5 CM
ECHO AO ROOT INDEX: 1.68 CM/M2
ECHO AV AREA PEAK VELOCITY: 2.9 CM2
ECHO AV AREA VTI: 3.2 CM2
ECHO AV AREA/BSA PEAK VELOCITY: 1.4 CM2/M2
ECHO AV AREA/BSA VTI: 1.5 CM2/M2
ECHO AV MEAN GRADIENT: 3 MMHG
ECHO AV MEAN VELOCITY: 0.9 M/S
ECHO AV PEAK GRADIENT: 6 MMHG
ECHO AV PEAK VELOCITY: 1.2 M/S
ECHO AV VELOCITY RATIO: 0.83
ECHO AV VTI: 22 CM
ECHO BSA: 2.16 M2
ECHO EST RA PRESSURE: 3 MMHG
ECHO LA DIAMETER INDEX: 1.59 CM/M2
ECHO LA DIAMETER: 3.3 CM
ECHO LA TO AORTIC ROOT RATIO: 0.94
ECHO LA VOL A-L A2C: 69 ML (ref 18–58)
ECHO LA VOL A-L A4C: 46 ML (ref 18–58)
ECHO LA VOL BP: 53 ML (ref 18–58)
ECHO LA VOL MOD A2C: 66 ML (ref 18–58)
ECHO LA VOL MOD A4C: 42 ML (ref 18–58)
ECHO LA VOL/BSA BIPLANE: 25 ML/M2 (ref 16–34)
ECHO LA VOLUME AREA LENGTH: 57 ML
ECHO LA VOLUME INDEX A-L A2C: 33 ML/M2 (ref 16–34)
ECHO LA VOLUME INDEX A-L A4C: 22 ML/M2 (ref 16–34)
ECHO LA VOLUME INDEX AREA LENGTH: 27 ML/M2 (ref 16–34)
ECHO LA VOLUME INDEX MOD A2C: 32 ML/M2 (ref 16–34)
ECHO LA VOLUME INDEX MOD A4C: 20 ML/M2 (ref 16–34)
ECHO LV E' LATERAL VELOCITY: 7.49 CM/S
ECHO LV E' SEPTAL VELOCITY: 5.45 CM/S
ECHO LV EDV A2C: 37 ML
ECHO LV EDV A4C: 50 ML
ECHO LV EDV BP: 44 ML (ref 67–155)
ECHO LV EDV INDEX A4C: 24 ML/M2
ECHO LV EDV INDEX BP: 21 ML/M2
ECHO LV EDV NDEX A2C: 18 ML/M2
ECHO LV EF PHYSICIAN: 60 %
ECHO LV EJECTION FRACTION A2C: 55 %
ECHO LV EJECTION FRACTION A4C: 53 %
ECHO LV EJECTION FRACTION BIPLANE: 54 % (ref 55–100)
ECHO LV ESV A2C: 17 ML
ECHO LV ESV A4C: 24 ML
ECHO LV ESV BP: 20 ML (ref 22–58)
ECHO LV ESV INDEX A2C: 8 ML/M2
ECHO LV ESV INDEX A4C: 12 ML/M2
ECHO LV ESV INDEX BP: 10 ML/M2
ECHO LV FRACTIONAL SHORTENING: 27 % (ref 28–44)
ECHO LV INTERNAL DIMENSION DIASTOLE INDEX: 2.12 CM/M2
ECHO LV INTERNAL DIMENSION DIASTOLIC: 4.4 CM (ref 4.2–5.9)
ECHO LV INTERNAL DIMENSION SYSTOLIC INDEX: 1.54 CM/M2
ECHO LV INTERNAL DIMENSION SYSTOLIC: 3.2 CM
ECHO LV IVSD: 1 CM (ref 0.6–1)
ECHO LV MASS 2D: 158.2 G (ref 88–224)
ECHO LV MASS INDEX 2D: 76.1 G/M2 (ref 49–115)
ECHO LV POSTERIOR WALL DIASTOLIC: 1.1 CM (ref 0.6–1)
ECHO LV RELATIVE WALL THICKNESS RATIO: 0.5
ECHO LVOT AREA: 3.5 CM2
ECHO LVOT AV VTI INDEX: 0.91
ECHO LVOT DIAM: 2.1 CM
ECHO LVOT MEAN GRADIENT: 2 MMHG
ECHO LVOT PEAK GRADIENT: 4 MMHG
ECHO LVOT PEAK VELOCITY: 1 M/S
ECHO LVOT STROKE VOLUME INDEX: 33.5 ML/M2
ECHO LVOT SV: 69.6 ML
ECHO LVOT VTI: 20.1 CM
ECHO MV A VELOCITY: 0.78 M/S
ECHO MV AREA VTI: 3.2 CM2
ECHO MV E DECELERATION TIME (DT): 279.5 MS
ECHO MV E VELOCITY: 0.5 M/S
ECHO MV E/A RATIO: 0.64
ECHO MV E/E' LATERAL: 6.68
ECHO MV E/E' RATIO (AVERAGED): 7.92
ECHO MV E/E' SEPTAL: 9.17
ECHO MV LVOT VTI INDEX: 1.09
ECHO MV MAX VELOCITY: 1 M/S
ECHO MV MEAN GRADIENT: 1 MMHG
ECHO MV MEAN VELOCITY: 0.5 M/S
ECHO MV PEAK GRADIENT: 4 MMHG
ECHO MV VTI: 22 CM
ECHO PV MAX VELOCITY: 1.1 M/S
ECHO PV PEAK GRADIENT: 5 MMHG
ECHO RA END SYSTOLIC VOLUME APICAL 4 CHAMBER INDEX BSA: 18 ML/M2
ECHO RA VOLUME: 37 ML
ECHO RIGHT VENTRICULAR SYSTOLIC PRESSURE (RVSP): 21 MMHG
ECHO RV BASAL DIMENSION: 4 CM
ECHO RV FREE WALL PEAK S': 11.8 CM/S
ECHO RV TAPSE: 2.1 CM (ref 1.7–?)
ECHO RVOT PEAK GRADIENT: 2 MMHG
ECHO RVOT PEAK VELOCITY: 0.6 M/S
ECHO TV REGURGITANT MAX VELOCITY: 2.11 M/S
ECHO TV REGURGITANT PEAK GRADIENT: 18 MMHG

## 2025-08-06 PROCEDURE — 93306 TTE W/DOPPLER COMPLETE: CPT | Performed by: INTERNAL MEDICINE

## 2025-08-06 PROCEDURE — 93880 EXTRACRANIAL BILAT STUDY: CPT | Performed by: INTERNAL MEDICINE

## 2025-08-07 LAB
VAS LEFT CCA DIST EDV: 11.9 CM/S
VAS LEFT CCA DIST PSV: 61 CM/S
VAS LEFT CCA PROX EDV: 21.2 CM/S
VAS LEFT CCA PROX PSV: 103.4 CM/S
VAS LEFT ECA EDV: 9.6 CM/S
VAS LEFT ECA PSV: 52.4 CM/S
VAS LEFT ICA DIST EDV: 18.2 CM/S
VAS LEFT ICA DIST PSV: 61.3 CM/S
VAS LEFT ICA MID EDV: 23.2 CM/S
VAS LEFT ICA MID PSV: 69.5 CM/S
VAS LEFT ICA PROX EDV: 12.8 CM/S
VAS LEFT ICA PROX PSV: 49.1 CM/S
VAS LEFT ICA/CCA PSV: 1.1 NO UNITS
VAS LEFT VERTEBRAL EDV: 11.9 CM/S
VAS LEFT VERTEBRAL PSV: 42.4 CM/S
VAS RIGHT CCA DIST EDV: 14.2 CM/S
VAS RIGHT CCA DIST PSV: 66.9 CM/S
VAS RIGHT CCA PROX EDV: 12.9 CM/S
VAS RIGHT CCA PROX PSV: 63.8 CM/S
VAS RIGHT ECA EDV: 8.7 CM/S
VAS RIGHT ECA PSV: 74.6 CM/S
VAS RIGHT ICA DIST EDV: 9.1 CM/S
VAS RIGHT ICA DIST PSV: 39 CM/S
VAS RIGHT ICA MID EDV: 13 CM/S
VAS RIGHT ICA MID PSV: 44.4 CM/S
VAS RIGHT ICA PROX EDV: 12 CM/S
VAS RIGHT ICA PROX PSV: 50.4 CM/S
VAS RIGHT ICA/CCA PSV: 0.8 NO UNITS
VAS RIGHT VERTEBRAL EDV: 6 CM/S
VAS RIGHT VERTEBRAL PSV: 28.2 CM/S

## 2025-08-12 ENCOUNTER — CLINICAL DOCUMENTATION (OUTPATIENT)
Age: 80
End: 2025-08-12

## 2025-08-14 DIAGNOSIS — M1A.0710 IDIOPATHIC CHRONIC GOUT OF RIGHT ANKLE WITHOUT TOPHUS: ICD-10-CM

## 2025-08-17 RX ORDER — FEBUXOSTAT 40 MG/1
40 TABLET, FILM COATED ORAL DAILY
Qty: 90 TABLET | Refills: 0 | Status: SHIPPED | OUTPATIENT
Start: 2025-08-17

## 2025-08-27 ENCOUNTER — PHARMACY VISIT (OUTPATIENT)
Dept: PRIMARY CARE CLINIC | Facility: CLINIC | Age: 80
End: 2025-08-27

## 2025-08-27 DIAGNOSIS — E11.65 UNCONTROLLED TYPE 2 DIABETES MELLITUS WITH HYPERGLYCEMIA (HCC): Primary | ICD-10-CM

## 2025-09-04 ENCOUNTER — TELEPHONE (OUTPATIENT)
Dept: PRIMARY CARE CLINIC | Facility: CLINIC | Age: 80
End: 2025-09-04

## (undated) DEVICE — Z DISCONTINUED NO SUB IDED SET EXTN W/ 4 W STPCOCK M SPIN LOK 36IN

## (undated) DEVICE — COVER LT HNDL PLAS RIG 1 PER PK

## (undated) DEVICE — PREP SKN CHLRAPRP APL 26ML STR --

## (undated) DEVICE — CONTAINER SPEC 20 ML LID NEUT BUFF FORMALIN 10 % POLYPR STS

## (undated) DEVICE — MAX-CORE® DISPOSABLE CORE BIOPSY INSTRUMENT, 16G X 16CM: Brand: MAX-CORE

## (undated) DEVICE — SNARE ENDOSCP M L240CM W27MM SHTH DIA2.4MM CHN 2.8MM OVL

## (undated) DEVICE — CANN NASAL O2 CAPNOGRAPHY AD -- FILTERLINE

## (undated) DEVICE — REM POLYHESIVE ADULT PATIENT RETURN ELECTRODE: Brand: VALLEYLAB

## (undated) DEVICE — CLICKLINE SCISSORS INSERT: Brand: CLICKLINE

## (undated) DEVICE — APPLIER CLP M/L SHFT DIA5MM 15 LIG LIGAMAX 5

## (undated) DEVICE — SET ADMIN 16ML TBNG L100IN 2 Y INJ SITE IV PIGGY BK DISP

## (undated) DEVICE — SET EXTN TBNG L BOR 4 W STPCOCK ST 32IN PRIMING VOL 6ML

## (undated) DEVICE — DRAPE,UTILTY,TAPE,15X26, 4EA/PK: Brand: MEDLINE

## (undated) DEVICE — SURGICAL PROCEDURE KIT GEN LAPAROSCOPY LF

## (undated) DEVICE — STERILE POLYISOPRENE POWDER-FREE SURGICAL GLOVES WITH EMOLLIENT COATING: Brand: PROTEXIS

## (undated) DEVICE — TUBING HYDR IRR --

## (undated) DEVICE — ORISE PROKNIFE 1.5 MM ELECTRODE: Brand: ORISE™ PROKNIFE

## (undated) DEVICE — INFECTION CONTROL KIT SYS

## (undated) DEVICE — 1200 GUARD II KIT W/5MM TUBE W/O VAC TUBE: Brand: GUARDIAN

## (undated) DEVICE — CATH IV AUTOGRD BC BLU 22GA 25 -- INSYTE

## (undated) DEVICE — FORCEPS BX L240CM JAW DIA2.8MM L CAP W/ NDL MIC MESH TOOTH

## (undated) DEVICE — SUTURE SZ 0 27IN 5/8 CIR UR-6  TAPER PT VIOLET ABSRB VICRYL J603H

## (undated) DEVICE — KIT IV STRT W CHLORAPREP PD 1ML

## (undated) DEVICE — BW-412T DISP COMBO CLEANING BRUSH: Brand: SINGLE USE COMBINATION CLEANING BRUSH

## (undated) DEVICE — TROCAR: Brand: KII® OPTICAL ACCESS SYSTEM

## (undated) DEVICE — ENDOLOOP SUT LIGATURE 18IN -- 12/BX PDS II

## (undated) DEVICE — TRAY BX SFT TISS W/ RUL ALC PREP PD FEN DRP TWL LUERLOCK

## (undated) DEVICE — BAG BELONG PT PERS CLEAR HANDL

## (undated) DEVICE — ENDO CARRY-ON PROCEDURE KIT INCLUDES ENZYMATIC SPONGE, GAUZE, BIOHAZARD LABEL, TRAY, LUBRICANT, DIRTY SCOPE LABEL, WATER LABEL, TRAY, DRAWSTRING PAD, AND DEFENDO 4-PIECE KIT.: Brand: ENDO CARRY-ON PROCEDURE KIT

## (undated) DEVICE — KENDALL RADIOLUCENT FOAM MONITORING ELECTRODE -RECTANGULAR SHAPE: Brand: KENDALL

## (undated) DEVICE — Device

## (undated) DEVICE — BAG SPEC BIOHZD LF 2MIL 6X10IN -- CONVERT TO ITEM 357326

## (undated) DEVICE — TROCAR SITE CLOSURE DEVICE: Brand: ENDO CLOSE

## (undated) DEVICE — 4-PORT MANIFOLD: Brand: NEPTUNE 2

## (undated) DEVICE — GARMENT,MEDLINE,DVT,INT,CALF,MED, GEN2: Brand: MEDLINE

## (undated) DEVICE — AIRLIFE™ U/CONNECT-IT OXYGEN TUBING 7 FEET (2.1 M) CRUSH-RESISTANT OXYGEN TUBING, VINYL TIPPED: Brand: AIRLIFE™

## (undated) DEVICE — LAPAROSCOPIC TROCAR SLEEVE/SINGLE USE: Brand: KII® OPTICAL ACCESS SYSTEM

## (undated) DEVICE — STRAP,POSITIONING,KNEE/BODY,FOAM,4X60": Brand: MEDLINE

## (undated) DEVICE — SOLIDIFIER FLUID 3000 CC ABSORB

## (undated) DEVICE — TROCAR: Brand: KII® SLEEVE

## (undated) DEVICE — Z DISCONTINUED USE 2751540 TUBING IRRIG L10IN DISP PMP ENDOGATOR

## (undated) DEVICE — DERMABOND SKIN ADH 0.7ML -- DERMABOND ADVANCED 12/BX

## (undated) DEVICE — NEEDLE HYPO 18GA L1.5IN PNK S STL HUB POLYPR SHLD REG BVL

## (undated) DEVICE — ORISE PROKNIFE 3.0 MM ELECTRODE: Brand: ORISE™ PROKNIFE

## (undated) DEVICE — DISSECTOR ULTRASONIC L39CM CRV JAW CRDLSS SONICISION

## (undated) DEVICE — TRAP SURG QUAD PARABOLA SLOT DSGN SFTY SCRN TRAPEASE

## (undated) DEVICE — FILTER SMK EVAC FLO CLR MEGADYNE

## (undated) DEVICE — NEEDLE HYPO 22GA L1.5IN BLK S STL HUB POLYPR SHLD REG BVL

## (undated) DEVICE — SUTURE MCRYL SZ 4-0 L27IN ABSRB UD L19MM PS-2 1/2 CIR PRIM Y426H

## (undated) DEVICE — SYRINGE MED 20ML STD CLR PLAS LUERLOCK TIP N CTRL DISP

## (undated) DEVICE — UNIVERSAL STAPLER: Brand: ENDO GIA ULTRA

## (undated) DEVICE — BAG SPEC REM 224ML W4XL6IN DIA10MM 1 HND GYN DISP ENDOPCH

## (undated) DEVICE — QUILTED PREMIUM COMFORT UNDERPAD,EXTRA HEAVY: Brand: WINGS

## (undated) DEVICE — CONNECTOR TBNG AUX H2O JET DISP FOR OLY 160/180 SER

## (undated) DEVICE — ELECTRODE ES 36CM LAP FLAT L HK COAT DISP CLEANCOAT